# Patient Record
Sex: FEMALE | Race: WHITE | Employment: OTHER | URBAN - METROPOLITAN AREA
[De-identification: names, ages, dates, MRNs, and addresses within clinical notes are randomized per-mention and may not be internally consistent; named-entity substitution may affect disease eponyms.]

---

## 2017-01-26 DIAGNOSIS — Z12.31 ENCOUNTER FOR SCREENING MAMMOGRAM FOR BREAST CANCER: Primary | ICD-10-CM

## 2017-02-22 ENCOUNTER — OFFICE VISIT (OUTPATIENT)
Dept: FAMILY MEDICINE CLINIC | Age: 72
End: 2017-02-22

## 2017-02-22 VITALS
DIASTOLIC BLOOD PRESSURE: 69 MMHG | BODY MASS INDEX: 23.99 KG/M2 | HEART RATE: 60 BPM | SYSTOLIC BLOOD PRESSURE: 137 MMHG | OXYGEN SATURATION: 97 % | WEIGHT: 140.5 LBS | HEIGHT: 64 IN

## 2017-02-22 DIAGNOSIS — E78.00 PURE HYPERCHOLESTEROLEMIA: ICD-10-CM

## 2017-02-22 DIAGNOSIS — I25.10 CORONARY ARTERY DISEASE INVOLVING NATIVE CORONARY ARTERY OF NATIVE HEART WITHOUT ANGINA PECTORIS: ICD-10-CM

## 2017-02-22 DIAGNOSIS — E11.42 TYPE 2 DIABETES MELLITUS WITH DIABETIC POLYNEUROPATHY, WITHOUT LONG-TERM CURRENT USE OF INSULIN (HCC): Primary | ICD-10-CM

## 2017-02-22 DIAGNOSIS — I48.0 PAROXYSMAL ATRIAL FIBRILLATION (HCC): ICD-10-CM

## 2017-02-22 DIAGNOSIS — I73.9 PVD (PERIPHERAL VASCULAR DISEASE) (HCC): ICD-10-CM

## 2017-02-22 DIAGNOSIS — I10 ESSENTIAL HYPERTENSION: ICD-10-CM

## 2017-02-22 LAB
A/G RATIO: 1.3 (ref 1.1–2.2)
ALBUMIN SERPL-MCNC: 4.3 G/DL (ref 3.4–5)
ALP BLD-CCNC: 72 U/L (ref 40–129)
ALT SERPL-CCNC: 18 U/L (ref 10–40)
ANION GAP SERPL CALCULATED.3IONS-SCNC: 12 MMOL/L (ref 3–16)
AST SERPL-CCNC: 15 U/L (ref 15–37)
BILIRUB SERPL-MCNC: 0.5 MG/DL (ref 0–1)
BUN BLDV-MCNC: 26 MG/DL (ref 7–20)
CALCIUM SERPL-MCNC: 9.9 MG/DL (ref 8.3–10.6)
CHLORIDE BLD-SCNC: 96 MMOL/L (ref 99–110)
CHOLESTEROL, TOTAL: 139 MG/DL (ref 0–199)
CO2: 25 MMOL/L (ref 21–32)
CREAT SERPL-MCNC: 0.8 MG/DL (ref 0.6–1.2)
CREATININE URINE POCT: ABNORMAL
GFR AFRICAN AMERICAN: >60
GFR NON-AFRICAN AMERICAN: >60
GLOBULIN: 3.2 G/DL
GLUCOSE BLD-MCNC: 193 MG/DL (ref 70–99)
HDLC SERPL-MCNC: 54 MG/DL (ref 40–60)
LDL CHOLESTEROL CALCULATED: 71 MG/DL
MICROALBUMIN/CREAT 24H UR: 50 MG/G{CREAT}
MICROALBUMIN/CREAT UR-RTO: ABNORMAL
POTASSIUM SERPL-SCNC: 4.3 MMOL/L (ref 3.5–5.1)
SODIUM BLD-SCNC: 133 MMOL/L (ref 136–145)
TOTAL PROTEIN: 7.5 G/DL (ref 6.4–8.2)
TRIGL SERPL-MCNC: 69 MG/DL (ref 0–150)
VLDLC SERPL CALC-MCNC: 14 MG/DL

## 2017-02-22 PROCEDURE — 3017F COLORECTAL CA SCREEN DOC REV: CPT | Performed by: FAMILY MEDICINE

## 2017-02-22 PROCEDURE — 1090F PRES/ABSN URINE INCON ASSESS: CPT | Performed by: FAMILY MEDICINE

## 2017-02-22 PROCEDURE — 1123F ACP DISCUSS/DSCN MKR DOCD: CPT | Performed by: FAMILY MEDICINE

## 2017-02-22 PROCEDURE — G8400 PT W/DXA NO RESULTS DOC: HCPCS | Performed by: FAMILY MEDICINE

## 2017-02-22 PROCEDURE — 3014F SCREEN MAMMO DOC REV: CPT | Performed by: FAMILY MEDICINE

## 2017-02-22 PROCEDURE — 99214 OFFICE O/P EST MOD 30 MIN: CPT | Performed by: FAMILY MEDICINE

## 2017-02-22 PROCEDURE — 36415 COLL VENOUS BLD VENIPUNCTURE: CPT | Performed by: FAMILY MEDICINE

## 2017-02-22 PROCEDURE — G8484 FLU IMMUNIZE NO ADMIN: HCPCS | Performed by: FAMILY MEDICINE

## 2017-02-22 PROCEDURE — G8598 ASA/ANTIPLAT THER USED: HCPCS | Performed by: FAMILY MEDICINE

## 2017-02-22 PROCEDURE — 82044 UR ALBUMIN SEMIQUANTITATIVE: CPT | Performed by: FAMILY MEDICINE

## 2017-02-22 PROCEDURE — 3044F HG A1C LEVEL LT 7.0%: CPT | Performed by: FAMILY MEDICINE

## 2017-02-22 PROCEDURE — G8420 CALC BMI NORM PARAMETERS: HCPCS | Performed by: FAMILY MEDICINE

## 2017-02-22 PROCEDURE — 4040F PNEUMOC VAC/ADMIN/RCVD: CPT | Performed by: FAMILY MEDICINE

## 2017-02-22 PROCEDURE — 1036F TOBACCO NON-USER: CPT | Performed by: FAMILY MEDICINE

## 2017-02-22 PROCEDURE — G8427 DOCREV CUR MEDS BY ELIG CLIN: HCPCS | Performed by: FAMILY MEDICINE

## 2017-02-23 LAB
ESTIMATED AVERAGE GLUCOSE: 240.3 MG/DL
HBA1C MFR BLD: 10 %

## 2017-04-13 RX ORDER — LISINOPRIL 10 MG/1
TABLET ORAL
Qty: 90 TABLET | Refills: 1 | Status: SHIPPED | OUTPATIENT
Start: 2017-04-13 | End: 2017-04-14 | Stop reason: SDUPTHER

## 2017-04-13 RX ORDER — SIMVASTATIN 40 MG
TABLET ORAL
Qty: 90 TABLET | Refills: 1 | Status: SHIPPED | OUTPATIENT
Start: 2017-04-13 | End: 2017-04-14 | Stop reason: SDUPTHER

## 2017-04-14 RX ORDER — LISINOPRIL 10 MG/1
TABLET ORAL
Qty: 90 TABLET | Refills: 3 | Status: SHIPPED | OUTPATIENT
Start: 2017-04-14 | End: 2017-06-30 | Stop reason: DRUGHIGH

## 2017-04-14 RX ORDER — SIMVASTATIN 40 MG
TABLET ORAL
Qty: 90 TABLET | Refills: 3 | Status: SHIPPED | OUTPATIENT
Start: 2017-04-14 | End: 2017-06-30 | Stop reason: ALTCHOICE

## 2017-05-15 ENCOUNTER — HOSPITAL ENCOUNTER (OUTPATIENT)
Dept: OTHER | Age: 72
Discharge: HOME | End: 2017-05-15
Attending: PODIATRIST | Admitting: PODIATRIST

## 2017-05-15 DIAGNOSIS — L97.509 DIABETIC FOOT ULCER ASSOCIATED WITH DIABETES MELLITUS DUE TO UNDERLYING CONDITION, UNSPECIFIED LATERALITY: ICD-10-CM

## 2017-05-15 DIAGNOSIS — E08.621 DIABETIC FOOT ULCER ASSOCIATED WITH DIABETES MELLITUS DUE TO UNDERLYING CONDITION, UNSPECIFIED LATERALITY: ICD-10-CM

## 2017-05-15 LAB
A/G RATIO: 1.2 (ref 1.1–2.2)
ALBUMIN SERPL-MCNC: 4.1 G/DL (ref 3.4–5)
ALP BLD-CCNC: 77 U/L (ref 40–129)
ALT SERPL-CCNC: 19 U/L (ref 10–40)
ANION GAP SERPL CALCULATED.3IONS-SCNC: 14 MMOL/L (ref 3–16)
AST SERPL-CCNC: 18 U/L (ref 15–37)
BASOPHILS ABSOLUTE: 0.1 K/UL (ref 0–0.2)
BASOPHILS RELATIVE PERCENT: 1.1 %
BILIRUB SERPL-MCNC: 0.4 MG/DL (ref 0–1)
BUN BLDV-MCNC: 26 MG/DL (ref 7–20)
CALCIUM SERPL-MCNC: 9.8 MG/DL (ref 8.3–10.6)
CHLORIDE BLD-SCNC: 95 MMOL/L (ref 99–110)
CO2: 25 MMOL/L (ref 21–32)
CREAT SERPL-MCNC: 1.1 MG/DL (ref 0.6–1.2)
EOSINOPHILS ABSOLUTE: 0.3 K/UL (ref 0–0.6)
EOSINOPHILS RELATIVE PERCENT: 3.1 %
GFR AFRICAN AMERICAN: 59
GFR NON-AFRICAN AMERICAN: 49
GLOBULIN: 3.4 G/DL
GLUCOSE BLD-MCNC: 303 MG/DL (ref 70–99)
HCT VFR BLD CALC: 33.4 % (ref 36–48)
HEMOGLOBIN: 11.2 G/DL (ref 12–16)
LYMPHOCYTES ABSOLUTE: 2.6 K/UL (ref 1–5.1)
LYMPHOCYTES RELATIVE PERCENT: 25.1 %
MCH RBC QN AUTO: 27.1 PG (ref 26–34)
MCHC RBC AUTO-ENTMCNC: 33.6 G/DL (ref 31–36)
MCV RBC AUTO: 80.6 FL (ref 80–100)
MONOCYTES ABSOLUTE: 0.6 K/UL (ref 0–1.3)
MONOCYTES RELATIVE PERCENT: 5.8 %
NEUTROPHILS ABSOLUTE: 6.8 K/UL (ref 1.7–7.7)
NEUTROPHILS RELATIVE PERCENT: 64.9 %
PDW BLD-RTO: 14.4 % (ref 12.4–15.4)
PLATELET # BLD: 183 K/UL (ref 135–450)
PMV BLD AUTO: 7.5 FL (ref 5–10.5)
POTASSIUM SERPL-SCNC: 4.2 MMOL/L (ref 3.5–5.1)
RBC # BLD: 4.15 M/UL (ref 4–5.2)
SEDIMENTATION RATE, ERYTHROCYTE: 37 MM/HR (ref 0–30)
SODIUM BLD-SCNC: 134 MMOL/L (ref 136–145)
TOTAL PROTEIN: 7.5 G/DL (ref 6.4–8.2)
VITAMIN B-12: 603 PG/ML (ref 211–911)
WBC # BLD: 10.4 K/UL (ref 4–11)

## 2017-05-16 LAB
ESTIMATED AVERAGE GLUCOSE: 205.9 MG/DL
HBA1C MFR BLD: 8.8 %
VITAMIN D 25-HYDROXY: 14.1 NG/ML

## 2017-05-20 ENCOUNTER — OFFICE VISIT (OUTPATIENT)
Dept: FAMILY MEDICINE CLINIC | Age: 72
End: 2017-05-20

## 2017-05-20 VITALS
WEIGHT: 137.13 LBS | HEART RATE: 79 BPM | SYSTOLIC BLOOD PRESSURE: 181 MMHG | DIASTOLIC BLOOD PRESSURE: 89 MMHG | OXYGEN SATURATION: 99 % | BODY MASS INDEX: 23.91 KG/M2

## 2017-05-20 DIAGNOSIS — M86.172 OSTEOMYELITIS OF ANKLE OR FOOT, LEFT, ACUTE (HCC): ICD-10-CM

## 2017-05-20 DIAGNOSIS — I70.203 ATHEROSCLEROSIS OF NATIVE ARTERY OF BOTH LOWER EXTREMITIES, WITH UNSPECIFIED PRESENCE OF CLINICAL MANIFESTATION (HCC): ICD-10-CM

## 2017-05-20 DIAGNOSIS — L03.119 CELLULITIS OF FOOT: Primary | ICD-10-CM

## 2017-05-20 DIAGNOSIS — E55.9 VITAMIN D DEFICIENCY: ICD-10-CM

## 2017-05-20 PROCEDURE — 1036F TOBACCO NON-USER: CPT | Performed by: FAMILY MEDICINE

## 2017-05-20 PROCEDURE — G8400 PT W/DXA NO RESULTS DOC: HCPCS | Performed by: FAMILY MEDICINE

## 2017-05-20 PROCEDURE — G8420 CALC BMI NORM PARAMETERS: HCPCS | Performed by: FAMILY MEDICINE

## 2017-05-20 PROCEDURE — 1090F PRES/ABSN URINE INCON ASSESS: CPT | Performed by: FAMILY MEDICINE

## 2017-05-20 PROCEDURE — 3014F SCREEN MAMMO DOC REV: CPT | Performed by: FAMILY MEDICINE

## 2017-05-20 PROCEDURE — G8427 DOCREV CUR MEDS BY ELIG CLIN: HCPCS | Performed by: FAMILY MEDICINE

## 2017-05-20 PROCEDURE — G8598 ASA/ANTIPLAT THER USED: HCPCS | Performed by: FAMILY MEDICINE

## 2017-05-20 PROCEDURE — 3017F COLORECTAL CA SCREEN DOC REV: CPT | Performed by: FAMILY MEDICINE

## 2017-05-20 PROCEDURE — 4040F PNEUMOC VAC/ADMIN/RCVD: CPT | Performed by: FAMILY MEDICINE

## 2017-05-20 PROCEDURE — 1123F ACP DISCUSS/DSCN MKR DOCD: CPT | Performed by: FAMILY MEDICINE

## 2017-05-20 PROCEDURE — 99214 OFFICE O/P EST MOD 30 MIN: CPT | Performed by: FAMILY MEDICINE

## 2017-05-20 RX ORDER — GENTAMICIN SULFATE 1 MG/G
CREAM TOPICAL
Status: ON HOLD | COMMUNITY
Start: 2017-05-15 | End: 2017-06-07 | Stop reason: HOSPADM

## 2017-05-20 RX ORDER — DOXYCYCLINE HYCLATE 100 MG/1
100 CAPSULE ORAL 2 TIMES DAILY
Qty: 20 CAPSULE | Refills: 0 | Status: SHIPPED | OUTPATIENT
Start: 2017-05-20 | End: 2017-05-30

## 2017-05-20 RX ORDER — LEVOFLOXACIN 500 MG/1
500 TABLET, FILM COATED ORAL DAILY
Qty: 10 TABLET | Refills: 0 | Status: SHIPPED | OUTPATIENT
Start: 2017-05-20 | End: 2017-05-30

## 2017-05-20 RX ORDER — AMMONIUM LACTATE 12 G/100G
CREAM TOPICAL
Status: ON HOLD | COMMUNITY
Start: 2017-05-15 | End: 2017-06-07 | Stop reason: HOSPADM

## 2017-05-20 RX ORDER — CHOLECALCIFEROL (VITAMIN D3) 1250 MCG
50000 CAPSULE ORAL
Qty: 4 CAPSULE | Refills: 3 | Status: SHIPPED | OUTPATIENT
Start: 2017-05-20 | End: 2017-08-22

## 2017-05-20 RX ORDER — HYDROCODONE BITARTRATE AND ACETAMINOPHEN 5; 325 MG/1; MG/1
1 TABLET ORAL EVERY 6 HOURS PRN
Qty: 100 TABLET | Refills: 0 | Status: ON HOLD | OUTPATIENT
Start: 2017-05-20 | End: 2017-06-07 | Stop reason: HOSPADM

## 2017-05-26 ENCOUNTER — OFFICE VISIT (OUTPATIENT)
Dept: FAMILY MEDICINE CLINIC | Age: 72
End: 2017-05-26

## 2017-05-26 VITALS
WEIGHT: 131.25 LBS | DIASTOLIC BLOOD PRESSURE: 80 MMHG | OXYGEN SATURATION: 98 % | TEMPERATURE: 97.7 F | HEART RATE: 62 BPM | BODY MASS INDEX: 22.89 KG/M2 | SYSTOLIC BLOOD PRESSURE: 151 MMHG

## 2017-05-26 DIAGNOSIS — I73.9 PVD (PERIPHERAL VASCULAR DISEASE) (HCC): ICD-10-CM

## 2017-05-26 DIAGNOSIS — I70.223 ATHEROSCLEROSIS OF NATIVE ARTERY OF BOTH LOWER EXTREMITIES WITH REST PAIN (HCC): Primary | ICD-10-CM

## 2017-05-26 PROCEDURE — 4040F PNEUMOC VAC/ADMIN/RCVD: CPT | Performed by: FAMILY MEDICINE

## 2017-05-26 PROCEDURE — 1090F PRES/ABSN URINE INCON ASSESS: CPT | Performed by: FAMILY MEDICINE

## 2017-05-26 PROCEDURE — 1123F ACP DISCUSS/DSCN MKR DOCD: CPT | Performed by: FAMILY MEDICINE

## 2017-05-26 PROCEDURE — 3017F COLORECTAL CA SCREEN DOC REV: CPT | Performed by: FAMILY MEDICINE

## 2017-05-26 PROCEDURE — G8598 ASA/ANTIPLAT THER USED: HCPCS | Performed by: FAMILY MEDICINE

## 2017-05-26 PROCEDURE — G8419 CALC BMI OUT NRM PARAM NOF/U: HCPCS | Performed by: FAMILY MEDICINE

## 2017-05-26 PROCEDURE — 3014F SCREEN MAMMO DOC REV: CPT | Performed by: FAMILY MEDICINE

## 2017-05-26 PROCEDURE — G8428 CUR MEDS NOT DOCUMENT: HCPCS | Performed by: FAMILY MEDICINE

## 2017-05-26 PROCEDURE — 1036F TOBACCO NON-USER: CPT | Performed by: FAMILY MEDICINE

## 2017-05-26 PROCEDURE — G8400 PT W/DXA NO RESULTS DOC: HCPCS | Performed by: FAMILY MEDICINE

## 2017-05-26 PROCEDURE — 99213 OFFICE O/P EST LOW 20 MIN: CPT | Performed by: FAMILY MEDICINE

## 2017-05-31 PROBLEM — L03.90 CELLULITIS: Status: ACTIVE | Noted: 2017-05-31

## 2017-06-30 ENCOUNTER — OFFICE VISIT (OUTPATIENT)
Dept: VASCULAR SURGERY | Age: 72
End: 2017-06-30

## 2017-06-30 VITALS
HEART RATE: 61 BPM | OXYGEN SATURATION: 98 % | SYSTOLIC BLOOD PRESSURE: 164 MMHG | DIASTOLIC BLOOD PRESSURE: 74 MMHG | BODY MASS INDEX: 22.18 KG/M2 | TEMPERATURE: 97.2 F | WEIGHT: 138 LBS | HEIGHT: 66 IN | RESPIRATION RATE: 16 BRPM

## 2017-06-30 DIAGNOSIS — Z09 POSTOP CHECK: Primary | ICD-10-CM

## 2017-06-30 DIAGNOSIS — Z95.828 S/P VASCULAR BYPASS: ICD-10-CM

## 2017-06-30 PROCEDURE — 99024 POSTOP FOLLOW-UP VISIT: CPT | Performed by: SURGERY

## 2017-06-30 RX ORDER — BISACODYL 10 MG
10 SUPPOSITORY, RECTAL RECTAL DAILY PRN
COMMUNITY
End: 2017-08-22

## 2017-06-30 RX ORDER — CLINDAMYCIN HYDROCHLORIDE 300 MG/1
300 CAPSULE ORAL 3 TIMES DAILY
COMMUNITY
End: 2017-08-22

## 2017-06-30 RX ORDER — ACETAMINOPHEN 325 MG/1
650 TABLET ORAL EVERY 6 HOURS PRN
COMMUNITY
End: 2017-08-22

## 2017-06-30 RX ORDER — HYDROCODONE BITARTRATE AND ACETAMINOPHEN 5; 325 MG/1; MG/1
1 TABLET ORAL EVERY 6 HOURS PRN
COMMUNITY
End: 2017-08-22

## 2017-06-30 RX ORDER — ATORVASTATIN CALCIUM 20 MG/1
20 TABLET, FILM COATED ORAL DAILY
COMMUNITY
End: 2017-08-22 | Stop reason: CLARIF

## 2017-06-30 RX ORDER — LISINOPRIL 20 MG/1
10 TABLET ORAL DAILY
COMMUNITY
End: 2017-08-22 | Stop reason: DRUGHIGH

## 2017-07-06 ENCOUNTER — TELEPHONE (OUTPATIENT)
Dept: CARDIOTHORACIC SURGERY | Age: 72
End: 2017-07-06

## 2017-07-20 RX ORDER — DIGOXIN 250 MCG
TABLET ORAL
Qty: 90 TABLET | Refills: 1 | Status: SHIPPED | OUTPATIENT
Start: 2017-07-20 | End: 2017-08-22 | Stop reason: SDUPTHER

## 2017-07-20 RX ORDER — HYDROCHLOROTHIAZIDE 25 MG/1
25 TABLET ORAL DAILY
Qty: 90 TABLET | Refills: 1 | Status: SHIPPED | OUTPATIENT
Start: 2017-07-20 | End: 2018-01-10 | Stop reason: SDUPTHER

## 2017-08-21 ENCOUNTER — HOSPITAL ENCOUNTER (OUTPATIENT)
Dept: VASCULAR LAB | Age: 72
Discharge: HOME | End: 2017-08-21
Attending: SURGERY | Admitting: SURGERY

## 2017-08-21 DIAGNOSIS — I73.9 PERIPHERAL VASCULAR DISEASE (HCC): ICD-10-CM

## 2017-08-22 ENCOUNTER — TELEPHONE (OUTPATIENT)
Dept: CARDIOTHORACIC SURGERY | Age: 72
End: 2017-08-22

## 2017-08-22 ENCOUNTER — OFFICE VISIT (OUTPATIENT)
Dept: FAMILY MEDICINE CLINIC | Age: 72
End: 2017-08-22

## 2017-08-22 VITALS
HEIGHT: 64 IN | DIASTOLIC BLOOD PRESSURE: 63 MMHG | HEART RATE: 45 BPM | WEIGHT: 124.38 LBS | SYSTOLIC BLOOD PRESSURE: 131 MMHG | BODY MASS INDEX: 21.24 KG/M2 | OXYGEN SATURATION: 98 %

## 2017-08-22 DIAGNOSIS — I10 ESSENTIAL HYPERTENSION: ICD-10-CM

## 2017-08-22 DIAGNOSIS — E78.00 PURE HYPERCHOLESTEROLEMIA: ICD-10-CM

## 2017-08-22 DIAGNOSIS — I73.9 PVD (PERIPHERAL VASCULAR DISEASE) (HCC): ICD-10-CM

## 2017-08-22 DIAGNOSIS — E11.42 TYPE 2 DIABETES MELLITUS WITH DIABETIC POLYNEUROPATHY, WITHOUT LONG-TERM CURRENT USE OF INSULIN (HCC): Primary | ICD-10-CM

## 2017-08-22 DIAGNOSIS — I48.0 PAROXYSMAL ATRIAL FIBRILLATION (HCC): ICD-10-CM

## 2017-08-22 LAB
ANION GAP SERPL CALCULATED.3IONS-SCNC: 15 MMOL/L (ref 3–16)
BUN BLDV-MCNC: 22 MG/DL (ref 7–20)
CALCIUM SERPL-MCNC: 9.3 MG/DL (ref 8.3–10.6)
CHLORIDE BLD-SCNC: 91 MMOL/L (ref 99–110)
CO2: 23 MMOL/L (ref 21–32)
CREAT SERPL-MCNC: 1.1 MG/DL (ref 0.6–1.2)
DIGOXIN LEVEL: 4.4 NG/ML (ref 0.8–2)
GFR AFRICAN AMERICAN: 59
GFR NON-AFRICAN AMERICAN: 49
GLUCOSE BLD-MCNC: 128 MG/DL (ref 70–99)
POTASSIUM SERPL-SCNC: 4.8 MMOL/L (ref 3.5–5.1)
SODIUM BLD-SCNC: 129 MMOL/L (ref 136–145)

## 2017-08-22 PROCEDURE — G8598 ASA/ANTIPLAT THER USED: HCPCS | Performed by: FAMILY MEDICINE

## 2017-08-22 PROCEDURE — 1090F PRES/ABSN URINE INCON ASSESS: CPT | Performed by: FAMILY MEDICINE

## 2017-08-22 PROCEDURE — G8427 DOCREV CUR MEDS BY ELIG CLIN: HCPCS | Performed by: FAMILY MEDICINE

## 2017-08-22 PROCEDURE — 3046F HEMOGLOBIN A1C LEVEL >9.0%: CPT | Performed by: FAMILY MEDICINE

## 2017-08-22 PROCEDURE — 3014F SCREEN MAMMO DOC REV: CPT | Performed by: FAMILY MEDICINE

## 2017-08-22 PROCEDURE — 3017F COLORECTAL CA SCREEN DOC REV: CPT | Performed by: FAMILY MEDICINE

## 2017-08-22 PROCEDURE — 99214 OFFICE O/P EST MOD 30 MIN: CPT | Performed by: FAMILY MEDICINE

## 2017-08-22 PROCEDURE — G8420 CALC BMI NORM PARAMETERS: HCPCS | Performed by: FAMILY MEDICINE

## 2017-08-22 PROCEDURE — G8400 PT W/DXA NO RESULTS DOC: HCPCS | Performed by: FAMILY MEDICINE

## 2017-08-22 PROCEDURE — 1123F ACP DISCUSS/DSCN MKR DOCD: CPT | Performed by: FAMILY MEDICINE

## 2017-08-22 PROCEDURE — 1036F TOBACCO NON-USER: CPT | Performed by: FAMILY MEDICINE

## 2017-08-22 PROCEDURE — 36415 COLL VENOUS BLD VENIPUNCTURE: CPT | Performed by: FAMILY MEDICINE

## 2017-08-22 PROCEDURE — 4040F PNEUMOC VAC/ADMIN/RCVD: CPT | Performed by: FAMILY MEDICINE

## 2017-08-22 RX ORDER — LISINOPRIL 10 MG/1
TABLET ORAL
COMMUNITY
Start: 2017-07-22 | End: 2018-04-30 | Stop reason: SDUPTHER

## 2017-08-22 RX ORDER — DIGOXIN 125 MCG
TABLET ORAL
Qty: 90 TABLET | Refills: 1 | Status: SHIPPED | OUTPATIENT
Start: 2017-08-22 | End: 2018-01-10 | Stop reason: SDUPTHER

## 2017-08-22 RX ORDER — TRAMADOL HYDROCHLORIDE 50 MG/1
TABLET ORAL
Refills: 0 | COMMUNITY
Start: 2017-07-06 | End: 2017-12-04 | Stop reason: ALTCHOICE

## 2017-08-22 RX ORDER — SIMVASTATIN 40 MG
TABLET ORAL
COMMUNITY
Start: 2017-07-22 | End: 2018-04-30 | Stop reason: SDUPTHER

## 2017-08-22 ASSESSMENT — PATIENT HEALTH QUESTIONNAIRE - PHQ9
SUM OF ALL RESPONSES TO PHQ9 QUESTIONS 1 & 2: 0
1. LITTLE INTEREST OR PLEASURE IN DOING THINGS: 0
2. FEELING DOWN, DEPRESSED OR HOPELESS: 0
SUM OF ALL RESPONSES TO PHQ QUESTIONS 1-9: 0

## 2017-08-23 DIAGNOSIS — E87.1 HYPONATREMIA: Primary | ICD-10-CM

## 2017-08-23 LAB
ESTIMATED AVERAGE GLUCOSE: 131.2 MG/DL
HBA1C MFR BLD: 6.2 %

## 2017-09-01 ENCOUNTER — NURSE ONLY (OUTPATIENT)
Dept: FAMILY MEDICINE CLINIC | Age: 72
End: 2017-09-01

## 2017-09-01 DIAGNOSIS — E87.1 HYPONATREMIA: ICD-10-CM

## 2017-09-01 LAB
ANION GAP SERPL CALCULATED.3IONS-SCNC: 16 MMOL/L (ref 3–16)
BUN BLDV-MCNC: 12 MG/DL (ref 7–20)
CALCIUM SERPL-MCNC: 10.1 MG/DL (ref 8.3–10.6)
CHLORIDE BLD-SCNC: 95 MMOL/L (ref 99–110)
CO2: 25 MMOL/L (ref 21–32)
CREAT SERPL-MCNC: 0.9 MG/DL (ref 0.6–1.2)
GFR AFRICAN AMERICAN: >60
GFR NON-AFRICAN AMERICAN: >60
GLUCOSE BLD-MCNC: 110 MG/DL (ref 70–99)
POTASSIUM SERPL-SCNC: 4.4 MMOL/L (ref 3.5–5.1)
SODIUM BLD-SCNC: 136 MMOL/L (ref 136–145)

## 2017-09-01 PROCEDURE — 36415 COLL VENOUS BLD VENIPUNCTURE: CPT | Performed by: FAMILY MEDICINE

## 2017-10-27 RX ORDER — HYDROCODONE BITARTRATE AND ACETAMINOPHEN 5; 325 MG/1; MG/1
1 TABLET ORAL EVERY 6 HOURS PRN
Qty: 60 TABLET | Refills: 0 | Status: SHIPPED | OUTPATIENT
Start: 2017-10-27 | End: 2017-11-29 | Stop reason: SDUPTHER

## 2017-10-27 NOTE — TELEPHONE ENCOUNTER
An [place] Autom[employer] Reporting System report was run and reviewed on the patient today. The results are consistent with the patient's treatment plan.

## 2017-11-28 ENCOUNTER — OFFICE VISIT (OUTPATIENT)
Dept: FAMILY MEDICINE CLINIC | Age: 72
End: 2017-11-28

## 2017-11-28 VITALS
BODY MASS INDEX: 22.73 KG/M2 | HEART RATE: 69 BPM | DIASTOLIC BLOOD PRESSURE: 74 MMHG | OXYGEN SATURATION: 100 % | WEIGHT: 130.38 LBS | TEMPERATURE: 98.1 F | SYSTOLIC BLOOD PRESSURE: 144 MMHG

## 2017-11-28 DIAGNOSIS — I48.0 PAROXYSMAL ATRIAL FIBRILLATION (HCC): ICD-10-CM

## 2017-11-28 DIAGNOSIS — I10 ESSENTIAL HYPERTENSION: ICD-10-CM

## 2017-11-28 DIAGNOSIS — I73.9 PVD (PERIPHERAL VASCULAR DISEASE) (HCC): ICD-10-CM

## 2017-11-28 DIAGNOSIS — L97.512 SKIN ULCER OF RIGHT FOOT WITH FAT LAYER EXPOSED (HCC): ICD-10-CM

## 2017-11-28 DIAGNOSIS — E11.42 TYPE 2 DIABETES MELLITUS WITH DIABETIC POLYNEUROPATHY, WITHOUT LONG-TERM CURRENT USE OF INSULIN (HCC): Primary | ICD-10-CM

## 2017-11-28 DIAGNOSIS — L97.911 ULCER OF RIGHT LOWER EXTREMITY, LIMITED TO BREAKDOWN OF SKIN (HCC): ICD-10-CM

## 2017-11-28 LAB — DIGOXIN LEVEL: 1.1 NG/ML (ref 0.8–2)

## 2017-11-28 PROCEDURE — G8484 FLU IMMUNIZE NO ADMIN: HCPCS | Performed by: FAMILY MEDICINE

## 2017-11-28 PROCEDURE — 3017F COLORECTAL CA SCREEN DOC REV: CPT | Performed by: FAMILY MEDICINE

## 2017-11-28 PROCEDURE — 36415 COLL VENOUS BLD VENIPUNCTURE: CPT | Performed by: FAMILY MEDICINE

## 2017-11-28 PROCEDURE — G8598 ASA/ANTIPLAT THER USED: HCPCS | Performed by: FAMILY MEDICINE

## 2017-11-28 PROCEDURE — G8420 CALC BMI NORM PARAMETERS: HCPCS | Performed by: FAMILY MEDICINE

## 2017-11-28 PROCEDURE — 3014F SCREEN MAMMO DOC REV: CPT | Performed by: FAMILY MEDICINE

## 2017-11-28 PROCEDURE — 4040F PNEUMOC VAC/ADMIN/RCVD: CPT | Performed by: FAMILY MEDICINE

## 2017-11-28 PROCEDURE — G8400 PT W/DXA NO RESULTS DOC: HCPCS | Performed by: FAMILY MEDICINE

## 2017-11-28 PROCEDURE — 1090F PRES/ABSN URINE INCON ASSESS: CPT | Performed by: FAMILY MEDICINE

## 2017-11-28 PROCEDURE — 99214 OFFICE O/P EST MOD 30 MIN: CPT | Performed by: FAMILY MEDICINE

## 2017-11-28 PROCEDURE — 1123F ACP DISCUSS/DSCN MKR DOCD: CPT | Performed by: FAMILY MEDICINE

## 2017-11-28 PROCEDURE — G8427 DOCREV CUR MEDS BY ELIG CLIN: HCPCS | Performed by: FAMILY MEDICINE

## 2017-11-28 PROCEDURE — 1036F TOBACCO NON-USER: CPT | Performed by: FAMILY MEDICINE

## 2017-11-28 PROCEDURE — 3044F HG A1C LEVEL LT 7.0%: CPT | Performed by: FAMILY MEDICINE

## 2017-11-28 NOTE — PATIENT INSTRUCTIONS
Please read the healthy family handout that you were given and share it with your family. Please compare this printed medication list with your medications at home to be sure they are the same. If you have any medications that are different please contact us immediately at [phone]. Also review your allergies that we have listed, these may also include medications that you have not been able to tolerate, make sure everything listed is correct. If you have any allergies that are different please contact us immediately at [phone].

## 2017-11-28 NOTE — PROGRESS NOTES
Subjective:   She presents for follow up of her diabetes atrial fibrillation hypertension PVD and chronic ulcerations. She states she has been going to podiatry since May and her toe was not getting any better she would like to see somebody different. She also has a shallow ulcer on her right leg that has not healed. She's been using antibiotic ointment on it she's been on oral antibiotics per podiatry. She admits she is still noncompliant with her diet her digoxin level was elevated on last blood work so we reduced her digoxin dose and her stomach is feeling better now she states her appetite has increased. We are going to recheck the level today. Blood pressures have been decently controlled        She is allergic to pcn [penicillins]. She   reports that she has never smoked. She has never used smokeless tobacco. Review of systems negative for chest pain shortness of breath wheezing abdominal pain and rectal bleeding positive for chronic leg swelling left usually worse than right  Objective:   BP (!) 144/74 (Site: Left Arm, Position: Sitting, Cuff Size: Small Adult)   Pulse 69   Temp 98.1 °F (36.7 °C) (Oral)   Wt 130 lb 6 oz (59.1 kg)   SpO2 100%   BMI 22.73 kg/m²   BP Readings from Last 3 Encounters:   11/28/17 (!) 144/74   08/22/17 131/63   06/30/17 (!) 164/74     Physical Exam   Constitutional: She is oriented to person, place, and time. She appears well-developed and well-nourished. Non-toxic appearance. HENT:   Head: Normocephalic. Eyes: Conjunctivae are normal. Right eye exhibits no discharge. Left eye exhibits no discharge. No scleral icterus. Neck: Neck supple. Carotid bruit is not present. No thyroid mass and no thyromegaly present. Cardiovascular: Normal rate, regular rhythm and normal heart sounds. No murmur heard. Pulmonary/Chest: Breath sounds normal. No respiratory distress. She has no wheezes. She has no rales. Abdominal: Soft. She exhibits no distension and no mass.  There is no hepatosplenomegaly. There is no tenderness. There is no rebound and no guarding. Musculoskeletal: She exhibits edema. Lymphadenopathy:     She has no cervical adenopathy. Right: No supraclavicular adenopathy present. Neurological: She is alert and oriented to person, place, and time. Skin: Skin is warm. No cyanosis. Psychiatric: She has a normal mood and affect. Her behavior is normal. Judgment and thought content normal.   Vitals reviewed. very shallow skin breakdown on left shin small ulceration left medial leg. Ulceration left great toe top  Assessment and Plan:  1. Type 2 diabetes mellitus with diabetic polyneuropathy, without long-term current use of insulin (HCC)  Hemoglobin A1C   2. Paroxysmal atrial fibrillation ([facility])  DIGOXIN LEVEL   3. Essential hypertension     4. PVD (peripheral vascular disease) ([facility]     5. Ulcer of right lower extremity, limited to breakdown of skin [facility])  [facility]   6. Skin ulcer of right foot with fat layer exposed ([facility]  [facility]   After long discussion will change to [facility] wound care center instead of podiatry in [facility] at patient request.  Patient did not want to go down to [facility] or [address] [place] [address] to see vascular surgeon  The problems listed in the assessment are stable unless otherwise indicated. She  was instructed to continue their current medications and treatment for the above  problems unless otherwise indicated above. Age-specific preventative medicine recommendations were reviewed with patient today and the Healthy Family Handout was given to patient. Avoid tobacco products exposure. Follow up in 3 mo. Call or return to office for any problems that develop before the next scheduled follow-up appointment. Jeremiha Monsivais M.D. Parts of this note were completed using voice recognition transcription.   Every effort was made to ensure accuracy; however, inadvertent transcription errors may be present.

## 2017-11-29 LAB
ESTIMATED AVERAGE GLUCOSE: 111.2 MG/DL
HBA1C MFR BLD: 5.5 %

## 2017-11-29 RX ORDER — HYDROCODONE BITARTRATE AND ACETAMINOPHEN 5; 325 MG/1; MG/1
1 TABLET ORAL EVERY 6 HOURS PRN
Qty: 60 TABLET | Refills: 0 | Status: SHIPPED | OUTPATIENT
Start: 2017-11-29 | End: 2018-02-12 | Stop reason: ALTCHOICE

## 2017-12-04 ENCOUNTER — HOSPITAL ENCOUNTER (OUTPATIENT)
Dept: WOUND CARE | Age: 72
Discharge: HOME | End: 2017-12-04
Attending: PODIATRIST | Admitting: PODIATRIST

## 2017-12-04 VITALS
DIASTOLIC BLOOD PRESSURE: 72 MMHG | TEMPERATURE: 98.2 F | RESPIRATION RATE: 17 BRPM | WEIGHT: 136.7 LBS | HEIGHT: 65 IN | HEART RATE: 78 BPM | BODY MASS INDEX: 22.78 KG/M2 | SYSTOLIC BLOOD PRESSURE: 165 MMHG

## 2017-12-04 NOTE — PROGRESS NOTES
[address] Progress Note      Dolores Gallo     : 1945    DATE OF VISIT:  2017    Subjective:     Dolores Gallo is a 67 y.o. female who has a chief complaint of a diabetic ulcer located on the left foot and right foot and left leg. Patient has been seeing Dr. Maricruz Richards for her wounds. Did have arterial procedure with Dr. Ángela Gastelum during the summer. States did see him once after the surgery for follow up. States that she has a lot of burning pain in her legs and feet. States that she thought it was due to medicine she used on her feet until she stopped using the medication and the burning continued. Has also been seen at the [address] [facility] in the past.  She is unsure if bone was removed from her great toe during that time. Did not have the 4th toe wound at that time. Ms. Marcelo Gregory has a past medical history of Atrial fibrillation ([address] Blood transfusion; CAD (coronary artery disease); Diabetes mellitus type II; Diabetic neuropathy ([address] Gastric ulcer; H. pylori infection; Hyperlipidemia; Hypertension; Numbness and tingling of left leg; Osteoarthritis; Poor historian; PVD (peripheral vascular disease) ([address] Unspecified cerebral artery occlusion with cerebral infarction; and upper gi bleed. She has a past surgical history that includes Coronary angioplasty with stent (244,4285); Cardiac catheterization (12); Total shoulder arthroplasty (10/5/12); angioplasty (12/30/15); and other surgical history (Left, 2017). Her family history includes Diabetes in her brother, maternal grandmother, and sister; Heart Disease in her father and mother; Stroke in her mother. Ms. Marcelo Gregory reports that she has never smoked. She has never used smokeless tobacco. She reports that she does not drink alcohol or use drugs.     Her current medication list consists of HYDROcodone-acetaminophen, aspirin, collagenase, digoxin, hydrochlorothiazide, lisinopril, metFORMIN, and simvastatin. Allergies: Pcn [penicillins]    Pertinent items from the review of systems are discussed in the HPI; the remainder of the ROS was reviewed and is negative. Objective:     BP (!) 165/72   Pulse 78   Temp 98.2 °F (36.8 °C) (Oral)   Resp 17   Ht 5' 5\" (1.651 m)   Wt 136 lb 11.2 oz (62 kg)   BMI 22.75 kg/m²   General Appearance: alert and oriented to person, place and time, well developed and well- nourished, in no acute distress  Skin: warm and dry, no rash or erythema  Head: normocephalic and atraumatic  Eyes: pupils equal, round, and reactive to light, extraocular eye movements intact, conjunctivae normal  ENT: tympanic membrane, external ear and ear canal normal bilaterally, nose without deformity, nasal mucosa and turbinates normal without polyps  Neck: supple and non-tender without mass, no thyromegaly or thyroid nodules, no cervical lymphadenopathy  Pulmonary/Chest: clear to auscultation bilaterally- no wheezes, rales or rhonchi, normal air movement, no respiratory distress  Cardiovascular: normal rate, regular rhythm, normal S1 and S2, no murmurs, rubs, clicks, or gallops, distal pulses intact, no carotid bruits  Abdomen: soft, non-tender, non-distended, normal bowel sounds, no masses or organomegaly.      Dorsalis pedis pulse left dopplerable  Posterior tibial pulse left dopplerable  Dorsalis pedis pulse right dopplerable  Posterior tibial pulse right dopplerable  Protective sensation absent bilateral LE      Ulcer on the posterior aspect right heel with no fibrotic tissue, red granulation tissue, mild serous drainage, no hyperkeratotic rim, no undermining, no tunneling, no purulence, no malodor, no eschar, no periwound maceration, no periwound erythema, no edema, no crepitus, no increase in skin temperature, ulcer probes to soft tissue only    Ulcer on the left hallux overlying the HIPJ with moderate fibrotic tissue, no granulation tissue, mild serous drainage, no hyperkeratotic rim, (safegel dry dressing ). Home treatment: good handwashing before and after any dressing changes. Cleanse ulcer with saline or soap & water before dressing change. May use Vaseline (petrolatum), Aquaphor, Aveeno, CeraVe, Cetaphil, Eucerin, Lubriderm, etc for dry skin. Dressing type for home: Continue topical ointment to wounds daily. Written discharge instructions given to patient. Offload ulcer(s) as directed. Elevate leg(s) as directed. Follow up in 1 week. Ordered xray to evaluate for osteomyelitis. Advised patient that most of the burning pain is due to peripheral neuropathy. Will need to follow up with Dr. Lorenza Flores. Review of notes in EPIC reveals concern for viability of toes even after arterial procedure with Dr. Lorenza Flores. Advised patient that given exposed bone there is a concern for the viability of her toes.          Electronically signed by Maru Kang DPM on 12/4/2017 at 4:43 PM.

## 2017-12-11 ENCOUNTER — HOSPITAL ENCOUNTER (OUTPATIENT)
Dept: WOUND CARE | Age: 72
Discharge: HOME | End: 2017-12-11
Attending: PODIATRIST | Admitting: PODIATRIST

## 2017-12-11 VITALS
TEMPERATURE: 98.1 F | RESPIRATION RATE: 16 BRPM | SYSTOLIC BLOOD PRESSURE: 158 MMHG | BODY MASS INDEX: 22.82 KG/M2 | HEIGHT: 65 IN | WEIGHT: 137 LBS | HEART RATE: 70 BPM | DIASTOLIC BLOOD PRESSURE: 72 MMHG

## 2017-12-11 ASSESSMENT — PAIN DESCRIPTION - FREQUENCY: FREQUENCY: INTERMITTENT

## 2017-12-11 ASSESSMENT — PAIN DESCRIPTION - ORIENTATION: ORIENTATION: LEFT

## 2017-12-11 ASSESSMENT — PAIN DESCRIPTION - ONSET: ONSET: ON-GOING

## 2017-12-11 ASSESSMENT — PAIN DESCRIPTION - PAIN TYPE: TYPE: CHRONIC PAIN

## 2017-12-11 ASSESSMENT — PAIN DESCRIPTION - DESCRIPTORS: DESCRIPTORS: BURNING

## 2017-12-11 ASSESSMENT — PAIN DESCRIPTION - PROGRESSION: CLINICAL_PROGRESSION: NOT CHANGED

## 2017-12-11 ASSESSMENT — PAIN SCALES - GENERAL: PAINLEVEL_OUTOF10: 5

## 2017-12-11 ASSESSMENT — PAIN DESCRIPTION - LOCATION: LOCATION: FOOT

## 2017-12-11 NOTE — PROGRESS NOTES
round, and reactive to light, extraocular eye movements intact, conjunctivae normal  ENT: tympanic membrane, external ear and ear canal normal bilaterally, nose without deformity, nasal mucosa and turbinates normal without polyps  Neck: supple and non-tender without mass, no thyromegaly or thyroid nodules, no cervical lymphadenopathy  Pulmonary/Chest: clear to auscultation bilaterally- no wheezes, rales or rhonchi, normal air movement, no respiratory distress  Cardiovascular: normal rate, regular rhythm, normal S1 and S2, no murmurs, rubs, clicks, or gallops, distal pulses intact, no carotid bruits  Abdomen: soft, non-tender, non-distended, normal bowel sounds, no masses or organomegaly.      Dorsalis pedis pulse left dopplerable  Posterior tibial pulse left dopplerable  Dorsalis pedis pulse right dopplerable  Posterior tibial pulse right dopplerable  Protective sensation absent bilateral LE      Ulcer on the posterior aspect right heel with no fibrotic tissue, red granulation tissue, mild serous drainage, no hyperkeratotic rim, no undermining, no tunneling, no purulence, no malodor, no eschar, no periwound maceration, no periwound erythema, no edema, no crepitus, no increase in skin temperature, ulcer probes to soft tissue only    Ulcer on the left hallux overlying the HIPJ with moderate fibrotic tissue, no granulation tissue, mild serous drainage, no hyperkeratotic rim, no undermining, no tunneling, no purulence, no malodor, no eschar,  no periwound maceration, mild periwound erythema, no edema, no crepitus, no increase in skin temperature, ulcer probes to bone    Ulcer on the left 4th digit overlying the PIPJ with mild fibrotic tissue, no granulation tissue, mild serous drainage, no hyperkeratotic rim, no undermining, no tunneling, no purulence, no malodor, no eschar, no periwound maceration, mild periwound erythema, mild edema, no crepitus, no increase in skin temperature, ulcer probes to bone that is  Noncompliance of patient with dietary regimen Z91.11    PVD (peripheral vascular disease) ([facility]) I73.9    Pure hypercholesterolemia E78.00    Coronary artery disease involving native coronary artery of native heart without angina pectoris I25.10    Essential hypertension I10    Type 2 diabetes mellitus with diabetic polyneuropathy, without long-term current use of insulin ([facility]) E11.42    Vitamin D deficiency E55.9    Cellulitis L03.90    Acute osteomyelitis of left foot ([facility]) M86.172       Assessment of today's active condition(s): diabetic foot ulcers bilateral, chronic ulcer left lower leg, diabetes mellitus, PAD. Factors contributing to occurrence and/or persistence of the chronic ulcer include diabetes and arterial insufficiency. Sharp debridement is indicated today, based upon the exam findings in the ulcer(s) above. Procedure note:     Consent obtained. Time out taken. Anesthetic  Anesthetic:  (post debridement)     After application of topical 4% lidocaine plain to the ulcers, the ulcer on left 4th digit was debrided of all fibrotic, necrotic, hyperkeratotic, nonviable and viable tissue through subcutaneous tissue and muscle and bone. This excised skin, subcutaneous tissue and muscle and bone with a scalpel and ronguer. Total Surface Area Debrided:  1 sq cm. The ulcer(s) were then irrigated with normal saline solution. The procedure was completed with a small amount of bleeding, and hemostasis was by pressure and by silver nitrate stick. The patient tolerated the procedure well, with no significant complications. The patient's level of pain during and after the procedure was monitored, and is noted in the wound documentation flowsheet. Discharge plan:     Treatment in the wound care center today:  . Home treatment: good handwashing before and after any dressing changes. Cleanse ulcer with saline or soap & water before dressing change.  May use Vaseline (petrolatum), Aquaphor,

## 2017-12-11 NOTE — PLAN OF CARE
Problem: Wound:  Goal: Will show signs of wound healing; wound closure and no evidence of infection  Will show signs of wound healing; wound closure and no evidence of infection   Outcome: Ongoing  Pt to the [facility] for follow up appointment. Dr Hosea Malik discussed Jacqui Pac results with patient. Pt to continue with santyl to wounds at home. Pt to follow up in the [facility] in 1 week. Discharge instructions reviewed with patient, all questions answered, copy given to patient. Dressings were applied to all wounds per M.D. Instructions at this visit.

## 2017-12-18 ENCOUNTER — HOSPITAL ENCOUNTER (OUTPATIENT)
Dept: WOUND CARE | Age: 72
Discharge: HOME | End: 2017-12-18
Attending: PODIATRIST | Admitting: PODIATRIST

## 2017-12-18 VITALS
TEMPERATURE: 97.9 F | WEIGHT: 139 LBS | RESPIRATION RATE: 17 BRPM | SYSTOLIC BLOOD PRESSURE: 156 MMHG | BODY MASS INDEX: 23.16 KG/M2 | HEART RATE: 65 BPM | HEIGHT: 65 IN | DIASTOLIC BLOOD PRESSURE: 76 MMHG

## 2017-12-18 NOTE — PROGRESS NOTES
[facility] Progress Note      Mali Mancini     : 1945    DATE OF VISIT:  2017    Subjective:     Mali Mancini is a 67 y.o. female who has a chief complaint of a diabetic ulcer located on the left foot and right foot and left leg. Ms. Kylee Buchanan has a past medical history of Atrial fibrillation ([facility] Blood transfusion; CAD (coronary artery disease); Diabetes mellitus type II; Diabetic neuropathy ([address] Gastric ulcer; H. pylori infection; Hyperlipidemia; Hypertension; Numbness and tingling of left leg; Osteoarthritis; Poor historian; PVD (peripheral vascular disease) ([facility] Unspecified cerebral artery occlusion with cerebral infarction; and upper gi bleed. She has a past surgical history that includes Coronary angioplasty with stent (9694,4038); Cardiac catheterization (12); Total shoulder arthroplasty (10/5/12); angioplasty (12/30/15); and other surgical history (Left, 2017). Her family history includes Diabetes in her brother, maternal grandmother, and sister; Heart Disease in her father and mother; Stroke in her mother. Ms. Kylee Buchanan reports that she has never smoked. She has never used smokeless tobacco. She reports that she does not drink alcohol or use drugs. Her current medication list consists of HYDROcodone-acetaminophen, aspirin, collagenase, digoxin, hydrochlorothiazide, lisinopril, metFORMIN, and simvastatin. Allergies: Pcn [penicillins]    Pertinent items from the review of systems are discussed in the HPI; the remainder of the ROS was reviewed and is negative.        Objective:     BP (!) 156/76   Pulse 65   Temp 97.9 °F (36.6 °C) (Oral)   Resp 17   Ht 5' 5\" (1.651 m)   Wt 139 lb (63 kg)   BMI 23.13 kg/m²   General Appearance: alert and oriented to person, place and time, well developed and well- nourished, in no acute distress  Skin: warm and dry, no rash or erythema  Head: normocephalic and atraumatic  Eyes: pupils equal, bone    Ulcer on the left lower leg medial aspect within cicatrix with mild to moderate fibrotic tissue, red granulation tissue, mild serous drainage, no hyperkeratotic rim, no undermining, no tunneling, no purulence, no malodor, no eschar,  mild periwound maceration, mild periwound erythema, mild edema, no crepitus, no increase in skin temperature, ulcer probes to soft tissue only         Today's ulcer measurements are in the wound documentation flowsheet.      Wound 12/04/17 #1- L medial lower leg cluster, diabetic leg ulcer, holbrook 1, onset 11/17, surgical-Wound Length (cm): 4.3 cm  Wound 12/04/17 #2- L dorsal great toe, diabetic, Holbrook 1, onset 05/17, trauma-Wound Length (cm): 0.9 cm  Wound 12/04/17 #3- L dorsal 4th toe, diabetic, Holbrook 1, onset 05/17, trauma-Wound Length (cm): 0.4 cm  Wound 12/04/17 #4- R heel, diabetic, H[place]rook 1, onset 11/17, trauma-Wound Length (cm): 0.7 cm  Wound 12/04/17 #1- L medial lower leg cluster, diabetic leg ulcer, holbrook 1, onset 11/17, surgical-Wound Width (cm): 0.6 cm  Wound 12/04/17 #2- L dorsal great toe, diabetic, Holbrook 1, onset 05/17, trauma-Wound Width (cm): 0.6 cm  Wound 12/04/17 #3- L dorsal 4th toe, diabetic, Holbrook 1, onset 05/17, trauma-Wound Width (cm): 0.3 cm  Wound 12/04/17 #4- R heel, diabetic, Holbrook 1, onset 11/17, trauma-Wound Width (cm): 0.2 cm  Wound 12/04/17 #1- L medial lower leg cluster, diabetic leg ulcer, holbrook 1, onset 11/17, surgical-Wound Depth (cm) : 1.3  Wound 12/04/17 #2- L dorsal great toe, diabetic, Holbrook 1, onset 05/17, trauma-Wound Depth (cm) : 0.3  Wound 12/04/17 #3- L dorsal 4th toe, diabetic, Holbrook 1, onset 05/17, trauma-Wound Depth (cm) : 0.2  Wound 12/04/17 #4- R heel, diabetic, Holbrook 1, onset 11/17, trauma-Wound Depth (cm) : 0.2    LABS  Lab Results   Component Value Date    LABA1C 5.5 11/28/2017         Assessment:     Patient Active Problem List   Diagnosis Code    Osteoarthritis M19.90    Paroxysmal atrial fibrillation ([facility] [address] I48.0  Noncompliance of patient with dietary regimen Z91.11    PVD (peripheral vascular disease) ([facility]) I73.9    Pure hypercholesterolemia E78.00    Coronary artery disease involving native coronary artery of native heart without angina pectoris I25.10    Essential hypertension I10    Type 2 diabetes mellitus with diabetic polyneuropathy, without long-term current use of insulin ([facility]) E11.42    Vitamin D deficiency E55.9    Cellulitis L03.90    Acute osteomyelitis of left foot ([facility]) M86.172       Assessment of today's active condition(s): diabetic foot ulcers bilateral, chronic ulcer left lower leg, diabetes mellitus, PAD. Factors contributing to occurrence and/or persistence of the chronic ulcer include diabetes and arterial insufficiency. Sharp debridement is indicated today, based upon the exam findings in the ulcer(s) above. Procedure note:     Consent obtained. Time out taken. Anesthetic  Anesthetic:  (post debridement)     After application of topical 4% lidocaine plain to the ulcer, the ulcer was debrided of all fibrotic, necrotic, hyperkeratotic tissue, nonviable and viable tissue through the subcutaneous tissue. This excised skin and subcutaneous tissue with a scalpel. Total Surface Area Debrided:  1 sq cm. The ulcer(s) were then irrigated with normal saline solution. The procedure was completed with a small amount of bleeding, and hemostasis was by pressure and by silver nitrate stick. The patient tolerated the procedure well, with no significant complications. The patient's level of pain during and after the procedure was monitored, and is noted in the wound documentation flowsheet.          Discharge plan:     Treatment in the wound care center today: Wound 12/04/17 #1- L medial lower leg cluster, diabetic leg ulcer, holbrook 1, onset 11/17, surgical-Dressing/Treatment:  (Aquacel Ag,Mepilex border)  Wound 12/04/17 #2- L dorsal great toe, diabetic, Holbrook 1, onset 05/17, trauma-Dressing/Treatment:  (Aquacel Ag,Mepilex border)  Wound 12/04/17 #3- L dorsal 4th toe, diabetic, Holbrook 1, onset 05/17, trauma-Dressing/Treatment:  (Aquacel Ag,Mepilex border)  Wound 12/04/17 #4- R heel, diabetic, Holbrook 1, onset 11/17, trauma-Dressing/Treatment:  (Aquacel Ag,Mepilex border). Home treatment: good handwashing before and after any dressing changes. Cleanse ulcer with saline or soap & water before dressing change. May use Vaseline (petrolatum), Aquaphor, Aveeno, CeraVe, Cetaphil, Eucerin, Lubriderm, etc for dry skin. Dressing type for home: Continue topical ointment to wounds daily. Written discharge instructions given to patient. Offload ulcer(s) as directed. Elevate leg(s) as directed. Follow up in 1 week. Advised patient that most of the burning pain is due to peripheral neuropathy. Will need to follow up with Dr. Ila Sanchez.              Electronically signed by Anibal Graves DPM on 12/18/2017 at 5:53 PM.

## 2018-01-08 ENCOUNTER — HOSPITAL ENCOUNTER (OUTPATIENT)
Dept: WOUND CARE | Age: 73
Discharge: HOME | End: 2018-01-08
Attending: PODIATRIST | Admitting: PODIATRIST

## 2018-01-08 VITALS
HEIGHT: 65 IN | DIASTOLIC BLOOD PRESSURE: 71 MMHG | RESPIRATION RATE: 16 BRPM | TEMPERATURE: 98.7 F | BODY MASS INDEX: 23.32 KG/M2 | HEART RATE: 66 BPM | WEIGHT: 140 LBS | SYSTOLIC BLOOD PRESSURE: 162 MMHG

## 2018-01-08 NOTE — PROGRESS NOTES
diabetic, Holbrook 1, onset 05/17, trauma-Dressing/Treatment: Other (Comment) (aquacel ag,dry dressing ). Home treatment: good handwashing before and after any dressing changes. Cleanse ulcer with saline or soap & water before dressing change. May use Vaseline (petrolatum), Aquaphor, Aveeno, CeraVe, Cetaphil, Eucerin, Lubriderm, etc for dry skin. Dressing type for home: Aquacel AG and dry dressing to the wounds changed daily. Written discharge instructions given to patient. Offload ulcer(s) as directed. Elevate leg(s) as directed. Follow up in 1 week. Continue follow up with Dr. Sandy Tripp as recommended by him. Discussed surgical intervention with patient. Given communication between leg ulcers will need excision and debridement of the area. Will also need debridement of foot ulcers in order to stimulate wound healing as well. Explained risks, complications, alternatives and benefits with patient. Understands chance of nonhealing wound, infection, need for further surgery, loss of limb or life. Will PA and schedule for ulcer debridement with graft application.              Electronically signed by Saulo Hobson DPM on 1/8/2018 at 4:47 PM.

## 2018-01-10 DIAGNOSIS — I48.0 PAROXYSMAL ATRIAL FIBRILLATION (HCC): ICD-10-CM

## 2018-01-10 RX ORDER — HYDROCHLOROTHIAZIDE 25 MG/1
25 TABLET ORAL DAILY
Qty: 90 TABLET | Refills: 1 | Status: SHIPPED | OUTPATIENT
Start: 2018-01-10 | End: 2018-07-31 | Stop reason: SDUPTHER

## 2018-01-10 RX ORDER — DIGOXIN 125 MCG
TABLET ORAL
Qty: 90 TABLET | Refills: 1 | Status: SHIPPED | OUTPATIENT
Start: 2018-01-10 | End: 2018-07-31 | Stop reason: SDUPTHER

## 2018-01-10 NOTE — TELEPHONE ENCOUNTER
Future appt Visit date 3/2/18    Last appt 11/28/17    Refilled medication per verbal order from MD.

## 2018-01-11 ENCOUNTER — HOSPITAL ENCOUNTER (OUTPATIENT)
Dept: SURGERY | Age: 73
Discharge: HOME | End: 2018-01-11
Attending: PODIATRIST | Admitting: PODIATRIST

## 2018-01-11 VITALS
DIASTOLIC BLOOD PRESSURE: 84 MMHG | OXYGEN SATURATION: 96 % | BODY MASS INDEX: 23.32 KG/M2 | WEIGHT: 140 LBS | HEIGHT: 65 IN | RESPIRATION RATE: 16 BRPM | TEMPERATURE: 98.4 F | SYSTOLIC BLOOD PRESSURE: 159 MMHG | HEART RATE: 53 BPM

## 2018-01-11 DIAGNOSIS — L97.514 DIABETIC ULCER OF OTHER PART OF RIGHT FOOT ASSOCIATED WITH TYPE 2 DIABETES MELLITUS, WITH NECROSIS OF BONE (HCC): Primary | ICD-10-CM

## 2018-01-11 DIAGNOSIS — E11.621 DIABETIC ULCER OF OTHER PART OF RIGHT FOOT ASSOCIATED WITH TYPE 2 DIABETES MELLITUS, WITH NECROSIS OF BONE (HCC): Primary | ICD-10-CM

## 2018-01-11 LAB
GLUCOSE BLD-MCNC: 118 MG/DL (ref 70–99)
PERFORMED ON: ABNORMAL

## 2018-01-11 RX ORDER — PROMETHAZINE HYDROCHLORIDE 25 MG/ML
6.25 INJECTION, SOLUTION INTRAMUSCULAR; INTRAVENOUS
Status: ACTIVE | OUTPATIENT
Start: 2018-01-11 | End: 2018-01-11

## 2018-01-11 RX ORDER — HYDRALAZINE HYDROCHLORIDE 20 MG/ML
5 INJECTION INTRAMUSCULAR; INTRAVENOUS EVERY 10 MIN PRN
Status: DISCONTINUED | OUTPATIENT
Start: 2018-01-11 | End: 2018-01-12 | Stop reason: HOSPADM

## 2018-01-11 RX ORDER — HYDROCODONE BITARTRATE AND ACETAMINOPHEN 5; 325 MG/1; MG/1
1-2 TABLET ORAL EVERY 4 HOURS PRN
Qty: 30 TABLET | Refills: 0 | Status: SHIPPED | OUTPATIENT
Start: 2018-01-11 | End: 2018-01-18

## 2018-01-11 RX ORDER — ONDANSETRON 2 MG/ML
4 INJECTION INTRAMUSCULAR; INTRAVENOUS
Status: ACTIVE | OUTPATIENT
Start: 2018-01-11 | End: 2018-01-11

## 2018-01-11 RX ORDER — HYDROMORPHONE HCL 110MG/55ML
0.25 PATIENT CONTROLLED ANALGESIA SYRINGE INTRAVENOUS EVERY 5 MIN PRN
Status: DISCONTINUED | OUTPATIENT
Start: 2018-01-11 | End: 2018-01-12 | Stop reason: HOSPADM

## 2018-01-11 RX ORDER — DIPHENHYDRAMINE HYDROCHLORIDE 50 MG/ML
12.5 INJECTION INTRAMUSCULAR; INTRAVENOUS
Status: ACTIVE | OUTPATIENT
Start: 2018-01-11 | End: 2018-01-11

## 2018-01-11 RX ORDER — LIDOCAINE HYDROCHLORIDE 10 MG/ML
0.3 INJECTION, SOLUTION EPIDURAL; INFILTRATION; INTRACAUDAL; PERINEURAL
Status: COMPLETED | OUTPATIENT
Start: 2018-01-11 | End: 2018-01-11

## 2018-01-11 RX ORDER — OXYCODONE HYDROCHLORIDE AND ACETAMINOPHEN 5; 325 MG/1; MG/1
2 TABLET ORAL PRN
Status: COMPLETED | OUTPATIENT
Start: 2018-01-11 | End: 2018-01-11

## 2018-01-11 RX ORDER — SODIUM CHLORIDE 0.9 % (FLUSH) 0.9 %
10 SYRINGE (ML) INJECTION EVERY 12 HOURS SCHEDULED
Status: DISCONTINUED | OUTPATIENT
Start: 2018-01-11 | End: 2018-01-12 | Stop reason: HOSPADM

## 2018-01-11 RX ORDER — MEPERIDINE HYDROCHLORIDE 25 MG/ML
12.5 INJECTION INTRAMUSCULAR; INTRAVENOUS; SUBCUTANEOUS EVERY 5 MIN PRN
Status: DISCONTINUED | OUTPATIENT
Start: 2018-01-11 | End: 2018-01-12 | Stop reason: HOSPADM

## 2018-01-11 RX ORDER — MORPHINE SULFATE 10 MG/ML
2 INJECTION, SOLUTION INTRAMUSCULAR; INTRAVENOUS EVERY 5 MIN PRN
Status: DISCONTINUED | OUTPATIENT
Start: 2018-01-11 | End: 2018-01-12 | Stop reason: HOSPADM

## 2018-01-11 RX ORDER — SODIUM CHLORIDE 0.9 % (FLUSH) 0.9 %
10 SYRINGE (ML) INJECTION PRN
Status: DISCONTINUED | OUTPATIENT
Start: 2018-01-11 | End: 2018-01-12 | Stop reason: HOSPADM

## 2018-01-11 RX ORDER — OXYCODONE HYDROCHLORIDE AND ACETAMINOPHEN 5; 325 MG/1; MG/1
1 TABLET ORAL PRN
Status: COMPLETED | OUTPATIENT
Start: 2018-01-11 | End: 2018-01-11

## 2018-01-11 RX ORDER — SODIUM CHLORIDE, SODIUM LACTATE, POTASSIUM CHLORIDE, CALCIUM CHLORIDE 600; 310; 30; 20 MG/100ML; MG/100ML; MG/100ML; MG/100ML
INJECTION, SOLUTION INTRAVENOUS CONTINUOUS
Status: DISCONTINUED | OUTPATIENT
Start: 2018-01-11 | End: 2018-01-12 | Stop reason: HOSPADM

## 2018-01-11 RX ORDER — HYDROMORPHONE HCL 110MG/55ML
0.5 PATIENT CONTROLLED ANALGESIA SYRINGE INTRAVENOUS EVERY 5 MIN PRN
Status: DISCONTINUED | OUTPATIENT
Start: 2018-01-11 | End: 2018-01-12 | Stop reason: HOSPADM

## 2018-01-11 RX ORDER — LABETALOL HYDROCHLORIDE 5 MG/ML
5 INJECTION, SOLUTION INTRAVENOUS EVERY 10 MIN PRN
Status: DISCONTINUED | OUTPATIENT
Start: 2018-01-11 | End: 2018-01-12 | Stop reason: HOSPADM

## 2018-01-11 RX ORDER — MORPHINE SULFATE 4 MG/ML
1 INJECTION, SOLUTION INTRAMUSCULAR; INTRAVENOUS EVERY 5 MIN PRN
Status: DISCONTINUED | OUTPATIENT
Start: 2018-01-11 | End: 2018-01-12 | Stop reason: HOSPADM

## 2018-01-11 RX ADMIN — OXYCODONE HYDROCHLORIDE AND ACETAMINOPHEN 1 TABLET: 5; 325 TABLET ORAL at 09:53

## 2018-01-11 RX ADMIN — LIDOCAINE HYDROCHLORIDE 0.1 ML: 10 INJECTION, SOLUTION EPIDURAL; INFILTRATION; INTRACAUDAL; PERINEURAL at 08:09

## 2018-01-11 RX ADMIN — SODIUM CHLORIDE, SODIUM LACTATE, POTASSIUM CHLORIDE, CALCIUM CHLORIDE: 600; 310; 30; 20 INJECTION, SOLUTION INTRAVENOUS at 08:08

## 2018-01-11 ASSESSMENT — PAIN SCALES - GENERAL: PAINLEVEL_OUTOF10: 4

## 2018-01-11 ASSESSMENT — PAIN - FUNCTIONAL ASSESSMENT: PAIN_FUNCTIONAL_ASSESSMENT: 0-10

## 2018-01-11 NOTE — PLAN OF CARE
Phase II:  1. Patient is identified using name and the date of birth. 2.  The patient is free from signs and symptoms of chemical, electrical, laser, radiation, positioning, or transfer/transport injury. 3.  The patient receives appropriate medication(s), safely administered during the Perioperative period. 4.  The patient has wound/tissue perfusion consistent with or improved from baseline levels established preoperatively. 5.  The patient is at or returning to normothermia at the conclusion of the immediate postoperative period. 6.  The patient's fluid, electrolyte, and acid base balances are consistent with or improved from baseline levels established preoperatively. 7.  The patient's pulmonary function is consistent with or improved from baseline levels established preoperatively. 8.  The patient's cardiovascular status is consistent with or improved from baseline levels established preoperatively. 9.  The patient/caregiver demonstrates knowledge of nutritional management related to the operative or other invasive procedure. 10. The patient/caregiver demonstrates knowledge of medication, pain, and wound management. 11. The patient participates in the rehabilitation process as applicable. 12.  The patient/caregiver participates in decisions affection his or her Perioperative plan of care. 13.  The patient's care is consistent with the individualized Perioperative plan of care. 14.  The patient's right to privacy is maintained. 15. The patient is the recipient of competent and ethical care within legal standards of practice. 16.  The patient's value system, lifestyle, ethnicity, and culture are considered, respected, and incorporated in the Perioperative plan of care and understands special services available. 17.  The patient demonstrates and/or reports adequate pain control throughout the the Perioperative period. 18.   The patient's neurological status is consistent with or improved from
Pre-Operative:  1. Patient/Caregiver identifies - states name and date of birth. 2.  The patient is free from signs and symptoms of injury. 3.  The patient receives appropriate medication(s), safely administered during the Perioperative period. 4.  The patient is free from signs and symptoms of infection. 5.  The patient has wound / tissue perfusion. 6.  The patients's fluid, electrolyte, and acid-base balances are established preoperatively. 7.  The patient's pulmonary function is established preoperatively. 8.  The patient's cardiovascular status is established preoperatively. 9.  The patient / caregiver demonstrates knowledge of nutritional management related to the operative or other invasive procedure. 10. The patient/caregiver demonstrates knowledge of medication management. 11. The patient/caregiver demonstrates knowledge of pain management. 12.  The patient participates in the rehabilitation process as applicable. 13.  The patient/caregiver participates in decisions affection his or her Perioperative plan of care. 14.  The patient's care is consistent with the individualized Perioperative plan of care. 15.  The patient's right to privacy is maintained. 16.  The patient is the recipient of competent and ethical care within legal standards of practice. 17.  The patient's value system, lifestyle, ethnicity, and culture are considered, respected, and incorporated in the Perioperative plan of care and understands special services available. 18.  The patient demonstrates and/or reports adequate pain control throughout the the Perioperative period. 19. The patient's neurological status is established preoperatively. 20. The patient/caregiver demonstrates knowledge of the expected responses to the operative or invasive procedure. 21.  Patient/Caregiver has reduced anxiety. Interventions- Familiarize with environment and equipment.   22. Patient/Caregiver verbalizes understanding of Phase I
and Phase II process. 23.  Patient pain level is established preoperatively using age appropriate pain scale. 24.  The patient will move to fall risk upon sedation- during and through the recovery phase. Interventions- orient the patient to the environment, especially the location of the bathroom; provide treaded socks/non-skid footwear; demonstrate and teach back use of the nurse's call system; instruct the patient to call for help before getting out of bed; lock all movable equipment before transferring patient; keep bed in lowest position possible.  25.  Other:

## 2018-01-11 NOTE — ANESTHESIA PRE-OP
Department of Anesthesiology  Preprocedure Note       Name:  Love Cunningham   Age:  67 y.o.  :  1945                                          MRN:  [id number]         Date:  2018      Surgeon:    Procedure:    Medications prior to admission:   Prior to Admission medications    Medication Sig Start Date End Date Taking? Authorizing Provider   metFORMIN (GLUCOPHAGE) 500 MG tablet TAKE 1 TABLET TWICE A DAY  WITH MEALS 1/10/18  Yes Emily Combs MD   hydrochlorothiazide (HYDRODIURIL) 25 MG tablet Take 1 tablet by mouth daily 1/10/18  Yes Emily Combs MD   digoxin [facility]) 125 MCG tablet TAKE 1 TABLET DAILY (dose reduction) 1/10/18  Yes Emily Combs MD   HYDROcodone-acetaminophen (NORCO) 5-325 MG per tablet Take 1 tablet by mouth every 6 hours as needed for Pain . 17  Yes Emily Combs MD   simvastatin (ZOCOR) 40 MG tablet  17  Yes Historical Provider, MD   lisinopril (PRINIVIL;ZESTRIL) 10 MG tablet  17  Yes Historical Provider, MD   aspirin 81 MG EC tablet Take 81 mg by mouth daily. Indications: stopped 12   Yes Historical Provider, MD       Current medications:    Current Outpatient Prescriptions   Medication Sig Dispense Refill    metFORMIN (GLUCOPHAGE) 500 MG tablet TAKE 1 TABLET TWICE A DAY  WITH MEALS 180 tablet 1    hydrochlorothiazide (HYDRODIURIL) 25 MG tablet Take 1 tablet by mouth daily 90 tablet 1    digoxin (LANOXIN) 125 MCG tablet TAKE 1 TABLET DAILY (dose reduction) 90 tablet 1    HYDROcodone-acetaminophen (NORCO) 5-325 MG per tablet Take 1 tablet by mouth every 6 hours as needed for Pain . 60 tablet 0    simvastatin (ZOCOR) 40 MG tablet       lisinopril (PRINIVIL;ZESTRIL) 10 MG tablet       aspirin 81 MG EC tablet Take 81 mg by mouth daily.  Indications: stopped 12       Current Facility-Administered Medications   Medication Dose Route Frequency Provider Last Rate Last Dose    vancomycin 1000 mg IVPB in 250 mL D5W addavial  1,000 mg Intravenous Once Brown Antonia, DPLESLIE        lactated ringers infusion   Intravenous Continuous Tim Guo  mL/hr at 01/11/18 4717      sodium chloride flush 0.9 % injection 10 mL  10 mL Intravenous 2 times per day Tim Guo MD        sodium chloride flush 0.9 % injection 10 mL  10 mL Intravenous PRN Tim Guo MD        HYDROmorphone (DILAUDID) injection 0.25 mg  0.25 mg Intravenous Q5 Min PRN Mikhail Quijano MD        HYDROmorphone (DILAUDID) injection 0.5 mg  0.5 mg Intravenous Q5 Min PRN Mikhail Quijano MD        morphine (PF) injection 1 mg  1 mg Intravenous Q5 Min PRN Mikhail Quijano MD        morphine (PF) injection 2 mg  2 mg Intravenous Q5 Min PRN Mikhail Quijano MD        oxyCODONE-acetaminophen (PERCOCET) 5-325 MG per tablet 1 tablet  1 tablet Oral PRN Mikhail Quijano MD        Or    oxyCODONE-acetaminophen (PERCOCET) 5-325 MG per tablet 2 tablet  2 tablet Oral PRN Mikhail Quijano MD        diphenhydrAMINE (BENADRYL) injection 12.5 mg  12.5 mg Intravenous Once PRN Mikhail Quijano MD        ondansetron [facility]) injection 4 mg  4 mg Intravenous Once PRN Mikhail Quijano MD        promethazine [facility]) injection 6.25 mg  6.25 mg Intravenous Once PRN Mikhail Quijano MD        labetalol (NORMODYNE;TRANDATE) injection 5 mg  5 mg Intravenous Q10 Min PRN Mikhail Quijano MD        hydrALAZINE (APRESOLINE) injection 5 mg  5 mg Intravenous Q10 Min PRN Mikhail Quijano MD        meperidine (DEMEROL) injection 12.5 mg  12.5 mg Intravenous Q5 Min PRN Mikhail Quijano MD           Allergies:     Allergies   Allergen Reactions    Pcn [Penicillins] Other (See Comments)     Unsure of reaction       Problem List:    Patient Active Problem List   Diagnosis Code    Osteoarthritis M19.90    Paroxysmal atrial fibrillation ([facility] I48.0    Noncompliance of patient with dietary regimen Z91.11    PVD (peripheral vascular disease) ([facility] Height: 5' 5\" (1.651 m)                                               BP Readings from Last 3 Encounters:   01/11/18 (!) 162/78   01/08/18 (!) 162/71   12/18/17 (!) 156/76       NPO Status: Time of last liquid consumption: 2350                        Time of last solid consumption: 2350                        Date of last liquid consumption: 01/10/18                        Date of last solid food consumption: 01/10/18    BMI:   Wt Readings from Last 3 Encounters:   01/09/18 140 lb (63.5 kg)   01/08/18 140 lb (63.5 kg)   12/18/17 139 lb (63 kg)     Body mass index is 23.3 kg/m². Anesthesia Evaluation    Airway: Mallampati: II  TM distance: >3 FB   Neck ROM: full  Mouth opening: > = 3 FB Dental:          Pulmonary:normal exam                               Cardiovascular:    (+) hypertension:, CAD (Stents x2 10 yrs ago. On Aspirin.):, dysrhythmias (Past hx): atrial fibrillation,                   Neuro/Psych:   (+) CVA:,             GI/Hepatic/Renal:   (+) PUD,           Endo/Other:    (+) Type II DM, , .                 Abdominal:           Vascular:                                     Pre-Operative Diagnosis: ULCERS, DIABETES    67 y.o.   BMI:  Body mass index is 23.3 kg/m².      Vitals:    01/09/18 1322 01/11/18 0750   BP:  (!) 162/78   Pulse:  58   Resp:  20   Temp:  97.6 °F (36.4 °C)   TempSrc:  Temporal   SpO2:  99%   Weight: 140 lb (63.5 kg)    Height: 5' 5\" (1.651 m)        Allergies   Allergen Reactions    Pcn [Penicillins] Other (See Comments)     Unsure of reaction       Social History   Substance Use Topics    Smoking status: Never Smoker    Smokeless tobacco: Never Used      Comment: Not needed    Alcohol use No       LABS:    CBC  Lab Results   Component Value Date/Time    WBC 16.9 (H) 06/06/2017 04:57 AM    HGB 9.2 (L) 06/06/2017 04:57 AM    HCT 27.3 (L) 06/06/2017 04:57 AM     06/06/2017 04:57 AM     RENAL  Lab Results   Component Value Date/Time     09/01/2017 02:40 PM    K 4.4 09/01/2017 02:40 PM    CL 95 (L) 09/01/2017 02:40 PM    CO2 25 09/01/2017 02:40 PM    BUN 12 09/01/2017 02:40 PM    CREATININE 0.9 09/01/2017 02:40 PM    GLUCOSE 110 (H) 09/01/2017 02:40 PM     COAGS  Lab Results   Component Value Date/Time    PROTIME 13.6 (H) 05/31/2017 02:00 PM    PROTIME 19.8 (H) 01/02/2010 03:15 PM    INR 1.20 (H) 05/31/2017 02:00 PM    APTT 29.3 06/06/2017 04:57 AM        Anesthesia Plan      general     ASA 3     (I discussed with the patient the risks and benefits of PIV, general anesthesia, IV Narcotics, PACU. All questions were answered the patient agrees with the plan.)  Induction: intravenous. MIPS: Postoperative opioids intended and Prophylactic antiemetics administered. Anesthetic plan and risks discussed with patient. Plan discussed with CRNA.                   Wallace Rodrigues MD   1/11/2018

## 2018-01-11 NOTE — H&P
Progress Note    Admit Date:  1/11/2018    Subjective:  67 y.o. female who is seen for evaluation of ulcers on left LE. [facility]        Past Medical History:        Diagnosis Date    Atrial fibrillation (HCC)     off coumadin after bleed, declined restart    Blood transfusion     CAD (coronary artery disease)     Diabetes mellitus type II     Diabetic neuropathy ([facility] 2011    Gastric ulcer     H. pylori infection 2009    Hyperlipidemia     Hypertension     Numbness and tingling of left leg     Osteoarthritis     knees    Poor historian     PVD (peripheral vascular disease) ([facility]     PTA distal sfa/pop/peroneal, Dr. Myra Toledo 12/2015    Unspecified cerebral artery occlusion with cerebral infarction     TIA affected left eye    upper gi bleed 12/2009       Past Surgical History:        Procedure Laterality Date    ANGIOPLASTY  12/30/15    Dr. Myra Toledo, E, PTA of distal sfa/pop/peroneal    CARDIAC CATHETERIZATION  9/21/12    done for surgical clearance, cath was negative    CORONARY ANGIOPLASTY WITH STENT PLACEMENT  2006,2007    OTHER SURGICAL HISTORY Left 06/05/2017    Left Femoral Peroneal Bypass    SHOULDER ARTHROPLASTY  10/5/12    RIGHT SHOULDER TOTAL ARTHROPLASTY, BICEPS TENODESIS DEPUY, GLOBAL ADVANTAGE AP       Current Medications:    Current Facility-Administered Medications: vancomycin 1000 mg IVPB in 250 mL D5W addavial, 1,000 mg, Intravenous, Once  lactated ringers infusion, , Intravenous, Continuous  sodium chloride flush 0.9 % injection 10 mL, 10 mL, Intravenous, 2 times per day  sodium chloride flush 0.9 % injection 10 mL, 10 mL, Intravenous, PRN  HYDROmorphone (DILAUDID) injection 0.25 mg, 0.25 mg, Intravenous, Q5 Min PRN  HYDROmorphone (DILAUDID) injection 0.5 mg, 0.5 mg, Intravenous, Q5 Min PRN  morphine (PF) injection 1 mg, 1 mg, Intravenous, Q5 Min PRN  morphine (PF) injection 2 mg, 2 mg, Intravenous, Q5 Min PRN  oxyCODONE-acetaminophen (PERCOCET) 5-325 MG per

## 2018-01-11 NOTE — ANESTHESIA POST-OP
Postoperative Anesthesia Note    Name:    Sharif Marie  MRN:      [id number]    Patient Vitals for the past 12 hrs:   BP Temp Temp src Pulse Resp SpO2   01/11/18 0952 (!) 159/84 - - 53 16 96 %   01/11/18 0927 (!) 144/70 98.4 °F (36.9 °C) Temporal (!) 43 16 98 %   01/11/18 0750 (!) 162/78 97.6 °F (36.4 °C) Temporal 58 20 99 %        LABS:    CBC  Lab Results   Component Value Date/Time    WBC 16.9 (H) 06/06/2017 04:57 AM    HGB 9.2 (L) 06/06/2017 04:57 AM    HCT 27.3 (L) 06/06/2017 04:57 AM     06/06/2017 04:57 AM     RENAL  Lab Results   Component Value Date/Time     09/01/2017 02:40 PM    K 4.4 09/01/2017 02:40 PM    CL 95 (L) 09/01/2017 02:40 PM    CO2 25 09/01/2017 02:40 PM    BUN 12 09/01/2017 02:40 PM    CREATININE 0.9 09/01/2017 02:40 PM    GLUCOSE 110 (H) 09/01/2017 02:40 PM     COAGS  Lab Results   Component Value Date/Time    PROTIME 13.6 (H) 05/31/2017 02:00 PM    PROTIME 19.8 (H) 01/02/2010 03:15 PM    INR 1.20 (H) 05/31/2017 02:00 PM    APTT 29.3 06/06/2017 04:57 AM       Intake & Output: In: 36 [P.O.:120; I.V.:700]  Out: -     Nausea & Vomiting:  No    Level of Consciousness:  Awake    Pain Assessment:  Adequate analgesia    Anesthesia Complications:  No apparent anesthetic complications    SUMMARY      Vital signs stable  OK to discharge from Stage I post anesthesia care.   Care transferred from Anesthesiology department on discharge from perioperative area

## 2018-01-15 ENCOUNTER — HOSPITAL ENCOUNTER (OUTPATIENT)
Dept: WOUND CARE | Age: 73
Discharge: HOME | End: 2018-01-15
Attending: PODIATRIST | Admitting: PODIATRIST

## 2018-01-15 VITALS
HEART RATE: 69 BPM | HEIGHT: 65 IN | DIASTOLIC BLOOD PRESSURE: 71 MMHG | RESPIRATION RATE: 17 BRPM | WEIGHT: 140 LBS | TEMPERATURE: 96.8 F | BODY MASS INDEX: 23.32 KG/M2 | SYSTOLIC BLOOD PRESSURE: 161 MMHG

## 2018-01-15 ASSESSMENT — PAIN SCALES - GENERAL: PAINLEVEL_OUTOF10: 0

## 2018-01-15 NOTE — PLAN OF CARE
Problem: Wound:  Goal: Will show signs of wound healing; wound closure and no evidence of infection  Will show signs of wound healing; wound closure and no evidence of infection   Outcome: Ongoing  Pt to the [facility] for follow up appointment. Pt had wound debridement on 1/11/18. Pt to have weekly dressing placed and follow up in the [facility] in 1 week. Discharge instructions reviewed with patient, all questions answered, copy given to patient. Dressings were applied to all wounds per M.D. Instructions at this visit.

## 2018-01-22 ENCOUNTER — HOSPITAL ENCOUNTER (OUTPATIENT)
Dept: WOUND CARE | Age: 73
Discharge: HOME | End: 2018-01-22
Attending: PODIATRIST | Admitting: PODIATRIST

## 2018-01-22 VITALS
HEIGHT: 65 IN | SYSTOLIC BLOOD PRESSURE: 155 MMHG | BODY MASS INDEX: 23.46 KG/M2 | DIASTOLIC BLOOD PRESSURE: 65 MMHG | WEIGHT: 140.8 LBS | TEMPERATURE: 97.8 F | RESPIRATION RATE: 14 BRPM | HEART RATE: 55 BPM

## 2018-01-22 ASSESSMENT — PAIN SCALES - GENERAL: PAINLEVEL_OUTOF10: 0

## 2018-01-22 NOTE — PROGRESS NOTES
normocephalic and atraumatic  Eyes: pupils equal, round, and reactive to light, extraocular eye movements intact, conjunctivae normal  ENT: tympanic membrane, external ear and ear canal normal bilaterally, nose without deformity, nasal mucosa and turbinates normal without polyps  Neck: supple and non-tender without mass, no thyromegaly or thyroid nodules, no cervical lymphadenopathy  Pulmonary/Chest: clear to auscultation bilaterally- no wheezes, rales or rhonchi, normal air movement, no respiratory distress  Cardiovascular: normal rate, regular rhythm, normal S1 and S2, no murmurs, rubs, clicks, or gallops, distal pulses intact, no carotid bruits  Abdomen: soft, non-tender, non-distended, normal bowel sounds, no masses or organomegaly. Dorsalis pedis pulse left dopplerable  Posterior tibial pulse left dopplerable  Dorsalis pedis pulse right dopplerable  Posterior tibial pulse right dopplerable  Protective sensation absent bilateral LE      Ulcer on the posterior aspect right heel epithelialized. Ulcer on the left hallux overlying the HIPJ with mild fibrotic tissue, no granulation tissue, mild serous drainage, no hyperkeratotic rim, no undermining, no tunneling, no purulence, no malodor, no eschar,  no periwound maceration, mild periwound erythema, no edema, no crepitus, no increase in skin temperature, ulcer probes to soft tissue    Ulcer on the left 4th digit epithelized. Ulcers on the left lower leg medial aspect with sutures intact. Mild gapping at proximal and distal aspect. Mild serous drainage. No active signs of infection noted. Today's ulcer measurements are in the wound documentation flowsheet.      Incision 01/11/18 Leg Left-Wound Length (cm): 4.5 cm  Wound 12/04/17 #2- L dorsal great toe, diabetic, Holbrook 1, onset 05/17, trauma-Wound Length (cm): 1 cm  Wound 12/04/17 #3- L dorsal 4th toe, diabetic, Holbrook 1, onset 05/17, trauma-Wound Length (cm): 0 cm  Incision 01/11/18 Leg Left-Wound

## 2018-01-22 NOTE — PLAN OF CARE
Problem: Wound:  Goal: Will show signs of wound healing; wound closure and no evidence of infection  Will show signs of wound healing; wound closure and no evidence of infection   Outcome: Ongoing  Pt to the [address] [place] [address] for follow up treatment. Wounds and incision stable. Pt to continue with weekly dressing. Pt to follow up in the [address] [place] [address] in 1 week. Instructed pt to continue to keep blood sugars in normal range. Discharge instructions reviewed with patient, all questions answered, copy given to patient. Dressings were applied to all wounds per M.D. Instructions at this visit.

## 2018-01-24 NOTE — PROGRESS NOTES
[facility] Progress Note      Edvin San     : 1945    DATE OF VISIT:  1/15/2018    Subjective:     Edvin San is a 67 y.o. female who has a chief complaint of a diabetic ulcer located on the left foot and left leg. States no issues since surgery. Ms. Jeremiah Eisenberg has a past medical history of Atrial fibrillation ([facility] [address] Blood transfusion; CAD (coronary artery disease); Diabetes mellitus type II; Diabetic neuropathy ([facility] Gastric ulcer; H. pylori infection; Hyperlipidemia; Hypertension; Numbness and tingling of left leg; Osteoarthritis; Poor historian; PVD (peripheral vascular disease) ([facility] [address] Unspecified cerebral artery occlusion with cerebral infarction; and upper gi bleed. She has a past surgical history that includes Coronary angioplasty with stent (6639,[id number]); Cardiac catheterization (12); Total shoulder arthroplasty (10/5/12); angioplasty (12/30/15); other surgical history (Left, 2017); and Foot surgery (Left, 2018). Her family history includes Diabetes in her brother, maternal grandmother, and sister; Heart Disease in her father and mother; Stroke in her mother. Ms. Jeremiah Eisenberg reports that she has never smoked. She has never used smokeless tobacco. She reports that she does not drink alcohol or use drugs. Her current medication list consists of HYDROcodone-acetaminophen, aspirin, digoxin, hydrochlorothiazide, lisinopril, metFORMIN, and simvastatin. Allergies: Pcn [penicillins]    Pertinent items from the review of systems are discussed in the HPI; the remainder of the ROS was reviewed and is negative.        Objective:     BP (!) 161/71   Pulse 69   Temp 96.8 °F (36 °C) (Oral)   Resp 17   Ht 5' 5\" (1.651 m)   Wt 140 lb (63.5 kg)   BMI 23.30 kg/m²   General Appearance: alert and oriented to person, place and time, well developed and well- nourished, in no acute distress  Skin: warm and dry, no rash or erythema  Head: normocephalic and #2- L dorsal great toe, diabetic, Holbrook 1, onset 05/17, trauma-Dressing/Treatment:  (xeroform,dry dressing, ace)  [REMOVED] Wound 12/04/17 #3- L dorsal 4th toe, diabetic, Holbrook 1, onset 05/17, trauma-Dressing/Treatment:  (xeroform,dry dressing, ace)  Incision 01/11/18 Leg Left-Dressing/Treatment: Dry dressing, Xeroform. Home treatment: good handwashing before and after any dressing changes. Cleanse ulcer with saline or soap & water before dressing change. May use Vaseline (petrolatum), Aquaphor, Aveeno, CeraVe, Cetaphil, Eucerin, Lubriderm, etc for dry skin. Dressing type for home: xeroform and well padded dry dressing with ace wrap to remain intact for one week. Written discharge instructions given to patient. Offload ulcer(s) as directed. Elevate leg(s) as directed. Follow up in 1 week. Continue follow up with Dr. Myra Toledo as recommended by him.                Electronically signed by Mary Killian DPM on 1/24/2018 at 11:57 AM.

## 2018-01-29 ENCOUNTER — HOSPITAL ENCOUNTER (OUTPATIENT)
Dept: WOUND CARE | Age: 73
Discharge: HOME | End: 2018-01-29
Attending: PODIATRIST | Admitting: PODIATRIST

## 2018-01-29 VITALS
DIASTOLIC BLOOD PRESSURE: 70 MMHG | RESPIRATION RATE: 16 BRPM | WEIGHT: 144.4 LBS | SYSTOLIC BLOOD PRESSURE: 164 MMHG | HEART RATE: 63 BPM | BODY MASS INDEX: 24.06 KG/M2 | HEIGHT: 65 IN | TEMPERATURE: 96.7 F

## 2018-01-29 ASSESSMENT — PAIN SCALES - GENERAL: PAINLEVEL_OUTOF10: 0

## 2018-01-29 NOTE — PROGRESS NOTES
Left-Wound Width (cm): 0.5 cm  Wound 12/04/17 #2- L dorsal great toe, diabetic, Holbrook 1, onset 05/17, trauma-Wound Depth (cm) : 0.4  Incision 01/11/18 Leg Left-Wound Depth (cm) : 0.1      LABS  Lab Results   Component Value Date    LABA1C 5.5 11/28/2017         Assessment:     Patient Active Problem List   Diagnosis Code    Osteoarthritis M19.90    Paroxysmal atrial fibrillation ([facility] [address] I48.0    Noncompliance of patient with dietary regimen Z91.11    PVD (peripheral vascular disease) ([facility] [address] I73.9    Pure hypercholesterolemia E78.00    Coronary artery disease involving native coronary artery of native heart without angina pectoris I25.10    Essential hypertension I10    Type 2 diabetes mellitus with diabetic polyneuropathy, without long-term current use of insulin ([facility]) E11.42    Vitamin D deficiency E55.9    Cellulitis L03.90    [facility] M86.172       Assessment of today's active condition(s): diabetic foot ulcers left, chronic ulcer left lower leg, diabetes mellitus, PAD. Factors contributing to occurrence and/or persistence of the chronic ulcer include diabetes and arterial insufficiency. Sharp debridement is not indicated today, based upon the exam findings in the ulcer(s) above. Discharge plan:     Treatment in the wound care center today: Wound 12/04/17 #2- L dorsal great toe, diabetic, Holbrook 1, onset 05/17, trauma-Dressing/Treatment: Ace Wrap, Dry dressing, Xeroform, 4x4  Incision 01/11/18 Leg Left-Dressing/Treatment: Ace Wrap, Dry dressing, Xeroform, 4x4. Home treatment: good handwashing before and after any dressing changes. Cleanse ulcer with saline or soap & water before dressing change. May use Vaseline (petrolatum), Aquaphor, Aveeno, CeraVe, Cetaphil, Eucerin, Lubriderm, etc for dry skin. Dressing type for home: xeroform and well padded dry dressing with ace wrap to remain intact for one week. Written discharge instructions given to patient.    Offload

## 2018-02-05 ENCOUNTER — HOSPITAL ENCOUNTER (OUTPATIENT)
Dept: WOUND CARE | Age: 73
Discharge: HOME | End: 2018-02-05
Attending: PODIATRIST | Admitting: PODIATRIST

## 2018-02-05 VITALS
TEMPERATURE: 96.8 F | SYSTOLIC BLOOD PRESSURE: 151 MMHG | BODY MASS INDEX: 23.66 KG/M2 | WEIGHT: 142 LBS | DIASTOLIC BLOOD PRESSURE: 73 MMHG | HEART RATE: 69 BPM | HEIGHT: 65 IN | RESPIRATION RATE: 16 BRPM

## 2018-02-12 ENCOUNTER — HOSPITAL ENCOUNTER (OUTPATIENT)
Dept: WOUND CARE | Age: 73
Discharge: HOME | End: 2018-02-12
Attending: PODIATRIST | Admitting: PODIATRIST

## 2018-02-12 VITALS
BODY MASS INDEX: 24.75 KG/M2 | SYSTOLIC BLOOD PRESSURE: 124 MMHG | RESPIRATION RATE: 17 BRPM | TEMPERATURE: 99.2 F | DIASTOLIC BLOOD PRESSURE: 65 MMHG | HEART RATE: 73 BPM | WEIGHT: 145 LBS | HEIGHT: 64 IN

## 2018-02-19 ENCOUNTER — HOSPITAL ENCOUNTER (OUTPATIENT)
Dept: WOUND CARE | Age: 73
Discharge: HOME | End: 2018-02-19
Attending: PODIATRIST | Admitting: PODIATRIST

## 2018-02-19 VITALS
HEART RATE: 74 BPM | RESPIRATION RATE: 16 BRPM | BODY MASS INDEX: 23.9 KG/M2 | WEIGHT: 140 LBS | HEIGHT: 64 IN | SYSTOLIC BLOOD PRESSURE: 146 MMHG | TEMPERATURE: 96.8 F | DIASTOLIC BLOOD PRESSURE: 56 MMHG

## 2018-02-19 NOTE — PROGRESS NOTES
atraumatic  Eyes: pupils equal, round, and reactive to light, extraocular eye movements intact, conjunctivae normal  ENT: tympanic membrane, external ear and ear canal normal bilaterally, nose without deformity, nasal mucosa and turbinates normal without polyps  Neck: supple and non-tender without mass, no thyromegaly or thyroid nodules, no cervical lymphadenopathy  Pulmonary/Chest: clear to auscultation bilaterally- no wheezes, rales or rhonchi, normal air movement, no respiratory distress  Cardiovascular: normal rate, regular rhythm, normal S1 and S2, no murmurs, rubs, clicks, or gallops, distal pulses intact, no carotid bruits  Abdomen: soft, non-tender, non-distended, normal bowel sounds, no masses or organomegaly. Dorsalis pedis pulse left dopplerable  Posterior tibial pulse left dopplerable  Dorsalis pedis pulse right dopplerable  Posterior tibial pulse right dopplerable  Protective sensation absent bilateral LE      Ulcer on the posterior aspect right heel epithelialized. Ulcer on the left hallux overlying the HIPJ with mild fibrotic tissue, no granulation tissue, mild to moderate serous drainage, no hyperkeratotic rim, no undermining, no tunneling, no purulence, no malodor, no eschar,  no periwound maceration, mild periwound erythema, no edema, no crepitus, no increase in skin temperature, ulcer probes to bone    Ulcer on the left 4th digit epithelized. Ulcer on the left lower leg medial aspect with mild fibrotic tissue, red granulation tissue, mild serous drainage, no hyperkeratotic rim, no undermining, no tunneling, no purulence, no malodor, no eschar,  no periwound maceration, mild periwound erythema, mild edema, no crepitus, no increase in skin temperature, ulcer probes to soft tissue only         Today's ulcer measurements are in the wound documentation flowsheet.      Incision 01/11/18 Leg Left-Wound Length (cm): 4 cm  Wound 12/04/17 #2- L dorsal great toe, diabetic, Holbrook 1, onset 05/17, trauma-Wound Length (cm): 0.8 cm  Incision 01/11/18 Leg Left-Wound Width (cm): 0.2 cm  Wound 12/04/17 #2- L dorsal great toe, diabetic, [place] 1, onset 05/17, trauma-Wound Width (cm): 0.8 cm  Incision 01/11/18 Leg Left-Wound Depth (cm) : 0.9  Wound 12/04/17 #2- L dorsal great toe, diabetic, Hol[place] 1, onset 05/17, trauma-Wound Depth (cm) : 0.2      LABS  Lab Results   Component Value Date    LABA1C 5.5 11/28/2017         Assessment:     Patient Active Problem List   Diagnosis Code    Osteoarthritis M19.90    Paroxysmal atrial fibrillation ([facility] I48.0    Noncompliance of patient with dietary regimen Z91.11    PVD (peripheral vascular disease) ([facility] I73.9    Pure hypercholesterolemia E78.00    Coronary artery disease involving native coronary artery of native heart without angina pectoris I25.10    Essential hypertension I10    Type 2 diabetes mellitus with diabetic polyneuropathy, without long-term current use of insulin ([facility]) E11.42    Vitamin D deficiency E55.9    Cellulitis L03.90    Acute osteomyelitis of left foot ([facility] [address] M86.172       Assessment of today's active condition(s): diabetic foot ulcers left, chronic ulcer left lower leg, diabetes mellitus, PAD. Factors contributing to occurrence and/or persistence of the chronic ulcer include diabetes and arterial insufficiency. Sharp debridement is not indicated today, based upon the exam findings in the ulcer(s) above. Discharge plan:     Treatment in the wound care center today: Incision 01/11/18 Leg Left-Dressing/Treatment: Other (Comment) (JACI,kerlix,ace wrap toes to knee)  Wound 12/04/17 #2- L dorsal great toe, diabetic, Holbrook 1, onset 05/17, trauma-Dressing/Treatment: Other (Comment) (aquacel ag,4x4,kerlix,ace wrap). Home treatment: good handwashing before and after any dressing changes. Cleanse ulcer with saline or soap & water before dressing change.  May use Vaseline (petrolatum), Aquaphor, Aveeno, CeraVe, Cetaphil, Eucerin, Lubriderm, etc for dry skin. Dressing type for home: To try to improve granulation tissue, encourage the ulcer to contract, and manage the higher-than usual level of exudate, we elected to apply a disposable NPWT system today (JACI). Wound was cleansed with saline after debridement; skin prep applied to periwound skin; first dressing layer(s) were applied as above, then a  JACI dressing was applied, with the Port in the superior position, and secured with adhesive strips. Pump turned on, functioning well, no leaks. Patient educated to keep dressing dry, watch occasionally for alarm lights, and note if drainage becomes excessive, or accumulates around [place]. Well padded dry dressing with ace wrap to remain intact for one week applied over JACI from toes to knee. Written discharge instructions given to patient. Offload ulcer(s) as directed. Elevate leg(s) as directed. Follow up in 1 week. Continue follow up with Dr. Ruslan Ash as recommended by him.            Electronically signed by Karlyne Aase, DPM on 2/19/2018 at 5:28 PM.

## 2018-02-21 DIAGNOSIS — I73.9 PVD (PERIPHERAL VASCULAR DISEASE) (HCC): Primary | ICD-10-CM

## 2018-02-22 NOTE — PROGRESS NOTES
Dressing type for home: To try to improve granulation tissue, encourage the ulcer to contract, and manage the higher-than usual level of exudate, we elected to apply a disposable NPWT system today (JACI). Wound was cleansed with saline after debridement; skin prep applied to periwound skin; first dressing layer(s) were applied as above, then a  JACI dressing was applied, with the Port in the superior position, and secured with adhesive strips. Pump turned on, functioning well, no leaks. Patient educated to keep dressing dry, watch occasionally for alarm lights, and note if drainage becomes excessive, or accumulates around [place]. Well padded dry dressing with ace wrap to remain intact for one week applied over JACI from toes to knee. Written discharge instructions given to patient. Offload ulcer(s) as directed. Elevate leg(s) as directed. Follow up in 1 week. Continue follow up with Dr. Crow Castelan as recommended by him.            Electronically signed by Elzbieta Johnson DPM on 2/22/2018 at 3:03 PM.

## 2018-02-26 ENCOUNTER — HOSPITAL ENCOUNTER (OUTPATIENT)
Dept: WOUND CARE | Age: 73
Discharge: HOME | End: 2018-02-26
Attending: PODIATRIST | Admitting: PODIATRIST

## 2018-02-26 VITALS
BODY MASS INDEX: 24.59 KG/M2 | HEIGHT: 64 IN | RESPIRATION RATE: 16 BRPM | SYSTOLIC BLOOD PRESSURE: 158 MMHG | WEIGHT: 144 LBS | HEART RATE: 78 BPM | DIASTOLIC BLOOD PRESSURE: 72 MMHG | TEMPERATURE: 97.3 F

## 2018-02-26 NOTE — PLAN OF CARE
Problem: Wound:  Goal: Will show signs of wound healing; wound closure and no evidence of infection  Will show signs of wound healing; wound closure and no evidence of infection   Outcome: Ongoing  Pt to the [facility] for follow up appointment. Great toe wound debrided today. Pt to have JACI to left lower leg. Pt to follow up in the [facility] in 1 week. Discharge instructions reviewed with patient, all questions answered, copy given to patient. Dressings were applied to all wounds per M.D. Instructions at this visit.

## 2018-03-01 NOTE — PROGRESS NOTES
[facility] Progress Note      Lboo Solorzano     : 1945    DATE OF VISIT:  2018    Subjective:     Lobo Solorzano is a 67 y.o. female who has a chief complaint of a diabetic ulcer located on the left foot and left leg. States no issues with wounds. Ms. Sepideh Medel has a past medical history of Atrial fibrillation ([facility] [address] Blood transfusion; CAD (coronary artery disease); Diabetes mellitus type II; Diabetic neuropathy ([address] Gastric ulcer; H. pylori infection; Hyperlipidemia; Hypertension; Numbness and tingling of left leg; Osteoarthritis; Poor historian; PVD (peripheral vascular disease) ([facility] [address] Unspecified cerebral artery occlusion with cerebral infarction; and upper gi bleed. She has a past surgical history that includes Coronary angioplasty with stent ([id number]); Cardiac catheterization (12); Total shoulder arthroplasty (10/5/12); angioplasty (12/30/15); other surgical history (Left, 2017); and Foot surgery (Left, 2018). Her family history includes Diabetes in her brother, maternal grandmother, and sister; Heart Disease in her father and mother; Stroke in her mother. Ms. Sepideh Medel reports that she has never smoked. She has never used smokeless tobacco. She reports that she does not drink alcohol or use drugs. Her current medication list consists of aspirin, digoxin, hydrochlorothiazide, lisinopril, metFORMIN, and simvastatin. Allergies: Pcn [penicillins]    Pertinent items from the review of systems are discussed in the HPI; the remainder of the ROS was reviewed and is negative.        Objective:     BP (!) 158/72   Pulse 78   Temp 97.3 °F (36.3 °C) (Oral)   Resp 16   Ht 5' 4\" (1.626 m)   Wt 144 lb (65.3 kg)   BMI 24.72 kg/m²   General Appearance: alert and oriented to person, place and time, well developed and well- nourished, in no acute distress  Skin: warm and dry, no rash or erythema  Head: normocephalic and atraumatic  Eyes: pupils equal, round, and reactive to light, extraocular eye movements intact, conjunctivae normal  ENT: tympanic membrane, external ear and ear canal normal bilaterally, nose without deformity, nasal mucosa and turbinates normal without polyps  Neck: supple and non-tender without mass, no thyromegaly or thyroid nodules, no cervical lymphadenopathy  Pulmonary/Chest: clear to auscultation bilaterally- no wheezes, rales or rhonchi, normal air movement, no respiratory distress  Cardiovascular: normal rate, regular rhythm, normal S1 and S2, no murmurs, rubs, clicks, or gallops, distal pulses intact, no carotid bruits  Abdomen: soft, non-tender, non-distended, normal bowel sounds, no masses or organomegaly. Dorsalis pedis pulse left dopplerable  Posterior tibial pulse left dopplerable  Dorsalis pedis pulse right dopplerable  Posterior tibial pulse right dopplerable  Protective sensation absent bilateral LE      Ulcer on the posterior aspect right heel epithelialized. Ulcer on the left hallux overlying the HIPJ with mild fibrotic tissue, no granulation tissue, mild serous drainage, no hyperkeratotic rim, no undermining, no tunneling, no purulence, no malodor, no eschar,  no periwound maceration, mild periwound erythema, no edema, no crepitus, no increase in skin temperature, ulcer probes to bone    Ulcer on the left 4th digit epithelized. Ulcer on the left lower leg medial aspect with mild fibrotic tissue, red granulation tissue, mild serous drainage, no hyperkeratotic rim, no undermining, no tunneling, no purulence, no malodor, no eschar,  no periwound maceration, mild periwound erythema, mild edema, no crepitus, no increase in skin temperature, ulcer probes to soft tissue only         Today's ulcer measurements are in the wound documentation flowsheet.      Wound 12/04/17 #2- L dorsal great toe, diabetic, [place] 1, onset 05/17, trauma-Wound Length (cm): 1 cm  Incision 01/11/18 Leg Left-Wound Length (cm): 4.5 cm  Wound 12/04/17 #2- L dorsal great toe, diabetic, [place] 1, onset 05/17, trauma-Wound Width (cm): 0.5 cm  Incision 01/11/18 Leg Left-Wound Width (cm): 0.1 cm  Wound 12/04/17 #2- L dorsal great toe, diabetic, Holbrook 1, onset 05/17, trauma-Wound Depth (cm) : 0.2  Incision 01/11/18 Leg Left-Wound Depth (cm) : 0.5      LABS  Lab Results   Component Value Date    LABA1C 5.5 11/28/2017         Assessment:     Patient Active Problem List   Diagnosis Code    Osteoarthritis M19.90    [facility]ox[facility] ([facility] I48.0    Noncompliance of patient with dietary regimen Z91.11    PVD (peripheral vascular disease) ([facility] I73.9    Pure hypercholesterolemia E78.00    Coronary artery disease involving native coronary artery of native heart without angina pectoris I25.10    Essential hypertension I10    Type 2 diabetes mellitus with diabetic polyneuropathy, without long-term current use of insulin ([facility]) E11.42    Vitamin D deficiency E55.9    Cellulitis L03.90    Acute osteomyelitis of left foot ([facility] M86.172       Assessment of today's active condition(s): diabetic foot ulcer left, chronic ulcer left lower leg, diabetes mellitus, PAD. Factors contributing to occurrence and/or persistence of the chronic ulcer include diabetes and arterial insufficiency. Sharp debridement is indicated today, based upon the exam findings in the ulcer(s) above. Procedure note:     Consent obtained. Time out taken. Anesthetic  Anesthetic:  (post debridement)     After application of topical 4% lidocaine plain to the ulcer, the ulcer was debrided of all fibrotic, necrotic, hyperkeratotic tissue, nonviable and viable tissue through the subcutaneous tissue. This excised skin and subcutaneous tissue with a scalpel. Total Surface Area Debrided:  1 sq cm. The ulcer(s) were then irrigated with normal saline solution. The procedure was completed with a small amount of bleeding, and hemostasis was by pressure.  The patient tolerated the procedure well, with no significant complications. The patient's level of pain during and after the procedure was monitored, and is noted in the wound documentation flowsheet. Discharge plan:     Treatment in the wound care center today: Wound 12/04/17 #2- L dorsal great toe, diabetic, Holbrook 1, onset 05/17, trauma-Dressing/Treatment:  (aquacell ag,kerlix,ace )  Incision 01/11/18 Leg Left-Dressing/Treatment:  (altagracia,jaci). Home treatment: good handwashing before and after any dressing changes. Cleanse ulcer with saline or soap & water before dressing change. May use Vaseline (petrolatum), Aquaphor, Aveeno, CeraVe, Cetaphil, Eucerin, Lubriderm, etc for dry skin. Dressing type for home: To try to improve granulation tissue, encourage the ulcer to contract, and manage the higher-than usual level of exudate, we elected to apply a disposable NPWT system today (JACI). Wound was cleansed with saline after debridement; skin prep applied to periwound skin; first dressing layer(s) were applied as above, then a  JACI dressing was applied, with the Port in the superior position, and secured with adhesive strips. Pump turned on, functioning well, no leaks. Patient educated to keep dressing dry, watch occasionally for alarm lights, and note if drainage becomes excessive, or accumulates around [place]. Well padded dry dressing with ace wrap to remain intact for one week applied over JACI from toes to knee. Written discharge instructions given to patient. Offload ulcer(s) as directed. Elevate leg(s) as directed. Follow up in 1 week. Continue follow up with Dr. Rafael West as recommended by him.            Electronically signed by Cora Wright DPM on 3/1/2018 at 11:22 AM.

## 2018-03-02 ENCOUNTER — OFFICE VISIT (OUTPATIENT)
Dept: FAMILY MEDICINE CLINIC | Age: 73
End: 2018-03-02

## 2018-03-02 VITALS
SYSTOLIC BLOOD PRESSURE: 160 MMHG | WEIGHT: 143 LBS | BODY MASS INDEX: 24.55 KG/M2 | DIASTOLIC BLOOD PRESSURE: 78 MMHG | TEMPERATURE: 98 F | HEART RATE: 62 BPM | OXYGEN SATURATION: 100 %

## 2018-03-02 DIAGNOSIS — E11.42 TYPE 2 DIABETES MELLITUS WITH DIABETIC POLYNEUROPATHY, WITHOUT LONG-TERM CURRENT USE OF INSULIN (HCC): Primary | ICD-10-CM

## 2018-03-02 DIAGNOSIS — I73.9 PVD (PERIPHERAL VASCULAR DISEASE) (HCC): ICD-10-CM

## 2018-03-02 DIAGNOSIS — I25.10 CORONARY ARTERY DISEASE INVOLVING NATIVE CORONARY ARTERY OF NATIVE HEART WITHOUT ANGINA PECTORIS: ICD-10-CM

## 2018-03-02 DIAGNOSIS — I10 ESSENTIAL HYPERTENSION: ICD-10-CM

## 2018-03-02 DIAGNOSIS — E78.00 PURE HYPERCHOLESTEROLEMIA: ICD-10-CM

## 2018-03-02 DIAGNOSIS — I48.0 PAROXYSMAL ATRIAL FIBRILLATION (HCC): ICD-10-CM

## 2018-03-02 LAB
A/G RATIO: 1.5 (ref 1.1–2.2)
ALBUMIN SERPL-MCNC: 4.4 G/DL (ref 3.4–5)
ALP BLD-CCNC: 81 U/L (ref 40–129)
ALT SERPL-CCNC: 33 U/L (ref 10–40)
ANION GAP SERPL CALCULATED.3IONS-SCNC: 16 MMOL/L (ref 3–16)
AST SERPL-CCNC: 33 U/L (ref 15–37)
BASOPHILS ABSOLUTE: 0.1 K/UL (ref 0–0.2)
BASOPHILS RELATIVE PERCENT: 1.2 %
BILIRUB SERPL-MCNC: 0.5 MG/DL (ref 0–1)
BUN BLDV-MCNC: 28 MG/DL (ref 7–20)
CALCIUM SERPL-MCNC: 9.1 MG/DL (ref 8.3–10.6)
CHLORIDE BLD-SCNC: 98 MMOL/L (ref 99–110)
CHOLESTEROL, TOTAL: 141 MG/DL (ref 0–199)
CO2: 23 MMOL/L (ref 21–32)
CREAT SERPL-MCNC: 0.9 MG/DL (ref 0.6–1.2)
CREATININE URINE POCT: 50
EOSINOPHILS ABSOLUTE: 0.3 K/UL (ref 0–0.6)
EOSINOPHILS RELATIVE PERCENT: 3.7 %
GFR AFRICAN AMERICAN: >60
GFR NON-AFRICAN AMERICAN: >60
GLOBULIN: 3 G/DL
GLUCOSE BLD-MCNC: 105 MG/DL (ref 70–99)
HCT VFR BLD CALC: 31.3 % (ref 36–48)
HDLC SERPL-MCNC: 75 MG/DL (ref 40–60)
HEMOGLOBIN: 10.4 G/DL (ref 12–16)
LDL CHOLESTEROL CALCULATED: 56 MG/DL
LYMPHOCYTES ABSOLUTE: 2.4 K/UL (ref 1–5.1)
LYMPHOCYTES RELATIVE PERCENT: 30.4 %
MCH RBC QN AUTO: 27.6 PG (ref 26–34)
MCHC RBC AUTO-ENTMCNC: 33.2 G/DL (ref 31–36)
MCV RBC AUTO: 83 FL (ref 80–100)
MICROALBUMIN/CREAT 24H UR: 10 MG/G{CREAT}
MICROALBUMIN/CREAT UR-RTO: ABNORMAL
MONOCYTES ABSOLUTE: 0.5 K/UL (ref 0–1.3)
MONOCYTES RELATIVE PERCENT: 6.4 %
NEUTROPHILS ABSOLUTE: 4.6 K/UL (ref 1.7–7.7)
NEUTROPHILS RELATIVE PERCENT: 58.3 %
PDW BLD-RTO: 14.5 % (ref 12.4–15.4)
PLATELET # BLD: 188 K/UL (ref 135–450)
PMV BLD AUTO: 8.4 FL (ref 5–10.5)
POTASSIUM SERPL-SCNC: 4.5 MMOL/L (ref 3.5–5.1)
RBC # BLD: 3.77 M/UL (ref 4–5.2)
SODIUM BLD-SCNC: 137 MMOL/L (ref 136–145)
TOTAL PROTEIN: 7.4 G/DL (ref 6.4–8.2)
TRIGL SERPL-MCNC: 48 MG/DL (ref 0–150)
VLDLC SERPL CALC-MCNC: 10 MG/DL
WBC # BLD: 7.9 K/UL (ref 4–11)

## 2018-03-02 PROCEDURE — 3014F SCREEN MAMMO DOC REV: CPT | Performed by: FAMILY MEDICINE

## 2018-03-02 PROCEDURE — 3046F HEMOGLOBIN A1C LEVEL >9.0%: CPT | Performed by: FAMILY MEDICINE

## 2018-03-02 PROCEDURE — G8427 DOCREV CUR MEDS BY ELIG CLIN: HCPCS | Performed by: FAMILY MEDICINE

## 2018-03-02 PROCEDURE — 1123F ACP DISCUSS/DSCN MKR DOCD: CPT | Performed by: FAMILY MEDICINE

## 2018-03-02 PROCEDURE — G8484 FLU IMMUNIZE NO ADMIN: HCPCS | Performed by: FAMILY MEDICINE

## 2018-03-02 PROCEDURE — G8400 PT W/DXA NO RESULTS DOC: HCPCS | Performed by: FAMILY MEDICINE

## 2018-03-02 PROCEDURE — G8420 CALC BMI NORM PARAMETERS: HCPCS | Performed by: FAMILY MEDICINE

## 2018-03-02 PROCEDURE — 99214 OFFICE O/P EST MOD 30 MIN: CPT | Performed by: FAMILY MEDICINE

## 2018-03-02 PROCEDURE — 82044 UR ALBUMIN SEMIQUANTITATIVE: CPT | Performed by: FAMILY MEDICINE

## 2018-03-02 PROCEDURE — G8598 ASA/ANTIPLAT THER USED: HCPCS | Performed by: FAMILY MEDICINE

## 2018-03-02 PROCEDURE — 36415 COLL VENOUS BLD VENIPUNCTURE: CPT | Performed by: FAMILY MEDICINE

## 2018-03-02 PROCEDURE — 4040F PNEUMOC VAC/ADMIN/RCVD: CPT | Performed by: FAMILY MEDICINE

## 2018-03-02 PROCEDURE — 1090F PRES/ABSN URINE INCON ASSESS: CPT | Performed by: FAMILY MEDICINE

## 2018-03-02 PROCEDURE — 1036F TOBACCO NON-USER: CPT | Performed by: FAMILY MEDICINE

## 2018-03-02 PROCEDURE — 3017F COLORECTAL CA SCREEN DOC REV: CPT | Performed by: FAMILY MEDICINE

## 2018-03-02 NOTE — PROGRESS NOTES
Subjective:   She presents for follow up of her diabetes atrial fibrillation PVD hyperlipidemia CAD and hypertension she's also still going to one care doctor for her chronic left foot issues and she has a wound VAC on now and she states it seems to be doing better she is still eating 3 candy bars a day. She has been noncompliant with her diabetic diet for a while but her numbers have been decent. She is back in atrial fibrillation today on exam but she has refused anticoagulation. She is due to get her cholesterol checked today she denies any heart issues that she is aware of but she does have some chest pain when she lifts things sometimes but she thinks it may be more the muscles        She is allergic to pcn [penicillins]. She   reports that she has never smoked. She has never used smokeless tobacco. Review of systems negative for swelling shortness breath wheezing abdominal pain rectal bleeding  Objective:   BP (!) 160/78   Pulse 62   Temp 98 °F (36.7 °C) (Oral)   Wt 143 lb (64.9 kg)   SpO2 100%   BMI 24.55 kg/m²   BP Readings from Last 3 Encounters:   03/02/18 (!) 160/78   02/26/18 (!) 158/72   02/19/18 (!) 146/56     Physical Exam   Constitutional: She is oriented to person, place, and time. She appears well-developed and well-nourished. Non-toxic appearance. HENT:   Head: Normocephalic. Eyes: Conjunctivae are normal. Right eye exhibits no discharge. Left eye exhibits no discharge. No scleral icterus. Neck: Neck supple. Carotid bruit is not present. No thyroid mass and no thyromegaly present. Cardiovascular: Normal rate and normal heart sounds. An irregular rhythm present. No murmur heard. Pulmonary/Chest: Breath sounds normal. No respiratory distress. She has no wheezes. She has no rales. Abdominal: Soft. She exhibits no distension and no mass. There is no hepatosplenomegaly. There is no tenderness. There is no rebound and no guarding. Musculoskeletal: She exhibits no edema.

## 2018-03-03 LAB
ESTIMATED AVERAGE GLUCOSE: 114 MG/DL
HBA1C MFR BLD: 5.6 %

## 2018-03-05 ENCOUNTER — HOSPITAL ENCOUNTER (OUTPATIENT)
Dept: WOUND CARE | Age: 73
Discharge: HOME | End: 2018-03-05
Attending: PODIATRIST | Admitting: PODIATRIST

## 2018-03-05 VITALS
RESPIRATION RATE: 18 BRPM | DIASTOLIC BLOOD PRESSURE: 66 MMHG | BODY MASS INDEX: 24.62 KG/M2 | SYSTOLIC BLOOD PRESSURE: 148 MMHG | HEART RATE: 69 BPM | TEMPERATURE: 98.4 F | HEIGHT: 64 IN | WEIGHT: 144.2 LBS

## 2018-03-12 ENCOUNTER — HOSPITAL ENCOUNTER (OUTPATIENT)
Dept: WOUND CARE | Age: 73
Discharge: HOME | End: 2018-03-12
Attending: PODIATRIST | Admitting: PODIATRIST

## 2018-03-12 VITALS
DIASTOLIC BLOOD PRESSURE: 74 MMHG | SYSTOLIC BLOOD PRESSURE: 157 MMHG | BODY MASS INDEX: 24.92 KG/M2 | HEART RATE: 80 BPM | RESPIRATION RATE: 18 BRPM | WEIGHT: 146 LBS | TEMPERATURE: 96.8 F | HEIGHT: 64 IN

## 2018-03-12 ASSESSMENT — PAIN SCALES - GENERAL: PAINLEVEL_OUTOF10: 0

## 2018-03-19 ENCOUNTER — HOSPITAL ENCOUNTER (OUTPATIENT)
Dept: WOUND CARE | Age: 73
Discharge: HOME | End: 2018-03-19
Attending: PODIATRIST | Admitting: PODIATRIST

## 2018-03-19 VITALS
BODY MASS INDEX: 24.82 KG/M2 | HEIGHT: 64 IN | HEART RATE: 78 BPM | RESPIRATION RATE: 18 BRPM | SYSTOLIC BLOOD PRESSURE: 162 MMHG | DIASTOLIC BLOOD PRESSURE: 76 MMHG | TEMPERATURE: 97.8 F | WEIGHT: 145.4 LBS

## 2018-03-20 NOTE — PROGRESS NOTES
equal, round, and reactive to light, extraocular eye movements intact, conjunctivae normal  ENT: tympanic membrane, external ear and ear canal normal bilaterally, nose without deformity, nasal mucosa and turbinates normal without polyps  Neck: supple and non-tender without mass, no thyromegaly or thyroid nodules, no cervical lymphadenopathy  Pulmonary/Chest: clear to auscultation bilaterally- no wheezes, rales or rhonchi, normal air movement, no respiratory distress  Cardiovascular: normal rate, regular rhythm, normal S1 and S2, no murmurs, rubs, clicks, or gallops, distal pulses intact, no carotid bruits  Abdomen: soft, non-tender, non-distended, normal bowel sounds, no masses or organomegaly. Dorsalis pedis pulse left dopplerable  Posterior tibial pulse left dopplerable  Dorsalis pedis pulse right dopplerable  Posterior tibial pulse right dopplerable  Protective sensation absent bilateral LE      Ulcer on the posterior aspect right heel epithelialized. Ulcer on the left hallux overlying the HIPJ with mild fibrotic tissue, no granulation tissue, mild serous drainage, no hyperkeratotic rim, no undermining, no tunneling, no purulence, no malodor, no eschar,  no periwound maceration, mild periwound erythema, no edema, no crepitus, no increase in skin temperature, ulcer probes to bone    Ulcer on the left 4th digit epithelized. Ulcer on the left lower leg medial aspect with mild fibrotic tissue, red granulation tissue, mild serous drainage, no hyperkeratotic rim, no undermining, no tunneling, no purulence, no malodor, no eschar,  no periwound maceration, mild periwound erythema, mild edema, no crepitus, no increase in skin temperature, ulcer probes to soft tissue only         Today's ulcer measurements are in the wound documentation flowsheet.      Wound 12/04/17 #2- L dorsal great toe, diabetic, Hol[place] 1, onset 05/17, trauma-Wound Length (cm): 1 cm  Incision 01/11/18 Leg Left-Wound Length (cm): 3.3

## 2018-03-20 NOTE — PROGRESS NOTES
[facility] Progress Note      aMx Stark     : 1945    DATE OF VISIT:  3/5/2018    Subjective:     Max Stark is a 67 y.o. female who has a chief complaint of a diabetic ulcer located on the left foot and left leg. States no issues with wounds. Ms. Vanessa Gerber has a past medical history of Atrial fibrillation ([facility] [address] Blood transfusion; CAD (coronary artery disease); Diabetes mellitus type II; Diabetic neuropathy ([facility] [address] Gastric ulcer; H. pylori infection; Hyperlipidemia; Hypertension; Numbness and tingling of left leg; Osteoarthritis; Poor historian; PVD (peripheral vascular disease) ([facility] [address] Unspecified cerebral artery occlusion with cerebral infarction; and upper gi bleed. She has a past surgical history that includes Coronary angioplasty with stent (,); Cardiac catheterization (12); Total shoulder arthroplasty (10/5/12); angioplasty (12/30/15); other surgical history (Left, 2017); and Foot surgery (Left, 2018). Her family history includes Diabetes in her brother, maternal grandmother, and sister; Heart Disease in her father and mother; Stroke in her mother. Ms. Vanessa Gerber reports that she has never smoked. She has never used smokeless tobacco. She reports that she does not drink alcohol or use drugs. Her current medication list consists of aspirin, digoxin, hydrochlorothiazide, lisinopril, metFORMIN, and simvastatin. Allergies: Pcn [penicillins]    Pertinent items from the review of systems are discussed in the HPI; the remainder of the ROS was reviewed and is negative.        Objective:     BP (!) 148/66   Pulse 69   Temp 98.4 °F (36.9 °C) (Oral)   Resp 18   Ht 5' 4\" (1.626 m)   Wt 144 lb 3.2 oz (65.4 kg)   BMI 24.75 kg/m²   General Appearance: alert and oriented to person, place and time, well developed and well- nourished, in no acute distress  Skin: warm and dry, no rash or erythema  Head: normocephalic and atraumatic  Eyes:

## 2018-03-26 ENCOUNTER — HOSPITAL ENCOUNTER (OUTPATIENT)
Dept: WOUND CARE | Age: 73
Discharge: HOME | End: 2018-03-26
Attending: PODIATRIST | Admitting: PODIATRIST

## 2018-03-26 VITALS
DIASTOLIC BLOOD PRESSURE: 54 MMHG | BODY MASS INDEX: 24.41 KG/M2 | WEIGHT: 143 LBS | SYSTOLIC BLOOD PRESSURE: 138 MMHG | HEIGHT: 64 IN | HEART RATE: 70 BPM | RESPIRATION RATE: 18 BRPM | TEMPERATURE: 98.1 F

## 2018-03-26 ASSESSMENT — PAIN SCALES - GENERAL: PAINLEVEL_OUTOF10: 0

## 2018-04-02 ENCOUNTER — HOSPITAL ENCOUNTER (OUTPATIENT)
Dept: WOUND CARE | Age: 73
Discharge: HOME | End: 2018-04-02
Attending: PODIATRIST | Admitting: PODIATRIST

## 2018-04-02 VITALS
HEIGHT: 64 IN | DIASTOLIC BLOOD PRESSURE: 58 MMHG | TEMPERATURE: 97 F | WEIGHT: 144.2 LBS | RESPIRATION RATE: 18 BRPM | BODY MASS INDEX: 24.62 KG/M2 | SYSTOLIC BLOOD PRESSURE: 132 MMHG | HEART RATE: 69 BPM

## 2018-04-02 ASSESSMENT — PAIN DESCRIPTION - PROGRESSION: CLINICAL_PROGRESSION: NOT CHANGED

## 2018-04-02 ASSESSMENT — PAIN SCALES - GENERAL: PAINLEVEL_OUTOF10: 0

## 2018-04-09 ENCOUNTER — HOSPITAL ENCOUNTER (OUTPATIENT)
Dept: WOUND CARE | Age: 73
Discharge: HOME | End: 2018-04-09
Attending: PODIATRIST | Admitting: PODIATRIST

## 2018-04-09 VITALS
BODY MASS INDEX: 24.59 KG/M2 | RESPIRATION RATE: 18 BRPM | SYSTOLIC BLOOD PRESSURE: 155 MMHG | TEMPERATURE: 97.7 F | HEART RATE: 69 BPM | DIASTOLIC BLOOD PRESSURE: 64 MMHG | HEIGHT: 64 IN | WEIGHT: 144 LBS

## 2018-04-09 ASSESSMENT — PAIN SCALES - GENERAL: PAINLEVEL_OUTOF10: 0

## 2018-04-12 NOTE — PROGRESS NOTES
05/17, trauma-Wound Length (cm): 0.7 cm  [REMOVED] Incision 01/11/18 Leg Left-Wound Width (cm): 0.3 cm  Wound 12/04/17 #2- L dorsal great toe, diabetic, [place] 1, onset 05/17, trauma-Wound Width (cm): 0.7 cm  [REMOVED] Incision 01/11/18 Leg Left-Wound Depth (cm) : 0.7  Wound 12/04/17 #2- L dorsal great toe, diabetic, [place] 1, onset 05/17, trauma-Wound Depth (cm) : 0.2      LABS  Lab Results   Component Value Date    LABA1C 5.6 03/02/2018         Assessment:     Patient Active Problem List   Diagnosis Code    Osteoarthritis M19.90    Paroxysmal atrial fibrillation ([facility] [address] I48.0    Noncompliance of patient with dietary regimen Z91.11    PVD (peripheral vascular disease) ([facility] [address] I73.9    Pure hypercholesterolemia E78.00    Coronary artery disease involving native coronary artery of native heart without angina pectoris I25.10    Essential hypertension I10    Type 2 diabetes mellitus with diabetic polyneuropathy, without long-term current use of insulin ([facility]) E11.42    Vitamin D deficiency E55.9    Cellulitis L03.90    Acute osteomyelitis of left foot ([facility] [address] M86.172       Assessment of today's active condition(s): diabetic foot ulcer left, chronic ulcer left lower leg, diabetes mellitus, PAD. Factors contributing to occurrence and/or persistence of the chronic ulcer include diabetes and arterial insufficiency. Sharp debridement is not indicated today, based upon the exam findings in the ulcer(s) above. Discharge plan:     Treatment in the wound care center today: [REMOVED] Incision 01/11/18 Leg Left-Dressing/Treatment: Other (Comment) (iodoform,dry dressing tubi )  Wound 12/04/17 #2- L dorsal great toe, diabetic, [place] 1, onset 05/17, trauma-Dressing/Treatment:  (opticell ag,dry dressing tubi ). Home treatment: good handwashing before and after any dressing changes. Cleanse ulcer with saline or soap & water before dressing change.  May use Vaseline (petrolatum), Aquaphor, Aveeno, CeraVe, Cetaphil, Eucerin,

## 2018-04-12 NOTE — PROGRESS NOTES
Length (cm): 0.7 cm  Wound 12/04/17 #2- L dorsal great toe, diabetic, [place] 1, onset 05/17, trauma-Wound Length (cm): 0.7 cm  [REMOVED] Incision 01/11/18 Leg Left-Wound Width (cm): 0.1 cm  Wound 03/26/18 #5, left surgical dehiscence, full thickness, onset 3/35/18, surgical injury-Wound Width (cm): 0.2 cm  Wound 12/04/17 #2- L dorsal great toe, diabetic, [place] 1, onset 05/17, trauma-Wound Width (cm): 0.7 cm  [REMOVED] Incision 01/11/18 Leg Left-Wound Depth (cm) : 0.3 (tunnel @ 6 o'clock, 1.0 cm)  Wound 03/26/18 #5, left surgical dehiscence, full thickness, onset 3/35/18, surgical injury-Wound Depth (cm) : 1.1  Wound 12/04/17 #2- L dorsal great toe, diabetic, [place] 1, onset 05/17, trauma-Wound Depth (cm) : 0.3      LABS  Lab Results   Component Value Date    LABA1C 5.6 03/02/2018         Assessment:     Patient Active Problem List   Diagnosis Code    Osteoarthritis M19.90    Paroxysmal atrial fibrillation ([facility] [address] I48.0    Noncompliance of patient with dietary regimen Z91.11    PVD (peripheral vascular disease) ([address] I73.9    Pure hypercholesterolemia E78.00    Coronary artery disease involving native coronary artery of native heart without angina pectoris I25.10    Essential hypertension I10    Type 2 diabetes mellitus with diabetic polyneuropathy, without long-term current use of insulin ([facility]) E11.42    Vitamin D deficiency E55.9    Cellulitis L03.90    Acute osteomyelitis of left foot ([facility] [address] M86.172       Assessment of today's active condition(s): diabetic foot ulcer left, chronic ulcer left lower leg, diabetes mellitus, PAD. Factors contributing to occurrence and/or persistence of the chronic ulcer include diabetes and arterial insufficiency. Sharp debridement is not indicated today, based upon the exam findings in the ulcer(s) above.       Discharge plan:     Treatment in the wound care center today: [REMOVED] Incision 01/11/18 Leg Left-Dressing/Treatment: Other (Comment) (iodoform,JACI)  Wound 03/26/18 #5, left surgical dehiscence, full thickness, onset 3/35/18, surgical injury-Dressing/Treatment:  (iodoform,JACI)  Wound 12/04/17 #2- L dorsal great toe, diabetic, [place] 1, onset 05/17, trauma-Dressing/Treatment:  (zinc,opticell dry dressing ). Home treatment: good handwashing before and after any dressing changes. Cleanse ulcer with saline or soap & water before dressing change. May use Vaseline (petrolatum), Aquaphor, Aveeno, CeraVe, Cetaphil, Eucerin, Lubriderm, etc for dry skin. Dressing type for home: To try to improve granulation tissue, encourage the ulcer to contract, and manage the higher-than usual level of exudate, we elected to apply a disposable NPWT system today (JACI). Wound was cleansed with saline after debridement; skin prep applied to periwound skin; first dressing layer(s) were applied as above, then a  JACI dressing was applied, with the Port in the superior position, and secured with adhesive strips. Pump turned on, functioning well, no leaks. Patient educated to keep dressing dry, watch occasionally for alarm lights, and note if drainage becomes excessive, or accumulates around [place]. Well padded dry dressing with ace wrap to remain intact for one week applied over JACI from toes to knee. Written discharge instructions given to patient. Offload ulcer(s) as directed. Elevate leg(s) as directed. Follow up in 1 week. Continue follow up with Dr. Lennox Kub as recommended by him.            Electronically signed by Melissa Hull DPM on 4/12/2018 at 4:17 PM.

## 2018-04-16 ENCOUNTER — HOSPITAL ENCOUNTER (OUTPATIENT)
Dept: WOUND CARE | Age: 73
Discharge: HOME | End: 2018-04-16
Attending: PODIATRIST | Admitting: PODIATRIST

## 2018-04-16 VITALS
HEIGHT: 64 IN | SYSTOLIC BLOOD PRESSURE: 149 MMHG | DIASTOLIC BLOOD PRESSURE: 71 MMHG | WEIGHT: 146 LBS | RESPIRATION RATE: 18 BRPM | TEMPERATURE: 98.2 F | HEART RATE: 62 BPM | BODY MASS INDEX: 24.92 KG/M2

## 2018-04-23 ENCOUNTER — HOSPITAL ENCOUNTER (OUTPATIENT)
Dept: WOUND CARE | Age: 73
Discharge: HOME | End: 2018-04-23
Attending: PODIATRIST | Admitting: PODIATRIST

## 2018-04-23 VITALS
BODY MASS INDEX: 23.45 KG/M2 | HEART RATE: 70 BPM | WEIGHT: 137.38 LBS | TEMPERATURE: 97.5 F | RESPIRATION RATE: 16 BRPM | HEIGHT: 64 IN

## 2018-04-23 ASSESSMENT — PAIN SCALES - GENERAL: PAINLEVEL_OUTOF10: 0

## 2018-04-30 ENCOUNTER — HOSPITAL ENCOUNTER (OUTPATIENT)
Dept: WOUND CARE | Age: 73
Discharge: HOME | End: 2018-04-30
Attending: PODIATRIST | Admitting: PODIATRIST

## 2018-04-30 VITALS
HEART RATE: 59 BPM | BODY MASS INDEX: 23.39 KG/M2 | SYSTOLIC BLOOD PRESSURE: 152 MMHG | DIASTOLIC BLOOD PRESSURE: 69 MMHG | HEIGHT: 64 IN | TEMPERATURE: 97 F | WEIGHT: 137 LBS | RESPIRATION RATE: 16 BRPM

## 2018-04-30 RX ORDER — SIMVASTATIN 40 MG
40 TABLET ORAL NIGHTLY
Qty: 90 TABLET | Refills: 0 | Status: SHIPPED | OUTPATIENT
Start: 2018-04-30 | End: 2018-08-21 | Stop reason: SDUPTHER

## 2018-04-30 RX ORDER — LISINOPRIL 10 MG/1
10 TABLET ORAL DAILY
Qty: 90 TABLET | Refills: 0 | Status: SHIPPED | OUTPATIENT
Start: 2018-04-30 | End: 2018-08-21 | Stop reason: SDUPTHER

## 2018-04-30 RX ORDER — LIDOCAINE HYDROCHLORIDE 40 MG/ML
SOLUTION TOPICAL ONCE
Status: DISCONTINUED | OUTPATIENT
Start: 2018-04-30 | End: 2018-05-01 | Stop reason: HOSPADM

## 2018-05-07 ENCOUNTER — HOSPITAL ENCOUNTER (OUTPATIENT)
Dept: WOUND CARE | Age: 73
Discharge: HOME | End: 2018-05-07
Attending: PODIATRIST | Admitting: PODIATRIST

## 2018-05-07 VITALS
TEMPERATURE: 97.4 F | RESPIRATION RATE: 16 BRPM | HEIGHT: 64 IN | WEIGHT: 141 LBS | BODY MASS INDEX: 24.07 KG/M2 | DIASTOLIC BLOOD PRESSURE: 56 MMHG | HEART RATE: 69 BPM | SYSTOLIC BLOOD PRESSURE: 144 MMHG

## 2018-05-07 RX ORDER — LIDOCAINE 40 MG/G
CREAM TOPICAL ONCE
Status: DISCONTINUED | OUTPATIENT
Start: 2018-05-07 | End: 2018-05-08 | Stop reason: HOSPADM

## 2018-05-14 ENCOUNTER — HOSPITAL ENCOUNTER (OUTPATIENT)
Dept: WOUND CARE | Age: 73
Discharge: HOME | End: 2018-05-14
Attending: PODIATRIST | Admitting: PODIATRIST

## 2018-05-14 VITALS
WEIGHT: 138 LBS | TEMPERATURE: 98.1 F | SYSTOLIC BLOOD PRESSURE: 137 MMHG | HEIGHT: 64 IN | DIASTOLIC BLOOD PRESSURE: 62 MMHG | RESPIRATION RATE: 16 BRPM | HEART RATE: 81 BPM | BODY MASS INDEX: 23.56 KG/M2

## 2018-05-14 RX ORDER — LIDOCAINE 40 MG/G
CREAM TOPICAL ONCE
Status: DISCONTINUED | OUTPATIENT
Start: 2018-05-14 | End: 2018-05-15 | Stop reason: HOSPADM

## 2018-05-14 ASSESSMENT — PAIN SCALES - GENERAL: PAINLEVEL_OUTOF10: 0

## 2018-05-21 ENCOUNTER — HOSPITAL ENCOUNTER (OUTPATIENT)
Dept: WOUND CARE | Age: 73
Discharge: HOME | End: 2018-05-21
Attending: PODIATRIST | Admitting: PODIATRIST

## 2018-05-21 VITALS
RESPIRATION RATE: 17 BRPM | HEIGHT: 64 IN | BODY MASS INDEX: 23.87 KG/M2 | DIASTOLIC BLOOD PRESSURE: 72 MMHG | TEMPERATURE: 97.4 F | SYSTOLIC BLOOD PRESSURE: 147 MMHG | HEART RATE: 70 BPM | WEIGHT: 139.8 LBS

## 2018-05-21 RX ORDER — LIDOCAINE 40 MG/G
CREAM TOPICAL PRN
Status: DISCONTINUED | OUTPATIENT
Start: 2018-05-21 | End: 2018-05-22 | Stop reason: HOSPADM

## 2018-05-21 ASSESSMENT — PAIN SCALES - GENERAL: PAINLEVEL_OUTOF10: 0

## 2018-06-04 ENCOUNTER — HOSPITAL ENCOUNTER (OUTPATIENT)
Dept: WOUND CARE | Age: 73
Discharge: HOME | End: 2018-06-04
Attending: PODIATRIST | Admitting: PODIATRIST

## 2018-06-04 VITALS — TEMPERATURE: 97.8 F | WEIGHT: 137.6 LBS | HEIGHT: 64 IN | BODY MASS INDEX: 23.49 KG/M2 | RESPIRATION RATE: 18 BRPM

## 2018-06-04 RX ORDER — LIDOCAINE 40 MG/G
CREAM TOPICAL ONCE
Status: DISCONTINUED | OUTPATIENT
Start: 2018-06-04 | End: 2018-06-05 | Stop reason: HOSPADM

## 2018-07-10 ENCOUNTER — OFFICE VISIT (OUTPATIENT)
Dept: FAMILY MEDICINE CLINIC | Age: 73
End: 2018-07-10

## 2018-07-10 VITALS
OXYGEN SATURATION: 100 % | HEART RATE: 80 BPM | DIASTOLIC BLOOD PRESSURE: 80 MMHG | TEMPERATURE: 97.9 F | SYSTOLIC BLOOD PRESSURE: 154 MMHG | BODY MASS INDEX: 23.95 KG/M2 | WEIGHT: 139.5 LBS

## 2018-07-10 DIAGNOSIS — E11.42 TYPE 2 DIABETES MELLITUS WITH DIABETIC POLYNEUROPATHY, WITHOUT LONG-TERM CURRENT USE OF INSULIN (HCC): Primary | ICD-10-CM

## 2018-07-10 LAB
A/G RATIO: 1.4 (ref 1.1–2.2)
ALBUMIN SERPL-MCNC: 4.6 G/DL (ref 3.4–5)
ALP BLD-CCNC: 72 U/L (ref 40–129)
ALT SERPL-CCNC: 15 U/L (ref 10–40)
ANION GAP SERPL CALCULATED.3IONS-SCNC: 15 MMOL/L (ref 3–16)
AST SERPL-CCNC: 18 U/L (ref 15–37)
BASOPHILS ABSOLUTE: 0.1 K/UL (ref 0–0.2)
BASOPHILS RELATIVE PERCENT: 1.3 %
BILIRUB SERPL-MCNC: 0.5 MG/DL (ref 0–1)
BUN BLDV-MCNC: 31 MG/DL (ref 7–20)
CALCIUM SERPL-MCNC: 10 MG/DL (ref 8.3–10.6)
CHLORIDE BLD-SCNC: 98 MMOL/L (ref 99–110)
CO2: 24 MMOL/L (ref 21–32)
CREAT SERPL-MCNC: 1.1 MG/DL (ref 0.6–1.2)
EOSINOPHILS ABSOLUTE: 0.3 K/UL (ref 0–0.6)
EOSINOPHILS RELATIVE PERCENT: 3.7 %
GFR AFRICAN AMERICAN: 59
GFR NON-AFRICAN AMERICAN: 49
GLOBULIN: 3.3 G/DL
GLUCOSE BLD-MCNC: 139 MG/DL (ref 70–99)
HBA1C MFR BLD: 6.3 %
HCT VFR BLD CALC: 32.2 % (ref 36–48)
HEMOGLOBIN: 11.1 G/DL (ref 12–16)
LYMPHOCYTES ABSOLUTE: 2.4 K/UL (ref 1–5.1)
LYMPHOCYTES RELATIVE PERCENT: 25.4 %
MCH RBC QN AUTO: 28.3 PG (ref 26–34)
MCHC RBC AUTO-ENTMCNC: 34.3 G/DL (ref 31–36)
MCV RBC AUTO: 82.5 FL (ref 80–100)
MONOCYTES ABSOLUTE: 0.6 K/UL (ref 0–1.3)
MONOCYTES RELATIVE PERCENT: 6.2 %
NEUTROPHILS ABSOLUTE: 5.9 K/UL (ref 1.7–7.7)
NEUTROPHILS RELATIVE PERCENT: 63.4 %
PDW BLD-RTO: 15.9 % (ref 12.4–15.4)
PLATELET # BLD: 199 K/UL (ref 135–450)
PMV BLD AUTO: 8.2 FL (ref 5–10.5)
POTASSIUM SERPL-SCNC: 4.3 MMOL/L (ref 3.5–5.1)
RBC # BLD: 3.91 M/UL (ref 4–5.2)
SODIUM BLD-SCNC: 137 MMOL/L (ref 136–145)
TOTAL PROTEIN: 7.9 G/DL (ref 6.4–8.2)
WBC # BLD: 9.2 K/UL (ref 4–11)

## 2018-07-10 PROCEDURE — 99213 OFFICE O/P EST LOW 20 MIN: CPT | Performed by: NURSE PRACTITIONER

## 2018-07-10 PROCEDURE — G8598 ASA/ANTIPLAT THER USED: HCPCS | Performed by: NURSE PRACTITIONER

## 2018-07-10 PROCEDURE — 3017F COLORECTAL CA SCREEN DOC REV: CPT | Performed by: NURSE PRACTITIONER

## 2018-07-10 PROCEDURE — 2022F DILAT RTA XM EVC RTNOPTHY: CPT | Performed by: NURSE PRACTITIONER

## 2018-07-10 PROCEDURE — G8400 PT W/DXA NO RESULTS DOC: HCPCS | Performed by: NURSE PRACTITIONER

## 2018-07-10 PROCEDURE — 4040F PNEUMOC VAC/ADMIN/RCVD: CPT | Performed by: NURSE PRACTITIONER

## 2018-07-10 PROCEDURE — G8427 DOCREV CUR MEDS BY ELIG CLIN: HCPCS | Performed by: NURSE PRACTITIONER

## 2018-07-10 PROCEDURE — G8420 CALC BMI NORM PARAMETERS: HCPCS | Performed by: NURSE PRACTITIONER

## 2018-07-10 PROCEDURE — 83036 HEMOGLOBIN GLYCOSYLATED A1C: CPT | Performed by: NURSE PRACTITIONER

## 2018-07-10 PROCEDURE — 1090F PRES/ABSN URINE INCON ASSESS: CPT | Performed by: NURSE PRACTITIONER

## 2018-07-10 PROCEDURE — 36415 COLL VENOUS BLD VENIPUNCTURE: CPT | Performed by: NURSE PRACTITIONER

## 2018-07-10 PROCEDURE — 3044F HG A1C LEVEL LT 7.0%: CPT | Performed by: NURSE PRACTITIONER

## 2018-07-10 PROCEDURE — 1036F TOBACCO NON-USER: CPT | Performed by: NURSE PRACTITIONER

## 2018-07-10 PROCEDURE — 1123F ACP DISCUSS/DSCN MKR DOCD: CPT | Performed by: NURSE PRACTITIONER

## 2018-07-10 ASSESSMENT — ENCOUNTER SYMPTOMS
SHORTNESS OF BREATH: 0
VOMITING: 0
COUGH: 0

## 2018-07-10 NOTE — PROGRESS NOTES
swelling. Gastrointestinal: Negative for vomiting. Endocrine: Negative for polydipsia, polyphagia and polyuria. Musculoskeletal: Negative for gait problem. Skin: Negative for pallor. Neurological: Positive for numbness (feet). Negative for headaches. Physical Exam   Constitutional: She is oriented to person, place, and time. She appears well-developed. Non-toxic appearance. No distress. HENT:   Head: Normocephalic and atraumatic. Mouth/Throat: Oropharynx is clear and moist.   Eyes: Pupils are equal, round, and reactive to light. Neck: Normal range of motion. Neck supple. Cardiovascular: Normal rate and normal heart sounds. An irregular rhythm present. No murmur heard. Pulmonary/Chest: Effort normal and breath sounds normal. She has no wheezes. Musculoskeletal: She exhibits no edema. Full ROM of all 4 extremities. Foot exam:     Cyanosis or signs of ischemia - none      Ulcerations - none    Callouses - no significant    Deformities - none                         Monofilament sensation testing - decreased bilateral                         Pulses - decreased left      Neurological: She is alert and oriented to person, place, and time. Skin: Skin is warm and dry. Psychiatric: She has a normal mood and affect. Her behavior is normal. Thought content normal.   Vitals reviewed. Vitals:    07/10/18 1350 07/10/18 1355   BP: (!) 168/78 (!) 154/80   Pulse: 80    Temp: 97.9 °F (36.6 °C)    TempSrc: Oral    SpO2: 100%    Weight: 139 lb 8 oz (63.3 kg)        Assessment:  Encounter Diagnosis   Name Primary?  Type 2 diabetes mellitus with diabetic polyneuropathy, without long-term current use of insulin ([facility] [address] Yes       Plan:  1. Type 2 diabetes mellitus with diabetic polyneuropathy, without long-term current use of insulin ([facility])  Stable  Continue with current medication. Back on candy bars. Hb A1c was 5.63 months ago, today is 6.3  Follow-up with podiatry to trim toenails.   Follow-up

## 2018-07-11 DIAGNOSIS — Z12.39 BREAST CANCER SCREENING: Primary | ICD-10-CM

## 2018-07-31 DIAGNOSIS — I48.0 PAROXYSMAL ATRIAL FIBRILLATION (HCC): ICD-10-CM

## 2018-07-31 RX ORDER — HYDROCHLOROTHIAZIDE 25 MG/1
25 TABLET ORAL DAILY
Qty: 90 TABLET | Refills: 1 | Status: SHIPPED | OUTPATIENT
Start: 2018-07-31 | End: 2019-02-01 | Stop reason: SDUPTHER

## 2018-07-31 RX ORDER — DIGOXIN 125 MCG
TABLET ORAL
Qty: 90 TABLET | Refills: 1 | Status: SHIPPED | OUTPATIENT
Start: 2018-07-31 | End: 2019-02-01 | Stop reason: SDUPTHER

## 2018-08-21 RX ORDER — LISINOPRIL 10 MG/1
10 TABLET ORAL DAILY
Qty: 90 TABLET | Refills: 0 | Status: SHIPPED | OUTPATIENT
Start: 2018-08-21 | End: 2018-11-19 | Stop reason: SDUPTHER

## 2018-08-21 RX ORDER — SIMVASTATIN 40 MG
40 TABLET ORAL NIGHTLY
Qty: 90 TABLET | Refills: 0 | Status: SHIPPED | OUTPATIENT
Start: 2018-08-21 | End: 2018-11-19 | Stop reason: SDUPTHER

## 2018-10-10 ENCOUNTER — OFFICE VISIT (OUTPATIENT)
Dept: FAMILY MEDICINE CLINIC | Age: 73
End: 2018-10-10
Payer: MEDICARE

## 2018-10-10 VITALS
SYSTOLIC BLOOD PRESSURE: 148 MMHG | OXYGEN SATURATION: 98 % | DIASTOLIC BLOOD PRESSURE: 76 MMHG | BODY MASS INDEX: 24.25 KG/M2 | HEART RATE: 67 BPM | TEMPERATURE: 98 F | WEIGHT: 141.25 LBS

## 2018-10-10 DIAGNOSIS — M20.42 HAMMERTOE, BILATERAL: ICD-10-CM

## 2018-10-10 DIAGNOSIS — I25.10 CORONARY ARTERY DISEASE INVOLVING NATIVE CORONARY ARTERY OF NATIVE HEART WITHOUT ANGINA PECTORIS: ICD-10-CM

## 2018-10-10 DIAGNOSIS — M20.41 HAMMERTOE, BILATERAL: ICD-10-CM

## 2018-10-10 DIAGNOSIS — I10 ESSENTIAL HYPERTENSION: ICD-10-CM

## 2018-10-10 DIAGNOSIS — E78.00 PURE HYPERCHOLESTEROLEMIA: ICD-10-CM

## 2018-10-10 DIAGNOSIS — E11.42 TYPE 2 DIABETES MELLITUS WITH DIABETIC POLYNEUROPATHY, WITHOUT LONG-TERM CURRENT USE OF INSULIN (HCC): Primary | ICD-10-CM

## 2018-10-10 LAB
ANION GAP SERPL CALCULATED.3IONS-SCNC: 13 MMOL/L (ref 3–16)
BUN BLDV-MCNC: 30 MG/DL (ref 7–20)
CALCIUM SERPL-MCNC: 9.8 MG/DL (ref 8.3–10.6)
CHLORIDE BLD-SCNC: 98 MMOL/L (ref 99–110)
CO2: 25 MMOL/L (ref 21–32)
CREAT SERPL-MCNC: 1.1 MG/DL (ref 0.6–1.2)
GFR AFRICAN AMERICAN: 59
GFR NON-AFRICAN AMERICAN: 49
GLUCOSE BLD-MCNC: 169 MG/DL (ref 70–99)
POTASSIUM SERPL-SCNC: 4.1 MMOL/L (ref 3.5–5.1)
SODIUM BLD-SCNC: 136 MMOL/L (ref 136–145)

## 2018-10-10 PROCEDURE — 4040F PNEUMOC VAC/ADMIN/RCVD: CPT | Performed by: NURSE PRACTITIONER

## 2018-10-10 PROCEDURE — G8427 DOCREV CUR MEDS BY ELIG CLIN: HCPCS | Performed by: NURSE PRACTITIONER

## 2018-10-10 PROCEDURE — 1101F PT FALLS ASSESS-DOCD LE1/YR: CPT | Performed by: NURSE PRACTITIONER

## 2018-10-10 PROCEDURE — G8420 CALC BMI NORM PARAMETERS: HCPCS | Performed by: NURSE PRACTITIONER

## 2018-10-10 PROCEDURE — 1090F PRES/ABSN URINE INCON ASSESS: CPT | Performed by: NURSE PRACTITIONER

## 2018-10-10 PROCEDURE — 99214 OFFICE O/P EST MOD 30 MIN: CPT | Performed by: NURSE PRACTITIONER

## 2018-10-10 PROCEDURE — G8484 FLU IMMUNIZE NO ADMIN: HCPCS | Performed by: NURSE PRACTITIONER

## 2018-10-10 PROCEDURE — 36415 COLL VENOUS BLD VENIPUNCTURE: CPT | Performed by: NURSE PRACTITIONER

## 2018-10-10 PROCEDURE — G8598 ASA/ANTIPLAT THER USED: HCPCS | Performed by: NURSE PRACTITIONER

## 2018-10-10 PROCEDURE — 1123F ACP DISCUSS/DSCN MKR DOCD: CPT | Performed by: NURSE PRACTITIONER

## 2018-10-10 PROCEDURE — 1036F TOBACCO NON-USER: CPT | Performed by: NURSE PRACTITIONER

## 2018-10-10 PROCEDURE — 3044F HG A1C LEVEL LT 7.0%: CPT | Performed by: NURSE PRACTITIONER

## 2018-10-10 PROCEDURE — 2022F DILAT RTA XM EVC RTNOPTHY: CPT | Performed by: NURSE PRACTITIONER

## 2018-10-10 PROCEDURE — G8400 PT W/DXA NO RESULTS DOC: HCPCS | Performed by: NURSE PRACTITIONER

## 2018-10-10 PROCEDURE — 3017F COLORECTAL CA SCREEN DOC REV: CPT | Performed by: NURSE PRACTITIONER

## 2018-10-10 ASSESSMENT — PATIENT HEALTH QUESTIONNAIRE - PHQ9
SUM OF ALL RESPONSES TO PHQ QUESTIONS 1-9: 0
2. FEELING DOWN, DEPRESSED OR HOPELESS: 0
1. LITTLE INTEREST OR PLEASURE IN DOING THINGS: 0
SUM OF ALL RESPONSES TO PHQ9 QUESTIONS 1 & 2: 0
SUM OF ALL RESPONSES TO PHQ QUESTIONS 1-9: 0

## 2018-10-10 NOTE — PATIENT INSTRUCTIONS
Please read the healthy family handout that you were given and share it with your family. Please compare this printed medication list with your medications at home to be sure they are the same. If you have any medications that are different please contact us immediately at [phone]. Also review your allergies that we have listed, these may also include medications that you have not been able to tolerate, make sure everything listed is correct. If you have any allergies that are different please contact us immediately at [phone]. Patient Education        Coronary Artery Disease: Care Instructions  Your Care Instructions    The heart is a muscle, and like any muscle, it needs blood to work well. Coronary artery disease occurs when the arteries that bring oxygen-rich blood to your heart have a buildup of plaque-deposits of fats and other substances. Plaque can reduce blood flow to the heart muscle. This can cause angina symptoms such as chest pain or pressure. A heart attack can happen if blood flow is completely blocked. You can do a lot to improve your health and prevent a heart attack. Eating healthy food, not smoking, getting regular exercise, and taking your medicine are the main things you can do every day to stay healthy. Follow-up care is a key part of your treatment and safety. Be sure to make and go to all appointments, and call your doctor if you are having problems. It's also a good idea to know your test results and keep a list of the medicines you take. How can you care for yourself at home? Medicines    · Be safe with medicines. Take your medicines exactly as prescribed. Call your doctor if you think you are having a problem with your medicine. You will get more details on the specific medicines your doctor prescribes. You may need several medicines.   ¨ Angiotensin-converting enzyme (ACE) inhibitors, angiotensin II receptor blockers (ARBs), beta-blockers, and statins can help prevent

## 2018-10-11 LAB
ESTIMATED AVERAGE GLUCOSE: 168.6 MG/DL
HBA1C MFR BLD: 7.5 %

## 2018-11-19 RX ORDER — SIMVASTATIN 40 MG
40 TABLET ORAL NIGHTLY
Qty: 90 TABLET | Refills: 1 | Status: SHIPPED | OUTPATIENT
Start: 2018-11-19 | End: 2019-05-09 | Stop reason: SDUPTHER

## 2018-11-19 RX ORDER — LISINOPRIL 10 MG/1
10 TABLET ORAL DAILY
Qty: 90 TABLET | Refills: 1 | Status: SHIPPED | OUTPATIENT
Start: 2018-11-19 | End: 2019-05-09

## 2019-01-10 ENCOUNTER — OFFICE VISIT (OUTPATIENT)
Dept: FAMILY MEDICINE CLINIC | Age: 74
End: 2019-01-10
Payer: MEDICARE

## 2019-01-10 VITALS
TEMPERATURE: 97.6 F | WEIGHT: 131.5 LBS | OXYGEN SATURATION: 100 % | DIASTOLIC BLOOD PRESSURE: 76 MMHG | BODY MASS INDEX: 22.57 KG/M2 | SYSTOLIC BLOOD PRESSURE: 148 MMHG | HEART RATE: 61 BPM

## 2019-01-10 DIAGNOSIS — E11.42 TYPE 2 DIABETES MELLITUS WITH DIABETIC POLYNEUROPATHY, WITHOUT LONG-TERM CURRENT USE OF INSULIN (HCC): Primary | ICD-10-CM

## 2019-01-10 DIAGNOSIS — I48.0 PAROXYSMAL ATRIAL FIBRILLATION (HCC): ICD-10-CM

## 2019-01-10 DIAGNOSIS — I10 ESSENTIAL HYPERTENSION: ICD-10-CM

## 2019-01-10 DIAGNOSIS — E78.00 PURE HYPERCHOLESTEROLEMIA: ICD-10-CM

## 2019-01-10 PROBLEM — L03.90 CELLULITIS: Status: RESOLVED | Noted: 2017-05-31 | Resolved: 2019-01-10

## 2019-01-10 LAB
ANION GAP SERPL CALCULATED.3IONS-SCNC: 17 MMOL/L (ref 3–16)
BASOPHILS ABSOLUTE: 0.1 K/UL (ref 0–0.2)
BASOPHILS RELATIVE PERCENT: 1.1 %
BUN BLDV-MCNC: 33 MG/DL (ref 7–20)
CALCIUM SERPL-MCNC: 9.9 MG/DL (ref 8.3–10.6)
CHLORIDE BLD-SCNC: 98 MMOL/L (ref 99–110)
CHOLESTEROL, TOTAL: 149 MG/DL (ref 0–199)
CO2: 24 MMOL/L (ref 21–32)
CREAT SERPL-MCNC: 1 MG/DL (ref 0.6–1.2)
EOSINOPHILS ABSOLUTE: 0.3 K/UL (ref 0–0.6)
EOSINOPHILS RELATIVE PERCENT: 3.1 %
GFR AFRICAN AMERICAN: >60
GFR NON-AFRICAN AMERICAN: 54
GLUCOSE BLD-MCNC: 139 MG/DL (ref 70–99)
HBA1C MFR BLD: 7.3 %
HCT VFR BLD CALC: 35.2 % (ref 36–48)
HDLC SERPL-MCNC: 58 MG/DL (ref 40–60)
HEMOGLOBIN: 11.7 G/DL (ref 12–16)
LDL CHOLESTEROL CALCULATED: 78 MG/DL
LYMPHOCYTES ABSOLUTE: 2.4 K/UL (ref 1–5.1)
LYMPHOCYTES RELATIVE PERCENT: 26 %
MCH RBC QN AUTO: 26.9 PG (ref 26–34)
MCHC RBC AUTO-ENTMCNC: 33.2 G/DL (ref 31–36)
MCV RBC AUTO: 81 FL (ref 80–100)
MONOCYTES ABSOLUTE: 0.5 K/UL (ref 0–1.3)
MONOCYTES RELATIVE PERCENT: 5.9 %
NEUTROPHILS ABSOLUTE: 5.8 K/UL (ref 1.7–7.7)
NEUTROPHILS RELATIVE PERCENT: 63.9 %
PDW BLD-RTO: 14.9 % (ref 12.4–15.4)
PLATELET # BLD: 236 K/UL (ref 135–450)
PMV BLD AUTO: 8.2 FL (ref 5–10.5)
POTASSIUM SERPL-SCNC: 4.4 MMOL/L (ref 3.5–5.1)
RBC # BLD: 4.35 M/UL (ref 4–5.2)
SODIUM BLD-SCNC: 139 MMOL/L (ref 136–145)
TRIGL SERPL-MCNC: 67 MG/DL (ref 0–150)
VLDLC SERPL CALC-MCNC: 13 MG/DL
WBC # BLD: 9.2 K/UL (ref 4–11)

## 2019-01-10 PROCEDURE — 3017F COLORECTAL CA SCREEN DOC REV: CPT | Performed by: NURSE PRACTITIONER

## 2019-01-10 PROCEDURE — 36415 COLL VENOUS BLD VENIPUNCTURE: CPT | Performed by: NURSE PRACTITIONER

## 2019-01-10 PROCEDURE — 83036 HEMOGLOBIN GLYCOSYLATED A1C: CPT | Performed by: NURSE PRACTITIONER

## 2019-01-10 PROCEDURE — 3046F HEMOGLOBIN A1C LEVEL >9.0%: CPT | Performed by: NURSE PRACTITIONER

## 2019-01-10 PROCEDURE — 1101F PT FALLS ASSESS-DOCD LE1/YR: CPT | Performed by: NURSE PRACTITIONER

## 2019-01-10 PROCEDURE — G8420 CALC BMI NORM PARAMETERS: HCPCS | Performed by: NURSE PRACTITIONER

## 2019-01-10 PROCEDURE — G8484 FLU IMMUNIZE NO ADMIN: HCPCS | Performed by: NURSE PRACTITIONER

## 2019-01-10 PROCEDURE — G8400 PT W/DXA NO RESULTS DOC: HCPCS | Performed by: NURSE PRACTITIONER

## 2019-01-10 PROCEDURE — G8598 ASA/ANTIPLAT THER USED: HCPCS | Performed by: NURSE PRACTITIONER

## 2019-01-10 PROCEDURE — 2022F DILAT RTA XM EVC RTNOPTHY: CPT | Performed by: NURSE PRACTITIONER

## 2019-01-10 PROCEDURE — 1123F ACP DISCUSS/DSCN MKR DOCD: CPT | Performed by: NURSE PRACTITIONER

## 2019-01-10 PROCEDURE — 1090F PRES/ABSN URINE INCON ASSESS: CPT | Performed by: NURSE PRACTITIONER

## 2019-01-10 PROCEDURE — 99214 OFFICE O/P EST MOD 30 MIN: CPT | Performed by: NURSE PRACTITIONER

## 2019-01-10 PROCEDURE — G8427 DOCREV CUR MEDS BY ELIG CLIN: HCPCS | Performed by: NURSE PRACTITIONER

## 2019-01-10 PROCEDURE — 1036F TOBACCO NON-USER: CPT | Performed by: NURSE PRACTITIONER

## 2019-01-10 PROCEDURE — 4040F PNEUMOC VAC/ADMIN/RCVD: CPT | Performed by: NURSE PRACTITIONER

## 2019-02-01 DIAGNOSIS — I48.0 PAROXYSMAL ATRIAL FIBRILLATION (HCC): ICD-10-CM

## 2019-02-01 RX ORDER — DIGOXIN 125 MCG
TABLET ORAL
Qty: 90 TABLET | Refills: 1 | Status: SHIPPED | OUTPATIENT
Start: 2019-02-01 | End: 2019-07-27 | Stop reason: SDUPTHER

## 2019-02-01 RX ORDER — HYDROCHLOROTHIAZIDE 25 MG/1
25 TABLET ORAL DAILY
Qty: 90 TABLET | Refills: 1 | Status: SHIPPED | OUTPATIENT
Start: 2019-02-01 | End: 2019-05-09 | Stop reason: ALTCHOICE

## 2019-04-05 PROBLEM — Z53.20 COLONOSCOPY REFUSED: Status: ACTIVE | Noted: 2019-04-05

## 2019-04-26 ENCOUNTER — TELEPHONE (OUTPATIENT)
Dept: FAMILY MEDICINE CLINIC | Age: 74
End: 2019-04-26

## 2019-04-26 ENCOUNTER — OFFICE VISIT (OUTPATIENT)
Dept: FAMILY MEDICINE CLINIC | Age: 74
End: 2019-04-26
Payer: MEDICARE

## 2019-04-26 VITALS
BODY MASS INDEX: 22.31 KG/M2 | TEMPERATURE: 98 F | OXYGEN SATURATION: 100 % | SYSTOLIC BLOOD PRESSURE: 190 MMHG | WEIGHT: 130 LBS | HEART RATE: 80 BPM | DIASTOLIC BLOOD PRESSURE: 70 MMHG

## 2019-04-26 DIAGNOSIS — I10 ESSENTIAL HYPERTENSION: ICD-10-CM

## 2019-04-26 DIAGNOSIS — T15.01XA CORNEAL FOREIGN BODY WITH RESIDUAL MATERIAL, RIGHT, INITIAL ENCOUNTER: Primary | ICD-10-CM

## 2019-04-26 DIAGNOSIS — S39.012A BACK STRAIN, INITIAL ENCOUNTER: ICD-10-CM

## 2019-04-26 PROCEDURE — G8427 DOCREV CUR MEDS BY ELIG CLIN: HCPCS | Performed by: NURSE PRACTITIONER

## 2019-04-26 PROCEDURE — G8400 PT W/DXA NO RESULTS DOC: HCPCS | Performed by: NURSE PRACTITIONER

## 2019-04-26 PROCEDURE — G8420 CALC BMI NORM PARAMETERS: HCPCS | Performed by: NURSE PRACTITIONER

## 2019-04-26 PROCEDURE — 1090F PRES/ABSN URINE INCON ASSESS: CPT | Performed by: NURSE PRACTITIONER

## 2019-04-26 PROCEDURE — 1123F ACP DISCUSS/DSCN MKR DOCD: CPT | Performed by: NURSE PRACTITIONER

## 2019-04-26 PROCEDURE — 99214 OFFICE O/P EST MOD 30 MIN: CPT | Performed by: NURSE PRACTITIONER

## 2019-04-26 PROCEDURE — 1036F TOBACCO NON-USER: CPT | Performed by: NURSE PRACTITIONER

## 2019-04-26 PROCEDURE — G8598 ASA/ANTIPLAT THER USED: HCPCS | Performed by: NURSE PRACTITIONER

## 2019-04-26 PROCEDURE — 4040F PNEUMOC VAC/ADMIN/RCVD: CPT | Performed by: NURSE PRACTITIONER

## 2019-04-26 PROCEDURE — 3017F COLORECTAL CA SCREEN DOC REV: CPT | Performed by: NURSE PRACTITIONER

## 2019-04-26 RX ORDER — BACLOFEN 10 MG/1
5 TABLET ORAL NIGHTLY PRN
Qty: 15 TABLET | Refills: 0 | Status: SHIPPED | OUTPATIENT
Start: 2019-04-26 | End: 2019-05-09 | Stop reason: ALTCHOICE

## 2019-04-26 RX ORDER — POLYMYXIN B SULFATE AND TRIMETHOPRIM 1; 10000 MG/ML; [USP'U]/ML
1 SOLUTION OPHTHALMIC EVERY 4 HOURS
Qty: 1 BOTTLE | Refills: 0 | Status: SHIPPED | OUTPATIENT
Start: 2019-04-26 | End: 2019-05-06

## 2019-04-26 NOTE — PROGRESS NOTES
affect. Her behavior is normal. Thought content normal.   Nursing note and vitals reviewed. Vitals:    04/26/19 1527 04/26/19 1531   BP: (!) 185/114 (!) 190/70   Pulse: 80    Temp: 98 °F (36.7 °C)    TempSrc: Oral    SpO2: 100%    Weight: 130 lb (59 kg)            MARVIN Licea-CNP    The note was completedusing Dragon voice recognition transcription. Every effort was made to ensure accuracy; however, inadvertent  transcription errors may be present despite my best efforts to edit errors.

## 2019-04-26 NOTE — PATIENT INSTRUCTIONS
Please read the healthy family handout that you were given and share it with your family. Please compare this printed medication list with your medications at home to be sure they are the same. If you have any medications that are different please contact us immediately at [phone]. Also review your allergies that we have listed, these may also include medications that you have not been able to tolerate, make sure everything listed is correct. If you have any allergies that are different please contact us immediately at [phone]. Patient Education        Object in the Eye: Care Instructions  Your Care Instructions  It is common for a speck of dirt or a small object, such as an eyelash or an insect, to get in the eye. Usually your tears wash the object out. But the speck can scratch the surface of the eye (cornea). If the eye surface is scratched, it can feel as if something is still in the eye. Most surface scratches are minor and heal on their own in a day or two. If the object was still in your eye, your doctor probably removed it during your exam. Sometimes these objects become stuck deep in the eye and require more treatment. For instance, a metal object may leave a rust ring. Follow-up care is a key part of your treatment and safety. Be sure to make and go to all appointments, and call your doctor if you are having problems. It's also a good idea to know your test results and keep a list of the medicines you take. How can you care for yourself at home? · The doctor probably used medicine to numb your eye. When it wears off in 30 to 60 minutes, your eye pain may come back. Take over-the-counter pain medicine, such as acetaminophen (Tylenol), ibuprofen (Advil, Motrin), or naproxen (Aleve), as needed. Read and follow all instructions on the label. · Do not take two or more pain medicines at the same time unless the doctor told you to. Many pain medicines have acetaminophen, which is Tylenol. Too much acetaminophen (Tylenol) can be harmful. · If your doctor prescribed antibiotics, take them as directed. Do not stop taking them just because you feel better. You need to take the full course of antibiotics. · The doctor may have put a patch over your injured eye. If so, keep your eye closed under the patch. This will make your eye feel better. Do not remove the patch until your doctor has told you to. · If you do not have a patch, keep your hurt eye closed to reduce pain. · Wash your hands before touching your eye. · Do not rub your injured eye. Rubbing can make it worse. · Use the prescribed eyedrops or ointment as directed. Be sure the dropper or bottle tip is clean. · To put in eyedrops or ointment:  ? Tilt your head back, and pull your lower eyelid down with one finger. ? Drop or squirt the medicine inside the lower lid. ? Close your eye for 30 to 60 seconds to let the drops or ointment move around. ? Do not touch the ointment or dropper tip to your eyelashes or any other surface. · Do not use a contact lens in your hurt eye until your doctor says you can. Also, do not wear eye makeup until your eye heals. · Do not drive if you are wearing an eye patch. You cannot  distances well. · For the first 24 to 48 hours, limit reading and other tasks that require a lot of eye movement. · Bright light may hurt. It may help to wear dark glasses. · To prevent eye injuries in the future, wear safety glasses or goggles when you work with machines or tools, mow the lawn, or ride a bike or motorcycle. When should you call for help? Call 911 anytime you think you may need emergency care. For example, call if:    · You suddenly cannot see or can barely see.    Call your doctor now or seek immediate medical care if:    · You have signs of infection in the eye, such as:  ? Pus or thick discharge coming from the eye.  ? Redness or swelling around the eye.  ?  A fever.     · You have new or increasing eye pain.     · It feels like sand is in your eye when you blink.    Watch closely for changes in your health, and be sure to contact your doctor if:    · You are not getting better after 1 day (24 hours). Where can you learn more? Go to https://chstephane.[employer] org and sign in to your W[employer] account. Enter I210 in the [facility] box to learn more about \"Object in the Eye: Care Instructions. \"     If you do not have an account, please click on the \"Sign Up Now\" link. Current as of: September 23, 2018  Content Version: 11.9  © 2639-7715 [employer]. Care instructions adapted under license by [facility] ([facility]). If you have questions about a medical condition or this instruction, always ask your healthcare professional. [address] any warranty or liability for your use of this information. Patient Education        Strain or Sprain: Care Instructions  Your Care Instructions    A strain happens when you overstretch, or pull, a muscle. A sprain occurs when you stretch or tear a ligament, the tough tissue that connects one bone to another. These problems can happen when you exercise or lift something or when you are in an accident. Rest and other home care can help strains and sprains heal.  The doctor has checked you carefully, but problems can develop later. If you notice any problems or new symptoms,  get medical treatment right away. Follow-up care is a key part of your treatment and safety. Be sure to make and go to all appointments, and call your doctor if you are having problems. It's also a good idea to know your test results and keep a list of the medicines you take. How can you care for yourself at home? · If your doctor gave you a sling, splint, brace, or immobilizer, use it exactly as directed. · Rest the strained or sprained area, and follow your doctor's advice about when you can be active again.   · Put ice or a cold pack on the sore your healthcare professional. [place] [address] any warranty or liability for your use of this information.

## 2019-04-29 ENCOUNTER — TELEPHONE (OUTPATIENT)
Dept: FAMILY MEDICINE CLINIC | Age: 74
End: 2019-04-29

## 2019-04-29 NOTE — TELEPHONE ENCOUNTER
Spoke to [facility]. Patient is scheduled on 4-30-19 @ 4-pm Dr. Christian Golden @ [facility] office.

## 2019-04-30 RX ORDER — PREDNISONE 10 MG/1
10 TABLET ORAL DAILY
Qty: 5 TABLET | Refills: 0 | Status: SHIPPED | OUTPATIENT
Start: 2019-04-30 | End: 2019-05-05

## 2019-04-30 NOTE — TELEPHONE ENCOUNTER
I sent and a prescription for prednisone to her pharmacy. If symptoms are not improved with prednisone use she needs to make another appointment.

## 2019-05-02 ENCOUNTER — HOSPITAL ENCOUNTER (OUTPATIENT)
Dept: GENERAL RADIOLOGY | Age: 74
Discharge: HOME | End: 2019-05-02
Payer: MEDICARE

## 2019-05-02 ENCOUNTER — OFFICE VISIT (OUTPATIENT)
Dept: FAMILY MEDICINE CLINIC | Age: 74
End: 2019-05-02
Payer: MEDICARE

## 2019-05-02 ENCOUNTER — HOSPITAL ENCOUNTER (OUTPATIENT)
Age: 74
Discharge: HOME | End: 2019-05-02
Payer: MEDICARE

## 2019-05-02 VITALS
DIASTOLIC BLOOD PRESSURE: 82 MMHG | OXYGEN SATURATION: 100 % | HEART RATE: 85 BPM | TEMPERATURE: 98.2 F | SYSTOLIC BLOOD PRESSURE: 154 MMHG

## 2019-05-02 DIAGNOSIS — M54.41 ACUTE RIGHT-SIDED LOW BACK PAIN WITH RIGHT-SIDED SCIATICA: Primary | ICD-10-CM

## 2019-05-02 DIAGNOSIS — M54.41 ACUTE RIGHT-SIDED LOW BACK PAIN WITH RIGHT-SIDED SCIATICA: ICD-10-CM

## 2019-05-02 DIAGNOSIS — R82.90 ABNORMAL URINALYSIS: ICD-10-CM

## 2019-05-02 LAB
A/G RATIO: 1.4 (ref 1.1–2.2)
ALBUMIN SERPL-MCNC: 4.7 G/DL (ref 3.4–5)
ALP BLD-CCNC: 67 U/L (ref 40–129)
ALT SERPL-CCNC: 22 U/L (ref 10–40)
ANION GAP SERPL CALCULATED.3IONS-SCNC: 15 MMOL/L (ref 3–16)
AST SERPL-CCNC: 21 U/L (ref 15–37)
BASOPHILS ABSOLUTE: 0.1 K/UL (ref 0–0.2)
BASOPHILS RELATIVE PERCENT: 1.1 %
BILIRUB SERPL-MCNC: 0.5 MG/DL (ref 0–1)
BILIRUBIN, POC: ABNORMAL
BLOOD URINE, POC: ABNORMAL
BUN BLDV-MCNC: 40 MG/DL (ref 7–20)
CALCIUM SERPL-MCNC: 10.8 MG/DL (ref 8.3–10.6)
CHLORIDE BLD-SCNC: 94 MMOL/L (ref 99–110)
CLARITY, POC: CLEAR
CO2: 25 MMOL/L (ref 21–32)
COLOR, POC: YELLOW
CREAT SERPL-MCNC: 1.5 MG/DL (ref 0.6–1.2)
EOSINOPHILS ABSOLUTE: 0.2 K/UL (ref 0–0.6)
EOSINOPHILS RELATIVE PERCENT: 2.3 %
GFR AFRICAN AMERICAN: 41
GFR NON-AFRICAN AMERICAN: 34
GLOBULIN: 3.4 G/DL
GLUCOSE BLD-MCNC: 225 MG/DL (ref 70–99)
GLUCOSE URINE, POC: ABNORMAL
HCT VFR BLD CALC: 36.6 % (ref 36–48)
HEMOGLOBIN: 12.3 G/DL (ref 12–16)
KETONES, POC: ABNORMAL
LEUKOCYTE EST, POC: ABNORMAL
LYMPHOCYTES ABSOLUTE: 2.3 K/UL (ref 1–5.1)
LYMPHOCYTES RELATIVE PERCENT: 23.3 %
MCH RBC QN AUTO: 27.6 PG (ref 26–34)
MCHC RBC AUTO-ENTMCNC: 33.7 G/DL (ref 31–36)
MCV RBC AUTO: 82 FL (ref 80–100)
MONOCYTES ABSOLUTE: 0.5 K/UL (ref 0–1.3)
MONOCYTES RELATIVE PERCENT: 4.9 %
NEUTROPHILS ABSOLUTE: 6.6 K/UL (ref 1.7–7.7)
NEUTROPHILS RELATIVE PERCENT: 68.4 %
NITRITE, POC: ABNORMAL
PDW BLD-RTO: 15.2 % (ref 12.4–15.4)
PH, POC: 5
PLATELET # BLD: 240 K/UL (ref 135–450)
PMV BLD AUTO: 8.1 FL (ref 5–10.5)
POTASSIUM SERPL-SCNC: 4.9 MMOL/L (ref 3.5–5.1)
PROTEIN, POC: ABNORMAL
RBC # BLD: 4.47 M/UL (ref 4–5.2)
SODIUM BLD-SCNC: 134 MMOL/L (ref 136–145)
SPECIFIC GRAVITY, POC: 1.02
TOTAL PROTEIN: 8.1 G/DL (ref 6.4–8.2)
UROBILINOGEN, POC: 0.2
WBC # BLD: 9.7 K/UL (ref 4–11)

## 2019-05-02 PROCEDURE — 36415 COLL VENOUS BLD VENIPUNCTURE: CPT | Performed by: NURSE PRACTITIONER

## 2019-05-02 PROCEDURE — G8598 ASA/ANTIPLAT THER USED: HCPCS | Performed by: NURSE PRACTITIONER

## 2019-05-02 PROCEDURE — G8420 CALC BMI NORM PARAMETERS: HCPCS | Performed by: NURSE PRACTITIONER

## 2019-05-02 PROCEDURE — G8400 PT W/DXA NO RESULTS DOC: HCPCS | Performed by: NURSE PRACTITIONER

## 2019-05-02 PROCEDURE — 1036F TOBACCO NON-USER: CPT | Performed by: NURSE PRACTITIONER

## 2019-05-02 PROCEDURE — 99214 OFFICE O/P EST MOD 30 MIN: CPT | Performed by: NURSE PRACTITIONER

## 2019-05-02 PROCEDURE — 3017F COLORECTAL CA SCREEN DOC REV: CPT | Performed by: NURSE PRACTITIONER

## 2019-05-02 PROCEDURE — 4040F PNEUMOC VAC/ADMIN/RCVD: CPT | Performed by: NURSE PRACTITIONER

## 2019-05-02 PROCEDURE — 72100 X-RAY EXAM L-S SPINE 2/3 VWS: CPT

## 2019-05-02 PROCEDURE — G8427 DOCREV CUR MEDS BY ELIG CLIN: HCPCS | Performed by: NURSE PRACTITIONER

## 2019-05-02 PROCEDURE — 1123F ACP DISCUSS/DSCN MKR DOCD: CPT | Performed by: NURSE PRACTITIONER

## 2019-05-02 PROCEDURE — 1090F PRES/ABSN URINE INCON ASSESS: CPT | Performed by: NURSE PRACTITIONER

## 2019-05-02 PROCEDURE — 81003 URINALYSIS AUTO W/O SCOPE: CPT | Performed by: NURSE PRACTITIONER

## 2019-05-02 RX ORDER — HYDROCODONE BITARTRATE AND ACETAMINOPHEN 5; 325 MG/1; MG/1
1 TABLET ORAL EVERY 6 HOURS PRN
Qty: 12 TABLET | Refills: 0 | Status: SHIPPED | OUTPATIENT
Start: 2019-05-02 | End: 2019-05-05

## 2019-05-02 NOTE — PROGRESS NOTES
Patient: Kortney Delacruz is a 68 y.o. female who presents today with the following Chief Complaint(s):  Chief Complaint   Patient presents with    Leg Pain     Right leg         Assessment & Plan:  1. Acute right-sided low back pain with right-sided sciatica  Ongoing issue  Had no relief with conservative treatment. Obtain x-rays. Rule out urinary tract infection  Urine dip trace leukocytes, trace blood and 1+ protein. May be related to contamination. Sent for culture. O[facility]RS report was obtained and no inappropriate prescription use was found. Follow-up as needed  - XR LUMBAR SPINE (2-3 VIEWS); Future  - HYDROcodone-acetaminophen (NORCO) 5-325 MG per tablet; Take 1 tablet by mouth every 6 hours as needed for Pain for up to 3 days. Intended supply: 3 days. Take lowest dose possible to manage pain  Dispense: 12 tablet; Refill: 0  - POCT Urinalysis No Micro (Auto)  - Comprehensive Metabolic Panel  - CBC Auto Differential      HPI  Aziza Lara is in the office with continued complaints of low back pain more so on the right side. Had back pain for 3 weeks. has been using muscle relaxer and prednisone without relief of symptoms. Her pain is getting worse. Pain is ache to sharp and constant. Worse with movement and upright position. She developed pain after she picked up several cases of water. Denies bowel or bladder incontinence, abdominal pain, melena, saddle numbness, weakness, falling, abnormal fatigue or fever. She has been vomiting due to pain. Pain radiates into her right leg into the thigh. No rash.     Current Outpatient Medications   Medication Sig Dispense Refill    predniSONE (DELTASONE) 10 MG tablet Take 1 tablet by mouth daily for 5 days 5 tablet 0    trimethoprim-polymyxin b (POLYTRIM) 48395-3.1 UNIT/ML-% ophthalmic solution Place 1 drop into the right eye every 4 hours for 10 days 1 Bottle 0    baclofen (LIORESAL) 10 MG tablet Take 0.5 tablets by mouth nightly as needed (muscle spasms) 15 tablet 0    digoxin (LANOXIN) 125 MCG tablet TAKE 1 TABLET DAILY (dose reduction) 90 tablet 1    hydrochlorothiazide (HYDRODIURIL) 25 MG tablet Take 1 tablet by mouth daily 90 tablet 1    metFORMIN (GLUCOPHAGE) 500 MG tablet Take 2 tablets in the morning and 1 tablet in the evening - Dose increase on 10/11/18 270 tablet 1    lisinopril (PRINIVIL;ZESTRIL) 10 MG tablet Take 1 tablet by mouth daily 90 tablet 1    simvastatin (ZOCOR) 40 MG tablet Take 1 tablet by mouth nightly 90 tablet 1    Docusate Calcium (STOOL SOFTENER PO) Take by mouth      aspirin 81 MG EC tablet Take 81 mg by mouth daily        No current facility-administered medications for this visit. Patient's past medical history, surgical history, family history, medications,  and allergies  were all reviewed and updated as appropriate today. Review of Systems  See HPI    Physical Exam   Constitutional: She is oriented to person, place, and time. She appears well-developed and well-nourished. She appears distressed (painful). HENT:   Head: Normocephalic. Eyes: Pupils are equal, round, and reactive to light. EOM are normal.   Neck: Normal range of motion. Cardiovascular: Normal rate, regular rhythm, normal heart sounds and intact distal pulses. No murmur heard. Pulmonary/Chest: Effort normal. She has no wheezes. Abdominal: Soft. Bowel sounds are normal. She exhibits no distension. There is no tenderness. Musculoskeletal:        Right hip: Normal.        Left hip: Normal.        Thoracic back: Normal.        Lumbar back: She exhibits decreased range of motion. She exhibits no bony tenderness. Back:    Stooped position   Neurological: She is alert and oriented to person, place, and time. Skin: Skin is warm and dry. Capillary refill takes 2 to 3 seconds. Psychiatric: She has a normal mood and affect.        Vitals:    05/02/19 1448 05/02/19 1451   BP: (!) 150/84 (!) 154/82   Pulse: 85    Temp: 98.2 °F (36.8 °C)    TempSrc: Oral    SpO2: 100%            MARVIN Guzman-CNP    The note was completedusing Dragon voice recognition transcription. Every effort was made to ensure accuracy; however, inadvertent  transcription errors may be present despite my best efforts to edit errors.

## 2019-05-02 NOTE — PATIENT INSTRUCTIONS
Please read the healthy family handout that you were given and share it with your family. Please compare this printed medication list with your medications at home to be sure they are the same. If you have any medications that are different please contact us immediately at [phone]. Also review your allergies that we have listed, these may also include medications that you have not been able to tolerate, make sure everything listed is correct. If you have any allergies that are different please contact us immediately at [phone]. Patient Education        Acute Low Back Pain: Exercises  Your Care Instructions  Here are some examples of typical rehabilitation exercises for your condition. Start each exercise slowly. Ease off the exercise if you start to have pain. Your doctor or physical therapist will tell you when you can start these exercises and which ones will work best for you. When you are not being active, find a comfortable position for rest. Some people are comfortable on the floor or a medium-firm bed with a small pillow under their head and another under their knees. Some people prefer to lie on their side with a pillow between their knees. Don't stay in one position for too long. Take short walks (10 to 20 minutes) every 2 to 3 hours. Avoid slopes, hills, and stairs until you feel better. Walk only distances you can manage without pain, especially leg pain. How to do the exercises  Back stretches    1. Get down on your hands and knees on the floor. 2. Relax your head and allow it to droop. Round your back up toward the ceiling until you feel a nice stretch in your upper, middle, and lower back. Hold this stretch for as long as it feels comfortable, or about 15 to 30 seconds. 3. Return to the starting position with a flat back while you are on your hands and knees. 4. Let your back sway by pressing your stomach toward the floor. Lift your buttocks toward the ceiling.   5. Hold this position for 15 to 30 seconds. 6. Repeat 2 to 4 times. Follow-up care is a key part of your treatment and safety. Be sure to make and go to all appointments, and call your doctor if you are having problems. It's also a good idea to know your test results and keep a list of the medicines you take. Where can you learn more? Go to https://CampusTappepiceweb.Meograph. org and sign in to your [employer] account. Enter X850 in the [employer] box to learn more about \"Acute Low Back Pain: Exercises. \"     If you do not have an account, please click on the \"Sign Up Now\" link. Current as of: September 20, 2018  Content Version: 11.9  © 4105-5090 [employer]. Care instructions adapted under license by [facility] ([facility]). If you have questions about a medical condition or this instruction, always ask your healthcare professional. [address] any warranty or liability for your use of this information.

## 2019-05-04 ENCOUNTER — APPOINTMENT (OUTPATIENT)
Dept: GENERAL RADIOLOGY | Age: 74
End: 2019-05-04
Payer: MEDICARE

## 2019-05-04 ENCOUNTER — HOSPITAL ENCOUNTER (EMERGENCY)
Age: 74
Discharge: HOME | End: 2019-05-04
Attending: EMERGENCY MEDICINE
Payer: MEDICARE

## 2019-05-04 VITALS
SYSTOLIC BLOOD PRESSURE: 176 MMHG | TEMPERATURE: 97 F | OXYGEN SATURATION: 100 % | HEIGHT: 65 IN | BODY MASS INDEX: 21.66 KG/M2 | RESPIRATION RATE: 16 BRPM | HEART RATE: 45 BPM | WEIGHT: 130 LBS | DIASTOLIC BLOOD PRESSURE: 61 MMHG

## 2019-05-04 DIAGNOSIS — M54.50 CHRONIC BILATERAL LOW BACK PAIN WITHOUT SCIATICA: ICD-10-CM

## 2019-05-04 DIAGNOSIS — G89.29 CHRONIC BILATERAL LOW BACK PAIN WITHOUT SCIATICA: ICD-10-CM

## 2019-05-04 DIAGNOSIS — N28.9 MILD RENAL INSUFFICIENCY: Primary | ICD-10-CM

## 2019-05-04 LAB
A/G RATIO: 1.3 (ref 1.1–2.2)
ALBUMIN SERPL-MCNC: 4.6 G/DL (ref 3.4–5)
ALP BLD-CCNC: 66 U/L (ref 40–129)
ALT SERPL-CCNC: 18 U/L (ref 10–40)
ANION GAP SERPL CALCULATED.3IONS-SCNC: 11 MMOL/L (ref 3–16)
AST SERPL-CCNC: 21 U/L (ref 15–37)
BACTERIA: ABNORMAL /HPF
BASOPHILS ABSOLUTE: 0.1 K/UL (ref 0–0.2)
BASOPHILS RELATIVE PERCENT: 1.3 %
BILIRUB SERPL-MCNC: 0.4 MG/DL (ref 0–1)
BILIRUBIN URINE: NEGATIVE
BLOOD, URINE: NEGATIVE
BUN BLDV-MCNC: 36 MG/DL (ref 7–20)
CALCIUM SERPL-MCNC: 9.9 MG/DL (ref 8.3–10.6)
CHLORIDE BLD-SCNC: 98 MMOL/L (ref 99–110)
CLARITY: CLEAR
CO2: 27 MMOL/L (ref 21–32)
COLOR: ABNORMAL
CREAT SERPL-MCNC: 1.1 MG/DL (ref 0.6–1.2)
EOSINOPHILS ABSOLUTE: 0.3 K/UL (ref 0–0.6)
EOSINOPHILS RELATIVE PERCENT: 3 %
EPITHELIAL CELLS, UA: ABNORMAL /HPF
GFR AFRICAN AMERICAN: 59
GFR NON-AFRICAN AMERICAN: 49
GLOBULIN: 3.5 G/DL
GLUCOSE BLD-MCNC: 171 MG/DL (ref 70–99)
GLUCOSE URINE: NEGATIVE MG/DL
HCT VFR BLD CALC: 36.7 % (ref 36–48)
HEMOGLOBIN: 12.4 G/DL (ref 12–16)
KETONES, URINE: NEGATIVE MG/DL
LACTIC ACID: 1.3 MMOL/L (ref 0.4–2)
LEUKOCYTE ESTERASE, URINE: ABNORMAL
LYMPHOCYTES ABSOLUTE: 2.3 K/UL (ref 1–5.1)
LYMPHOCYTES RELATIVE PERCENT: 27.6 %
MAGNESIUM: 2.1 MG/DL (ref 1.8–2.4)
MCH RBC QN AUTO: 27.4 PG (ref 26–34)
MCHC RBC AUTO-ENTMCNC: 33.7 G/DL (ref 31–36)
MCV RBC AUTO: 81.3 FL (ref 80–100)
MICROSCOPIC EXAMINATION: YES
MONOCYTES ABSOLUTE: 0.5 K/UL (ref 0–1.3)
MONOCYTES RELATIVE PERCENT: 6 %
NEUTROPHILS ABSOLUTE: 5.2 K/UL (ref 1.7–7.7)
NEUTROPHILS RELATIVE PERCENT: 62.1 %
NITRITE, URINE: NEGATIVE
PDW BLD-RTO: 15.2 % (ref 12.4–15.4)
PH UA: 6 (ref 5–8)
PLATELET # BLD: 202 K/UL (ref 135–450)
PMV BLD AUTO: 7.5 FL (ref 5–10.5)
POTASSIUM SERPL-SCNC: 4.5 MMOL/L (ref 3.5–5.1)
PROTEIN UA: NEGATIVE MG/DL
RBC # BLD: 4.51 M/UL (ref 4–5.2)
RBC UA: ABNORMAL /HPF (ref 0–2)
SODIUM BLD-SCNC: 136 MMOL/L (ref 136–145)
SPECIFIC GRAVITY UA: <=1.005 (ref 1–1.03)
TOTAL PROTEIN: 8.1 G/DL (ref 6.4–8.2)
URINE CULTURE, ROUTINE: NORMAL
URINE REFLEX TO CULTURE: YES
URINE TYPE: ABNORMAL
UROBILINOGEN, URINE: 0.2 E.U./DL
WBC # BLD: 8.4 K/UL (ref 4–11)
WBC UA: ABNORMAL /HPF (ref 0–5)

## 2019-05-04 PROCEDURE — 83735 ASSAY OF MAGNESIUM: CPT

## 2019-05-04 PROCEDURE — 96360 HYDRATION IV INFUSION INIT: CPT

## 2019-05-04 PROCEDURE — 2580000003 HC RX 258: Performed by: PHYSICIAN ASSISTANT

## 2019-05-04 PROCEDURE — 6370000000 HC RX 637 (ALT 250 FOR IP): Performed by: PHYSICIAN ASSISTANT

## 2019-05-04 PROCEDURE — 71046 X-RAY EXAM CHEST 2 VIEWS: CPT

## 2019-05-04 PROCEDURE — 80053 COMPREHEN METABOLIC PANEL: CPT

## 2019-05-04 PROCEDURE — 85025 COMPLETE CBC W/AUTO DIFF WBC: CPT

## 2019-05-04 PROCEDURE — 81001 URINALYSIS AUTO W/SCOPE: CPT

## 2019-05-04 PROCEDURE — 87086 URINE CULTURE/COLONY COUNT: CPT

## 2019-05-04 PROCEDURE — 87186 SC STD MICRODIL/AGAR DIL: CPT

## 2019-05-04 PROCEDURE — 87077 CULTURE AEROBIC IDENTIFY: CPT

## 2019-05-04 PROCEDURE — 83605 ASSAY OF LACTIC ACID: CPT

## 2019-05-04 PROCEDURE — 99283 EMERGENCY DEPT VISIT LOW MDM: CPT

## 2019-05-04 RX ORDER — 0.9 % SODIUM CHLORIDE 0.9 %
1000 INTRAVENOUS SOLUTION INTRAVENOUS ONCE
Status: COMPLETED | OUTPATIENT
Start: 2019-05-04 | End: 2019-05-04

## 2019-05-04 RX ORDER — AMLODIPINE BESYLATE 5 MG/1
5 TABLET ORAL DAILY
Qty: 30 TABLET | Refills: 0 | Status: SHIPPED | OUTPATIENT
Start: 2019-05-04 | End: 2019-05-09 | Stop reason: SDUPTHER

## 2019-05-04 RX ORDER — AMLODIPINE BESYLATE 5 MG/1
5 TABLET ORAL ONCE
Status: COMPLETED | OUTPATIENT
Start: 2019-05-04 | End: 2019-05-04

## 2019-05-04 RX ADMIN — SODIUM CHLORIDE 1000 ML: 9 INJECTION, SOLUTION INTRAVENOUS at 15:09

## 2019-05-04 RX ADMIN — AMLODIPINE BESYLATE 5 MG: 5 TABLET ORAL at 16:58

## 2019-05-04 ASSESSMENT — PAIN DESCRIPTION - DESCRIPTORS: DESCRIPTORS: STABBING

## 2019-05-04 ASSESSMENT — PAIN DESCRIPTION - LOCATION: LOCATION: BACK;FLANK

## 2019-05-04 ASSESSMENT — PAIN DESCRIPTION - FREQUENCY: FREQUENCY: CONTINUOUS

## 2019-05-04 ASSESSMENT — PAIN SCALES - GENERAL: PAINLEVEL_OUTOF10: 10

## 2019-05-04 NOTE — ED PROVIDER NOTES
[address] ED  EMERGENCY DEPARTMENT ENCOUNTER        Pt Name: Jazmine Reagan  MRN: [id number]  [place] 1945  Date of evaluation: 5/4/2019  Provider: Kailee Downey PA-C  PCP: MARVIN Lerner CNP    This patient was seen and evaluated by the attending Man Moore. CHIEF COMPLAINT       Chief Complaint   Patient presents with    Back Pain     Pt. states she went to her family doctor this week for back pain. Pt. states she called her family doctor and was told she is in kidney failure. HISTORY OF PRESENT ILLNESS   (Location/Symptom, Timing/Onset, Context/Setting, Quality, Duration, Modifying Factors, Severity)  Note limiting factors. Jazmine Reagan is a 68 y.o. female patient presenting at the urging of her PCP for evaluation of renal insufficiency. The patient had renal studies January 10, 2019 showing BUN 33, creatinine 1.0 and GFR 54. The patient was seen yesterday at PCP office and laboratory studies were ordered. Results reveal sodium 134, potassium 4.9, glucose 225, BUN 40, creatinine 1.5 and GFR 34. The patient's urinalysis showed no clear evidence UTI. She had 1+ set on Macrobid. Culture result at this time showing greater than 50,000 mixed pathogens. Patient states she has low back pain. She did have x-ray lumbar spinal May 2 showing spondylolisthesis L5-S1 with a 14 mm anterior listhesis of L5 on S1. MRI suggested to further evaluate the possibility of central canal stenosis. Advanced arthritic changes are noted. At this time she does not indicate chest pain, shortness breath, fever, chills, urinary symptoms or paresthesias related to the back pain complaint. The patient referred by PCP for renal insufficiency. Nursing Notes were all reviewed and agreed with or any disagreements were addressed  in the HPI.     REVIEW OF SYSTEMS    (2-9 systems for level 4, 10 or more for level 5)     Review of Systems    Positives and Pertinent negatives as per HPI. Except as noted abovein the ROS, all other systems were reviewed and negative. PAST MEDICAL HISTORY     Past Medical History:   Diagnosis Date    Atrial fibrillation ([facility]     off coumadin after bleed, declined restart    Blood transfusion     CAD (coronary artery disease)     Diabetes mellitus type II     Diabetic neuropathy ([facility] 2011    Gastric ulcer     H. pylori infection 2009    Hyperlipidemia     Hypertension     Numbness and tingling of left leg     Osteoarthritis     knees    Poor historian     PVD (peripheral vascular disease) ([facility]     PTA distal sfa/pop/peroneal, Dr. Annabella Lacy 12/2015    Unspecified cerebral artery occlusion with cerebral infarction     TIA affected left eye    upper gi bleed 12/2009         SURGICAL HISTORY     Past Surgical History:   Procedure Laterality Date    ANGIOPLASTY  12/30/15    Dr. Annabella Lacy, E, PTA of distal sfa/pop/peroneal    CARDIAC CATHETERIZATION  9/21/12    done for surgical clearance, cath was negative    CORONARY ANGIOPLASTY WITH STENT PLACEMENT  8560,5734    FOOT SURGERY Left 01/11/2018    DEBRIDEMENT OF ULCERS LEFT FOOT AND LEG WITH GRAFT    OTHER SURGICAL HISTORY Left 06/05/2017    Left Femoral Peroneal Bypass    SHOULDER ARTHROPLASTY  10/5/12    RIGHT SHOULDER TOTAL ARTHROPLASTY, BICEPS TENODESIS DEPUY, GLOBAL ADVANTAGE AP         [address]       Discharge Medication List as of 5/4/2019  4:56 PM      CONTINUE these medications which have NOT CHANGED    Details   HYDROcodone-acetaminophen (NORCO) 5-325 MG per tablet Take 1 tablet by mouth every 6 hours as needed for Pain for up to 3 days. Intended supply: 3 days.  Take lowest dose possible to manage pain, Disp-12 tablet, R-0Print      predniSONE (DELTASONE) 10 MG tablet Take 1 tablet by mouth daily for 5 days, Disp-5 tablet, R-0Normal      trimethoprim-polymyxin b (POLYTRIM) 88540-6.1 UNIT/ML-% ophthalmic solution Place 1 drop into the right eye every 4 hours for CFU/ml mixed skin/urogenital robert.  No further workup    Organism Gram negative qi (*)     All other components within normal limits    Narrative:     ORDER#: [id number]                          ORDERED BY: Nancy Cortes  SOURCE: Urine Clean Catch                  COLLECTED:  05/04/19 15:05  ANTIBIOTICS AT ONEL.:                      RECEIVED :  05/04/19 16:33  Performed at:  [facility]  [address] [facility]   Phone [phone]   COMPREHENSIVE METABOLIC PANEL - Abnormal; Notable for the following components:    Chloride 98 (*)     Glucose 171 (*)     BUN 36 (*)     GFR Non- 49 (*)     GFR  59 (*)     All other components within normal limits    Narrative:     Performed at:  [facility]) [facility],  [place], [place] [employer]   Phone [phone]   URINE RT REFLEX TO CULTURE - Abnormal; Notable for the following components:    Leukocyte Esterase, Urine SMALL (*)     All other components within normal limits    Narrative:     Performed at:  [facility],  [place], [facility]   Phone [phone]   MICROSCOPIC URINALYSIS - Abnormal; Notable for the following components:    Bacteria, UA 3+ (*)     All other components within normal limits    Narrative:     Performed at:  [facility],  [place], [facility]   Phone [phone]   CBC WITH AUTO DIFFERENTIAL    Narrative:     Performed at:  [place] [address],  [place], [facility]   Phone [phone]   MAGNESIUM    Narrative:     Performed at:  [facility] [address],  [employer] [place], [employer]   Phone [phone]   LACTIC ACID, PLASMA    Narrative:     Performed at:  [place] [address],  [place], [place] [employer]   Phone [phone]       All other labs were within normal range or not returned as of this dictation. EKG: All EKG's are interpreted by the Emergency Department Physician who either signs orCo-signs this chart in the absence of a cardiologist.  Please see their note for interpretation of EKG. RADIOLOGY:   Non-plain film images such as CT, Ultrasound and MRI are read by the radiologist. Dub Distance radiographic images are visualized andpreliminarily interpreted by the  ED Provider with the below findings:    Chest x-ray shows no acute cardiopulmonary process. Interpretation [facility] Radiologist below, if available at the time of this note:    XR CHEST STANDARD (2 VW)   Preliminary Result   No acute cardiopulmonary disease. [unfilled]      PROCEDURES   Unless otherwise noted below, none     Procedures    CRITICAL CARE TIME   N/A    CONSULTS:  None      EMERGENCY DEPARTMENT COURSE and DIFFERENTIALDIAGNOSIS/MDM:   Vitals:    Vitals:    05/04/19 1437 05/04/19 1642   BP: (!) 161/66 (!) 176/61   Pulse: 66 (!) 45   Resp: 14 16   Temp: 97 °F (36.1 °C)    TempSrc: Oral    SpO2: 99% 100%   Weight: 130 lb (59 kg)    Height: 5' 5\" (1.651 m)        Patient was given thefollowing medications:  Medications   0.9 % sodium chloride bolus (0 mLs Intravenous Stopped 5/4/19 1623)   amLODIPine (NORVASC) tablet 5 mg (5 mg Oral Given 5/4/19 1658)       Patient presenting urging of her PCP due to abnormal renal studies. Patient asymptomatic. Patient BP elevated. No cardiovascular complaints. The patient's CBC is normal.  The patient's CMP reveals BUN 36, crit of 1.1 and GFR 49. This does reflect mild renal compromise. Patient hydrated with 1 L saline. The patient is on both lisinopril and hydrochlorothiazide. Hydrochlorothiazide 25 mg will be discontinued and the patient will be started on amlodipine 5 mg. Patient recommended to increase her fluid intake.   She will need repeat BMP in the following week as well as BP

## 2019-05-04 NOTE — DISCHARGE INSTR - COC
Continuity of Care Form    Patient Name: Kortney Delacruz   :  1945  MRN:  [id number]    Admit date:  2019  Discharge date:  ***    Code Status Order: Prior   Advance Directives:     Admitting Physician:  No admitting provider for patient encounter.   PCP: MARVIN Pate CNP    Discharging Nurse: [facility] Unit/Room#:   Discharging Unit Phone Number: ***    Emergency Contact:   Extended Emergency Contact Information  Primary Emergency Contact: Dillon Romero  Address: [place] [address], [address] [place] [address]  [address] Phone: [phone]  Relation: Spouse  Secondary Emergency Contact: [employer] [facility] Phone: [phone]  Relation: Other    Past Surgical History:  Past Surgical History:   Procedure Laterality Date    ANGIOPLASTY  12/30/15    JALEESA Smith, PTA of distal sfa/pop/peroneal    CARDIAC CATHETERIZATION  12    done for surgical clearance, cath was negative    CORONARY ANGIOPLASTY WITH STENT PLACEMENT      FOOT SURGERY Left 2018    DEBRIDEMENT OF ULCERS LEFT FOOT AND LEG WITH GRAFT    OTHER SURGICAL HISTORY Left 2017    Left Femoral Peroneal Bypass    SHOULDER ARTHROPLASTY  10/5/12    RIGHT SHOULDER TOTAL ARTHROPLASTY, BICEPS TENODESIS DEPUY, GLOBAL ADVANTAGE AP       Immunization History:   Immunization History   Administered Date(s) Administered    Pneumococcal 13-valent Conjugate (Dcjchqi50) 2016    Pneumococcal Polysaccharide (Wnxlrtknb06) 2011       Active Problems:  Patient Active Problem List   Diagnosis Code    Osteoarthritis M19.90    Paroxysmal atrial fibrillation ([facility] I48.0    Noncompliance of patient with dietary regimen Z91.11    PVD (peripheral vascular disease) ([facility] I73.9    Pure hypercholesterolemia E78.00    Coronary artery disease involving native coronary artery of native heart without angina pectoris I25.10    Essential hypertension I10    Type 2 diabetes mellitus with diabetic polyneuropathy, without long-term current use of insulin ([facility]) E11.42    Vitamin D deficiency E55.9    Acute osteomyelitis of left foot ([facility]) M86.172    Hammertoe, bilateral M20.41, M20.42    Colonoscopy refused Z53.20       Isolation/Infection:   Isolation          No Isolation            Nurse Assessment:  Last Vital Signs: BP (!) 161/66   Pulse 66   Temp 97 °F (36.1 °C) (Oral)   Resp 14   Ht 5' 5\" (1.651 m)   Wt 130 lb (59 kg)   SpO2 99%   BMI 21.63 kg/m²     Last documented pain score (0-10 scale): Pain Level: 10  Last Weight:   Wt Readings from Last 1 Encounters:   05/04/19 130 lb (59 kg)     Mental Status:  {IP PT MENTAL STATUS:20030}    IV Access:  { RAFA IV ACCESS:872035782}    Nursing Mobility/ADLs:  Walking   {P DME EAOP:534803569}  Transfer  {P DME QHJD:250154138}  Bathing  {[facility] DME BCBR:096205765}  Dressing  {P DME KEYN:764751839}  Toileting  {P DME TJUP:684774077}  Feeding  {[facility] DME EOUF:981623425}  Med Admin  {[facility] DME NTIJ:459432305}  Med Delivery   { RAFA MED Delivery:242850372}    Wound Care Documentation and Therapy:  Wound 12/28/15 Other (Comment) Toe (Comment  which one) Anterior; Left (Active)   Number of days: 1223       Wound 06/01/17 Venous ulcer Toe (Comment  which one) (Active)   Number of days: 701       Diabetic Ulcer 12/28/15 Toe (Comment which one) Left  (Active)   Number of days: 1223       Incision 06/05/17 Leg Left (Active)   Number of days: 698        Elimination:  Continence:   · Bowel: {YES / HP:02949}  · Bladder: {YES / GV:32862}  Urinary Catheter: {Urinary Catheter:769978157}   Colostomy/Ileostomy/Ileal Conduit: {YES / WM:45081}       Date of Last BM: ***    Intake/Output Summary (Last 24 hours) at 5/4/2019 1636  Last data filed at 5/4/2019 1623  Gross per 24 hour   Intake 1000 ml   Output --   Net 1000 ml     No intake/output data recorded.     Safety Concerns:     [address] Safety Concerns:442116930}    Impairments/Disabilities:      [address] SIGNATURE:  {[facility]:850433563}

## 2019-05-05 NOTE — ED PROVIDER NOTES
I independently performed a history and physical on Xuan Romero.   All diagnostic, treatment, and disposition decisions were made by myself in conjunction with the advanced practice provider. Briefly, this is a 68 y.o. female here for back pain. Patient had outpatient lab testing done and was told that her kidneys were having problems she came to the ER for further evaluation. .    On exam, patient does have a benign physical examination, we have noted improved kidney function on the AP blood test today. At this point we are going to adjust the patient's blood pressure medications, and we'll have the patient follow up with the primary care doctor on Monday    For further details of Demario Carrasco emergency department encounter, please see documentation by advanced practice provider  Yajaira Magana.         Danilo Barajas MD  05/05/19 2542

## 2019-05-06 LAB
ORGANISM: ABNORMAL
URINE CULTURE, ROUTINE: ABNORMAL
URINE CULTURE, ROUTINE: ABNORMAL

## 2019-05-07 NOTE — RESULT ENCOUNTER NOTE
Comfortable patient at her urine culture was positive and a need Macrobid 100 mg twice a day for 10 days

## 2019-05-09 ENCOUNTER — OFFICE VISIT (OUTPATIENT)
Dept: FAMILY MEDICINE CLINIC | Age: 74
End: 2019-05-09
Payer: MEDICARE

## 2019-05-09 VITALS
DIASTOLIC BLOOD PRESSURE: 72 MMHG | WEIGHT: 129.38 LBS | SYSTOLIC BLOOD PRESSURE: 168 MMHG | BODY MASS INDEX: 21.53 KG/M2 | HEART RATE: 85 BPM

## 2019-05-09 DIAGNOSIS — N28.9 FUNCTION KIDNEY DECREASED: ICD-10-CM

## 2019-05-09 DIAGNOSIS — M54.41 ACUTE MIDLINE LOW BACK PAIN WITH RIGHT-SIDED SCIATICA: ICD-10-CM

## 2019-05-09 DIAGNOSIS — I10 ESSENTIAL HYPERTENSION: Primary | ICD-10-CM

## 2019-05-09 DIAGNOSIS — E11.42 TYPE 2 DIABETES MELLITUS WITH DIABETIC POLYNEUROPATHY, WITHOUT LONG-TERM CURRENT USE OF INSULIN (HCC): ICD-10-CM

## 2019-05-09 PROCEDURE — 99214 OFFICE O/P EST MOD 30 MIN: CPT | Performed by: NURSE PRACTITIONER

## 2019-05-09 PROCEDURE — 3045F PR MOST RECENT HEMOGLOBIN A1C LEVEL 7.0-9.0%: CPT | Performed by: NURSE PRACTITIONER

## 2019-05-09 PROCEDURE — 4040F PNEUMOC VAC/ADMIN/RCVD: CPT | Performed by: NURSE PRACTITIONER

## 2019-05-09 PROCEDURE — 36415 COLL VENOUS BLD VENIPUNCTURE: CPT | Performed by: NURSE PRACTITIONER

## 2019-05-09 PROCEDURE — 1123F ACP DISCUSS/DSCN MKR DOCD: CPT | Performed by: NURSE PRACTITIONER

## 2019-05-09 PROCEDURE — G8420 CALC BMI NORM PARAMETERS: HCPCS | Performed by: NURSE PRACTITIONER

## 2019-05-09 PROCEDURE — G8400 PT W/DXA NO RESULTS DOC: HCPCS | Performed by: NURSE PRACTITIONER

## 2019-05-09 PROCEDURE — 1036F TOBACCO NON-USER: CPT | Performed by: NURSE PRACTITIONER

## 2019-05-09 PROCEDURE — 1090F PRES/ABSN URINE INCON ASSESS: CPT | Performed by: NURSE PRACTITIONER

## 2019-05-09 PROCEDURE — G8427 DOCREV CUR MEDS BY ELIG CLIN: HCPCS | Performed by: NURSE PRACTITIONER

## 2019-05-09 PROCEDURE — 2022F DILAT RTA XM EVC RTNOPTHY: CPT | Performed by: NURSE PRACTITIONER

## 2019-05-09 PROCEDURE — 3017F COLORECTAL CA SCREEN DOC REV: CPT | Performed by: NURSE PRACTITIONER

## 2019-05-09 PROCEDURE — G8598 ASA/ANTIPLAT THER USED: HCPCS | Performed by: NURSE PRACTITIONER

## 2019-05-09 RX ORDER — AMLODIPINE BESYLATE 5 MG/1
5 TABLET ORAL DAILY
Qty: 90 TABLET | Refills: 0 | Status: SHIPPED | OUTPATIENT
Start: 2019-05-09 | End: 2019-08-06 | Stop reason: SDUPTHER

## 2019-05-09 RX ORDER — SIMVASTATIN 20 MG
20 TABLET ORAL NIGHTLY
Qty: 90 TABLET | Refills: 0 | Status: SHIPPED | OUTPATIENT
Start: 2019-05-09 | End: 2019-08-06 | Stop reason: SDUPTHER

## 2019-05-09 RX ORDER — LISINOPRIL 20 MG/1
20 TABLET ORAL DAILY
Qty: 90 TABLET | Refills: 0 | Status: SHIPPED | OUTPATIENT
Start: 2019-05-09 | End: 2019-08-06 | Stop reason: SDUPTHER

## 2019-05-09 NOTE — PROGRESS NOTES
Scheduled patients MRI for Monday May 20th at 3:00. [facility] [place] [facility]"" [facility].    Left message on patients answering machine with all information. That is what patient wanted me to do. Instructed her to call the office if she does not understand.

## 2019-05-09 NOTE — PATIENT INSTRUCTIONS
Please read the healthy family handout that you were given and share it with your family. Please compare this printed medication list with your medications at home to be sure they are the same. If you have any medications that are different please contact us immediately at [phone]. Also review your allergies that we have listed, these may also include medications that you have not been able to tolerate, make sure everything listed is correct. If you have any allergies that are different please contact us immediately at [phone].

## 2019-05-09 NOTE — PROGRESS NOTES
Patient: Suzanne Pedroza is a 68 y.o. female who presents today with the following Chief Complaint(s):  Chief Complaint   Patient presents with    Other     ER follow up         Assessment & Plan:  1. Essential hypertension  Uncontrolled  Increase lisinopril. Discontinue hydrochlorothiazide and continue with Norvasc. Follow low-sodium diet  Follow-up in 4 weeks  - lisinopril (PRINIVIL;ZESTRIL) 20 MG tablet; Take 1 tablet by mouth daily  Dispense: 90 tablet; Refill: 0  - amLODIPine (NORVASC) 5 MG tablet; Take 1 tablet by mouth daily  Dispense: 90 tablet; Refill: 0    2. Type 2 diabetes mellitus with diabetic polyneuropathy, without long-term current use of insulin (HCC)  Increase metformin to 2 tablets twice daily  Decrease sugar from diet. F/u in 3 months  - metFORMIN (GLUCOPHAGE) 500 MG tablet; Take 2 tablets by mouth 2 times daily (with meals)  Dispense: 360 tablet; Refill: 0    3. Acute midline low back pain with right-sided sciatica  Continued pain  May take [place] as needed  MRI ordered  - MRI [address] [place]; Future    4. Function kidney decreased  Recheck kidney functions  Drink at least 64 ounces of water daily. - Comprehensive Metabolic Panel    HPI  Tara Goddard is in the office for ER follow-up.  5 days ago she went to the emergency department for decreased kidney functions. Hydrochlorothiazide was discontinued and Norvasc was started. She was given IV fluids and discharged home. She is not sure what medication she is supposed to be taking. Muscle cramps in her legs have gotten better. She is still drinking a lot of pop and juice and eating candy. She is still having lower back pain that radiates into her right leg. Denies bowel or bladder incontinence. Last week she was falling.     Current Outpatient Medications   Medication Sig Dispense Refill    amLODIPine (NORVASC) 5 MG tablet Take 1 tablet by mouth daily 30 tablet 0    digoxin (LANOXIN) 125 MCG tablet TAKE 1 TABLET DAILY (dose reduction) 90 tablet 1    metFORMIN (GLUCOPHAGE) 500 MG tablet Take 2 tablets in the morning and 1 tablet in the evening - Dose increase on 10/11/18 270 tablet 1    lisinopril (PRINIVIL;ZESTRIL) 10 MG tablet Take 1 tablet by mouth daily 90 tablet 1    simvastatin (ZOCOR) 40 MG tablet Take 1 tablet by mouth nightly 90 tablet 1    aspirin 81 MG EC tablet Take 81 mg by mouth daily        No current facility-administered medications for this visit. Patient's past medical history, surgical history, family history, medications,  and allergies  were all reviewed and updated as appropriate today. Review of Systems  See HPI    Physical Exam   Constitutional: She is oriented to person, place, and time. She appears well-developed and well-nourished. HENT:   Head: Normocephalic. Eyes: Pupils are equal, round, and reactive to light. EOM are normal.   Neck: Normal range of motion. Cardiovascular: Normal rate and intact distal pulses. Pulmonary/Chest: Effort normal.   Musculoskeletal:        Right hip: Normal.        Thoracic back: Normal.        Lumbar back: She exhibits decreased range of motion and tenderness. Neurological: She is alert and oriented to person, place, and time. She displays abnormal reflex. Skin: Skin is warm and dry. Capillary refill takes 2 to 3 seconds. Psychiatric: She has a normal mood and affect. Vitals:    05/09/19 1459   BP: (!) 168/72   Pulse: 85   Weight: 129 lb 6 oz (58.7 kg)           MARVIN Baer-CNP    The note was completedusing Dragon voice recognition transcription. Every effort was made to ensure accuracy; however, inadvertent  transcription errors may be present despite my best efforts to edit errors.

## 2019-05-10 LAB
A/G RATIO: 1.5 (ref 1.1–2.2)
ALBUMIN SERPL-MCNC: 4.6 G/DL (ref 3.4–5)
ALP BLD-CCNC: 65 U/L (ref 40–129)
ALT SERPL-CCNC: 21 U/L (ref 10–40)
ANION GAP SERPL CALCULATED.3IONS-SCNC: 14 MMOL/L (ref 3–16)
AST SERPL-CCNC: 19 U/L (ref 15–37)
BILIRUB SERPL-MCNC: 0.5 MG/DL (ref 0–1)
BUN BLDV-MCNC: 24 MG/DL (ref 7–20)
CALCIUM SERPL-MCNC: 10.3 MG/DL (ref 8.3–10.6)
CHLORIDE BLD-SCNC: 98 MMOL/L (ref 99–110)
CO2: 25 MMOL/L (ref 21–32)
CREAT SERPL-MCNC: 1.2 MG/DL (ref 0.6–1.2)
GFR AFRICAN AMERICAN: 53
GFR NON-AFRICAN AMERICAN: 44
GLOBULIN: 3 G/DL
GLUCOSE BLD-MCNC: 168 MG/DL (ref 70–99)
POTASSIUM SERPL-SCNC: 4.1 MMOL/L (ref 3.5–5.1)
SODIUM BLD-SCNC: 137 MMOL/L (ref 136–145)
TOTAL PROTEIN: 7.6 G/DL (ref 6.4–8.2)

## 2019-05-11 NOTE — RESULT ENCOUNTER NOTE
Patient's positive result has been appropriately evaluated by the provider pool. Patient was unable to be reached by phone. A voicemail was left with the patient. Will await a return phone call. Will try to contact patient again tomorrow per protocol.

## 2019-05-20 ENCOUNTER — HOSPITAL ENCOUNTER (OUTPATIENT)
Dept: MRI IMAGING | Age: 74
Discharge: HOME | End: 2019-05-20
Payer: MEDICARE

## 2019-05-20 DIAGNOSIS — M54.41 ACUTE MIDLINE LOW BACK PAIN WITH RIGHT-SIDED SCIATICA: ICD-10-CM

## 2019-05-20 PROCEDURE — 72148 MRI LUMBAR SPINE W/O DYE: CPT

## 2019-05-21 DIAGNOSIS — M43.16 SPONDYLOLISTHESIS OF LUMBAR REGION: ICD-10-CM

## 2019-05-21 DIAGNOSIS — M43.10 RETROLISTHESIS: Primary | ICD-10-CM

## 2019-05-23 DIAGNOSIS — S39.012A BACK STRAIN, INITIAL ENCOUNTER: ICD-10-CM

## 2019-05-23 RX ORDER — BACLOFEN 10 MG/1
TABLET ORAL
Qty: 15 TABLET | Refills: 0 | OUTPATIENT
Start: 2019-05-23

## 2019-07-27 DIAGNOSIS — I48.0 PAROXYSMAL ATRIAL FIBRILLATION (HCC): ICD-10-CM

## 2019-07-29 RX ORDER — DIGOXIN 125 MCG
TABLET ORAL
Qty: 90 TABLET | Refills: 1 | Status: SHIPPED | OUTPATIENT
Start: 2019-07-29 | End: 2020-01-29

## 2019-07-29 RX ORDER — HYDROCHLOROTHIAZIDE 25 MG/1
TABLET ORAL
Qty: 90 TABLET | Refills: 1 | OUTPATIENT
Start: 2019-07-29

## 2019-08-06 DIAGNOSIS — I10 ESSENTIAL HYPERTENSION: ICD-10-CM

## 2019-08-06 DIAGNOSIS — E11.42 TYPE 2 DIABETES MELLITUS WITH DIABETIC POLYNEUROPATHY, WITHOUT LONG-TERM CURRENT USE OF INSULIN (HCC): ICD-10-CM

## 2019-08-06 RX ORDER — AMLODIPINE BESYLATE 5 MG/1
TABLET ORAL
Qty: 90 TABLET | Refills: 0 | Status: SHIPPED | OUTPATIENT
Start: 2019-08-06 | End: 2019-10-01

## 2019-08-06 RX ORDER — LISINOPRIL 20 MG/1
TABLET ORAL
Qty: 90 TABLET | Refills: 0 | Status: SHIPPED | OUTPATIENT
Start: 2019-08-06 | End: 2019-10-30 | Stop reason: SDUPTHER

## 2019-08-06 RX ORDER — SIMVASTATIN 20 MG
TABLET ORAL
Qty: 90 TABLET | Refills: 0 | Status: SHIPPED | OUTPATIENT
Start: 2019-08-06 | End: 2019-11-03 | Stop reason: SDUPTHER

## 2019-08-21 ENCOUNTER — TELEPHONE (OUTPATIENT)
Dept: FAMILY MEDICINE CLINIC | Age: 74
End: 2019-08-21

## 2019-10-01 ENCOUNTER — OFFICE VISIT (OUTPATIENT)
Dept: FAMILY MEDICINE CLINIC | Age: 74
End: 2019-10-01
Payer: MEDICARE

## 2019-10-01 VITALS
SYSTOLIC BLOOD PRESSURE: 178 MMHG | OXYGEN SATURATION: 100 % | HEART RATE: 72 BPM | BODY MASS INDEX: 22.36 KG/M2 | DIASTOLIC BLOOD PRESSURE: 72 MMHG | TEMPERATURE: 98.5 F | WEIGHT: 134.38 LBS

## 2019-10-01 DIAGNOSIS — I73.9 PVD (PERIPHERAL VASCULAR DISEASE) (HCC): ICD-10-CM

## 2019-10-01 DIAGNOSIS — E11.42 TYPE 2 DIABETES MELLITUS WITH DIABETIC POLYNEUROPATHY, WITHOUT LONG-TERM CURRENT USE OF INSULIN (HCC): Primary | ICD-10-CM

## 2019-10-01 DIAGNOSIS — I48.0 PAROXYSMAL ATRIAL FIBRILLATION (HCC): ICD-10-CM

## 2019-10-01 DIAGNOSIS — E78.00 PURE HYPERCHOLESTEROLEMIA: ICD-10-CM

## 2019-10-01 DIAGNOSIS — I10 ESSENTIAL HYPERTENSION: ICD-10-CM

## 2019-10-01 LAB
A/G RATIO: 1.7 (ref 1.1–2.2)
ALBUMIN SERPL-MCNC: 4.7 G/DL (ref 3.4–5)
ALP BLD-CCNC: 64 U/L (ref 40–129)
ALT SERPL-CCNC: 13 U/L (ref 10–40)
ANION GAP SERPL CALCULATED.3IONS-SCNC: 12 MMOL/L (ref 3–16)
AST SERPL-CCNC: 17 U/L (ref 15–37)
BASOPHILS ABSOLUTE: 0.1 K/UL (ref 0–0.2)
BASOPHILS RELATIVE PERCENT: 1.1 %
BILIRUB SERPL-MCNC: 0.5 MG/DL (ref 0–1)
BUN BLDV-MCNC: 26 MG/DL (ref 7–20)
CALCIUM SERPL-MCNC: 10.2 MG/DL (ref 8.3–10.6)
CHLORIDE BLD-SCNC: 102 MMOL/L (ref 99–110)
CHOLESTEROL, TOTAL: 134 MG/DL (ref 0–199)
CO2: 25 MMOL/L (ref 21–32)
CREAT SERPL-MCNC: 1 MG/DL (ref 0.6–1.2)
CREATININE URINE POCT: 100
EOSINOPHILS ABSOLUTE: 0.4 K/UL (ref 0–0.6)
EOSINOPHILS RELATIVE PERCENT: 4.3 %
GFR AFRICAN AMERICAN: >60
GFR NON-AFRICAN AMERICAN: 54
GLOBULIN: 2.8 G/DL
GLUCOSE BLD-MCNC: 115 MG/DL (ref 70–99)
HCT VFR BLD CALC: 33.2 % (ref 36–48)
HDLC SERPL-MCNC: 58 MG/DL (ref 40–60)
HEMOGLOBIN: 10.8 G/DL (ref 12–16)
LDL CHOLESTEROL CALCULATED: 65 MG/DL
LYMPHOCYTES ABSOLUTE: 2.3 K/UL (ref 1–5.1)
LYMPHOCYTES RELATIVE PERCENT: 25.8 %
MCH RBC QN AUTO: 26.5 PG (ref 26–34)
MCHC RBC AUTO-ENTMCNC: 32.4 G/DL (ref 31–36)
MCV RBC AUTO: 81.7 FL (ref 80–100)
MICROALBUMIN/CREAT 24H UR: 80 MG/G{CREAT}
MICROALBUMIN/CREAT UR-RTO: ABNORMAL
MONOCYTES ABSOLUTE: 0.5 K/UL (ref 0–1.3)
MONOCYTES RELATIVE PERCENT: 5.4 %
NEUTROPHILS ABSOLUTE: 5.5 K/UL (ref 1.7–7.7)
NEUTROPHILS RELATIVE PERCENT: 63.4 %
PDW BLD-RTO: 15.3 % (ref 12.4–15.4)
PLATELET # BLD: 224 K/UL (ref 135–450)
PMV BLD AUTO: 8.2 FL (ref 5–10.5)
POTASSIUM SERPL-SCNC: 4.5 MMOL/L (ref 3.5–5.1)
RBC # BLD: 4.06 M/UL (ref 4–5.2)
SODIUM BLD-SCNC: 139 MMOL/L (ref 136–145)
TOTAL PROTEIN: 7.5 G/DL (ref 6.4–8.2)
TRIGL SERPL-MCNC: 55 MG/DL (ref 0–150)
VLDLC SERPL CALC-MCNC: 11 MG/DL
WBC # BLD: 8.7 K/UL (ref 4–11)

## 2019-10-01 PROCEDURE — 1036F TOBACCO NON-USER: CPT | Performed by: NURSE PRACTITIONER

## 2019-10-01 PROCEDURE — 99214 OFFICE O/P EST MOD 30 MIN: CPT | Performed by: NURSE PRACTITIONER

## 2019-10-01 PROCEDURE — G8427 DOCREV CUR MEDS BY ELIG CLIN: HCPCS | Performed by: NURSE PRACTITIONER

## 2019-10-01 PROCEDURE — G8598 ASA/ANTIPLAT THER USED: HCPCS | Performed by: NURSE PRACTITIONER

## 2019-10-01 PROCEDURE — G8484 FLU IMMUNIZE NO ADMIN: HCPCS | Performed by: NURSE PRACTITIONER

## 2019-10-01 PROCEDURE — 2022F DILAT RTA XM EVC RTNOPTHY: CPT | Performed by: NURSE PRACTITIONER

## 2019-10-01 PROCEDURE — 3017F COLORECTAL CA SCREEN DOC REV: CPT | Performed by: NURSE PRACTITIONER

## 2019-10-01 PROCEDURE — 1090F PRES/ABSN URINE INCON ASSESS: CPT | Performed by: NURSE PRACTITIONER

## 2019-10-01 PROCEDURE — 36415 COLL VENOUS BLD VENIPUNCTURE: CPT | Performed by: NURSE PRACTITIONER

## 2019-10-01 PROCEDURE — G8400 PT W/DXA NO RESULTS DOC: HCPCS | Performed by: NURSE PRACTITIONER

## 2019-10-01 PROCEDURE — G8420 CALC BMI NORM PARAMETERS: HCPCS | Performed by: NURSE PRACTITIONER

## 2019-10-01 PROCEDURE — 3045F PR MOST RECENT HEMOGLOBIN A1C LEVEL 7.0-9.0%: CPT | Performed by: NURSE PRACTITIONER

## 2019-10-01 PROCEDURE — 1123F ACP DISCUSS/DSCN MKR DOCD: CPT | Performed by: NURSE PRACTITIONER

## 2019-10-01 PROCEDURE — 82044 UR ALBUMIN SEMIQUANTITATIVE: CPT | Performed by: NURSE PRACTITIONER

## 2019-10-01 PROCEDURE — 4040F PNEUMOC VAC/ADMIN/RCVD: CPT | Performed by: NURSE PRACTITIONER

## 2019-10-01 RX ORDER — AMLODIPINE BESYLATE 10 MG/1
10 TABLET ORAL DAILY
Qty: 90 TABLET | Refills: 0 | Status: SHIPPED | OUTPATIENT
Start: 2019-10-01 | End: 2020-02-24 | Stop reason: SDUPTHER

## 2019-10-02 LAB
ESTIMATED AVERAGE GLUCOSE: 122.6 MG/DL
HBA1C MFR BLD: 5.9 %

## 2019-10-30 DIAGNOSIS — I10 ESSENTIAL HYPERTENSION: ICD-10-CM

## 2019-10-30 RX ORDER — LISINOPRIL 20 MG/1
TABLET ORAL
Qty: 90 TABLET | Refills: 0 | Status: SHIPPED | OUTPATIENT
Start: 2019-10-30 | End: 2020-02-11

## 2019-10-30 RX ORDER — AMLODIPINE BESYLATE 5 MG/1
TABLET ORAL
Qty: 90 TABLET | Refills: 0 | OUTPATIENT
Start: 2019-10-30

## 2019-11-04 RX ORDER — SIMVASTATIN 20 MG
TABLET ORAL
Qty: 90 TABLET | Refills: 0 | Status: SHIPPED | OUTPATIENT
Start: 2019-11-04 | End: 2020-02-11

## 2019-11-19 DIAGNOSIS — I10 ESSENTIAL HYPERTENSION: ICD-10-CM

## 2019-11-19 RX ORDER — AMLODIPINE BESYLATE 5 MG/1
TABLET ORAL
Qty: 90 TABLET | Refills: 0 | OUTPATIENT
Start: 2019-11-19

## 2020-01-02 ENCOUNTER — OFFICE VISIT (OUTPATIENT)
Dept: FAMILY MEDICINE CLINIC | Age: 75
End: 2020-01-02
Payer: MEDICARE

## 2020-01-02 VITALS
RESPIRATION RATE: 16 BRPM | TEMPERATURE: 98.1 F | OXYGEN SATURATION: 100 % | BODY MASS INDEX: 24.76 KG/M2 | WEIGHT: 148.6 LBS | HEIGHT: 65 IN | DIASTOLIC BLOOD PRESSURE: 78 MMHG | SYSTOLIC BLOOD PRESSURE: 173 MMHG | HEART RATE: 69 BPM

## 2020-01-02 LAB
A/G RATIO: 1.3 (ref 1.1–2.2)
ALBUMIN SERPL-MCNC: 4.3 G/DL (ref 3.4–5)
ALP BLD-CCNC: 74 U/L (ref 40–129)
ALT SERPL-CCNC: 20 U/L (ref 10–40)
ANION GAP SERPL CALCULATED.3IONS-SCNC: 16 MMOL/L (ref 3–16)
AST SERPL-CCNC: 19 U/L (ref 15–37)
BILIRUB SERPL-MCNC: 0.7 MG/DL (ref 0–1)
BUN BLDV-MCNC: 18 MG/DL (ref 7–20)
CALCIUM SERPL-MCNC: 9.7 MG/DL (ref 8.3–10.6)
CHLORIDE BLD-SCNC: 100 MMOL/L (ref 99–110)
CO2: 22 MMOL/L (ref 21–32)
CREAT SERPL-MCNC: 1.1 MG/DL (ref 0.6–1.2)
GFR AFRICAN AMERICAN: 59
GFR NON-AFRICAN AMERICAN: 48
GLOBULIN: 3.3 G/DL
GLUCOSE BLD-MCNC: 236 MG/DL (ref 70–99)
POTASSIUM SERPL-SCNC: 4.4 MMOL/L (ref 3.5–5.1)
SODIUM BLD-SCNC: 138 MMOL/L (ref 136–145)
TOTAL PROTEIN: 7.6 G/DL (ref 6.4–8.2)
VITAMIN D 25-HYDROXY: 14.4 NG/ML

## 2020-01-02 PROCEDURE — 2022F DILAT RTA XM EVC RTNOPTHY: CPT | Performed by: NURSE PRACTITIONER

## 2020-01-02 PROCEDURE — 1036F TOBACCO NON-USER: CPT | Performed by: NURSE PRACTITIONER

## 2020-01-02 PROCEDURE — G8484 FLU IMMUNIZE NO ADMIN: HCPCS | Performed by: NURSE PRACTITIONER

## 2020-01-02 PROCEDURE — 3046F HEMOGLOBIN A1C LEVEL >9.0%: CPT | Performed by: NURSE PRACTITIONER

## 2020-01-02 PROCEDURE — G8400 PT W/DXA NO RESULTS DOC: HCPCS | Performed by: NURSE PRACTITIONER

## 2020-01-02 PROCEDURE — 36415 COLL VENOUS BLD VENIPUNCTURE: CPT | Performed by: NURSE PRACTITIONER

## 2020-01-02 PROCEDURE — G8420 CALC BMI NORM PARAMETERS: HCPCS | Performed by: NURSE PRACTITIONER

## 2020-01-02 PROCEDURE — 1090F PRES/ABSN URINE INCON ASSESS: CPT | Performed by: NURSE PRACTITIONER

## 2020-01-02 PROCEDURE — G8510 SCR DEP NEG, NO PLAN REQD: HCPCS | Performed by: NURSE PRACTITIONER

## 2020-01-02 PROCEDURE — 99213 OFFICE O/P EST LOW 20 MIN: CPT | Performed by: NURSE PRACTITIONER

## 2020-01-02 PROCEDURE — 3017F COLORECTAL CA SCREEN DOC REV: CPT | Performed by: NURSE PRACTITIONER

## 2020-01-02 PROCEDURE — G8427 DOCREV CUR MEDS BY ELIG CLIN: HCPCS | Performed by: NURSE PRACTITIONER

## 2020-01-02 PROCEDURE — 4040F PNEUMOC VAC/ADMIN/RCVD: CPT | Performed by: NURSE PRACTITIONER

## 2020-01-02 PROCEDURE — 1123F ACP DISCUSS/DSCN MKR DOCD: CPT | Performed by: NURSE PRACTITIONER

## 2020-01-02 ASSESSMENT — PATIENT HEALTH QUESTIONNAIRE - PHQ9
SUM OF ALL RESPONSES TO PHQ9 QUESTIONS 1 & 2: 0
SUM OF ALL RESPONSES TO PHQ QUESTIONS 1-9: 0
2. FEELING DOWN, DEPRESSED OR HOPELESS: 0
SUM OF ALL RESPONSES TO PHQ QUESTIONS 1-9: 0
1. LITTLE INTEREST OR PLEASURE IN DOING THINGS: 0

## 2020-01-02 NOTE — PROGRESS NOTES
and cheeseburgers. She reports she cannot afford other foods. She does not smoke. Denies activity intolerance, orthopnea, ankle edema or jaw pain. Her only complaint is her shoulder pain from torn rotator cuff. Hyperlipidemia/CAD  Current treatment includes Zocor. Reports she takes her medication most of the time. She is noncompliant with diet and exercise. Denies adverse effects, myalgia. She does not see cardiology. She states there is nothing wrong with her heart rate now and does not need to see a heart doctor. She has history of vitamin D deficiency. She does not take vitamin D supplements. She denies abnormal fatigue, depression. Current Outpatient Medications   Medication Sig Dispense Refill    simvastatin (ZOCOR) 20 MG tablet TAKE 1 TABLET BY MOUTH EVERY DAY AT NIGHT 90 tablet 0    lisinopril (PRINIVIL;ZESTRIL) 20 MG tablet TAKE 1 TABLET BY MOUTH EVERY DAY 90 tablet 0    amLODIPine (NORVASC) 10 MG tablet Take 1 tablet by mouth daily 90 tablet 0    digoxin (LANOXIN) 125 MCG tablet TAKE 1 TABLET DAILY (DOSE REDUCTION) 90 tablet 1    aspirin 81 MG EC tablet Take 81 mg by mouth daily        No current facility-administered medications for this visit. Patient's past medical history, surgical history, family history, medications,  and allergies  were all reviewed and updated as appropriate today. Review of Systems  See HPI    Physical Exam  Vitals signs reviewed. Constitutional:       General: She is not in acute distress. Appearance: She is well-developed. She is not toxic-appearing. HENT:      Head: Normocephalic. Nose: Nose normal.      Mouth/Throat:      Mouth: Mucous membranes are moist.      Pharynx: No posterior oropharyngeal erythema. Eyes:      Extraocular Movements: Extraocular movements intact. Neck:      Musculoskeletal: Neck supple. Vascular: No carotid bruit. Cardiovascular:      Rate and Rhythm: Normal rate. Pulses: Normal pulses. Heart sounds: No murmur. Pulmonary:      Effort: Pulmonary effort is normal.      Breath sounds: Normal breath sounds. No wheezing or rhonchi. Abdominal:      General: Bowel sounds are normal.      Palpations: Abdomen is soft. Musculoskeletal: Normal range of motion. Right lower leg: No edema. Left lower leg: No edema. Lymphadenopathy:      Cervical: No cervical adenopathy. Skin:     General: Skin is warm and dry. Capillary Refill: Capillary refill takes 2 to 3 seconds. Neurological:      Mental Status: She is alert and oriented to person, place, and time. Psychiatric:         Behavior: Behavior normal.         Thought Content: Thought content normal.         Judgment: Judgment normal.         Vitals:    01/02/20 1324 01/02/20 1331   BP: (!) 174/85 (!) 173/78   Site: Left Upper Arm Left Upper Arm   Position: Sitting Sitting   Cuff Size: Medium Adult Medium Adult   Pulse: 69 69   Resp: 16    Temp: 98.1 °F (36.7 °C)    SpO2: 100%    Weight: 148 lb 9.6 oz (67.4 kg)    Height: 5' 5\" (1.651 m)            MARVIN Campbell-CNP    The note was completedusing Dragon voice recognition transcription. Every effort was made to ensure accuracy; however, inadvertent  transcription errors may be present despite my best efforts to edit errors.

## 2020-01-03 LAB
BASOPHILS ABSOLUTE: 0.1 K/UL (ref 0–0.2)
BASOPHILS RELATIVE PERCENT: 1.6 %
EOSINOPHILS ABSOLUTE: 0.2 K/UL (ref 0–0.6)
EOSINOPHILS RELATIVE PERCENT: 2.9 %
ESTIMATED AVERAGE GLUCOSE: 162.8 MG/DL
HBA1C MFR BLD: 7.3 %
HCT VFR BLD CALC: 33.5 % (ref 36–48)
HEMOGLOBIN: 10.9 G/DL (ref 12–16)
LYMPHOCYTES ABSOLUTE: 1.3 K/UL (ref 1–5.1)
LYMPHOCYTES RELATIVE PERCENT: 19.9 %
MCH RBC QN AUTO: 26.8 PG (ref 26–34)
MCHC RBC AUTO-ENTMCNC: 32.4 G/DL (ref 31–36)
MCV RBC AUTO: 82.5 FL (ref 80–100)
MONOCYTES ABSOLUTE: 0.4 K/UL (ref 0–1.3)
MONOCYTES RELATIVE PERCENT: 5.5 %
NEUTROPHILS ABSOLUTE: 4.7 K/UL (ref 1.7–7.7)
NEUTROPHILS RELATIVE PERCENT: 70.1 %
PDW BLD-RTO: 16.5 % (ref 12.4–15.4)
PLATELET # BLD: 180 K/UL (ref 135–450)
PMV BLD AUTO: 8.5 FL (ref 5–10.5)
RBC # BLD: 4.06 M/UL (ref 4–5.2)
WBC # BLD: 6.7 K/UL (ref 4–11)

## 2020-01-29 RX ORDER — DIGOXIN 125 MCG
TABLET ORAL
Qty: 90 TABLET | Refills: 0 | Status: SHIPPED | OUTPATIENT
Start: 2020-01-29 | End: 2020-11-02 | Stop reason: SDUPTHER

## 2020-02-11 RX ORDER — SIMVASTATIN 20 MG
TABLET ORAL
Qty: 90 TABLET | Refills: 0 | Status: SHIPPED | OUTPATIENT
Start: 2020-02-11 | End: 2020-05-11 | Stop reason: SDUPTHER

## 2020-02-11 RX ORDER — LISINOPRIL 20 MG/1
TABLET ORAL
Qty: 90 TABLET | Refills: 0 | Status: SHIPPED | OUTPATIENT
Start: 2020-02-11 | End: 2020-05-11 | Stop reason: SDUPTHER

## 2020-02-24 RX ORDER — AMLODIPINE BESYLATE 10 MG/1
10 TABLET ORAL DAILY
Qty: 90 TABLET | Refills: 1 | Status: SHIPPED | OUTPATIENT
Start: 2020-02-24 | End: 2020-07-22 | Stop reason: SDUPTHER

## 2020-05-11 RX ORDER — LISINOPRIL 20 MG/1
20 TABLET ORAL DAILY
Qty: 90 TABLET | Refills: 0 | Status: SHIPPED | OUTPATIENT
Start: 2020-05-11 | End: 2020-08-11

## 2020-05-11 RX ORDER — SIMVASTATIN 20 MG
20 TABLET ORAL NIGHTLY
Qty: 90 TABLET | Refills: 0 | Status: SHIPPED | OUTPATIENT
Start: 2020-05-11 | End: 2020-08-11

## 2020-07-21 ENCOUNTER — OFFICE VISIT (OUTPATIENT)
Dept: FAMILY MEDICINE CLINIC | Age: 75
End: 2020-07-21
Payer: MEDICARE

## 2020-07-21 VITALS
SYSTOLIC BLOOD PRESSURE: 145 MMHG | WEIGHT: 137.13 LBS | BODY MASS INDEX: 22.82 KG/M2 | HEART RATE: 50 BPM | TEMPERATURE: 97.1 F | DIASTOLIC BLOOD PRESSURE: 64 MMHG

## 2020-07-21 LAB
A/G RATIO: 1.3 (ref 1.1–2.2)
ALBUMIN SERPL-MCNC: 4.3 G/DL (ref 3.4–5)
ALP BLD-CCNC: 80 U/L (ref 40–129)
ALT SERPL-CCNC: 17 U/L (ref 10–40)
ANION GAP SERPL CALCULATED.3IONS-SCNC: 13 MMOL/L (ref 3–16)
AST SERPL-CCNC: 18 U/L (ref 15–37)
BASOPHILS ABSOLUTE: 0.1 K/UL (ref 0–0.2)
BASOPHILS RELATIVE PERCENT: 1.8 %
BILIRUB SERPL-MCNC: 0.4 MG/DL (ref 0–1)
BUN BLDV-MCNC: 29 MG/DL (ref 7–20)
CALCIUM SERPL-MCNC: 9.7 MG/DL (ref 8.3–10.6)
CHLORIDE BLD-SCNC: 104 MMOL/L (ref 99–110)
CO2: 23 MMOL/L (ref 21–32)
CREAT SERPL-MCNC: 1.1 MG/DL (ref 0.6–1.2)
DIGOXIN LEVEL: 1.7 NG/ML (ref 0.8–2)
EOSINOPHILS ABSOLUTE: 0.3 K/UL (ref 0–0.6)
EOSINOPHILS RELATIVE PERCENT: 4.5 %
GFR AFRICAN AMERICAN: 59
GFR NON-AFRICAN AMERICAN: 48
GLOBULIN: 3.2 G/DL
GLUCOSE BLD-MCNC: 102 MG/DL (ref 70–99)
HCT VFR BLD CALC: 31.6 % (ref 36–48)
HEMOGLOBIN: 10.3 G/DL (ref 12–16)
LYMPHOCYTES ABSOLUTE: 1.7 K/UL (ref 1–5.1)
LYMPHOCYTES RELATIVE PERCENT: 28.2 %
MCH RBC QN AUTO: 25.6 PG (ref 26–34)
MCHC RBC AUTO-ENTMCNC: 32.5 G/DL (ref 31–36)
MCV RBC AUTO: 78.6 FL (ref 80–100)
MONOCYTES ABSOLUTE: 0.4 K/UL (ref 0–1.3)
MONOCYTES RELATIVE PERCENT: 7.1 %
NEUTROPHILS ABSOLUTE: 3.6 K/UL (ref 1.7–7.7)
NEUTROPHILS RELATIVE PERCENT: 58.4 %
PDW BLD-RTO: 16.5 % (ref 12.4–15.4)
PLATELET # BLD: 200 K/UL (ref 135–450)
PMV BLD AUTO: 8.1 FL (ref 5–10.5)
POTASSIUM SERPL-SCNC: 4.6 MMOL/L (ref 3.5–5.1)
RBC # BLD: 4.02 M/UL (ref 4–5.2)
SODIUM BLD-SCNC: 140 MMOL/L (ref 136–145)
TOTAL PROTEIN: 7.5 G/DL (ref 6.4–8.2)
WBC # BLD: 6.2 K/UL (ref 4–11)

## 2020-07-21 PROCEDURE — 99214 OFFICE O/P EST MOD 30 MIN: CPT | Performed by: NURSE PRACTITIONER

## 2020-07-21 PROCEDURE — 36415 COLL VENOUS BLD VENIPUNCTURE: CPT | Performed by: NURSE PRACTITIONER

## 2020-07-21 PROCEDURE — 3051F HG A1C>EQUAL 7.0%<8.0%: CPT | Performed by: NURSE PRACTITIONER

## 2020-07-21 RX ORDER — DIGOXIN 125 MCG
TABLET ORAL
Qty: 90 TABLET | Refills: 0 | Status: CANCELLED | OUTPATIENT
Start: 2020-07-21

## 2020-07-21 ASSESSMENT — ENCOUNTER SYMPTOMS
SORE THROAT: 0
ABDOMINAL PAIN: 0
ABDOMINAL DISTENTION: 0
DIARRHEA: 0
COUGH: 0
SHORTNESS OF BREATH: 0
NAUSEA: 0
VOMITING: 0
RHINORRHEA: 0
WHEEZING: 0
CHEST TIGHTNESS: 0

## 2020-07-21 NOTE — PROGRESS NOTES
CHIEF COMPLAINT  Chief Complaint   Patient presents with   Pia NEWSOME   Kvng Parrish is a 76 y.o. female who presents to the office for checkup. Patient presents to the office alone. Patient reports that her  manages her medications and she is unsure of what she takes. Patient does report that she forgets to take them at least once to twice a week. Patient denies any episodes of dizziness or lightheadedness. No chest pain, tightness, palpitations or shortness of breath. Patient has a history of atrial fibrillation and previously was on digoxin however patient does not know if she can currently still takes it. No abdominal pain or discomfort. No nausea, vomiting, or diarrhea. No unusual fatigue or unexplained weight loss. Patient reports chronic bilateral feet pain patient reports that when she walks for long periods of time she does get swelling. Patient reports that left foot is worse than right foot. Patient has history of neuropathy. Patient denies any acute injury, trauma, or fall prior. No fever or chills. No cough or congestion. Patient denies any use of tobacco products, recreational drugs, or alcohol. Patient reports that her  does smoke. Patient does not check her blood sugar or blood pressure on a regular basis. No other complaints, modifying factors or associated symptoms. Nursing notes reviewed.    Past Medical History:   Diagnosis Date    Atrial fibrillation ([facility] [address]     off coumadin after bleed, declined restart    Blood transfusion     CAD (coronary artery disease)     Diabetes mellitus type II     Diabetic neuropathy ([address] 2011    Gastric ulcer     H. pylori infection 2009    Hyperlipidemia     Hypertension     Numbness and tingling of left leg     Osteoarthritis     knees    Poor historian     PVD (peripheral vascular disease) ([facility] [address]     PTA distal sfa/pop/peroneal, Dr. 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     Fear of current or ex partner: Not on file     Emotionally abused: Not on file     Physically abused: Not on file     Forced sexual activity: Not on file   Other Topics Concern    Not on file   Social History Narrative    Not on file     Current Outpatient Medications   Medication Sig Dispense Refill    simvastatin (ZOCOR) 20 MG tablet Take 1 tablet by mouth nightly 90 tablet 0    lisinopril (PRINIVIL;ZESTRIL) 20 MG tablet Take 1 tablet by mouth daily 90 tablet 0    digoxin (LANOXIN) 125 MCG tablet TAKE 1 TABLET DAILY (DOSE REDUCTION) 90 tablet 0    aspirin 81 MG EC tablet Take 81 mg by mouth daily       amLODIPine (NORVASC) 10 MG tablet Take 1 tablet by mouth daily 90 tablet 1     No current facility-administered medications for this visit. Allergies   Allergen Reactions    Pcn [Penicillins] Other (See Comments)     Unsure of reaction       REVIEW OF SYSTEMS  Review of Systems   Constitutional: Negative for activity change, appetite change, chills and fever. HENT: Negative for congestion, rhinorrhea and sore throat. Eyes: Negative for visual disturbance. Respiratory: Negative for cough, chest tightness, shortness of breath and wheezing. Cardiovascular: Negative for chest pain and leg swelling. Gastrointestinal: Negative for abdominal distention, abdominal pain, diarrhea, nausea and vomiting. Genitourinary: Negative for dysuria, frequency, hematuria and urgency. Musculoskeletal: Positive for arthralgias. Negative for gait problem and myalgias. Skin: Negative for rash. Neurological: Negative for dizziness, weakness, light-headedness, numbness and headaches. Psychiatric/Behavioral: Negative for self-injury and sleep disturbance. The patient is not nervous/anxious. PHYSICAL EXAM  BP (!) 145/64   Pulse 50   Temp 97.1 °F (36.2 °C) (Infrared)   Wt 137 lb 2 oz (62.2 kg)   BMI 22.82 kg/m²   Physical Exam  Vitals signs reviewed.    Constitutional:       General: She is not in acute distress. Appearance: Normal appearance. She is well-developed. She is not diaphoretic. HENT:      Head: Normocephalic and atraumatic. Right Ear: Tympanic membrane normal.      Left Ear: Tympanic membrane normal.      Nose: Nose normal.      Mouth/Throat:      Mouth: Mucous membranes are moist.      Pharynx: Posterior oropharyngeal erythema present. Eyes:      General:         Right eye: No discharge. Left eye: No discharge. Pupils: Pupils are equal, round, and reactive to light. Neck:      Musculoskeletal: Normal range of motion and neck supple. Vascular: No JVD. Cardiovascular:      Rate and Rhythm: Normal rate and regular rhythm. Pulses: Normal pulses. Pulmonary:      Effort: Pulmonary effort is normal. No respiratory distress. Breath sounds: No stridor. No wheezing, rhonchi or rales. Chest:      Chest wall: No tenderness. Abdominal:      General: Bowel sounds are normal. There is no distension. Palpations: Abdomen is soft. Tenderness: There is no abdominal tenderness. There is no guarding. Musculoskeletal: Normal range of motion. General: No deformity. Right lower leg: Edema present. Left lower leg: Edema present. Skin:     General: Skin is warm and dry. Capillary Refill: Capillary refill takes 2 to 3 seconds. Coloration: Skin is not pale. Findings: No bruising, lesion or rash. Neurological:      General: No focal deficit present. Mental Status: She is alert and oriented to person, place, and time. Psychiatric:         Mood and Affect: Mood normal.         Speech: Speech normal.         Behavior: Behavior normal.         Cognition and Memory: Memory is impaired. ASSESSMENT/PLAN:   1. Type 2 diabetes mellitus with diabetic polyneuropathy, without long-term current use of insulin (HCC)  Stable. Patient noncompliant with diet.   I did discuss risks associated with uncontrolled diet associated with diabetes mellitus, hypertension, and hyperlipidemia. Patient educated on the significant risk associated with cardiovascular events. She is not on any medications at this time. Patient reports that she snacks and eats candy as well as [employer] daily. Patient denies any fatigue, diaphoresis, frequent thirst or urination. Patient has seen podiatry in the past but denies any current delayed healing wounds. Prior A1c 5.9. Labs ordered and pending we will follow-up with results. Follow-up in 3 months, sooner for new or worsening symptoms.  - HEMOGLOBIN A1C    2. Paroxysmal atrial fibrillation (HCC)  Asymptomatic. Patient has been on digoxin 125 mcg tablets at previous visits. Patient unsure if she takes it on a daily basis or not. Patient is noncompliant with medications and does forget to take doses at least 1-2 times a week. Patient denies any episodes of dizziness, lightheadedness, chest pain, palpitations, shortness of breath. Digoxin level will be obtained and patient's  will be called to verify home medications. Once  verifies medications we will refill if patient needs refills. Continue current treatment follow-up in 3 months, sooner for new or worsening symptoms.  - DIGOXIN LEVEL    3. Coronary artery disease involving native coronary artery of native heart without angina pectoris/Pure hypercholesterolemia  Stable. Patient prescribed Zocor 20 mg and aspirin. Patient reports that she forgets to take her medications at least once or twice a week. Patient is not compliant with diet. Patient denies any overt side effects or myalgias. Patient does not see cardiology on a regular basis. Patient reports that she feels fine and denies any complaints of fatigue, chest pain, chest tightness or shortness of breath. Continue current therapy and follow-up in 3 months, sooner for new or worsening symptoms. 4. Essential hypertension  Stable.   Patient is prescribed

## 2020-07-22 LAB
ESTIMATED AVERAGE GLUCOSE: 119.8 MG/DL
HBA1C MFR BLD: 5.8 %

## 2020-07-22 RX ORDER — AMLODIPINE BESYLATE 10 MG/1
10 TABLET ORAL DAILY
COMMUNITY
End: 2020-08-17

## 2020-08-11 RX ORDER — LISINOPRIL 20 MG/1
TABLET ORAL
Qty: 90 TABLET | Refills: 0 | Status: SHIPPED | OUTPATIENT
Start: 2020-08-11 | End: 2020-09-10 | Stop reason: SDUPTHER

## 2020-08-11 RX ORDER — SIMVASTATIN 20 MG
TABLET ORAL
Qty: 90 TABLET | Refills: 0 | Status: SHIPPED | OUTPATIENT
Start: 2020-08-11 | End: 2020-09-10 | Stop reason: SDUPTHER

## 2020-08-11 NOTE — TELEPHONE ENCOUNTER
Future appt scheduled 10/23/2020         Last appt 07/21/2020      Last Written       simvastatin (ZOCOR) 20 MG tablet   05/11/2020  #90  0 RF     lisinopril (PRINIVIL;ZESTRIL) 20 MG tablet  05/11/2020  #90  0 RF

## 2020-08-17 RX ORDER — AMLODIPINE BESYLATE 10 MG/1
TABLET ORAL
Qty: 90 TABLET | Refills: 0 | Status: SHIPPED | OUTPATIENT
Start: 2020-08-17 | End: 2020-11-09

## 2020-09-10 RX ORDER — SIMVASTATIN 20 MG
TABLET ORAL
Qty: 90 TABLET | Refills: 1 | Status: SHIPPED | OUTPATIENT
Start: 2020-09-10 | End: 2021-02-12 | Stop reason: SDUPTHER

## 2020-09-10 RX ORDER — LISINOPRIL 20 MG/1
TABLET ORAL
Qty: 90 TABLET | Refills: 1 | Status: SHIPPED | OUTPATIENT
Start: 2020-09-10 | End: 2021-02-12 | Stop reason: SDUPTHER

## 2020-10-08 ENCOUNTER — OFFICE VISIT (OUTPATIENT)
Dept: FAMILY MEDICINE CLINIC | Age: 75
End: 2020-10-08
Payer: MEDICARE

## 2020-10-08 VITALS
DIASTOLIC BLOOD PRESSURE: 71 MMHG | BODY MASS INDEX: 23.3 KG/M2 | HEART RATE: 84 BPM | TEMPERATURE: 98.4 F | SYSTOLIC BLOOD PRESSURE: 136 MMHG | OXYGEN SATURATION: 98 % | WEIGHT: 140 LBS

## 2020-10-08 LAB — HBA1C MFR BLD: 6 %

## 2020-10-08 PROCEDURE — 99214 OFFICE O/P EST MOD 30 MIN: CPT | Performed by: NURSE PRACTITIONER

## 2020-10-08 PROCEDURE — 83036 HEMOGLOBIN GLYCOSYLATED A1C: CPT | Performed by: NURSE PRACTITIONER

## 2020-10-08 ASSESSMENT — ENCOUNTER SYMPTOMS
ABDOMINAL DISTENTION: 0
RHINORRHEA: 0
NAUSEA: 0
SORE THROAT: 0
VOMITING: 0
COUGH: 0
ABDOMINAL PAIN: 0
CHEST TIGHTNESS: 0
SHORTNESS OF BREATH: 0
DIARRHEA: 0
WHEEZING: 0

## 2020-10-08 NOTE — PROGRESS NOTES
CHIEF COMPLAINT  Chief Complaint   Patient presents with   Monty NEWSOME   Ricarda French is a 76 y.o. female who presents to the office checkup. Patient denies any recent changes in medical illness or medications. Patient denies any unusual fatigue or unintentional weight loss. No chest pain, tightness, palpitations or shortness of breath. No abdominal pain or discomfort. No nausea, vomiting, or diarrhea. Patient has ongoing left leg swelling and reports that she has been walking on it more lately. Patient continues to be noncompliant with diet and medications. Patient reports that she tries to take them when she remembers and that her  helps her with her medications. Patient continues to eat fast food on a daily basis. Patient requesting a order so that she does not have to wear seatbelt in the car because of her limited range of motion of her shoulders because of previous surgery. Patient reports that she got a ticket a few weeks ago and needs an order saying that she does not need to wear a seatbelt. No other complaints, modifying factors or associated symptoms. Nursing notes reviewed.    Past Medical History:   Diagnosis Date    Atrial fibrillation ([address]     off coumadin after bleed, declined restart    Blood transfusion     CAD (coronary artery disease)     Diabetes mellitus type II     Diabetic neuropathy ([address] 2011    Gastric ulcer     H. pylori infection 2009    Hyperlipidemia     Hypertension     Numbness and tingling of left leg     Osteoarthritis     knees    Poor historian     PVD (peripheral vascular disease) ([facility] [address]     PTA distal sfa/pop/peroneal, Dr. Patrice Ca 12/2015    Unspecified cerebral artery occlusion with cerebral infarction     TIA affected left eye    upper gi bleed 12/2009     Past Surgical History:   Procedure Laterality Date    ANGIOPLASTY  12/30/15    Dr. Patrice Ca, [facility], PTA of distal sfa/pop/peroneal    CARDIAC CATHETERIZATION  9/21/12    done for surgical clearance, cath was negative    CORONARY ANGIOPLASTY WITH STENT PLACEMENT  2067,2911    FOOT SURGERY Left 01/11/2018    DEBRIDEMENT OF ULCERS LEFT FOOT AND LEG WITH GRAFT    OTHER SURGICAL HISTORY Left 06/05/2017    Left Femoral Peroneal Bypass    SHOULDER ARTHROPLASTY  10/5/12    RIGHT SHOULDER TOTAL ARTHROPLASTY, BICEPS TENODESIS DEPUY, GLOBAL ADVANTAGE AP     Family History   Problem Relation Age of Onset    Heart Disease Mother     Stroke Mother     Heart Disease Father     Diabetes Sister     Diabetes Brother     Diabetes Maternal Grandmother      Social History     Socioeconomic History    Marital status:      Spouse name: Not on file    Number of children: Not on file    Years of education: Not on file    Highest education level: Not on file   Occupational History    Not on file   Social Needs    Financial resource strain: Not on file    Food insecurity     Worry: Not on file     Inability: Not on file    Transportation needs     Medical: Not on file     Non-medical: Not on file   Tobacco Use    Smoking status: Never Smoker    Smokeless tobacco: Never Used    Tobacco comment: Not needed   Substance and Sexual Activity    Alcohol use: No    Drug use: No    Sexual activity: Yes     Partners: Male   Lifestyle    Physical activity     Days per week: Not on file     Minutes per session: Not on file    Stress: Not on file   Relationships    Social connections     Talks on phone: Not on file     Gets together: Not on file     Attends [employer] service: Not on file     Active member of club or organization: Not on file     Attends meetings of clubs or organizations: Not on file     Relationship status: Not on file    Intimate partner violence     Fear of current or ex partner: Not on file     Emotionally abused: Not on file     Physically abused: Not on file     Forced sexual activity: Not on file   Other Topics Concern    Not on file   Social History Narrative    Not on file     Current Outpatient Medications   Medication Sig Dispense Refill    lisinopril (PRINIVIL;ZESTRIL) 20 MG tablet TAKE 1 TABLET BY MOUTH EVERY DAY 90 tablet 1    simvastatin (ZOCOR) 20 MG tablet TAKE 1 TABLET BY MOUTH EVERY DAY AT NIGHT 90 tablet 1    metFORMIN (GLUCOPHAGE) 500 MG tablet Take 2 tablets by mouth daily (with breakfast) 180 tablet 1    amLODIPine (NORVASC) 10 MG tablet TAKE 1 TABLET BY MOUTH EVERY DAY 90 tablet 0    digoxin (LANOXIN) 125 MCG tablet TAKE 1 TABLET DAILY (DOSE REDUCTION) 90 tablet 0    aspirin 81 MG EC tablet Take 81 mg by mouth daily        No current facility-administered medications for this visit. Allergies   Allergen Reactions    Pcn [Penicillins] Other (See Comments)     Unsure of reaction       REVIEW OF SYSTEMS  Review of Systems   Constitutional: Negative for activity change, appetite change, chills and fever. HENT: Negative for congestion, rhinorrhea and sore throat. Eyes: Negative for visual disturbance. Respiratory: Negative for cough, chest tightness, shortness of breath and wheezing. Cardiovascular: Negative for chest pain and leg swelling. Gastrointestinal: Negative for abdominal distention, abdominal pain, diarrhea, nausea and vomiting. Genitourinary: Negative for dysuria, frequency, hematuria and urgency. Musculoskeletal: Positive for arthralgias. Negative for gait problem and myalgias. Skin: Negative for rash. Neurological: Negative for dizziness, weakness, light-headedness, numbness and headaches. Psychiatric/Behavioral: Negative for self-injury and sleep disturbance. The patient is not nervous/anxious. PHYSICAL EXAM  /71   Pulse 84   Temp 98.4 °F (36.9 °C) (Oral)   Wt 140 lb (63.5 kg)   SpO2 98%   BMI 23.30 kg/m²   Physical Exam  Vitals signs reviewed. Constitutional:       General: She is not in acute distress. Appearance: Normal appearance. She is well-developed. She is not diaphoretic.    HENT: Head: Normocephalic and atraumatic. Right Ear: Tympanic membrane normal.      Left Ear: Tympanic membrane normal.      Nose: Nose normal.      Mouth/Throat:      Mouth: Mucous membranes are moist.      Pharynx: Oropharynx is clear. Eyes:      General:         Right eye: No discharge. Left eye: No discharge. Pupils: Pupils are equal, round, and reactive to light. Neck:      Musculoskeletal: Normal range of motion and neck supple. Vascular: No JVD. Cardiovascular:      Rate and Rhythm: Normal rate. Pulses: Normal pulses. Dorsalis pedis pulses are 2+ on the right side and 2+ on the left side. Pulmonary:      Effort: Pulmonary effort is normal. No respiratory distress. Breath sounds: No stridor. No wheezing, rhonchi or rales. Chest:      Chest wall: No tenderness. Abdominal:      General: Bowel sounds are normal. There is no distension. Palpations: Abdomen is soft. Tenderness: There is no abdominal tenderness. There is no guarding. Musculoskeletal:         General: No deformity. Left lower leg: Edema present. Comments: Chronic limited range of motion of right shoulder. Feet:      Right foot:      Protective Sensation: 5 sites tested. 1 site sensed. Left foot:      Protective Sensation: 5 sites tested. 2 sites sensed. Skin:     General: Skin is warm and dry. Capillary Refill: Capillary refill takes 2 to 3 seconds. Coloration: Skin is not pale. Findings: No bruising, lesion or rash. Neurological:      General: No focal deficit present. Mental Status: She is alert and oriented to person, place, and time. Psychiatric:         Mood and Affect: Mood normal.         Speech: Speech normal.         Behavior: Behavior normal.         Cognition and Memory: Memory is impaired. ASSESSMENT/PLAN:   1. Type 2 diabetes mellitus with diabetic polyneuropathy, without long-term current use of insulin (HCC)  Stable.   A1c today noncompliant with diet. No adverse side effects or myalgias of statins. Patient does not see cardiology on a regular basis. Patient asymptomatic at this time. Continue current treatment management follow-up in 4 months, sooner for new or worsening symptoms.  - Lipid, Fasting; Future    5. Essential hypertension  Stable. Patient is prescribed lisinopril 10 mg and Norvasc 10 mg daily. Patient reports that she does forget to take her medications at least 1-2 times a week. Patient is noncompliant with diet or exercise. Patient reports intermittent swelling in her ankles. Patient reports that she eats [employer] every night. Patient reports that she does eat cheeseburgers sometimes twice a day for [employer]. Patient reports that she also eats sweets. Patient reports eating 3 cookies before her appointment today. Patient also reports eating she is coming twice a week due to her now working at [employer]. No episodes of chest pain, tightness, palpitations or shortness of breath. Continue with current treatment and therapy, follow-up in 3 months, sooner for new or worsening symptoms. 6. Vitamin D deficiency  Stable. Previous levels deficient. Patient was recommended to take vitamin D on a daily basis. Patient unsure if she is taking a supplement. Medication is not listed on her home med rec. Labs ordered and will reevaluate. 7. Colonoscopy refused  Patient aware of risks associated with refusing colon cancer screening. Patient continues to refuse colonoscopy or FIT testing. Patient requesting a note stating that she does not have to wear a seatbelt while driving her vehicle. Patient reports that because of her previous right shoulder replacement she has limited range of motion and difficulty buckling her seatbelt. Patient has previously been prescribed to wear only a lap belt in the car however that has .   Patient does not want an order for the lap belt and reports that she still has difficulty buckling her seatbelt. I did inform patient that I cannot write an order for her stating that she does not need to wear a seatbelt while driving her car. I educated her on the reasons why I cannot prescribe that and that it is up to the discretion of the office or if she is pulled over. The note was completed using Dragon voice recognition transcription. Every effort was made to ensure accuracy; however, inadvertent  transcription errors may be present despite my best efforts to edit errors.     Chantel Ha, APRN - CNP

## 2020-10-08 NOTE — PATIENT INSTRUCTIONS
Please read the healthy family handout that you were given and share it with your family. Please compare this printed medication list with your medications at home to be sure they are the same. If you have any medications that are different please contact us immediately at [phone]. Also review your allergies that we have listed, these may also include medications that you have not been able to tolerate, make sure everything listed is correct. If you have any allergies that are different please contact us immediately at [phone]. Patient Education        Atrial Fibrillation: Care Instructions  Your Care Instructions     Atrial fibrillation is an irregular and often fast heartbeat. Treating this condition is important for several reasons. It can cause blood clots, which can travel from your heart to your brain and cause a stroke. If you have a fast heartbeat, you may feel lightheaded, dizzy, and weak. An irregular heartbeat can also increase your risk for heart failure. Atrial fibrillation is often the result of another heart condition, such as high blood pressure or coronary artery disease. Making changes to improve your heart condition will help you stay healthy and active. Follow-up care is a key part of your treatment and safety. Be sure to make and go to all appointments, and call your doctor if you are having problems. It's also a good idea to know your test results and keep a list of the medicines you take. How can you care for yourself at home? Medicines    · Take your medicines exactly as prescribed. Call your doctor if you think you are having a problem with your medicine. You will get more details on the specific medicines your doctor prescribes.     · If your doctor has given you a blood thinner to prevent a stroke, be sure you get instructions about how to take your medicine safely.  Blood thinners can cause serious bleeding problems.     · Do not take any vitamins, over-the-counter drugs, or herbal products without talking to your doctor first.   Lifestyle changes    · Do not smoke. Smoking can increase your chance of a stroke and heart attack. If you need help quitting, talk to your doctor about stop-smoking programs and medicines. These can increase your chances of quitting for good.     · Eat a heart-healthy diet.     · Stay at a healthy weight. Lose weight if you need to.     · Limit alcohol to 2 drinks a day for men and 1 drink a day for women. Too much alcohol can cause health problems.     · Avoid colds and flu. Get a pneumococcal vaccine shot. If you have had one before, ask your doctor whether you need another dose. Get a flu shot every year. If you must be around people with colds or flu, wash your hands often. Activity    · If your doctor recommends it, get more exercise. Walking is a good choice. Bit by bit, increase the amount you walk every day. Try for at least 30 minutes on most days of the week. You also may want to swim, bike, or do other activities. Your doctor may suggest that you join a cardiac rehabilitation program so that you can have help increasing your physical activity safely.     · Start light exercise if your doctor says it is okay. Even a small amount will help you get stronger, have more energy, and manage stress. Walking is an easy way to get exercise. Start out by walking a little more than you did in the hospital. Gradually increase the amount you walk.     · When you exercise, watch for signs that your heart is working too hard. You are pushing too hard if you cannot talk while you are exercising. If you become short of breath or dizzy or have chest pain, sit down and rest immediately.     · Check your pulse regularly. Place two fingers on the artery at the palm side of your wrist, in line with your thumb. If your heartbeat seems uneven or fast, talk to your doctor. When should you call for help? Call 911 anytime you think you may need emergency care. For example, call if:    · You have symptoms of a heart attack. These may include:  ? Chest pain or pressure, or a strange feeling in the chest.  ? Sweating. ? Shortness of breath. ? Nausea or vomiting. ? Pain, pressure, or a strange feeling in the back, neck, jaw, or upper belly or in one or both shoulders or arms. ? Lightheadedness or sudden weakness. ? A fast or irregular heartbeat. After you call 911, the  may tell you to chew 1 adult-strength or 2 to 4 low-dose aspirin. Wait for an ambulance. Do not try to drive yourself.     · You have symptoms of a stroke. These may include:  ? Sudden numbness, tingling, weakness, or loss of movement in your face, arm, or leg, especially on only one side of your body. ? Sudden vision changes. ? Sudden trouble speaking. ? Sudden confusion or trouble understanding simple statements. ? Sudden problems with walking or balance. ? A sudden, severe headache that is different from past headaches.     · You passed out (lost consciousness). Call your doctor now or seek immediate medical care if:    · You have new or increased shortness of breath.     · You feel dizzy or lightheaded, or you feel like you may faint.     · Your heart rate becomes irregular.     · You can feel your heart flutter in your chest or skip heartbeats. Tell your doctor if these symptoms are new or worse. Watch closely for changes in your health, and be sure to contact your doctor if you have any problems. Where can you learn more? Go to https://Nunook Interactivejulio cOxsensis.PharmaGen. org and sign in to your [employer] account. Enter U020 in the [employer] box to learn more about \"Atrial Fibrillation: Care Instructions. \"     If you do not have an account, please click on the \"Sign Up Now\" link. Current as of: December 16, 2019               Content Version: 12.6  © 1006-8236 [employer]. Care instructions adapted under license by [address]  If you have questions about a medical condition or this instruction, always ask your healthcare professional. [address] any warranty or liability for your use of this information. Patient Education        Learning About Coronary Artery Disease (CAD)  What is coronary artery disease? Coronary artery disease (CAD) occurs when plaque builds up in the arteries that bring oxygen-rich blood to your heart. Plaque is a fatty substance made of cholesterol, calcium, and other substances in the blood. This process is called hardening of the arteries, or atherosclerosis. What happens when you have coronary artery disease? · Plaque may narrow the coronary arteries. Narrowed arteries cause poor blood flow. This can lead to angina symptoms such as chest pain or discomfort. If blood flow is completely blocked, you could have a heart attack. · You can slow CAD and reduce the risk of future problems by making changes in your lifestyle. These include quitting smoking and eating heart-healthy foods. · Treatments for CAD, along with changes in your lifestyle, can help you live a longer and healthier life. How can you prevent coronary artery disease? · Do not smoke. It may be the best thing you can do to prevent heart disease. If you need help quitting, talk to your doctor about stop-smoking programs and medicines. These can increase your chances of quitting for good. · Be active. Get at least 30 minutes of exercise on most days of the week. Walking is a good choice. You also may want to do other activities, such as running, swimming, cycling, or playing tennis or team sports. · Eat heart-healthy foods. Eat more fruits and vegetables and less foods that contain saturated and trans fats. Limit alcohol, sodium, and sweets. · Stay at a healthy weight. Lose weight if you need to. · Manage other health problems such as diabetes, high blood pressure, and high cholesterol. How is coronary artery disease treated?   · Your doctor will suggest that you make lifestyle changes. For example, your doctor may ask you to eat healthy foods, quit smoking, lose extra weight, and be more active. · You will have to take medicines. · Your doctor may suggest a procedure to open narrowed or blocked arteries. This is called angioplasty. Or your doctor may suggest using healthy blood vessels to create detours around narrowed or blocked arteries. This is called bypass surgery. Follow-up care is a key part of your treatment and safety. Be sure to make and go to all appointments, and call your doctor if you are having problems. It's also a good idea to know your test results and keep a list of the medicines you take. Where can you learn more? Go to https://Cube BiotechpeWisheweb.Resident Research. org and sign in to your [employer] account. Enter [id number] in the Imm[employer]oft box to learn more about \"Learning About Coronary Artery Disease (CAD). \"     If you do not have an account, please click on the \"Sign Up Now\" link. Current as of: December 16, 2019               Content Version: 12.6  © 7536-1696 [employer]. Care instructions adapted under license by [facility] ([facility]). If you have questions about a medical condition or this instruction, always ask your healthcare professional. [address] any warranty or liability for your use of this information. Patient Education        Learning About Meal Planning for Diabetes  Why plan your meals? Meal planning can be a key part of managing diabetes. Planning meals and snacks with the right balance of carbohydrate, protein, and fat can help you keep your blood sugar at the target level you set with your doctor. You don't have to eat special foods. You can eat what your family eats, including sweets once in a while. But you do have to pay attention to how often you eat and how much you eat of certain foods.   You may want to work with a dietitian or a certified diabetes educator. He or she can give you tips and meal ideas and can answer your questions about meal planning. This health professional can also help you reach a healthy weight if that is one of your goals. What plan is right for you? Your dietitian or diabetes educator may suggest that you start with the plate format or carbohydrate counting. The plate format  The plate format is a simple way to help you manage how you eat. You plan meals by learning how much space each food should take on a plate. Using the plate format helps you spread carbohydrate throughout the day. It can make it easier to keep your blood sugar level within your target range. It also helps you see if you're eating healthy portion sizes. To use the plate format, you put non-starchy vegetables on half your plate. Add meat or meat substitutes on one-quarter of the plate. Put a grain or starchy vegetable (such as brown rice or a potato) on the final quarter of the plate. You can add a small piece of fruit and some low-fat or fat-free milk or yogurt, depending on your carbohydrate goal for each meal.  Here are some tips for using the plate format:  · Make sure that you are not using an oversized plate. A 9-inch plate is best. Many restaurants use larger plates. · Get used to using the plate format at home. Then you can use it when you eat out. · Write down your questions about using the plate format. Talk to your doctor, a dietitian, or a diabetes educator about your concerns. Carbohydrate counting  With carbohydrate counting, you plan meals based on the amount of carbohydrate in each food. Carbohydrate raises blood sugar higher and more quickly than any other nutrient. It is found in desserts, breads and cereals, and fruit. It's also found in starchy vegetables such as potatoes and corn, grains such as rice and pasta, and milk and yogurt. Spreading carbohydrate throughout the day helps keep your blood sugar levels within your target range.   Your educator. How do you get started with meal planning? Here are some tips to get started:  · Plan your meals a week at a time. Don't forget to include snacks too. · Use cookbooks or online recipes to plan several main meals. Plan some quick meals for busy nights. You also can double some recipes that freeze well. Then you can save half for other busy nights when you don't have time to cook. · Make sure you have the ingredients you need for your recipes. If you're running low on basic items, put these items on your shopping list too. · List foods that you use to make breakfasts, lunches, and snacks. List plenty of fruits and vegetables. · Post this list on the refrigerator. Add to it as you think of more things you need. · Take the list to the store to do your weekly shopping. Follow-up care is a key part of your treatment and safety. Be sure to make and go to all appointments, and call your doctor if you are having problems. It's also a good idea to know your test results and keep a list of the medicines you take. Where can you learn more? Go to https://Vital EnergipepicewChicago Hustles Magazine.Camiloo. org and sign in to your [employer] account. Enter X613 in the "astamuse company, ltd." box to learn more about \"Learning About Meal Planning for Diabetes. \"     If you do not have an account, please click on the \"Sign Up Now\" link. Current as of: December 20, 2019               Content Version: 12.6  © 1924-4794 [employer] Incorporated. Care instructions adapted under license by [facility] ([facility]). If you have questions about a medical condition or this instruction, always ask your healthcare professional. [place] [address] any warranty or liability for your use of this information. Patient Education        DASH Diet: Care Instructions  Your Care Instructions     The DASH diet is an eating plan that can help lower your blood pressure. DASH stands for Dietary Approaches to Stop Hypertension.  Hypertension is high blood pressure. The DASH diet focuses on eating foods that are high in calcium, potassium, and magnesium. These nutrients can lower blood pressure. The foods that are highest in these nutrients are fruits, vegetables, low-fat dairy products, nuts, seeds, and legumes. But taking calcium, potassium, and magnesium supplements instead of eating foods that are high in those nutrients does not have the same effect. The DASH diet also includes whole grains, fish, and poultry. The DASH diet is one of several lifestyle changes your doctor may recommend to lower your high blood pressure. Your doctor may also want you to decrease the amount of sodium in your diet. Lowering sodium while following the DASH diet can lower blood pressure even further than just the DASH diet alone. Follow-up care is a key part of your treatment and safety. Be sure to make and go to all appointments, and call your doctor if you are having problems. It's also a good idea to know your test results and keep a list of the medicines you take. How can you care for yourself at home? Following the DASH diet  · Eat 4 to 5 servings of fruit each day. A serving is 1 medium-sized piece of fruit, ½ cup chopped or canned fruit, 1/4 cup dried fruit, or 4 ounces (½ cup) of fruit juice. Choose fruit more often than fruit juice. · Eat 4 to 5 servings of vegetables each day. A serving is 1 cup of lettuce or raw leafy vegetables, ½ cup of chopped or cooked vegetables, or 4 ounces (½ cup) of vegetable juice. Choose vegetables more often than vegetable juice. · Get 2 to 3 servings of low-fat and fat-free dairy each day. A serving is 8 ounces of milk, 1 cup of yogurt, or 1 ½ ounces of cheese. · Eat 6 to 8 servings of grains each day. A serving is 1 slice of bread, 1 ounce of dry cereal, or ½ cup of cooked rice, pasta, or cooked cereal. Try to choose whole-grain products as much as possible. · Limit lean meat, poultry, and fish to 2 servings each day.  A serving is account. Enter T789 in the [facility] box to learn more about \"DASH Diet: Care Instructions. \"     If you do not have an account, please click on the \"Sign Up Now\" link. Current as of: December 16, 2019               Content Version: 12.6  © 2094-6539 [employer] Incorporated. Care instructions adapted under license by [facility] ([facility]). If you have questions about a medical condition or this instruction, always ask your healthcare professional. [address] any warranty or liability for your use of this information. Patient Education        High Cholesterol: Care Instructions  Your Care Instructions     Cholesterol is a type of fat in your blood. It is needed for many body functions, such as making new cells. Cholesterol is made by your body. It also comes from food you eat. High cholesterol means that you have too much of the fat in your blood. This raises your risk of a heart attack and stroke. LDL and HDL are part of your total cholesterol. LDL is the \"bad\" cholesterol. High LDL can raise your risk for heart disease, heart attack, and stroke. HDL is the \"good\" cholesterol. It helps clear bad cholesterol from the body. High HDL is linked with a lower risk of heart disease, heart attack, and stroke. Your cholesterol levels help your doctor find out your risk for having a heart attack or stroke. You and your doctor can talk about whether you need to lower your risk and what treatment is best for you. A heart-healthy lifestyle along with medicines can help lower your cholesterol and your risk. The way you choose to lower your risk will depend on how high your risk is for heart attack and stroke. It will also depend on how you feel about taking medicines. Follow-up care is a key part of your treatment and safety. Be sure to make and go to all appointments, and call your doctor if you are having problems.  It's also a good idea to know your test results and keep a list of the medicines you take. How can you care for yourself at home? · Eat a variety of foods every day. Good choices include fruits, vegetables, whole grains (like oatmeal), dried beans and peas, nuts and seeds, soy products (like tofu), and fat-free or low-fat dairy products. · Replace butter, margarine, and hydrogenated or partially hydrogenated oils with olive and canola oils. (Canola oil margarine without trans fat is fine.)  · Replace red meat with fish, poultry, and soy protein (like tofu). · Limit processed and packaged foods like chips, crackers, and cookies. · Bake, broil, or steam foods. Don't colón them. · Be physically active. Get at least 30 minutes of exercise on most days of the week. Walking is a good choice. You also may want to do other activities, such as running, swimming, cycling, or playing tennis or team sports. · Stay at a healthy weight or lose weight by making the changes in eating and physical activity listed above. Losing just a small amount of weight, even 5 to 10 pounds, can reduce your risk for having a heart attack or stroke. · Do not smoke. When should you call for help? Watch closely for changes in your health, and be sure to contact your doctor if:    · You need help making lifestyle changes.     · You have questions about your medicine. Where can you learn more? Go to https://Plures Technologiespeelgineb.Jalbum. org and sign in to your [facility] account. Enter E853 in the [facility] box to learn more about \"High Cholesterol: Care Instructions. \"     If you do not have an account, please click on the \"Sign Up Now\" link. Current as of: December 16, 2019               Content Version: 12.6  © 0005-5186 [employer] Incorporated. Care instructions adapted under license by [facility] ([facility]).  If you have questions about a medical condition or this instruction, always ask your healthcare professional. [address] any warranty or liability for your use of this information. Patient Education        High Blood Pressure: Care Instructions  Overview     It's normal for blood pressure to go up and down throughout the day. But if it stays up, you have high blood pressure. Another name for high blood pressure is hypertension. Despite what a lot of people think, high blood pressure usually doesn't cause headaches or make you feel dizzy or lightheaded. It usually has no symptoms. But it does increase your risk of stroke, heart attack, and other problems. You and your doctor will talk about your risks of these problems based on your blood pressure. Your doctor will give you a goal for your blood pressure. Your goal will be based on your health and your age. Lifestyle changes, such as eating healthy and being active, are always important to help lower blood pressure. You might also take medicine to reach your blood pressure goal.  Follow-up care is a key part of your treatment and safety. Be sure to make and go to all appointments, and call your doctor if you are having problems. It's also a good idea to know your test results and keep a list of the medicines you take. How can you care for yourself at home? Medical treatment  · If you stop taking your medicine, your blood pressure will go back up. You may take one or more types of medicine to lower your blood pressure. Be safe with medicines. Take your medicine exactly as prescribed. Call your doctor if you think you are having a problem with your medicine. · Talk to your doctor before you start taking aspirin every day. Aspirin can help certain people lower their risk of a heart attack or stroke. But taking aspirin isn't right for everyone, because it can cause serious bleeding. · See your doctor regularly. You may need to see the doctor more often at first or until your blood pressure comes down.   · If you are taking blood pressure medicine, talk to your doctor before you take decongestants or anti-inflammatory weakness. ? A fast or irregular heartbeat.     · You have symptoms of a stroke. These may include:  ? Sudden numbness, tingling, weakness, or loss of movement in your face, arm, or leg, especially on only one side of your body. ? Sudden vision changes. ? Sudden trouble speaking. ? Sudden confusion or trouble understanding simple statements. ? Sudden problems with walking or balance. ? A sudden, severe headache that is different from past headaches.     · You have severe back or belly pain. Do not wait until your blood pressure comes down on its own. Get help right away. Call your doctor now or seek immediate care if:    · Your blood pressure is much higher than normal (such as 180/120 or higher), but you don't have symptoms.     · You think high blood pressure is causing symptoms, such as:  ? Severe headache.  ? Blurry vision. Watch closely for changes in your health, and be sure to contact your doctor if:    · Your blood pressure measures higher than your doctor recommends at least 2 times. That means the top number is higher or the bottom number is higher, or both.     · You think you may be having side effects from your blood pressure medicine. Where can you learn more? Go to https://i3 membrane.Zeus. org and sign in to your [employer] account. Enter H063 in the [employer] box to learn more about \"High Blood Pressure: Care Instructions. \"     If you do not have an account, please click on the \"Sign Up Now\" link. Current as of: December 16, 2019               Content Version: 12.6  © 6266-0252 [employer], Incorporated. Care instructions adapted under license by [facility] ([facility]). If you have questions about a medical condition or this instruction, always ask your healthcare professional. [place] [address] any warranty or liability for your use of this information.

## 2020-10-17 ENCOUNTER — HOSPITAL ENCOUNTER (EMERGENCY)
Age: 75
Discharge: HOME | End: 2020-10-17
Payer: MEDICARE

## 2020-10-17 ENCOUNTER — APPOINTMENT (OUTPATIENT)
Dept: GENERAL RADIOLOGY | Age: 75
End: 2020-10-17
Payer: MEDICARE

## 2020-10-17 VITALS
HEART RATE: 72 BPM | DIASTOLIC BLOOD PRESSURE: 63 MMHG | SYSTOLIC BLOOD PRESSURE: 167 MMHG | OXYGEN SATURATION: 99 % | RESPIRATION RATE: 16 BRPM | WEIGHT: 140 LBS | TEMPERATURE: 98.4 F | BODY MASS INDEX: 23.3 KG/M2

## 2020-10-17 PROCEDURE — 99283 EMERGENCY DEPT VISIT LOW MDM: CPT

## 2020-10-17 PROCEDURE — 73562 X-RAY EXAM OF KNEE 3: CPT

## 2020-10-17 ASSESSMENT — ENCOUNTER SYMPTOMS
ABDOMINAL PAIN: 0
COUGH: 0
NAUSEA: 0
VOMITING: 0
SHORTNESS OF BREATH: 0

## 2020-10-17 ASSESSMENT — PAIN SCALES - GENERAL: PAINLEVEL_OUTOF10: 0
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
## 2020-10-18 NOTE — ED NOTES
Pt placed in 6in strapping ace wrap to left knee for comfort.      Kristie Laureano LPN  07/11/05 1383

## 2020-10-23 ENCOUNTER — OFFICE VISIT (OUTPATIENT)
Dept: ORTHOPEDIC SURGERY | Age: 75
End: 2020-10-23
Payer: MEDICARE

## 2020-10-23 VITALS — BODY MASS INDEX: 23.32 KG/M2 | HEIGHT: 65 IN | WEIGHT: 140 LBS

## 2020-10-23 PROCEDURE — 1090F PRES/ABSN URINE INCON ASSESS: CPT | Performed by: PHYSICIAN ASSISTANT

## 2020-10-23 PROCEDURE — 1123F ACP DISCUSS/DSCN MKR DOCD: CPT | Performed by: PHYSICIAN ASSISTANT

## 2020-10-23 PROCEDURE — 3017F COLORECTAL CA SCREEN DOC REV: CPT | Performed by: PHYSICIAN ASSISTANT

## 2020-10-23 PROCEDURE — 1036F TOBACCO NON-USER: CPT | Performed by: PHYSICIAN ASSISTANT

## 2020-10-23 PROCEDURE — G8484 FLU IMMUNIZE NO ADMIN: HCPCS | Performed by: PHYSICIAN ASSISTANT

## 2020-10-23 PROCEDURE — G8420 CALC BMI NORM PARAMETERS: HCPCS | Performed by: PHYSICIAN ASSISTANT

## 2020-10-23 PROCEDURE — 4040F PNEUMOC VAC/ADMIN/RCVD: CPT | Performed by: PHYSICIAN ASSISTANT

## 2020-10-23 PROCEDURE — 99203 OFFICE O/P NEW LOW 30 MIN: CPT | Performed by: PHYSICIAN ASSISTANT

## 2020-10-23 PROCEDURE — G8427 DOCREV CUR MEDS BY ELIG CLIN: HCPCS | Performed by: PHYSICIAN ASSISTANT

## 2020-10-23 PROCEDURE — G8400 PT W/DXA NO RESULTS DOC: HCPCS | Performed by: PHYSICIAN ASSISTANT
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
## 2020-11-02 RX ORDER — DIGOXIN 125 MCG
TABLET ORAL
Qty: 90 TABLET | Refills: 0 | Status: SHIPPED | OUTPATIENT
Start: 2020-11-02 | End: 2021-02-12 | Stop reason: SDUPTHER

## 2020-11-09 RX ORDER — AMLODIPINE BESYLATE 10 MG/1
TABLET ORAL
Qty: 90 TABLET | Refills: 0 | Status: SHIPPED | OUTPATIENT
Start: 2020-11-09 | End: 2020-11-30 | Stop reason: SDUPTHER

## 2020-11-30 RX ORDER — AMLODIPINE BESYLATE 10 MG/1
TABLET ORAL
Qty: 90 TABLET | Refills: 0 | Status: SHIPPED | OUTPATIENT
Start: 2020-11-30 | End: 2021-02-12 | Stop reason: SDUPTHER

## 2020-11-30 NOTE — TELEPHONE ENCOUNTER
Patient needed RX sent to [facility] in [facility]. RX was sent to the wrong pharmacy    Refilled medication per verbal order from provider.

## 2020-12-17 ENCOUNTER — APPOINTMENT (OUTPATIENT)
Dept: VASCULAR LAB | Age: 75
End: 2020-12-17
Payer: MEDICARE

## 2020-12-17 ENCOUNTER — HOSPITAL ENCOUNTER (EMERGENCY)
Age: 75
Discharge: HOME | End: 2020-12-17
Attending: EMERGENCY MEDICINE
Payer: MEDICARE

## 2020-12-17 VITALS
SYSTOLIC BLOOD PRESSURE: 169 MMHG | BODY MASS INDEX: 23.66 KG/M2 | DIASTOLIC BLOOD PRESSURE: 52 MMHG | HEART RATE: 60 BPM | RESPIRATION RATE: 16 BRPM | HEIGHT: 65 IN | WEIGHT: 142 LBS | TEMPERATURE: 97.8 F | OXYGEN SATURATION: 94 %

## 2020-12-17 LAB
ANION GAP SERPL CALCULATED.3IONS-SCNC: 10 MMOL/L (ref 3–16)
APTT: 33.9 SEC (ref 24.2–36.2)
BASOPHILS ABSOLUTE: 0.1 K/UL (ref 0–0.2)
BASOPHILS RELATIVE PERCENT: 1.4 %
BUN BLDV-MCNC: 22 MG/DL (ref 7–20)
CALCIUM SERPL-MCNC: 9.8 MG/DL (ref 8.3–10.6)
CHLORIDE BLD-SCNC: 107 MMOL/L (ref 99–110)
CO2: 23 MMOL/L (ref 21–32)
CREAT SERPL-MCNC: 1.1 MG/DL (ref 0.6–1.2)
EOSINOPHILS ABSOLUTE: 0.2 K/UL (ref 0–0.6)
EOSINOPHILS RELATIVE PERCENT: 3.2 %
GFR AFRICAN AMERICAN: 59
GFR NON-AFRICAN AMERICAN: 48
GLUCOSE BLD-MCNC: 159 MG/DL (ref 70–99)
HCT VFR BLD CALC: 31.6 % (ref 36–48)
HEMOGLOBIN: 10.2 G/DL (ref 12–16)
INR BLD: 1.16 (ref 0.86–1.14)
LYMPHOCYTES ABSOLUTE: 1.4 K/UL (ref 1–5.1)
LYMPHOCYTES RELATIVE PERCENT: 19.4 %
MCH RBC QN AUTO: 26 PG (ref 26–34)
MCHC RBC AUTO-ENTMCNC: 32.4 G/DL (ref 31–36)
MCV RBC AUTO: 80.2 FL (ref 80–100)
MONOCYTES ABSOLUTE: 0.5 K/UL (ref 0–1.3)
MONOCYTES RELATIVE PERCENT: 6.8 %
NEUTROPHILS ABSOLUTE: 5.2 K/UL (ref 1.7–7.7)
NEUTROPHILS RELATIVE PERCENT: 69.2 %
PDW BLD-RTO: 16.7 % (ref 12.4–15.4)
PLATELET # BLD: 199 K/UL (ref 135–450)
PMV BLD AUTO: 7.9 FL (ref 5–10.5)
POTASSIUM REFLEX MAGNESIUM: 4.3 MMOL/L (ref 3.5–5.1)
PROTHROMBIN TIME: 13.5 SEC (ref 10–13.2)
RBC # BLD: 3.94 M/UL (ref 4–5.2)
SODIUM BLD-SCNC: 140 MMOL/L (ref 136–145)
WBC # BLD: 7.4 K/UL (ref 4–11)

## 2020-12-17 PROCEDURE — 85610 PROTHROMBIN TIME: CPT

## 2020-12-17 PROCEDURE — 6370000000 HC RX 637 (ALT 250 FOR IP): Performed by: EMERGENCY MEDICINE

## 2020-12-17 PROCEDURE — 80048 BASIC METABOLIC PNL TOTAL CA: CPT

## 2020-12-17 PROCEDURE — 99284 EMERGENCY DEPT VISIT MOD MDM: CPT

## 2020-12-17 PROCEDURE — 93971 EXTREMITY STUDY: CPT

## 2020-12-17 PROCEDURE — 85025 COMPLETE CBC W/AUTO DIFF WBC: CPT

## 2020-12-17 PROCEDURE — 85730 THROMBOPLASTIN TIME PARTIAL: CPT

## 2020-12-17 RX ADMIN — APIXABAN 10 MG: 5 TABLET, FILM COATED ORAL at 18:11

## 2020-12-17 ASSESSMENT — PAIN DESCRIPTION - LOCATION: LOCATION: LEG

## 2020-12-17 ASSESSMENT — ENCOUNTER SYMPTOMS
SHORTNESS OF BREATH: 0
COUGH: 0
VOMITING: 0
NAUSEA: 0

## 2020-12-17 ASSESSMENT — PAIN SCALES - GENERAL: PAINLEVEL_OUTOF10: 9

## 2020-12-17 ASSESSMENT — PAIN DESCRIPTION - PAIN TYPE: TYPE: ACUTE PAIN

## 2020-12-17 ASSESSMENT — PAIN DESCRIPTION - ORIENTATION: ORIENTATION: LEFT
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
## 2021-02-10 RX ORDER — APIXABAN 5 MG/1
TABLET, FILM COATED ORAL
Qty: 60 TABLET | Refills: 0 | Status: SHIPPED | OUTPATIENT
Start: 2021-02-10 | End: 2021-02-12 | Stop reason: SDUPTHER

## 2021-02-10 NOTE — TELEPHONE ENCOUNTER
Future appt scheduled 02/12/2021           Last appt 10/08/2020      Last Written 12/25/2020    apixaban (ELIQUIS) 5 MG TABS tablet  #60  0 RF           Looks like this medication may have been written during ED visit

## 2021-02-11 NOTE — PROGRESS NOTES
CHIEF COMPLAINT  Chief Complaint   Patient presents with   Marta NEWSOME   Cheikh Roque is a 76 y.o. female who presents to the office for month checkup. Patient denies any new unusual fatigue or unexplained weight loss. No episodes of chest pain, tightness, palpitations or shortness of breath. No abdominal pain or discomfort. No nausea, vomiting, or diarrhea. Patient has ongoing chronic left lower leg swelling and pain. Patient also has reports of chronic shoulder pain which makes it difficult for her to buckle her seatbelt while driving. Patient reports  helping her with her medications. Patient was diagnosed with a DVT in December and placed on anticoagulation therapy. Patient reports that she has been off of it for approximately a month or more. Patient reports that the pharmacy never refilled it. Patient does not check blood sugar on a regular basis. Patient reports that she works 2 days a week and  helps her with her medications. No other complaints, modifying factors or associated symptoms. Nursing notes reviewed.    Past Medical History:   Diagnosis Date    Atrial fibrillation ([facility]     off coumadin after bleed, declined restart    Blood transfusion     CAD (coronary artery disease)     Diabetes mellitus type II     Diabetic neuropathy ([address] 2011    Gastric ulcer     H. pylori infection 2009    Hyperlipidemia     Hypertension     Numbness and tingling of left leg     Osteoarthritis     knees    Poor historian     PVD (peripheral vascular disease) ([facility]     PTA distal sfa/pop/peroneal, Dr. Becki Estrada 12/2015    Unspecified cerebral artery occlusion with cerebral infarction     TIA affected left eye    upper gi bleed 12/2009     Past Surgical History:   Procedure Laterality Date    ANGIOPLASTY  12/30/15    Dr. Becki Estrada, E, PTA of distal sfa/pop/peroneal    CARDIAC CATHETERIZATION  9/21/12    done for surgical clearance, cath was negative    CORONARY ANGIOPLASTY WITH STENT PLACEMENT  [id number]    FOOT SURGERY Left 01/11/2018    DEBRIDEMENT OF ULCERS LEFT FOOT AND LEG WITH GRAFT    OTHER SURGICAL HISTORY Left 06/05/2017    Left Femoral Peroneal Bypass    SHOULDER ARTHROPLASTY  10/5/12    RIGHT SHOULDER TOTAL ARTHROPLASTY, BICEPS TENODESIS DEPUY, GLOBAL ADVANTAGE AP     Family History   Problem Relation Age of Onset    Heart Disease Mother     Stroke Mother     Heart Disease Father     Diabetes Sister     Diabetes Brother     Diabetes Maternal Grandmother      Social History     Socioeconomic History    Marital status:      Spouse name: Not on file    Number of children: Not on file    Years of education: Not on file    Highest education level: Not on file   Occupational History    Not on file   Social Needs    Financial resource strain: Not on file    Food insecurity     Worry: Not on file     Inability: Not on file    Transportation needs     Medical: Not on file     Non-medical: Not on file   Tobacco Use    Smoking status: Never Smoker    Smokeless tobacco: Never Used    Tobacco comment: Not needed   Substance and Sexual Activity    Alcohol use: No    Drug use: No    Sexual activity: Yes     Partners: Male   Lifestyle    Physical activity     Days per week: Not on file     Minutes per session: Not on file    Stress: Not on file   Relationships    Social connections     Talks on phone: Not on file     Gets together: Not on file     Attends Yazidi service: Not on file     Active member of club or organization: Not on file     Attends meetings of clubs or organizations: Not on file     Relationship status: Not on file    Intimate partner violence     Fear of current or ex partner: Not on file     Emotionally abused: Not on file     Physically abused: Not on file     Forced sexual activity: Not on file   Other Topics Concern    Not on file   Social History Narrative    Not on file     Current Outpatient Medications Medication Sig Dispense Refill    metFORMIN (GLUCOPHAGE) 500 MG tablet Take 2 tablets by mouth daily (with breakfast) 180 tablet 1    aspirin 81 MG EC tablet Take 81 mg by mouth daily       apixaban (ELIQUIS) 5 MG TABS tablet TAKE 1 TABLET BY MOUTH TWO TIMES A  tablet 5    digoxin (LANOXIN) 125 MCG tablet Take 1 tablet daily. 90 tablet 5    amLODIPine (NORVASC) 10 MG tablet TAKE 1 TABLET BY MOUTH EVERY DAY 90 tablet 5    lisinopril (PRINIVIL;ZESTRIL) 20 MG tablet TAKE 1 TABLET BY MOUTH EVERY DAY 90 tablet 5    simvastatin (ZOCOR) 20 MG tablet TAKE 1 TABLET BY MOUTH EVERY DAY AT NIGHT 90 tablet 5     No current facility-administered medications for this visit. Allergies   Allergen Reactions    Pcn [Penicillins] Other (See Comments)     Unsure of reaction       REVIEW OF SYSTEMS  Review of Systems   Constitutional: Negative for activity change, appetite change, chills and fever. HENT: Negative for congestion, rhinorrhea and sore throat. Eyes: Negative for visual disturbance. Respiratory: Negative for cough, chest tightness, shortness of breath and wheezing. Cardiovascular: Positive for leg swelling. Negative for chest pain. Gastrointestinal: Negative for abdominal distention, abdominal pain, diarrhea, nausea and vomiting. Genitourinary: Negative for dysuria, frequency, hematuria and urgency. Musculoskeletal: Positive for arthralgias and joint swelling. Negative for myalgias. Skin: Positive for wound. Negative for rash. Neurological: Negative for dizziness, weakness, light-headedness, numbness and headaches. Psychiatric/Behavioral: Negative for self-injury and sleep disturbance. The patient is not nervous/anxious. PHYSICAL EXAM  BP (!) 166/65   Pulse 69   Temp 98.3 °F (36.8 °C) (Oral)   Wt 147 lb (66.7 kg)   SpO2 99%   BMI 24.46 kg/m²   Physical Exam  Vitals signs reviewed. Constitutional:       General: She is not in acute distress.      Appearance: Normal appearance. She is well-developed. She is not diaphoretic. HENT:      Head: Normocephalic and atraumatic. Right Ear: External ear normal.      Left Ear: External ear normal.      Nose: Nose normal.      Mouth/Throat:      Mouth: Mucous membranes are moist.      Pharynx: Oropharynx is clear. Eyes:      General:         Right eye: No discharge. Left eye: No discharge. Pupils: Pupils are equal, round, and reactive to light. Neck:      Musculoskeletal: Normal range of motion and neck supple. Vascular: No JVD. Cardiovascular:      Rate and Rhythm: Normal rate. Pulses: Normal pulses. Pulmonary:      Effort: Pulmonary effort is normal. No respiratory distress. Breath sounds: No stridor. No wheezing, rhonchi or rales. Chest:      Chest wall: No tenderness. Abdominal:      General: Bowel sounds are normal. There is no distension. Palpations: Abdomen is soft. Tenderness: There is no abdominal tenderness. There is no guarding. Musculoskeletal:         General: No swelling or deformity. Left knee: She exhibits decreased range of motion and swelling. Skin:     General: Skin is warm and dry. Capillary Refill: Capillary refill takes 2 to 3 seconds. Coloration: Skin is not pale. Findings: Bruising and lesion present. No rash. Neurological:      Mental Status: She is alert and oriented to person, place, and time. Psychiatric:         Mood and Affect: Mood normal.         Speech: Speech normal.         Behavior: Behavior normal.         Cognition and Memory: Memory is impaired. ASSESSMENT/PLAN:   1. Type 2 diabetes mellitus with diabetic polyneuropathy, without long-term current use of insulin (HCC)  Stable. Last A1c 6.0. Patient noncompliant with diet or checking blood sugars. Patient denies any intentional weight loss, fatigue, increase in thirst or urination. Patient has seen podiatry on a regular basis.   Patient encouraged to do eye exam.  Patient currently on Metformin 1000 mg daily. Continue with current treatment management. Labs ordered and pending we will follow-up with results. Follow-up with patient in 4 months, sooner for new or worsening symptoms.  - HEMOGLOBIN A1C  - COMPREHENSIVE METABOLIC PANEL  - CBC Auto Differential    2. Coronary artery disease involving native coronary artery of native heart without angina pectoris  Stable. Patient prescribed Zocor 20 mg and aspirin. Patient reports occasionally forgetting to take her medications. Patient noncompliant with diet. No adverse side effects or myalgias of statins. Patient does not see cardiology on a regular basis. Patient asymptomatic at this time. Continue current treatment management follow-up in 4 months, sooner for new or worsening symptoms. 3. Pure hypercholesterolemia  Stable  versus uncontrolled. Previous labs ordered for fasting lipid however patient is not fasting today. I did recommend her calling and scheduling appointment to have fasting lipids performed. Patient is noncompliant with diet monitoring, exercise. Patient encouraged to follow a healthy lifestyle. We will follow-up with lipid once we are able to get those labs. Patient verbalized and acknowledges. 4. Paroxysmal atrial fibrillation (HCC)  Stable. Patient prescribe digoxin 125 mcg daily. Patient denies any episodes of chest pain, tightness, palpitations or shortness of breath. Previous dig level stable. Continue with current treatment management follow-up in 4 months, sooner for new or worsening symptoms. 5. Noncompliance of patient with dietary regimen      6. Essential hypertension  Stable. Patient is prescribed lisinopril 10 mg and Norvasc 10 mg daily. She does occasionally forget to take her medications. Patient noncompliant with diet and exercise. Patient denies any episodes of chest pain, tightness, palpitations or shortness of breath.   Continue with current treatment management follow-up in 4 months, sooner for new or worsening symptoms. 7. PVD (peripheral vascular disease) (HCC)  Stable. Patient reports swelling to left lower extremity. Patient was diagnosed with DVT in December and placed on Eliquis. Patient reports that after that ran out she stopped taking it. Refills were sent to patient's pharmacy however she reports that they did not have it. We did call and verify that prescriptions were sent and received. Patient encouraged to restart anticoagulation therapy. I did order repeat ultrasound for further evaluation and comparison to previous ultrasound performed in December. Patient denies any calf pain or tenderness upon exam.  No erythematous areas noted. Patient aware of risks associated with DVTs, A. fib and not taking anticoagulation therapy and missing doses of medications. Follow-up in 4 months, sooner for new or worsening symptoms. 8. Deep venous thrombosis (DVT) of left peroneal vein, unspecified chronicity ([facility]  See above #7.  - US DUP LOWER EXTREMITY LEFT JONATHON; Future         The note was completed using Dragon voice recognition transcription. Every effort was made to ensure accuracy; however, inadvertent  transcription errors may be present despite my best efforts to edit errors.     Reji Mehta, MARVIN - CNP

## 2021-02-12 ENCOUNTER — OFFICE VISIT (OUTPATIENT)
Dept: FAMILY MEDICINE CLINIC | Age: 76
End: 2021-02-12
Payer: MEDICARE

## 2021-02-12 VITALS
TEMPERATURE: 98.3 F | SYSTOLIC BLOOD PRESSURE: 166 MMHG | WEIGHT: 147 LBS | BODY MASS INDEX: 24.46 KG/M2 | OXYGEN SATURATION: 99 % | DIASTOLIC BLOOD PRESSURE: 65 MMHG | HEART RATE: 69 BPM

## 2021-02-12 DIAGNOSIS — I48.0 PAROXYSMAL ATRIAL FIBRILLATION (HCC): ICD-10-CM

## 2021-02-12 DIAGNOSIS — I10 ESSENTIAL (PRIMARY) HYPERTENSION: ICD-10-CM

## 2021-02-12 DIAGNOSIS — E78.00 PURE HYPERCHOLESTEROLEMIA: ICD-10-CM

## 2021-02-12 DIAGNOSIS — I25.10 CORONARY ARTERY DISEASE INVOLVING NATIVE CORONARY ARTERY OF NATIVE HEART WITHOUT ANGINA PECTORIS: ICD-10-CM

## 2021-02-12 DIAGNOSIS — I73.9 PVD (PERIPHERAL VASCULAR DISEASE) (HCC): ICD-10-CM

## 2021-02-12 DIAGNOSIS — Z91.119 NONCOMPLIANCE OF PATIENT WITH DIETARY REGIMEN: ICD-10-CM

## 2021-02-12 DIAGNOSIS — I10 ESSENTIAL HYPERTENSION: ICD-10-CM

## 2021-02-12 DIAGNOSIS — I82.452 DEEP VENOUS THROMBOSIS (DVT) OF LEFT PERONEAL VEIN, UNSPECIFIED CHRONICITY (HCC): ICD-10-CM

## 2021-02-12 DIAGNOSIS — E11.42 TYPE 2 DIABETES MELLITUS WITH DIABETIC POLYNEUROPATHY, WITHOUT LONG-TERM CURRENT USE OF INSULIN (HCC): Primary | ICD-10-CM

## 2021-02-12 LAB
A/G RATIO: 1.3 (ref 1.1–2.2)
ALBUMIN SERPL-MCNC: 4.6 G/DL (ref 3.4–5)
ALP BLD-CCNC: 102 U/L (ref 40–129)
ALT SERPL-CCNC: 27 U/L (ref 10–40)
ANION GAP SERPL CALCULATED.3IONS-SCNC: 13 MMOL/L (ref 3–16)
AST SERPL-CCNC: 24 U/L (ref 15–37)
BASOPHILS ABSOLUTE: 0.1 K/UL (ref 0–0.2)
BASOPHILS RELATIVE PERCENT: 1 %
BILIRUB SERPL-MCNC: 0.7 MG/DL (ref 0–1)
BUN BLDV-MCNC: 32 MG/DL (ref 7–20)
CALCIUM SERPL-MCNC: 10 MG/DL (ref 8.3–10.6)
CHLORIDE BLD-SCNC: 105 MMOL/L (ref 99–110)
CO2: 22 MMOL/L (ref 21–32)
CREAT SERPL-MCNC: 1.2 MG/DL (ref 0.6–1.2)
EOSINOPHILS ABSOLUTE: 0.2 K/UL (ref 0–0.6)
EOSINOPHILS RELATIVE PERCENT: 3 %
GFR AFRICAN AMERICAN: 53
GFR NON-AFRICAN AMERICAN: 44
GLOBULIN: 3.6 G/DL
GLUCOSE BLD-MCNC: 136 MG/DL (ref 70–99)
HCT VFR BLD CALC: 31.1 % (ref 36–48)
HEMOGLOBIN: 10.2 G/DL (ref 12–16)
LYMPHOCYTES ABSOLUTE: 1.3 K/UL (ref 1–5.1)
LYMPHOCYTES RELATIVE PERCENT: 16.4 %
MCH RBC QN AUTO: 26 PG (ref 26–34)
MCHC RBC AUTO-ENTMCNC: 32.7 G/DL (ref 31–36)
MCV RBC AUTO: 79.6 FL (ref 80–100)
MONOCYTES ABSOLUTE: 0.5 K/UL (ref 0–1.3)
MONOCYTES RELATIVE PERCENT: 5.8 %
NEUTROPHILS ABSOLUTE: 5.8 K/UL (ref 1.7–7.7)
NEUTROPHILS RELATIVE PERCENT: 73.8 %
PDW BLD-RTO: 16.2 % (ref 12.4–15.4)
PLATELET # BLD: 190 K/UL (ref 135–450)
PMV BLD AUTO: 8.7 FL (ref 5–10.5)
POTASSIUM SERPL-SCNC: 4.7 MMOL/L (ref 3.5–5.1)
RBC # BLD: 3.91 M/UL (ref 4–5.2)
SODIUM BLD-SCNC: 140 MMOL/L (ref 136–145)
TOTAL PROTEIN: 8.2 G/DL (ref 6.4–8.2)
WBC # BLD: 7.8 K/UL (ref 4–11)

## 2021-02-12 PROCEDURE — 99214 OFFICE O/P EST MOD 30 MIN: CPT | Performed by: NURSE PRACTITIONER

## 2021-02-12 PROCEDURE — 36415 COLL VENOUS BLD VENIPUNCTURE: CPT | Performed by: NURSE PRACTITIONER

## 2021-02-12 RX ORDER — LISINOPRIL 20 MG/1
TABLET ORAL
Qty: 90 TABLET | Refills: 5 | Status: SHIPPED | OUTPATIENT
Start: 2021-02-12 | End: 2022-03-03 | Stop reason: SDUPTHER

## 2021-02-12 RX ORDER — AMLODIPINE BESYLATE 10 MG/1
TABLET ORAL
Qty: 90 TABLET | Refills: 5 | Status: SHIPPED | OUTPATIENT
Start: 2021-02-12 | End: 2022-03-03 | Stop reason: SDUPTHER

## 2021-02-12 RX ORDER — SIMVASTATIN 20 MG
TABLET ORAL
Qty: 90 TABLET | Refills: 5 | Status: SHIPPED | OUTPATIENT
Start: 2021-02-12 | End: 2022-03-03 | Stop reason: SDUPTHER

## 2021-02-12 RX ORDER — DIGOXIN 125 MCG
TABLET ORAL
Qty: 90 TABLET | Refills: 5 | Status: SHIPPED | OUTPATIENT
Start: 2021-02-12 | End: 2022-03-03 | Stop reason: SDUPTHER

## 2021-02-12 ASSESSMENT — ENCOUNTER SYMPTOMS
COUGH: 0
SORE THROAT: 0
DIARRHEA: 0
CHEST TIGHTNESS: 0
VOMITING: 0
RHINORRHEA: 0
WHEEZING: 0
SHORTNESS OF BREATH: 0
NAUSEA: 0
ABDOMINAL DISTENTION: 0
ABDOMINAL PAIN: 0

## 2021-02-12 NOTE — PATIENT INSTRUCTIONS
Please read the healthy family handout that you were given and share it with your family. Please compare this printed medication list with your medications at home to be sure they are the same. If you have any medications that are different please contact us immediately at [phone]. Also review your allergies that we have listed, these may also include medications that you have not been able to tolerate, make sure everything listed is correct. If you have any allergies that are different please contact us immediately at [phone]. Patient Education        Atrial Fibrillation: Care Instructions  Your Care Instructions     Atrial fibrillation is an irregular and often fast heartbeat. Treating this condition is important for several reasons. It can cause blood clots, which can travel from your heart to your brain and cause a stroke. If you have a fast heartbeat, you may feel lightheaded, dizzy, and weak. An irregular heartbeat can also increase your risk for heart failure. Atrial fibrillation is often the result of another heart condition, such as high blood pressure or coronary artery disease. Making changes to improve your heart condition will help you stay healthy and active. Follow-up care is a key part of your treatment and safety. Be sure to make and go to all appointments, and call your doctor if you are having problems. It's also a good idea to know your test results and keep a list of the medicines you take. How can you care for yourself at home? Medicines    · Take your medicines exactly as prescribed. Call your doctor if you think you are having a problem with your medicine. You will get more details on the specific medicines your doctor prescribes.     · If your doctor has given you a blood thinner to prevent a stroke, be sure you get instructions about how to take your medicine safely.  Blood thinners can cause serious bleeding problems.     · Do not take any vitamins, over-the-counter For example, call if:    · You have symptoms of a heart attack. These may include:  ? Chest pain or pressure, or a strange feeling in the chest.  ? Sweating. ? Shortness of breath. ? Nausea or vomiting. ? Pain, pressure, or a strange feeling in the back, neck, jaw, or upper belly or in one or both shoulders or arms. ? Lightheadedness or sudden weakness. ? A fast or irregular heartbeat. After you call 911, the  may tell you to chew 1 adult-strength or 2 to 4 low-dose aspirin. Wait for an ambulance. Do not try to drive yourself.     · You have symptoms of a stroke. These may include:  ? Sudden numbness, tingling, weakness, or loss of movement in your face, arm, or leg, especially on only one side of your body. ? Sudden vision changes. ? Sudden trouble speaking. ? Sudden confusion or trouble understanding simple statements. ? Sudden problems with walking or balance. ? A sudden, severe headache that is different from past headaches.     · You passed out (lost consciousness). Call your doctor now or seek immediate medical care if:    · You have new or increased shortness of breath.     · You feel dizzy or lightheaded, or you feel like you may faint.     · Your heart rate becomes irregular.     · You can feel your heart flutter in your chest or skip heartbeats. Tell your doctor if these symptoms are new or worse. Watch closely for changes in your health, and be sure to contact your doctor if you have any problems. Where can you learn more? Go to https://ScardspeRevaluate.Duxter. org and sign in to your Think Good Thoughts account. Enter U020 in the [employer] box to learn more about \"Atrial Fibrillation: Care Instructions. \"     If you do not have an account, please click on the \"Sign Up Now\" link. Current as of: December 16, 2019               Content Version: 12.6  © 1916-7600 Mbaobao, Incorporated. Care instructions adapted under license by [facility] ([facility]).  If you have questions about a medical condition or this instruction, always ask your healthcare professional. [address] any warranty or liability for your use of this information. Patient Education        Learning About Coronary Artery Disease (CAD)  What is coronary artery disease? Coronary artery disease (CAD) occurs when plaque builds up in the arteries that bring oxygen-rich blood to your heart. Plaque is a fatty substance made of cholesterol, calcium, and other substances in the blood. This process is called hardening of the arteries, or atherosclerosis. What happens when you have coronary artery disease? · Plaque may narrow the coronary arteries. Narrowed arteries cause poor blood flow. This can lead to angina symptoms such as chest pain or discomfort. If blood flow is completely blocked, you could have a heart attack. · You can slow CAD and reduce the risk of future problems by making changes in your lifestyle. These include quitting smoking and eating heart-healthy foods. · Treatments for CAD, along with changes in your lifestyle, can help you live a longer and healthier life. How can you prevent coronary artery disease? · Do not smoke. It may be the best thing you can do to prevent heart disease. If you need help quitting, talk to your doctor about stop-smoking programs and medicines. These can increase your chances of quitting for good. · Be active. Get at least 30 minutes of exercise on most days of the week. Walking is a good choice. You also may want to do other activities, such as running, swimming, cycling, or playing tennis or team sports. · Eat heart-healthy foods. Eat more fruits and vegetables and less foods that contain saturated and trans fats. Limit alcohol, sodium, and sweets. · Stay at a healthy weight. Lose weight if you need to. · Manage other health problems such as diabetes, high blood pressure, and high cholesterol. How is coronary artery disease treated?   · Your doctor will suggest that you make lifestyle changes. For example, your doctor may ask you to eat healthy foods, quit smoking, lose extra weight, and be more active. · You will have to take medicines. · Your doctor may suggest a procedure to open narrowed or blocked arteries. This is called angioplasty. Or your doctor may suggest using healthy blood vessels to create detours around narrowed or blocked arteries. This is called bypass surgery. Follow-up care is a key part of your treatment and safety. Be sure to make and go to all appointments, and call your doctor if you are having problems. It's also a good idea to know your test results and keep a list of the medicines you take. Where can you learn more? Go to https://MicropharmapeAct-On Softwareeweb."CollabRx, Inc.". org and sign in to your cocone account. Enter [phone] in the [employer]ital [employer] box to learn more about \"Learning About Coronary Artery Disease (CAD). \"     If you do not have an account, please click on the \"Sign Up Now\" link. Current as of: December 16, 2019               Content Version: 12.6  © 6873-7002 [employer]Plant. Care instructions adapted under license by [facility] ([facility]). If you have questions about a medical condition or this instruction, always ask your healthcare professional. [address] any warranty or liability for your use of this information. Patient Education        Learning About Meal Planning for Diabetes  Why plan your meals? Meal planning can be a key part of managing diabetes. Planning meals and snacks with the right balance of carbohydrate, protein, and fat can help you keep your blood sugar at the target level you set with your doctor. You don't have to eat special foods. You can eat what your family eats, including sweets once in a while. But you do have to pay attention to how often you eat and how much you eat of certain foods.   You may want to work with a dietitian or a certified diabetes educator. He or she can give you tips and meal ideas and can answer your questions about meal planning. This health professional can also help you reach a healthy weight if that is one of your goals. What plan is right for you? Your dietitian or diabetes educator may suggest that you start with the plate format or carbohydrate counting. The plate format  The plate format is a simple way to help you manage how you eat. You plan meals by learning how much space each food should take on a plate. Using the plate format helps you spread carbohydrate throughout the day. It can make it easier to keep your blood sugar level within your target range. It also helps you see if you're eating healthy portion sizes. To use the plate format, you put non-starchy vegetables on half your plate. Add meat or meat substitutes on one-quarter of the plate. Put a grain or starchy vegetable (such as brown rice or a potato) on the final quarter of the plate. You can add a small piece of fruit and some low-fat or fat-free milk or yogurt, depending on your carbohydrate goal for each meal.  Here are some tips for using the plate format:  · Make sure that you are not using an oversized plate. A 9-inch plate is best. Many restaurants use larger plates. · Get used to using the plate format at home. Then you can use it when you eat out. · Write down your questions about using the plate format. Talk to your doctor, a dietitian, or a diabetes educator about your concerns. Carbohydrate counting  With carbohydrate counting, you plan meals based on the amount of carbohydrate in each food. Carbohydrate raises blood sugar higher and more quickly than any other nutrient. It is found in desserts, breads and cereals, and fruit. It's also found in starchy vegetables such as potatoes and corn, grains such as rice and pasta, and milk and yogurt. Spreading carbohydrate throughout the day helps keep your blood sugar levels within your target range.   Your daily amount depends on several things, including your weight, how active you are, which diabetes medicines you take, and what your goals are for your blood sugar levels. A registered dietitian or diabetes educator can help you plan how much carbohydrate to include in each meal and snack. A guideline for your daily amount of carbohydrate is:  · 45 to 60 grams at each meal. That's about the same as 3 to 4 carbohydrate servings. · 15 to 20 grams at each snack. That's about the same as 1 carbohydrate serving. The Nutrition Facts label on packaged foods tells you how much carbohydrate is in a serving of the food. First, look at the serving size on the food label. Is that the amount you eat in a serving? All of the nutrition information on a food label is based on that serving size. So if you eat more or less than that, you'll need to adjust the other numbers. Total carbohydrate is the next thing you need to look for on the label. If you count carbohydrate servings, one serving of carbohydrate is 15 grams. For foods that don't come with labels, such as fresh fruits and vegetables, you'll need a guide that lists carbohydrate in these foods. Ask your doctor, dietitian, or diabetes educator about books or other nutrition guides you can use. If you take insulin, you need to know how many grams of carbohydrate are in a meal. This lets you know how much rapid-acting insulin to take before you eat. If you use an insulin pump, you get a constant rate of insulin during the day. So the pump must be programmed at meals to give you extra insulin to cover the rise in blood sugar after meals. When you know how much carbohydrate you will eat, you can take the right amount of insulin. Or, if you always use the same amount of insulin, you need to make sure that you eat the same amount of carbohydrate at meals.   If you need more help to understand carbohydrate counting and food labels, ask your doctor, dietitian, or diabetes educator. How do you get started with meal planning? Here are some tips to get started:  · Plan your meals a week at a time. Don't forget to include snacks too. · Use cookbooks or online recipes to plan several main meals. Plan some quick meals for busy nights. You also can double some recipes that freeze well. Then you can save half for other busy nights when you don't have time to cook. · Make sure you have the ingredients you need for your recipes. If you're running low on basic items, put these items on your shopping list too. · List foods that you use to make breakfasts, lunches, and snacks. List plenty of fruits and vegetables. · Post this list on the refrigerator. Add to it as you think of more things you need. · Take the list to the store to do your weekly shopping. Follow-up care is a key part of your treatment and safety. Be sure to make and go to all appointments, and call your doctor if you are having problems. It's also a good idea to know your test results and keep a list of the medicines you take. Where can you learn more? Go to https://Vital Insightpepiceweb.LogoGrab. org and sign in to your [employer] account. Enter F630 in the [facility] box to learn more about \"Learning About Meal Planning for Diabetes. \"     If you do not have an account, please click on the \"Sign Up Now\" link. Current as of: December 20, 2019               Content Version: 12.6  © 0332-7351 [employer]DACO, Incorporated. Care instructions adapted under license by [facility] ([facility]). If you have questions about a medical condition or this instruction, always ask your healthcare professional. [place] [address] any warranty or liability for your use of this information. Patient Education        DASH Diet: Care Instructions  Your Care Instructions     The DASH diet is an eating plan that can help lower your blood pressure. DASH stands for Dietary Approaches to Stop Hypertension.  Hypertension is high blood pressure. The DASH diet focuses on eating foods that are high in calcium, potassium, and magnesium. These nutrients can lower blood pressure. The foods that are highest in these nutrients are fruits, vegetables, low-fat dairy products, nuts, seeds, and legumes. But taking calcium, potassium, and magnesium supplements instead of eating foods that are high in those nutrients does not have the same effect. The DASH diet also includes whole grains, fish, and poultry. The DASH diet is one of several lifestyle changes your doctor may recommend to lower your high blood pressure. Your doctor may also want you to decrease the amount of sodium in your diet. Lowering sodium while following the DASH diet can lower blood pressure even further than just the DASH diet alone. Follow-up care is a key part of your treatment and safety. Be sure to make and go to all appointments, and call your doctor if you are having problems. It's also a good idea to know your test results and keep a list of the medicines you take. How can you care for yourself at home? Following the DASH diet  · Eat 4 to 5 servings of fruit each day. A serving is 1 medium-sized piece of fruit, ½ cup chopped or canned fruit, 1/4 cup dried fruit, or 4 ounces (½ cup) of fruit juice. Choose fruit more often than fruit juice. · Eat 4 to 5 servings of vegetables each day. A serving is 1 cup of lettuce or raw leafy vegetables, ½ cup of chopped or cooked vegetables, or 4 ounces (½ cup) of vegetable juice. Choose vegetables more often than vegetable juice. · Get 2 to 3 servings of low-fat and fat-free dairy each day. A serving is 8 ounces of milk, 1 cup of yogurt, or 1 ½ ounces of cheese. · Eat 6 to 8 servings of grains each day. A serving is 1 slice of bread, 1 ounce of dry cereal, or ½ cup of cooked rice, pasta, or cooked cereal. Try to choose whole-grain products as much as possible. · Limit lean meat, poultry, and fish to 2 servings each day.  A serving is 3 ounces, about the size of a deck of cards. · Eat 4 to 5 servings of nuts, seeds, and legumes (cooked dried beans, lentils, and split peas) each week. A serving is 1/3 cup of nuts, 2 tablespoons of seeds, or ½ cup of cooked beans or peas. · Limit fats and oils to 2 to 3 servings each day. A serving is 1 teaspoon of vegetable oil or 2 tablespoons of salad dressing. · Limit sweets and added sugars to 5 servings or less a week. A serving is 1 tablespoon jelly or jam, ½ cup sorbet, or 1 cup of lemonade. · Eat less than 2,300 milligrams (mg) of sodium a day. If you limit your sodium to 1,500 mg a day, you can lower your blood pressure even more. Tips for success  · Start small. Do not try to make dramatic changes to your diet all at once. You might feel that you are missing out on your favorite foods and then be more likely to not follow the plan. Make small changes, and stick with them. Once those changes become habit, add a few more changes. · Try some of the following:  ? Make it a goal to eat a fruit or vegetable at every meal and at snacks. This will make it easy to get the recommended amount of fruits and vegetables each day. ? Try yogurt topped with fruit and nuts for a snack or healthy dessert. ? Add lettuce, tomato, cucumber, and onion to sandwiches. ? Combine a ready-made pizza crust with low-fat mozzarella cheese and lots of vegetable toppings. Try using tomatoes, squash, spinach, broccoli, carrots, cauliflower, and onions. ? Have a variety of cut-up vegetables with a low-fat dip as an appetizer instead of chips and dip. ? Sprinkle sunflower seeds or chopped almonds over salads. Or try adding chopped walnuts or almonds to cooked vegetables. ? Try some vegetarian meals using beans and peas. Add garbanzo or kidney beans to salads. Make burritos and tacos with mashed enamorado beans or black beans. Where can you learn more? Go to https://kristina.[facility]. org and sign in to your MyChart account. Enter T105 in the [facility] box to learn more about \"DASH Diet: Care Instructions. \"     If you do not have an account, please click on the \"Sign Up Now\" link. Current as of: December 16, 2019               Content Version: 12.6  © 0602-4381 imeem, Incorporated. Care instructions adapted under license by [facility] ([facility]). If you have questions about a medical condition or this instruction, always ask your healthcare professional. [address] any warranty or liability for your use of this information. Patient Education        High Cholesterol: Care Instructions  Your Care Instructions     Cholesterol is a type of fat in your blood. It is needed for many body functions, such as making new cells. Cholesterol is made by your body. It also comes from food you eat. High cholesterol means that you have too much of the fat in your blood. This raises your risk of a heart attack and stroke. LDL and HDL are part of your total cholesterol. LDL is the \"bad\" cholesterol. High LDL can raise your risk for heart disease, heart attack, and stroke. HDL is the \"good\" cholesterol. It helps clear bad cholesterol from the body. High HDL is linked with a lower risk of heart disease, heart attack, and stroke. Your cholesterol levels help your doctor find out your risk for having a heart attack or stroke. You and your doctor can talk about whether you need to lower your risk and what treatment is best for you. A heart-healthy lifestyle along with medicines can help lower your cholesterol and your risk. The way you choose to lower your risk will depend on how high your risk is for heart attack and stroke. It will also depend on how you feel about taking medicines. Follow-up care is a key part of your treatment and safety. Be sure to make and go to all appointments, and call your doctor if you are having problems.  It's also a good idea to know your test results and keep a list of the medicines you take. How can you care for yourself at home? · Eat a variety of foods every day. Good choices include fruits, vegetables, whole grains (like oatmeal), dried beans and peas, nuts and seeds, soy products (like tofu), and fat-free or low-fat dairy products. · Replace butter, margarine, and hydrogenated or partially hydrogenated oils with olive and canola oils. (Canola oil margarine without trans fat is fine.)  · Replace red meat with fish, poultry, and soy protein (like tofu). · Limit processed and packaged foods like chips, crackers, and cookies. · Bake, broil, or steam foods. Don't colón them. · Be physically active. Get at least 30 minutes of exercise on most days of the week. Walking is a good choice. You also may want to do other activities, such as running, swimming, cycling, or playing tennis or team sports. · Stay at a healthy weight or lose weight by making the changes in eating and physical activity listed above. Losing just a small amount of weight, even 5 to 10 pounds, can reduce your risk for having a heart attack or stroke. · Do not smoke. When should you call for help? Watch closely for changes in your health, and be sure to contact your doctor if:    · You need help making lifestyle changes.     · You have questions about your medicine. Where can you learn more? Go to https://[place] Commandpeelgineb.CloudDock. org and sign in to your [employer] account. Enter E601 in the [facility] box to learn more about \"High Cholesterol: Care Instructions. \"     If you do not have an account, please click on the \"Sign Up Now\" link. Current as of: December 16, 2019               Content Version: 12.6  © 3269-9483 [employer]ellr[employer]yr [employer], Incorporated. Care instructions adapted under license by [facility] ([facility]).  If you have questions about a medical condition or this instruction, always ask your healthcare professional. [address] any warranty or liability for your use of this information.

## 2021-02-13 LAB
ESTIMATED AVERAGE GLUCOSE: 128.4 MG/DL
HBA1C MFR BLD: 6.1 %

## 2021-02-19 ENCOUNTER — HOSPITAL ENCOUNTER (OUTPATIENT)
Dept: VASCULAR LAB | Age: 76
Discharge: HOME | End: 2021-02-19
Payer: MEDICARE

## 2021-02-19 DIAGNOSIS — I82.452 DEEP VENOUS THROMBOSIS (DVT) OF LEFT PERONEAL VEIN, UNSPECIFIED CHRONICITY (HCC): ICD-10-CM

## 2021-02-19 PROCEDURE — 93971 EXTREMITY STUDY: CPT

## 2021-03-23 NOTE — TELEPHONE ENCOUNTER
Refilled medication per verbal order from provider.   Future appt scheduled 05/14/2021  Last appt 02/12/2021

## 2021-05-13 NOTE — PROGRESS NOTES
CHIEF COMPLAINT  Chief Complaint   Patient presents with    Check-Up     DM2        HPI   Jennifer Mcintosh is a 76 y.o. female who presents to the office checkup. Patient denies any recent changes medications or medical illness. No new unusual fatigue or unexplained weight loss. No episodes of dizziness, lightheadedness, chest pain or shortness of breath. No abdominal pain or discomfort. No nausea, vomiting, diarrhea. Patient continues to have left lower leg swelling. Patient reports that she is unable to elevate it because it makes her sciatica worse. Patient does not check blood sugar on a regular basis. Patient reports taking her medications as prescribed other than occasionally forgetting her aspirin. No other complaints, modifying factors or associated symptoms. Nursing notes reviewed.    Past Medical History:   Diagnosis Date    Atrial fibrillation ([facility] [address]     off coumadin after bleed, declined restart    Blood transfusion     CAD (coronary artery disease)     Diabetes mellitus type II     Diabetic neuropathy ([facility] [address] 2011    Gastric ulcer     H. pylori infection 2009    Hyperlipidemia     Hypertension     Numbness and tingling of left leg     Osteoarthritis     knees    Poor historian     PVD (peripheral vascular disease) ([facility] [address]     PTA distal sfa/pop/peroneal, Dr. Indigo Romero 12/2015    Unspecified cerebral artery occlusion with cerebral infarction     TIA affected left eye    upper gi bleed 12/2009     Past Surgical History:   Procedure Laterality Date    ANGIOPLASTY  12/30/15    Dr. Indigo Romero, E, PTA of distal sfa/pop/peroneal    CARDIAC CATHETERIZATION  9/21/12    done for surgical clearance, cath was negative    CORONARY ANGIOPLASTY WITH STENT PLACEMENT  9406,[id number]    FOOT SURGERY Left 01/11/2018    DEBRIDEMENT OF ULCERS LEFT FOOT AND LEG WITH GRAFT    OTHER SURGICAL HISTORY Left 06/05/2017    Left Femoral Peroneal Bypass    SHOULDER ARTHROPLASTY  10/5/12    RIGHT SHOULDER TOTAL ARTHROPLASTY, BICEPS TENODESIS OSIEL, GLOBAL ADVANTAGE AP     Family History   Problem Relation Age of Onset    Heart Disease Mother     Stroke Mother     Heart Disease Father     Diabetes Sister     Diabetes Brother     Diabetes Maternal Grandmother      Social History     Socioeconomic History    Marital status:      Spouse name: Not on file    Number of children: Not on file    Years of education: Not on file    Highest education level: Not on file   Occupational History    Not on file   Social Needs    Financial resource strain: Not on file    Food insecurity     Worry: Not on file     Inability: Not on file    Transportation needs     Medical: Not on file     Non-medical: Not on file   Tobacco Use    Smoking status: Never Smoker    Smokeless tobacco: Never Used    Tobacco comment: Not needed   Substance and Sexual Activity    Alcohol use: No    Drug use: No    Sexual activity: Yes     Partners: Male   Lifestyle    Physical activity     Days per week: Not on file     Minutes per session: Not on file    Stress: Not on file   Relationships    Social connections     Talks on phone: Not on file     Gets together: Not on file     Attends Shinto service: Not on file     Active member of club or organization: Not on file     Attends meetings of clubs or organizations: Not on file     Relationship status: Not on file    Intimate partner violence     Fear of current or ex partner: Not on file     Emotionally abused: Not on file     Physically abused: Not on file     Forced sexual activity: Not on file   Other Topics Concern    Not on file   Social History Narrative    Not on file     Current Outpatient Medications   Medication Sig Dispense Refill    metFORMIN (GLUCOPHAGE) 500 MG tablet TAKE 2 TABLETS BY MOUTH EVERY DAY WITH BREAKFAST 180 tablet 1    apixaban (ELIQUIS) 5 MG TABS tablet TAKE 1 TABLET BY MOUTH TWO TIMES A  tablet 5    digoxin (LANOXIN) 125 MCG tablet Take 1 Pharynx: Oropharynx is clear. No oropharyngeal exudate or posterior oropharyngeal erythema. Eyes:      General:         Right eye: No discharge. Left eye: No discharge. Pupils: Pupils are equal, round, and reactive to light. Neck:      Musculoskeletal: Normal range of motion and neck supple. Vascular: No JVD. Cardiovascular:      Rate and Rhythm: Normal rate and regular rhythm. Pulses: Normal pulses. Pulmonary:      Effort: Pulmonary effort is normal. No respiratory distress. Breath sounds: No stridor. No wheezing, rhonchi or rales. Chest:      Chest wall: No tenderness. Abdominal:      General: Bowel sounds are normal. There is no distension. Palpations: Abdomen is soft. Tenderness: There is no abdominal tenderness. There is no guarding. Musculoskeletal:      Right shoulder: She exhibits decreased range of motion and swelling. Left ankle: She exhibits swelling. Left lower leg: Edema present. Skin:     General: Skin is warm and dry. Capillary Refill: Capillary refill takes less than 2 seconds. Findings: Bruising present. No rash. Neurological:      Mental Status: She is alert and oriented to person, place, and time. Psychiatric:         Mood and Affect: Mood normal.         Behavior: Behavior normal.            ASSESSMENT/PLAN:   1. Type 2 diabetes mellitus with diabetic polyneuropathy, without long-term current use of insulin ([address]  Able. Previous A1c 6.1. Patient noncompliant with diet and checking blood sugars. Patient denies any unusual fatigue, unexplained weight loss, polyuria, polydipsia. Patient given referral for ophthalmology to have diabetic eye exam.  Patient currently on Metformin 1000 mg daily. Continue with current treatment management labs ordered and pending follow-up in 4 months, sooner for new or worsening symptoms.  - HEMOGLOBIN A1C  - AFL - Kathryn Triana MD, Ophthalmology, [facility]    2.  Pure hypercholesterolemia  Stable? Previous lipid performed 2019. Patient reports that she ate around noon therefore lipid panel was performed today. Patient has difficulty getting to earlier appointments. Patient encouraged to monitor dietary intake tinea with Zocor 20 mg daily. Follow-up in 6 months, sooner for new or worsening symptoms.  - LIPID PANEL    3. Colonoscopy refused  Colon Cancer screening discussed patient declines. 4. PVD (peripheral vascular disease) (HCC)  Stable versus uncontrolled. Patient continues to have left lower leg swelling. Patient diagnosed with DVT in February. Patient is on Eliquis and reports taking 5 mg twice a day. Patient denies any missed doses. Patient reports that she does take aspirin when she remembers. Patient wearing compression socks today. I did recommend her monitoring salt in her diet and being more compliant as well as elevating leg when able. Patient reports that she has difficulty elevating extremity because it exacerbates sciatica. Patient denies any pain with left lower extremity but reports feeling worsening numbness. I did recommend patient following up with vascular surgeon that she has seen in the past.  Patient verbalized and acknowledges referral placed and patient encouraged to call and schedule. - Leobardo Porter MD, Vascular Surgery, [facility]    5. Deep venous thrombosis (DVT) of left peroneal vein, unspecified chronicity ([facility] [address]  See above #4. - Leobardo Porter MD, Vascular Surgery, [facility]         The note was completed using Dragon voice recognition transcription. Every effort was made to ensure accuracy; however, inadvertent  transcription errors may be present despite my best efforts to edit errors.     Anna Umaña, MARVIN - CNP

## 2021-05-14 ENCOUNTER — OFFICE VISIT (OUTPATIENT)
Dept: FAMILY MEDICINE CLINIC | Age: 76
End: 2021-05-14
Payer: MEDICARE

## 2021-05-14 VITALS
TEMPERATURE: 98.3 F | HEART RATE: 53 BPM | DIASTOLIC BLOOD PRESSURE: 54 MMHG | OXYGEN SATURATION: 98 % | WEIGHT: 142 LBS | SYSTOLIC BLOOD PRESSURE: 151 MMHG | BODY MASS INDEX: 23.63 KG/M2

## 2021-05-14 DIAGNOSIS — E78.00 PURE HYPERCHOLESTEROLEMIA: ICD-10-CM

## 2021-05-14 DIAGNOSIS — E11.42 TYPE 2 DIABETES MELLITUS WITH DIABETIC POLYNEUROPATHY, WITHOUT LONG-TERM CURRENT USE OF INSULIN (HCC): Primary | ICD-10-CM

## 2021-05-14 DIAGNOSIS — I73.9 PVD (PERIPHERAL VASCULAR DISEASE) (HCC): ICD-10-CM

## 2021-05-14 DIAGNOSIS — I82.452 DEEP VENOUS THROMBOSIS (DVT) OF LEFT PERONEAL VEIN, UNSPECIFIED CHRONICITY (HCC): ICD-10-CM

## 2021-05-14 DIAGNOSIS — Z53.20 COLONOSCOPY REFUSED: ICD-10-CM

## 2021-05-14 PROCEDURE — 99214 OFFICE O/P EST MOD 30 MIN: CPT | Performed by: NURSE PRACTITIONER

## 2021-05-14 PROCEDURE — 1123F ACP DISCUSS/DSCN MKR DOCD: CPT | Performed by: NURSE PRACTITIONER

## 2021-05-14 PROCEDURE — 1090F PRES/ABSN URINE INCON ASSESS: CPT | Performed by: NURSE PRACTITIONER

## 2021-05-14 PROCEDURE — 3017F COLORECTAL CA SCREEN DOC REV: CPT | Performed by: NURSE PRACTITIONER

## 2021-05-14 PROCEDURE — 2022F DILAT RTA XM EVC RTNOPTHY: CPT | Performed by: NURSE PRACTITIONER

## 2021-05-14 PROCEDURE — 4040F PNEUMOC VAC/ADMIN/RCVD: CPT | Performed by: NURSE PRACTITIONER

## 2021-05-14 PROCEDURE — G8400 PT W/DXA NO RESULTS DOC: HCPCS | Performed by: NURSE PRACTITIONER

## 2021-05-14 PROCEDURE — 1036F TOBACCO NON-USER: CPT | Performed by: NURSE PRACTITIONER

## 2021-05-14 PROCEDURE — 36415 COLL VENOUS BLD VENIPUNCTURE: CPT | Performed by: NURSE PRACTITIONER

## 2021-05-14 PROCEDURE — G8420 CALC BMI NORM PARAMETERS: HCPCS | Performed by: NURSE PRACTITIONER

## 2021-05-14 PROCEDURE — 3044F HG A1C LEVEL LT 7.0%: CPT | Performed by: NURSE PRACTITIONER

## 2021-05-14 PROCEDURE — G8427 DOCREV CUR MEDS BY ELIG CLIN: HCPCS | Performed by: NURSE PRACTITIONER

## 2021-05-14 ASSESSMENT — ENCOUNTER SYMPTOMS
RHINORRHEA: 0
ABDOMINAL PAIN: 0
COUGH: 0
SORE THROAT: 0
VOMITING: 0
SHORTNESS OF BREATH: 0
WHEEZING: 0
BACK PAIN: 1
DIARRHEA: 0
CHEST TIGHTNESS: 0
ABDOMINAL DISTENTION: 0
NAUSEA: 0

## 2021-05-14 ASSESSMENT — PATIENT HEALTH QUESTIONNAIRE - PHQ9
SUM OF ALL RESPONSES TO PHQ QUESTIONS 1-9: 0
SUM OF ALL RESPONSES TO PHQ9 QUESTIONS 1 & 2: 0
SUM OF ALL RESPONSES TO PHQ QUESTIONS 1-9: 0

## 2021-05-15 LAB
CHOLESTEROL, TOTAL: 132 MG/DL (ref 0–199)
ESTIMATED AVERAGE GLUCOSE: 125.5 MG/DL
HBA1C MFR BLD: 6 %
HDLC SERPL-MCNC: 54 MG/DL (ref 40–60)
LDL CHOLESTEROL CALCULATED: 64 MG/DL
TRIGL SERPL-MCNC: 68 MG/DL (ref 0–150)
VLDLC SERPL CALC-MCNC: 14 MG/DL

## 2021-05-28 ENCOUNTER — OFFICE VISIT (OUTPATIENT)
Dept: VASCULAR SURGERY | Age: 76
End: 2021-05-28
Payer: MEDICARE

## 2021-05-28 VITALS
SYSTOLIC BLOOD PRESSURE: 142 MMHG | HEIGHT: 63 IN | BODY MASS INDEX: 25.16 KG/M2 | DIASTOLIC BLOOD PRESSURE: 68 MMHG | WEIGHT: 142 LBS

## 2021-05-28 DIAGNOSIS — I82.552 CHRONIC DEEP VEIN THROMBOSIS (DVT) OF LEFT PERONEAL VEIN (HCC): ICD-10-CM

## 2021-05-28 DIAGNOSIS — I73.9 PERIPHERAL VASCULAR DISEASE, UNSPECIFIED (HCC): Primary | ICD-10-CM

## 2021-05-28 DIAGNOSIS — I73.9 PVD (PERIPHERAL VASCULAR DISEASE) (HCC): Primary | ICD-10-CM

## 2021-05-28 PROCEDURE — G8427 DOCREV CUR MEDS BY ELIG CLIN: HCPCS | Performed by: SURGERY

## 2021-05-28 PROCEDURE — G8417 CALC BMI ABV UP PARAM F/U: HCPCS | Performed by: SURGERY

## 2021-05-28 PROCEDURE — 1090F PRES/ABSN URINE INCON ASSESS: CPT | Performed by: SURGERY

## 2021-05-28 PROCEDURE — 99203 OFFICE O/P NEW LOW 30 MIN: CPT | Performed by: SURGERY

## 2021-05-28 NOTE — PROGRESS NOTES
FOOT AND LEG WITH GRAFT    OTHER SURGICAL HISTORY Left 06/05/2017    Left Femoral Peroneal Bypass    SHOULDER ARTHROPLASTY  10/5/12    RIGHT SHOULDER TOTAL ARTHROPLASTY, BICEPS TENODESIS DEPUY, GLOBAL ADVANTAGE AP       Home Medications:   Prior to Admission medications    Medication Sig Start Date End Date Taking? Authorizing Provider   metFORMIN (GLUCOPHAGE) 500 MG tablet TAKE 2 TABLETS BY MOUTH EVERY DAY WITH BREAKFAST 3/23/21  Yes MARVIN Mcgregor CNP   apixaban (ELIQUIS) 5 MG TABS tablet TAKE 1 TABLET BY MOUTH TWO TIMES A DAY 2/12/21  Yes MARVIN Mcgregor CNP   digoxin (LANOXIN) 125 MCG tablet Take 1 tablet daily.  2/12/21  Yes MARVIN Mcgregor CNP   amLODIPine (NORVASC) 10 MG tablet TAKE 1 TABLET BY MOUTH EVERY DAY 2/12/21  Yes MARVIN Mcgregor CNP   lisinopril (PRINIVIL;ZESTRIL) 20 MG tablet TAKE 1 TABLET BY MOUTH EVERY DAY 2/12/21  Yes MARVIN Mcgregor CNP   simvastatin (ZOCOR) 20 MG tablet TAKE 1 TABLET BY MOUTH EVERY DAY AT NIGHT 2/12/21  Yes MARVIN Mcgregor CNP   aspirin 81 MG EC tablet Take 81 mg by mouth daily    Yes Historical Provider, MD        Allergies:  Pcn [penicillins]      Social History:      Social History     Socioeconomic History    Marital status:      Spouse name: Not on file    Number of children: Not on file    Years of education: Not on file    Highest education level: Not on file   Occupational History    Not on file   Tobacco Use    Smoking status: Never Smoker    Smokeless tobacco: Never Used    Tobacco comment: Not needed   Vaping Use    Vaping Use: Never used   Substance and Sexual Activity    Alcohol use: No    Drug use: No    Sexual activity: Yes     Partners: Male   Other Topics Concern    Not on file   Social History Narrative    Not on file     Social Determinants of Health     Financial Resource Strain:     Difficulty of Paying Living Expenses:    Food Insecurity:     Worried About Running Out of Food in the Last Year:     Ran Out of Food in the Last Year:    Transportation Needs:     Lack of Transportation (Medical):  Lack of Transportation (Non-Medical):    Physical Activity:     Days of Exercise per Week:     Minutes of Exercise per Session:    Stress:     Feeling of Stress :    Social Connections:     Frequency of Communication with Friends and Family:     Frequency of Social Gatherings with Friends and Family:     Attends Anabaptism Services:     Active Member of Clubs or Organizations:     Attends Club or Organization Meetings:     Marital Status:    Intimate Partner Violence:     Fear of Current or Ex-Partner:     Emotionally Abused:     Physically Abused:     Sexually Abused:        Family History:        Problem Relation Age of Onset    Heart Disease Mother     Stroke Mother     Heart Disease Father     Diabetes Sister     Diabetes Brother     Diabetes Maternal Grandmother        Review of Systems:  A 14 point review of systems was completed. Pertinent positives identified in the HPI, all other review of systems negative. Physical Examination:    BP (!) 142/68 (Site: Right Upper Arm)   Ht 5' 3\" (1.6 m)   Wt 142 lb (64.4 kg)   BMI 25.15 kg/m²     Weight: 142 lb (64.4 kg)       General appearance: NAD  Eyes: PERRLA  Neck: no JVD, no lymphadenopathy. Respiratory: effort is unlabored, no crackles, wheezes or rubs. Cardiovascular: regular, no murmur. No carotid bruits. Pulses: Palp graft pulse left medial knee. GI: abdomen soft, nondistended, no organomegaly. Musculoskeletal: strength and tone normal.  Extremities: warm and pink. Mild/moderate LLE edema. Skin: no dermatitis or ulceration. Neuro/psychiatric: grossly intact. MEDICAL DECISION MAKING/TESTING        Venous Duplex:    Impressions   Right Impression   There is no evidence of deep venous thrombosis involving the common femoral   vein.    Left Impression   There is acute totally occluding deep venous thrombosis involving the one   peroneal vein. There is no other evidence of deep or superficial venous thrombosis involving   the left lower extremity. Previous exam on 12/17/2020, acute deep venous thrombosis involving left   peroneal veins.       Conclusions        Summary        There is acute totally occluding deep venous thrombosis involving the one    left peroneal vein.    There is no evidence of deep venous thrombosis involving the right common    femoral vein. Assessment:      Diagnosis Orders   1. PVD (peripheral vascular disease) ([facility] [address]     2. Chronic deep vein thrombosis (DVT) of left peroneal vein (HCC)       Leg swelling more a consequence of prior bypass surgery than from isolated tibial vein DVT. DVT also more likely chronic and secondary to operation on peroneal artery which is immediately adjacent to peroneal veins. Patient needs to maintain anticoagulation for Afib in either case. Recommendations/Plan:  Continue anticoagulation. Support stockings daily for edema control. F/u graft duplex exam to evaluate bypass.        Tae Oliver MD, MD, FACS

## 2021-06-03 ENCOUNTER — HOSPITAL ENCOUNTER (EMERGENCY)
Age: 76
Discharge: HOME | End: 2021-06-03
Payer: MEDICARE

## 2021-06-03 ENCOUNTER — HOSPITAL ENCOUNTER (OUTPATIENT)
Dept: VASCULAR LAB | Age: 76
Discharge: HOME | End: 2021-06-03
Payer: MEDICARE

## 2021-06-03 VITALS
SYSTOLIC BLOOD PRESSURE: 150 MMHG | OXYGEN SATURATION: 97 % | DIASTOLIC BLOOD PRESSURE: 65 MMHG | TEMPERATURE: 98.2 F | HEART RATE: 55 BPM | RESPIRATION RATE: 16 BRPM

## 2021-06-03 DIAGNOSIS — S51.811A SKIN TEAR OF FOREARM WITHOUT COMPLICATION, RIGHT, INITIAL ENCOUNTER: Primary | ICD-10-CM

## 2021-06-03 DIAGNOSIS — I73.9 PERIPHERAL VASCULAR DISEASE, UNSPECIFIED (HCC): ICD-10-CM

## 2021-06-03 PROCEDURE — 99284 EMERGENCY DEPT VISIT MOD MDM: CPT

## 2021-06-03 PROCEDURE — 6370000000 HC RX 637 (ALT 250 FOR IP): Performed by: NURSE PRACTITIONER

## 2021-06-03 PROCEDURE — 12001 RPR S/N/AX/GEN/TRNK 2.5CM/<: CPT

## 2021-06-03 PROCEDURE — 90715 TDAP VACCINE 7 YRS/> IM: CPT | Performed by: NURSE PRACTITIONER

## 2021-06-03 PROCEDURE — 93926 LOWER EXTREMITY STUDY: CPT

## 2021-06-03 PROCEDURE — 6360000002 HC RX W HCPCS: Performed by: NURSE PRACTITIONER

## 2021-06-03 PROCEDURE — 90471 IMMUNIZATION ADMIN: CPT | Performed by: NURSE PRACTITIONER

## 2021-06-03 RX ADMIN — TETANUS TOXOID, REDUCED DIPHTHERIA TOXOID AND ACELLULAR PERTUSSIS VACCINE, ADSORBED 0.5 ML: 5; 2.5; 8; 8; 2.5 SUSPENSION INTRAMUSCULAR at 17:29

## 2021-06-03 RX ADMIN — MUPIROCIN: 20 OINTMENT TOPICAL at 17:29

## 2021-06-03 ASSESSMENT — ENCOUNTER SYMPTOMS
ABDOMINAL PAIN: 0
COLOR CHANGE: 0
RHINORRHEA: 0
SORE THROAT: 0
SHORTNESS OF BREATH: 0

## 2021-06-03 NOTE — ED PROVIDER NOTES
Evaluated by [id number] [facility] Provider          [facility] ED  EMERGENCY DEPARTMENT ENCOUNTER        Pt Name: Jennifer Rodriguez  MRN: [id number]  [place] 1945  Dateof evaluation: 6/3/2021  Provider: MARVIN Larios CNP  PCP: MARVIN Bishop CNP  ED Attending: No att. providers found    [address] [place] [address]       Chief Complaint   Patient presents with    Wound Check     pt sustained a skin tear last friday, seeking re-eval of wound to right forearm. HISTORY OF PRESENTILLNESS   (Location/Symptom, Timing/Onset, Context/Setting, Quality, Duration, Modifying Factors, Severity)  Note limiting factors. Jennifer Rodriguez is a 76 y.o. female for right skin tear. Onset was 1 week ago. Context includes patient reports that she had a skin tear to her right forearm 1 week ago. Patient reports that she was at a visit at the hospital today and decided to have her wound evaluated in the ER. Patient states that it does not appear to be healing. She denies any fever and is unsure of her tetanus status. Alleviating factors include nothing. Aggravating factors include nothing. Pain is 0/10. Nothing has been used for pain today. Nursing Notes were all reviewed and agreed with or any disagreements were addressed  in the HPI. REVIEW OF SYSTEMS    (2-9 systems for level 4, 10 or more for level 5)     Review of Systems   Constitutional: Negative for fever. HENT: Negative for congestion, rhinorrhea and sore throat. Respiratory: Negative for shortness of breath. Cardiovascular: Negative for chest pain. Gastrointestinal: Negative for abdominal pain. Genitourinary: Negative for decreased urine volume and difficulty urinating. Musculoskeletal: Negative for arthralgias and myalgias. Skin: Positive for wound. Negative for color change and rash. Neurological: Negative for dizziness and light-headedness. Psychiatric/Behavioral: Negative for agitation.    All other systems reviewed and are negative. Positives and Pertinent negatives as per HPI. Except as noted above in the ROS, all other systems were reviewed and negative. PAST MEDICAL HISTORY     Past Medical History:   Diagnosis Date    Atrial fibrillation ([facility] [address]     off coumadin after bleed, declined restart    Blood transfusion     CAD (coronary artery disease)     Diabetes mellitus type II     Diabetic neuropathy ([facility] [address] 2011    Gastric ulcer     H. pylori infection 2009    Hyperlipidemia     Hypertension     Numbness and tingling of left leg     Osteoarthritis     knees    Poor historian     PVD (peripheral vascular disease) ([facility] [address]     PTA distal sfa/pop/peroneal, Dr. Chris Hernandez 12/2015    Unspecified cerebral artery occlusion with cerebral infarction     TIA affected left eye    upper gi bleed 12/2009         SURGICAL HISTORY       Past Surgical History:   Procedure Laterality Date    ANGIOPLASTY  12/30/15    Dr. Chris Hernandez, LLE, PTA of distal sfa/pop/peroneal    CARDIAC CATHETERIZATION  9/21/12    done for surgical clearance, cath was negative    CORONARY ANGIOPLASTY WITH STENT PLACEMENT  [id number],[id number]    FOOT SURGERY Left 01/11/2018    DEBRIDEMENT OF ULCERS LEFT FOOT AND LEG WITH GRAFT    OTHER SURGICAL HISTORY Left 06/05/2017    Left Femoral Peroneal Bypass    SHOULDER ARTHROPLASTY  10/5/12    RIGHT SHOULDER TOTAL ARTHROPLASTY, BICEPS TENODESIS DEPUY, GLOBAL ADVANTAGE AP         CURRENT MEDICATIONS       Discharge Medication List as of 6/3/2021  5:30 PM      CONTINUE these medications which have NOT CHANGED    Details   metFORMIN (GLUCOPHAGE) 500 MG tablet TAKE 2 TABLETS BY MOUTH EVERY DAY WITH BREAKFAST, Disp-180 tablet, R-1Normal      apixaban (ELIQUIS) 5 MG TABS tablet TAKE 1 TABLET BY MOUTH TWO TIMES A DAY, Disp-180 tablet, R-5pcpNormal      digoxin (LANOXIN) 125 MCG tablet Take 1 tablet daily. , Disp-90 tablet, R-5Normal      amLODIPine (NORVASC) 10 MG tablet TAKE 1 TABLET BY MOUTH EVERY DAY, Disp-90 tablet, R-5Normal      lisinopril (PRINIVIL;ZESTRIL) 20 MG tablet TAKE 1 TABLET BY MOUTH EVERY DAY, Disp-90 tablet, R-5Normal      simvastatin (ZOCOR) 20 MG tablet TAKE 1 TABLET BY MOUTH EVERY DAY AT NIGHT, Disp-90 tablet, R-5Normal      aspirin 81 MG EC tablet Take 81 mg by mouth daily Historical Med               ALLERGIES     Pcn [penicillins]    FAMILY HISTORY       Family History   Problem Relation Age of Onset    Heart Disease Mother     Stroke Mother     Heart Disease Father     Diabetes Sister     Diabetes Brother     Diabetes Maternal Grandmother           SOCIAL HISTORY       Social History     Socioeconomic History    Marital status:      Spouse name: None    Number of children: None    Years of education: None    Highest education level: None   Occupational History    None   Tobacco Use    Smoking status: Never Smoker    Smokeless tobacco: Never Used    Tobacco comment: Not needed   Vaping Use    Vaping Use: Never used   Substance and Sexual Activity    Alcohol use: No    Drug use: No    Sexual activity: Yes     Partners: Male   Other Topics Concern    None   Social History Narrative    None     Social Determinants of Health     Financial Resource Strain:     Difficulty of Paying Living Expenses:    Food Insecurity:     Worried About Running Out of Food in the Last Year:     Ran Out of Food in the Last Year:    Transportation Needs:     Lack of Transportation (Medical):      Lack of Transportation (Non-Medical):    Physical Activity:     Days of Exercise per Week:     Minutes of Exercise per Session:    Stress:     Feeling of Stress :    Social Connections:     Frequency of Communication with Friends and Family:     Frequency of Social Gatherings with Friends and Family:     Attends Hoahaoism Services:     Active Member of Clubs or Organizations:     Attends Club or Organization Meetings:     Marital Status:    Intimate Partner Violence:     Fear of Current or Ex-Partner: Consent obtained:  Verbal    Consent given by:  Patient    Risks discussed:  Infection, pain, poor cosmetic result and poor wound healing    Alternatives discussed:  No treatment  Laceration details:     Location:  Shoulder/arm    Shoulder/arm location:  R lower arm    Wound length (cm): 2 cm triangular skin flap tear. Laceration depth: Superficial.  Repair type:     Repair type:  Complex  Pre-procedure details:     Preparation:  Patient was prepped and draped in usual sterile fashion  Exploration:     Wound exploration: wound explored through full range of motion    Treatment:     Debridement:  Minimal (1 cm discolored skin tag cut with scissors and removed.)    Scar revision: no           CRITICAL CARE TIME   N/A    CONSULTS:  None      EMERGENCYDEPARTMENT COURSE and DIFFERENTIAL DIAGNOSIS/MDM:   Vitals:    Vitals:    06/03/21 1556 06/03/21 1715   BP: (!) 165/63 (!) 150/65   Pulse: 52 55   Resp: 16 16   Temp: 98.2 °F (36.8 °C)    TempSrc: Oral    SpO2: 97% 97%       Patient was given the following medications:  Medications   mupirocin (BACTROBAN) 2 % ointment ( Topical Given 6/3/21 1729)   Tetanus-Diphth-Acell Pertussis (BOOSTRIX) injection 0.5 mL (0.5 mLs Intramuscular Given 6/3/21 1729)       Patient here today for a wound check. Patient reports that she caught her right arm on a door jam a week ago and has had a skin tear since. Patient was in the hospital today having other procedures done and thought that she would have her wound evaluated. She denies any fever and is unsure of her tetanus status. Patient is neurovascular intact her right hand. She reports that her dressing does not stay intact. On exam of her wound she does have a triangular skin flap that was avulsed. There was some necrotic tissue that was removed after the wound was cleaned. At this point it is too late for sutures and the patient was informed that it cannot be sewn clot at this time however it will heal by secondary intention.

## 2021-06-07 ENCOUNTER — TELEPHONE (OUTPATIENT)
Dept: VASCULAR SURGERY | Age: 76
End: 2021-06-07

## 2021-06-07 DIAGNOSIS — I73.9 PERIPHERAL VASCULAR DISEASE, UNSPECIFIED (HCC): Primary | ICD-10-CM

## 2021-09-16 NOTE — PROGRESS NOTES
CHIEF COMPLAINT  Chief Complaint   Patient presents with    Check-Up     DM2        HPI   Mariam Marin is a 68 y.o. female who presents to the office check up. She denies any new unusual fatigue or unexplained weight loss. No chest pain or shortness of breath. No abdominal pain, nausea, vomiting or diarrhea. Patient denies any recent cough or congestion. No numbness or tingling in extremities. No unilateral weakness. Patient reports taking her medications as prescribed. Patient reports that her  does help her with her medications. Patient reports that she did have a fall 2 months ago bending over to  a can off the road. Patient reports that they did call EMS however she declined care at that time. No head injury or loss of consciousness. No other complaints, modifying factors or associated symptoms. Nursing notes reviewed.    Past Medical History:   Diagnosis Date    Atrial fibrillation ([facility]     off coumadin after bleed, declined restart    Blood transfusion     CAD (coronary artery disease)     Diabetes mellitus type II     Diabetic neuropathy ([facility] [address] [facility] 2011    Gastric ulcer     H. pylori infection 2009    Hyperlipidemia     Hypertension     Numbness and tingling of left leg     Osteoarthritis     knees    Poor historian     PVD (peripheral vascular disease) ([facility] [address] [facility]     PTA distal sfa/pop/peroneal, Dr. Erika Vasques 12/2015    Unspecified cerebral artery occlusion with cerebral infarction     TIA affected left eye    upper gi bleed 12/2009     Past Surgical History:   Procedure Laterality Date    ANGIOPLASTY  12/30/15    Dr. Erika Vasques, [facility], PTA of distal sfa/pop/peroneal    CARDIAC CATHETERIZATION  9/21/12    done for surgical clearance, cath was negative    CORONARY ANGIOPLASTY WITH STENT PLACEMENT  0177,9247    FOOT SURGERY Left 01/11/2018    DEBRIDEMENT OF ULCERS LEFT FOOT AND LEG WITH GRAFT    OTHER SURGICAL HISTORY Left 06/05/2017    Left Femoral Peroneal Bypass    SHOULDER ARTHROPLASTY  10/5/12    RIGHT SHOULDER TOTAL ARTHROPLASTY, BICEPS TENODESIS DEPUY, GLOBAL ADVANTAGE AP     Family History   Problem Relation Age of Onset    Heart Disease Mother     Stroke Mother     Heart Disease Father     Diabetes Sister     Diabetes Brother     Diabetes Maternal Grandmother      Social History     Socioeconomic History    Marital status:      Spouse name: Not on file    Number of children: Not on file    Years of education: Not on file    Highest education level: Not on file   Occupational History    Not on file   Tobacco Use    Smoking status: Never Smoker    Smokeless tobacco: Never Used    Tobacco comment: Not needed   Vaping Use    Vaping Use: Never used   Substance and Sexual Activity    Alcohol use: No    Drug use: No    Sexual activity: Yes     Partners: Male   Other Topics Concern    Not on file   Social History Narrative    Not on file     Social Determinants of Health     Financial Resource Strain:     Difficulty of Paying Living Expenses:    Food Insecurity:     Worried About Running Out of Food in the Last Year:     Ran Out of Food in the Last Year:    Transportation Needs:     Lack of Transportation (Medical):      Lack of Transportation (Non-Medical):    Physical Activity:     Days of Exercise per Week:     Minutes of Exercise per Session:    Stress:     Feeling of Stress :    Social Connections:     Frequency of Communication with Friends and Family:     Frequency of Social Gatherings with Friends and Family:     Attends Hinduism Services:     Active Member of Clubs or Organizations:     Attends Club or Organization Meetings:     Marital Status:    Intimate Partner Violence:     Fear of Current or Ex-Partner:     Emotionally Abused:     Physically Abused:     Sexually Abused:      Current Outpatient Medications   Medication Sig Dispense Refill    metFORMIN (GLUCOPHAGE) 500 MG tablet TAKE 2 TABLETS BY MOUTH EVERY DAY WITH BREAKFAST 180 tablet 1    apixaban (ELIQUIS) 5 MG TABS tablet TAKE 1 TABLET BY MOUTH TWO TIMES A  tablet 5    digoxin (LANOXIN) 125 MCG tablet Take 1 tablet daily. 90 tablet 5    amLODIPine (NORVASC) 10 MG tablet TAKE 1 TABLET BY MOUTH EVERY DAY 90 tablet 5    lisinopril (PRINIVIL;ZESTRIL) 20 MG tablet TAKE 1 TABLET BY MOUTH EVERY DAY 90 tablet 5    simvastatin (ZOCOR) 20 MG tablet TAKE 1 TABLET BY MOUTH EVERY DAY AT NIGHT 90 tablet 5    aspirin 81 MG EC tablet Take 81 mg by mouth daily        No current facility-administered medications for this visit. Allergies   Allergen Reactions    Pcn [Penicillins] Other (See Comments)     Unsure of reaction       REVIEW OF SYSTEMS  Review of Systems   Constitutional: Negative for activity change, appetite change, chills and fever. HENT: Negative for congestion, rhinorrhea and sore throat. Eyes: Negative for visual disturbance. Respiratory: Negative for cough, chest tightness, shortness of breath and wheezing. Cardiovascular: Positive for leg swelling. Negative for chest pain. Gastrointestinal: Negative for abdominal distention, abdominal pain, diarrhea, nausea and vomiting. Genitourinary: Negative for dysuria, frequency, hematuria and urgency. Musculoskeletal: Positive for arthralgias, back pain and gait problem. Negative for myalgias. Skin: Negative for rash and wound. Neurological: Negative for dizziness, weakness, light-headedness, numbness and headaches. Hematological: Bruises/bleeds easily. Psychiatric/Behavioral: Negative for self-injury and sleep disturbance. The patient is not nervous/anxious. PHYSICAL EXAM  /60   Pulse (!) 45   Temp 98.7 °F (37.1 °C) (Oral)   Wt 131 lb 2 oz (59.5 kg)   SpO2 99%   BMI 23.23 kg/m²   Physical Exam  Vitals reviewed. Constitutional:       General: She is not in acute distress. Appearance: Normal appearance. She is well-developed. She is not diaphoretic.    HENT:      Head: worsening symptoms.  - HEMOGLOBIN A1C    2. Paroxysmal atrial fibrillation (HCC)  Stable. Patient compliant with digoxin 125 mcg daily. No episodes of dizziness, lightheadedness, chest pain or shortness of breath. Labs ordered and pending. Continue with current treatment management follow-up in 4 months, sooner for new or worsening symptoms.  - DIGOXIN LEVEL    3. PVD (peripheral vascular disease) (HCC)  Stable. Patient has followed with Dr. Gricelda Meas recently. Patient compliant with Eliquis daily. Further change in treatment plan. Patient denies any new or worsening swelling, pain in lower extremities. Patient continues to not wear compression stockings. We did discuss the importance of wearing the compression stockings and continue with current dosing of medication management. Follow-up in 4 to 6 months, sooner for new or worsening symptoms. 4. Pure hypercholesterolemia  Stable. Compliant with simvastatin  milligrams daily. Patient educated on the importance of diet. Continue with current treatment management follow-up in 6 months, sooner for new or worsening symptoms. 5. Coronary artery disease involving native coronary artery of native heart without angina pectoris  Stable.  Patient prescribed Zocor 20 mg, lisinopril 20 mg, amlodipine 10 mg, and aspirin 81 mg daily.  Patient reports occasionally forgetting to take her medications.  Patient noncompliant with diet.  No adverse side effects or myalgias of statins.  Patient does not see cardiology on a regular basis.  Patient asymptomatic at this time.  Continue current treatment management follow-up in 4 months, sooner for new or worsening symptoms. 6. Essential hypertension  Stable. Compliant with Norvasc 10 mg and lisinopril 20 mg daily. No episodes of dizziness, lightheadedness, chest pain or shortness breath.   Continue with current treatment management follow-up in 4 to 6 months, sooner for new or worsening symptoms.  - COMPREHENSIVE METABOLIC PANEL  - CBC Auto Differential    7. Vitamin D deficiency  Stable. Labs ordered and pending. We will follow-up with results. No current use of supplements at this time. - VITAMIN D 25 HYDROXY    8. Colonoscopy refused  Colon Cancer screening discussed patient declines. 9. At high risk for falls  Patient reports that approximately 2 months ago she fell bending over to  a can off the side of the road. Patient reports that she did cut herself and was bleeding. Patient reports that standards call 911 however declined care at the scene. Patient denies any loss of consciousness. We did discuss the importance of being evaluated properly due to anticoagulation therapy. Patient's tetanus is up-to-date. On the basis of positive falls risk screening, assessment and plan is as follows: home safety tips provided. The note was completed using Dragon voice recognition transcription. Every effort was made to ensure accuracy; however, inadvertent  transcription errors may be present despite my best efforts to edit errors.     MARVIN Arias - CNP

## 2021-09-17 ENCOUNTER — OFFICE VISIT (OUTPATIENT)
Dept: FAMILY MEDICINE CLINIC | Age: 76
End: 2021-09-17
Payer: MEDICARE

## 2021-09-17 VITALS
TEMPERATURE: 98.7 F | HEART RATE: 45 BPM | WEIGHT: 131.13 LBS | OXYGEN SATURATION: 99 % | SYSTOLIC BLOOD PRESSURE: 137 MMHG | DIASTOLIC BLOOD PRESSURE: 60 MMHG | BODY MASS INDEX: 23.23 KG/M2

## 2021-09-17 DIAGNOSIS — I10 ESSENTIAL HYPERTENSION: ICD-10-CM

## 2021-09-17 DIAGNOSIS — E78.00 PURE HYPERCHOLESTEROLEMIA: ICD-10-CM

## 2021-09-17 DIAGNOSIS — I48.0 PAROXYSMAL ATRIAL FIBRILLATION (HCC): ICD-10-CM

## 2021-09-17 DIAGNOSIS — Z91.81 AT HIGH RISK FOR FALLS: ICD-10-CM

## 2021-09-17 DIAGNOSIS — E55.9 VITAMIN D DEFICIENCY: ICD-10-CM

## 2021-09-17 DIAGNOSIS — I25.10 CORONARY ARTERY DISEASE INVOLVING NATIVE CORONARY ARTERY OF NATIVE HEART WITHOUT ANGINA PECTORIS: ICD-10-CM

## 2021-09-17 DIAGNOSIS — Z91.119 NONCOMPLIANCE OF PATIENT WITH DIETARY REGIMEN: ICD-10-CM

## 2021-09-17 DIAGNOSIS — Z53.20 COLONOSCOPY REFUSED: ICD-10-CM

## 2021-09-17 DIAGNOSIS — E11.42 TYPE 2 DIABETES MELLITUS WITH DIABETIC POLYNEUROPATHY, WITHOUT LONG-TERM CURRENT USE OF INSULIN (HCC): Primary | ICD-10-CM

## 2021-09-17 DIAGNOSIS — I73.9 PVD (PERIPHERAL VASCULAR DISEASE) (HCC): ICD-10-CM

## 2021-09-17 PROCEDURE — G8420 CALC BMI NORM PARAMETERS: HCPCS | Performed by: NURSE PRACTITIONER

## 2021-09-17 PROCEDURE — G8427 DOCREV CUR MEDS BY ELIG CLIN: HCPCS | Performed by: NURSE PRACTITIONER

## 2021-09-17 PROCEDURE — 36415 COLL VENOUS BLD VENIPUNCTURE: CPT | Performed by: NURSE PRACTITIONER

## 2021-09-17 PROCEDURE — 1123F ACP DISCUSS/DSCN MKR DOCD: CPT | Performed by: NURSE PRACTITIONER

## 2021-09-17 PROCEDURE — 99214 OFFICE O/P EST MOD 30 MIN: CPT | Performed by: NURSE PRACTITIONER

## 2021-09-17 PROCEDURE — 1036F TOBACCO NON-USER: CPT | Performed by: NURSE PRACTITIONER

## 2021-09-17 PROCEDURE — 4040F PNEUMOC VAC/ADMIN/RCVD: CPT | Performed by: NURSE PRACTITIONER

## 2021-09-17 PROCEDURE — 1090F PRES/ABSN URINE INCON ASSESS: CPT | Performed by: NURSE PRACTITIONER

## 2021-09-17 PROCEDURE — G8400 PT W/DXA NO RESULTS DOC: HCPCS | Performed by: NURSE PRACTITIONER

## 2021-09-17 ASSESSMENT — ENCOUNTER SYMPTOMS
VOMITING: 0
SORE THROAT: 0
ABDOMINAL PAIN: 0
DIARRHEA: 0
ABDOMINAL DISTENTION: 0
CHEST TIGHTNESS: 0
COUGH: 0
RHINORRHEA: 0
NAUSEA: 0
SHORTNESS OF BREATH: 0
WHEEZING: 0
BACK PAIN: 1

## 2021-09-17 NOTE — PATIENT INSTRUCTIONS
Please read the healthy family handout that you were given and share it with your family. Please compare this printed medication list with your medications at home to be sure they are the same. If you have any medications that are different please contact us immediately at [phone]. Also review your allergies that we have listed, these may also include medications that you have not been able to tolerate, make sure everything listed is correct. If you have any allergies that are different please contact us immediately at [phone].

## 2021-09-18 LAB
A/G RATIO: 1.4 (ref 1.1–2.2)
ALBUMIN SERPL-MCNC: 4.5 G/DL (ref 3.4–5)
ALP BLD-CCNC: 89 U/L (ref 40–129)
ALT SERPL-CCNC: 16 U/L (ref 10–40)
ANION GAP SERPL CALCULATED.3IONS-SCNC: 21 MMOL/L (ref 3–16)
AST SERPL-CCNC: <5 U/L (ref 15–37)
BASOPHILS ABSOLUTE: 0.1 K/UL (ref 0–0.2)
BASOPHILS RELATIVE PERCENT: 1.5 %
BILIRUB SERPL-MCNC: 0.5 MG/DL (ref 0–1)
BUN BLDV-MCNC: 29 MG/DL (ref 7–20)
CALCIUM SERPL-MCNC: 9.8 MG/DL (ref 8.3–10.6)
CHLORIDE BLD-SCNC: 104 MMOL/L (ref 99–110)
CO2: 15 MMOL/L (ref 21–32)
CREAT SERPL-MCNC: 1.2 MG/DL (ref 0.6–1.2)
DIGOXIN LEVEL: 0.9 NG/ML (ref 0.8–2)
EOSINOPHILS ABSOLUTE: 0.3 K/UL (ref 0–0.6)
EOSINOPHILS RELATIVE PERCENT: 3.8 %
ESTIMATED AVERAGE GLUCOSE: 116.9 MG/DL
GFR AFRICAN AMERICAN: 53
GFR NON-AFRICAN AMERICAN: 44
GLOBULIN: 3.2 G/DL
GLUCOSE BLD-MCNC: 153 MG/DL (ref 70–99)
HBA1C MFR BLD: 5.7 %
HCT VFR BLD CALC: 31.2 % (ref 36–48)
HEMOGLOBIN: 9.9 G/DL (ref 12–16)
LYMPHOCYTES ABSOLUTE: 1.7 K/UL (ref 1–5.1)
LYMPHOCYTES RELATIVE PERCENT: 26.1 %
MCH RBC QN AUTO: 25.9 PG (ref 26–34)
MCHC RBC AUTO-ENTMCNC: 31.9 G/DL (ref 31–36)
MCV RBC AUTO: 81.3 FL (ref 80–100)
MONOCYTES ABSOLUTE: 0.3 K/UL (ref 0–1.3)
MONOCYTES RELATIVE PERCENT: 5.2 %
NEUTROPHILS ABSOLUTE: 4.2 K/UL (ref 1.7–7.7)
NEUTROPHILS RELATIVE PERCENT: 63.4 %
PDW BLD-RTO: 16.1 % (ref 12.4–15.4)
PLATELET # BLD: 192 K/UL (ref 135–450)
PMV BLD AUTO: 8.1 FL (ref 5–10.5)
POTASSIUM SERPL-SCNC: 4.7 MMOL/L (ref 3.5–5.1)
RBC # BLD: 3.84 M/UL (ref 4–5.2)
SODIUM BLD-SCNC: 140 MMOL/L (ref 136–145)
TOTAL PROTEIN: 7.7 G/DL (ref 6.4–8.2)
VITAMIN D 25-HYDROXY: 13.3 NG/ML
WBC # BLD: 6.7 K/UL (ref 4–11)
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
## 2022-01-28 ENCOUNTER — OFFICE VISIT (OUTPATIENT)
Dept: FAMILY MEDICINE CLINIC | Age: 77
End: 2022-01-28
Payer: MEDICARE

## 2022-01-28 VITALS
SYSTOLIC BLOOD PRESSURE: 151 MMHG | OXYGEN SATURATION: 99 % | TEMPERATURE: 97.8 F | DIASTOLIC BLOOD PRESSURE: 69 MMHG | HEART RATE: 70 BPM | WEIGHT: 135 LBS | BODY MASS INDEX: 23.91 KG/M2

## 2022-01-28 DIAGNOSIS — E78.00 PURE HYPERCHOLESTEROLEMIA: ICD-10-CM

## 2022-01-28 DIAGNOSIS — I48.0 PAROXYSMAL ATRIAL FIBRILLATION (HCC): ICD-10-CM

## 2022-01-28 DIAGNOSIS — M19.90 OSTEOARTHRITIS, UNSPECIFIED OSTEOARTHRITIS TYPE, UNSPECIFIED SITE: ICD-10-CM

## 2022-01-28 DIAGNOSIS — Z79.01 CHRONIC ANTICOAGULATION: ICD-10-CM

## 2022-01-28 DIAGNOSIS — E11.42 TYPE 2 DIABETES MELLITUS WITH DIABETIC POLYNEUROPATHY, WITHOUT LONG-TERM CURRENT USE OF INSULIN (HCC): Primary | ICD-10-CM

## 2022-01-28 DIAGNOSIS — I10 ESSENTIAL HYPERTENSION: ICD-10-CM

## 2022-01-28 DIAGNOSIS — Z53.20 COLONOSCOPY REFUSED: ICD-10-CM

## 2022-01-28 DIAGNOSIS — T14.8XXA WOUND OF SKIN: ICD-10-CM

## 2022-01-28 DIAGNOSIS — I82.552 CHRONIC DEEP VEIN THROMBOSIS (DVT) OF LEFT PERONEAL VEIN (HCC): ICD-10-CM

## 2022-01-28 LAB — HBA1C MFR BLD: 6.1 %

## 2022-01-28 PROCEDURE — 83036 HEMOGLOBIN GLYCOSYLATED A1C: CPT | Performed by: NURSE PRACTITIONER

## 2022-01-28 PROCEDURE — 4040F PNEUMOC VAC/ADMIN/RCVD: CPT | Performed by: NURSE PRACTITIONER

## 2022-01-28 PROCEDURE — 1090F PRES/ABSN URINE INCON ASSESS: CPT | Performed by: NURSE PRACTITIONER

## 2022-01-28 PROCEDURE — G8484 FLU IMMUNIZE NO ADMIN: HCPCS | Performed by: NURSE PRACTITIONER

## 2022-01-28 PROCEDURE — G8427 DOCREV CUR MEDS BY ELIG CLIN: HCPCS | Performed by: NURSE PRACTITIONER

## 2022-01-28 PROCEDURE — 99214 OFFICE O/P EST MOD 30 MIN: CPT | Performed by: NURSE PRACTITIONER

## 2022-01-28 PROCEDURE — G8420 CALC BMI NORM PARAMETERS: HCPCS | Performed by: NURSE PRACTITIONER

## 2022-01-28 PROCEDURE — 1036F TOBACCO NON-USER: CPT | Performed by: NURSE PRACTITIONER

## 2022-01-28 PROCEDURE — G8400 PT W/DXA NO RESULTS DOC: HCPCS | Performed by: NURSE PRACTITIONER

## 2022-01-28 PROCEDURE — 1123F ACP DISCUSS/DSCN MKR DOCD: CPT | Performed by: NURSE PRACTITIONER

## 2022-01-28 ASSESSMENT — ENCOUNTER SYMPTOMS
SHORTNESS OF BREATH: 0
NAUSEA: 0
SORE THROAT: 0
WHEEZING: 0
CHEST TIGHTNESS: 0
DIARRHEA: 0
RHINORRHEA: 0
ABDOMINAL DISTENTION: 0
BACK PAIN: 1
ABDOMINAL PAIN: 0
COUGH: 0
VOMITING: 0

## 2022-01-28 NOTE — PATIENT INSTRUCTIONS
Please read the healthy family handout that you were given and share it with your family. Please compare this printed medication list with your medications at home to be sure they are the same. If you have any medications that are different please contact us immediately at [phone]. Also review your allergies that we have listed, these may also include medications that you have not been able to tolerate, make sure everything listed is correct. If you have any allergies that are different please contact us immediately at [phone].

## 2022-02-23 ENCOUNTER — TELEPHONE (OUTPATIENT)
Dept: FAMILY MEDICINE CLINIC | Age: 77
End: 2022-02-23

## 2022-02-23 NOTE — LETTER
[address]  [address] [place] [address]  Phone: [phone]  Fax: [phone]    MARVIN Dang CNP         February 23, 2022     Patient: Lacie Davis   YOB: 1945   Date of Visit: 2/23/2022       To Whom It May Concern: It is my medical opinion that Philippe Montesinos requires a disability parking placard for the following reasons:  She cannot walk 200 feet without stopping to rest.  Duration of need: 5 years. If you have any questions or concerns, please don't hesitate to call.     Sincerely,        MARVIN Dang CNP

## 2022-03-03 ENCOUNTER — OFFICE VISIT (OUTPATIENT)
Dept: FAMILY MEDICINE CLINIC | Age: 77
End: 2022-03-03
Payer: MEDICARE

## 2022-03-03 VITALS
HEART RATE: 64 BPM | TEMPERATURE: 97.7 F | DIASTOLIC BLOOD PRESSURE: 67 MMHG | SYSTOLIC BLOOD PRESSURE: 178 MMHG | WEIGHT: 128.25 LBS | BODY MASS INDEX: 22.72 KG/M2 | OXYGEN SATURATION: 98 %

## 2022-03-03 DIAGNOSIS — I48.0 PAROXYSMAL ATRIAL FIBRILLATION (HCC): ICD-10-CM

## 2022-03-03 DIAGNOSIS — I10 ESSENTIAL HYPERTENSION: ICD-10-CM

## 2022-03-03 DIAGNOSIS — G62.9 NEUROPATHY: ICD-10-CM

## 2022-03-03 DIAGNOSIS — I10 ESSENTIAL (PRIMARY) HYPERTENSION: ICD-10-CM

## 2022-03-03 DIAGNOSIS — L03.115 CELLULITIS OF RIGHT LOWER EXTREMITY: ICD-10-CM

## 2022-03-03 DIAGNOSIS — M79.672 PAIN IN BOTH FEET: Primary | ICD-10-CM

## 2022-03-03 DIAGNOSIS — M79.671 PAIN IN BOTH FEET: Primary | ICD-10-CM

## 2022-03-03 DIAGNOSIS — Z76.0 MEDICATION REFILL: ICD-10-CM

## 2022-03-03 PROCEDURE — 1090F PRES/ABSN URINE INCON ASSESS: CPT | Performed by: NURSE PRACTITIONER

## 2022-03-03 PROCEDURE — G8420 CALC BMI NORM PARAMETERS: HCPCS | Performed by: NURSE PRACTITIONER

## 2022-03-03 PROCEDURE — G8427 DOCREV CUR MEDS BY ELIG CLIN: HCPCS | Performed by: NURSE PRACTITIONER

## 2022-03-03 PROCEDURE — 4040F PNEUMOC VAC/ADMIN/RCVD: CPT | Performed by: NURSE PRACTITIONER

## 2022-03-03 PROCEDURE — G8484 FLU IMMUNIZE NO ADMIN: HCPCS | Performed by: NURSE PRACTITIONER

## 2022-03-03 PROCEDURE — 1123F ACP DISCUSS/DSCN MKR DOCD: CPT | Performed by: NURSE PRACTITIONER

## 2022-03-03 PROCEDURE — 99213 OFFICE O/P EST LOW 20 MIN: CPT | Performed by: NURSE PRACTITIONER

## 2022-03-03 PROCEDURE — G8400 PT W/DXA NO RESULTS DOC: HCPCS | Performed by: NURSE PRACTITIONER

## 2022-03-03 PROCEDURE — 1036F TOBACCO NON-USER: CPT | Performed by: NURSE PRACTITIONER

## 2022-03-03 RX ORDER — GABAPENTIN 100 MG/1
100 CAPSULE ORAL DAILY
Qty: 30 CAPSULE | Refills: 2 | Status: SHIPPED | OUTPATIENT
Start: 2022-03-03 | End: 2022-03-28 | Stop reason: ALTCHOICE

## 2022-03-03 RX ORDER — AMLODIPINE BESYLATE 10 MG/1
TABLET ORAL
Qty: 90 TABLET | Refills: 3 | Status: CANCELLED | OUTPATIENT
Start: 2022-03-03

## 2022-03-03 RX ORDER — SIMVASTATIN 20 MG
TABLET ORAL
Qty: 90 TABLET | Refills: 3 | Status: CANCELLED | OUTPATIENT
Start: 2022-03-03

## 2022-03-03 RX ORDER — LISINOPRIL 20 MG/1
TABLET ORAL
Qty: 90 TABLET | Refills: 3 | Status: CANCELLED | OUTPATIENT
Start: 2022-03-03

## 2022-03-03 RX ORDER — CEPHALEXIN 500 MG/1
500 CAPSULE ORAL 4 TIMES DAILY
Qty: 40 CAPSULE | Refills: 0 | Status: SHIPPED | OUTPATIENT
Start: 2022-03-03 | End: 2022-03-28 | Stop reason: ALTCHOICE

## 2022-03-03 RX ORDER — LISINOPRIL 20 MG/1
TABLET ORAL
Qty: 90 TABLET | Refills: 5 | Status: SHIPPED | OUTPATIENT
Start: 2022-03-03 | End: 2022-06-27 | Stop reason: SDUPTHER

## 2022-03-03 RX ORDER — DIGOXIN 125 MCG
TABLET ORAL
Qty: 90 TABLET | Refills: 5 | Status: ON HOLD | OUTPATIENT
Start: 2022-03-03 | End: 2022-06-09 | Stop reason: SDUPTHER

## 2022-03-03 RX ORDER — DIGOXIN 125 MCG
TABLET ORAL
Qty: 90 TABLET | Refills: 3 | Status: CANCELLED | OUTPATIENT
Start: 2022-03-03

## 2022-03-03 RX ORDER — AMLODIPINE BESYLATE 10 MG/1
TABLET ORAL
Qty: 90 TABLET | Refills: 5 | Status: SHIPPED | OUTPATIENT
Start: 2022-03-03 | End: 2022-06-27 | Stop reason: SDUPTHER

## 2022-03-03 RX ORDER — SIMVASTATIN 20 MG
TABLET ORAL
Qty: 90 TABLET | Refills: 5 | Status: SHIPPED | OUTPATIENT
Start: 2022-03-03 | End: 2022-06-27 | Stop reason: ALTCHOICE

## 2022-03-03 ASSESSMENT — ENCOUNTER SYMPTOMS
BACK PAIN: 1
SHORTNESS OF BREATH: 0
ABDOMINAL PAIN: 0
COUGH: 0
CHEST TIGHTNESS: 0
NAUSEA: 0
COLOR CHANGE: 1
SORE THROAT: 0
DIARRHEA: 0
ABDOMINAL DISTENTION: 0
RHINORRHEA: 0
WHEEZING: 0
VOMITING: 0

## 2022-03-03 NOTE — PATIENT INSTRUCTIONS
Please read the healthy family handout that you were given and share it with your family. Please compare this printed medication list with your medications at home to be sure they are the same. If you have any medications that are different please contact us immediately at [phone]. Also review your allergies that we have listed, these may also include medications that you have not been able to tolerate, make sure everything listed is correct. If you have any allergies that are different please contact us immediately at [phone].

## 2022-03-03 NOTE — PROGRESS NOTES
CHIEF COMPLAINT  Chief Complaint   Patient presents with    Foot Pain        HPI   Jonathan Rosen is a 68 y.o. female who presents to the office complaining of worsening pain/numbness and tingling in her bilateral feet. Patient reports that she was unable to get into the podiatrist and cut her toenails herself. Patient reports that she has an area where she cut the skin around her toe nail. Patient reports that pain is worse in her right foot. Patient also requesting refills on all her medications. Patient reports that she has not taken any of her daily meds today. No other complaints, modifying factors or associated symptoms. Nursing notes reviewed.    Past Medical History:   Diagnosis Date    Atrial fibrillation ([address]     off coumadin after bleed, declined restart    Blood transfusion     CAD (coronary artery disease)     Diabetes mellitus type II     Diabetic neuropathy ([facility] 2011    Gastric ulcer     H. pylori infection 2009    Hyperlipidemia     Hypertension     Numbness and tingling of left leg     Osteoarthritis     knees    Poor historian     PVD (peripheral vascular disease) ([facility] [address] [facility]     PTA distal sfa/pop/peroneal, Dr. Agusto Mukherjee 12/2015    Unspecified cerebral artery occlusion with cerebral infarction     TIA affected left eye    upper gi bleed 12/2009     Past Surgical History:   Procedure Laterality Date    ANGIOPLASTY  12/30/15    Dr. Agusto Mukherjee, LLE, PTA of distal sfa/pop/peroneal    CARDIAC CATHETERIZATION  9/21/12    done for surgical clearance, cath was negative    CORONARY ANGIOPLASTY WITH STENT PLACEMENT  3044,6160    FOOT SURGERY Left 01/11/2018    DEBRIDEMENT OF ULCERS LEFT FOOT AND LEG WITH GRAFT    OTHER SURGICAL HISTORY Left 06/05/2017    Left Femoral Peroneal Bypass    SHOULDER ARTHROPLASTY  10/5/12    RIGHT SHOULDER TOTAL ARTHROPLASTY, BICEPS TENODESIS DEPUY, GLOBAL ADVANTAGE AP     Family History   Problem Relation Age of Onset    Heart Disease Mother     Stroke Mother     Heart Disease Father     Diabetes Sister     Diabetes Brother     Diabetes Maternal Grandmother      Social History     Socioeconomic History    Marital status:      Spouse name: Not on file    Number of children: Not on file    Years of education: Not on file    Highest education level: Not on file   Occupational History    Not on file   Tobacco Use    Smoking status: Never Smoker    Smokeless tobacco: Never Used    Tobacco comment: Not needed   Vaping Use    Vaping Use: Never used   Substance and Sexual Activity    Alcohol use: No    Drug use: No    Sexual activity: Yes     Partners: Male   Other Topics Concern    Not on file   Social History Narrative    Not on file     Social Determinants of Health     Financial Resource Strain:     Difficulty of Paying Living Expenses: Not on file   Food Insecurity:     Worried About Running Out of Food in the Last Year: Not on file    Donal of Food in the Last Year: Not on file   Transportation Needs:     Lack of Transportation (Medical): Not on file    Lack of Transportation (Non-Medical):  Not on file   Physical Activity:     Days of Exercise per Week: Not on file    Minutes of Exercise per Session: Not on file   Stress:     Feeling of Stress : Not on file   Social Connections:     Frequency of Communication with Friends and Family: Not on file    Frequency of Social Gatherings with Friends and Family: Not on file    Attends Mandaeism Services: Not on file    Active Member of Clubs or Organizations: Not on file    Attends Club or Organization Meetings: Not on file    Marital Status: Not on file   Intimate Partner Violence:     Fear of Current or Ex-Partner: Not on file    Emotionally Abused: Not on file    Physically Abused: Not on file    Sexually Abused: Not on file   Housing Stability:     Unable to Pay for Housing in the Last Year: Not on file    Number of Places Lived in the Last Year: Not on file    Unstable Housing in the Last Year: Not on file     Current Outpatient Medications   Medication Sig Dispense Refill    cephALEXin (KEFLEX) 500 MG capsule Take 1 capsule by mouth 4 times daily 40 capsule 0    gabapentin (NEURONTIN) 100 MG capsule Take 1 capsule by mouth daily for 90 days. Intended supply: 90 days 30 capsule 2    metFORMIN (GLUCOPHAGE) 500 MG tablet TAKE 2 TABLETS BY MOUTH EVERY DAY WITH BREAKFAST 180 tablet 0    apixaban (ELIQUIS) 5 MG TABS tablet TAKE 1 TABLET BY MOUTH TWO TIMES A  tablet 5    digoxin (LANOXIN) 125 MCG tablet Take 1 tablet daily. 90 tablet 5    amLODIPine (NORVASC) 10 MG tablet TAKE 1 TABLET BY MOUTH EVERY DAY 90 tablet 5    lisinopril (PRINIVIL;ZESTRIL) 20 MG tablet TAKE 1 TABLET BY MOUTH EVERY DAY 90 tablet 5    simvastatin (ZOCOR) 20 MG tablet TAKE 1 TABLET BY MOUTH EVERY DAY AT NIGHT 90 tablet 5    aspirin 81 MG EC tablet Take 81 mg by mouth daily        No current facility-administered medications for this visit. Allergies   Allergen Reactions    Pcn [Penicillins] Other (See Comments)     Unsure of reaction       REVIEW OF SYSTEMS  Review of Systems   Constitutional: Negative for activity change, appetite change, chills and fever. HENT: Negative for congestion, rhinorrhea and sore throat. Eyes: Negative for visual disturbance. Respiratory: Negative for cough, chest tightness, shortness of breath and wheezing. Cardiovascular: Positive for leg swelling. Negative for chest pain. Gastrointestinal: Negative for abdominal distention, abdominal pain, diarrhea, nausea and vomiting. Genitourinary: Negative for dysuria, frequency, hematuria and urgency. Musculoskeletal: Positive for arthralgias, back pain and gait problem. Negative for myalgias. Skin: Positive for color change and wound. Negative for rash. Neurological: Negative for dizziness, weakness, light-headedness, numbness and headaches. Hematological: Bruises/bleeds easily. Psychiatric/Behavioral: Negative for self-injury and sleep disturbance. The patient is not nervous/anxious. PHYSICAL EXAM  BP (!) 178/67   Pulse 64   Temp 97.7 °F (36.5 °C) (Oral)   Wt 128 lb 4 oz (58.2 kg)   SpO2 98%   BMI 22.72 kg/m²   Physical Exam  Vitals reviewed. Constitutional:       General: She is not in acute distress. Appearance: Normal appearance. She is well-developed. She is not diaphoretic. HENT:      Head: Normocephalic and atraumatic. Right Ear: External ear normal.      Left Ear: External ear normal.      Nose: Nose normal.      Mouth/Throat:      Mouth: Mucous membranes are moist.      Pharynx: Oropharynx is clear. No oropharyngeal exudate or posterior oropharyngeal erythema. Eyes:      General:         Right eye: No discharge. Left eye: No discharge. Pupils: Pupils are equal, round, and reactive to light. Neck:      Vascular: No JVD. Cardiovascular:      Rate and Rhythm: Normal rate and regular rhythm. Pulses: Normal pulses. Pulmonary:      Effort: Pulmonary effort is normal. No respiratory distress. Breath sounds: No stridor. No wheezing, rhonchi or rales. Chest:      Chest wall: No tenderness. Abdominal:      General: Bowel sounds are normal. There is no distension. Palpations: Abdomen is soft. Tenderness: There is no abdominal tenderness. There is no guarding. Musculoskeletal:      Cervical back: Normal range of motion and neck supple. Right lower leg: Edema present. Left lower leg: Edema present. Feet:       Comments: Trace edema   Feet:      Comments: Redness noted to right great toe around nailbed that extends to right great toe. No abscess or streaking. Skin:     General: Skin is warm and dry. Capillary Refill: Capillary refill takes less than 2 seconds. Findings: Bruising and erythema present. No rash.    Neurological:      Mental Status: She is alert and oriented to person, place, and time.   Psychiatric:         Mood and Affect: Mood normal.         Behavior: Behavior normal.        ASSESSMENT/PLAN:   1. Pain in both feet  Reports ongoing pain in bilateral feet. Patient reports the pain is worse at night. Patient reports it keeps her up and has difficulty sleeping. Patient has known history of peripheral vascular disease. Patient reports taken Tylenol but it is not helping. Patient reports that she went to multiple podiatrist and was unable to get into them so she went ahead and trimmed her own toenails. Patient did cut the skin around her right great toe. Toe does appear erythematous. No areas of streaking, sloughing or abscess noted. I did recommend that patient be placed on a course of Keflex at this time with strict return precautions. Patient encouraged to soak feet in Epson salt and warm water. We did discuss healthy diet, regular food intake and taking her prescribed medications as directed. Patiently placed on gabapentin 100 mg daily and encouraged to follow-up if symptoms do not improve or worsen. Patient verbalized and acknowledges with plan of care at this time. - gabapentin (NEURONTIN) 100 MG capsule; Take 1 capsule by mouth daily for 90 days. Intended supply: 90 days  Dispense: 30 capsule; Refill: 2    2. Neuropathy  See above #1    3. Cellulitis of right lower extremity  See above #1  - cephALEXin (KEFLEX) 500 MG capsule; Take 1 capsule by mouth 4 times daily  Dispense: 40 capsule; Refill: 0    4. Medication refill  Requesting refills on all current chronic medications at today's visit. Refill sent to pharmacy patient notified. The note was completed using Dragon voice recognition transcription. Every effort was made to ensure accuracy; however, inadvertent  transcription errors may be present despite my best efforts to edit errors.     Alejandro rDake, MARVIN - CNP

## 2022-03-23 DIAGNOSIS — L03.115 CELLULITIS OF RIGHT LOWER EXTREMITY: ICD-10-CM

## 2022-03-23 RX ORDER — CEPHALEXIN 500 MG/1
CAPSULE ORAL
Qty: 40 CAPSULE | Refills: 0 | OUTPATIENT
Start: 2022-03-23

## 2022-03-25 RX ORDER — MELATONIN
1000 DAILY
Qty: 1 TABLET | Refills: 0
Start: 2022-03-25 | End: 2022-06-27 | Stop reason: SDUPTHER

## 2022-03-28 ENCOUNTER — OFFICE VISIT (OUTPATIENT)
Dept: FAMILY MEDICINE CLINIC | Age: 77
End: 2022-03-28
Payer: MEDICARE

## 2022-03-28 VITALS
HEART RATE: 73 BPM | OXYGEN SATURATION: 100 % | TEMPERATURE: 98.1 F | BODY MASS INDEX: 24.11 KG/M2 | WEIGHT: 136.13 LBS | DIASTOLIC BLOOD PRESSURE: 72 MMHG | SYSTOLIC BLOOD PRESSURE: 182 MMHG

## 2022-03-28 DIAGNOSIS — L03.031 CELLULITIS OF GREAT TOE, RIGHT: ICD-10-CM

## 2022-03-28 DIAGNOSIS — Z91.199 NON COMPLIANCE WITH MEDICAL TREATMENT: Primary | ICD-10-CM

## 2022-03-28 DIAGNOSIS — E11.42 TYPE 2 DIABETES MELLITUS WITH DIABETIC POLYNEUROPATHY, WITHOUT LONG-TERM CURRENT USE OF INSULIN (HCC): ICD-10-CM

## 2022-03-28 PROCEDURE — 4040F PNEUMOC VAC/ADMIN/RCVD: CPT | Performed by: NURSE PRACTITIONER

## 2022-03-28 PROCEDURE — G8427 DOCREV CUR MEDS BY ELIG CLIN: HCPCS | Performed by: NURSE PRACTITIONER

## 2022-03-28 PROCEDURE — 1036F TOBACCO NON-USER: CPT | Performed by: NURSE PRACTITIONER

## 2022-03-28 PROCEDURE — 3044F HG A1C LEVEL LT 7.0%: CPT | Performed by: NURSE PRACTITIONER

## 2022-03-28 PROCEDURE — G8484 FLU IMMUNIZE NO ADMIN: HCPCS | Performed by: NURSE PRACTITIONER

## 2022-03-28 PROCEDURE — 99213 OFFICE O/P EST LOW 20 MIN: CPT | Performed by: NURSE PRACTITIONER

## 2022-03-28 PROCEDURE — G8420 CALC BMI NORM PARAMETERS: HCPCS | Performed by: NURSE PRACTITIONER

## 2022-03-28 PROCEDURE — 1090F PRES/ABSN URINE INCON ASSESS: CPT | Performed by: NURSE PRACTITIONER

## 2022-03-28 PROCEDURE — G8400 PT W/DXA NO RESULTS DOC: HCPCS | Performed by: NURSE PRACTITIONER

## 2022-03-28 PROCEDURE — 1123F ACP DISCUSS/DSCN MKR DOCD: CPT | Performed by: NURSE PRACTITIONER

## 2022-03-28 RX ORDER — CLINDAMYCIN HYDROCHLORIDE 150 MG/1
450 CAPSULE ORAL 3 TIMES DAILY
Qty: 90 CAPSULE | Refills: 0 | Status: SHIPPED | OUTPATIENT
Start: 2022-03-28 | End: 2022-04-07

## 2022-03-28 RX ORDER — CLINDAMYCIN HYDROCHLORIDE 300 MG/1
300 CAPSULE ORAL 2 TIMES DAILY
Qty: 14 CAPSULE | Refills: 0 | Status: CANCELLED | OUTPATIENT
Start: 2022-03-28 | End: 2022-04-04

## 2022-03-28 ASSESSMENT — ENCOUNTER SYMPTOMS
GASTROINTESTINAL NEGATIVE: 1
COLOR CHANGE: 1
RESPIRATORY NEGATIVE: 1

## 2022-03-28 NOTE — PATIENT INSTRUCTIONS
Please read the healthy family handout that you were given and share it with your family. Please compare this printed medication list with your medications at home to be sure they are the same. If you have any medications that are different please contact us immediately at [phone]. Also review your allergies that we have listed, these may also include medications that you have not been able to tolerate, make sure everything listed is correct. If you have any allergies that are different please contact us immediately at [phone].

## 2022-03-28 NOTE — PROGRESS NOTES
CHIEF COMPLAINT  Chief Complaint   Patient presents with    Other     Discuss medications and foot pain        HPI   Efra Whitney is a 68 y.o. female who presents to the office complaining of ongoing pain and burning in her feet. Patient was recently treated for cellulitis. Patient reports that she did finish the 10-day treatment of Keflex. Patient reports that there was a medication discrepancy at the pharmacy. Patient was also given a prescription for gabapentin at that time however she reports that she never received it. Patient reports that she did receive 2 bottles of antibiotics with her other medications but no medications for pain/gabapentin. Patient reports that she has been soaking her foot in vinegar as advised by one of her friends. Patient denies any fever, chills. Patient reports that she has been cutting her nails herself as well. Patient had been instructed previously to follow-up with podiatry but reports that she was unable to get in. Patient reports that sugars have been elevated as well. Patient reports that she did get pulled over recently because her blood glucose was 400. Patient reports that she had been eating nothing but peanut butter fudge prior to that episode. No other complaints, modifying factors or associated symptoms. Nursing notes reviewed.    Past Medical History:   Diagnosis Date    Atrial fibrillation ([address]     off coumadin after bleed, declined restart    Blood transfusion     CAD (coronary artery disease)     Diabetes mellitus type II     Diabetic neuropathy ([address] 2011    Gastric ulcer     H. pylori infection 2009    Hyperlipidemia     Hypertension     Numbness and tingling of left leg     Osteoarthritis     knees    Poor historian     PVD (peripheral vascular disease) ([address]     PTA distal sfa/pop/peroneal, Dr. Greene Retedward 12/2015    Unspecified cerebral artery occlusion with cerebral infarction     TIA affected left eye    upper gi bleed 12/2009 Past Surgical History:   Procedure Laterality Date    ANGIOPLASTY  12/30/15    Dr. Carol Cheng, LLE, PTA of distal sfa/pop/peroneal    CARDIAC CATHETERIZATION  9/21/12    done for surgical clearance, cath was negative    CORONARY ANGIOPLASTY WITH STENT PLACEMENT  [id number]    FOOT SURGERY Left 01/11/2018    DEBRIDEMENT OF ULCERS LEFT FOOT AND LEG WITH GRAFT    OTHER SURGICAL HISTORY Left 06/05/2017    Left Femoral Peroneal Bypass    SHOULDER ARTHROPLASTY  10/5/12    RIGHT SHOULDER TOTAL ARTHROPLASTY, BICEPS TENODESIS DEPUY, GLOBAL ADVANTAGE AP     Family History   Problem Relation Age of Onset    Heart Disease Mother     Stroke Mother     Heart Disease Father     Diabetes Sister     Diabetes Brother     Diabetes Maternal Grandmother      Social History     Socioeconomic History    Marital status:      Spouse name: Not on file    Number of children: Not on file    Years of education: Not on file    Highest education level: Not on file   Occupational History    Not on file   Tobacco Use    Smoking status: Never Smoker    Smokeless tobacco: Never Used    Tobacco comment: Not needed   Vaping Use    Vaping Use: Never used   Substance and Sexual Activity    Alcohol use: No    Drug use: No    Sexual activity: Yes     Partners: Male   Other Topics Concern    Not on file   Social History Narrative    Not on file     Social Determinants of Health     Financial Resource Strain:     Difficulty of Paying Living Expenses: Not on file   Food Insecurity:     Worried About Running Out of Food in the Last Year: Not on file    Donal of Food in the Last Year: Not on file   Transportation Needs:     Lack of Transportation (Medical): Not on file    Lack of Transportation (Non-Medical):  Not on file   Physical Activity:     Days of Exercise per Week: Not on file    Minutes of Exercise per Session: Not on file   Stress:     Feeling of Stress : Not on file   Social Connections:     Frequency of Communication with Friends and Family: Not on file    Frequency of Social Gatherings with Friends and Family: Not on file    Attends Scientology Services: Not on file    Active Member of Clubs or Organizations: Not on file    Attends Club or Organization Meetings: Not on file    Marital Status: Not on file   Intimate Partner Violence:     Fear of Current or Ex-Partner: Not on file    Emotionally Abused: Not on file    Physically Abused: Not on file    Sexually Abused: Not on file   Housing Stability:     Unable to Pay for Housing in the Last Year: Not on file    Number of [place] in the Last Year: Not on file    Unstable Housing in the Last Year: Not on file     Current Outpatient Medications   Medication Sig Dispense Refill    Ibuprofen (ADVIL PO) Take by mouth      clindamycin (CLEOCIN) 150 MG capsule Take 3 capsules by mouth 3 times daily for 10 days 90 capsule 0    vitamin D3 (CHOLECALCIFEROL) 25 MCG (1000 UT) TABS tablet Take 1 tablet by mouth daily 1 tablet 0    metFORMIN (GLUCOPHAGE) 500 MG tablet TAKE 2 TABLETS BY MOUTH EVERY DAY WITH BREAKFAST 180 tablet 0    digoxin (LANOXIN) 125 MCG tablet Take 1 tablet daily. 90 tablet 5    amLODIPine (NORVASC) 10 MG tablet TAKE 1 TABLET BY MOUTH EVERY DAY 90 tablet 5    lisinopril (PRINIVIL;ZESTRIL) 20 MG tablet TAKE 1 TABLET BY MOUTH EVERY DAY 90 tablet 5    simvastatin (ZOCOR) 20 MG tablet TAKE 1 TABLET BY MOUTH EVERY DAY AT NIGHT 90 tablet 5     No current facility-administered medications for this visit. Allergies   Allergen Reactions    Pcn [Penicillins] Other (See Comments)     Unsure of reaction       REVIEW OF SYSTEMS  Review of Systems   Constitutional: Negative. HENT: Negative. Respiratory: Negative. Cardiovascular: Negative. Gastrointestinal: Negative. Genitourinary: Negative. Musculoskeletal: Positive for arthralgias. Skin: Positive for color change. Neurological: Negative.     Psychiatric/Behavioral: more compliant with her medication regimen, diet and treatment that patient could lose her 's license and be under my direct care. Patient verbalized and acknowledges. We did discuss the importance of taking medications as prescribed and that her  assist her. Patient aware that if she has any concerns or issues to contact our office immediately in regards to further medications. Patient verbalized and acknowledges with plan of care at this time. 2. Type 2 diabetes mellitus with diabetic polyneuropathy, without long-term current use of insulin ([facility]  See above #1    3. Cellulitis of great toe, right  Patient presents today for reevaluation of foot pain and swelling. Patient was previously placed on Keflex and reports that she finished antibiotic for approximately 10 days. Patient reports that the pharmacy messed up and gave her 2 bottles of antibiotics and not 1 for gabapentin. Patient reports that she was also soaking her toe in vinegar. Patient reports that she continues to cut her own toenails. Patient reports that she has tried to get into the podiatrist since last visit. I did recommend that patient discontinue use of vinegar, use only warm Epson salt soaks and start clindamycin at this time. Patient educated on the importance of not cutting her own toenails and seeing podiatrist for further treatment. Patient encouraged to call our office for any new or worsening symptoms. Patient verbalized acknowledges the plan of care at this time. - clindamycin (CLEOCIN) 150 MG capsule; Take 3 capsules by mouth 3 times daily for 10 days  Dispense: 90 capsule; Refill: 0         The note was completed using Dragon voice recognition transcription. Every effort was made to ensure accuracy; however, inadvertent  transcription errors may be present despite my best efforts to edit errors.     Alex Shaw, APRN - CNP

## 2022-05-03 ENCOUNTER — HOSPITAL ENCOUNTER (EMERGENCY)
Age: 77
Discharge: HOME | End: 2022-05-03
Payer: MEDICARE

## 2022-05-03 VITALS
BODY MASS INDEX: 24.8 KG/M2 | SYSTOLIC BLOOD PRESSURE: 140 MMHG | WEIGHT: 140 LBS | TEMPERATURE: 97.4 F | RESPIRATION RATE: 18 BRPM | DIASTOLIC BLOOD PRESSURE: 68 MMHG | HEART RATE: 70 BPM | OXYGEN SATURATION: 99 %

## 2022-05-03 DIAGNOSIS — S91.109A NON-HEALING OPEN WOUND OF TOE, INITIAL ENCOUNTER: Primary | ICD-10-CM

## 2022-05-03 PROCEDURE — 6370000000 HC RX 637 (ALT 250 FOR IP): Performed by: NURSE PRACTITIONER

## 2022-05-03 PROCEDURE — 99283 EMERGENCY DEPT VISIT LOW MDM: CPT

## 2022-05-03 RX ORDER — DOXYCYCLINE HYCLATE 100 MG
100 TABLET ORAL 2 TIMES DAILY
Qty: 14 TABLET | Refills: 0 | Status: SHIPPED | OUTPATIENT
Start: 2022-05-03 | End: 2022-05-10

## 2022-05-03 RX ORDER — DOXYCYCLINE HYCLATE 100 MG
100 TABLET ORAL ONCE
Status: COMPLETED | OUTPATIENT
Start: 2022-05-03 | End: 2022-05-03

## 2022-05-03 RX ADMIN — DOXYCYCLINE HYCLATE 100 MG: 100 TABLET, COATED ORAL at 13:41

## 2022-05-03 ASSESSMENT — ENCOUNTER SYMPTOMS
RHINORRHEA: 0
ABDOMINAL PAIN: 0
COLOR CHANGE: 0
SORE THROAT: 0
SHORTNESS OF BREATH: 0
FACIAL SWELLING: 0

## 2022-05-03 ASSESSMENT — PAIN DESCRIPTION - ORIENTATION: ORIENTATION: LEFT;RIGHT

## 2022-05-03 ASSESSMENT — PAIN - FUNCTIONAL ASSESSMENT: PAIN_FUNCTIONAL_ASSESSMENT: 0-10

## 2022-05-03 ASSESSMENT — PAIN DESCRIPTION - LOCATION: LOCATION: FOOT

## 2022-05-03 ASSESSMENT — PAIN SCALES - GENERAL: PAINLEVEL_OUTOF10: 10

## 2022-05-03 NOTE — ED PROVIDER NOTES
Evaluated by [id number] [facility] Provider          [facility] ED  EMERGENCY DEPARTMENT ENCOUNTER        Pt Name: Marion Jones  MRN: [id number]  [place] 1945  Dateof evaluation: 5/3/2022  Provider: MARVIN Doherty CNP  PCP: MARVIN Resendez CNP  ED Attending: No att. providers found    [facility]       Chief Complaint   Patient presents with    Foot Pain     Pt states that per Dr. Raven Wood RN that she needed to be seen at [address] [place] [address]. Pt attempted to call the phone number to make an appoitment and wasn't able to get through to make the appoitment. Pt states that she was then told at the front to come through the ER       HISTORY OF PRESENTILLNESS   (Location/Symptom, Timing/Onset, Context/Setting, Quality, Duration, Modifying Factors, Severity)  Note limiting factors. Marion Jones is a 68 y.o. female for right great toe and fourth toe wounds. Onset was over the last few days. Context includes patient states that she was sent over to see Dr. Hung Vaughan at the wound clinic however the wound clinic sent her to the ED because Dr. Hung Vaughan is not working today. Patient is wanting to be evaluated for the wounds on her toes and have her toenails addressed. Patient is a diabetic. Patient is unsure about her tetanus status. Patient denies any fevers. Alleviating factors include nothing. Aggravating factors include nothing. Pain is 10/10. Nothing has been used for pain today. Reports that she is currently on antibiotics for her foot. She also has an empty bottle of Neurontin that she states she has been taking. Nursing Notes were all reviewed and agreed with or any disagreements were addressed  in the HPI. REVIEW OF SYSTEMS    (2-9 systems for level 4, 10 or more for level 5)     Review of Systems   Constitutional: Negative for activity change, appetite change and fever. HENT: Negative for congestion, facial swelling, rhinorrhea and sore throat. Eyes: Negative for visual disturbance. Respiratory: Negative for shortness of breath. Cardiovascular: Negative for chest pain. Gastrointestinal: Negative for abdominal pain. Genitourinary: Negative for difficulty urinating. Musculoskeletal: Negative for arthralgias and myalgias. Skin: Negative for color change and rash. Foot wounds   Neurological: Negative for dizziness and light-headedness. Psychiatric/Behavioral: Negative for agitation. All other systems reviewed and are negative. Positives and Pertinent negatives as per HPI. Except as noted above in the ROS, all other systems were reviewed and negative.        PAST MEDICAL HISTORY     Past Medical History:   Diagnosis Date    Atrial fibrillation ([facility]     off coumadin after bleed, declined restart    Blood transfusion     CAD (coronary artery disease)     Diabetes mellitus type II     Diabetic neuropathy ([facility] 2011    Gastric ulcer     H. pylori infection 2009    Hyperlipidemia     Hypertension     Numbness and tingling of left leg     Osteoarthritis     knees    Poor historian     PVD (peripheral vascular disease) ([facility]     PTA distal sfa/pop/peroneal, Dr. Lizzette Pete 12/2015    Unspecified cerebral artery occlusion with cerebral infarction     TIA affected left eye    upper gi bleed 12/2009         SURGICAL HISTORY       Past Surgical History:   Procedure Laterality Date    ANGIOPLASTY  12/30/15    Dr. Lizzette Pete, [facility], PTA of distal sfa/pop/peroneal    CARDIAC CATHETERIZATION  9/21/12    done for surgical clearance, cath was negative    CORONARY ANGIOPLASTY WITH STENT PLACEMENT  [id number]    FOOT SURGERY Left 01/11/2018    DEBRIDEMENT OF ULCERS LEFT FOOT AND LEG WITH GRAFT    OTHER SURGICAL HISTORY Left 06/05/2017    Left Femoral Peroneal Bypass    SHOULDER ARTHROPLASTY  10/5/12    RIGHT SHOULDER TOTAL ARTHROPLASTY, BICEPS TENODESIS DEPUY, GLOBAL ADVANTAGE AP         CURRENT MEDICATIONS       Discharge Medication List as of 5/3/2022  1:32 PM      CONTINUE these medications which have NOT CHANGED    Details   Ibuprofen (ADVIL PO) Take by mouthHistorical Med      vitamin D3 (CHOLECALCIFEROL) 25 MCG (1000 UT) TABS tablet Take 1 tablet by mouth daily, Disp-1 tablet, R-0NO PRINT      metFORMIN (GLUCOPHAGE) 500 MG tablet TAKE 2 TABLETS BY MOUTH EVERY DAY WITH BREAKFAST, Disp-180 tablet, R-0Normal      digoxin (LANOXIN) 125 MCG tablet Take 1 tablet daily. , Disp-90 tablet, R-5Normal      amLODIPine (NORVASC) 10 MG tablet TAKE 1 TABLET BY MOUTH EVERY DAY, Disp-90 tablet, R-5Normal      lisinopril (PRINIVIL;ZESTRIL) 20 MG tablet TAKE 1 TABLET BY MOUTH EVERY DAY, Disp-90 tablet, R-5Normal      simvastatin (ZOCOR) 20 MG tablet TAKE 1 TABLET BY MOUTH EVERY DAY AT NIGHT, Disp-90 tablet, R-5Normal               ALLERGIES     Pcn [penicillins]    FAMILY HISTORY       Family History   Problem Relation Age of Onset    Heart Disease Mother     Stroke Mother     Heart Disease Father     Diabetes Sister     Diabetes Brother     Diabetes Maternal Grandmother           SOCIAL HISTORY       Social History     Socioeconomic History    Marital status:      Spouse name: None    Number of children: None    Years of education: None    Highest education level: None   Occupational History    None   Tobacco Use    Smoking status: Never Smoker    Smokeless tobacco: Never Used    Tobacco comment: Not needed   Vaping Use    Vaping Use: Never used   Substance and Sexual Activity    Alcohol use: No    Drug use: No    Sexual activity: Yes     Partners: Male   Other Topics Concern    None   Social History Narrative    None     Social Determinants of Health     Financial Resource Strain:     Difficulty of Paying Living Expenses: Not on file   Food Insecurity:     Worried About Running Out of Food in the Last Year: Not on file    Donal of Food in the Last Year: Not on file   Transportation Needs:     Lack of Transportation (Medical):  Not on file    Lack of Transportation (Non-Medical): Not on file   Physical Activity:     Days of Exercise per Week: Not on file    Minutes of Exercise per Session: Not on file   Stress:     Feeling of Stress : Not on file   Social Connections:     Frequency of Communication with Friends and Family: Not on file    Frequency of Social Gatherings with Friends and Family: Not on file    Attends Oriental orthodox Services: Not on file    Active Member of [address] [place] [address] or Organizations: Not on file    Attends Club or Organization Meetings: Not on file    Marital Status: Not on file   Intimate Partner Violence:     Fear of Current or Ex-Partner: Not on file    Emotionally Abused: Not on file    Physically Abused: Not on file    Sexually Abused: Not on file   Housing Stability:     Unable to Pay for Housing in the Last Year: Not on file    Number of [place] in the Last Year: Not on file    Unstable Housing in the Last Year: Not on file       SCREENINGS    [place] Coma Scale  Eye Opening: Spontaneous  Best Verbal Response: Oriented  Best Motor Response: Obeys commands  [place] Coma Scale Score: 15        PHYSICAL EXAM  (up to 7 for level 4, 8 or more for level 5)     ED Triage Vitals [05/03/22 1147]   BP Temp Temp Source Pulse Resp SpO2 Height Weight   (!) 144/63 97.4 °F (36.3 °C) Temporal 63 18 100 % -- 140 lb (63.5 kg)       Physical Exam  Constitutional:       Appearance: She is well-developed. HENT:      Head: Normocephalic and atraumatic. Cardiovascular:      Rate and Rhythm: Normal rate. Pulmonary:      Effort: Pulmonary effort is normal. No respiratory distress. Abdominal:      General: There is no distension. Palpations: Abdomen is soft. Tenderness: There is no abdominal tenderness. Musculoskeletal:         General: Normal range of motion. Cervical back: Normal range of motion. Skin:     General: Skin is warm and dry. Findings: Erythema present.       Comments: Distal portion of the right foot and the toes are slightly erythematous. There is a scabbed over area noted to the dorsal portion of the right great toe and the lateral aspect of the right fourth toe. There is no discharge noted. Sensation and pulse intact to right foot. Neurological:      Mental Status: She is alert and oriented to person, place, and time. DIAGNOSTIC RESULTS   LABS:    Labs Reviewed - No data to display    All other labs werewithin normal range or not returned as of this dictation. EKG: All EKG's are interpreted by the Emergency Department Physician who either signs or Co-signs this chart in the absence of a cardiologist.  Please see their note for interpretation of EKG. RADIOLOGY:           Interpretation per the Radiologist below, if available at the time of this note:    No orders to display     No results found. PROCEDURES   Unless otherwise noted below, none     Procedures     CRITICAL CARE TIME   N/A    CONSULTS:  None      EMERGENCYDEPARTMENT COURSE and DIFFERENTIAL DIAGNOSIS/MDM:   Vitals:    Vitals:    05/03/22 1147 05/03/22 1351   BP: (!) 144/63 (!) 140/68   Pulse: 63 70   Resp: 18 18   Temp: 97.4 °F (36.3 °C)    TempSrc: Temporal    SpO2: 100% 99%   Weight: 140 lb (63.5 kg)        Patient was given the following medications:  Medications   doxycycline hyclate (VIBRA-TABS) tablet 100 mg (100 mg Oral Given 5/3/22 1341)       Patient was seen evaluated by myself. Patient here for concerns for trying to see wound clinic. Patient states that she was sent in to see Dr. Karishma Woods at the wound clinic however upon arriving to the wound clinic Dr. Karishma Woods was not working and she was sent to the ED. Patient reports that she is on antibiotics for her foot from her primary care doctor. She is a diabetic and denies any fevers. On exam she is awake and alert hemodynamically stable nontoxic in appearance. She does have healing wounds noted to her right great toe and her right fourth toe. Call was placed to the patient's pharmacy and she reportedly according to the pharmacy notes was on Keflex earlier in the year however is currently not on any antibiotics. Patient be started on doxycycline and given a referral for podiatry. She was encouraged to follow-up. She was given a dose in the ED and given a postop shoe. She was encouraged to return to the ED for worsening symptoms. She was also encouraged to follow-up with her primary care doctor in the next few days. Patient was ultimately discharged with all questions answered. The patient tolerated their visit well. I have evaluated this patient. My supervising physician was available for consultation. The patient and / or the family were informed of the results of any tests, a time was given to answer questions, a plan was proposed and they agreed with plan. FINAL IMPRESSION      1.  Non-healing open wound of toe, initial encounter          DISPOSITION/PLAN   DISPOSITION Decision To Discharge 05/03/2022 01:51:04 PM      PATIENT REFERRED TO:  Nevin Conde, APRN - CNP  [address] [place]  [phone]    Schedule an appointment as soon as possible for a visit in 2 days  for re-evaluation    [facility] ED  [address] [facility]  [phone]    If symptoms worsen    [facility] [address] [phone]  Call today  for re-evaluation      DISCHARGE MEDICATIONS:  Discharge Medication List as of 5/3/2022  1:32 PM      START taking these medications    Details   doxycycline hyclate (VIBRA-TABS) 100 MG tablet Take 1 tablet by mouth 2 times daily for 7 days, Disp-14 tablet, R-0Print             DISCONTINUED MEDICATIONS:  Discharge Medication List as of 5/3/2022  1:32 PM                 (Please note that portions of this note were completed with a voice recognition program.  Efforts were made to edit the dictations but occasionally words are mis-transcribed.)    MARVIN Tapia CNP (electronically signed)         MARVIN Tapia CNP  05/03/22 [id number]

## 2022-05-03 NOTE — ED NOTES
[address] to check Abx; 2 different Rx's for Keflex in March.      Stephanie Bustos RN  05/03/22 9663

## 2022-05-12 ENCOUNTER — OFFICE VISIT (OUTPATIENT)
Dept: FAMILY MEDICINE CLINIC | Age: 77
End: 2022-05-12
Payer: MEDICARE

## 2022-05-12 ENCOUNTER — HOSPITAL ENCOUNTER (OUTPATIENT)
Dept: GENERAL RADIOLOGY | Age: 77
Discharge: HOME | DRG: 300 | End: 2022-05-12
Payer: MEDICARE

## 2022-05-12 ENCOUNTER — HOSPITAL ENCOUNTER (OUTPATIENT)
Age: 77
Discharge: HOME | DRG: 300 | End: 2022-05-12
Payer: MEDICARE

## 2022-05-12 VITALS
OXYGEN SATURATION: 100 % | SYSTOLIC BLOOD PRESSURE: 155 MMHG | HEART RATE: 92 BPM | TEMPERATURE: 98.6 F | WEIGHT: 133.5 LBS | DIASTOLIC BLOOD PRESSURE: 66 MMHG | BODY MASS INDEX: 23.65 KG/M2

## 2022-05-12 DIAGNOSIS — S91.109S NON-HEALING OPEN WOUND OF TOE, SEQUELA: Primary | ICD-10-CM

## 2022-05-12 DIAGNOSIS — S91.109S NON-HEALING OPEN WOUND OF TOE, SEQUELA: ICD-10-CM

## 2022-05-12 PROCEDURE — 1090F PRES/ABSN URINE INCON ASSESS: CPT | Performed by: NURSE PRACTITIONER

## 2022-05-12 PROCEDURE — 1036F TOBACCO NON-USER: CPT | Performed by: NURSE PRACTITIONER

## 2022-05-12 PROCEDURE — 73630 X-RAY EXAM OF FOOT: CPT

## 2022-05-12 PROCEDURE — 4040F PNEUMOC VAC/ADMIN/RCVD: CPT | Performed by: NURSE PRACTITIONER

## 2022-05-12 PROCEDURE — G8427 DOCREV CUR MEDS BY ELIG CLIN: HCPCS | Performed by: NURSE PRACTITIONER

## 2022-05-12 PROCEDURE — G8420 CALC BMI NORM PARAMETERS: HCPCS | Performed by: NURSE PRACTITIONER

## 2022-05-12 PROCEDURE — 99213 OFFICE O/P EST LOW 20 MIN: CPT | Performed by: NURSE PRACTITIONER

## 2022-05-12 PROCEDURE — 1123F ACP DISCUSS/DSCN MKR DOCD: CPT | Performed by: NURSE PRACTITIONER

## 2022-05-12 PROCEDURE — G8400 PT W/DXA NO RESULTS DOC: HCPCS | Performed by: NURSE PRACTITIONER

## 2022-05-12 RX ORDER — CLINDAMYCIN HYDROCHLORIDE 150 MG/1
450 CAPSULE ORAL 3 TIMES DAILY
Qty: 90 CAPSULE | Refills: 0 | Status: ON HOLD | OUTPATIENT
Start: 2022-05-12 | End: 2022-05-25 | Stop reason: HOSPADM

## 2022-05-12 ASSESSMENT — ENCOUNTER SYMPTOMS
RESPIRATORY NEGATIVE: 1
EYES NEGATIVE: 1
GASTROINTESTINAL NEGATIVE: 1
COLOR CHANGE: 1

## 2022-05-12 NOTE — PROGRESS NOTES
CHIEF COMPLAINT  Chief Complaint   Patient presents with    Other     ER follow up        HPI   Maeve Davidson is a 68 y.o. female who presents to the office for ER follow-up. Patient was seen in the ER 5/3/2022 for nonhealing wound on her right great toe. Patient has been referred to podiatry in the past but has had difficulty getting into see them. Patient also reports that she went to the wound care clinic but did not have an appointment and was sent over to the ER for evaluation. Patient was placed on doxycycline at that time and reports that Shala Maher has a few days left. \"Patient denies any fever, chills, nausea or vomiting. She has history of type 2 diabetes mellitus. No other complaints, modifying factors or associated symptoms. Nursing notes reviewed.    Past Medical History:   Diagnosis Date    Atrial fibrillation ([address]     off coumadin after bleed, declined restart    Blood transfusion     CAD (coronary artery disease)     Diabetes mellitus type II     Diabetic neuropathy ([address] 2011    Gastric ulcer     H. pylori infection 2009    Hyperlipidemia     Hypertension     Numbness and tingling of left leg     Osteoarthritis     knees    Poor historian     PVD (peripheral vascular disease) ([address]     PTA distal sfa/pop/peroneal, Dr. Elisha Segura 12/2015    Unspecified cerebral artery occlusion with cerebral infarction     TIA affected left eye    upper gi bleed 12/2009     Past Surgical History:   Procedure Laterality Date    ANGIOPLASTY  12/30/15    Dr. Elisha Segura, [facility], PTA of distal sfa/pop/peroneal    CARDIAC CATHETERIZATION  9/21/12    done for surgical clearance, cath was negative    CORONARY ANGIOPLASTY WITH STENT PLACEMENT  [id number]    FOOT SURGERY Left 01/11/2018    DEBRIDEMENT OF ULCERS LEFT FOOT AND LEG WITH GRAFT    OTHER SURGICAL HISTORY Left 06/05/2017    Left Femoral Peroneal Bypass    SHOULDER ARTHROPLASTY  10/5/12    RIGHT SHOULDER TOTAL ARTHROPLASTY, BICEPS TENODESIS [facility], GLOBAL ADVANTAGE AP     Family History   Problem Relation Age of Onset    Heart Disease Mother     Stroke Mother     Heart Disease Father     Diabetes Sister     Diabetes Brother     Diabetes Maternal Grandmother      Social History     Socioeconomic History    Marital status:      Spouse name: Not on file    Number of children: Not on file    Years of education: Not on file    Highest education level: Not on file   Occupational History    Not on file   Tobacco Use    Smoking status: Never Smoker    Smokeless tobacco: Never Used    Tobacco comment: Not needed   Vaping Use    Vaping Use: Never used   Substance and Sexual Activity    Alcohol use: No    Drug use: No    Sexual activity: Yes     Partners: Male   Other Topics Concern    Not on file   Social History Narrative    Not on file     Social Determinants of Health     Financial Resource Strain:     Difficulty of Paying Living Expenses: Not on file   Food Insecurity:     Worried About Running Out of Food in the Last Year: Not on file    Donal of Food in the Last Year: Not on file   Transportation Needs:     Lack of Transportation (Medical): Not on file    Lack of Transportation (Non-Medical):  Not on file   Physical Activity:     Days of Exercise per Week: Not on file    Minutes of Exercise per Session: Not on file   Stress:     Feeling of Stress : Not on file   Social Connections:     Frequency of Communication with Friends and Family: Not on file    Frequency of Social Gatherings with Friends and Family: Not on file    Attends Gnosticism Services: Not on file    Active Member of Clubs or Organizations: Not on file    Attends Club or Organization Meetings: Not on file    Marital Status: Not on file   Intimate Partner Violence:     Fear of Current or Ex-Partner: Not on file    Emotionally Abused: Not on file    Physically Abused: Not on file    Sexually Abused: Not on file   Housing Stability:     Unable to Pay for Housing in the Last Year: Not on file    Number of Places Lived in the Last Year: Not on file    Unstable Housing in the Last Year: Not on file     Current Outpatient Medications   Medication Sig Dispense Refill    clindamycin (CLEOCIN) 150 MG capsule Take 3 capsules by mouth 3 times daily for 10 days 90 capsule 0    Ibuprofen (ADVIL PO) Take by mouth      vitamin D3 (CHOLECALCIFEROL) 25 MCG (1000 UT) TABS tablet Take 1 tablet by mouth daily 1 tablet 0    metFORMIN (GLUCOPHAGE) 500 MG tablet TAKE 2 TABLETS BY MOUTH EVERY DAY WITH BREAKFAST 180 tablet 0    digoxin (LANOXIN) 125 MCG tablet Take 1 tablet daily. 90 tablet 5    amLODIPine (NORVASC) 10 MG tablet TAKE 1 TABLET BY MOUTH EVERY DAY 90 tablet 5    lisinopril (PRINIVIL;ZESTRIL) 20 MG tablet TAKE 1 TABLET BY MOUTH EVERY DAY 90 tablet 5    simvastatin (ZOCOR) 20 MG tablet TAKE 1 TABLET BY MOUTH EVERY DAY AT NIGHT 90 tablet 5     No current facility-administered medications for this visit. Allergies   Allergen Reactions    Pcn [Penicillins] Other (See Comments)     Unsure of reaction       REVIEW OF SYSTEMS  Review of Systems   Constitutional: Negative. HENT: Negative. Eyes: Negative. Respiratory: Negative. Gastrointestinal: Negative. Genitourinary: Negative. Musculoskeletal: Positive for arthralgias. Skin: Positive for color change and wound. Neurological: Negative. Psychiatric/Behavioral: Negative. PHYSICAL EXAM  BP (!) 155/66   Pulse 92   Temp 98.6 °F (37 °C) (Oral)   Wt 133 lb 8 oz (60.6 kg)   SpO2 100%   BMI 23.65 kg/m²   Physical Exam  Constitutional:       Appearance: Normal appearance. HENT:      Head: Normocephalic. Right Ear: External ear normal.      Left Ear: External ear normal.      Nose: Nose normal.      Mouth/Throat:      Mouth: Mucous membranes are moist.      Pharynx: Oropharynx is clear. Eyes:      Extraocular Movements: Extraocular movements intact.       Conjunctiva/sclera: Conjunctivae normal.   Cardiovascular:      Rate and Rhythm: Normal rate. Pulses: Normal pulses. Pulmonary:      Effort: Pulmonary effort is normal.      Breath sounds: Normal breath sounds. Abdominal:      Palpations: Abdomen is soft. Tenderness: There is no guarding. Musculoskeletal:      Cervical back: Normal range of motion and neck supple. Skin:     General: Skin is warm and dry. Capillary Refill: Capillary refill takes 2 to 3 seconds. Findings: Erythema present. Comments: See image   Neurological:      Mental Status: She is alert and oriented to person, place, and time. ASSESSMENT/PLAN:   1. Non-healing open wound of toe, sequela  Presents today with ongoing concerns of wounds on right foot/toes. Patient reports that she has had difficulty getting in with a podiatrist and was seen in the ER most recently. Patient was placed on doxycycline and reports that she has a few days left. Based on date of prescription patient should have been finished with current antibiotics. Patient denies any fever, chills, vomiting or diarrhea. Patient reports that she did go to the wound care clinic but did not have an appointment. I did contact the wound care clinic myself and made an appointment for the patient on 5/25 at 230. Radiological imaging of the right foot was ordered. Patient instructed to go to [facility] for imaging due to concerns of worsening wounds on her foot. Patient is diabetic and noncompliant with diet. Patient was placed on clindamycin at this time. Patient reports that yesterday she was unable to put a shoe on her foot and went barefoot at [facility]. Patient counseled on the importance of wearing shoes and not going barefoot due to significant risk of worsening wound condition and risks of possible amputation of toes due to foot infection.   Patient verbalized and acknowledges with plan of care prior to leaving the office.  - [facility]  - XR FOOT RIGHT (2 VIEWS); Future  - clindamycin (CLEOCIN) 150 MG capsule; Take 3 capsules by mouth 3 times daily for 10 days  Dispense: 90 capsule; Refill: 0         The note was completed using Dragon voice recognition transcription. Every effort was made to ensure accuracy; however, inadvertent  transcription errors may be present despite my best efforts to edit errors.     Pamela Griffiths, MARVIN - CNP

## 2022-05-12 NOTE — PATIENT INSTRUCTIONS
Please read the healthy family handout that you were given and share it with your family. Please compare this printed medication list with your medications at home to be sure they are the same. If you have any medications that are different please contact us immediately at [phone]. Also review your allergies that we have listed, these may also include medications that you have not been able to tolerate, make sure everything listed is correct. If you have any allergies that are different please contact us immediately at [phone].

## 2022-05-13 ENCOUNTER — HOSPITAL ENCOUNTER (INPATIENT)
Age: 77
LOS: 5 days | Discharge: ANOTHER HOSPITAL | DRG: 300 | End: 2022-05-18
Attending: STUDENT IN AN ORGANIZED HEALTH CARE EDUCATION/TRAINING PROGRAM | Admitting: INTERNAL MEDICINE
Payer: MEDICARE

## 2022-05-13 DIAGNOSIS — M86.9 OSTEOMYELITIS OF RIGHT FOOT, UNSPECIFIED TYPE (HCC): Primary | ICD-10-CM

## 2022-05-13 DIAGNOSIS — L03.115 CELLULITIS OF RIGHT LOWER EXTREMITY: ICD-10-CM

## 2022-05-13 PROBLEM — M86.171 ACUTE OSTEOMYELITIS OF RIGHT FOOT (HCC): Status: ACTIVE | Noted: 2022-05-13

## 2022-05-13 LAB
A/G RATIO: 1.4 (ref 1.1–2.2)
ALBUMIN SERPL-MCNC: 4.6 G/DL (ref 3.4–5)
ALP BLD-CCNC: 80 U/L (ref 40–129)
ALT SERPL-CCNC: 15 U/L (ref 10–40)
ANION GAP SERPL CALCULATED.3IONS-SCNC: 13 MMOL/L (ref 3–16)
AST SERPL-CCNC: 17 U/L (ref 15–37)
BASOPHILS ABSOLUTE: 0.1 K/UL (ref 0–0.2)
BASOPHILS RELATIVE PERCENT: 1.1 %
BILIRUB SERPL-MCNC: 0.3 MG/DL (ref 0–1)
BUN BLDV-MCNC: 40 MG/DL (ref 7–20)
CALCIUM SERPL-MCNC: 9.8 MG/DL (ref 8.3–10.6)
CHLORIDE BLD-SCNC: 102 MMOL/L (ref 99–110)
CO2: 21 MMOL/L (ref 21–32)
CREAT SERPL-MCNC: 1.3 MG/DL (ref 0.6–1.2)
EOSINOPHILS ABSOLUTE: 0.3 K/UL (ref 0–0.6)
EOSINOPHILS RELATIVE PERCENT: 3.6 %
GFR AFRICAN AMERICAN: 48
GFR NON-AFRICAN AMERICAN: 40
GLUCOSE BLD-MCNC: 103 MG/DL (ref 70–99)
GLUCOSE BLD-MCNC: 117 MG/DL (ref 70–99)
HCT VFR BLD CALC: 33.4 % (ref 36–48)
HEMOGLOBIN: 10.7 G/DL (ref 12–16)
INFLUENZA A: NOT DETECTED
INFLUENZA B: NOT DETECTED
LACTIC ACID: 0.7 MMOL/L (ref 0.4–2)
LYMPHOCYTES ABSOLUTE: 1.9 K/UL (ref 1–5.1)
LYMPHOCYTES RELATIVE PERCENT: 20 %
MCH RBC QN AUTO: 26.4 PG (ref 26–34)
MCHC RBC AUTO-ENTMCNC: 32 G/DL (ref 31–36)
MCV RBC AUTO: 82.4 FL (ref 80–100)
MONOCYTES ABSOLUTE: 0.6 K/UL (ref 0–1.3)
MONOCYTES RELATIVE PERCENT: 6.6 %
NEUTROPHILS ABSOLUTE: 6.4 K/UL (ref 1.7–7.7)
NEUTROPHILS RELATIVE PERCENT: 68.7 %
PDW BLD-RTO: 15.3 % (ref 12.4–15.4)
PERFORMED ON: ABNORMAL
PLATELET # BLD: 255 K/UL (ref 135–450)
PMV BLD AUTO: 7.9 FL (ref 5–10.5)
POTASSIUM REFLEX MAGNESIUM: 4.3 MMOL/L (ref 3.5–5.1)
RBC # BLD: 4.05 M/UL (ref 4–5.2)
SARS-COV-2 RNA, RT PCR: NOT DETECTED
SODIUM BLD-SCNC: 136 MMOL/L (ref 136–145)
TOTAL PROTEIN: 8 G/DL (ref 6.4–8.2)
WBC # BLD: 9.3 K/UL (ref 4–11)

## 2022-05-13 PROCEDURE — 85025 COMPLETE CBC W/AUTO DIFF WBC: CPT

## 2022-05-13 PROCEDURE — 2580000003 HC RX 258: Performed by: NURSE PRACTITIONER

## 2022-05-13 PROCEDURE — 80053 COMPREHEN METABOLIC PANEL: CPT

## 2022-05-13 PROCEDURE — 83605 ASSAY OF LACTIC ACID: CPT

## 2022-05-13 PROCEDURE — 6360000002 HC RX W HCPCS: Performed by: NURSE PRACTITIONER

## 2022-05-13 PROCEDURE — 87636 SARSCOV2 & INF A&B AMP PRB: CPT

## 2022-05-13 PROCEDURE — 83036 HEMOGLOBIN GLYCOSYLATED A1C: CPT

## 2022-05-13 PROCEDURE — 6370000000 HC RX 637 (ALT 250 FOR IP): Performed by: INTERNAL MEDICINE

## 2022-05-13 PROCEDURE — 87040 BLOOD CULTURE FOR BACTERIA: CPT

## 2022-05-13 PROCEDURE — 36415 COLL VENOUS BLD VENIPUNCTURE: CPT

## 2022-05-13 PROCEDURE — 96365 THER/PROPH/DIAG IV INF INIT: CPT

## 2022-05-13 PROCEDURE — 99285 EMERGENCY DEPT VISIT HI MDM: CPT

## 2022-05-13 PROCEDURE — 1200000000 HC SEMI PRIVATE

## 2022-05-13 PROCEDURE — 2580000003 HC RX 258: Performed by: INTERNAL MEDICINE

## 2022-05-13 RX ORDER — ENOXAPARIN SODIUM 100 MG/ML
30 INJECTION SUBCUTANEOUS DAILY
Status: DISCONTINUED | OUTPATIENT
Start: 2022-05-14 | End: 2022-05-16

## 2022-05-13 RX ORDER — INSULIN LISPRO 100 [IU]/ML
0-6 INJECTION, SOLUTION INTRAVENOUS; SUBCUTANEOUS
Status: DISCONTINUED | OUTPATIENT
Start: 2022-05-13 | End: 2022-05-18 | Stop reason: HOSPADM

## 2022-05-13 RX ORDER — ASPIRIN 81 MG/1
81 TABLET ORAL DAILY
COMMUNITY
End: 2022-06-27 | Stop reason: SDUPTHER

## 2022-05-13 RX ORDER — ATORVASTATIN CALCIUM 10 MG/1
10 TABLET, FILM COATED ORAL DAILY
Status: DISCONTINUED | OUTPATIENT
Start: 2022-05-13 | End: 2022-05-18 | Stop reason: HOSPADM

## 2022-05-13 RX ORDER — MAGNESIUM SULFATE 1 G/100ML
1000 INJECTION INTRAVENOUS PRN
Status: DISCONTINUED | OUTPATIENT
Start: 2022-05-13 | End: 2022-05-18 | Stop reason: HOSPADM

## 2022-05-13 RX ORDER — SODIUM CHLORIDE 0.9 % (FLUSH) 0.9 %
5-40 SYRINGE (ML) INJECTION PRN
Status: DISCONTINUED | OUTPATIENT
Start: 2022-05-13 | End: 2022-05-18 | Stop reason: HOSPADM

## 2022-05-13 RX ORDER — ONDANSETRON 4 MG/1
4 TABLET, ORALLY DISINTEGRATING ORAL EVERY 8 HOURS PRN
Status: DISCONTINUED | OUTPATIENT
Start: 2022-05-13 | End: 2022-05-18 | Stop reason: HOSPADM

## 2022-05-13 RX ORDER — POTASSIUM CHLORIDE 7.45 MG/ML
10 INJECTION INTRAVENOUS PRN
Status: DISCONTINUED | OUTPATIENT
Start: 2022-05-13 | End: 2022-05-18 | Stop reason: HOSPADM

## 2022-05-13 RX ORDER — POLYETHYLENE GLYCOL 3350 17 G/17G
17 POWDER, FOR SOLUTION ORAL DAILY PRN
Status: DISCONTINUED | OUTPATIENT
Start: 2022-05-13 | End: 2022-05-18 | Stop reason: HOSPADM

## 2022-05-13 RX ORDER — POTASSIUM CHLORIDE 20 MEQ/1
40 TABLET, EXTENDED RELEASE ORAL PRN
Status: DISCONTINUED | OUTPATIENT
Start: 2022-05-13 | End: 2022-05-18 | Stop reason: HOSPADM

## 2022-05-13 RX ORDER — ACETAMINOPHEN 325 MG/1
650 TABLET ORAL EVERY 6 HOURS PRN
Status: DISCONTINUED | OUTPATIENT
Start: 2022-05-13 | End: 2022-05-18 | Stop reason: HOSPADM

## 2022-05-13 RX ORDER — DIGOXIN 125 MCG
125 TABLET ORAL DAILY
Status: DISCONTINUED | OUTPATIENT
Start: 2022-05-14 | End: 2022-05-18 | Stop reason: HOSPADM

## 2022-05-13 RX ORDER — DEXTROSE MONOHYDRATE 50 MG/ML
100 INJECTION, SOLUTION INTRAVENOUS PRN
Status: DISCONTINUED | OUTPATIENT
Start: 2022-05-13 | End: 2022-05-18 | Stop reason: HOSPADM

## 2022-05-13 RX ORDER — SODIUM CHLORIDE 9 MG/ML
INJECTION, SOLUTION INTRAVENOUS PRN
Status: DISCONTINUED | OUTPATIENT
Start: 2022-05-13 | End: 2022-05-18 | Stop reason: HOSPADM

## 2022-05-13 RX ORDER — LISINOPRIL 20 MG/1
20 TABLET ORAL DAILY
Status: DISCONTINUED | OUTPATIENT
Start: 2022-05-13 | End: 2022-05-18 | Stop reason: HOSPADM

## 2022-05-13 RX ORDER — INSULIN LISPRO 100 [IU]/ML
0-3 INJECTION, SOLUTION INTRAVENOUS; SUBCUTANEOUS NIGHTLY
Status: DISCONTINUED | OUTPATIENT
Start: 2022-05-13 | End: 2022-05-18 | Stop reason: HOSPADM

## 2022-05-13 RX ORDER — VITAMIN B COMPLEX
1000 TABLET ORAL DAILY
Status: DISCONTINUED | OUTPATIENT
Start: 2022-05-13 | End: 2022-05-18 | Stop reason: HOSPADM

## 2022-05-13 RX ORDER — AMLODIPINE BESYLATE 5 MG/1
10 TABLET ORAL DAILY
Status: DISCONTINUED | OUTPATIENT
Start: 2022-05-13 | End: 2022-05-18 | Stop reason: HOSPADM

## 2022-05-13 RX ORDER — SODIUM CHLORIDE 9 MG/ML
INJECTION, SOLUTION INTRAVENOUS CONTINUOUS
Status: DISCONTINUED | OUTPATIENT
Start: 2022-05-13 | End: 2022-05-18

## 2022-05-13 RX ORDER — ACETAMINOPHEN 650 MG/1
650 SUPPOSITORY RECTAL EVERY 6 HOURS PRN
Status: DISCONTINUED | OUTPATIENT
Start: 2022-05-13 | End: 2022-05-18 | Stop reason: HOSPADM

## 2022-05-13 RX ORDER — ONDANSETRON 2 MG/ML
4 INJECTION INTRAMUSCULAR; INTRAVENOUS EVERY 6 HOURS PRN
Status: DISCONTINUED | OUTPATIENT
Start: 2022-05-13 | End: 2022-05-18 | Stop reason: HOSPADM

## 2022-05-13 RX ORDER — SODIUM CHLORIDE 0.9 % (FLUSH) 0.9 %
5-40 SYRINGE (ML) INJECTION EVERY 12 HOURS SCHEDULED
Status: DISCONTINUED | OUTPATIENT
Start: 2022-05-13 | End: 2022-05-18 | Stop reason: HOSPADM

## 2022-05-13 RX ADMIN — SODIUM CHLORIDE, PRESERVATIVE FREE 10 ML: 5 INJECTION INTRAVENOUS at 23:10

## 2022-05-13 RX ADMIN — Medication 1000 UNITS: at 23:09

## 2022-05-13 RX ADMIN — LISINOPRIL 20 MG: 20 TABLET ORAL at 23:09

## 2022-05-13 RX ADMIN — VANCOMYCIN HYDROCHLORIDE 1500 MG: 10 INJECTION, POWDER, LYOPHILIZED, FOR SOLUTION INTRAVENOUS at 18:00

## 2022-05-13 RX ADMIN — CEFEPIME 2000 MG: 2 INJECTION, POWDER, FOR SOLUTION INTRAVENOUS at 16:43

## 2022-05-13 RX ADMIN — SODIUM CHLORIDE: 9 INJECTION, SOLUTION INTRAVENOUS at 23:16

## 2022-05-13 RX ADMIN — AMLODIPINE BESYLATE 10 MG: 5 TABLET ORAL at 23:09

## 2022-05-13 RX ADMIN — ATORVASTATIN CALCIUM 10 MG: 10 TABLET, FILM COATED ORAL at 23:10

## 2022-05-13 ASSESSMENT — PAIN SCALES - GENERAL
PAINLEVEL_OUTOF10: 5
PAINLEVEL_OUTOF10: 2

## 2022-05-13 ASSESSMENT — PAIN - FUNCTIONAL ASSESSMENT: PAIN_FUNCTIONAL_ASSESSMENT: 0-10

## 2022-05-13 ASSESSMENT — LIFESTYLE VARIABLES: HOW OFTEN DO YOU HAVE A DRINK CONTAINING ALCOHOL: NEVER

## 2022-05-13 ASSESSMENT — PAIN DESCRIPTION - ORIENTATION: ORIENTATION: RIGHT

## 2022-05-13 ASSESSMENT — PAIN DESCRIPTION - LOCATION: LOCATION: TOE (COMMENT WHICH ONE)

## 2022-05-13 NOTE — ED PROVIDER NOTES
Kaylee 50        Pt Name: Lin Max  MRN: [id number]  [place] 1945  Date of evaluation: 5/13/2022  Provider: MARVIN Dennis CNP  PCP: MARVIN Tinoco CNP  Note Started: 4:19 PM EDT       I have seen and evaluated this patient with my supervising physician Cameron Patino MD.      Kimani ALVES 49.       Chief Complaint   Patient presents with    Toe Injury     Pt states that she was sent to ED per PCP for possible toe infection. HISTORY OF PRESENT ILLNESS   (Location/Symptom, Timing/Onset, Context/Setting, Quality, Duration, Modifying Factors, Severity)  Note limiting factors. Chief Complaint: Infection involving the right great toe and fourth digit. Lin Max is a 68 y.o. female who presents to the emergency department with symptoms of ulceration involving the right great toe and fourth digit of foot. Patient states that symptoms began approximately a month ago. States that symptoms have continued to worsen. States that now she has swelling involving the foot, ankle, and mid shin. No fevers no chills. States that she was evaluated by her family doctor yesterday and was provided outpatient x-rays. X-rays were read today and they were concerning for infection of the bone called osteomyelitis. Patient states she reported zoster involving the great toe and fourth digit. She has no other acute complaints at this time and states otherwise feeling well. She states that she was sent here from her family doctor's office to be evaluated and placed on IV antibiotics and admitted to the hospital.    Nursing Notes were all reviewed and agreed with or any disagreements were addressed in the HPI. REVIEW OF SYSTEMS    (2-9 systems for level 4, 10 or more for level 5)     Review of Systems   Constitutional: Negative for chills, diaphoresis and fever.    HENT: Negative for congestion, ear pain, rhinorrhea and sore throat. Eyes: Negative for pain and visual disturbance. Respiratory: Negative for cough and shortness of breath. Cardiovascular: Negative for chest pain and leg swelling. Gastrointestinal: Negative for abdominal pain, blood in stool, diarrhea, nausea and vomiting. Genitourinary: Negative for difficulty urinating, dysuria, flank pain and frequency. Musculoskeletal: Negative for back pain and neck pain. Erythema involving the foot, ankle, and mid shin. Erythema is concerning for cellulitis. She also has some dry ulcerations with dried necrotic tissue involving the distal tip of the right great toe and right fourth digit of foot   Skin: Negative for rash and wound. Neurological: Negative for dizziness and light-headedness.        PAST MEDICAL HISTORY     Past Medical History:   Diagnosis Date    Atrial fibrillation ([facility]     off coumadin after bleed, declined restart    Blood transfusion     CAD (coronary artery disease)     Diabetes mellitus type II     Diabetic neuropathy ([facility] 2011    Gastric ulcer     H. pylori infection 2009    Hyperlipidemia     Hypertension     Numbness and tingling of left leg     Osteoarthritis     knees    Poor historian     PVD (peripheral vascular disease) ([facility] [facility]     PTA distal sfa/pop/peroneal, Dr. Elzbieta Lopez 12/2015    Unspecified cerebral artery occlusion with cerebral infarction     TIA affected left eye    upper gi bleed 12/2009       SURGICAL HISTORY     Past Surgical History:   Procedure Laterality Date    ANGIOPLASTY  12/30/15    Dr. Elzbieta Lopez, E, PTA of distal sfa/pop/peroneal    CARDIAC CATHETERIZATION  9/21/12    done for surgical clearance, cath was negative    CORONARY ANGIOPLASTY WITH STENT PLACEMENT  [id number]    FOOT SURGERY Left 01/11/2018    DEBRIDEMENT OF ULCERS LEFT FOOT AND LEG WITH GRAFT    OTHER SURGICAL HISTORY Left 06/05/2017    Left Femoral Peroneal Bypass    SHOULDER ARTHROPLASTY  10/5/12    RIGHT SHOULDER TOTAL ARTHROPLASTY, BICEPS TENODESIS OSIEL, GLOBAL ADVANTAGE AP       CURRENTMEDICATIONS       Current Discharge Medication List      CONTINUE these medications which have NOT CHANGED    Details   aspirin 81 MG EC tablet Take 81 mg by mouth daily      clindamycin (CLEOCIN) 150 MG capsule Take 3 capsules by mouth 3 times daily for 10 days  Qty: 90 capsule, Refills: 0    Associated Diagnoses: Non-healing open wound of toe, sequela      Ibuprofen (ADVIL PO) Take by mouth      vitamin D3 (CHOLECALCIFEROL) 25 MCG (1000 UT) TABS tablet Take 1 tablet by mouth daily  Qty: 1 tablet, Refills: 0      metFORMIN (GLUCOPHAGE) 500 MG tablet TAKE 2 TABLETS BY MOUTH EVERY DAY WITH BREAKFAST  Qty: 180 tablet, Refills: 0      digoxin (LANOXIN) 125 MCG tablet Take 1 tablet daily.   Qty: 90 tablet, Refills: 5    Associated Diagnoses: Paroxysmal atrial fibrillation (HCC)      amLODIPine (NORVASC) 10 MG tablet TAKE 1 TABLET BY MOUTH EVERY DAY  Qty: 90 tablet, Refills: 5    Associated Diagnoses: Essential (primary) hypertension      lisinopril (PRINIVIL;ZESTRIL) 20 MG tablet TAKE 1 TABLET BY MOUTH EVERY DAY  Qty: 90 tablet, Refills: 5    Associated Diagnoses: Essential hypertension      simvastatin (ZOCOR) 20 MG tablet TAKE 1 TABLET BY MOUTH EVERY DAY AT NIGHT  Qty: 90 tablet, Refills: 5             ALLERGIES     Pcn [penicillins]    FAMILYHISTORY       Family History   Problem Relation Age of Onset    Heart Disease Mother     Stroke Mother     Heart Disease Father     Diabetes Sister     Diabetes Brother     Diabetes Maternal Grandmother         SOCIAL HISTORY       Social History     Socioeconomic History    Marital status:      Spouse name: None    Number of children: None    Years of education: None    Highest education level: None   Occupational History    None   Tobacco Use    Smoking status: Never Smoker    Smokeless tobacco: Never Used    Tobacco comment: Not needed   Vaping Use    Vaping Use: Never used   Substance and Sexual Activity    Alcohol use: No    Drug use: No    Sexual activity: Yes     Partners: Male   Other Topics Concern    None   Social History Narrative    None     Social Determinants of Health     Financial Resource Strain:     Difficulty of Paying Living Expenses: Not on file   Food Insecurity:     Worried About Running Out of Food in the Last Year: Not on file    Donal of Food in the Last Year: Not on file   Transportation Needs:     Lack of Transportation (Medical): Not on file    Lack of Transportation (Non-Medical): Not on file   Physical Activity:     Days of Exercise per Week: Not on file    Minutes of Exercise per Session: Not on file   Stress:     Feeling of Stress : Not on file   Social Connections:     Frequency of Communication with Friends and Family: Not on file    Frequency of Social Gatherings with Friends and Family: Not on file    Attends Congregational Services: Not on file    Active Member of [address] [place] [address] [employer] or Organizations: Not on file    Attends Club or Organization Meetings: Not on file    Marital Status: Not on file   Intimate Partner Violence:     Fear of Current or Ex-Partner: Not on file    Emotionally Abused: Not on file    Physically Abused: Not on file    Sexually Abused: Not on file   Housing Stability:     Unable to Pay for Housing in the Last Year: Not on file    Number of [place] in the Last Year: Not on file    Unstable Housing in the Last Year: Not on file       SCREENINGS    [place] Coma Scale  Eye Opening: Spontaneous  Best Verbal Response: Oriented  Best Motor Response: Obeys commands  [place] Coma Scale Score: 15        PHYSICAL EXAM    (up to 7 for level 4, 8 or more for level 5)     ED Triage Vitals [05/13/22 1455]   BP Temp Temp Source Pulse Resp SpO2 Height Weight   (!) 165/65 96.3 °F (35.7 °C) Temporal 54 16 98 % -- 133 lb (60.3 kg)       Physical Exam  Vitals and nursing note reviewed. Constitutional:       Appearance: Normal appearance.  She is not toxic-appearing or diaphoretic. HENT:      Head: Normocephalic and atraumatic. Nose: Nose normal.   Eyes:      General:         Right eye: No discharge. Left eye: No discharge. Cardiovascular:      Rate and Rhythm: Normal rate and regular rhythm. Pulses: Normal pulses. Heart sounds: No murmur heard. Pulmonary:      Effort: Pulmonary effort is normal. No respiratory distress. Breath sounds: No wheezing or rhonchi. Abdominal:      Palpations: Abdomen is soft. Tenderness: There is no abdominal tenderness. There is no guarding or rebound. Musculoskeletal:      Cervical back: Normal range of motion and neck supple. Right foot: Swelling and tenderness present. Normal pulse. Left foot: Normal. Normal pulse. Comments: 2+ dorsalis pedis and posterior tibial pulse. Doppler confirmed in Bilateral lower extremities. Cellulitis noted from the foot up to mid shin of the right lower extremity. Denies any knee pain or hip pain. Skin:     General: Skin is warm and dry. Capillary Refill: Capillary refill takes less than 2 seconds. Neurological:      General: No focal deficit present. Mental Status: She is alert and oriented to person, place, and time.    Psychiatric:         Mood and Affect: Mood normal.         Behavior: Behavior normal.         DIAGNOSTIC RESULTS   LABS:    Labs Reviewed   CBC WITH AUTO DIFFERENTIAL - Abnormal; Notable for the following components:       Result Value    Hemoglobin 10.7 (*)     Hematocrit 33.4 (*)     All other components within normal limits   COMPREHENSIVE METABOLIC PANEL W/ REFLEX TO MG FOR LOW K - Abnormal; Notable for the following components:    Glucose 103 (*)     BUN 40 (*)     CREATININE 1.3 (*)     GFR Non- 40 (*)     GFR  48 (*)     All other components within normal limits   CBC WITH AUTO DIFFERENTIAL - Abnormal; Notable for the following components:    RBC 3.55 (*)     Hemoglobin 9.6 (*)     Hematocrit 29.0 (*)     All other components within normal limits   BASIC METABOLIC PANEL W/ REFLEX TO MG FOR LOW K - Abnormal; Notable for the following components:    Glucose 115 (*)     BUN 37 (*)     GFR Non- 48 (*)     GFR  58 (*)     All other components within normal limits   POCT GLUCOSE - Abnormal; Notable for the following components:    POC Glucose 117 (*)     All other components within normal limits   POCT GLUCOSE - Abnormal; Notable for the following components:    POC Glucose 113 (*)     All other components within normal limits   POCT GLUCOSE - Abnormal; Notable for the following components:    POC Glucose 162 (*)     All other components within normal limits   POCT GLUCOSE - Abnormal; Notable for the following components:    POC Glucose 123 (*)     All other components within normal limits   CULTURE, BLOOD 1    Narrative:     ORDER#: [id number]                          ORDERED BY: CORY FIERRO  SOURCE: Blood Antecubital-Lef              COLLECTED:  05/13/22 16:15  ANTIBIOTICS AT ONEL.:                      RECEIVED :  05/14/22 03:12  If child <=2 yrs old please draw pediatric bottle. ~Blood Culture 1   CULTURE, BLOOD 2    Narrative:     ORDER#: [id number]                          ORDERED BY: Mariela Francisco  SOURCE: Blood Antecubital-Rig              COLLECTED:  05/13/22 16:15  ANTIBIOTICS AT ONEL.:                      RECEIVED :  05/14/22 03:12  If child <=2 yrs old please draw pediatric bottle. ~Blood Culture #2   COVID-19 & INFLUENZA COMBO   LACTIC ACID   VANCOMYCIN LEVEL, RANDOM   HEMOGLOBIN B2T   BASIC METABOLIC PANEL W/ REFLEX TO MG FOR LOW K   CBC WITH AUTO DIFFERENTIAL   SEDIMENTATION RATE   C-REACTIVE PROTEIN   POCT GLUCOSE   POCT GLUCOSE   POCT GLUCOSE   POCT GLUCOSE   POCT GLUCOSE   POCT GLUCOSE   POCT GLUCOSE   POCT GLUCOSE       When ordered, only abnormal lab results are displayed.  All other labs were within normal range or not returned as of this dictation. EKG: When ordered, EKG's are interpreted by the Emergency Department Physician in the absence of a cardiologist.  Please see their note for interpretation of EKG. RADIOLOGY:   Non-plain film images such as CT, Ultrasound and MRI are read by the radiologist. Plain radiographic images are visualized andpreliminarily interpreted by the  ED Provider with the below findings:        Interpretation perthe Radiologist below, if available at the time of this note:    MRI FOOT RIGHT WO CONTRAST    (Results Pending)     No results found.       PROCEDURES   Unless otherwise noted below, none     Procedures    CRITICAL CARE TIME   N/A    CONSULTS:  IP CONSULT TO PODIATRY  IP CONSULT TO PHARMACY      EMERGENCY DEPARTMENT COURSE and DIFFERENTIAL DIAGNOSIS/MDM:   Vitals:    Vitals:    05/14/22 0530 05/14/22 0845 05/14/22 1401 05/14/22 2238   BP: (!) 154/59 (!) 172/70 (!) 148/54 (!) 179/66   Pulse: 54 78 92 61   Resp:   18 16   Temp:  97.1 °F (36.2 °C) 97 °F (36.1 °C) 98.4 °F (36.9 °C)   TempSrc:  Oral Oral Oral   SpO2:  96% 98% 96%   Weight:       Height:           Patient was given thefollowing medications:  Medications   metFORMIN (GLUCOPHAGE) tablet 500 mg (500 mg Oral Given 5/14/22 1658)   digoxin (LANOXIN) tablet 125 mcg (125 mcg Oral Given 5/14/22 0852)   amLODIPine (NORVASC) tablet 10 mg (10 mg Oral Given 5/14/22 0852)   lisinopril (PRINIVIL;ZESTRIL) tablet 20 mg (20 mg Oral Given 5/14/22 0851)   atorvastatin (LIPITOR) tablet 10 mg (10 mg Oral Given 5/14/22 0852)   Vitamin D (CHOLECALCIFEROL) tablet 1,000 Units (1,000 Units Oral Given 5/14/22 0852)   insulin lispro (HUMALOG) injection vial 0-6 Units (0 Units SubCUTAneous Not Given 5/14/22 1820)   insulin lispro (HUMALOG) injection vial 0-3 Units (0 Units SubCUTAneous Not Given 5/14/22 2030)   glucose chewable tablet 16 g (has no administration in time range)   dextrose bolus 10% 125 mL (has no administration in time range)     Or   dextrose bolus 10% 250 mL (has no administration in time range)   glucagon (rDNA) injection 1 mg (has no administration in time range)   dextrose 5 % solution (has no administration in time range)   0.9 % sodium chloride infusion ( IntraVENous Rate/Dose Verify 5/14/22 0658)   sodium chloride flush 0.9 % injection 5-40 mL (5 mLs IntraVENous Not Given 5/14/22 2238)   sodium chloride flush 0.9 % injection 5-40 mL (has no administration in time range)   0.9 % sodium chloride infusion (has no administration in time range)   potassium chloride (KLOR-CON M) extended release tablet 40 mEq (has no administration in time range)     Or   potassium bicarb-citric acid (EFFER-K) effervescent tablet 40 mEq (has no administration in time range)     Or   potassium chloride 10 mEq/100 mL IVPB (Peripheral Line) (has no administration in time range)   magnesium sulfate 1000 mg in dextrose 5% 100 mL IVPB (has no administration in time range)   enoxaparin Sodium (LOVENOX) injection 30 mg (30 mg SubCUTAneous Given 5/14/22 0852)   ondansetron (ZOFRAN-ODT) disintegrating tablet 4 mg (has no administration in time range)     Or   ondansetron (ZOFRAN) injection 4 mg (has no administration in time range)   polyethylene glycol (GLYCOLAX) packet 17 g (has no administration in time range)   acetaminophen (TYLENOL) tablet 650 mg (has no administration in time range)     Or   acetaminophen (TYLENOL) suppository 650 mg (has no administration in time range)   cefepime (MAXIPIME) 1000 mg IVPB minibag (0 mg IntraVENous Stopped 5/14/22 1730)   vancomycin (VANCOCIN) intermittent dosing (placeholder) (has no administration in time range)   hydrALAZINE (APRESOLINE) tablet 25 mg (25 mg Oral Given 5/14/22 2243)   cefepime (MAXIPIME) 2000 mg IVPB minibag (0 mg IntraVENous Stopped 5/13/22 1801)   vancomycin 1500 mg in dextrose 5% 300 mL IVPB (0 mg IntraVENous Stopped 5/13/22 1955)   vancomycin (VANCOCIN) 750 mg in dextrose 5 % 250 mL IVPB (0 mg IntraVENous Stopped 5/14/22 0958)     ED Course as of 05/15/22 0023   Fri May 13, 2022   1552 Osteomyelitis of right great toe and 4th digit  Cellulitis to mid shin  Admission for IV Abx [ER]   1648 Anemia to 10.7. No leukocytosis or thrombocytopenia [ER]   1719 COVID and flu swab negative [ER]   6965 Mild kidney dysfunction with creatinine of 1.3. No electrolyte abnormalities [ER]   1720 Liver function testing unremarkable [ER]      ED Course User Index  [ER] Luis Berg MD      Is this patient to be included in the SEP-1 Core Measure due to severe sepsis or septic shock? No   Exclusion criteria - the patient is NOT to be included for SEP-1 Core Measure due to:  2+ SIRS criteria are not met    Plan is patient is to be admitted to the hospital service. Patient was noted to have osteomyelitis involving the right great toe and fourth digit of the foot. Patient states that he has been having pain in the site for last few weeks/months. She is also noted to have cellulitis up to the point of mid shin. Patient be admitted to the hospital service for IV antibiotics and evaluation by podiatry. Patient has no other change in condition and states that she otherwise feels well. I spoke with hospitalist and they are evaluating her for admission. FINAL IMPRESSION      1. Osteomyelitis of right foot, unspecified type ([address]    2. Cellulitis of right lower extremity          DISPOSITION/PLAN   DISPOSITION Admitted 05/13/2022 05:50:06 PM      PATIENT REFERREDTO:  No follow-up provider specified.     DISCHARGE MEDICATIONS:  Current Discharge Medication List          DISCONTINUED MEDICATIONS:  Current Discharge Medication List                 (Please note that portions ofthis note were completed with a voice recognition program.  Efforts were made to edit the dictations but occasionally words are mis-transcribed.)    Raj Halsted, APRN - CNP (electronically signed)            Raj Halsted, APRN - CNP  05/15/22 0023

## 2022-05-13 NOTE — PLAN OF CARE
Impression   1. Soft tissue swelling in the right 1st and 4th toes potentially due to   cellulitis given the clinical history with possible ulcerations.  No   underlying findings of necrotizing fasciitis.  However, there is   osteomyelitis involving the lateral cortex of the 4th middle phalanx, and   there may be osteomyelitis involving the distal tuft of the 1st distal   phalanx associated with a nondisplaced pathologic fracture.  Consider MRI. 2. Mild osteoarthritic changes of the right 1st metatarsophalangeal joint. 3. Bony demineralization.         outpt  X ray . 5/12/22         sent in for admission for osteo       Patient with history of type 2 diabetes mellitus, hypertension, hyperlipidemia, diabetic foot ulcers,  Seen as outpatient for diabetic foot foot ulcers with right leg cellulitis. Imaging shows osteomyelitis. Patient sent in for admission. -IV antibiotics Vanco and cefepime.   Podiatry consult  -Resume home meds  -Sliding scale insulin

## 2022-05-14 LAB
ANION GAP SERPL CALCULATED.3IONS-SCNC: 9 MMOL/L (ref 3–16)
BASOPHILS ABSOLUTE: 0.1 K/UL (ref 0–0.2)
BASOPHILS RELATIVE PERCENT: 1.2 %
BUN BLDV-MCNC: 37 MG/DL (ref 7–20)
CALCIUM SERPL-MCNC: 9.3 MG/DL (ref 8.3–10.6)
CHLORIDE BLD-SCNC: 106 MMOL/L (ref 99–110)
CO2: 22 MMOL/L (ref 21–32)
CREAT SERPL-MCNC: 1.1 MG/DL (ref 0.6–1.2)
EOSINOPHILS ABSOLUTE: 0.3 K/UL (ref 0–0.6)
EOSINOPHILS RELATIVE PERCENT: 4.8 %
GFR AFRICAN AMERICAN: 58
GFR NON-AFRICAN AMERICAN: 48
GLUCOSE BLD-MCNC: 113 MG/DL (ref 70–99)
GLUCOSE BLD-MCNC: 115 MG/DL (ref 70–99)
GLUCOSE BLD-MCNC: 123 MG/DL (ref 70–99)
GLUCOSE BLD-MCNC: 162 MG/DL (ref 70–99)
GLUCOSE BLD-MCNC: 84 MG/DL (ref 70–99)
HCT VFR BLD CALC: 29 % (ref 36–48)
HEMOGLOBIN: 9.6 G/DL (ref 12–16)
LYMPHOCYTES ABSOLUTE: 1.3 K/UL (ref 1–5.1)
LYMPHOCYTES RELATIVE PERCENT: 19.7 %
MCH RBC QN AUTO: 27.1 PG (ref 26–34)
MCHC RBC AUTO-ENTMCNC: 33.2 G/DL (ref 31–36)
MCV RBC AUTO: 81.8 FL (ref 80–100)
MONOCYTES ABSOLUTE: 0.5 K/UL (ref 0–1.3)
MONOCYTES RELATIVE PERCENT: 7.4 %
NEUTROPHILS ABSOLUTE: 4.3 K/UL (ref 1.7–7.7)
NEUTROPHILS RELATIVE PERCENT: 66.9 %
PDW BLD-RTO: 15.2 % (ref 12.4–15.4)
PERFORMED ON: ABNORMAL
PERFORMED ON: NORMAL
PLATELET # BLD: 187 K/UL (ref 135–450)
PMV BLD AUTO: 7.3 FL (ref 5–10.5)
POTASSIUM REFLEX MAGNESIUM: 4.4 MMOL/L (ref 3.5–5.1)
RBC # BLD: 3.55 M/UL (ref 4–5.2)
SODIUM BLD-SCNC: 137 MMOL/L (ref 136–145)
VANCOMYCIN RANDOM: 13.5 UG/ML
WBC # BLD: 6.4 K/UL (ref 4–11)

## 2022-05-14 PROCEDURE — 6370000000 HC RX 637 (ALT 250 FOR IP): Performed by: INTERNAL MEDICINE

## 2022-05-14 PROCEDURE — 6360000002 HC RX W HCPCS: Performed by: INTERNAL MEDICINE

## 2022-05-14 PROCEDURE — 85025 COMPLETE CBC W/AUTO DIFF WBC: CPT

## 2022-05-14 PROCEDURE — 1200000000 HC SEMI PRIVATE

## 2022-05-14 PROCEDURE — 80202 ASSAY OF VANCOMYCIN: CPT

## 2022-05-14 PROCEDURE — 36415 COLL VENOUS BLD VENIPUNCTURE: CPT

## 2022-05-14 PROCEDURE — 80048 BASIC METABOLIC PNL TOTAL CA: CPT

## 2022-05-14 PROCEDURE — 99223 1ST HOSP IP/OBS HIGH 75: CPT | Performed by: INTERNAL MEDICINE

## 2022-05-14 PROCEDURE — 2580000003 HC RX 258: Performed by: INTERNAL MEDICINE

## 2022-05-14 RX ORDER — HYDRALAZINE HYDROCHLORIDE 25 MG/1
25 TABLET, FILM COATED ORAL EVERY 6 HOURS PRN
Status: DISCONTINUED | OUTPATIENT
Start: 2022-05-14 | End: 2022-05-16

## 2022-05-14 RX ADMIN — METFORMIN HYDROCHLORIDE 500 MG: 500 TABLET ORAL at 08:52

## 2022-05-14 RX ADMIN — LISINOPRIL 20 MG: 20 TABLET ORAL at 08:51

## 2022-05-14 RX ADMIN — INSULIN LISPRO 1 UNITS: 100 INJECTION, SOLUTION INTRAVENOUS; SUBCUTANEOUS at 12:20

## 2022-05-14 RX ADMIN — DIGOXIN 125 MCG: 125 TABLET ORAL at 08:52

## 2022-05-14 RX ADMIN — ENOXAPARIN SODIUM 30 MG: 100 INJECTION SUBCUTANEOUS at 08:52

## 2022-05-14 RX ADMIN — CEFEPIME HYDROCHLORIDE 1000 MG: 1 INJECTION, POWDER, FOR SOLUTION INTRAMUSCULAR; INTRAVENOUS at 17:00

## 2022-05-14 RX ADMIN — ATORVASTATIN CALCIUM 10 MG: 10 TABLET, FILM COATED ORAL at 08:52

## 2022-05-14 RX ADMIN — VANCOMYCIN HYDROCHLORIDE 750 MG: 750 INJECTION, POWDER, LYOPHILIZED, FOR SOLUTION INTRAVENOUS at 08:58

## 2022-05-14 RX ADMIN — METFORMIN HYDROCHLORIDE 500 MG: 500 TABLET ORAL at 16:58

## 2022-05-14 RX ADMIN — Medication 1000 UNITS: at 08:52

## 2022-05-14 RX ADMIN — CEFEPIME HYDROCHLORIDE 1000 MG: 1 INJECTION, POWDER, FOR SOLUTION INTRAMUSCULAR; INTRAVENOUS at 05:34

## 2022-05-14 RX ADMIN — HYDRALAZINE HYDROCHLORIDE 25 MG: 25 TABLET, FILM COATED ORAL at 22:43

## 2022-05-14 RX ADMIN — AMLODIPINE BESYLATE 10 MG: 5 TABLET ORAL at 08:52

## 2022-05-14 ASSESSMENT — PAIN DESCRIPTION - LOCATION: LOCATION: FOOT

## 2022-05-14 ASSESSMENT — PAIN SCALES - GENERAL
PAINLEVEL_OUTOF10: 5
PAINLEVEL_OUTOF10: 5

## 2022-05-14 ASSESSMENT — PAIN DESCRIPTION - ORIENTATION: ORIENTATION: RIGHT

## 2022-05-14 NOTE — FLOWSHEET NOTE
05/14/22 0419   Treatment Team Notification   Reason for Communication Abnormal vitals  (Elevated /57)   Team Member Name [facility]   Treatment Team Role Attending Provider   Method of Communication Call   Response Waiting for response   Notification Time [phone]     Notified MD of HTN, 174/57. MD aware. No new orders at this time. 7643: New orders for PRN hydralaizine 25 mg tablet every 6 hrs as needed. See orders.

## 2022-05-14 NOTE — FLOWSHEET NOTE
05/14/22 [place] Given To [employer] Received From [facility] Communication Face to Face; At bedside   Time Handoff Given 6460   End of Shift Check Performed Yes

## 2022-05-14 NOTE — FLOWSHEET NOTE
05/14/22 0530   Vital Signs   Pulse 54   Heart Rate Source Monitor   BP (!) 154/59   MAP (Calculated) 90.67   Level of Consciousness Alert (0)     Unable to given PRN hydralazine, BP did not meet order parameters.

## 2022-05-14 NOTE — CONSULTS
Pharmacy to dose IV Vanco:  1,500mg IV Vanco given in ED to provide Gram+ organism coverage to include MRSA. Will check a Random Vanco with AM labs. Will pulse dose until renal function stabilizes. Bone/ joint infection.   CARLOTA [facility] PharmD 5/13/2022 8:20 PM

## 2022-05-14 NOTE — PROGRESS NOTES
4 Eyes Skin Assessment     The patient is being assess for   Admission    I agree that 2 RN's have performed a thorough Head to Toe Skin Assessment on the patient. ALL assessment sites listed below have been assessed. Areas assessed for pressure by both nurses:   [x]   Head, Face, and Ears   [x]   Shoulders, Back, and Chest, Abdomen  [x]   Arms, Elbows, and Hands   [x]   Coccyx, Sacrum, and Ischium  [x]   Legs, Feet, and Heels        Healing skin teat to left forearm. Scattered bruising to BUE & BLE. Non-healing wound, ulcer to Right foot; digits 1 & 4, dry eschar. Skin Assessed Under all Medical Devices by both nurses:  N/A                      **SHARE this note so that the co-signing nurse is able to place an eSignature**    Co-signer eSignature: Electronically signed by Juan Bruner RN on 5/14/22 at 4:59 AM EDT    Does the Patient have Skin Breakdown related to pressure?   No     Jc Prevention initiated:  No   Wound Care Orders initiated:  No      [facility] nurse consulted for Pressure Injury (Stage 3,4, Unstageable, DTI, NWPT, Complex wounds)and New or Established Ostomies:  No      Primary Nurse eSignature: Electronically signed by Roseann Jones RN on 5/14/22 at 4:33 AM EDT

## 2022-05-14 NOTE — PLAN OF CARE
Problem: Discharge Planning  Goal: Discharge to home or other facility with appropriate resources  5/14/2022 1225 by Taina Carrera RN  Outcome: Progressing  5/14/2022 0508 by Yamile Mckinney RN  Outcome: Progressing  Flowsheets  Taken 5/14/2022 0358  Discharge to home or other facility with appropriate resources:   Identify barriers to discharge with patient and caregiver   Arrange for needed discharge resources and transportation as appropriate   Identify discharge learning needs (meds, wound care, etc)  Taken 5/13/2022 2325  Discharge to home or other facility with appropriate resources:   Identify barriers to discharge with patient and caregiver   Arrange for needed discharge resources and transportation as appropriate   Identify discharge learning needs (meds, wound care, etc)     Problem: Pain  Goal: Verbalizes/displays adequate comfort level or baseline comfort level  5/14/2022 1225 by Taina Carrera RN  Outcome: Progressing  5/14/2022 0508 by Yamile Mckinney RN  Outcome: Progressing  Flowsheets (Taken 5/13/2022 2307)  Verbalizes/displays adequate comfort level or baseline comfort level: Encourage patient to monitor pain and request assistance     Problem: Safety - Adult  Goal: Free from fall injury  5/14/2022 1225 by Taina Carrera RN  Outcome: Progressing  5/14/2022 0508 by Yamile Mckinney RN  Outcome: Progressing     Problem: Genitourinary - Adult  Goal: Absence of urinary retention  5/14/2022 1225 by Taina Carrera RN  Outcome: Progressing  5/14/2022 0508 by Yamile Mckinney RN  Outcome: Progressing     Problem: Metabolic/Fluid and Electrolytes - Adult  Goal: Electrolytes maintained within normal limits  5/14/2022 1225 by Taina Carrera RN  Outcome: Progressing  5/14/2022 0508 by Yamile Mckinney RN  Outcome: Progressing  Goal: Hemodynamic stability and optimal renal function maintained  5/14/2022 1225 by Taina Carrera RN  Outcome: Progressing  5/14/2022 0508 by Yamile Mckinney RN  Outcome: Progressing  Goal: Glucose maintained within prescribed range  5/14/2022 1225 by Sally Reis RN  Outcome: Progressing  5/14/2022 0508 by Philipp Villarreal RN  Outcome: Progressing     Problem: Hematologic - Adult  Goal: Maintains hematologic stability  5/14/2022 1225 by Sally Reis RN  Outcome: Progressing  5/14/2022 0508 by Philipp Villarreal RN  Outcome: Progressing     Problem: Chronic Conditions and Co-morbidities  Goal: Patient's chronic conditions and co-morbidity symptoms are monitored and maintained or improved  Outcome: Progressing

## 2022-05-14 NOTE — PLAN OF CARE
Problem: Discharge Planning  Goal: Discharge to home or other facility with appropriate resources  Outcome: Progressing  Flowsheets  Taken 5/14/2022 0358  Discharge to home or other facility with appropriate resources:   Identify barriers to discharge with patient and caregiver   Arrange for needed discharge resources and transportation as appropriate   Identify discharge learning needs (meds, wound care, etc)  Taken 5/13/2022 2323  Discharge to home or other facility with appropriate resources:   Identify barriers to discharge with patient and caregiver   Arrange for needed discharge resources and transportation as appropriate   Identify discharge learning needs (meds, wound care, etc)     Problem: Pain  Goal: Verbalizes/displays adequate comfort level or baseline comfort level  Outcome: Progressing  Flowsheets (Taken 5/13/2022 2305)  Verbalizes/displays adequate comfort level or baseline comfort level: Encourage patient to monitor pain and request assistance     Problem: Safety - Adult  Goal: Free from fall injury  Outcome: Progressing     Problem: Genitourinary - Adult  Goal: Absence of urinary retention  Outcome: Progressing     Problem: Metabolic/Fluid and Electrolytes - Adult  Goal: Electrolytes maintained within normal limits  Outcome: Progressing  Goal: Hemodynamic stability and optimal renal function maintained  Outcome: Progressing  Goal: Glucose maintained within prescribed range  Outcome: Progressing     Problem: Hematologic - Adult  Goal: Maintains hematologic stability  Outcome: Progressing

## 2022-05-14 NOTE — FLOWSHEET NOTE
05/14/22 0845   Vital Signs   Temp 97.1 °F (36.2 °C)   Temp Source Oral   Pulse 78   Heart Rate Source Monitor   BP (!) 172/70   BP Location Right upper arm   MAP (Calculated) 104   Patient Position Semi fowlers   Level of Consciousness Alert (0)   Oxygen Therapy   SpO2 96 %   O2 Device None (Room air)     Pt assessment completed, see flow sheet. Pty alert and oriented x 4. Pt denies any needs at this time.

## 2022-05-14 NOTE — H&P
Hospital Medicine History & Physical      PCP: MARVIN Bass CNP    Date of Admission: 5/13/2022    Date of Service: Pt seen/examined on 5/14/2022     Chief Complaint:    Chief Complaint   Patient presents with    Toe Injury     Pt states that she was sent to ED per PCP for possible toe infection. History Of Present Illness: The patient is a 68 y.o. female with pmhx of atrial fibrillation, CAD, DM type 2, HTN, HLD, PVD, CVA who presented to [facility] ED with complaint of possible toe infection. Patient sent in for evaluation of diabetic foot ulcers, with necrotic toes; outpatient imaging suggestive of osteomyelitis. Right right foot first toe and fourth toe with ulcerations, necrotic tips, and possible osteomyelitis. right foot is diffusely erythematous and tender. No fevers or chills. Pt states that she has been noticing the darkening in her toes for months , more redness and pain now -> saw PCP, imaging done and sent to the hospital for IV antibiotics and podiatry evaluation.       Past Medical History:        Diagnosis Date    Atrial fibrillation ([address]     off coumadin after bleed, declined restart    Blood transfusion     CAD (coronary artery disease)     Diabetes mellitus type II     Diabetic neuropathy ([address] 2011    Gastric ulcer     H. pylori infection 2009    Hyperlipidemia     Hypertension     Numbness and tingling of left leg     Osteoarthritis     knees    Poor historian     PVD (peripheral vascular disease) ([address]     PTA distal sfa/pop/peroneal, Dr. Tim Martínez 12/2015    Unspecified cerebral artery occlusion with cerebral infarction     TIA affected left eye    upper gi bleed 12/2009       Past Surgical History:        Procedure Laterality Date    ANGIOPLASTY  12/30/15    Dr. Tim Martínez, E, PTA of distal sfa/pop/peroneal    CARDIAC CATHETERIZATION  9/21/12    done for surgical clearance, cath was negative    CORONARY ANGIOPLASTY WITH STENT PLACEMENT  3231,1003  FOOT SURGERY Left 01/11/2018    DEBRIDEMENT OF ULCERS LEFT FOOT AND LEG WITH GRAFT    OTHER SURGICAL HISTORY Left 06/05/2017    Left Femoral Peroneal Bypass    SHOULDER ARTHROPLASTY  10/5/12    RIGHT SHOULDER TOTAL ARTHROPLASTY, BICEPS TENODESIS DEPUY, GLOBAL ADVANTAGE AP       Medications Prior to Admission:    Prior to Admission medications    Medication Sig Start Date End Date Taking? Authorizing Provider   aspirin 81 MG EC tablet Take 81 mg by mouth daily   Yes Historical Provider, MD   clindamycin (CLEOCIN) 150 MG capsule Take 3 capsules by mouth 3 times daily for 10 days 5/12/22 5/22/22  MARVIN Guerrero CNP   Ibuprofen (ADVIL PO) Take by mouth    Historical Provider, MD   vitamin D3 (CHOLECALCIFEROL) 25 MCG (1000 UT) TABS tablet Take 1 tablet by mouth daily 3/25/22   MARVIN Guerrero CNP   metFORMIN (GLUCOPHAGE) 500 MG tablet TAKE 2 TABLETS BY MOUTH EVERY DAY WITH BREAKFAST 3/3/22   MARVIN Guerrero CNP   digoxin (LANOXIN) 125 MCG tablet Take 1 tablet daily. 3/3/22   MARVIN Guerrero CNP   amLODIPine (NORVASC) 10 MG tablet TAKE 1 TABLET BY MOUTH EVERY DAY 3/3/22   MARVIN Guerrero CNP   lisinopril (PRINIVIL;ZESTRIL) 20 MG tablet TAKE 1 TABLET BY MOUTH EVERY DAY 3/3/22   MARVIN Guerrero CNP   simvastatin (ZOCOR) 20 MG tablet TAKE 1 TABLET BY MOUTH EVERY DAY AT NIGHT 3/3/22   MARVIN Guerrero CNP       Allergies:  Pcn [penicillins]    Social History:  The patient currently lives at home    TOBACCO:   reports that she has never smoked. She has never used smokeless tobacco.  ETOH:   reports no history of alcohol use.       Family History:   Positive as follows:        Problem Relation Age of Onset    Heart Disease Mother     Stroke Mother     Heart Disease Father     Diabetes Sister     Diabetes Brother     Diabetes Maternal Grandmother        REVIEW OF SYSTEMS:     Constitutional: Negative for fever   HENT: Negative for sore throat   Eyes: Negative for redness Respiratory: Negative  for dyspnea, cough   Cardiovascular: Negative for chest pain   Gastrointestinal: Negative for vomiting, diarrhea   Genitourinary: Negative for hematuria   Musculoskeletal: Negative for arthralgias   Right foot ulcers as above  Skin: Negative for rash   Neurological: Negative for syncope   Hematological: Negative for adenopathy   Psychiatric/Behavorial: Negative for anxiety    PHYSICAL EXAM:    BP (!) 154/59   Pulse 54   Temp 97.1 °F (36.2 °C) (Oral)   Resp 16   Ht 5' 3\" (1.6 m)   Wt 131 lb (59.4 kg)   SpO2 96%   BMI 23.21 kg/m²     Gen: No distress. Alert. Awake and well-oriented  Thin built  Eyes: PERRL. No sclera icterus. No conjunctival injection. ENT: No discharge. Pharynx clear. Neck: No JVD. No Carotid Bruit. Trachea midline. Resp: No accessory muscle use. No crackles. No wheezes. No rhonchi. CV: Regular rate. Regular rhythm. No murmur. No rub. No edema. Capillary Refill: Brisk,< 3 seconds   Peripheral Pulses: +2 palpable, equal bilaterally   GI: Non-tender. Non-distended. No masses. No organomegaly. Normal bowel sounds. No hernia. Skin:  M/S:    Right Lower extremity : right foot right first great toe and fourth toe is necrotic. Right foot is erythematous and tender. .  No edema  Neuro: Awake. Grossly nonfocal    Psych: Oriented x 3. No anxiety or agitation.      Lab Results   Component Value Date    WBC 6.4 05/14/2022    HGB 9.6 (L) 05/14/2022    HCT 29.0 (L) 05/14/2022    MCV 81.8 05/14/2022     05/14/2022     Lab Results   Component Value Date     05/14/2022    K 4.4 05/14/2022     05/14/2022    CO2 22 05/14/2022    BUN 37 (H) 05/14/2022    CREATININE 1.1 05/14/2022    GLUCOSE 115 (H) 05/14/2022    CALCIUM 9.3 05/14/2022    PROT 8.0 05/13/2022    LABALBU 4.6 05/13/2022    BILITOT 0.3 05/13/2022    ALKPHOS 80 05/13/2022    AST 17 05/13/2022    ALT 15 05/13/2022    LABGLOM 48 (A) 05/14/2022    GFRAA 58 (A) 05/14/2022    AGRATIO 1.4 05/13/2022 GLOB 3.2 09/17/2021       U/A:    Lab Results   Component Value Date    NITRITE neg 05/02/2019    COLORU Straw 05/04/2019    WBCUA 3-5 05/04/2019    RBCUA 0-2 05/04/2019    BACTERIA 3+ 05/04/2019    CLARITYU Clear 05/04/2019    SPECGRAV <=1.005 05/04/2019    LEUKOCYTESUR SMALL 05/04/2019    BLOODU Negative 05/04/2019    GLUCOSEU Negative 05/04/2019    AMORPHOUS 2+ 09/19/2012       CULTURES  Blood cultures pending   COVID and Flu not detected     RADIOLOGY  No orders to display     xray done as outpatient on 5/12/22  Impression   1. Soft tissue swelling in the right 1st and 4th toes potentially due to   cellulitis given the clinical history with possible ulcerations.  No   underlying findings of necrotizing fasciitis.  However, there is   osteomyelitis involving the lateral cortex of the 4th middle phalanx, and   there may be osteomyelitis involving the distal tuft of the 1st distal   phalanx associated with a nondisplaced pathologic fracture.  Consider MRI. 2. Mild osteoarthritic changes of the right 1st metatarsophalangeal joint. 3. Bony demineralization.            ASSESSMENT/PLAN:    #Osteomyelitis of right foot  #Chronic diabetic foot ulcers. Right first and fourth toe  -Afebrile   -No leukocytosis   -Continue IV antibiotics , IV vanc and cefepime   -IVF  -blood cultures pending   -podiatry consulted  -MRI of the foot has been ordered    #Mild dehydration  -Improved with IV fluids  Creatinine1.3-> 1.1    #Atrial fibrillation   -on digoxin   -rate controlled     #HTN   -continue norvasc   -continue lisinopril     #DM type 2   -continue metformin   -SSI   -continue to monitor BS     #HX of CVA   #CAD  #PVD   -continue ASA and statin       DVT Prophylaxis: lovenox   Diet: ADULT DIET;  Regular; 4 carb choices (60 gm/meal)  Code Status: Full Code        Yue Mendez MD

## 2022-05-14 NOTE — PROGRESS NOTES
patient to march in place at bedside. 2. Then ask patient to advance step and return each foot. Some medical conditions may render a patient from stepping backwards, use your best clinical judgement. Fail- Patient not able to complete tasks OR requires use of assistive device. Patient is MOBILITY LEVEL 3.        Mobility Level- 3

## 2022-05-14 NOTE — PROGRESS NOTES
Pharmacy Vancomycin Consult     Vancomycin Day: 2  Current Dosing: pulse  Current indication: bone and joint infection    Recent Labs     05/13/22  1615 05/14/22  0601   BUN 40* 37*   CREATININE 1.3* 1.1   WBC 9.3 6.4       Intake/Output Summary (Last 24 hours) at 5/14/2022 0715  Last data filed at 5/14/2022 9523  Gross per 24 hour   Intake 579.87 ml   Output --   Net 579.87 ml     Culture Date      Source                       Results      Ht Readings from Last 1 Encounters:   05/13/22 5' 3\" (1.6 m)        Wt Readings from Last 1 Encounters:   05/13/22 131 lb (59.4 kg)       Body mass index is 23.21 kg/m². Estimated Creatinine Clearance: 36 mL/min (based on SCr of 1.1 mg/dL). Random: 13.5    Assessment/Plan:  Give vancomycin 750mg x1  Random level again tomorrow morning.

## 2022-05-15 LAB
ANION GAP SERPL CALCULATED.3IONS-SCNC: 9 MMOL/L (ref 3–16)
BASOPHILS ABSOLUTE: 0.1 K/UL (ref 0–0.2)
BASOPHILS RELATIVE PERCENT: 1 %
BUN BLDV-MCNC: 29 MG/DL (ref 7–20)
C-REACTIVE PROTEIN: <3 MG/L (ref 0–5.1)
CALCIUM SERPL-MCNC: 9.3 MG/DL (ref 8.3–10.6)
CHLORIDE BLD-SCNC: 107 MMOL/L (ref 99–110)
CO2: 21 MMOL/L (ref 21–32)
CREAT SERPL-MCNC: 0.9 MG/DL (ref 0.6–1.2)
EOSINOPHILS ABSOLUTE: 0.3 K/UL (ref 0–0.6)
EOSINOPHILS RELATIVE PERCENT: 4.1 %
GFR AFRICAN AMERICAN: >60
GFR NON-AFRICAN AMERICAN: >60
GLUCOSE BLD-MCNC: 103 MG/DL (ref 70–99)
GLUCOSE BLD-MCNC: 111 MG/DL (ref 70–99)
GLUCOSE BLD-MCNC: 114 MG/DL (ref 70–99)
GLUCOSE BLD-MCNC: 121 MG/DL (ref 70–99)
GLUCOSE BLD-MCNC: 84 MG/DL (ref 70–99)
HCT VFR BLD CALC: 28.4 % (ref 36–48)
HEMOGLOBIN: 9.4 G/DL (ref 12–16)
LYMPHOCYTES ABSOLUTE: 1.2 K/UL (ref 1–5.1)
LYMPHOCYTES RELATIVE PERCENT: 16.9 %
MCH RBC QN AUTO: 26.8 PG (ref 26–34)
MCHC RBC AUTO-ENTMCNC: 33.2 G/DL (ref 31–36)
MCV RBC AUTO: 80.9 FL (ref 80–100)
MONOCYTES ABSOLUTE: 0.5 K/UL (ref 0–1.3)
MONOCYTES RELATIVE PERCENT: 6.5 %
NEUTROPHILS ABSOLUTE: 5.2 K/UL (ref 1.7–7.7)
NEUTROPHILS RELATIVE PERCENT: 71.5 %
PDW BLD-RTO: 15.1 % (ref 12.4–15.4)
PERFORMED ON: ABNORMAL
PERFORMED ON: NORMAL
PLATELET # BLD: 176 K/UL (ref 135–450)
PMV BLD AUTO: 7.5 FL (ref 5–10.5)
POTASSIUM REFLEX MAGNESIUM: 4.5 MMOL/L (ref 3.5–5.1)
RBC # BLD: 3.5 M/UL (ref 4–5.2)
SEDIMENTATION RATE, ERYTHROCYTE: 33 MM/HR (ref 0–30)
SODIUM BLD-SCNC: 137 MMOL/L (ref 136–145)
VANCOMYCIN RANDOM: 11.6 UG/ML
WBC # BLD: 7.3 K/UL (ref 4–11)

## 2022-05-15 PROCEDURE — 6370000000 HC RX 637 (ALT 250 FOR IP): Performed by: INTERNAL MEDICINE

## 2022-05-15 PROCEDURE — 99232 SBSQ HOSP IP/OBS MODERATE 35: CPT | Performed by: INTERNAL MEDICINE

## 2022-05-15 PROCEDURE — 80048 BASIC METABOLIC PNL TOTAL CA: CPT

## 2022-05-15 PROCEDURE — 85025 COMPLETE CBC W/AUTO DIFF WBC: CPT

## 2022-05-15 PROCEDURE — 1200000000 HC SEMI PRIVATE

## 2022-05-15 PROCEDURE — 80202 ASSAY OF VANCOMYCIN: CPT

## 2022-05-15 PROCEDURE — 86140 C-REACTIVE PROTEIN: CPT

## 2022-05-15 PROCEDURE — 36415 COLL VENOUS BLD VENIPUNCTURE: CPT

## 2022-05-15 PROCEDURE — 85652 RBC SED RATE AUTOMATED: CPT

## 2022-05-15 PROCEDURE — 2580000003 HC RX 258: Performed by: INTERNAL MEDICINE

## 2022-05-15 PROCEDURE — 6360000002 HC RX W HCPCS: Performed by: INTERNAL MEDICINE

## 2022-05-15 RX ADMIN — LISINOPRIL 20 MG: 20 TABLET ORAL at 08:21

## 2022-05-15 RX ADMIN — ATORVASTATIN CALCIUM 10 MG: 10 TABLET, FILM COATED ORAL at 08:21

## 2022-05-15 RX ADMIN — CEFEPIME HYDROCHLORIDE 1000 MG: 1 INJECTION, POWDER, FOR SOLUTION INTRAMUSCULAR; INTRAVENOUS at 04:56

## 2022-05-15 RX ADMIN — SODIUM CHLORIDE: 9 INJECTION, SOLUTION INTRAVENOUS at 04:55

## 2022-05-15 RX ADMIN — SODIUM CHLORIDE: 9 INJECTION, SOLUTION INTRAVENOUS at 19:17

## 2022-05-15 RX ADMIN — VANCOMYCIN HYDROCHLORIDE 1000 MG: 1 INJECTION, POWDER, LYOPHILIZED, FOR SOLUTION INTRAVENOUS at 08:25

## 2022-05-15 RX ADMIN — HYDRALAZINE HYDROCHLORIDE 25 MG: 25 TABLET, FILM COATED ORAL at 22:30

## 2022-05-15 RX ADMIN — DIGOXIN 125 MCG: 125 TABLET ORAL at 08:21

## 2022-05-15 RX ADMIN — ENOXAPARIN SODIUM 30 MG: 100 INJECTION SUBCUTANEOUS at 08:23

## 2022-05-15 RX ADMIN — METFORMIN HYDROCHLORIDE 500 MG: 500 TABLET ORAL at 17:26

## 2022-05-15 RX ADMIN — Medication 1000 UNITS: at 08:21

## 2022-05-15 RX ADMIN — AMLODIPINE BESYLATE 10 MG: 5 TABLET ORAL at 08:21

## 2022-05-15 RX ADMIN — CEFEPIME HYDROCHLORIDE 1000 MG: 1 INJECTION, POWDER, FOR SOLUTION INTRAMUSCULAR; INTRAVENOUS at 17:28

## 2022-05-15 RX ADMIN — METFORMIN HYDROCHLORIDE 500 MG: 500 TABLET ORAL at 08:20

## 2022-05-15 ASSESSMENT — ENCOUNTER SYMPTOMS
ABDOMINAL PAIN: 0
BACK PAIN: 0
SORE THROAT: 0
SHORTNESS OF BREATH: 0
NAUSEA: 0
RHINORRHEA: 0
COUGH: 0
EYE PAIN: 0
VOMITING: 0
DIARRHEA: 0
BLOOD IN STOOL: 0

## 2022-05-15 ASSESSMENT — PAIN SCALES - GENERAL: PAINLEVEL_OUTOF10: 4

## 2022-05-15 ASSESSMENT — PAIN DESCRIPTION - FREQUENCY: FREQUENCY: CONTINUOUS

## 2022-05-15 ASSESSMENT — PAIN DESCRIPTION - PAIN TYPE: TYPE: NEUROPATHIC PAIN;ACUTE PAIN

## 2022-05-15 ASSESSMENT — PAIN DESCRIPTION - LOCATION: LOCATION: FOOT

## 2022-05-15 ASSESSMENT — PAIN DESCRIPTION - DESCRIPTORS: DESCRIPTORS: BURNING

## 2022-05-15 ASSESSMENT — PAIN DESCRIPTION - ORIENTATION: ORIENTATION: RIGHT

## 2022-05-15 ASSESSMENT — PAIN DESCRIPTION - ONSET: ONSET: ON-GOING

## 2022-05-15 NOTE — PLAN OF CARE
Problem: Discharge Planning  Goal: Discharge to home or other facility with appropriate resources  Outcome: Progressing     Problem: Pain  Goal: Verbalizes/displays adequate comfort level or baseline comfort level  Outcome: Progressing     Problem: Safety - Adult  Goal: Free from fall injury  Outcome: Progressing     Problem: Genitourinary - Adult  Goal: Absence of urinary retention  Outcome: Progressing  Flowsheets (Taken 5/14/2022 1610 by Marcial Patino RN)  Absence of urinary retention: Assess patients ability to void and empty bladder     Problem: Metabolic/Fluid and Electrolytes - Adult  Goal: Electrolytes maintained within normal limits  Outcome: Progressing  Goal: Hemodynamic stability and optimal renal function maintained  Outcome: Progressing  Goal: Glucose maintained within prescribed range  Outcome: Progressing     Problem: Hematologic - Adult  Goal: Maintains hematologic stability  Outcome: Progressing     Problem: Chronic Conditions and Co-morbidities  Goal: Patient's chronic conditions and co-morbidity symptoms are monitored and maintained or improved  Outcome: Progressing     Problem: ABCDS Injury Assessment  Goal: Absence of physical injury  Outcome: Progressing

## 2022-05-15 NOTE — FLOWSHEET NOTE
Pt A/Ox4. BP elevated. Pt unlabored; respirations even, regular, effortless. No distress noted. Shift assessment complete. See flowsheet. PRN hydralazine given for BP. See MAR. Denies needs at this time. Side rails 2/4. Bed in low position. Bed alarm on. Call light within reach.         05/14/22 2178   Vital Signs   Temp 98.4 °F (36.9 °C)   Temp Source Oral   Pulse 61   Heart Rate Source Monitor   Resp 16   BP (!) 179/66   BP Location Right upper arm   MAP (Calculated) 103.67   Patient Position Semi fowlers   Level of Consciousness Alert (0)   MEWS Score 1   Pain Assessment   Pain Assessment 0-10   Pain Level 5   Oxygen Therapy   SpO2 96 %   O2 Device None (Room air)

## 2022-05-15 NOTE — FLOWSHEET NOTE
05/15/22 0815   Vital Signs   Temp 97.5 °F (36.4 °C)   Temp Source Oral   Pulse 76   Heart Rate Source Monitor   Resp 16   BP (!) 169/61   BP Location Right upper arm   MAP (Calculated) 97   Patient Position Sitting   Level of Consciousness Alert (0)   MEWS Score 1   Oxygen Therapy   SpO2 95 %   O2 Device None (Room air)     Pt assessment completed, vss, see flow sheet. Pt alert and oriented x 4. Pt denies any pain at this time.  Chase Mcintosh RN

## 2022-05-15 NOTE — PROGRESS NOTES
Pharmacy Vancomycin Consult     Vancomycin Day: 3  Current Dosing: pulse  Current indication: bone and joint infection      Recent Labs     05/14/22  0601 05/15/22  0610   BUN 37* 29*   CREATININE 1.1 0.9   WBC 6.4 7.3       Intake/Output Summary (Last 24 hours) at 5/15/2022 0724  Last data filed at 5/15/2022 1321  Gross per 24 hour   Intake 1831.93 ml   Output --   Net 1831.93 ml     Culture Date      Source                       Results      Ht Readings from Last 1 Encounters:   05/13/22 5' 3\" (1.6 m)        Wt Readings from Last 1 Encounters:   05/15/22 135 lb 4.8 oz (61.4 kg)       Body mass index is 23.97 kg/m². Estimated Creatinine Clearance: 44 mL/min (based on SCr of 0.9 mg/dL). Trough: 11.6    Assessment/Plan:  Give vancomycin 1000mg dose x1 today.   Repeat random level again tomorrow @ 0600

## 2022-05-15 NOTE — PROGRESS NOTES
chloride 75 mL/hr at 05/15/22 0702    sodium chloride         Data:  CBC:   Recent Labs     05/13/22  1615 05/14/22  0601 05/15/22  0610   WBC 9.3 6.4 7.3   RBC 4.05 3.55* 3.50*   HGB 10.7* 9.6* 9.4*   HCT 33.4* 29.0* 28.4*   MCV 82.4 81.8 80.9   RDW 15.3 15.2 15.1    187 176     BMP:   Recent Labs     05/13/22  1615 05/14/22  0601 05/15/22  0610    137 137   K 4.3 4.4 4.5    106 107   CO2 21 22 21   BUN 40* 37* 29*   CREATININE 1.3* 1.1 0.9     BNP: No results for input(s): BNP in the last 72 hours. PT/INR: No results for input(s): PROTIME, INR in the last 72 hours. APTT: No results for input(s): APTT in the last 72 hours. CARDIAC ENZYMES: No results for input(s): CKMB, CKMBINDEX, TROPONINI in the last 72 hours. Invalid input(s): CKTOTAL;3  FASTING LIPID PANEL:  Lab Results   Component Value Date    CHOL 132 05/14/2021    HDL 54 05/14/2021    TRIG 68 05/14/2021     LIVER PROFILE:   Recent Labs     05/13/22  1615   AST 17   ALT 15   BILITOT 0.3   ALKPHOS 80          Cultures  Blood cultures pending   COVID and Flu not detected        Radiology  MRI FOOT RIGHT WO CONTRAST    (Results Pending)            Xray  done as outpatient on 5/12/22  Impression   1. Soft tissue swelling in the right 1st and 4th toes potentially due to   cellulitis given the clinical history with possible ulcerations.  No   underlying findings of necrotizing fasciitis.  However, there is   osteomyelitis involving the lateral cortex of the 4th middle phalanx, and   there may be osteomyelitis involving the distal tuft of the 1st distal   phalanx associated with a nondisplaced pathologic fracture.  Consider MRI. 2. Mild osteoarthritic changes of the right 1st metatarsophalangeal joint. 3. Bony demineralization.          Assessment:  Principal Problem:    Acute osteomyelitis of right foot ([address]  Active Problems:    Osteomyelitis of right foot (HCC)    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, with bone involvement without evidence of necrosis ([address]    Primary hypertension  Resolved Problems:    * No resolved hospital problems. *      Plan:    #Osteomyelitis of right foot  #Chronic diabetic foot ulcers. Right first and fourth toe  -Afebrile   -No leukocytosis   -Continue IV antibiotics , IV vanc and cefepime   -IVF  -blood cultures pending   -podiatry consulted  -MRI of the foot has been ordered     #Mild dehydration  -Improved with IV fluids  Creatinine1.3-> 1.1     #Atrial fibrillation   -on digoxin   -rate controlled      #HTN   -continue norvasc   -continue lisinopril      #DM type 2   -continue metformin   -SSI   -continue to monitor BS      #HX of CVA   #CAD  #PVD   -continue ASA and statin         DVT Prophylaxis: lovenox   Diet: ADULT DIET;  Regular; 4 carb choices (60 gm/meal)  Code Status: Full Code          Lulú Valenzueal MD   5/15/2022 2:43 PM

## 2022-05-16 ENCOUNTER — APPOINTMENT (OUTPATIENT)
Dept: MRI IMAGING | Age: 77
DRG: 300 | End: 2022-05-16
Payer: MEDICARE

## 2022-05-16 ENCOUNTER — APPOINTMENT (OUTPATIENT)
Dept: CT IMAGING | Age: 77
DRG: 300 | End: 2022-05-16
Payer: MEDICARE

## 2022-05-16 LAB
ANION GAP SERPL CALCULATED.3IONS-SCNC: 10 MMOL/L (ref 3–16)
BASOPHILS ABSOLUTE: 0.1 K/UL (ref 0–0.2)
BASOPHILS RELATIVE PERCENT: 0.9 %
BUN BLDV-MCNC: 26 MG/DL (ref 7–20)
CALCIUM SERPL-MCNC: 9.1 MG/DL (ref 8.3–10.6)
CHLORIDE BLD-SCNC: 103 MMOL/L (ref 99–110)
CO2: 20 MMOL/L (ref 21–32)
CREAT SERPL-MCNC: 0.8 MG/DL (ref 0.6–1.2)
EOSINOPHILS ABSOLUTE: 0.3 K/UL (ref 0–0.6)
EOSINOPHILS RELATIVE PERCENT: 3.2 %
GFR AFRICAN AMERICAN: >60
GFR NON-AFRICAN AMERICAN: >60
GLUCOSE BLD-MCNC: 113 MG/DL (ref 70–99)
GLUCOSE BLD-MCNC: 113 MG/DL (ref 70–99)
GLUCOSE BLD-MCNC: 114 MG/DL (ref 70–99)
GLUCOSE BLD-MCNC: 119 MG/DL (ref 70–99)
GLUCOSE BLD-MCNC: 123 MG/DL (ref 70–99)
GLUCOSE BLD-MCNC: 149 MG/DL (ref 70–99)
HCT VFR BLD CALC: 27.3 % (ref 36–48)
HEMOGLOBIN: 9 G/DL (ref 12–16)
LYMPHOCYTES ABSOLUTE: 1.6 K/UL (ref 1–5.1)
LYMPHOCYTES RELATIVE PERCENT: 19.7 %
MCH RBC QN AUTO: 26.6 PG (ref 26–34)
MCHC RBC AUTO-ENTMCNC: 33.1 G/DL (ref 31–36)
MCV RBC AUTO: 80.4 FL (ref 80–100)
MONOCYTES ABSOLUTE: 0.5 K/UL (ref 0–1.3)
MONOCYTES RELATIVE PERCENT: 6.5 %
NEUTROPHILS ABSOLUTE: 5.8 K/UL (ref 1.7–7.7)
NEUTROPHILS RELATIVE PERCENT: 69.7 %
PDW BLD-RTO: 15.1 % (ref 12.4–15.4)
PERFORMED ON: ABNORMAL
PLATELET # BLD: 170 K/UL (ref 135–450)
PMV BLD AUTO: 7.7 FL (ref 5–10.5)
POTASSIUM REFLEX MAGNESIUM: 4.3 MMOL/L (ref 3.5–5.1)
RBC # BLD: 3.39 M/UL (ref 4–5.2)
SODIUM BLD-SCNC: 133 MMOL/L (ref 136–145)
VANCOMYCIN RANDOM: 12.3 UG/ML
WBC # BLD: 8.4 K/UL (ref 4–11)

## 2022-05-16 PROCEDURE — 6370000000 HC RX 637 (ALT 250 FOR IP): Performed by: INTERNAL MEDICINE

## 2022-05-16 PROCEDURE — 85025 COMPLETE CBC W/AUTO DIFF WBC: CPT

## 2022-05-16 PROCEDURE — 80202 ASSAY OF VANCOMYCIN: CPT

## 2022-05-16 PROCEDURE — 73718 MRI LOWER EXTREMITY W/O DYE: CPT

## 2022-05-16 PROCEDURE — 6360000004 HC RX CONTRAST MEDICATION: Performed by: PODIATRIST

## 2022-05-16 PROCEDURE — 36415 COLL VENOUS BLD VENIPUNCTURE: CPT

## 2022-05-16 PROCEDURE — 2580000003 HC RX 258: Performed by: INTERNAL MEDICINE

## 2022-05-16 PROCEDURE — 1200000000 HC SEMI PRIVATE

## 2022-05-16 PROCEDURE — 99232 SBSQ HOSP IP/OBS MODERATE 35: CPT | Performed by: INTERNAL MEDICINE

## 2022-05-16 PROCEDURE — 80048 BASIC METABOLIC PNL TOTAL CA: CPT

## 2022-05-16 PROCEDURE — 6360000002 HC RX W HCPCS: Performed by: INTERNAL MEDICINE

## 2022-05-16 PROCEDURE — 75635 CT ANGIO ABDOMINAL ARTERIES: CPT

## 2022-05-16 RX ORDER — ENOXAPARIN SODIUM 100 MG/ML
40 INJECTION SUBCUTANEOUS DAILY
Status: DISCONTINUED | OUTPATIENT
Start: 2022-05-17 | End: 2022-05-18 | Stop reason: HOSPADM

## 2022-05-16 RX ORDER — HYDRALAZINE HYDROCHLORIDE 20 MG/ML
10 INJECTION INTRAMUSCULAR; INTRAVENOUS EVERY 6 HOURS PRN
Status: DISCONTINUED | OUTPATIENT
Start: 2022-05-16 | End: 2022-05-17

## 2022-05-16 RX ORDER — CLONIDINE HYDROCHLORIDE 0.1 MG/1
0.1 TABLET ORAL EVERY 4 HOURS PRN
Status: DISCONTINUED | OUTPATIENT
Start: 2022-05-16 | End: 2022-05-18 | Stop reason: HOSPADM

## 2022-05-16 RX ORDER — OXYCODONE HYDROCHLORIDE AND ACETAMINOPHEN 5; 325 MG/1; MG/1
1 TABLET ORAL EVERY 4 HOURS PRN
Status: DISCONTINUED | OUTPATIENT
Start: 2022-05-16 | End: 2022-05-18 | Stop reason: HOSPADM

## 2022-05-16 RX ADMIN — METFORMIN HYDROCHLORIDE 500 MG: 500 TABLET ORAL at 08:24

## 2022-05-16 RX ADMIN — METFORMIN HYDROCHLORIDE 500 MG: 500 TABLET ORAL at 17:13

## 2022-05-16 RX ADMIN — AMLODIPINE BESYLATE 10 MG: 5 TABLET ORAL at 08:24

## 2022-05-16 RX ADMIN — LISINOPRIL 20 MG: 20 TABLET ORAL at 08:24

## 2022-05-16 RX ADMIN — CLONIDINE HYDROCHLORIDE 0.1 MG: 0.1 TABLET ORAL at 03:24

## 2022-05-16 RX ADMIN — ENOXAPARIN SODIUM 30 MG: 100 INJECTION SUBCUTANEOUS at 08:24

## 2022-05-16 RX ADMIN — SODIUM CHLORIDE, PRESERVATIVE FREE 10 ML: 5 INJECTION INTRAVENOUS at 21:34

## 2022-05-16 RX ADMIN — VANCOMYCIN HYDROCHLORIDE 1500 MG: 10 INJECTION, POWDER, LYOPHILIZED, FOR SOLUTION INTRAVENOUS at 11:01

## 2022-05-16 RX ADMIN — CEFEPIME 2000 MG: 2 INJECTION, POWDER, FOR SOLUTION INTRAVENOUS at 18:32

## 2022-05-16 RX ADMIN — IOPAMIDOL 100 ML: 755 INJECTION, SOLUTION INTRAVENOUS at 11:51

## 2022-05-16 RX ADMIN — INSULIN LISPRO 1 UNITS: 100 INJECTION, SOLUTION INTRAVENOUS; SUBCUTANEOUS at 11:26

## 2022-05-16 RX ADMIN — CEFEPIME HYDROCHLORIDE 1000 MG: 1 INJECTION, POWDER, FOR SOLUTION INTRAMUSCULAR; INTRAVENOUS at 05:57

## 2022-05-16 RX ADMIN — ACETAMINOPHEN 650 MG: 325 TABLET ORAL at 00:05

## 2022-05-16 RX ADMIN — OXYCODONE HYDROCHLORIDE AND ACETAMINOPHEN 1 TABLET: 5; 325 TABLET ORAL at 03:24

## 2022-05-16 RX ADMIN — Medication 1000 UNITS: at 08:24

## 2022-05-16 RX ADMIN — ATORVASTATIN CALCIUM 10 MG: 10 TABLET, FILM COATED ORAL at 08:24

## 2022-05-16 ASSESSMENT — PAIN SCALES - GENERAL
PAINLEVEL_OUTOF10: 3
PAINLEVEL_OUTOF10: 5
PAINLEVEL_OUTOF10: 6
PAINLEVEL_OUTOF10: 7

## 2022-05-16 ASSESSMENT — PAIN DESCRIPTION - ORIENTATION
ORIENTATION_2: LEFT
ORIENTATION: RIGHT

## 2022-05-16 ASSESSMENT — PAIN - FUNCTIONAL ASSESSMENT
PAIN_FUNCTIONAL_ASSESSMENT_SITE2: PREVENTS OR INTERFERES SOME ACTIVE ACTIVITIES AND ADLS
PAIN_FUNCTIONAL_ASSESSMENT: PREVENTS OR INTERFERES SOME ACTIVE ACTIVITIES AND ADLS

## 2022-05-16 ASSESSMENT — PAIN DESCRIPTION - PAIN TYPE: TYPE: NEUROPATHIC PAIN;ACUTE PAIN

## 2022-05-16 ASSESSMENT — PAIN DESCRIPTION - LOCATION
LOCATION_2: SHOULDER
LOCATION: FOOT

## 2022-05-16 ASSESSMENT — PAIN DESCRIPTION - ONSET: ONSET: ON-GOING

## 2022-05-16 ASSESSMENT — PAIN DESCRIPTION - DESCRIPTORS
DESCRIPTORS: BURNING
DESCRIPTORS_2: DULL;ACHING

## 2022-05-16 ASSESSMENT — PAIN DESCRIPTION - FREQUENCY: FREQUENCY: CONTINUOUS

## 2022-05-16 ASSESSMENT — PAIN DESCRIPTION - INTENSITY: RATING_2: 6

## 2022-05-16 NOTE — ACP (ADVANCE CARE PLANNING)
Advance Care Planning     General Advance Care Planning (ACP) Conversation    Date of Conversation: 5/13/2022  Conducted with: Patient with Decision Making Capacity    Healthcare Decision Maker:  No healthcare decision makers have been documented. Click here to complete [address] including selection of the Healthcare Decision Maker Relationship (ie \"Primary\")   Today we documented Decision Maker(s) consistent with Legal Next of Kin hierarchy.     Reviewed DNR/DNI and patient elects Full Code (Attempt Resuscitation)      Length of Voluntary ACP Conversation in minutes:  <16 minutes (Non-Billable)    Cyndie Zamarripa RN

## 2022-05-16 NOTE — FLOWSHEET NOTE
05/16/22 0752   Handoff   Communication Given Shift Handoff   Handoff Given To SOLEDAD   Handoff Received From [facility] Communication Face to Face; At bedside   Time Handoff Given 5707   End of Shift Check Performed Yes

## 2022-05-16 NOTE — PLAN OF CARE
Problem: Discharge Planning  Goal: Discharge to home or other facility with appropriate resources  5/16/2022 0106 by Joanna Wilcox RN  Outcome: Progressing  Flowsheets (Taken 5/15/2022 2304)  Discharge to home or other facility with appropriate resources: Identify barriers to discharge with patient and caregiver  5/15/2022 1114 by Leslie Moreno RN  Outcome: Progressing     Problem: Pain  Goal: Verbalizes/displays adequate comfort level or baseline comfort level  5/16/2022 0106 by Joanna Wilcox RN  Outcome: Progressing  5/15/2022 1114 by Leslie Moreno RN  Outcome: Progressing     Problem: Safety - Adult  Goal: Free from fall injury  5/16/2022 0106 by Joanna Wilcox RN  Outcome: Progressing  5/15/2022 1114 by Leslie Moreno RN  Outcome: Progressing     Problem: Genitourinary - Adult  Goal: Absence of urinary retention  5/16/2022 0106 by Joanna Wilcox RN  Outcome: Progressing  Flowsheets (Taken 5/15/2022 2304)  Absence of urinary retention: Assess patients ability to void and empty bladder  5/15/2022 1114 by Leslie Moreno RN  Outcome: Progressing  Flowsheets (Taken 5/14/2022 2240 by Joanna Wilcox RN)  Absence of urinary retention: Assess patients ability to void and empty bladder     Problem: Metabolic/Fluid and Electrolytes - Adult  Goal: Electrolytes maintained within normal limits  5/16/2022 0106 by Joanna Wilcox RN  Outcome: Progressing  Flowsheets (Taken 5/15/2022 2304)  Electrolytes maintained within normal limits: Monitor labs and assess patient for signs and symptoms of electrolyte imbalances  5/15/2022 1114 by Leslie Moreno RN  Outcome: Progressing  Goal: Hemodynamic stability and optimal renal function maintained  5/16/2022 0106 by Joanna Wilcox RN  Outcome: Progressing  Flowsheets (Taken 5/15/2022 2304)  Hemodynamic stability and optimal renal function maintained:   Monitor labs and assess for signs and symptoms of volume excess or deficit   Monitor intake, output and patient weight  5/15/2022 1114 by Jose Luis Hernandez RN  Outcome: Progressing  Goal: Glucose maintained within prescribed range  5/16/2022 0106 by Megan Estrada RN  Outcome: Progressing  Flowsheets (Taken 5/15/2022 2304)  Glucose maintained within prescribed range:   Assess for signs and symptoms of hyperglycemia and hypoglycemia   Monitor blood glucose as ordered  5/15/2022 1114 by Jose Luis Hernandez RN  Outcome: Progressing     Problem: Hematologic - Adult  Goal: Maintains hematologic stability  5/16/2022 0106 by Megan Estrada RN  Outcome: Progressing  Flowsheets (Taken 5/15/2022 2304)  Maintains hematologic stability: Assess for signs and symptoms of bleeding or hemorrhage  5/15/2022 1114 by Jose Luis Hernandez RN  Outcome: Progressing     Problem: Chronic Conditions and Co-morbidities  Goal: Patient's chronic conditions and co-morbidity symptoms are monitored and maintained or improved  5/16/2022 0106 by Megan Estrada RN  Outcome: Progressing  Flowsheets (Taken 5/15/2022 2304)  Care Plan - Patient's Chronic Conditions and Co-Morbidity Symptoms are Monitored and Maintained or Improved: Monitor and assess patient's chronic conditions and comorbid symptoms for stability, deterioration, or improvement  5/15/2022 1114 by Jose Luis Hernandez RN  Outcome: Progressing     Problem: ABCDS Injury Assessment  Goal: Absence of physical injury  5/16/2022 0106 by Megan Estrada RN  Outcome: Progressing  5/15/2022 1114 by Jose Luis Hernandez RN  Outcome: Progressing

## 2022-05-16 NOTE — FLOWSHEET NOTE
Pt A/Ox4. BP elevated. Pt unlabored; respirations even, regular, effortless. No distress noted. Shift assessment complete. See flowsheet. PRN hydralazine given for BP. Will reassess. See MAR. Denies needs at this time. Side rails 2/4. Bed in low position. Bed alarm on. Call light within reach.       05/15/22 2225   Vital Signs   Temp 97.7 °F (36.5 °C)   Temp Source Oral   Pulse 77   Heart Rate Source Monitor   Resp 16   BP (!) 190/73   BP Location Right upper arm   MAP (Calculated) 112   Patient Position Supine   Level of Consciousness Alert (0)   MEWS Score 1   Pain Assessment   Pain Assessment 0-10   Pain Level 4   Patient's Stated Pain Goal 0 - No pain   Pain Location Foot   Pain Orientation Right   Pain Descriptors Burning   Pain Type Neuropathic pain;Acute pain   Pain Frequency Continuous   Pain Onset On-going   Oxygen Therapy   SpO2 96 %   O2 Device None (Room air)

## 2022-05-16 NOTE — FLOWSHEET NOTE
Notified MD for persistent HTN & moderate pain. PRN hydralazine 25 mg tablet given at 2230, not effective. Pt c/o moderate pain in Rt foot, only has Tylenol ordered pain management. Tylenol given at 0005, not effective. Requested intervention to tx HTN & prn pain medication for moderate-severe pain. See new orders.        05/15/22 2225 05/15/22 2304 05/16/22 0005   Vital Signs   Temp 97.7 °F (36.5 °C)  --   --    Temp Source Oral  --   --    Pulse 77  --  65   Heart Rate Source Monitor  --  Monitor   Resp 16  --   --    BP (!) 190/73  --  (!) 185/65   BP Location Right upper arm  --   --    MAP (Calculated) 112  --  105   Patient Position Supine  --   --    Level of Consciousness Alert (0) Alert (0) Alert (0)   MEWS Score 1  --   --    Pain Assessment   Pain Assessment 0-10  --  0-10   Pain Level 4  --  7   Patient's Stated Pain Goal 0 - No pain  --   --    Pain Location Foot  --   --    Pain Orientation Right  --   --    Pain Descriptors Burning  --   --    Functional Pain Assessment  --   --   --    Pain Type Neuropathic pain;Acute pain  --   --    Pain Frequency Continuous  --   --    Pain Onset On-going  --   --    Response to Pain Intervention  --   --   --    Multiple Pain Sites  --   --   --    Pain 2   Pain Rating 2  --   --   --    Pain Location 2  --   --   --    Pain Orientation 2  --   --   --    Pain Descriptors 2  --   --   --    Functional Pain Assessment 2  --   --   --    Opioid-Induced Sedation   POSS Score  --   --   --    RASS   Vega Agitation Sedation Scale (RASS)  --   --   --    Oxygen Therapy   SpO2 96 %  --   --    O2 Device None (Room air)  --   --       05/16/22 0100 05/16/22 0206   Vital Signs   Temp  --  98.1 °F (36.7 °C)   Temp Source  --  Oral   Pulse  --  71   Heart Rate Source  --  Monitor   Resp  --  18   BP  --  (!) 185/73   BP Location  --   --    MAP (Calculated)  --  110.33   Patient Position  --  Supine   Level of Consciousness Alert (0) Alert (0)   MEWS Score  --  1   Pain Assessment   Pain Assessment 0-10 0-10   Pain Level 7 6   Patient's Stated Pain Goal  --   --    Pain Location  --  Foot   Pain Orientation  --  Right   Pain Descriptors  --  Burning   Functional Pain Assessment  --  Prevents or interferes some active activities and ADLs   Pain Type  --  Neuropathic pain;Acute pain   Pain Frequency  --  Continuous   Pain Onset  --  On-going   Response to Pain Intervention None (pain unchanged or no improvement)  --    Multiple Pain Sites  --  Yes   Pain 2   Pain Rating 2  --  6   Pain Location 2  --  Shoulder   Pain Orientation 2  --  Left   Pain Descriptors 2  --  Dull;Aching   Functional Pain Assessment 2  --  Prevents or interferes some active activities and ADLs   Opioid-Induced Sedation   POSS Score  --  1   RASS   Vega Agitation Sedation Scale (RASS)  --  0   Oxygen Therapy   SpO2  --  98 %   O2 Device  --  None (Room air)

## 2022-05-16 NOTE — FLOWSHEET NOTE
05/16/22 0559   Vital Signs   Pulse 52   Heart Rate Source Monitor   BP (!) 154/63   MAP (Calculated) 93.33   Level of Consciousness Alert (0)   Pain Assessment   Pain Assessment 0-10   Pain Level 3   Response to Pain Intervention Patient satisfied     Clonidine effective. PRN percocet helped pain. Pt satisfied.

## 2022-05-16 NOTE — FLOWSHEET NOTE
05/16/22 0815   Vital Signs   Temp 98 °F (36.7 °C)   Temp Source Oral   Pulse 50   Heart Rate Source Monitor   Resp 16   BP (!) 147/58   BP Location Right upper arm   MAP (Calculated) 87.67   Patient Position Sitting   Level of Consciousness Alert (0)   MEWS Score 2   Pain Assessment   Pain Assessment None - Denies Pain   Oxygen Therapy   SpO2 97 %   O2 Device None (Room air)     Pt assessment completed, vss, see flow sheet. Pt alert and oriented x 4. Pt denies any pain at this time.  Nichol Vasques RN

## 2022-05-16 NOTE — CONSULTS
CONSULT    Admit Date:  5/13/2022    Subjective:  68 y.o. female who is seen for evaluation of wounds on right foot with osteomyelitis on xray. Patient was scheduled to see me in the office last Tuesday but was a no show. States she knew she had an appointment in Tuesday and Thursday last week but did not know who for. Went to her PCP on Thursday. Has shown up at my office and [address] in the past without an appointment wanting her toenails cut. States that she is not walking like she used to. Uses a cart when grocery shopping. Is not able to walk very far before her feet and legs start to hurt. Feet hurt most of the time. Was putting hydrogen peroxide on the wounds prior to admission. Has seen Dr. Marques Harris in the past for arterial issues in the left leg.      Past Medical History:        Diagnosis Date    Atrial fibrillation ([address]     off coumadin after bleed, declined restart    Blood transfusion     CAD (coronary artery disease)     Diabetes mellitus type II     Diabetic neuropathy ([address] 2011    Gastric ulcer     H. pylori infection 2009    Hyperlipidemia     Hypertension     Numbness and tingling of left leg     Osteoarthritis     knees    Poor historian     PVD (peripheral vascular disease) ([address]     PTA distal sfa/pop/peroneal, Dr. Marques Harris 12/2015    Unspecified cerebral artery occlusion with cerebral infarction     TIA affected left eye    upper gi bleed 12/2009       Past Surgical History:        Procedure Laterality Date    ANGIOPLASTY  12/30/15    Dr. Marques Harris, LLE, PTA of distal sfa/pop/peroneal    CARDIAC CATHETERIZATION  9/21/12    done for surgical clearance, cath was negative    CORONARY ANGIOPLASTY WITH STENT PLACEMENT  [id number]    FOOT SURGERY Left 01/11/2018    DEBRIDEMENT OF ULCERS LEFT FOOT AND LEG WITH GRAFT    OTHER SURGICAL HISTORY Left 06/05/2017    Left Femoral Peroneal Bypass    SHOULDER ARTHROPLASTY  10/5/12    RIGHT SHOULDER TOTAL ARTHROPLASTY, BICEPS MIGUEL [employer]VANTAGE AP       Current Medications:     metFORMIN  500 mg Oral BID WC    digoxin  125 mcg Oral Daily    amLODIPine  10 mg Oral Daily    lisinopril  20 mg Oral Daily    atorvastatin  10 mg Oral Daily    Vitamin D  1,000 Units Oral Daily    insulin lispro  0-6 Units SubCUTAneous TID WC    insulin lispro  0-3 Units SubCUTAneous Nightly    sodium chloride flush  5-40 mL IntraVENous 2 times per day    enoxaparin  30 mg SubCUTAneous Daily    cefepime  1,000 mg IntraVENous Q12H    vancomycin (VANCOCIN) intermittent dosing (placeholder)   Other RX Placeholder       Allergies:  Pcn [penicillins]    Social History:    Social History     Tobacco Use    Smoking status: Never Smoker    Smokeless tobacco: Never Used    Tobacco comment: Not needed   Vaping Use    Vaping Use: Never used   Substance Use Topics    Alcohol use: No    Drug use: No       Family History:       Problem Relation Age of Onset    Heart Disease Mother     Stroke Mother     Heart Disease Father     Diabetes Sister     Diabetes Brother     Diabetes Maternal Grandmother        Review of Systems    CONSTITUTIONAL:  negative  EYES:  negative  HEENT:  negative  RESPIRATORY:  negative  CARDIOVASCULAR:  negative  GASTROINTESTINAL:  negative  GENITOURINARY:  negative  INTEGUMENT/BREAST:  positive for ulcers  MUSCULOSKELETAL:  positive for  pain  NEUROLOGICAL:  positive for numbness      Objective:   BP (!) 154/63   Pulse 52   Temp 97.8 °F (36.6 °C) (Oral)   Resp 16   Ht 5' 3\" (1.6 m)   Wt 135 lb 4.8 oz (61.4 kg)   SpO2 96%   BMI 23.97 kg/m²     Data:  CBC:   Recent Labs     05/14/22  0601 05/15/22  0610 05/16/22  0559   WBC 6.4 7.3 8.4   HGB 9.6* 9.4* 9.0*   HCT 29.0* 28.4* 27.3*   MCV 81.8 80.9 80.4    176 170     BMP:   Recent Labs     05/14/22  0601 05/15/22  0610 05/16/22  0559    137 133*   K 4.4 4.5 4.3    107 103   CO2 22 21 20*   BUN 37* 29* 26*   CREATININE 1.1 0.9 0.8     LIVER PROFILE:   Recent Labs     05/13/22  1615   AST 17   ALT 15   BILITOT 0.3   ALKPHOS 80     PT/INR:   Recent Labs     05/13/22  1615   PROT 8.0     HgBA1c:  Lab Results   Component Value Date    LABA1C 6.1 01/28/2022       CRP - <3 (5/15/22)  ESR - 33 (3/15/22)    Cultures: Blood x2 - NGSF    Imaging: xray right foot -   Diffuse bony demineralization. Age indeterminate transverse fracture   involving the distal tuft of the 1st distal phalanx with suggestion of   osteolysis along the fracture margins and no significant displacement. Osteolysis in the lateral cortex of the 4th middle phalanx. Joints maintain   anatomic alignment. Calcaneal enthesophytes at the insertion of the Achilles   tendon and origin of the plantar aponeurosis. Mild arthropathic changes of   the 1st metatarsophalangeal joint. Soft tissue swelling in the 1st and 4th   toes with possible skin defects. No soft tissue gas nor radiopaque foreign   bodies. Extensive arterial calcifications. Impression   1. Soft tissue swelling in the right 1st and 4th toes potentially due to   cellulitis given the clinical history with possible ulcerations. No   underlying findings of necrotizing fasciitis. However, there is   osteomyelitis involving the lateral cortex of the 4th middle phalanx, and   there may be osteomyelitis involving the distal tuft of the 1st distal   phalanx associated with a nondisplaced pathologic fracture. Consider MRI. 2. Mild osteoarthritic changes of the right 1st metatarsophalangeal joint. 3. Bony demineralization. Physical Exam:    General Appearance: alert and oriented to person, place and time, well developed and well- nourished, in no acute distress  Head: normocephalic and atraumatic  Eyes: pupils equal, round  Neck: trachea midline  Pulmonary/Chest: no wheezes  Abdomen: soft, non-tender, non-distended    Distal aspect right hallux with dry stable eschar noted. No active drainage noted.  Mild erythema noted. No increase in skin temperature noted. No purulence or malodor noted. Lateral aspect right 4th digit at DIPJ with dry stable eschar with no drainage noted. Absent hair growth bilateral LE. Thin shiny skin bilateral LE. Assessment:  Patient Active Problem List   Diagnosis Code    Osteoarthritis M19.90    Paroxysmal atrial fibrillation ([facility] I48.0    Noncompliance of patient with dietary regimen Z91.11    PVD (peripheral vascular disease) ([facility] I73.9    Pure hypercholesterolemia E78.00    Coronary artery disease involving native coronary artery of native heart without angina pectoris I25.10    Essential hypertension I10    Type 2 diabetes mellitus with diabetic polyneuropathy, without long-term current use of insulin ([facility]) E11.42    Vitamin D deficiency E55.9    Acute osteomyelitis of left foot ([facility] M86.172    Hammertoe, bilateral M20.41, M20.42    Colonoscopy refused Z53.20    Chronic deep vein thrombosis (DVT) of left peroneal vein ([facility]) I82.552    Acute osteomyelitis of right foot ([facility] M86.171    Osteomyelitis of right foot ([facility] M86.9    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, with bone involvement without evidence of necrosis ([facility] E10.621, L97.516    Primary hypertension I10     diabetic foot ulcers right foot secondary to peripheral neuropathy   PAD  diabetes mellitus with peripheral neuropathy   Osteomyelitis right foot secondary to diabetes mellitus       Plan  Patient examined. Reviewed labs and imaging. Continue IV antibiotics. OK to leave wounds open to air. Xrays show question of osteomyelitis right hallux and 4th digit. However CRP is WNL and ESR is just above normal. I would expect these to be elevated with osteomyelitis. Ordered MRI of the foot to further evaluate for osteomyelitis or soft tissue infection. Ordered MRA right LE to further evaluate for severity of PAD.  Has had noninvasive arterial exams in the past

## 2022-05-16 NOTE — CARE COORDINATION
Case Management Assessment  Initial Evaluation      Patient Name: Rubio Yang  YOB: 1945  Diagnosis: Acute osteomyelitis of right foot ([facility] [M86.171]  Cellulitis of right lower extremity [L03.115]  Osteomyelitis of right foot, unspecified type [facility]) [M86.9]  Date / Time: 5/13/2022  3:41 PM    Admission status/Date:INPT 5/13/22  Chart Reviewed: Yes      Patient Interviewed: Yes   Family Interviewed:  No      Hospitalization in the last 30 days:  No      Health Care Decision Maker : Alexia Solomon, spouse: [phone]    (CM - must 1st enter selection under Navigator - emergency contact- [place] Relationship and pick relationship)   Who do you trust or have selected to make healthcare decisions for you      Met with: pt at bedside  Interview conducted  (bedside/phone):    Current PCP: [address] required for SNF : Y, N          3 night stay required - Y, [employer]  Support Systems/Care Needs: Spouse/Significant Other  Transportation: self    Meal Preparation: self    Housing  Living Arrangements: home with spouse  Steps: 1  Intent for return to present living arrangements: Yes  Identified Issues: [id number] [address] [facility] with Home Health Care : No Agency:(Services)  Type of Home Care Services: None  Passport/Waiver : No  :                      Phone Number:    Passport/Waiver Services: -          Durable Medical Equiptment   DME Provider: n/a  Equipment: n/a  Walker___Cane___RTS___ BSC___Shower Chair___Hospital Bed___W/C____Other________  02 at ____Liter(s)---wears(frequency)_______ HHN ___ CPAP___ BiPap___   N/A____      Home O2 Use :  No    If No for home O2---if presently on O2 during hospitalization:  No  if yes CM to follow for potential DC O2 need  Informed of need for care provider to bring portable home O2 tank on day of discharge for nursing to connect prior to leaving:   Not Indicated  Verbalized agreement/Understanding:   Not Indicated    Community Service Affiliation  Dialysis:  No    · Agency:  · Location:  · Dialysis Schedule:  · Phone:   · Fax: Other Community Services: (ex:PT/OT,Mental Health,Wound Clinic, Cardio/Pul [address])    DISCHARGE PLAN: Explained Case Management role/services. Cm reviewed chart and met with pt. Pt reports to live at home with spouse and plans to return home at discharge. Pt denies needs. CM will follow for poss c needs.

## 2022-05-16 NOTE — PROGRESS NOTES
IM Progress Note    Admit Date:  5/13/2022      Admitted with diabetic foot ulcer and osteomyelitis. Subjective:  Ms. Sean Sena is stable. And denies any major pain issues  No fevers      Objective:   BP (!) 147/58   Pulse 50   Temp 98 °F (36.7 °C) (Oral)   Resp 16   Ht 5' 3\" (1.6 m)   Wt 135 lb 4.8 oz (61.4 kg)   SpO2 97%   BMI 23.97 kg/m²       Intake/Output Summary (Last 24 hours) at 5/16/2022 1413  Last data filed at 5/16/2022 0754  Gross per 24 hour   Intake 2267.66 ml   Output 2 ml   Net 2265.66 ml         Physical Exam:  Gen: elderly thin frail female,  No distress. Alert. Awake and well-oriented  Thin built  Eyes: PERRL. No sclera icterus. No conjunctival injection. ENT: No discharge. Pharynx clear. Neck: No JVD. No Carotid Bruit. Trachea midline. Resp: No accessory muscle use. No crackles. No wheezes. No rhonchi. CV: Regular rate. Regular rhythm. No murmur. No rub. No edema. Capillary Refill: Brisk,< 3 seconds   Peripheral Pulses: +2 palpable, equal bilaterally   GI: Non-tender. Non-distended. No masses. No organomegaly. Normal bowel sounds. No hernia. Skin:  M/S:    Right Lower extremity : right foot right first great toe and fourth toe tip mild necrotic ulcers. improving  Right foot  Erythema and tenderness . No edema  Poor pulses in both feet   Evidence of old left popliteal- femoral bypass  Neuro: Awake. Grossly nonfocal    Psych: Oriented x 3.  No anxiety or agitation.            Medications:   Scheduled Meds:   cefepime  2,000 mg IntraVENous Q12H    [START ON 5/17/2022] enoxaparin  40 mg SubCUTAneous Daily    metFORMIN  500 mg Oral BID     digoxin  125 mcg Oral Daily    amLODIPine  10 mg Oral Daily    lisinopril  20 mg Oral Daily    atorvastatin  10 mg Oral Daily    Vitamin D  1,000 Units Oral Daily    insulin lispro  0-6 Units SubCUTAneous TID     insulin lispro  0-3 Units SubCUTAneous Nightly    sodium chloride flush  5-40 mL IntraVENous 2 times per day    vancomycin (VANCOCIN) intermittent dosing (placeholder)   Other RX Placeholder       Continuous Infusions:   dextrose      sodium chloride 75 mL/hr at 05/16/22 0754    sodium chloride         Data:  CBC:   Recent Labs     05/14/22  0601 05/15/22  0610 05/16/22  0559   WBC 6.4 7.3 8.4   RBC 3.55* 3.50* 3.39*   HGB 9.6* 9.4* 9.0*   HCT 29.0* 28.4* 27.3*   MCV 81.8 80.9 80.4   RDW 15.2 15.1 15.1    176 170     BMP:   Recent Labs     05/14/22  0601 05/15/22  0610 05/16/22  0559    137 133*   K 4.4 4.5 4.3    107 103   CO2 22 21 20*   BUN 37* 29* 26*   CREATININE 1.1 0.9 0.8        LIVER PROFILE:   Recent Labs     05/13/22  1615   AST 17   ALT 15   BILITOT 0.3   ALKPHOS 80          Cultures  Blood cultures pending   COVID and Flu not detected        Radiology  CTA ABDOMINAL AORTA W BILAT RUNOFF W CONTRAST   Final Result   Extensive vascular calcifications seen throughout the abdominal aorta,   mesenteric vessels, pelvic vessels and lower extremity vessels. There is   patency identified of a graft from the mid superficial femoral artery on the   left to the peroneal artery. There is a 2-vessel runoff identified to the   left ankle with extensive disease seen throughout the right superficial   femoral artery distally with multiple areas of severe stenoses. There is   severe stenoses as well seen throughout the right popliteal artery with a   2-vessel runoff to the right ankle with the peroneal artery and anterior   tibial artery with extensive disease seen throughout the trifurcation vessels   as well. Severe stenoses seen in the right renal artery at multiple levels with severe   stenoses at the origin of the SMA and left renal artery. Atrophy and   decreased enhancement of the right renal parenchyma. MRI FOOT RIGHT WO CONTRAST    (Results Pending)            Xray  done as outpatient on 5/12/22  Impression   1.  Soft tissue swelling in the right 1st and 4th toes potentially due to cellulitis given the clinical history with possible ulcerations.  No   underlying findings of necrotizing fasciitis.  However, there is   osteomyelitis involving the lateral cortex of the 4th middle phalanx, and   there may be osteomyelitis involving the distal tuft of the 1st distal   phalanx associated with a nondisplaced pathologic fracture.  Consider MRI. 2. Mild osteoarthritic changes of the right 1st metatarsophalangeal joint. 3. Bony demineralization. CTA LE     Extensive vascular calcifications seen throughout the abdominal aorta,   mesenteric vessels, pelvic vessels and lower extremity vessels.  There is   patency identified of a graft from the mid superficial femoral artery on the   left to the peroneal artery. Miguel Lugo is a 2-vessel runoff identified to the   left ankle with extensive disease seen throughout the right superficial   femoral artery distally with multiple areas of severe stenoses.  There is   severe stenoses as well seen throughout the right popliteal artery with a   2-vessel runoff to the right ankle with the peroneal artery and anterior   tibial artery with extensive disease seen throughout the trifurcation vessels   as well. Severe stenoses seen in the right renal artery at multiple levels with severe   stenoses at the origin of the SMA and left renal artery.  Atrophy and   decreased enhancement of the right renal parenchyma. Assessment and Plan      #Osteomyelitis of right fourth toe middle phalynx and possible osteomyelitis of distal tuft of great toe   #Chronic diabetic foot ulcers.   likely ischemic ulcers given CTA findings , see above     -Afebrile   -No leukocytosis   -Continue IV antibiotics , IV vanc and cefepime   -IVF  -blood cultures pending   -podiatry consulted     #Mild dehydration  -Improved with IV fluids  Creatinine1.3-> 1.1     #Atrial fibrillation   -on digoxin   -rate controlled      #HTN   -continue norvasc   -continue lisinopril      #DM type 2 -continue metformin   -SSI   -continue to monitor BS      #HX of CVA   #CAD  #PVD   -continue ASA and statin     PAD - s.p left LE intervention previously  See CTA legs above  Might need right side as well   Refer to DR. LEGER at CT        DVT Prophylaxis: lovenox   Diet: ADULT DIET;  Regular; 4 carb choices (60 gm/meal)  Code Status: Full Code          Katey Jones MD   5/16/2022 2:13 PM

## 2022-05-16 NOTE — PLAN OF CARE
Problem: Discharge Planning  Goal: Discharge to home or other facility with appropriate resources  5/16/2022 1235 by Bryan Harper RN  Outcome: Progressing  5/16/2022 0106 by Opal Gregory RN  Outcome: Progressing  [address] (Taken 5/15/2022 2304)  Discharge to home or other facility with appropriate resources: Identify barriers to discharge with patient and caregiver     Problem: Pain  Goal: Verbalizes/displays adequate comfort level or baseline comfort level  5/16/2022 1235 by Bryan Harper RN  Outcome: Progressing  5/16/2022 0106 by Opal Gregory RN  Outcome: Progressing     Problem: Safety - Adult  Goal: Free from fall injury  5/16/2022 1235 by Bryan Harper RN  Outcome: Progressing  5/16/2022 0106 by Opal Gregory RN  Outcome: Progressing     Problem: Genitourinary - Adult  Goal: Absence of urinary retention  5/16/2022 1235 by Bryan Harper RN  Outcome: Progressing  5/16/2022 0106 by Opal Gregory RN  Outcome: Progressing  Flowsheets (Taken 5/15/2022 2304)  Absence of urinary retention: Assess patients ability to void and empty bladder     Problem: Metabolic/Fluid and Electrolytes - Adult  Goal: Electrolytes maintained within normal limits  5/16/2022 1235 by Bryan Harper RN  Outcome: Progressing  5/16/2022 0106 by Opal Gregory RN  Outcome: Progressing  Flowsheets (Taken 5/15/2022 2304)  Electrolytes maintained within normal limits: Monitor labs and assess patient for signs and symptoms of electrolyte imbalances  Goal: Hemodynamic stability and optimal renal function maintained  5/16/2022 1235 by Bryan Harper RN  Outcome: Progressing  5/16/2022 0106 by Opal Gregory RN  Outcome: Progressing  Flowsheets (Taken 5/15/2022 2304)  Hemodynamic stability and optimal renal function maintained:   Monitor labs and assess for signs and symptoms of volume excess or deficit   Monitor intake, output and patient weight  Goal: Glucose maintained within prescribed range  5/16/2022 1235 by Jose Young TOPHER Mckeon  Outcome: Progressing  5/16/2022 0106 by Marcial Patino RN  Outcome: Progressing  Flowsheets (Taken 5/15/2022 2304)  Glucose maintained within prescribed range:   Assess for signs and symptoms of hyperglycemia and hypoglycemia   Monitor blood glucose as ordered     Problem: Hematologic - Adult  Goal: Maintains hematologic stability  5/16/2022 1235 by Glenn Crooks RN  Outcome: Progressing  5/16/2022 0106 by Marcial Patino RN  Outcome: Progressing  Flowsheets (Taken 5/15/2022 2304)  Maintains hematologic stability: Assess for signs and symptoms of bleeding or hemorrhage     Problem: Chronic Conditions and Co-morbidities  Goal: Patient's chronic conditions and co-morbidity symptoms are monitored and maintained or improved  5/16/2022 1235 by Glenn Crooks RN  Outcome: Progressing  5/16/2022 0106 by Marcial Patino RN  Outcome: Progressing  Flowsheets (Taken 5/15/2022 2304)  Care Plan - Patient's Chronic Conditions and Co-Morbidity Symptoms are Monitored and Maintained or Improved: Monitor and assess patient's chronic conditions and comorbid symptoms for stability, deterioration, or improvement     Problem: ABCDS Injury Assessment  Goal: Absence of physical injury  5/16/2022 1235 by Glenn Crooks RN  Outcome: Progressing  5/16/2022 0106 by Marcial Patino RN  Outcome: Progressing  Flowsheets (Taken 5/16/2022 0106)  Absence of Physical Injury: Implement safety measures based on patient assessment

## 2022-05-16 NOTE — PROGRESS NOTES
Pharmacy Vancomycin Consult     Vancomycin Day: 4  Current Dosing: Pulse  Current indication: Bone and Joint Infection    Recent Labs     05/15/22  0610 05/16/22  0559   BUN 29* 26*   CREATININE 0.9 0.8   WBC 7.3 8.4       Estimated Creatinine Clearance: 49 mL/min (based on SCr of 0.8 mg/dL). Trough: 12.3    Assessment/Plan:  Will dose with 1500 mg x1 and recheck a level with AM labs.      Aron Negrete, PharmD, [facility], 5/16/2022 8:39 AM

## 2022-05-16 NOTE — ED PROVIDER NOTES
I independently examined and evaluated Ricarda French. I personally saw the patient and performed a substantive portion of the visit including all aspects of the medical decision making    In brief, Ricarda French is a 68 y.o. female with a past medical history of paroxysmal atrial fibrillation, peripheral vascular disease, coronary artery disease, diabetes, DVT, and osteomyelitis, who presents to the ED sent in by outpatient providers for admission for treatment of osteomyelitis. Patient has ulceration of the right great toe and fourth digit, symptoms started approximately 1 month ago. Symptoms have worsened progressively since that time. She now has swelling of the foot to the mid shin. She was seen by her family doctor yesterday who completed outpatient x-rays that were concerning for osteomyelitis. She denies fevers, chills, vomiting, or other signs of systemic infection. PCP contacted podiatry who said patient should present to the emergency department for admission for IV antibiotics. Lower extremity edema, coolness, and weakness are Absent. Numbness and tingling are Absent. XR R foot 5/12/22  Impression   1. Soft tissue swelling in the right 1st and 4th toes potentially due to   cellulitis given the clinical history with possible ulcerations.  No   underlying findings of necrotizing fasciitis.  However, there is   osteomyelitis involving the lateral cortex of the 4th middle phalanx, and   there may be osteomyelitis involving the distal tuft of the 1st distal   phalanx associated with a nondisplaced pathologic fracture.  Consider MRI. 2. Mild osteoarthritic changes of the right 1st metatarsophalangeal joint. 3. Bony demineralization. REVIEW OF SYSTEMS  All systems reviewed, pertinent positives per HPI otherwise noted to be negative.     Focused exam revealed   PHYSICAL EXAM  BP (!) 165/65   Pulse 54   Temp 97 °F (36.1 °C) (Oral)   Resp 16   Ht 5' 3\" (1.6 m)   Wt 135 lb 4.8 oz (61.4 kg)   SpO2 98%   BMI 23.97 kg/m²    GENERAL APPEARANCE: Awake and alert. Cooperative. no distress. HENT: Normocephalic. Atraumatic. Mucous membranes are moist  NECK: Supple. Full range of motion of the neck without stiffness or pain. EYES: PERRL. EOM's grossly intact. HEART/CHEST: RRR. No murmurs. Chest wall is not tender to palpation. Bilateral pulses present. LUNGS: Respirations unlabored. CTAB. Good air exchange. Speaking comfortably in full sentences. ABDOMEN: No tenderness. Soft. Non-distended. No masses. No organomegaly. No guarding or rebound. MUSCULOSKELETAL: No extremity edema. Compartments soft. No deformity. No tenderness in the extremities. All extremities neurovascularly intact. SKIN: Warm and dry. No acute rashes. Swelling and tenderness of the right foot, mild edema and erythema to mid shin. No crepitus. NEUROLOGICAL: Alert and oriented. No gross facial drooping. Strength 5/5, sensation intact. PSYCHIATRIC: Normal mood and affect. ED course / MDM:   Overall well appearing patient, in no acute distress, presenting for osteomyelitis of the right foot diagnosed by outpatient provider. Physical exam remarkable for evidence of cellulitis and ulceration on the right foot. I personally saw the patient and performed a substantive portion of the visit including all aspects of the medical decision making. Differential diagnosis includes but is not limited to: Cellulitis, osteomyelitis, low suspicion for septic arthritis, necrotizing fasciitis, sepsis      Workup showed:    ED Course as of 05/15/22 2141   Fri May 13, 2022   1648 Anemia to 10.7. No leukocytosis or thrombocytopenia [ER]   1719 COVID and flu swab negative [ER]   6177 Mild kidney dysfunction with creatinine of 1.3. No electrolyte abnormalities [ER]   1720 Liver function testing unremarkable [ER]      ED Course User Index  [ER] Junie Kc MD     Compartments are soft, low suspicion for compartment syndrome. History and exam not consistent with DVT. No evidence of neurovascular injury on exam. No warmth and erythema over the joint. No swelling. No pain with micro-movements. Low suspicion for septic arthritis or gout. No crepitus on exam, x-ray did not show evidence of air. Low suspicion for necrotizing fasciitis. Based on results of work-up, I am concerned for cellulitis and osteomyelitis. At this time, do feel the patient requires admission for further work-up and management. Discussed the patient with hospital team.    CLINICAL IMPRESSION  1. Osteomyelitis of right foot, unspecified type ([address]    2. Cellulitis of right lower extremity        Blood pressure (!) 184/81, pulse 65, temperature 97 °F (36.1 °C), temperature source Oral, resp. rate 16, height 5' 3\" (1.6 m), weight 135 lb 4.8 oz (61.4 kg), SpO2 98 %, not currently breastfeeding. Puneet Cruz was admitted in stable condition. All diagnostic, treatment, and disposition decisions were made by myself in conjunction with the advanced practice provider. For all further details of the patient's emergency department visit, please see the advanced practice provider's documentation. Comment: Please note this report has been produced using speech recognition software and may contain errors related to that system including errors in grammar, punctuation, and spelling, as well as words and phrases that may be inappropriate. If there are any questions or concerns please feel free to contact the dictating provider for clarification.         Ced Pedro MD  05/15/22 0163

## 2022-05-17 LAB
ANION GAP SERPL CALCULATED.3IONS-SCNC: 9 MMOL/L (ref 3–16)
BASOPHILS ABSOLUTE: 0.1 K/UL (ref 0–0.2)
BASOPHILS RELATIVE PERCENT: 0.9 %
BLOOD CULTURE, ROUTINE: NORMAL
BUN BLDV-MCNC: 28 MG/DL (ref 7–20)
CALCIUM SERPL-MCNC: 9.5 MG/DL (ref 8.3–10.6)
CHLORIDE BLD-SCNC: 104 MMOL/L (ref 99–110)
CO2: 22 MMOL/L (ref 21–32)
CREAT SERPL-MCNC: 1 MG/DL (ref 0.6–1.2)
CULTURE, BLOOD 2: NORMAL
EOSINOPHILS ABSOLUTE: 0.3 K/UL (ref 0–0.6)
EOSINOPHILS RELATIVE PERCENT: 4.1 %
ESTIMATED AVERAGE GLUCOSE: 114 MG/DL
GFR AFRICAN AMERICAN: >60
GFR NON-AFRICAN AMERICAN: 54
GLUCOSE BLD-MCNC: 107 MG/DL (ref 70–99)
GLUCOSE BLD-MCNC: 110 MG/DL (ref 70–99)
GLUCOSE BLD-MCNC: 116 MG/DL (ref 70–99)
GLUCOSE BLD-MCNC: 159 MG/DL (ref 70–99)
GLUCOSE BLD-MCNC: 169 MG/DL (ref 70–99)
HBA1C MFR BLD: 5.6 %
HCT VFR BLD CALC: 26.7 % (ref 36–48)
HEMOGLOBIN: 9 G/DL (ref 12–16)
LYMPHOCYTES ABSOLUTE: 1.6 K/UL (ref 1–5.1)
LYMPHOCYTES RELATIVE PERCENT: 22 %
MCH RBC QN AUTO: 27.2 PG (ref 26–34)
MCHC RBC AUTO-ENTMCNC: 33.9 G/DL (ref 31–36)
MCV RBC AUTO: 80.4 FL (ref 80–100)
MONOCYTES ABSOLUTE: 0.5 K/UL (ref 0–1.3)
MONOCYTES RELATIVE PERCENT: 7 %
NEUTROPHILS ABSOLUTE: 4.9 K/UL (ref 1.7–7.7)
NEUTROPHILS RELATIVE PERCENT: 66 %
PDW BLD-RTO: 14.8 % (ref 12.4–15.4)
PERFORMED ON: ABNORMAL
PLATELET # BLD: 185 K/UL (ref 135–450)
PMV BLD AUTO: 7.3 FL (ref 5–10.5)
POTASSIUM REFLEX MAGNESIUM: 4.4 MMOL/L (ref 3.5–5.1)
RBC # BLD: 3.32 M/UL (ref 4–5.2)
SODIUM BLD-SCNC: 135 MMOL/L (ref 136–145)
VANCOMYCIN RANDOM: 20.4 UG/ML
WBC # BLD: 7.4 K/UL (ref 4–11)

## 2022-05-17 PROCEDURE — 80048 BASIC METABOLIC PNL TOTAL CA: CPT

## 2022-05-17 PROCEDURE — 6360000002 HC RX W HCPCS: Performed by: INTERNAL MEDICINE

## 2022-05-17 PROCEDURE — 6370000000 HC RX 637 (ALT 250 FOR IP): Performed by: INTERNAL MEDICINE

## 2022-05-17 PROCEDURE — 80202 ASSAY OF VANCOMYCIN: CPT

## 2022-05-17 PROCEDURE — 2580000003 HC RX 258: Performed by: INTERNAL MEDICINE

## 2022-05-17 PROCEDURE — 85025 COMPLETE CBC W/AUTO DIFF WBC: CPT

## 2022-05-17 PROCEDURE — 99232 SBSQ HOSP IP/OBS MODERATE 35: CPT | Performed by: INTERNAL MEDICINE

## 2022-05-17 PROCEDURE — 1200000000 HC SEMI PRIVATE

## 2022-05-17 RX ORDER — HYDRALAZINE HYDROCHLORIDE 50 MG/1
50 TABLET, FILM COATED ORAL EVERY 12 HOURS SCHEDULED
Status: DISCONTINUED | OUTPATIENT
Start: 2022-05-17 | End: 2022-05-18 | Stop reason: HOSPADM

## 2022-05-17 RX ADMIN — INSULIN LISPRO 1 UNITS: 100 INJECTION, SOLUTION INTRAVENOUS; SUBCUTANEOUS at 11:44

## 2022-05-17 RX ADMIN — LISINOPRIL 20 MG: 20 TABLET ORAL at 09:03

## 2022-05-17 RX ADMIN — CEFEPIME 2000 MG: 2 INJECTION, POWDER, FOR SOLUTION INTRAVENOUS at 18:12

## 2022-05-17 RX ADMIN — CEFEPIME 2000 MG: 2 INJECTION, POWDER, FOR SOLUTION INTRAVENOUS at 05:47

## 2022-05-17 RX ADMIN — ATORVASTATIN CALCIUM 10 MG: 10 TABLET, FILM COATED ORAL at 09:04

## 2022-05-17 RX ADMIN — OXYCODONE HYDROCHLORIDE AND ACETAMINOPHEN 1 TABLET: 5; 325 TABLET ORAL at 13:49

## 2022-05-17 RX ADMIN — ENOXAPARIN SODIUM 40 MG: 100 INJECTION SUBCUTANEOUS at 09:07

## 2022-05-17 RX ADMIN — INSULIN LISPRO 1 UNITS: 100 INJECTION, SOLUTION INTRAVENOUS; SUBCUTANEOUS at 21:21

## 2022-05-17 RX ADMIN — METFORMIN HYDROCHLORIDE 500 MG: 500 TABLET ORAL at 09:04

## 2022-05-17 RX ADMIN — METFORMIN HYDROCHLORIDE 500 MG: 500 TABLET ORAL at 18:04

## 2022-05-17 RX ADMIN — DIGOXIN 125 MCG: 125 TABLET ORAL at 09:04

## 2022-05-17 RX ADMIN — HYDRALAZINE HYDROCHLORIDE 50 MG: 50 TABLET, FILM COATED ORAL at 11:47

## 2022-05-17 RX ADMIN — HYDRALAZINE HYDROCHLORIDE 50 MG: 50 TABLET, FILM COATED ORAL at 20:10

## 2022-05-17 RX ADMIN — Medication 1000 UNITS: at 09:03

## 2022-05-17 RX ADMIN — AMLODIPINE BESYLATE 10 MG: 5 TABLET ORAL at 09:04

## 2022-05-17 ASSESSMENT — PAIN SCALES - GENERAL: PAINLEVEL_OUTOF10: 7

## 2022-05-17 ASSESSMENT — PAIN DESCRIPTION - ORIENTATION: ORIENTATION: LEFT;RIGHT

## 2022-05-17 NOTE — PROGRESS NOTES
IM Progress Note    Admit Date:  5/13/2022      Admitted with diabetic foot ulcer and osteomyelitis. Subjective:  Ms. Marko Ramsey is stable. And denies any major pain issues  No fevers    MRI with osteo       Objective:   BP (!) 163/67   Pulse 64   Temp 97.7 °F (36.5 °C) (Oral)   Resp 16   Ht 5' 3\" (1.6 m)   Wt 135 lb 4.8 oz (61.4 kg)   SpO2 96%   BMI 23.97 kg/m²     No intake or output data in the 24 hours ending 05/17/22 1031      Physical Exam:  Gen: elderly thin frail female,  No distress. Alert. Awake and well-oriented  Thin built  Eyes: PERRL. No sclera icterus. No conjunctival injection. ENT: No discharge. Pharynx clear. Neck: No JVD. No Carotid Bruit. Trachea midline. Resp: No accessory muscle use. No crackles. No wheezes. No rhonchi. CV: Regular rate. Regular rhythm. No murmur. No rub. No edema. Capillary Refill: Brisk,< 3 seconds   Peripheral Pulses: +2 palpable, equal bilaterally   GI: Non-tender. Non-distended. No masses. No organomegaly. Normal bowel sounds. No hernia. Skin:  M/S:    Right Lower extremity : right foot right first great toe and fourth toe tip mild necrotic ulcers. improving  Right foot  Erythema and tenderness . No edema  Poor pulses in both feet   Evidence of old left popliteal- femoral bypass  Neuro: Awake. Grossly nonfocal    Psych: Oriented x 3.  No anxiety or agitation.            Medications:   Scheduled Meds:   hydrALAZINE  50 mg Oral 2 times per day    cefepime  2,000 mg IntraVENous Q12H    enoxaparin  40 mg SubCUTAneous Daily    metFORMIN  500 mg Oral BID     digoxin  125 mcg Oral Daily    amLODIPine  10 mg Oral Daily    lisinopril  20 mg Oral Daily    atorvastatin  10 mg Oral Daily    Vitamin D  1,000 Units Oral Daily    insulin lispro  0-6 Units SubCUTAneous TID     insulin lispro  0-3 Units SubCUTAneous Nightly    sodium chloride flush  5-40 mL IntraVENous 2 times per day    vancomycin (VANCOCIN) intermittent dosing (placeholder)   Other RX Placeholder       Continuous Infusions:   dextrose      sodium chloride 75 mL/hr at 05/16/22 0754    sodium chloride         Data:  CBC:   Recent Labs     05/15/22  0610 05/16/22  0559 05/17/22  0545   WBC 7.3 8.4 7.4   RBC 3.50* 3.39* 3.32*   HGB 9.4* 9.0* 9.0*   HCT 28.4* 27.3* 26.7*   MCV 80.9 80.4 80.4   RDW 15.1 15.1 14.8    170 185     BMP:   Recent Labs     05/15/22  0610 05/16/22  0559 05/17/22  0545    133* 135*   K 4.5 4.3 4.4    103 104   CO2 21 20* 22   BUN 29* 26* 28*   CREATININE 0.9 0.8 1.0        LIVER PROFILE:   No results for input(s): AST, ALT, ALB, BILIDIR, BILITOT, ALKPHOS in the last 72 hours. Cultures  Blood cultures pending   COVID and Flu not detected        Radiology  MRI FOOT RIGHT WO CONTRAST   Final Result   1. Osteomyelitis throughout the 1st distal phalanx. Adjacent deep soft   tissue ulceration and cellulitis. No associated abscess. 2. Osteomyelitis of the 4th middle and distal phalanges. Adjacent deep soft   tissue ulceration along the dorsal lateral aspect of the toe with adjacent   cellulitis. No abscess. CTA ABDOMINAL AORTA W BILAT RUNOFF W CONTRAST   Final Result   Extensive vascular calcifications seen throughout the abdominal aorta,   mesenteric vessels, pelvic vessels and lower extremity vessels. There is   patency identified of a graft from the mid superficial femoral artery on the   left to the peroneal artery. There is a 2-vessel runoff identified to the   left ankle with extensive disease seen throughout the right superficial   femoral artery distally with multiple areas of severe stenoses. There is   severe stenoses as well seen throughout the right popliteal artery with a   2-vessel runoff to the right ankle with the peroneal artery and anterior   tibial artery with extensive disease seen throughout the trifurcation vessels   as well.       Severe stenoses seen in the right renal artery at multiple levels with severe   stenoses at the origin of the SMA and left renal artery. Atrophy and   decreased enhancement of the right renal parenchyma.                  Xray  done as outpatient on 5/12/22  Impression   1. Soft tissue swelling in the right 1st and 4th toes potentially due to   cellulitis given the clinical history with possible ulcerations.  No   underlying findings of necrotizing fasciitis.  However, there is   osteomyelitis involving the lateral cortex of the 4th middle phalanx, and   there may be osteomyelitis involving the distal tuft of the 1st distal   phalanx associated with a nondisplaced pathologic fracture.  Consider MRI. 2. Mild osteoarthritic changes of the right 1st metatarsophalangeal joint. 3. Bony demineralization. CTA LE     Extensive vascular calcifications seen throughout the abdominal aorta,   mesenteric vessels, pelvic vessels and lower extremity vessels.  There is   patency identified of a graft from the mid superficial femoral artery on the   left to the peroneal artery. [place] Lug[place] is a 2-vessel runoff identified to the   left ankle with extensive disease seen throughout the right superficial   femoral artery distally with multiple areas of severe stenoses.  There is   severe stenoses as well seen throughout the right popliteal artery with a   2-vessel runoff to the right ankle with the peroneal artery and anterior   tibial artery with extensive disease seen throughout the trifurcation vessels   as well. Severe stenoses seen in the right renal artery at multiple levels with severe   stenoses at the origin of the SMA and left renal artery.  Atrophy and   decreased enhancement of the right renal parenchyma. Assessment and Plan      #Osteomyelitis of right fourth toe distal and middle  phalynx and osteomyelitis of distal phalynx of great toe   #Chronic diabetic foot ulcers.   likely ischemic ulcers given CTA findings , see above     -Afebrile   -No leukocytosis   -Continue IV antibiotics , IV vanc and cefepime   -IVF  -blood cultures pending   -podiatry consulted     #Mild dehydration  -Improved with IV fluids  Creatinine1.3-> 1.1     #Atrial fibrillation   -on digoxin   -rate controlled   Unsure why not on AC     #HTN - uncontrolled  -continue norvasc and acei  -add hydralazine      #DM type 2   -continue metformin   -SSI   -continue to monitor BS      #HX of CVA   #CAD  #PVD   -continue ASA and statin     PAD - s.p left LE intervention previously  See CTA legs above  Might need right side as well   Refer to DR. LEGER at [facility]        DVT Prophylaxis: lovenox   Diet: ADULT DIET;  Regular; 4 carb choices (60 gm/meal)  Code Status: Full Code          Maria Del Carmen Castrejon MD   5/17/2022 10:31 AM

## 2022-05-17 NOTE — PROGRESS NOTES
Pharmacy Vancomycin Consult     Vancomycin Day: 5  Current Dosing: pulse  Current indication: Bone and joint infection      Recent Labs     05/16/22  0559 05/17/22  0545   BUN 26* 28*   CREATININE 0.8 1.0   WBC 8.4 7.4     No intake or output data in the 24 hours ending 05/17/22 0831  Culture Date      Source                       Results      Ht Readings from Last 1 Encounters:   05/13/22 5' 3\" (1.6 m)        Wt Readings from Last 1 Encounters:   05/15/22 135 lb 4.8 oz (61.4 kg)       Body mass index is 23.97 kg/m². Estimated Creatinine Clearance: 40 mL/min (based on SCr of 1 mg/dL). Random: 20.4    Assessment/Plan:  Hold vancomycin dose today.  Repeat random level tomorrow morning

## 2022-05-17 NOTE — PROGRESS NOTES
Pt still thinking that she was going to [facility] for [facility]. Pt was told that there is not an order for that.

## 2022-05-17 NOTE — ADT AUTH CERT
Utilization Reviews         Osteomyelitis - Care Day 4 (5/16/2022) by Mary Wadsworth RN       Review Status Review Entered   Completed 5/17/2022 12:12      Criteria Review      Care Day: 4 Care Date: 5/16/2022 Level of Care: Inpatient Floor    Guideline Day 2    Clinical Status    (X) * Afebrile or fever improved    5/17/2022 12:12 PM EDT by Mesitis      05/16/22 0434 97.8 (36.6) 16 64 172/71 -- Semi fowlers -- -- 96 None (Room air) --    (X) * Oral intake tolerated    Routes    ( ) * Oral hydration    5/17/2022 12:12 PM EDT by Mesitis      ivf at 75    Interventions    (X) Possible WBC, ESR, C-reactive protein    5/17/2022 12:12 PM EDT by Mesitis      Hemoglobin9.0g/dL   Cwvvbaymzw61.3%  Sodium 133   BUN 26    Medications    (X) Parenteral antibiotics    5/17/2022 12:12 PM EDT by Mesitis      maxipime iv  vanco iv    * Milestone   Additional Notes   DATE: 5/16      Pertinent Updates:   Cont iv abx, ivf.       Vitals:    05/16/22 0434 97.8 (36.6) 16 64 172/71 - Semi fowlers - - 96 None (Room air) -       Abnl/Pertinent Labs/Radiology/Diagnostic Studies:     Hemoglobin9.0g/dL    Epxontysmd76.3%   Sodium 133    BUN 26       Physical Exam:   Right Lower extremity : right foot right first great toe and fourth toe tip mild necrotic ulcers. improving  Right foot  Erythema and tenderness .  No edema   Poor pulses in both feet       MD Consults/Assessments & Plans:   MEDICINE:   #Osteomyelitis of right fourth toe middle phalynx and possible osteomyelitis of distal tuft of great toe    #Chronic diabetic foot ulcers.    likely ischemic ulcers given CTA findings , see above        -Afebrile    -No leukocytosis    -Continue IV antibiotics , IV vanc and cefepime    -IVF   -blood cultures pending    -podiatry consulted       #Mild dehydration   -Improved with IV fluids   Creatinine1.3-> 1.1       #Atrial fibrillation    -on digoxin    -rate controlled        #HTN    -continue norvasc    -continue lisinopril      #DM type 2    -continue metformin    -SSI    -continue to monitor BS        #HX of CVA    #CAD   #PVD    -continue ASA and statin        PAD - s.p left LE intervention previously   See CTA legs above   Might need right side as well    [facility] to [facility]. LEGER at [facility]           DVT Prophylaxis: lovenox    Diet: ADULT DIET; Regular; 4 carb choices (60 gm/meal)   Code Status: Full Code      POD:   diabetic foot ulcers right foot secondary to peripheral neuropathy    PAD   diabetes mellitus with peripheral neuropathy    Osteomyelitis right foot secondary to diabetes mellitus            Plan   Patient examined. Reviewed labs and imaging. Continue IV antibiotics. OK to leave wounds open to air. Xrays show question of osteomyelitis right hallux and 4th digit.  However CRP is WNL and ESR is just above normal. I would expect these to be elevated with osteomyelitis. Ordered MRI of the foot to further evaluate for osteomyelitis or soft tissue infection. Ordered MRA right LE to further evaluate for severity of PAD. Has had noninvasive arterial exams in the past that showed PAD but unable to calculate KE due to calcified vessels. If severe PAD is found will need to be evaluated by Dr. Elzbieta Lopez again at [facility].     Discussed with patient concern for underlying bone infection and need for additional imaging to determine if the toe is infected or not. If osteomyelitis is found patient knows that she is at risk for losing her toe. Control glucose levels to prevent future complications.        Medications:      cefepime (MAXIPIME) 1000 mg IVPB minibag   Dose: 1,000 mg   Freq: EVERY 12 HOURS Route: IV   Start: 05/14/22 0500 End: 05/16/22 1242      cefepime (MAXIPIME) 2000 mg IVPB minibag   Dose: 2,000 mg   Freq: EVERY 12 HOURS Route: IV   Start: 05/16/22 1800      vancomycin 1500 mg in dextrose 5% 300 mL IVPB   Dose: 1,500 mg   Freq: ONCE Route: IV   Start: 05/16/22 0900 End: 05/16/22 1301      0.9 % sodium chloride infusion Rate: 75 mL/hr Freq: CONTINUOUS Route: IV   Start: 05/13/22 2000        Osteomyelitis - Care Day 3 (5/15/2022) by Antony Pedro RN       Review Status Review Entered   Completed 5/17/2022 12:05      Criteria Review      Care Day: 3 Care Date: 5/15/2022 Level of Care: Inpatient Floor    Guideline Day 2    Clinical Status    (X) * Afebrile or fever improved    5/17/2022 12:05 PM EDT by Lawanda Moe      05/15/22 1400 97 (36.1) 16 65 184/81 -- Semi fowlers -- -- 98 None (Room air) --    (X) * Oral intake tolerated    Routes    ( ) * Oral hydration    5/17/2022 12:05 PM EDT by Lawanda Moe      ivf at 75    Interventions    (X) Possible WBC, ESR, C-reactive protein    5/17/2022 12:05 PM EDT by Lawadna Moe      Hemoglobin9.4g/dL   Eclnzhogof35.4%  BUN 29   Sed Rate 33    Medications    (X) Parenteral antibiotics    5/17/2022 12:05 PM EDT by Lawanda Moe      vanco iv  maxipime iv    * Milestone   Additional Notes   DATE: 5/15      Pertinent Updates:   Ivf at 75   Continued IV abx   Elevated BP- requiring hydralazine prn         Vitals:    05/15/22 1400 97 (36.1) 16 65 184/81 - Semi fowlers - - 98 None (Room air) -       Abnl/Pertinent Labs/Radiology/Diagnostic Studies:   Hemoglobin9.4g/dL    Hohihnknmp97.4%   BUN 29    Sed Rate 33       Physical Exam:   right foot right first great toe and fourth toe is necrotic.  Right foot is erythematous and tender      MD Consults/Assessments & Plans:   MEDICINE:   #Osteomyelitis of right foot   #Chronic diabetic foot ulcers.    Right first and fourth toe   -Afebrile    -No leukocytosis    -Continue IV antibiotics , IV vanc and cefepime    -IVF   -blood cultures pending    -podiatry consulted   -MRI of the foot has been ordered       #Mild dehydration   -Improved with IV fluids   Creatinine1.3-> 1.1       #Atrial fibrillation    -on digoxin    -rate controlled        #HTN    -continue norvasc    -continue lisinopril        #DM type 2    -continue metformin    -SSI -continue to monitor BS        #HX of CVA    #CAD   #PVD    -continue ASA and statin            DVT Prophylaxis: lovenox    Diet: ADULT DIET; Regular; 4 carb choices (60 gm/meal)   Code Status: Full Code      Medications:      0.9 % sodium chloride infusion   Rate: 75 mL/hr Freq: CONTINUOUS Route: IV   Start: 05/13/22 2000      cefepime (MAXIPIME) 1000 mg IVPB minibag   Dose: 1,000 mg   Freq: EVERY 12 HOURS Route: IV   Start: 05/14/22 0500 End: 05/16/22 1242      vancomycin 1000 mg IVPB in 250 mL D5W addavial   Dose: 1,000 mg   Freq: ONCE Route: IV   Start: 05/15/22 0745 End: 05/15/22 0925      hydrALAZINE (APRESOLINE) tablet 25 mg   Dose: 25 mg   Freq: EVERY 6 HOURS PRN Route: PO   PRN Comment: SBP > 160, Hold for HR > 85.    Start: 05/14/22 0516 End: 05/16/22 0241   X 1

## 2022-05-17 NOTE — PLAN OF CARE
Problem: Discharge Planning  Goal: Discharge to home or other facility with appropriate resources  5/17/2022 0220 by Miko Desir RN  Outcome: Progressing  5/16/2022 1235 by Sally Reis RN  Outcome: Progressing     Problem: Safety - Adult  Goal: Free from fall injury  5/17/2022 0220 by Miko Desir RN  Outcome: Progressing  5/16/2022 1235 by Sally Reis RN  Outcome: Progressing     Problem: Genitourinary - Adult  Goal: Absence of urinary retention  5/17/2022 0220 by Miko Desir RN  Outcome: Progressing  5/16/2022 1235 by Sally Reis RN  Outcome: Progressing     Problem: Metabolic/Fluid and Electrolytes - Adult  Goal: Electrolytes maintained within normal limits  5/17/2022 0220 by Miko Desir RN  Outcome: Progressing  5/16/2022 1235 by Sally Reis RN  Outcome: Progressing  Goal: Hemodynamic stability and optimal renal function maintained  5/17/2022 0220 by Miko Desir RN  Outcome: Progressing  5/16/2022 1235 by Sally Reis RN  Outcome: Progressing  Goal: Glucose maintained within prescribed range  5/17/2022 0220 by Miko Desir RN  Outcome: Progressing  5/16/2022 1235 by Sally Reis RN  Outcome: Progressing     Problem: Hematologic - Adult  Goal: Maintains hematologic stability  5/17/2022 0220 by Miko Desir RN  Outcome: Progressing  5/16/2022 1235 by Sally Reis RN  Outcome: Progressing     Problem: Chronic Conditions and Co-morbidities  Goal: Patient's chronic conditions and co-morbidity symptoms are monitored and maintained or improved  5/17/2022 0220 by Miko Desir RN  Outcome: Progressing  5/16/2022 1235 by Sally Reis RN  Outcome: Progressing     Problem: ABCDS Injury Assessment  Goal: Absence of physical injury  5/17/2022 0220 by Miko Desir RN  Outcome: Progressing  5/16/2022 1235 by Sally Reis RN  Outcome: Progressing

## 2022-05-17 NOTE — PROGRESS NOTES
PROGRESS NOTE    Admit Date:  5/13/2022    Subjective:  68 y.o. female who is seen for evaluation of wounds on right foot with osteomyelitis. Patient was scheduled to see me in the office last Tuesday but was a no show. States she knew she had an appointment in Tuesday and Thursday last week but did not know who for. Went to her PCP on Thursday. Has shown up at my office and [address] in the past without an appointment wanting her toenails cut. States that she is not walking like she used to. Uses a cart when grocery shopping. Is not able to walk very far before her feet and legs start to hurt. Feet hurt most of the time. Was putting hydrogen peroxide on the wounds prior to admission. Has seen Dr. Jenny Light in the past for arterial issues in the left leg. States feeling OK today. Pain in the foot is improved a little. Does still have burning in the foot.        Past Medical History:        Diagnosis Date    Atrial fibrillation ([facility]     off coumadin after bleed, declined restart    Blood transfusion     CAD (coronary artery disease)     Diabetes mellitus type II     Diabetic neuropathy ([address] 2011    Gastric ulcer     H. pylori infection 2009    Hyperlipidemia     Hypertension     Numbness and tingling of left leg     Osteoarthritis     knees    Poor historian     PVD (peripheral vascular disease) ([facility]     PTA distal sfa/pop/peroneal, Dr. Jenny Light 12/2015    Unspecified cerebral artery occlusion with cerebral infarction     TIA affected left eye    upper gi bleed 12/2009       Past Surgical History:        Procedure Laterality Date    ANGIOPLASTY  12/30/15    Dr. Jenny Light, LLE, PTA of distal sfa/pop/peroneal    CARDIAC CATHETERIZATION  9/21/12    done for surgical clearance, cath was negative    CORONARY ANGIOPLASTY WITH STENT PLACEMENT  [id number]    FOOT SURGERY Left 01/11/2018    DEBRIDEMENT OF ULCERS LEFT FOOT AND LEG WITH GRAFT    OTHER SURGICAL HISTORY Left 06/05/2017    Left Femoral Peroneal Bypass    SHOULDER ARTHROPLASTY  10/5/12    RIGHT SHOULDER TOTAL ARTHROPLASTY, BICEPS TENODESIS DEPUY, GLOBAL ADVANTAGE AP       Current Medications:     hydrALAZINE  50 mg Oral 2 times per day    cefepime  2,000 mg IntraVENous Q12H    enoxaparin  40 mg SubCUTAneous Daily    metFORMIN  500 mg Oral BID WC    digoxin  125 mcg Oral Daily    amLODIPine  10 mg Oral Daily    lisinopril  20 mg Oral Daily    atorvastatin  10 mg Oral Daily    Vitamin D  1,000 Units Oral Daily    insulin lispro  0-6 Units SubCUTAneous TID WC    insulin lispro  0-3 Units SubCUTAneous Nightly    sodium chloride flush  5-40 mL IntraVENous 2 times per day    vancomycin (VANCOCIN) intermittent dosing (placeholder)   Other RX Placeholder       Allergies:  Pcn [penicillins]    Social History:    Social History     Tobacco Use    Smoking status: Never Smoker    Smokeless tobacco: Never Used    Tobacco comment: Not needed   Vaping Use    Vaping Use: Never used   Substance Use Topics    Alcohol use: No    Drug use: No       Family History:       Problem Relation Age of Onset    Heart Disease Mother     Stroke Mother     Heart Disease Father     Diabetes Sister     Diabetes Brother     Diabetes Maternal Grandmother        Review of Systems    CONSTITUTIONAL:  negative  EYES:  negative  HEENT:  negative  RESPIRATORY:  negative  CARDIOVASCULAR:  negative  GASTROINTESTINAL:  negative  GENITOURINARY:  negative  INTEGUMENT/BREAST:  positive for ulcers  MUSCULOSKELETAL:  positive for pain  NEUROLOGICAL:  positive for numbness      Objective:   BP (!) 174/69   Pulse 64   Temp 97.7 °F (36.5 °C) (Oral)   Resp 16   Ht 5' 3\" (1.6 m)   Wt 135 lb 4.8 oz (61.4 kg)   SpO2 96%   BMI 23.97 kg/m²     Data:  CBC:   Recent Labs     05/15/22  0610 05/16/22  0559 05/17/22  0545   WBC 7.3 8.4 7.4   HGB 9.4* 9.0* 9.0*   HCT 28.4* 27.3* 26.7*   MCV 80.9 80.4 80.4    170 185     BMP:   Recent Labs     05/15/22  0610 05/16/22  0559 05/17/22  0545    133* 135*   K 4.5 4.3 4.4    103 104   CO2 21 20* 22   BUN 29* 26* 28*   CREATININE 0.9 0.8 1.0     LIVER PROFILE:   No results for input(s): AST, ALT, LIPASE, BILIDIR, BILITOT, ALKPHOS in the last 72 hours. Invalid input(s): AMYLASE,  ALB  PT/INR:   No results for input(s): PROT, INR in the last 72 hours. HgBA1c:  Lab Results   Component Value Date    LABA1C 5.6 05/13/2022       CRP - <3 (5/15/22)  ESR - 33 (3/15/22)    Cultures: Blood x2 - NGSF    Imaging: xray right foot -   Diffuse bony demineralization. Age indeterminate transverse fracture   involving the distal tuft of the 1st distal phalanx with suggestion of   osteolysis along the fracture margins and no significant displacement. Osteolysis in the lateral cortex of the 4th middle phalanx. Joints maintain   anatomic alignment. Calcaneal enthesophytes at the insertion of the Achilles   tendon and origin of the plantar aponeurosis. Mild arthropathic changes of   the 1st metatarsophalangeal joint. Soft tissue swelling in the 1st and 4th   toes with possible skin defects. No soft tissue gas nor radiopaque foreign   bodies. Extensive arterial calcifications. Impression   1. Soft tissue swelling in the right 1st and 4th toes potentially due to   cellulitis given the clinical history with possible ulcerations. No   underlying findings of necrotizing fasciitis. However, there is   osteomyelitis involving the lateral cortex of the 4th middle phalanx, and   there may be osteomyelitis involving the distal tuft of the 1st distal   phalanx associated with a nondisplaced pathologic fracture. Consider MRI. 2. Mild osteoarthritic changes of the right 1st metatarsophalangeal joint. 3. Bony demineralization. MRI right foot -   FINDINGS:   LISFRANC JOINT:  Lisfranc ligament is visualized and is normal.  The   alignment of the tarsal-metatarsal joint is anatomic.      BONE MARROW: Significant bone marrow edema with decreased T1 signal   throughout the 1st distal phalanx. Suspected erosion of the 1st distal   phalangeal tuft. Bone marrow edema with decreased T1 signal also seen   throughout the 4th middle and distal phalanges. No fracture or dislocation. No suspicious marrow space-occupying lesion. GREATER AND LESSER MTP JOINTS: Bipartite medial and lateral hallux sesamoids. No significant degenerative changes. No joint effusion. SOFT TISSUES: Deep soft tissue ulceration at the distal aspect of the 1st   toe. Circumferential subcutaneous edema throughout the 1st toe. Deep soft   tissue ulceration along the dorsal lateral aspect of the 4th toe. Circumferential subcutaneous edema also seen in the 4th toe. Mild dorsal   forefoot subcutaneous edema. No drainable fluid collection identified. TENDONS: The visualized tendons are intact without tenosynovitis. Impression:  1. Osteomyelitis throughout the 1st distal phalanx. Adjacent deep soft   tissue ulceration and cellulitis. No associated abscess. 2. Osteomyelitis of the 4th middle and distal phalanges. Adjacent deep soft   tissue ulceration along the dorsal lateral aspect of the toe with adjacent   cellulitis. No abscess. CTA with runoff    The abdominal aorta reveals extensive atherosclerotic disease throughout the   abdominal aorta. There is severe atherosclerotic disease identified at the   origin of the SMA with findings concerning for severe stenosis of the origin   of the SMA. There is severe stenosis seen at the origin of the right renal   artery as well as multiple areas throughout the right renal artery. There is   severe stenosis identified at the origin of the left renal artery. The KAHLIL   is patent. No significant stenosis of the abdominal aorta. Both common   iliac arteries are normal in caliber.   There is atherosclerotic disease seen   at the origin of iliac vessels bilaterally with no significant stenosis seen   in the external iliac arteries or internal iliac arteries. The right lower extremity reveals no significant stenosis of the common   femoral artery. There is extensive atherosclerotic disease seen throughout   the superficial femoral artery with patency identified of both the   superficial femoral artery proximally and the profunda. The mid superficial   femoral artery reveals moderate stenosis. There is extensive atherosclerotic   disease and severe stenosis seen at the distal superficial femoral artery on   the right. There are multiple areas of severe stenosis of the distal   superficial femoral artery. There is contrast seen within the proximal   popliteal artery with severe stenosis at the origin of the popliteal artery. There is severe stenosis of the distal popliteal artery. Extensive vascular   calcification identified with multiple levels of stenoses seen throughout the   popliteal artery. There is extensive atherosclerotic disease seen throughout   the trifurcation vessels with multiple collateral vessels identified within   the right lower extremity. There is a 2-vessel runoff identified within the   right ankle with multiple areas of stenosis seen both throughout the anterior   tibial artery and peroneal artery. The posterior tibial artery is not well   seen. The left lower extremity reveals atherosclerotic disease in the common   femoral artery. There is severe atherosclerotic disease seen throughout the   proximal superficial femoral artery. Patency of the profunda and its   branches. There is a graft identified from the mid superficial femoral   artery to the peroneal artery proximally. There is a 2-vessel runoff   identified to the left ankle. Occlusion identified of the distal superficial   femoral artery on the left and popliteal artery. Severe atherosclerotic   disease seen diffusely throughout the trifurcation vessels on the left.    Impression:  Extensive vascular calcifications seen throughout the abdominal aorta,   mesenteric vessels, pelvic vessels and lower extremity vessels. There is   patency identified of a graft from the mid superficial femoral artery on the   left to the peroneal artery. There is a 2-vessel runoff identified to the   left ankle with extensive disease seen throughout the right superficial   femoral artery distally with multiple areas of severe stenoses. There is   severe stenoses as well seen throughout the right popliteal artery with a   2-vessel runoff to the right ankle with the peroneal artery and anterior   tibial artery with extensive disease seen throughout the trifurcation vessels   as well. Severe stenoses seen in the right renal artery at multiple levels with severe   stenoses at the origin of the SMA and left renal artery. Atrophy and   decreased enhancement of the right renal parenchyma. Physical Exam:    General Appearance: alert and oriented to person, place and time, well developed and well- nourished, in no acute distress  Head: normocephalic and atraumatic  Eyes: pupils equal, round  Neck: trachea midline  Pulmonary/Chest: no wheezes  Abdomen: soft, non-tender, non-distended    Distal aspect right hallux with dry stable eschar noted. No active drainage noted. Mild erythema noted. No increase in skin temperature noted. No purulence or malodor noted. Lateral aspect right 4th digit at DIPJ with dry stable eschar with no drainage noted. Absent hair growth bilateral LE. Thin shiny skin bilateral LE.          Assessment:  Patient Active Problem List   Diagnosis Code    Osteoarthritis M19.90    Paroxysmal atrial fibrillation ([facility] I48.0    Noncompliance of patient with dietary regimen Z91.11    PAD (peripheral artery disease) ([facility] I73.9    Pure hypercholesterolemia E78.00    Coronary artery disease involving native coronary artery of native heart without angina pectoris I25.10    Essential hypertension I10    Type 2 diabetes mellitus with diabetic polyneuropathy, without long-term current use of insulin ([facility]) E11.42    Vitamin D deficiency E55.9    Acute osteomyelitis of left foot ([facility]) M86.172    Hammertoe, bilateral M20.41, M20.42    Colonoscopy refused Z53.20    Chronic deep vein thrombosis (DVT) of left peroneal vein ([facility]) I82.552    Acute osteomyelitis of right foot ([facility]) M86.171    Osteomyelitis of right foot ([facility] M86.9    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, with bone involvement without evidence of necrosis ([facility] E10.621, L97.516    Primary hypertension I10    Cellulitis of right lower extremity L03.115     diabetic foot ulcers right foot secondary to peripheral neuropathy   PAD  diabetes mellitus with peripheral neuropathy   Osteomyelitis right foot secondary to diabetes mellitus       Plan  Patient examined. Reviewed labs and imaging. Continue IV antibiotics. OK to leave wounds open to air. Control glucose levels to prevent future complications. MRI shows osteomyelitis of the distal phalanx right hallux and right 4th digit distal and middle phalanx. CTA reveals severe atherosclerotic disease and stenosis of multiple arteries. CRP is WNL and ESR is just above normal limit which would be expected to be elevated with osteomyelitis. Recommend follow up with Dr. Marianne Lopez for arterial evaluation/treatment.    Prior to amputation of the digits will need to have arterial supply improved first.           Electronically signed by Zayra Peng DPM on 5/17/2022 at 12:46 PM.

## 2022-05-18 ENCOUNTER — HOSPITAL ENCOUNTER (INPATIENT)
Age: 77
LOS: 7 days | Discharge: NURSING, REHAB OR LONG-TERM CARE | DRG: 253 | End: 2022-05-25
Attending: HOSPITALIST | Admitting: INTERNAL MEDICINE
Payer: MEDICARE

## 2022-05-18 VITALS
HEART RATE: 66 BPM | WEIGHT: 135.3 LBS | BODY MASS INDEX: 23.97 KG/M2 | SYSTOLIC BLOOD PRESSURE: 153 MMHG | OXYGEN SATURATION: 97 % | RESPIRATION RATE: 16 BRPM | TEMPERATURE: 97.4 F | HEIGHT: 63 IN | DIASTOLIC BLOOD PRESSURE: 63 MMHG

## 2022-05-18 DIAGNOSIS — M86.68 OTHER CHRONIC OSTEOMYELITIS, OTHER SITE (HCC): ICD-10-CM

## 2022-05-18 DIAGNOSIS — Z89.421 STATUS POST AMPUTATION OF TOE OF RIGHT FOOT (HCC): Primary | ICD-10-CM

## 2022-05-18 PROBLEM — I73.9 PVD (PERIPHERAL VASCULAR DISEASE) (HCC): Status: ACTIVE | Noted: 2022-05-18

## 2022-05-18 LAB
ANION GAP SERPL CALCULATED.3IONS-SCNC: 10 MMOL/L (ref 3–16)
BASOPHILS ABSOLUTE: 0.1 K/UL (ref 0–0.2)
BASOPHILS RELATIVE PERCENT: 0.8 %
BUN BLDV-MCNC: 34 MG/DL (ref 7–20)
CALCIUM SERPL-MCNC: 9.3 MG/DL (ref 8.3–10.6)
CHLORIDE BLD-SCNC: 105 MMOL/L (ref 99–110)
CO2: 20 MMOL/L (ref 21–32)
CREAT SERPL-MCNC: 1 MG/DL (ref 0.6–1.2)
EOSINOPHILS ABSOLUTE: 0.3 K/UL (ref 0–0.6)
EOSINOPHILS RELATIVE PERCENT: 3.6 %
GFR AFRICAN AMERICAN: >60
GFR NON-AFRICAN AMERICAN: 54
GLUCOSE BLD-MCNC: 104 MG/DL (ref 70–99)
GLUCOSE BLD-MCNC: 110 MG/DL (ref 70–99)
GLUCOSE BLD-MCNC: 112 MG/DL (ref 70–99)
GLUCOSE BLD-MCNC: 116 MG/DL (ref 70–99)
GLUCOSE BLD-MCNC: 124 MG/DL (ref 70–99)
HCT VFR BLD CALC: 25.9 % (ref 36–48)
HEMOGLOBIN: 8.9 G/DL (ref 12–16)
LYMPHOCYTES ABSOLUTE: 1.3 K/UL (ref 1–5.1)
LYMPHOCYTES RELATIVE PERCENT: 18.1 %
MCH RBC QN AUTO: 27.6 PG (ref 26–34)
MCHC RBC AUTO-ENTMCNC: 34.3 G/DL (ref 31–36)
MCV RBC AUTO: 80.3 FL (ref 80–100)
MONOCYTES ABSOLUTE: 0.6 K/UL (ref 0–1.3)
MONOCYTES RELATIVE PERCENT: 8 %
NEUTROPHILS ABSOLUTE: 5 K/UL (ref 1.7–7.7)
NEUTROPHILS RELATIVE PERCENT: 69.5 %
PDW BLD-RTO: 14.9 % (ref 12.4–15.4)
PERFORMED ON: ABNORMAL
PLATELET # BLD: 175 K/UL (ref 135–450)
PMV BLD AUTO: 7.5 FL (ref 5–10.5)
POTASSIUM REFLEX MAGNESIUM: 4.9 MMOL/L (ref 3.5–5.1)
RBC # BLD: 3.22 M/UL (ref 4–5.2)
SODIUM BLD-SCNC: 135 MMOL/L (ref 136–145)
VANCOMYCIN RANDOM: 11.8 UG/ML
WBC # BLD: 7.1 K/UL (ref 4–11)

## 2022-05-18 PROCEDURE — 6360000002 HC RX W HCPCS: Performed by: INTERNAL MEDICINE

## 2022-05-18 PROCEDURE — 36415 COLL VENOUS BLD VENIPUNCTURE: CPT

## 2022-05-18 PROCEDURE — 80202 ASSAY OF VANCOMYCIN: CPT

## 2022-05-18 PROCEDURE — 80048 BASIC METABOLIC PNL TOTAL CA: CPT

## 2022-05-18 PROCEDURE — 2580000003 HC RX 258: Performed by: INTERNAL MEDICINE

## 2022-05-18 PROCEDURE — 1200000000 HC SEMI PRIVATE

## 2022-05-18 PROCEDURE — 6370000000 HC RX 637 (ALT 250 FOR IP): Performed by: INTERNAL MEDICINE

## 2022-05-18 PROCEDURE — 85025 COMPLETE CBC W/AUTO DIFF WBC: CPT

## 2022-05-18 PROCEDURE — 2580000003 HC RX 258: Performed by: NURSE PRACTITIONER

## 2022-05-18 PROCEDURE — 99239 HOSP IP/OBS DSCHRG MGMT >30: CPT | Performed by: INTERNAL MEDICINE

## 2022-05-18 RX ORDER — DIGOXIN 125 MCG
125 TABLET ORAL DAILY
Status: DISCONTINUED | OUTPATIENT
Start: 2022-05-18 | End: 2022-05-25 | Stop reason: HOSPADM

## 2022-05-18 RX ORDER — SODIUM CHLORIDE 9 MG/ML
INJECTION, SOLUTION INTRAVENOUS PRN
Status: DISCONTINUED | OUTPATIENT
Start: 2022-05-18 | End: 2022-05-25 | Stop reason: HOSPADM

## 2022-05-18 RX ORDER — ONDANSETRON 2 MG/ML
4 INJECTION INTRAMUSCULAR; INTRAVENOUS EVERY 6 HOURS PRN
Status: DISCONTINUED | OUTPATIENT
Start: 2022-05-18 | End: 2022-05-25 | Stop reason: HOSPADM

## 2022-05-18 RX ORDER — ACETAMINOPHEN 325 MG/1
650 TABLET ORAL EVERY 6 HOURS PRN
Status: DISCONTINUED | OUTPATIENT
Start: 2022-05-18 | End: 2022-05-23

## 2022-05-18 RX ORDER — ONDANSETRON 4 MG/1
4 TABLET, ORALLY DISINTEGRATING ORAL EVERY 8 HOURS PRN
Status: DISCONTINUED | OUTPATIENT
Start: 2022-05-18 | End: 2022-05-25 | Stop reason: HOSPADM

## 2022-05-18 RX ORDER — SODIUM CHLORIDE 0.9 % (FLUSH) 0.9 %
5-40 SYRINGE (ML) INJECTION PRN
Status: DISCONTINUED | OUTPATIENT
Start: 2022-05-18 | End: 2022-05-25 | Stop reason: HOSPADM

## 2022-05-18 RX ORDER — ASPIRIN 81 MG/1
81 TABLET ORAL DAILY
Status: DISCONTINUED | OUTPATIENT
Start: 2022-05-18 | End: 2022-05-25 | Stop reason: HOSPADM

## 2022-05-18 RX ORDER — SODIUM CHLORIDE 9 MG/ML
INJECTION, SOLUTION INTRAVENOUS CONTINUOUS
Status: ACTIVE | OUTPATIENT
Start: 2022-05-18 | End: 2022-05-19

## 2022-05-18 RX ORDER — INSULIN LISPRO 100 [IU]/ML
0-6 INJECTION, SOLUTION INTRAVENOUS; SUBCUTANEOUS
Status: DISCONTINUED | OUTPATIENT
Start: 2022-05-19 | End: 2022-05-25 | Stop reason: HOSPADM

## 2022-05-18 RX ORDER — INSULIN LISPRO 100 [IU]/ML
0-3 INJECTION, SOLUTION INTRAVENOUS; SUBCUTANEOUS NIGHTLY
Status: DISCONTINUED | OUTPATIENT
Start: 2022-05-18 | End: 2022-05-25 | Stop reason: HOSPADM

## 2022-05-18 RX ORDER — ATORVASTATIN CALCIUM 10 MG/1
20 TABLET, FILM COATED ORAL DAILY
Refills: 5 | Status: DISCONTINUED | OUTPATIENT
Start: 2022-05-18 | End: 2022-05-25 | Stop reason: HOSPADM

## 2022-05-18 RX ORDER — SODIUM CHLORIDE 0.9 % (FLUSH) 0.9 %
5-40 SYRINGE (ML) INJECTION EVERY 12 HOURS SCHEDULED
Status: DISCONTINUED | OUTPATIENT
Start: 2022-05-18 | End: 2022-05-25 | Stop reason: HOSPADM

## 2022-05-18 RX ORDER — ENOXAPARIN SODIUM 100 MG/ML
40 INJECTION SUBCUTANEOUS DAILY
Status: DISCONTINUED | OUTPATIENT
Start: 2022-05-19 | End: 2022-05-24

## 2022-05-18 RX ORDER — ACETAMINOPHEN 650 MG/1
650 SUPPOSITORY RECTAL EVERY 6 HOURS PRN
Status: DISCONTINUED | OUTPATIENT
Start: 2022-05-18 | End: 2022-05-23

## 2022-05-18 RX ORDER — HYDRALAZINE HYDROCHLORIDE 50 MG/1
50 TABLET, FILM COATED ORAL EVERY 12 HOURS SCHEDULED
Status: DISCONTINUED | OUTPATIENT
Start: 2022-05-18 | End: 2022-05-21

## 2022-05-18 RX ORDER — POLYETHYLENE GLYCOL 3350 17 G/17G
17 POWDER, FOR SOLUTION ORAL DAILY PRN
Status: DISCONTINUED | OUTPATIENT
Start: 2022-05-18 | End: 2022-05-19

## 2022-05-18 RX ORDER — AMLODIPINE BESYLATE 5 MG/1
10 TABLET ORAL DAILY
Status: DISCONTINUED | OUTPATIENT
Start: 2022-05-18 | End: 2022-05-25 | Stop reason: HOSPADM

## 2022-05-18 RX ORDER — LISINOPRIL 20 MG/1
20 TABLET ORAL DAILY
Status: DISCONTINUED | OUTPATIENT
Start: 2022-05-18 | End: 2022-05-25 | Stop reason: HOSPADM

## 2022-05-18 RX ADMIN — HYDRALAZINE HYDROCHLORIDE 50 MG: 50 TABLET, FILM COATED ORAL at 20:04

## 2022-05-18 RX ADMIN — DIGOXIN 125 MCG: 125 TABLET ORAL at 08:39

## 2022-05-18 RX ADMIN — SODIUM CHLORIDE: 9 INJECTION, SOLUTION INTRAVENOUS at 01:22

## 2022-05-18 RX ADMIN — ASPIRIN 81 MG: 81 TABLET, COATED ORAL at 19:58

## 2022-05-18 RX ADMIN — CEFEPIME 2000 MG: 2 INJECTION, POWDER, FOR SOLUTION INTRAVENOUS at 06:18

## 2022-05-18 RX ADMIN — HYDRALAZINE HYDROCHLORIDE 50 MG: 50 TABLET, FILM COATED ORAL at 08:40

## 2022-05-18 RX ADMIN — Medication 1000 UNITS: at 08:40

## 2022-05-18 RX ADMIN — SODIUM CHLORIDE: 9 INJECTION, SOLUTION INTRAVENOUS at 21:54

## 2022-05-18 RX ADMIN — METFORMIN HYDROCHLORIDE 500 MG: 500 TABLET ORAL at 08:40

## 2022-05-18 RX ADMIN — CEFEPIME HYDROCHLORIDE 2000 MG: 2 INJECTION, POWDER, FOR SOLUTION INTRAVENOUS at 20:11

## 2022-05-18 RX ADMIN — AMLODIPINE BESYLATE 10 MG: 5 TABLET ORAL at 08:40

## 2022-05-18 RX ADMIN — ENOXAPARIN SODIUM 40 MG: 100 INJECTION SUBCUTANEOUS at 08:39

## 2022-05-18 RX ADMIN — LISINOPRIL 20 MG: 20 TABLET ORAL at 08:40

## 2022-05-18 RX ADMIN — SODIUM CHLORIDE: 9 INJECTION, SOLUTION INTRAVENOUS at 20:09

## 2022-05-18 RX ADMIN — ATORVASTATIN CALCIUM 10 MG: 10 TABLET, FILM COATED ORAL at 08:39

## 2022-05-18 ASSESSMENT — PAIN DESCRIPTION - ORIENTATION
ORIENTATION: LEFT;RIGHT
ORIENTATION_2: LEFT

## 2022-05-18 ASSESSMENT — PAIN DESCRIPTION - INTENSITY: RATING_2: 5

## 2022-05-18 ASSESSMENT — PAIN DESCRIPTION - ONSET: ONSET: ON-GOING

## 2022-05-18 ASSESSMENT — PAIN - FUNCTIONAL ASSESSMENT
PAIN_FUNCTIONAL_ASSESSMENT: PREVENTS OR INTERFERES SOME ACTIVE ACTIVITIES AND ADLS
PAIN_FUNCTIONAL_ASSESSMENT_SITE2: PREVENTS OR INTERFERES SOME ACTIVE ACTIVITIES AND ADLS

## 2022-05-18 ASSESSMENT — PAIN DESCRIPTION - FREQUENCY: FREQUENCY: CONTINUOUS

## 2022-05-18 ASSESSMENT — PAIN DESCRIPTION - DESCRIPTORS
DESCRIPTORS_2: ACHING
DESCRIPTORS: BURNING

## 2022-05-18 ASSESSMENT — PAIN DESCRIPTION - LOCATION
LOCATION: FOOT
LOCATION_2: SHOULDER

## 2022-05-18 ASSESSMENT — PAIN DESCRIPTION - PAIN TYPE: TYPE: NEUROPATHIC PAIN

## 2022-05-18 ASSESSMENT — PAIN SCALES - GENERAL: PAINLEVEL_OUTOF10: 3

## 2022-05-18 NOTE — PROGRESS NOTES
Pharmacy Vancomycin Consult     Vancomycin Day: 6  Current Dosing: pulse  Current indication: Bone and Joint Infection    Recent Labs     05/17/22  0545 05/18/22  0612   BUN 28* 34*   CREATININE 1.0 1.0   WBC 7.4 7.1       Estimated Creatinine Clearance: 40 mL/min (based on SCr of 1 mg/dL). Trough: 11.8    Assessment/Plan:  Will dose with 1250 mg x1 and recheck level with AM labs tomorrow.      Ellinwood Lu Turner, [facility], 5/18/2022 9:00 AM

## 2022-05-18 NOTE — PROGRESS NOTES
Patient transferred to [facility] for vascular surgery. Patient left in stable condition. Patients home medications sent with the patient in security bag.

## 2022-05-18 NOTE — PROGRESS NOTES
PROGRESS NOTE    Admit Date:  5/13/2022    Subjective:  68 y.o. female who is seen for evaluation of wounds on right foot with osteomyelitis. Patient was scheduled to see me in the office last Tuesday but was a no show. States she knew she had an appointment in Tuesday and Thursday last week but did not know who for. Went to her PCP on Thursday. Has shown up at my office and [address] in the past without an appointment wanting her toenails cut. States that she is not walking like she used to. Uses a cart when grocery shopping. Is not able to walk very far before her feet and legs start to hurt. Feet hurt most of the time. Was putting hydrogen peroxide on the wounds prior to admission. Has seen Dr. Patrice Ca in the past for arterial issues in the left leg. States feeling a little better today. Less pain in her foot.        Past Medical History:        Diagnosis Date    Atrial fibrillation ([address]     off coumadin after bleed, declined restart    Blood transfusion     CAD (coronary artery disease)     Diabetes mellitus type II     Diabetic neuropathy ([address] 2011    Gastric ulcer     H. pylori infection 2009    Hyperlipidemia     Hypertension     Numbness and tingling of left leg     Osteoarthritis     knees    Poor historian     PVD (peripheral vascular disease) ([address]     PTA distal sfa/pop/peroneal, Dr. Patrice Ca 12/2015    Unspecified cerebral artery occlusion with cerebral infarction     TIA affected left eye    upper gi bleed 12/2009       Past Surgical History:        Procedure Laterality Date    ANGIOPLASTY  12/30/15    Dr. Patrice Ca, LLE, PTA of distal sfa/pop/peroneal    CARDIAC CATHETERIZATION  9/21/12    done for surgical clearance, cath was negative    CORONARY ANGIOPLASTY WITH STENT PLACEMENT  [id number]    FOOT SURGERY Left 01/11/2018    DEBRIDEMENT OF ULCERS LEFT FOOT AND LEG WITH GRAFT    OTHER SURGICAL HISTORY Left 06/05/2017    Left Femoral Peroneal Bypass    SHOULDER ARTHROPLASTY  10/5/12    RIGHT SHOULDER TOTAL ARTHROPLASTY, BICEPS TENODESIS DEPUY, GLOBAL ADVANTAGE AP       Current Medications:     hydrALAZINE  50 mg Oral 2 times per day    cefepime  2,000 mg IntraVENous Q12H    enoxaparin  40 mg SubCUTAneous Daily    metFORMIN  500 mg Oral BID WC    digoxin  125 mcg Oral Daily    amLODIPine  10 mg Oral Daily    lisinopril  20 mg Oral Daily    atorvastatin  10 mg Oral Daily    Vitamin D  1,000 Units Oral Daily    insulin lispro  0-6 Units SubCUTAneous TID WC    insulin lispro  0-3 Units SubCUTAneous Nightly    sodium chloride flush  5-40 mL IntraVENous 2 times per day    vancomycin (VANCOCIN) intermittent dosing (placeholder)   Other RX Placeholder       Allergies:  Pcn [penicillins]    Social History:    Social History     Tobacco Use    Smoking status: Never Smoker    Smokeless tobacco: Never Used    Tobacco comment: Not needed   Vaping Use    Vaping Use: Never used   Substance Use Topics    Alcohol use: No    Drug use: No       Family History:       Problem Relation Age of Onset    Heart Disease Mother     Stroke Mother     Heart Disease Father     Diabetes Sister     Diabetes Brother     Diabetes Maternal Grandmother        Review of Systems    CONSTITUTIONAL:  negative  EYES:  negative  HEENT:  negative  RESPIRATORY:  negative  CARDIOVASCULAR:  negative  GASTROINTESTINAL:  negative  GENITOURINARY:  negative  INTEGUMENT/BREAST:  positive for ulcers  MUSCULOSKELETAL:  positive for pain  NEUROLOGICAL:  positive for numbness      Objective:   BP (!) 151/62   Pulse 68   Temp 98 °F (36.7 °C) (Oral)   Resp 16   Ht 5' 3\" (1.6 m)   Wt 135 lb 4.8 oz (61.4 kg)   SpO2 96%   BMI 23.97 kg/m²     Data:  CBC:   Recent Labs     05/16/22  0559 05/17/22  0545 05/18/22  0612   WBC 8.4 7.4 7.1   HGB 9.0* 9.0* 8.9*   HCT 27.3* 26.7* 25.9*   MCV 80.4 80.4 80.3    185 175     BMP:   Recent Labs     05/16/22  0559 05/17/22  0545 05/18/22  0612   * 135* 135*   K 4.3 4.4 4.9    104 105   CO2 20* 22 20*   BUN 26* 28* 34*   CREATININE 0.8 1.0 1.0     LIVER PROFILE:   No results for input(s): AST, ALT, LIPASE, BILIDIR, BILITOT, ALKPHOS in the last 72 hours. Invalid input(s): AMYLASE,  ALB  PT/INR:   No results for input(s): PROT, INR in the last 72 hours. HgBA1c:  Lab Results   Component Value Date    LABA1C 5.6 05/13/2022       CRP - <3 (5/15/22)  ESR - 33 (3/15/22)    Cultures: Blood x2 - NGSF    Imaging: xray right foot -   Diffuse bony demineralization. Age indeterminate transverse fracture   involving the distal tuft of the 1st distal phalanx with suggestion of   osteolysis along the fracture margins and no significant displacement. Osteolysis in the lateral cortex of the 4th middle phalanx. Joints maintain   anatomic alignment. Calcaneal enthesophytes at the insertion of the Achilles   tendon and origin of the plantar aponeurosis. Mild arthropathic changes of   the 1st metatarsophalangeal joint. Soft tissue swelling in the 1st and 4th   toes with possible skin defects. No soft tissue gas nor radiopaque foreign   bodies. Extensive arterial calcifications. Impression   1. Soft tissue swelling in the right 1st and 4th toes potentially due to   cellulitis given the clinical history with possible ulcerations. No   underlying findings of necrotizing fasciitis. However, there is   osteomyelitis involving the lateral cortex of the 4th middle phalanx, and   there may be osteomyelitis involving the distal tuft of the 1st distal   phalanx associated with a nondisplaced pathologic fracture. Consider MRI. 2. Mild osteoarthritic changes of the right 1st metatarsophalangeal joint. 3. Bony demineralization. MRI right foot -   FINDINGS:   LISFRANC JOINT:  Lisfranc ligament is visualized and is normal.  The   alignment of the tarsal-metatarsal joint is anatomic.      BONE MARROW: Significant bone marrow edema with decreased T1 signal throughout the 1st distal phalanx. Suspected erosion of the 1st distal   phalangeal tuft. Bone marrow edema with decreased T1 signal also seen   throughout the 4th middle and distal phalanges. No fracture or dislocation. No suspicious marrow space-occupying lesion. GREATER AND LESSER MTP JOINTS: Bipartite medial and lateral hallux sesamoids. No significant degenerative changes. No joint effusion. SOFT TISSUES: Deep soft tissue ulceration at the distal aspect of the 1st   toe. Circumferential subcutaneous edema throughout the 1st toe. Deep soft   tissue ulceration along the dorsal lateral aspect of the 4th toe. Circumferential subcutaneous edema also seen in the 4th toe. Mild dorsal   forefoot subcutaneous edema. No drainable fluid collection identified. TENDONS: The visualized tendons are intact without tenosynovitis. Impression:  1. Osteomyelitis throughout the 1st distal phalanx. Adjacent deep soft   tissue ulceration and cellulitis. No associated abscess. 2. Osteomyelitis of the 4th middle and distal phalanges. Adjacent deep soft   tissue ulceration along the dorsal lateral aspect of the toe with adjacent   cellulitis. No abscess. CTA with runoff    The abdominal aorta reveals extensive atherosclerotic disease throughout the   abdominal aorta. There is severe atherosclerotic disease identified at the   origin of the SMA with findings concerning for severe stenosis of the origin   of the SMA. There is severe stenosis seen at the origin of the right renal   artery as well as multiple areas throughout the right renal artery. There is   severe stenosis identified at the origin of the left renal artery. The KAHLIL   is patent. No significant stenosis of the abdominal aorta. Both common   iliac arteries are normal in caliber.   There is atherosclerotic disease seen   at the origin of iliac vessels bilaterally with no significant stenosis seen   in the external iliac arteries or internal iliac arteries. The right lower extremity reveals no significant stenosis of the common   femoral artery. There is extensive atherosclerotic disease seen throughout   the superficial femoral artery with patency identified of both the   superficial femoral artery proximally and the profunda. The mid superficial   femoral artery reveals moderate stenosis. There is extensive atherosclerotic   disease and severe stenosis seen at the distal superficial femoral artery on   the right. There are multiple areas of severe stenosis of the distal   superficial femoral artery. There is contrast seen within the proximal   popliteal artery with severe stenosis at the origin of the popliteal artery. There is severe stenosis of the distal popliteal artery. Extensive vascular   calcification identified with multiple levels of stenoses seen throughout the   popliteal artery. There is extensive atherosclerotic disease seen throughout   the trifurcation vessels with multiple collateral vessels identified within   the right lower extremity. There is a 2-vessel runoff identified within the   right ankle with multiple areas of stenosis seen both throughout the anterior   tibial artery and peroneal artery. The posterior tibial artery is not well   seen. The left lower extremity reveals atherosclerotic disease in the common   femoral artery. There is severe atherosclerotic disease seen throughout the   proximal superficial femoral artery. Patency of the profunda and its   branches. There is a graft identified from the mid superficial femoral   artery to the peroneal artery proximally. There is a 2-vessel runoff   identified to the left ankle. Occlusion identified of the distal superficial   femoral artery on the left and popliteal artery. Severe atherosclerotic   disease seen diffusely throughout the trifurcation vessels on the left.    Impression:  Extensive vascular calcifications seen throughout the abdominal aorta,   mesenteric vessels, pelvic vessels and lower extremity vessels. There is   patency identified of a graft from the mid superficial femoral artery on the   left to the peroneal artery. There is a 2-vessel runoff identified to the   left ankle with extensive disease seen throughout the right superficial   femoral artery distally with multiple areas of severe stenoses. There is   severe stenoses as well seen throughout the right popliteal artery with a   2-vessel runoff to the right ankle with the peroneal artery and anterior   tibial artery with extensive disease seen throughout the trifurcation vessels   as well. Severe stenoses seen in the right renal artery at multiple levels with severe   stenoses at the origin of the SMA and left renal artery. Atrophy and   decreased enhancement of the right renal parenchyma. Physical Exam:    General Appearance: alert and oriented to person, place and time, well developed and well- nourished, in no acute distress  Head: normocephalic and atraumatic  Eyes: pupils equal, round  Neck: trachea midline  Pulmonary/Chest: no wheezes  Abdomen: soft, non-tender, non-distended    Distal aspect right hallux with dry stable eschar noted. No active drainage noted. Mild erythema noted. No increase in skin temperature noted. No purulence or malodor noted. Lateral aspect right 4th digit at DIPJ with dry stable eschar with no drainage noted. Absent hair growth bilateral LE. Thin shiny skin bilateral LE.          Assessment:  Patient Active Problem List   Diagnosis Code    Osteoarthritis M19.90    Paroxysmal atrial fibrillation ([address] I48.0    Noncompliance of patient with dietary regimen Z91.11    PAD (peripheral artery disease) ([address] I73.9    Pure hypercholesterolemia E78.00    Coronary artery disease involving native coronary artery of native heart without angina pectoris I25.10    Essential hypertension I10    Type 2 diabetes mellitus with diabetic polyneuropathy, without long-term current use of insulin ([facility]) E11.42    Vitamin D deficiency E55.9    Acute osteomyelitis of left foot ([facility]) M86.172    Hammertoe, bilateral M20.41, M20.42    Colonoscopy refused Z53.20    Chronic deep vein thrombosis (DVT) of left peroneal vein ([facility]) I82.552    Acute osteomyelitis of right foot ([facility]) M86.171    Osteomyelitis of right foot ([facility] M86.9    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, with bone involvement without evidence of necrosis ([facility] E10.621, L97.516    Primary hypertension I10    Cellulitis of right lower extremity L03.115     diabetic foot ulcers right foot secondary to peripheral neuropathy   PAD  diabetes mellitus with peripheral neuropathy   Osteomyelitis right foot secondary to diabetes mellitus (hallux and 4th digit)      Plan  Patient examined. Reviewed labs and imaging. Continue IV antibiotics. OK to leave wounds open to air. Control glucose levels to prevent future complications. MRI shows osteomyelitis of the distal phalanx right hallux and right 4th digit distal and middle phalanx. CTA reveals severe atherosclerotic disease and stenosis of multiple arteries. CRP is WNL and ESR is just above normal limit which would be expected to be elevated with osteomyelitis. Discussed with Dr. Marcus Mary. Patient would benefit from transfer to [facility] for vascular evaluation and treatment. She has history of multiple no shows to appointments so if able to get arterial supply improved now it would greatly benefit the patient. If patient will require any digital amputations she needs to have an improvement in the arterial supply for any chance to heal the amputations. Patient understands.              Electronically signed by Raciel Duran DPM on 5/18/2022 at 8:23 AM.

## 2022-05-18 NOTE — PLAN OF CARE
Spoke to Dr. Lady Renteria, plans for angiogram to evaluate severe PAD in right LE  Transfer to [facility] under hospitalist service

## 2022-05-18 NOTE — PLAN OF CARE
Problem: Discharge Planning  Goal: Discharge to home or other facility with appropriate resources  Outcome: Progressing     Problem: Pain  Goal: Verbalizes/displays adequate comfort level or baseline comfort level  Outcome: Progressing     Problem: Safety - Adult  Goal: Free from fall injury  Outcome: Progressing     Problem: Genitourinary - Adult  Goal: Absence of urinary retention  Outcome: Progressing     Problem: Metabolic/Fluid and Electrolytes - Adult  Goal: Electrolytes maintained within normal limits  Outcome: Progressing  Goal: Hemodynamic stability and optimal renal function maintained  Outcome: Progressing  Goal: Glucose maintained within prescribed range  Outcome: Progressing     Problem: Hematologic - Adult  Goal: Maintains hematologic stability  Outcome: Progressing     Problem: Chronic Conditions and Co-morbidities  Goal: Patient's chronic conditions and co-morbidity symptoms are monitored and maintained or improved  Outcome: Progressing     Problem: ABCDS Injury Assessment  Goal: Absence of physical injury  Outcome: Progressing

## 2022-05-18 NOTE — DISCHARGE SUMMARY
Name:  Vidhya Perez  Room:  0208/0208-01  MRN:    [id number]    Discharge Summary      This discharge summary is in conjunction with a complete physical exam done on the day of discharge. Attending Physician: Dr. Douglas Crawley  Discharging Physician: Dr. Benton Adler: 5/13/2022  Discharge:   5/18/2022    HPI:    The patient is a 68 y.o. female with pmhx of atrial fibrillation, CAD, DM type 2, HTN, HLD, PVD, CVA who presented to [facility] ED with complaint of possible toe infection. Patient sent in for evaluation of diabetic foot ulcers, with necrotic toes; outpatient imaging suggestive of osteomyelitis.      Right right foot first toe and fourth toe with ulcerations, necrotic tips, and possible osteomyelitis. right foot is diffusely erythematous and tender. No fevers or chills.      Pt states that she has been noticing the darkening in her toes for months , more redness and pain now -> saw PCP, imaging done and sent to the hospital for IV antibiotics and podiatry evaluation.       Diagnoses this Admission and Hospital Course   #Osteomyelitis of right fourth toe distal and middle  phalynx and osteomyelitis of distal phalynx of great toe   #Chronic diabetic foot ulcers. likely ischemic ulcers given CTA findings , see above      -Afebrile   -No leukocytosis   -Continue IV antibiotics , IV vanc and cefepime   -IVF  -blood cultures remain neg  -podiatry consulted and recommends vascular intervention before foot surgery      #Mild dehydration  -Improved with IV fluids  Creatinine1.3-> 1.1     #Atrial fibrillation   -on digoxin   -rate controlled   Unsure why not on AC- remains in NSR      #HTN - uncontrolled  -continued norvasc and acei  -added hydralazine and prn clonidine      #DM type 2   -continued metformin   -SSI   -continued to monitor BS      #HX of CVA   #CAD  #PVD   -continued ASA and statin      PAD - s.p left LE intervention previously  See CTA legs below  Might need right side as well   Refer to  [facility]      DVT Prophylaxis: lovenox     Transferring [facility] for severe PAD    Procedures (Please Review Full Report for Details)  N/A    Consults    Podiatry       Physical Exam at Discharge:    BP (!) 176/64   Pulse 76   Temp 96.9 °F (36.1 °C) (Oral)   Resp 16   Ht 5' 3\" (1.6 m)   Wt 135 lb 4.8 oz (61.4 kg)   SpO2 99%   BMI 23.97 kg/m²     Gen: elderly thin frail female,  No distress. Alert. Prisca Zoya and well-oriented  Thin built  Eyes: PERRL. No sclera icterus. No conjunctival injection. ENT: No discharge. Pharynx clear. Neck: No JVD.  No Carotid Bruit. Trachea midline. Resp: No accessory muscle use. No crackles. No wheezes. No rhonchi. CV: Regular rate. Regular rhythm. No murmur.  No rub. No edema. Capillary Refill: Brisk,< 3 seconds   Peripheral Pulses: +2 palpable, equal bilaterally   GI: Non-tender. Non-distended. No masses. No organomegaly. Normal bowel sounds. No hernia. Skin:  M/S:    Right Lower extremity : right foot right first great toe and fourth toe tip mild necrotic ulcers. improving  Right foot  Erythema and tenderness .  No edema  Poor pulses in both feet   Evidence of old left popliteal- femoral bypass  Neuro: Awake. Grossly nonfocal    Psych: Oriented x 3.  No anxiety or agitation.        CBC: Recent Labs     05/16/22  0559 05/17/22  0545 05/18/22  0612   WBC 8.4 7.4 7.1   HGB 9.0* 9.0* 8.9*   HCT 27.3* 26.7* 25.9*   MCV 80.4 80.4 80.3    185 175     BMP:   Recent Labs     05/16/22  0559 05/17/22  0545 05/18/22  0612   * 135* 135*   K 4.3 4.4 4.9    104 105   CO2 20* 22 20*   BUN 26* 28* 34*   CREATININE 0.8 1.0 1.0       U/A:    Lab Results   Component Value Date    NITRITE neg 05/02/2019    COLORU Straw 05/04/2019    WBCUA 3-5 05/04/2019    RBCUA 0-2 05/04/2019    BACTERIA 3+ 05/04/2019    CLARITYU Clear 05/04/2019    SPECGRAV <=1.005 05/04/2019    LEUKOCYTESUR SMALL 05/04/2019    BLOODU Negative 05/04/2019    GLUCOSEU Negative 05/04/2019    AMORPHOUS 2+ 09/19/2012         CULTURES    Blood cultures NGTD  COVID and Flu not detected     RADIOLOGY  MRI FOOT RIGHT WO CONTRAST   Final Result   1. Osteomyelitis throughout the 1st distal phalanx. Adjacent deep soft   tissue ulceration and cellulitis. No associated abscess. 2. Osteomyelitis of the 4th middle and distal phalanges. Adjacent deep soft   tissue ulceration along the dorsal lateral aspect of the toe with adjacent   cellulitis. No abscess. CTA ABDOMINAL AORTA W BILAT RUNOFF W CONTRAST   Final Result   Extensive vascular calcifications seen throughout the abdominal aorta,   mesenteric vessels, pelvic vessels and lower extremity vessels. There is   patency identified of a graft from the mid superficial femoral artery on the   left to the peroneal artery. There is a 2-vessel runoff identified to the   left ankle with extensive disease seen throughout the right superficial   femoral artery distally with multiple areas of severe stenoses. There is   severe stenoses as well seen throughout the right popliteal artery with a   2-vessel runoff to the right ankle with the peroneal artery and anterior   tibial artery with extensive disease seen throughout the trifurcation vessels   as well. Severe stenoses seen in the right renal artery at multiple levels with severe   stenoses at the origin of the SMA and left renal artery. Atrophy and   decreased enhancement of the right renal parenchyma. Discharge Medications     Medication List      ASK your doctor about these medications    ADVIL PO     amLODIPine 10 MG tablet  Commonly known as: NORVASC  TAKE 1 TABLET BY MOUTH EVERY DAY     aspirin 81 MG EC tablet     clindamycin 150 MG capsule  Commonly known as: Cleocin  Take 3 capsules by mouth 3 times daily for 10 days     digoxin 125 MCG tablet  Commonly known as: LANOXIN  Take 1 tablet daily.      lisinopril 20 MG tablet  Commonly known as: PRINIVIL;ZESTRIL  TAKE 1 TABLET BY MOUTH EVERY DAY metFORMIN 500 MG tablet  Commonly known as: GLUCOPHAGE  TAKE 2 TABLETS BY MOUTH EVERY DAY WITH BREAKFAST     simvastatin 20 MG tablet  Commonly known as: ZOCOR  TAKE 1 TABLET BY MOUTH EVERY DAY AT NIGHT     vitamin D3 25 MCG (1000 UT) Tabs tablet  Commonly known as: CHOLECALCIFEROL  Take 1 tablet by mouth daily              Discharged in stable condition to [facility].

## 2022-05-18 NOTE — PROGRESS NOTES
Pt arrived from [place]. Alert and oriented, BP elevated, all other VSS. Page to MD to notify for orders.

## 2022-05-18 NOTE — PROGRESS NOTES
IM Progress Note    Admit Date:  5/13/2022      Admitted with diabetic foot ulcer and osteomyelitis. Subjective:  Ms. Alcon Alcala is stable. And denies any major pain issues but reports her foot hurts everytime she eats food   No fevers    MRI with osteomyelitis and podiatry recommending vascular surgery       Objective:   BP (!) 151/62   Pulse 68   Temp 98 °F (36.7 °C) (Oral)   Resp 16   Ht 5' 3\" (1.6 m)   Wt 135 lb 4.8 oz (61.4 kg)   SpO2 96%   BMI 23.97 kg/m²       Intake/Output Summary (Last 24 hours) at 5/18/2022 0727  Last data filed at 5/18/2022 1496  Gross per 24 hour   Intake 670.63 ml   Output 1 ml   Net 669.63 ml         Physical Exam:  Gen: elderly thin frail female,  No distress. Alert. Awake and well-oriented  Thin built  Eyes: PERRL. No sclera icterus. No conjunctival injection. ENT: No discharge. Pharynx clear. Neck: No JVD. No Carotid Bruit. Trachea midline. Resp: No accessory muscle use. No crackles. No wheezes. No rhonchi. CV: Regular rate. Regular rhythm. No murmur. No rub. No edema. Capillary Refill: Brisk,< 3 seconds   Peripheral Pulses: +2 palpable, equal bilaterally   GI: Non-tender. Non-distended. No masses. No organomegaly. Normal bowel sounds. No hernia. Skin:  M/S:    Right Lower extremity : right foot right first great toe and fourth toe tip mild necrotic ulcers. improving  Right foot  Erythema and tenderness . No edema  Poor pulses in both feet   Evidence of old left popliteal- femoral bypass  Neuro: Awake. Grossly nonfocal    Psych: Oriented x 3.  No anxiety or agitation.            Medications:   Scheduled Meds:   hydrALAZINE  50 mg Oral 2 times per day    cefepime  2,000 mg IntraVENous Q12H    enoxaparin  40 mg SubCUTAneous Daily    metFORMIN  500 mg Oral BID WC    digoxin  125 mcg Oral Daily    amLODIPine  10 mg Oral Daily    lisinopril  20 mg Oral Daily    atorvastatin  10 mg Oral Daily    Vitamin D  1,000 Units Oral Daily    insulin lispro  0-6 Units SubCUTAneous TID WC    insulin lispro  0-3 Units SubCUTAneous Nightly    sodium chloride flush  5-40 mL IntraVENous 2 times per day    vancomycin (VANCOCIN) intermittent dosing (placeholder)   Other RX Placeholder       Continuous Infusions:   dextrose      sodium chloride 75 mL/hr at 05/18/22 0122    sodium chloride         Data:  CBC:   Recent Labs     05/16/22  0559 05/17/22  0545 05/18/22  0612   WBC 8.4 7.4 7.1   RBC 3.39* 3.32* 3.22*   HGB 9.0* 9.0* 8.9*   HCT 27.3* 26.7* 25.9*   MCV 80.4 80.4 80.3   RDW 15.1 14.8 14.9    185 175     BMP:   Recent Labs     05/16/22  0559 05/17/22  0545   * 135*   K 4.3 4.4    104   CO2 20* 22   BUN 26* 28*   CREATININE 0.8 1.0        LIVER PROFILE:   No results for input(s): AST, ALT, ALB, BILIDIR, BILITOT, ALKPHOS in the last 72 hours. Cultures  Blood cultures pending   COVID and Flu not detected        Radiology  MRI FOOT RIGHT WO CONTRAST   Final Result   1. Osteomyelitis throughout the 1st distal phalanx. Adjacent deep soft   tissue ulceration and cellulitis. No associated abscess. 2. Osteomyelitis of the 4th middle and distal phalanges. Adjacent deep soft   tissue ulceration along the dorsal lateral aspect of the toe with adjacent   cellulitis. No abscess. CTA ABDOMINAL AORTA W BILAT RUNOFF W CONTRAST   Final Result   Extensive vascular calcifications seen throughout the abdominal aorta,   mesenteric vessels, pelvic vessels and lower extremity vessels. There is   patency identified of a graft from the mid superficial femoral artery on the   left to the peroneal artery. There is a 2-vessel runoff identified to the   left ankle with extensive disease seen throughout the right superficial   femoral artery distally with multiple areas of severe stenoses.   There is   severe stenoses as well seen throughout the right popliteal artery with a   2-vessel runoff to the right ankle with the peroneal artery and anterior   tibial artery with extensive disease seen throughout the trifurcation vessels   as well. Severe stenoses seen in the right renal artery at multiple levels with severe   stenoses at the origin of the SMA and left renal artery. Atrophy and   decreased enhancement of the right renal parenchyma.                  Xray  done as outpatient on 5/12/22  Impression   1. Soft tissue swelling in the right 1st and 4th toes potentially due to   cellulitis given the clinical history with possible ulcerations.  No   underlying findings of necrotizing fasciitis.  However, there is   osteomyelitis involving the lateral cortex of the 4th middle phalanx, and   there may be osteomyelitis involving the distal tuft of the 1st distal   phalanx associated with a nondisplaced pathologic fracture.  Consider MRI. 2. Mild osteoarthritic changes of the right 1st metatarsophalangeal joint. 3. Bony demineralization. CTA LE     Extensive vascular calcifications seen throughout the abdominal aorta,   mesenteric vessels, pelvic vessels and lower extremity vessels.  There is   patency identified of a graft from the mid superficial femoral artery on the   left to the peroneal artery. Min Jenna is a 2-vessel runoff identified to the   left ankle with extensive disease seen throughout the right superficial   femoral artery distally with multiple areas of severe stenoses.  There is   severe stenoses as well seen throughout the right popliteal artery with a   2-vessel runoff to the right ankle with the peroneal artery and anterior   tibial artery with extensive disease seen throughout the trifurcation vessels   as well. Severe stenoses seen in the right renal artery at multiple levels with severe   stenoses at the origin of the SMA and left renal artery.  Atrophy and   decreased enhancement of the right renal parenchyma.      Assessment and Plan      #Osteomyelitis of right fourth toe distal and middle  phalynx and osteomyelitis of distal phalynx of great toe   #Chronic diabetic foot ulcers. likely ischemic ulcers given CTA findings , see above     -Afebrile   -No leukocytosis   -Continue IV antibiotics , IV vanc and cefepime   -IVF  -blood cultures remain neg  -podiatry consulted and recommends vascular intervention before foot surgery      #Mild dehydration  -Improved with IV fluids  Creatinine1.3-> 1.1     #Atrial fibrillation   -on digoxin   -rate controlled   Unsure why not on AC- remains in NSR      #HTN - uncontrolled  -continue norvasc and acei  -added hydralazine and prn clonidine      #DM type 2   -continue metformin   -SSI   -continue to monitor BS      #HX of CVA   #CAD  #PVD   -continue ASA and statin     PAD - s.p left LE intervention previously  See CTA legs above  Might need right side as well   Refer to DR. LEGER         DVT Prophylaxis: lovenox   Diet: ADULT DIET;  Regular; 4 carb choices (60 gm/meal)  Code Status: Full Code          Collin Amador MD   5/18/2022 7:27 AM

## 2022-05-18 NOTE — CARE COORDINATION
INTERDISCIPLINARY PLAN OF CARE CONFERENCE    Date/Time: 5/18/2022 4:08 PM  Completed by: Teddy Esteban RN, Case Management      Patient Name:  Lorenza Calderon  YOB: 1945  Admitting Diagnosis: Acute osteomyelitis of right foot ([facility] [address] [M86.171]  Cellulitis of right lower extremity [L03.115]  Osteomyelitis of right foot, unspecified type [facility]) [M86.9]     Admit Date/Time:  5/13/2022  3:41 PM    Chart reviewed. Interdisciplinary team contacted or reviewed plan related to patient progress and discharge plans. Disciplines included Case Management, Nursing, and Dietitian. Current Status: Stable    Discharge Plan Comments: Noted pt is to transfer to [facility]. Will follow until transfer.

## 2022-05-18 NOTE — CONSULTS
Podiatry Consult Note    History of Present Illness: The patient is a 68year old woman with DM2, neuropathy and PVD who we were asked to see for OM at the right hallux and fourth toe. She has been worked up by Dr. Kuldeep Palma and the Team at [facility]. The patient has had wounds at her right hallux and fourth toe for months. They have become very pain. She presented to [facility]. She was found to have OM at the right hallux and right fourth toe. She also has PVD. She was transferred to [facility] for potential intervention with Dr. Gasper Denise. At the time of encounter, the patient had pain at all her toes on the right foot. She denied n/v/f/c and SOB.     Past Medical History:        Diagnosis Date    Atrial fibrillation ([facility] [address]     off coumadin after bleed, declined restart    Blood transfusion     CAD (coronary artery disease)     Diabetes mellitus type II     Diabetic neuropathy ([facility] [address] 2011    Gastric ulcer     H. pylori infection 2009    Hyperlipidemia     Hypertension     Numbness and tingling of left leg     Osteoarthritis     knees    Poor historian     PVD (peripheral vascular disease) ([facility] [address]     PTA distal sfa/pop/peroneal, Dr. Gasper Denise 12/2015    Unspecified cerebral artery occlusion with cerebral infarction     TIA affected left eye    upper gi bleed 12/2009       Past Surgical History:        Procedure Laterality Date    ANGIOPLASTY  12/30/15    Dr. Gasper Denise, LLE, PTA of distal sfa/pop/peroneal    CARDIAC CATHETERIZATION  9/21/12    done for surgical clearance, cath was negative    CORONARY ANGIOPLASTY WITH STENT PLACEMENT  [id number]    FOOT SURGERY Left 01/11/2018    DEBRIDEMENT OF ULCERS LEFT FOOT AND LEG WITH GRAFT    OTHER SURGICAL HISTORY Left 06/05/2017    Left Femoral Peroneal Bypass    SHOULDER ARTHROPLASTY  10/5/12    RIGHT SHOULDER TOTAL ARTHROPLASTY, BICEPS TENODESIS DEPUY, GLOBAL ADVANTAGE AP       Current Medications:    Current Facility-Administered Medications: amLODIPine (NORVASC) tablet 10 mg, 10 mg, Oral, Daily  aspirin EC tablet 81 mg, 81 mg, Oral, Daily  digoxin (LANOXIN) tablet 125 mcg, 125 mcg, Oral, Daily  lisinopril (PRINIVIL;ZESTRIL) tablet 20 mg, 20 mg, Oral, Daily  atorvastatin (LIPITOR) tablet 20 mg, 20 mg, Oral, Daily  cefepime (MAXIPIME) 2000 mg IVPB minibag, 2,000 mg, IntraVENous, Q12H  hydrALAZINE (APRESOLINE) tablet 50 mg, 50 mg, Oral, 2 times per day  insulin lispro (HUMALOG) injection vial 0-3 Units, 0-3 Units, SubCUTAneous, Nightly  [START ON 5/19/2022] insulin lispro (HUMALOG) injection vial 0-6 Units, 0-6 Units, SubCUTAneous, TID WC  [START ON 5/14/2023] vancomycin (VANCOCIN) intermittent dosing (placeholder), , Other, RX Placeholder  sodium chloride flush 0.9 % injection 5-40 mL, 5-40 mL, IntraVENous, 2 times per day  sodium chloride flush 0.9 % injection 5-40 mL, 5-40 mL, IntraVENous, PRN  0.9 % sodium chloride infusion, , IntraVENous, PRN  [START ON 5/19/2022] enoxaparin (LOVENOX) injection 40 mg, 40 mg, SubCUTAneous, Daily  ondansetron (ZOFRAN-ODT) disintegrating tablet 4 mg, 4 mg, Oral, Q8H PRN **OR** ondansetron (ZOFRAN) injection 4 mg, 4 mg, IntraVENous, Q6H PRN  polyethylene glycol (GLYCOLAX) packet 17 g, 17 g, Oral, Daily PRN  acetaminophen (TYLENOL) tablet 650 mg, 650 mg, Oral, Q6H PRN **OR** acetaminophen (TYLENOL) suppository 650 mg, 650 mg, Rectal, Q6H PRN    Allergies:  Pcn [penicillins]    Social History:    Social History     Socioeconomic History    Marital status:      Spouse name: Not on file    Number of children: Not on file    Years of education: Not on file    Highest education level: Not on file   Occupational History    Not on file   Tobacco Use    Smoking status: Never Smoker    Smokeless tobacco: Never Used    Tobacco comment: Not needed   Vaping Use    Vaping Use: Never used   Substance and Sexual Activity    Alcohol use: No    Drug use: No    Sexual activity: Yes     Partners: Male   Other Topics Concern    Not on file   Social History Narrative    Not on file     Social Determinants of Health     Financial Resource Strain:     Difficulty of Paying Living Expenses: Not on file   Food Insecurity:     Worried About Running Out of Food in the Last Year: Not on file    Donal of Food in the Last Year: Not on file   Transportation Needs:     Lack of Transportation (Medical): Not on file    Lack of Transportation (Non-Medical):  Not on file   Physical Activity:     Days of Exercise per Week: Not on file    Minutes of Exercise per Session: Not on file   Stress:     Feeling of Stress : Not on file   Social Connections:     Frequency of Communication with Friends and Family: Not on file    Frequency of Social Gatherings with Friends and Family: Not on file    Attends Tenri[employer] Services: Not on file    Active Member of [address] [place] [address] [employer] or Organizations: Not on file    Attends Club or Organization Meetings: Not on file    Marital Status: Not on file   Intimate Partner Violence:     Fear of Current or Ex-Partner: Not on file    Emotionally Abused: Not on file    Physically Abused: Not on file    Sexually Abused: Not on file   Housing Stability:     Unable to Pay for Housing in the Last Year: Not on file    Number of [place] in the Last Year: Not on file    Unstable Housing in the Last Year: Not on file     Family History:   Family History   Problem Relation Age of Onset    Heart Disease Mother     Stroke Mother     Heart Disease Father     Diabetes Sister     Diabetes Brother     Diabetes Maternal Grandmother      Review of Systems:    CONSTITUTIONAL:  negative  HEENT:  negative  RESPIRATORY:  negative  CARDIOVASCULAR:  negative  GASTROINTESTINAL:  negative  GENITOURINARY:  negative  ENDOCRINE:  negative  MUSCULOSKELETAL:  negative    Physical Exam:    Vitals:    BP (!) 178/80   Pulse 65   Temp 98.2 °F (36.8 °C) (Oral)   Resp 16   SpO2 97%   Integument:  Dry, black, necrotic changes at the distal right hallux and lateral right fourth toe. No surrounding erythema. No acute signs of infection. Otherwise, skin is thin, dry and atrophic, bilateral.  Neurologic:  Protective sensation is grossly intact to light touch at the level of the toes, bilateral.  Vascular:  DP and PT pulses are palpable, bilateral.  CFT is less than five seconds at all toes. No significant edema appreciated. Musculoskeletal:  No gross musculoskeletal deformities noted. Pain on palpation at the right hallux and right fourth toe. Labs:  CBC with Differential:    Lab Results   Component Value Date    WBC 7.1 05/18/2022    RBC 3.22 05/18/2022    HGB 8.9 05/18/2022    HCT 25.9 05/18/2022     05/18/2022    MCV 80.3 05/18/2022    MCH 27.6 05/18/2022    MCHC 34.3 05/18/2022    RDW 14.9 05/18/2022    SEGSPCT 71.4 05/23/2013    LYMPHOPCT 18.1 05/18/2022    MONOPCT 8.0 05/18/2022    EOSPCT 2.0 04/26/2010    BASOPCT 0.8 05/18/2022    MONOSABS 0.6 05/18/2022    LYMPHSABS 1.3 05/18/2022    EOSABS 0.3 05/18/2022    BASOSABS 0.1 05/18/2022    DIFFTYPE Auto-K 05/23/2013     CMP:    Lab Results   Component Value Date     05/18/2022    K 4.9 05/18/2022     05/18/2022    CO2 20 05/18/2022    BUN 34 05/18/2022    CREATININE 1.0 05/18/2022    GFRAA >60 05/18/2022    GFRAA >60 05/23/2013    AGRATIO 1.4 05/13/2022    LABGLOM 54 05/18/2022    GLUCOSE 104 05/18/2022    PROT 8.0 05/13/2022    PROT 7.3 09/10/2012    LABALBU 4.6 05/13/2022    CALCIUM 9.3 05/18/2022    BILITOT 0.3 05/13/2022    ALKPHOS 80 05/13/2022    AST 17 05/13/2022    ALT 15 05/13/2022     PT/INR:    Lab Results   Component Value Date    PROTIME 13.5 12/17/2020    PROTIME 19.8 01/02/2010    INR 1.16 12/17/2020     Last 3 Troponin:  No results found for: TROPONINI  HgBA1c:    Lab Results   Component Value Date    LABA1C 5.6 05/13/2022       Imaging:  MRI, Right Foot  Impression/Recommendations:  5/16/2022:  1. Osteomyelitis throughout the 1st distal phalanx.  Adjacent deep soft   tissue ulceration and cellulitis.  No associated abscess. 2. Osteomyelitis of the 4th middle and distal phalanges.  Adjacent deep soft   tissue ulceration along the dorsal lateral aspect of the toe with adjacent   cellulitis.  No abscess. The patient is a pleasant 68year old woman with:  1) Osteomyelitis, right hallux and fourth toe  - Antibiotics per Primary Team (Cefepime & Vanc)  - Patient will benefit from amputation of the right hallux and fourth toe pending Vascular work-up  - Weight-bear as tolerated for now  2) PVD  - Vascular consult pending  3) DM    Thank you for the opportunity to take part in this patient's care. Please feel free to contact me with any questions.     Gill Onofre, [facility] [id number]  Office: [phone]  Cell: [phone]

## 2022-05-19 LAB
ANION GAP SERPL CALCULATED.3IONS-SCNC: 10 MMOL/L (ref 3–16)
BASOPHILS ABSOLUTE: 0.1 K/UL (ref 0–0.2)
BASOPHILS RELATIVE PERCENT: 1.3 %
BUN BLDV-MCNC: 30 MG/DL (ref 7–20)
CALCIUM SERPL-MCNC: 9.2 MG/DL (ref 8.3–10.6)
CHLORIDE BLD-SCNC: 107 MMOL/L (ref 99–110)
CO2: 19 MMOL/L (ref 21–32)
CREAT SERPL-MCNC: 0.9 MG/DL (ref 0.6–1.2)
DIGOXIN LEVEL: 0.7 NG/ML (ref 0.8–2)
EOSINOPHILS ABSOLUTE: 0.4 K/UL (ref 0–0.6)
EOSINOPHILS RELATIVE PERCENT: 5.1 %
GFR AFRICAN AMERICAN: >60
GFR NON-AFRICAN AMERICAN: >60
GLUCOSE BLD-MCNC: 111 MG/DL (ref 70–99)
GLUCOSE BLD-MCNC: 114 MG/DL (ref 70–99)
GLUCOSE BLD-MCNC: 120 MG/DL (ref 70–99)
GLUCOSE BLD-MCNC: 126 MG/DL (ref 70–99)
GLUCOSE BLD-MCNC: 249 MG/DL (ref 70–99)
HCT VFR BLD CALC: 26 % (ref 36–48)
HEMOGLOBIN: 8.7 G/DL (ref 12–16)
LYMPHOCYTES ABSOLUTE: 1.3 K/UL (ref 1–5.1)
LYMPHOCYTES RELATIVE PERCENT: 17.6 %
MCH RBC QN AUTO: 27.1 PG (ref 26–34)
MCHC RBC AUTO-ENTMCNC: 33.5 G/DL (ref 31–36)
MCV RBC AUTO: 80.9 FL (ref 80–100)
MONOCYTES ABSOLUTE: 0.5 K/UL (ref 0–1.3)
MONOCYTES RELATIVE PERCENT: 7.4 %
NEUTROPHILS ABSOLUTE: 4.9 K/UL (ref 1.7–7.7)
NEUTROPHILS RELATIVE PERCENT: 68.6 %
PDW BLD-RTO: 15.1 % (ref 12.4–15.4)
PERFORMED ON: ABNORMAL
PLATELET # BLD: 175 K/UL (ref 135–450)
PMV BLD AUTO: 7.3 FL (ref 5–10.5)
POTASSIUM REFLEX MAGNESIUM: 4.4 MMOL/L (ref 3.5–5.1)
RBC # BLD: 3.21 M/UL (ref 4–5.2)
SODIUM BLD-SCNC: 136 MMOL/L (ref 136–145)
VANCOMYCIN RANDOM: 8.7 UG/ML
WBC # BLD: 7.2 K/UL (ref 4–11)

## 2022-05-19 PROCEDURE — 6370000000 HC RX 637 (ALT 250 FOR IP): Performed by: INTERNAL MEDICINE

## 2022-05-19 PROCEDURE — 85025 COMPLETE CBC W/AUTO DIFF WBC: CPT

## 2022-05-19 PROCEDURE — 80202 ASSAY OF VANCOMYCIN: CPT

## 2022-05-19 PROCEDURE — 1200000000 HC SEMI PRIVATE

## 2022-05-19 PROCEDURE — 2580000003 HC RX 258: Performed by: INTERNAL MEDICINE

## 2022-05-19 PROCEDURE — 6360000002 HC RX W HCPCS: Performed by: INTERNAL MEDICINE

## 2022-05-19 PROCEDURE — 80162 ASSAY OF DIGOXIN TOTAL: CPT

## 2022-05-19 PROCEDURE — 80048 BASIC METABOLIC PNL TOTAL CA: CPT

## 2022-05-19 PROCEDURE — 99222 1ST HOSP IP/OBS MODERATE 55: CPT | Performed by: SURGERY

## 2022-05-19 PROCEDURE — 36415 COLL VENOUS BLD VENIPUNCTURE: CPT

## 2022-05-19 RX ORDER — POLYETHYLENE GLYCOL 3350 17 G/17G
17 POWDER, FOR SOLUTION ORAL DAILY
Status: DISCONTINUED | OUTPATIENT
Start: 2022-05-20 | End: 2022-05-22

## 2022-05-19 RX ADMIN — CEFEPIME HYDROCHLORIDE 2000 MG: 2 INJECTION, POWDER, FOR SOLUTION INTRAVENOUS at 21:45

## 2022-05-19 RX ADMIN — CEFEPIME HYDROCHLORIDE 2000 MG: 2 INJECTION, POWDER, FOR SOLUTION INTRAVENOUS at 08:43

## 2022-05-19 RX ADMIN — ATORVASTATIN CALCIUM 20 MG: 10 TABLET, FILM COATED ORAL at 21:32

## 2022-05-19 RX ADMIN — LISINOPRIL 20 MG: 20 TABLET ORAL at 08:34

## 2022-05-19 RX ADMIN — ASPIRIN 81 MG: 81 TABLET, COATED ORAL at 08:34

## 2022-05-19 RX ADMIN — HYDRALAZINE HYDROCHLORIDE 50 MG: 50 TABLET, FILM COATED ORAL at 21:32

## 2022-05-19 RX ADMIN — VANCOMYCIN HYDROCHLORIDE 1250 MG: 10 INJECTION, POWDER, LYOPHILIZED, FOR SOLUTION INTRAVENOUS at 12:45

## 2022-05-19 RX ADMIN — HYDRALAZINE HYDROCHLORIDE 50 MG: 50 TABLET, FILM COATED ORAL at 08:35

## 2022-05-19 RX ADMIN — DIGOXIN 125 MCG: 125 TABLET ORAL at 08:35

## 2022-05-19 RX ADMIN — INSULIN LISPRO 1 UNITS: 100 INJECTION, SOLUTION INTRAVENOUS; SUBCUTANEOUS at 21:32

## 2022-05-19 RX ADMIN — POLYETHYLENE GLYCOL 3350 17 G: 17 POWDER, FOR SOLUTION ORAL at 10:01

## 2022-05-19 RX ADMIN — AMLODIPINE BESYLATE 10 MG: 5 TABLET ORAL at 08:34

## 2022-05-19 RX ADMIN — SODIUM CHLORIDE, PRESERVATIVE FREE 10 ML: 5 INJECTION INTRAVENOUS at 21:41

## 2022-05-19 ASSESSMENT — PAIN DESCRIPTION - LOCATION: LOCATION: TOE (COMMENT WHICH ONE)

## 2022-05-19 ASSESSMENT — PAIN DESCRIPTION - DESCRIPTORS: DESCRIPTORS: BURNING

## 2022-05-19 ASSESSMENT — PAIN DESCRIPTION - ORIENTATION: ORIENTATION: RIGHT

## 2022-05-19 ASSESSMENT — PAIN SCALES - GENERAL: PAINLEVEL_OUTOF10: 5

## 2022-05-19 NOTE — PROGRESS NOTES
Hospitalist Progress Note      PCP: Owen Acevedo, APRN - CNP    Date of Admission: 5/18/2022    Chief Complaint: osteomyelitis of right foot     Hospital Course: Gina Lara is a 68 y.o. female with a PMH of PVD with fem pop hx, Afib, CAD, DM2, HTN, and HLD who was a direct admit from [facility] to [facility] for osteomyelitis of the right foot. She presented on 5/13 to the [place] ED after her PCP evaluated her for diabetic foot ulcers and imaging suggested osteomyelitis in her right foot. In [place], she was started on IV vancomycin and cefepime. Podiatry was consulted and they recommended a vascular consult for a right hallus and 4th toe amputation. Therefore, she was transferred to our facility for a vascular consultation/workup. Vascular is following the patient and is recommended an angiogram to be completed tomorrow. Subjective: Mrs. Ariela Kent was in good spirits this morning. She states she feels tightness in her right foot and calf. She denied any pain, swelling, erythema, tenderness, fevers, chills, shakes, SOB,or  chest pain.       Medications:  Reviewed    Infusion Medications    sodium chloride Stopped (05/19/22 0843)     Scheduled Medications    amLODIPine  10 mg Oral Daily    aspirin  81 mg Oral Daily    digoxin  125 mcg Oral Daily    lisinopril  20 mg Oral Daily    atorvastatin  20 mg Oral Daily    cefepime  2,000 mg IntraVENous Q12H    hydrALAZINE  50 mg Oral 2 times per day    insulin lispro  0-3 Units SubCUTAneous Nightly    insulin lispro  0-6 Units SubCUTAneous TID WC    [START ON 5/14/2023] vancomycin (VANCOCIN) intermittent dosing (placeholder)   Other RX Placeholder    sodium chloride flush  5-40 mL IntraVENous 2 times per day    enoxaparin  40 mg SubCUTAneous Daily     PRN Meds: sodium chloride flush, sodium chloride, ondansetron **OR** ondansetron, polyethylene glycol, acetaminophen **OR** acetaminophen      Intake/Output Summary (Last 24 hours) at 5/19/2022 [id number] [facility] filed at 5/19/2022 1342  Gross per 24 hour   Intake 758.03 ml   Output 1400 ml   Net -641.97 ml       Physical Exam Performed:    BP (!) 149/63   Pulse 80   Temp 98 °F (36.7 °C) (Oral)   Resp 16   Ht 5' 3\" (1.6 m)   Wt 135 lb (61.2 kg)   SpO2 95%   BMI 23.91 kg/m²     General appearance: No apparent distress, appears stated age and cooperative. HEENT: Pupils equal, round, and reactive to light. Conjunctivae/corneas clear. Neck: Supple, with full range of motion. No jugular venous distention. Trachea midline. Respiratory:  Normal respiratory effort. Clear to auscultation, bilaterally without Rales/Wheezes/Rhonchi. Cardiovascular: Regular rate and rhythm with normal S1/S2 without murmurs, rubs or gallops. Abdomen: Soft, non-tender, non-distended with normal bowel sounds. Musculoskeletal: No clubbing, cyanosis or edema bilaterally. Full range of motion without deformity. Skin: Skin color, texture, turgor normal.  No rashes or lesions. Neurologic:  Neurovascularly intact without any focal sensory/motor deficits.  Cranial nerves: II-XII intact, grossly non-focal.  Psychiatric: Alert and oriented, thought content appropriate, normal insight  Capillary Refill: Brisk,3 seconds, normal   Peripheral Pulses: +2 palpable, equal bilaterally       Labs:   Recent Labs     05/17/22  0545 05/18/22  0612 05/19/22  0526   WBC 7.4 7.1 7.2   HGB 9.0* 8.9* 8.7*   HCT 26.7* 25.9* 26.0*    175 175     Recent Labs     05/17/22  0545 05/18/22  0612 05/19/22  0526   * 135* 136   K 4.4 4.9 4.4    105 107   CO2 22 20* 19*   BUN 28* 34* 30*   CREATININE 1.0 1.0 0.9   CALCIUM 9.5 9.3 9.2       Active Hospital Problems    Diagnosis     PVD (peripheral vascular disease) ([facility] [I73.9]      Priority: Medium    Acute osteomyelitis of right foot ([facility] [M86.171]      Priority: Medium    Essential hypertension [I10]     Coronary artery disease involving native coronary artery of native heart without angina pectoris [I25.10]     Type 2 diabetes mellitus with diabetic polyneuropathy, without long-term current use of insulin (HCC) [E11.42]     Paroxysmal atrial fibrillation (HCC) [I48.0]      Assessment/Plan:  Acute osteomyelitis of right foot in patient with known PVD  -MRI right foot on 5/16/2022 revealed: osteomyelitis t/o the 1st distal phalanx. Adjacent deep soft tissue ulceration and cellulitis. Osteomyelitis of 4th middle and distal phalanges. Adjacent deep soft tissue ulceration along the dorsal lateral aspect of the toe with adjacent cellulitis  -Continue abx regimen of cefepime and vancomycin    -Checking vanc levels   -neurovascular checks  -Vascular surgery consulted and recommendations below:   -NPO at midnight    -Angiogram tomorrow in the AM     CAD  -Continue ASA, lisinopril, amlodipine, atorvastatin    HTN   -Continue amlodipine, lisinopril and hydralazine     HLD  -Continue home atorvastatin     DM2, controlled  -HA1C on 5/13/2022: 5.6%   -Holding home medicationis  -Low dose SSI     PAF  -Continue digoxin   -checking digoxin levels   -Holding home eliquis      Chronic normocytic anemia  -No s/s of bleeding at this time  -cbc in am     DVT Prophylaxis: Lovenox  Diet: Diet NPO  ADULT DIET; Regular; 5 carb choices (75 gm/meal)  Code Status: Full Code    PT/OT Eval Status: N/a     Dispo - Pending vascular workup     Stephanie Fontaine MD     Addendum to Resident  Progress note:  Pt seen,examined and evaluated with resident and discussed regarding POC . I have reviewed the current history, physical findings, labs and assessment and plan and agree with note as documented by resident DO ( Dr. Verónica Lorenzo )    Patient currently sleeping during my rounds. Discussed care plan with resident team.  Noted vascular surgery consultation -plan for angiogram tomorrow and final decision regarding amputation to be determined based on testing. Continue rest of the current management.     Hipolito Linn Griselda Morris MD  Hospitalist Physician

## 2022-05-19 NOTE — PROGRESS NOTES
Attempted to call daughter in law to update on POC, left voicemail. Linda Hutton, dtr in law, updated on POC. States she will be up here later today.

## 2022-05-19 NOTE — PROGRESS NOTES
Assumed care from previous RN. No changes from her assessment. Pt up in chair, verbalizes understanding to be NPO at midnight. Denies any needs at this time. Will continue to monitor.

## 2022-05-19 NOTE — H&P
[facility] Medicine History & Physical      PCP: Lukas Cook, APRN - CNP    Date of Admission: 5/18/2022    Date of Service: Pt seen/examined on 5/18/2022 and Admitted to Inpatient with expected LOS greater than two midnights due to medical therapy. Chief Complaint:  Osteomyelitis of right foot    History Of Present Illness:      68 y.o. female, with PMH of PVD, HTN, CAD, HLD, DM and PAF, who was a direct admit from [facility] to [facility] with osteomyelitis of right foot. History obtained from the patient and review of EMR. The patient was admitted to [facility] on 5/13/2022 after presenting to the ED for possible toe infection. She was sent to the ED for evaluation of diabetic foot ulcers, with necrotic toes after outpatient imaging suggested the patient has osteomyelitis of the right great toe and fourth toe. The patient has a history of PVD and follows with podiatry, but has a difficult time getting in with them and occasionally no shows. she does have a history of having a left Fem Pop in the past. While admitted to [facility], the patient was started on IV vancomycin and cefepime and podiatry was consulted. Podiatry recommended a vascular consult d/t believing the patient would benefit from amputation of the right hallus and fourth toe. The patient was ultimately transferred to [facility] for further evaluation and treatment. Vascular surgery has been consulted. The patient denied any other associated symptoms as well as any aggravating and/or alleviating factors. At the time of this assessment, the patient was resting comfortably in bed. She currently denies any chest pain, back pain, abdominal pain, shortness of breath, numbness, tingling, N/V/C/D, fever and/or chills.      Past Medical History:          Diagnosis Date    Atrial fibrillation ([address]     off coumadin after bleed, declined restart    Blood transfusion     CAD (coronary artery disease)     Diabetes mellitus type II     Diabetic neuropathy ([facility] 2011    Gastric ulcer     H. pylori infection 2009    Hyperlipidemia     Hypertension     Numbness and tingling of left leg     Osteoarthritis     knees    Poor historian     PVD (peripheral vascular disease) ([facility]     PTA distal sfa/pop/peroneal, Dr. Janey Plasencia 12/2015    Unspecified cerebral artery occlusion with cerebral infarction     TIA affected left eye    upper gi bleed 12/2009     Past Surgical History:          Procedure Laterality Date    ANGIOPLASTY  12/30/15    Dr. Janey Plasencia, LLE, PTA of distal sfa/pop/peroneal    CARDIAC CATHETERIZATION  9/21/12    done for surgical clearance, cath was negative    CORONARY ANGIOPLASTY WITH STENT PLACEMENT  1229,1876    FOOT SURGERY Left 01/11/2018    DEBRIDEMENT OF ULCERS LEFT FOOT AND LEG WITH GRAFT    OTHER SURGICAL HISTORY Left 06/05/2017    Left Femoral Peroneal Bypass    SHOULDER ARTHROPLASTY  10/5/12    RIGHT SHOULDER TOTAL ARTHROPLASTY, BICEPS TENODESIS DEPUY, GLOBAL ADVANTAGE AP     Medications Prior to Admission:      Prior to Admission medications    Medication Sig Start Date End Date Taking? Authorizing Provider   aspirin 81 MG EC tablet Take 81 mg by mouth daily    Historical Provider, MD   clindamycin (CLEOCIN) 150 MG capsule Take 3 capsules by mouth 3 times daily for 10 days 5/12/22 5/22/22  Cheryle Rear, APRN - CNP   Ibuprofen (ADVIL PO) Take by mouth    Historical Provider, MD   vitamin D3 (CHOLECALCIFEROL) 25 MCG (1000 UT) TABS tablet Take 1 tablet by mouth daily 3/25/22   Cheryle Rear, APRN - CNP   metFORMIN (GLUCOPHAGE) 500 MG tablet TAKE 2 TABLETS BY MOUTH EVERY DAY WITH BREAKFAST 3/3/22   Cheryle Rear, APRN - CNP   digoxin (LANOXIN) 125 MCG tablet Take 1 tablet daily.  3/3/22   Cheryle Rear, APRN - CNP   amLODIPine (NORVASC) 10 MG tablet TAKE 1 TABLET BY MOUTH EVERY DAY 3/3/22   Cheryle Rear, APRN - CNP   lisinopril (PRINIVIL;ZESTRIL) 20 MG tablet TAKE 1 TABLET BY MOUTH EVERY DAY 3/3/22   Cheryle Rear, APRN - CNP simvastatin (ZOCOR) 20 MG tablet TAKE 1 TABLET BY MOUTH EVERY DAY AT NIGHT 3/3/22   Chantel Lab, APRN - CNP     Allergies:  Pcn [penicillins]    Social History:      The patient currently lives at home    TOBACCO:   reports that she has never smoked. She has never used smokeless tobacco.  ETOH:   reports no history of alcohol use. E-Cigarettes/Vaping Use     Questions Responses    E-Cigarette/Vaping Use Never User    Start Date     Passive Exposure     Quit Date     Counseling Given     Comments         Family History:      Reviewed in detail and negative for DM, CAD, Cancer, CVA. Positive as follows:        Problem Relation Age of Onset    Heart Disease Mother     Stroke Mother     Heart Disease Father     Diabetes Sister     Diabetes Brother     Diabetes Maternal Grandmother      REVIEW OF SYSTEMS COMPLETED:   Pertinent positives as noted in the HPI. All other systems reviewed and negative. PHYSICAL EXAM PERFORMED:    BP (!) 171/65   Pulse 77   Temp 98.3 °F (36.8 °C) (Oral)   Resp 16   SpO2 97%     General appearance:  Pleasant female in no apparent distress, appears stated age and cooperative. HEENT: Pupils equal, round, and reactive to light. Extra ocular muscles intact. Conjunctivae/corneas clear. Neck: Supple, with full range of motion. No jugular venous distention. Trachea midline. Respiratory:  Normal respiratory effort. Clear to auscultation, bilaterally without Rales/Wheezes/Rhonchi. Cardiovascular:  Regular rate and rhythm with normal S1/S2 without murmurs, rubs or gallops. Abdomen: Soft, non-tender, non-distended with normal bowel sounds. Musculoskeletal:  No clubbing, cyanosis or edema bilaterally. Full range of motion without deformity. Skin: Skin color, texture, turgor normal. right foot with great toe and fourth toe tip mildly necrotic ulcer. Erythema t/o. tenderness  Neurologic:  Neurovascularly intact.  Cranial nerves: II-XII intact, grossly non-focal.  Psychiatric:  Alert and oriented, thought content appropriate, normal insight  Capillary Refill: Brisk,3 seconds, normal  Peripheral Pulses: +2 palpable, equal bilaterally     Labs:     Recent Labs     05/16/22  0559 05/17/22  0545 05/18/22  0612   WBC 8.4 7.4 7.1   HGB 9.0* 9.0* 8.9*   HCT 27.3* 26.7* 25.9*    185 175     Recent Labs     05/16/22  0559 05/17/22  0545 05/18/22  0612   * 135* 135*   K 4.3 4.4 4.9    104 105   CO2 20* 22 20*   BUN 26* 28* 34*   CREATININE 0.8 1.0 1.0   CALCIUM 9.1 9.5 9.3     Urinalysis:      Lab Results   Component Value Date    NITRU Negative 05/04/2019    WBCUA 3-5 05/04/2019    BACTERIA 3+ 05/04/2019    RBCUA 0-2 05/04/2019    BLOODU Negative 05/04/2019    SPECGRAV <=1.005 05/04/2019    GLUCOSEU Negative 05/04/2019     Radiology:     CXR: I have reviewed the CXR with the following interpretation: N/A    EKG:  I have reviewed the EKG with the following interpretation: N/A    No orders to display     ASSESSMENT:    Active Hospital Problems    Diagnosis Date Noted    PVD (peripheral vascular disease) ([facility] [I73.9] 05/18/2022     Priority: Medium    Acute osteomyelitis of right foot ([address] Yann Singh 05/13/2022     Priority: Medium    Essential hypertension [I10] 07/19/2016    Coronary artery disease involving native coronary artery of native heart without angina pectoris [I25.10] 07/19/2016    Type 2 diabetes mellitus with diabetic polyneuropathy, without long-term current use of insulin ([address] [E11.42] 07/19/2016    Paroxysmal atrial fibrillation ([address] [I48.0] 04/25/2011     PLAN:    Acute osteomyelitis of right foot in patient with known PVD  -MRI right foot on 5/16/2022 revealed: osteomyelitis t/o the 1st distal phalanx. Adjacent deep soft tissue ulceration and cellulitis. Osteomyelitis of 4th middle and distal phalanges.  Adjacent deep soft tissue ulceration along the dorsal lateral aspect of the toe with adjacent cellulitis  -continue ASA  -continue cefepime and vancomycin  -NPO   -neurovascular checks  -vascular surgery consult - appreciate recommendations in advance    Essential HTN in setting of known CAD  -continue ASA  -continue amlodipine, lisinopril and hydralazine    HLD  -continue atorvastatin    DM2, controlled  -  -hemglobin a1c 5.6 on 5/13/2022  -hold home medicationis  -ldssi  -poct ac/hs  -hypoglycemia protocol  -carb control diet    PAF  -continue digoxin    Chronic normocytic anemia  -no s/s of bleeding at this time  -cbc in am    DVT Prophylaxis: Lovenox    Diet: Diet NPO  ADULT DIET; Regular; 3 carb choices (45 gm/meal)    Code Status: Full Code    PT/OT Eval Status: no indication for need at this time    Dispo - 2-3 days pending clinical improvement     MARVIN Brito CNP    Thank you MARVIN Hall CNP for the opportunity to be involved in this patient's care.  If you have any questions or concerns please feel free to contact me at [phone].  --------------------------Anticipated Dr. Scottie dorman-------------------------

## 2022-05-19 NOTE — DISCHARGE INSTR - COC
Continuity of Care Form    Patient Name: Marion Jones   :  1945  MRN:  [id number]    Admit date:  2022  Discharge date:  22      Code Status Order: Full Code   Advance Directives:      Admitting Physician:  No admitting provider for patient encounter.   PCP: MARVIN Resendez CNP    Discharging Nurse: [facility] Unit/Room#: 6334/3832-96  Discharging Unit Phone Number: [phone]    Emergency Contact:   Extended Emergency Contact Information  Primary Emergency Contact: Dillon Romero  Address: [address] [facility]  [address] Phone: [phone]  Work Phone: [phone]  Mobile Phone: [phone]  Relation: Spouse  Secondary Emergency Contact: [address] Phone: [phone]  Mobile Phone: [phone]  Relation: Daughter-in-Law    Past Surgical History:  Past Surgical History:   Procedure Laterality Date    ANGIOPLASTY  12/30/15    AIMEE KimbroughE, PTA of distal sfa/pop/peroneal    CARDIAC CATHETERIZATION  12    done for surgical clearance, cath was negative    CORONARY ANGIOPLASTY WITH STENT PLACEMENT  4154,3352    FOOT SURGERY Left 2018    DEBRIDEMENT OF ULCERS LEFT FOOT AND LEG WITH GRAFT    OTHER SURGICAL HISTORY Left 2017    Left Femoral Peroneal Bypass    SHOULDER ARTHROPLASTY  10/5/12    RIGHT SHOULDER TOTAL ARTHROPLASTY, BICEPS TENODESIS DEPUY, GLOBAL ADVANTAGE AP       Immunization History:   Immunization History   Administered Date(s) Administered    Pneumococcal Conjugate 13-valent (Idella Garland) 2016    Pneumococcal Polysaccharide ([id number]) 2011    Tdap (Boostrix, Adacel) 2021       Active Problems:  Patient Active Problem List   Diagnosis Code    Osteoarthritis M19.90    Paroxysmal atrial fibrillation ([address] I48.0    Noncompliance of patient with dietary regimen Z91.11    PAD (peripheral artery disease) ([facility] I73.9    Pure hypercholesterolemia E78.00    Coronary artery disease Wound Etiology Deep tissue/Injury 05/19/22 0831   Dressing/Treatment Open to air;Betadine swabs/povidone iodine 05/19/22 0831   Wound Length (cm) 1.5 cm 05/18/22 1800   Wound Width (cm) 1.5 cm 05/18/22 1800   Wound Depth (cm) 0 cm 05/18/22 1800   Wound Surface Area (cm^2) 2.25 cm^2 05/18/22 1800   Change in Wound Size % (l*w) -36.36 05/18/22 1800   Wound Volume (cm^3) 0 cm^3 05/18/22 1800   Wound Assessment Eschar dry 05/19/22 0831   Drainage Amount None 05/19/22 0831   Odor None 05/19/22 0831   Yazmin-wound Assessment Intact 05/19/22 0831   Number of days: 5       Wound 05/13/22 Toe (Comment  which one) Right digit 4 (Active)   Wound Etiology Diabetic 05/19/22 0831   Dressing/Treatment Open to air 05/19/22 0831   Wound Length (cm) 0.8 cm 05/18/22 1800   Wound Width (cm) 0.8 cm 05/18/22 1800   Wound Depth (cm) 0 cm 05/18/22 1800   Wound Surface Area (cm^2) 0.64 cm^2 05/18/22 1800   Change in Wound Size % (l*w) 0 05/18/22 1800   Wound Volume (cm^3) 0 cm^3 05/18/22 1800   Wound Assessment Eschar dry 05/19/22 0831   Drainage Amount None 05/19/22 0831   Odor None 05/19/22 0831   Yazmin-wound Assessment Intact 05/19/22 0831   Number of days: 5        Elimination:  Continence: Bowel: Yes  Bladder: Yes  Urinary Catheter: None   Colostomy/Ileostomy/Ileal Conduit: No       Date of Last BM: ***    Intake/Output Summary (Last 24 hours) at 5/19/2022 1031  Last data filed at 5/19/2022 0845  Gross per 24 hour   Intake 458.03 ml   Output 1400 ml   Net -941.97 ml     I/O last 3 completed shifts: In: 12 [P.O.:260; I.V.:106.1; IV Piggyback:39.9]  Out: 1400 [Urine:1400]    Safety Concerns:     Anxiety panic, especially with needles    Impairments/Disabilities:      Amputation - first and fourth toe on right foot. Nutrition Therapy:  Current Nutrition Therapy:   - Oral Diet:  Carb Control 5 carbs/meal (2000kcals/day)    Routes of Feeding: Oral  Liquids:  Thin Liquids  Daily Fluid Restriction: no  Last Modified Barium Swallow with Video (Video Swallowing Test): not done    Treatments at the Time of Hospital Discharge:   Respiratory Treatments: none  Oxygen Therapy:  is not on home oxygen therapy. Ventilator:    - No ventilator support    Rehab Therapies: Physical Therapy, Occupational Therapy, and Orthotics/Prosthetics  Weight Bearing Status/Restrictions: No weight bearing restrictions  Other Medical Equipment (for information only, NOT a DME order):  walker and SURGICAL SHOE  Other Treatments: ***    Patient's personal belongings (please select all that are sent with patient):  Dentures upper and lower    RN SIGNATURE:  Electronically signed by Malcolm Arauz RN on 5/25/22 at 3:27 PM EDT    CASE MANAGEMENT/SOCIAL WORK SECTION    Inpatient Status Date: 5/18/2022    Readmission Risk Assessment Score:  Readmission Risk              Risk of Unplanned Readmission:  9           Discharging to Facility/ Agency   Name: [facility]  DTFST:[phone]  Fax:[phone]      / signature: Electronically signed by Dickson Barrios RN on 5/25/22 at 2:40 PM EDT    PHYSICIAN SECTION    Prognosis: Good    Condition at Discharge: Stable    Rehab Potential (if transferring to Rehab): Good    Recommended Labs or Other Treatments After Discharge:    Recommended Follow-up, Labs or Other Treatments After Discharge:    Needs to follow up with PCP regarding hypertension,CKD, Dm2, and blood pressure   Needs to follow up with podiatrist one week after discharge            Physician Certification: I certify the above information and transfer of Ricarda French  is necessary for the continuing treatment of the diagnosis listed and that she requires [facility] for greater 30 days.      Update Admission H&P: Changes in H&P as follows - voltaren gel for osteoarthritis, Norco for acute pain from amputation, gabapentin 200 mg BID for nerve pain due to amputation & Dm2, colace and miralax for constipation, hydralazine 50 mg TID for blood pressures     PHYSICIAN SIGNATURE:  Electronically signed by Serenity Reyes MD on 5/25/22 at 11:33 AM EDT

## 2022-05-19 NOTE — PROGRESS NOTES
Assessment completed and documented. VSS. A/ox4. Ambulates stand by assist with a walker. Consent signed and in chart. Denies pain. Bed locked and in lowest position. Bedside table and call light within reach. Denies further needs at this time.

## 2022-05-19 NOTE — PROGRESS NOTES
Podiatry Progress Note  Subjective:   Patient seen at bedside this evening. No new pedal complaints. She denied n/v/f/c and SOB. Objective:   Vitals:  BP (!) 149/63   Pulse 80   Temp 98 °F (36.7 °C) (Oral)   Resp 16   Ht 5' 3\" (1.6 m)   Wt 135 lb (61.2 kg)   SpO2 95%   BMI 23.91 kg/m²   Integument:  Dry, black, necrotic changes at the distal right hallux and lateral right fourth toe. No surrounding erythema. No acute signs of infection. Otherwise, skin is thin, dry and atrophic, bilateral.  Neurologic:  Protective sensation is grossly intact to light touch at the level of the toes, bilateral.  Vascular:  DP and PT pulses are palpable, bilateral.  CFT is less than five seconds at all toes. No significant edema appreciated. Musculoskeletal:  No gross musculoskeletal deformities noted.  Pain on palpation at the right hallux and right fourth toe.      Labs:   CBC with Differential:    Lab Results   Component Value Date    WBC 7.2 05/19/2022    RBC 3.21 05/19/2022    HGB 8.7 05/19/2022    HCT 26.0 05/19/2022     05/19/2022    MCV 80.9 05/19/2022    MCH 27.1 05/19/2022    MCHC 33.5 05/19/2022    RDW 15.1 05/19/2022    SEGSPCT 71.4 05/23/2013    LYMPHOPCT 17.6 05/19/2022    MONOPCT 7.4 05/19/2022    EOSPCT 2.0 04/26/2010    BASOPCT 1.3 05/19/2022    MONOSABS 0.5 05/19/2022    LYMPHSABS 1.3 05/19/2022    EOSABS 0.4 05/19/2022    BASOSABS 0.1 05/19/2022    DIFFTYPE Auto-K 05/23/2013     CMP:    Lab Results   Component Value Date     05/19/2022    K 4.4 05/19/2022     05/19/2022    CO2 19 05/19/2022    BUN 30 05/19/2022    CREATININE 0.9 05/19/2022    GFRAA >60 05/19/2022    GFRAA >60 05/23/2013    AGRATIO 1.4 05/13/2022    LABGLOM >60 05/19/2022    GLUCOSE 114 05/19/2022    PROT 8.0 05/13/2022    PROT 7.3 09/10/2012    LABALBU 4.6 05/13/2022    CALCIUM 9.2 05/19/2022    BILITOT 0.3 05/13/2022    ALKPHOS 80 05/13/2022    AST 17 05/13/2022    ALT 15 05/13/2022     PT/INR:    Lab Results Component Value Date    PROTIME 13.5 12/17/2020    PROTIME 19.8 01/02/2010    INR 1.16 12/17/2020     Last 3 Troponin:  No results found for: TROPONINI  HgBA1c:    Lab Results   Component Value Date    LABA1C 5.6 05/13/2022     Imaging:   MRI, Right Foot  Impression/Recommendations:  5/16/2022:  1. Osteomyelitis throughout the 1st distal phalanx.  Adjacent deep soft   tissue ulceration and cellulitis.  No associated abscess. 2. Osteomyelitis of the 4th middle and distal phalanges.  Adjacent deep soft   tissue ulceration along the dorsal lateral aspect of the toe with adjacent   cellulitis.  No abscess.      Assessment/Plan:   1) Osteomyelitis, right hallux and fourth toe  - Antibiotics per Primary Team (Cefepime & Vanc)  - Patient will benefit from amputation of the right hallux and fourth toe pending Vascular work-up  - Weight-bear as tolerated for now  2) PVD  - Angio scheduled for tomorrow  3) DM    [place], [place], [facility] [address]  Office: [phone]  Cell: [phone]

## 2022-05-19 NOTE — CARE COORDINATION
CASE MANAGEMENT INITIAL ASSESSMENT      Reviewed chart and completed assessment with patient: at bedside  Family present:  no  Explained Case Management role/services. Primary contact information:     Health Care Decision Maker :   Primary Decision Maker: Mo Schmid - [phone]    Secondary Decision Maker: Otoniel Branch - Daughter-in-Law - [phone]    Secondary Decision Maker: Placido Hill - Child - [phone]          Can this person be reached and be able to respond quickly, such as within a few minutes or hours? Yes  Who would be your back-up decision maker? above    Admit date/status: 5/18/22 Inpatient  Diagnosis: PVD   Is this a Readmission?:  Yes      Insurance: [employer]   Precert required for SNF: Yes       3 night stay required: No    Living arrangements, Adls, care needs, prior to admission: home with spouse. Independent prior to admission    Durable Medical Equipment at home:  none    Services in the home and/or outpatient, prior to admission: none, per pt  will not let home care into house. Current PCP: MARVIN Hill CNp                                Medications:  Prescription coverage? Yes Will pt require financial assistance with medications  No     Transportation needs: daughter in law to transport         PT/OT recs: not ordered    [address] Notification (HEN): not initiated    Barriers to discharge: angiogram tomorrow    Plan/comments: Pt from home with spouse. Per Pt  falls a lot an will not go to doctor or let anyone into their home.  Pt has been to [facility] in the past.      ECOC on chart for MD signature

## 2022-05-19 NOTE — CONSULTS
Vascular Surgery Consultation    Date of Admission:  5/18/2022  5:57 PM  Date of Consultation:  5/19/2022    PCP:  MARVIN Amador CNP       Chief Complaint: R foot pain and Osteomyelitis. History of Present Illness: We are asked to see this patient in consultation by Dr. Kane Rojas regarding PVD and osteo r foot. Michelle Cooley is a 68 y.o. female who was transferred to [facility] from [facility] due to above issues. Patient well known to me- s/p LLE bypass. Currently reports continuous pain in Right foot ongoing for many months. She has dry ulcerations on several toes of foot. She underwent plain films and MRI consistent with Osteo of the Hallux. A CTA of the lower extremity was obtained showing multilevel occlusive disease. She will need local amputation but there is concern for wound healing with her PVD. She denies F/C. No CP or SOB.        Past Medical History:  Past Medical History:   Diagnosis Date    Atrial fibrillation ([address]     off coumadin after bleed, declined restart    Blood transfusion     CAD (coronary artery disease)     Diabetes mellitus type II     Diabetic neuropathy ([address] 2011    Gastric ulcer     H. pylori infection 2009    Hyperlipidemia     Hypertension     Numbness and tingling of left leg     Osteoarthritis     knees    Poor historian     PVD (peripheral vascular disease) ([address]     PTA distal sfa/pop/peroneal, Dr. Champ Man 12/2015    Unspecified cerebral artery occlusion with cerebral infarction     TIA affected left eye    upper gi bleed 12/2009       Past Surgical History:  Past Surgical History:   Procedure Laterality Date    ANGIOPLASTY  12/30/15    JALEESA Lemus, PTA of distal sfa/pop/peroneal    CARDIAC CATHETERIZATION  9/21/12    done for surgical clearance, cath was negative    CORONARY ANGIOPLASTY WITH STENT PLACEMENT  [id number]    FOOT SURGERY Left 01/11/2018    DEBRIDEMENT OF ULCERS LEFT FOOT AND LEG WITH GRAFT    OTHER SURGICAL HISTORY Left 06/05/2017    Left Femoral Peroneal Bypass    SHOULDER ARTHROPLASTY  10/5/12    RIGHT SHOULDER TOTAL ARTHROPLASTY, BICEPS TENODESIS DEPUY, GLOBAL ADVANTAGE AP       Home Medications:   Prior to Admission medications    Medication Sig Start Date End Date Taking? Authorizing Provider   aspirin 81 MG EC tablet Take 81 mg by mouth daily    Historical Provider, MD   clindamycin (CLEOCIN) 150 MG capsule Take 3 capsules by mouth 3 times daily for 10 days 5/12/22 5/22/22  MARVIN Villarreal CNP   Ibuprofen (ADVIL PO) Take by mouth    Historical Provider, MD   vitamin D3 (CHOLECALCIFEROL) 25 MCG (1000 UT) TABS tablet Take 1 tablet by mouth daily 3/25/22   MARVIN Villarreal CNP   metFORMIN (GLUCOPHAGE) 500 MG tablet TAKE 2 TABLETS BY MOUTH EVERY DAY WITH BREAKFAST 3/3/22   MARVIN Villarreal CNP   digoxin (LANOXIN) 125 MCG tablet Take 1 tablet daily.  3/3/22   MARVIN Villarreal CNP   amLODIPine (NORVASC) 10 MG tablet TAKE 1 TABLET BY MOUTH EVERY DAY 3/3/22   MARVIN Villarreal CNP   lisinopril (PRINIVIL;ZESTRIL) 20 MG tablet TAKE 1 TABLET BY MOUTH EVERY DAY 3/3/22   MARVIN Villarreal CNP   simvastatin (ZOCOR) 20 MG tablet TAKE 1 TABLET BY MOUTH EVERY DAY AT NIGHT 3/3/22   MARVIN Villarreal CNP        Facility Administered Medications:    amLODIPine  10 mg Oral Daily    aspirin  81 mg Oral Daily    digoxin  125 mcg Oral Daily    lisinopril  20 mg Oral Daily    atorvastatin  20 mg Oral Daily    cefepime  2,000 mg IntraVENous Q12H    hydrALAZINE  50 mg Oral 2 times per day    insulin lispro  0-3 Units SubCUTAneous Nightly    insulin lispro  0-6 Units SubCUTAneous TID WC    [START ON 5/14/2023] vancomycin (VANCOCIN) intermittent dosing (placeholder)   Other RX Placeholder    sodium chloride flush  5-40 mL IntraVENous 2 times per day    enoxaparin  40 mg SubCUTAneous Daily       Allergies:  Pcn [penicillins]     Social History:      Social History     Socioeconomic History    Marital status:      Spouse name: Not on file    Number of children: Not on file    Years of education: Not on file    Highest education level: Not on file   Occupational History    Not on file   Tobacco Use    Smoking status: Never Smoker    Smokeless tobacco: Never Used    Tobacco comment: Not needed   Vaping Use    Vaping Use: Never used   Substance and Sexual Activity    Alcohol use: No    Drug use: No    Sexual activity: Yes     Partners: Male   Other Topics Concern    Not on file   Social History Narrative    Not on file     Social Determinants of Health     Financial Resource Strain:     Difficulty of Paying Living Expenses: Not on file   Food Insecurity:     Worried About Running Out of Food in the Last Year: Not on file    Donal of Food in the Last Year: Not on file   Transportation Needs:     Lack of Transportation (Medical): Not on file    Lack of Transportation (Non-Medical):  Not on file   Physical Activity:     Days of Exercise per Week: Not on file    Minutes of Exercise per Session: Not on file   Stress:     Feeling of Stress : Not on file   Social Connections:     Frequency of Communication with Friends and Family: Not on file    Frequency of Social Gatherings with Friends and Family: Not on file    Attends Church Services: Not on file    Active Member of [address] [place] [address] [employer] or Organizations: Not on file    Attends Club or Organization Meetings: Not on file    Marital Status: Not on file   Intimate Partner Violence:     Fear of Current or Ex-Partner: Not on file    Emotionally Abused: Not on file    Physically Abused: Not on file    Sexually Abused: Not on file   Housing Stability:     Unable to Pay for Housing in the Last Year: Not on file    Number of [place] in the Last Year: Not on file    Unstable Housing in the Last Year: Not on file       Family History:        Problem Relation Age of Onset    Heart Disease Mother     Stroke Mother     Heart Disease Father     Diabetes Sister     Diabetes Brother     Diabetes Maternal Grandmother        Review of Systems:  A 14 point review of systems was completed. Pertinent positives identified in the HPI, all other review of systems negative. Physical Examination:    BP (!) 171/65   Pulse 77   Temp 98.3 °F (36.8 °C) (Oral)   Resp 16   Ht 5' 3\" (1.6 m)   Wt 135 lb (61.2 kg)   SpO2 97%   BMI 23.91 kg/m²        Admission Weight: 135 lb (61.2 kg)       General appearance: NAD  Eyes: PERRLA  Neck: no JVD, no lymphadenopathy. Respiratory: effort is unlabored, no crackles, wheezes or rubs. Cardiovascular: regular, no murmur. No carotid bruits. No edema or varicosities. Pulses: Palpable graft pulse behind left knee   femoral DP PT   RIGHT 1 doppler doppler   LEFT 1 doppler doppler   GI: abdomen soft, nondistended, no organomegaly. Musculoskeletal: strength and tone normal.  Extremities: warm and pink. Skin: Dry ulcerations distal R Hallux and 4th toe. Neuro/psychiatric: grossly intact. ASA 2 - Patient with mild systemic disease with no functional limitations    Mallampati Airway Assessment:  Mallampati Class II - (soft palate, fauces & uvula are visible)    MEDICAL DECISION MAKING/TESTING        CTA:  Images personally reviewed and interpreted. Multiple stenoses of the tight superficial femoral and popliteal artery. Tibial vessel patency difficult to determine but extensive calcifications noted. Left distal SFA to peroneal graft patent.       Labs:   CBC:   Recent Labs     05/17/22  0545 05/18/22  0612 05/19/22  0526   WBC 7.4 7.1 7.2   HGB 9.0* 8.9* 8.7*   HCT 26.7* 25.9* 26.0*   MCV 80.4 80.3 80.9    175 175     BMP:   Recent Labs     05/17/22  0545 05/18/22  0612 05/19/22  0526   * 135* 136   K 4.4 4.9 4.4    105 107   CO2 22 20* 19*   BUN 28* 34* 30*   CREATININE 1.0 1.0 0.9   CALCIUM 9.5 9.3 9.2     Cardiac Enzymes: No results for input(s): CKTOTAL, CKMB, CKMBINDEX, TROPONINI in the last 72 hours. PT/INR: No results for input(s): PROTIME, INR in the last 72 hours. APTT: No results for input(s): APTT in the last 72 hours. Liver Profile:  Lab Results   Component Value Date    AST 17 05/13/2022    ALT 15 05/13/2022    BILITOT 0.3 05/13/2022    ALKPHOS 80 05/13/2022     Lab Results   Component Value Date    CHOL 132 05/14/2021    HDL 54 05/14/2021    HDL 52 03/05/2012    TRIG 68 05/14/2021     TSH:  No results found for: TSH  UA:   Lab Results   Component Value Date    NITRITE neg 05/02/2019    COLORU Straw 05/04/2019    PHUR 6.0 05/04/2019    WBCUA 3-5 05/04/2019    RBCUA 0-2 05/04/2019    BACTERIA 3+ 05/04/2019    CLARITYU Clear 05/04/2019    SPECGRAV <=1.005 05/04/2019    LEUKOCYTESUR SMALL 05/04/2019    UROBILINOGEN 0.2 05/04/2019    BILIRUBINUR Negative 05/04/2019    BILIRUBINUR neg 05/02/2019    BLOODU Negative 05/04/2019    GLUCOSEU Negative 05/04/2019    AMORPHOUS 2+ 09/19/2012           Assessment:  Osteomyelitis of the Right Hallux with ischemic pain in the foot. Severe multilevel arterial occlusive disease in the right lower extremity. Plan: Angiogram of the right lower extremity with intervention in amenable. Patient may need bypass similar to LLE Previously. I discussed above with patient who understands and wishes to proceed. I have discussed all risks (including but not limited to bleeding, thrombosis, contrast allergy, renal failure, and early and late failure of intervention), benefits and alternatives of catheter-based angiography. Patient freely consents and is eager to proceed. All questions and expectations addressed.

## 2022-05-19 NOTE — PROGRESS NOTES
Vascular    PVD  Osteo R Foot. Needs flow improved prior to any toe amputation.    Angiogram tomorrow am.   [id number] Magdalene Bishop to eat today  Full consult to follow

## 2022-05-19 NOTE — PLAN OF CARE
Problem: Pain  Goal: Verbalizes/displays adequate comfort level or baseline comfort level  5/18/2022 2130 by Shayne Clemons RN  Outcome: Progressing

## 2022-05-20 ENCOUNTER — APPOINTMENT (OUTPATIENT)
Dept: CARDIAC CATH/INVASIVE PROCEDURES | Age: 77
DRG: 253 | End: 2022-05-20
Attending: HOSPITALIST
Payer: MEDICARE

## 2022-05-20 LAB
ANION GAP SERPL CALCULATED.3IONS-SCNC: 11 MMOL/L (ref 3–16)
BASOPHILS ABSOLUTE: 0.1 K/UL (ref 0–0.2)
BASOPHILS RELATIVE PERCENT: 1.3 %
BUN BLDV-MCNC: 34 MG/DL (ref 7–20)
CALCIUM SERPL-MCNC: 9.4 MG/DL (ref 8.3–10.6)
CHLORIDE BLD-SCNC: 105 MMOL/L (ref 99–110)
CO2: 20 MMOL/L (ref 21–32)
CREAT SERPL-MCNC: 1 MG/DL (ref 0.6–1.2)
EOSINOPHILS ABSOLUTE: 0.4 K/UL (ref 0–0.6)
EOSINOPHILS RELATIVE PERCENT: 4.5 %
GFR AFRICAN AMERICAN: >60
GFR NON-AFRICAN AMERICAN: 54
GLUCOSE BLD-MCNC: 120 MG/DL (ref 70–99)
GLUCOSE BLD-MCNC: 128 MG/DL (ref 70–99)
GLUCOSE BLD-MCNC: 130 MG/DL (ref 70–99)
GLUCOSE BLD-MCNC: 134 MG/DL (ref 70–99)
HCT VFR BLD CALC: 25.9 % (ref 36–48)
HEMOGLOBIN: 8.8 G/DL (ref 12–16)
LYMPHOCYTES ABSOLUTE: 1.3 K/UL (ref 1–5.1)
LYMPHOCYTES RELATIVE PERCENT: 15.7 %
MCH RBC QN AUTO: 27.2 PG (ref 26–34)
MCHC RBC AUTO-ENTMCNC: 33.9 G/DL (ref 31–36)
MCV RBC AUTO: 80.1 FL (ref 80–100)
MONOCYTES ABSOLUTE: 0.6 K/UL (ref 0–1.3)
MONOCYTES RELATIVE PERCENT: 7.8 %
NEUTROPHILS ABSOLUTE: 5.8 K/UL (ref 1.7–7.7)
NEUTROPHILS RELATIVE PERCENT: 70.7 %
PDW BLD-RTO: 15 % (ref 12.4–15.4)
PERFORMED ON: ABNORMAL
PLATELET # BLD: 191 K/UL (ref 135–450)
PMV BLD AUTO: 7 FL (ref 5–10.5)
POTASSIUM REFLEX MAGNESIUM: 4.6 MMOL/L (ref 3.5–5.1)
RBC # BLD: 3.23 M/UL (ref 4–5.2)
SODIUM BLD-SCNC: 136 MMOL/L (ref 136–145)
VANCOMYCIN RANDOM: 15.8 UG/ML
WBC # BLD: 8.2 K/UL (ref 4–11)

## 2022-05-20 PROCEDURE — 6360000002 HC RX W HCPCS

## 2022-05-20 PROCEDURE — C1894 INTRO/SHEATH, NON-LASER: HCPCS

## 2022-05-20 PROCEDURE — 37228 HC TIB PER TERRITORY PLASTY: CPT

## 2022-05-20 PROCEDURE — 6360000002 HC RX W HCPCS: Performed by: INTERNAL MEDICINE

## 2022-05-20 PROCEDURE — 80048 BASIC METABOLIC PNL TOTAL CA: CPT

## 2022-05-20 PROCEDURE — C2623 CATH, TRANSLUMIN, DRUG-COAT: HCPCS

## 2022-05-20 PROCEDURE — 80202 ASSAY OF VANCOMYCIN: CPT

## 2022-05-20 PROCEDURE — 36415 COLL VENOUS BLD VENIPUNCTURE: CPT

## 2022-05-20 PROCEDURE — 6370000000 HC RX 637 (ALT 250 FOR IP)

## 2022-05-20 PROCEDURE — 75774 ARTERY X-RAY EACH VESSEL: CPT

## 2022-05-20 PROCEDURE — B41G1ZZ FLUOROSCOPY OF LEFT LOWER EXTREMITY ARTERIES USING LOW OSMOLAR CONTRAST: ICD-10-PCS | Performed by: SURGERY

## 2022-05-20 PROCEDURE — 37224 PR REVSC OPN/PRG FEM/POP W/ANGIOPLASTY UNI: CPT | Performed by: SURGERY

## 2022-05-20 PROCEDURE — 6370000000 HC RX 637 (ALT 250 FOR IP): Performed by: INTERNAL MEDICINE

## 2022-05-20 PROCEDURE — 6360000004 HC RX CONTRAST MEDICATION

## 2022-05-20 PROCEDURE — 2500000003 HC RX 250 WO HCPCS

## 2022-05-20 PROCEDURE — 047K3ZZ DILATION OF RIGHT FEMORAL ARTERY, PERCUTANEOUS APPROACH: ICD-10-PCS | Performed by: SURGERY

## 2022-05-20 PROCEDURE — C1760 CLOSURE DEV, VASC: HCPCS

## 2022-05-20 PROCEDURE — 75716 ARTERY X-RAYS ARMS/LEGS: CPT

## 2022-05-20 PROCEDURE — C1887 CATHETER, GUIDING: HCPCS

## 2022-05-20 PROCEDURE — B41F1ZZ FLUOROSCOPY OF RIGHT LOWER EXTREMITY ARTERIES USING LOW OSMOLAR CONTRAST: ICD-10-PCS | Performed by: SURGERY

## 2022-05-20 PROCEDURE — C1725 CATH, TRANSLUMIN NON-LASER: HCPCS

## 2022-05-20 PROCEDURE — C1769 GUIDE WIRE: HCPCS

## 2022-05-20 PROCEDURE — 2709999900 HC NON-CHARGEABLE SUPPLY

## 2022-05-20 PROCEDURE — 99152 MOD SED SAME PHYS/QHP 5/>YRS: CPT | Performed by: SURGERY

## 2022-05-20 PROCEDURE — 2580000003 HC RX 258: Performed by: INTERNAL MEDICINE

## 2022-05-20 PROCEDURE — 76937 US GUIDE VASCULAR ACCESS: CPT | Performed by: SURGERY

## 2022-05-20 PROCEDURE — 37228 PR REVSC OPN/PRQ TIB/PERO W/ANGIOPLASTY UNI: CPT | Performed by: SURGERY

## 2022-05-20 PROCEDURE — 85025 COMPLETE CBC W/AUTO DIFF WBC: CPT

## 2022-05-20 PROCEDURE — 04FL3ZZ FRAGMENTATION OF LEFT FEMORAL ARTERY, PERCUTANEOUS APPROACH: ICD-10-PCS | Performed by: SURGERY

## 2022-05-20 PROCEDURE — C9764 REVASC INTRAVASC LITHOTRIPSY: HCPCS

## 2022-05-20 PROCEDURE — 1200000000 HC SEMI PRIVATE

## 2022-05-20 PROCEDURE — 75716 ARTERY X-RAYS ARMS/LEGS: CPT | Performed by: SURGERY

## 2022-05-20 RX ORDER — NITROGLYCERIN 20 MG/100ML
INJECTION INTRAVENOUS
Status: DISPENSED
Start: 2022-05-20 | End: 2022-05-20

## 2022-05-20 RX ORDER — FENTANYL CITRATE 50 UG/ML
INJECTION, SOLUTION INTRAMUSCULAR; INTRAVENOUS
Status: COMPLETED | OUTPATIENT
Start: 2022-05-20 | End: 2022-05-20

## 2022-05-20 RX ORDER — CLOPIDOGREL BISULFATE 75 MG/1
75 TABLET ORAL DAILY
Status: DISCONTINUED | OUTPATIENT
Start: 2022-05-21 | End: 2022-05-25 | Stop reason: HOSPADM

## 2022-05-20 RX ORDER — HEPARIN SODIUM 1000 [USP'U]/ML
INJECTION, SOLUTION INTRAVENOUS; SUBCUTANEOUS
Status: COMPLETED | OUTPATIENT
Start: 2022-05-20 | End: 2022-05-20

## 2022-05-20 RX ORDER — MIDAZOLAM HYDROCHLORIDE 1 MG/ML
INJECTION INTRAMUSCULAR; INTRAVENOUS
Status: COMPLETED | OUTPATIENT
Start: 2022-05-20 | End: 2022-05-20

## 2022-05-20 RX ORDER — CLOPIDOGREL BISULFATE 75 MG/1
150 TABLET ORAL ONCE
Status: COMPLETED | OUTPATIENT
Start: 2022-05-20 | End: 2022-05-20

## 2022-05-20 RX ADMIN — FENTANYL CITRATE 25 MCG: 50 INJECTION, SOLUTION INTRAMUSCULAR; INTRAVENOUS at 11:00

## 2022-05-20 RX ADMIN — ATORVASTATIN CALCIUM 20 MG: 10 TABLET, FILM COATED ORAL at 20:51

## 2022-05-20 RX ADMIN — HEPARIN SODIUM 1000 UNITS: 1000 INJECTION, SOLUTION INTRAVENOUS; SUBCUTANEOUS at 10:21

## 2022-05-20 RX ADMIN — HYDRALAZINE HYDROCHLORIDE 50 MG: 50 TABLET, FILM COATED ORAL at 20:51

## 2022-05-20 RX ADMIN — FENTANYL CITRATE 25 MCG: 50 INJECTION, SOLUTION INTRAMUSCULAR; INTRAVENOUS at 10:19

## 2022-05-20 RX ADMIN — VANCOMYCIN HYDROCHLORIDE 500 MG: 500 INJECTION, POWDER, LYOPHILIZED, FOR SOLUTION INTRAVENOUS at 16:48

## 2022-05-20 RX ADMIN — CLOPIDOGREL BISULFATE 150 MG: 75 TABLET ORAL at 11:43

## 2022-05-20 RX ADMIN — CEFEPIME HYDROCHLORIDE 2000 MG: 2 INJECTION, POWDER, FOR SOLUTION INTRAVENOUS at 20:58

## 2022-05-20 RX ADMIN — HEPARIN SODIUM 5000 UNITS: 1000 INJECTION, SOLUTION INTRAVENOUS; SUBCUTANEOUS at 10:05

## 2022-05-20 RX ADMIN — SODIUM CHLORIDE, PRESERVATIVE FREE 10 ML: 5 INJECTION INTRAVENOUS at 20:51

## 2022-05-20 RX ADMIN — FENTANYL CITRATE 25 MCG: 50 INJECTION, SOLUTION INTRAMUSCULAR; INTRAVENOUS at 10:21

## 2022-05-20 RX ADMIN — MIDAZOLAM HYDROCHLORIDE 1 MG: 1 INJECTION INTRAMUSCULAR; INTRAVENOUS at 09:52

## 2022-05-20 RX ADMIN — FENTANYL CITRATE 25 MCG: 50 INJECTION, SOLUTION INTRAMUSCULAR; INTRAVENOUS at 10:34

## 2022-05-20 RX ADMIN — MIDAZOLAM HYDROCHLORIDE 1 MG: 1 INJECTION INTRAMUSCULAR; INTRAVENOUS at 10:48

## 2022-05-20 RX ADMIN — FENTANYL CITRATE 25 MCG: 50 INJECTION, SOLUTION INTRAMUSCULAR; INTRAVENOUS at 10:49

## 2022-05-20 RX ADMIN — FENTANYL CITRATE 25 MCG: 50 INJECTION, SOLUTION INTRAMUSCULAR; INTRAVENOUS at 11:10

## 2022-05-20 RX ADMIN — ASPIRIN 81 MG: 81 TABLET, COATED ORAL at 09:34

## 2022-05-20 RX ADMIN — FENTANYL CITRATE 25 MCG: 50 INJECTION, SOLUTION INTRAMUSCULAR; INTRAVENOUS at 10:53

## 2022-05-20 RX ADMIN — FENTANYL CITRATE 25 MCG: 50 INJECTION, SOLUTION INTRAMUSCULAR; INTRAVENOUS at 09:52

## 2022-05-20 RX ADMIN — SODIUM CHLORIDE: 9 INJECTION, SOLUTION INTRAVENOUS at 20:57

## 2022-05-20 NOTE — PROGRESS NOTES
Hospitalist Progress Note      PCP: Pamela Griffiths, MARVIN - CNP    Date of Admission: 5/18/2022    Chief Complaint: osteomyelitis of right foot     Hospital Course: Lali Bradford is a 68 y.o. female with a PMH of PVD with fem pop hx, Afib, CAD, DM2, HTN, and HLD who was a direct admit from [facility] to [facility] for osteomyelitis of the right foot. She presented on 5/13 to the [place] ED after her PCP evaluated her for diabetic foot ulcers and imaging suggested osteomyelitis in her right foot. In [place], she was started on IV vancomycin and cefepime. Podiatry was consulted and they recommended a vascular consult for a right hallus and 4th toe amputation. Therefore, she was transferred to our facility for a vascular consultation/workup. Vascular is following the patient and is recommended an angiogram to be completed today. Subjective: Patient in angiogram today. Have attempted to see her multiple times but is still in OR.        Medications:  Reviewed    Infusion Medications    sodium chloride 10 mL/hr at 05/20/22 0607     Scheduled Medications    polyethylene glycol  17 g Oral Daily    amLODIPine  10 mg Oral Daily    aspirin  81 mg Oral Daily    digoxin  125 mcg Oral Daily    lisinopril  20 mg Oral Daily    atorvastatin  20 mg Oral Daily    cefepime  2,000 mg IntraVENous Q12H    hydrALAZINE  50 mg Oral 2 times per day    insulin lispro  0-3 Units SubCUTAneous Nightly    insulin lispro  0-6 Units SubCUTAneous TID WC    [START ON 5/14/2023] vancomycin (VANCOCIN) intermittent dosing (placeholder)   Other RX Placeholder    sodium chloride flush  5-40 mL IntraVENous 2 times per day    enoxaparin  40 mg SubCUTAneous Daily     PRN Meds: sodium chloride flush, sodium chloride, ondansetron **OR** ondansetron, acetaminophen **OR** acetaminophen      Intake/Output Summary (Last 24 hours) at 5/20/2022 0955  Last data filed at 5/20/2022 0757  Gross per 24 hour   Intake 1287.3 ml   Output --   Net 1287.3 ml       Physical Exam Performed:    BP (!) 122/50   Pulse 70   Temp 98.2 °F (36.8 °C) (Oral)   Resp 16   Ht 5' 3\" (1.6 m)   Wt 135 lb (61.2 kg)   SpO2 97%   BMI 23.91 kg/m²     Deferred for today. Labs:   Recent Labs     05/18/22  0612 05/19/22  0526 05/20/22  0520   WBC 7.1 7.2 8.2   HGB 8.9* 8.7* 8.8*   HCT 25.9* 26.0* 25.9*    175 191     Recent Labs     05/18/22  0612 05/19/22  0526 05/20/22  0520   * 136 136   K 4.9 4.4 4.6    107 105   CO2 20* 19* 20*   BUN 34* 30* 34*   CREATININE 1.0 0.9 1.0   CALCIUM 9.3 9.2 9.4       Active Hospital Problems    Diagnosis     PVD (peripheral vascular disease) ([facility] [I73.9]      Priority: Medium    Acute osteomyelitis of right foot ([facility] [M86.171]      Priority: Medium    Essential hypertension [I10]     Coronary artery disease involving native coronary artery of native heart without angina pectoris [I25.10]     Type 2 diabetes mellitus with diabetic polyneuropathy, without long-term current use of insulin (HCC) [E11.42]     Paroxysmal atrial fibrillation (HCC) [I48.0]      Assessment/Plan:  Acute osteomyelitis of right foot in patient with known PVD  -MRI right foot on 5/16/2022 revealed: osteomyelitis t/o the 1st distal phalanx. Adjacent deep soft tissue ulceration and cellulitis. Osteomyelitis of 4th middle and distal phalanges.  Adjacent deep soft tissue ulceration along the dorsal lateral aspect of the toe with adjacent cellulitis  -Continue abx regimen of cefepime and vancomycin    -Checking vanc levels   -neurovascular checks  -Vascular surgery consulted and recommendations below:   -Angiogram today     CAD  -Continue ASA, lisinopril, amlodipine, atorvastatin    HTN   -Continue amlodipine, lisinopril and hydralazine     HLD  -Continue home atorvastatin     DM2, controlled  -HA1C on 5/13/2022: 5.6%   -Holding home medicationis  -Low dose SSI     PAF  -Continue digoxin   -checking digoxin levels   -Holding home eliquis      Chronic normocytic anemia  -No s/s of bleeding at this time  -Monitoring     DVT Prophylaxis: Lovenox  Diet: Diet NPO  Code Status: Full Code    PT/OT Eval Status: N/a     Dispo - Pending vascular workup     Champ Valdes MD     Addendum to Resident  Progress note: Attempted to evaluate patient this morning. She is off the floor for her angiogram.  Discussed with RN-no acute events reported overnight; continue current management.   Will reevaluate tomorrow    Severa Filippo, MD  Hospitalist Physician

## 2022-05-20 NOTE — CARE COORDINATION
Patient from home with spouse with HX of frequent falls. She won't let anyone into home. She has been to [facility] in past. Normally independent with ADL's. Vascular following and patient had BLE Angiogram. PTA L SFA /Pop/AT. Podiatry following also. Awaiting final decision regarding amputation to be determined based on testing. Continue current  Management per MD. Following for needs.

## 2022-05-20 NOTE — PROGRESS NOTES
Report given to [facility] RN. Denied questions. Patients belongings up to room. Update given to Real Wilburn, daughter in law. Denied questions.

## 2022-05-20 NOTE — PLAN OF CARE
Bed locked and in low position, nonskid socks on, bedside table and call light within reach, calls appropriately for assistance.   Problem: Safety - Adult  Goal: Free from fall injury  Outcome: Progressing

## 2022-05-21 ENCOUNTER — APPOINTMENT (OUTPATIENT)
Dept: GENERAL RADIOLOGY | Age: 77
DRG: 253 | End: 2022-05-21
Attending: HOSPITALIST
Payer: MEDICARE

## 2022-05-21 LAB
A/G RATIO: 1.1 (ref 1.1–2.2)
ALBUMIN SERPL-MCNC: 3.5 G/DL (ref 3.4–5)
ALP BLD-CCNC: 66 U/L (ref 40–129)
ALT SERPL-CCNC: 14 U/L (ref 10–40)
ANION GAP SERPL CALCULATED.3IONS-SCNC: 13 MMOL/L (ref 3–16)
AST SERPL-CCNC: 15 U/L (ref 15–37)
BASOPHILS ABSOLUTE: 0.1 K/UL (ref 0–0.2)
BASOPHILS RELATIVE PERCENT: 0.9 %
BILIRUB SERPL-MCNC: 0.3 MG/DL (ref 0–1)
BUN BLDV-MCNC: 23 MG/DL (ref 7–20)
CALCIUM SERPL-MCNC: 8.9 MG/DL (ref 8.3–10.6)
CHLORIDE BLD-SCNC: 100 MMOL/L (ref 99–110)
CO2: 19 MMOL/L (ref 21–32)
CREAT SERPL-MCNC: 1.1 MG/DL (ref 0.6–1.2)
EOSINOPHILS ABSOLUTE: 0.3 K/UL (ref 0–0.6)
EOSINOPHILS RELATIVE PERCENT: 3 %
GFR AFRICAN AMERICAN: 58
GFR NON-AFRICAN AMERICAN: 48
GLUCOSE BLD-MCNC: 124 MG/DL (ref 70–99)
GLUCOSE BLD-MCNC: 156 MG/DL (ref 70–99)
GLUCOSE BLD-MCNC: 182 MG/DL (ref 70–99)
GLUCOSE BLD-MCNC: 203 MG/DL (ref 70–99)
GLUCOSE BLD-MCNC: 205 MG/DL (ref 70–99)
HCT VFR BLD CALC: 27 % (ref 36–48)
HEMOGLOBIN: 9.1 G/DL (ref 12–16)
LYMPHOCYTES ABSOLUTE: 1.1 K/UL (ref 1–5.1)
LYMPHOCYTES RELATIVE PERCENT: 12 %
MCH RBC QN AUTO: 27.1 PG (ref 26–34)
MCHC RBC AUTO-ENTMCNC: 33.7 G/DL (ref 31–36)
MCV RBC AUTO: 80.3 FL (ref 80–100)
MONOCYTES ABSOLUTE: 0.8 K/UL (ref 0–1.3)
MONOCYTES RELATIVE PERCENT: 7.9 %
NEUTROPHILS ABSOLUTE: 7.3 K/UL (ref 1.7–7.7)
NEUTROPHILS RELATIVE PERCENT: 76.2 %
PDW BLD-RTO: 15.3 % (ref 12.4–15.4)
PERFORMED ON: ABNORMAL
PLATELET # BLD: 191 K/UL (ref 135–450)
PMV BLD AUTO: 7.1 FL (ref 5–10.5)
POTASSIUM REFLEX MAGNESIUM: 4.7 MMOL/L (ref 3.5–5.1)
RBC # BLD: 3.36 M/UL (ref 4–5.2)
SODIUM BLD-SCNC: 132 MMOL/L (ref 136–145)
TOTAL PROTEIN: 6.6 G/DL (ref 6.4–8.2)
VANCOMYCIN RANDOM: 13.5 UG/ML
WBC # BLD: 9.5 K/UL (ref 4–11)

## 2022-05-21 PROCEDURE — 6360000002 HC RX W HCPCS: Performed by: INTERNAL MEDICINE

## 2022-05-21 PROCEDURE — 1200000000 HC SEMI PRIVATE

## 2022-05-21 PROCEDURE — 99231 SBSQ HOSP IP/OBS SF/LOW 25: CPT | Performed by: SURGERY

## 2022-05-21 PROCEDURE — 2580000003 HC RX 258: Performed by: SURGERY

## 2022-05-21 PROCEDURE — 73090 X-RAY EXAM OF FOREARM: CPT

## 2022-05-21 PROCEDURE — 6370000000 HC RX 637 (ALT 250 FOR IP): Performed by: INTERNAL MEDICINE

## 2022-05-21 PROCEDURE — 80202 ASSAY OF VANCOMYCIN: CPT

## 2022-05-21 PROCEDURE — 6360000002 HC RX W HCPCS: Performed by: SURGERY

## 2022-05-21 PROCEDURE — 6370000000 HC RX 637 (ALT 250 FOR IP): Performed by: SURGERY

## 2022-05-21 PROCEDURE — 36415 COLL VENOUS BLD VENIPUNCTURE: CPT

## 2022-05-21 PROCEDURE — 80053 COMPREHEN METABOLIC PANEL: CPT

## 2022-05-21 PROCEDURE — 85025 COMPLETE CBC W/AUTO DIFF WBC: CPT

## 2022-05-21 PROCEDURE — 6370000000 HC RX 637 (ALT 250 FOR IP): Performed by: STUDENT IN AN ORGANIZED HEALTH CARE EDUCATION/TRAINING PROGRAM

## 2022-05-21 PROCEDURE — 2580000003 HC RX 258: Performed by: INTERNAL MEDICINE

## 2022-05-21 RX ORDER — HYDRALAZINE HYDROCHLORIDE 50 MG/1
50 TABLET, FILM COATED ORAL EVERY 8 HOURS SCHEDULED
Status: DISCONTINUED | OUTPATIENT
Start: 2022-05-21 | End: 2022-05-25 | Stop reason: HOSPADM

## 2022-05-21 RX ADMIN — HYDRALAZINE HYDROCHLORIDE 50 MG: 50 TABLET, FILM COATED ORAL at 09:42

## 2022-05-21 RX ADMIN — VANCOMYCIN HYDROCHLORIDE 750 MG: 750 INJECTION, POWDER, LYOPHILIZED, FOR SOLUTION INTRAVENOUS at 15:45

## 2022-05-21 RX ADMIN — SODIUM CHLORIDE, PRESERVATIVE FREE 10 ML: 5 INJECTION INTRAVENOUS at 09:41

## 2022-05-21 RX ADMIN — SODIUM CHLORIDE: 9 INJECTION, SOLUTION INTRAVENOUS at 20:22

## 2022-05-21 RX ADMIN — CEFEPIME HYDROCHLORIDE 2000 MG: 2 INJECTION, POWDER, FOR SOLUTION INTRAVENOUS at 20:23

## 2022-05-21 RX ADMIN — INSULIN LISPRO 2 UNITS: 100 INJECTION, SOLUTION INTRAVENOUS; SUBCUTANEOUS at 13:15

## 2022-05-21 RX ADMIN — CEFEPIME HYDROCHLORIDE 2000 MG: 2 INJECTION, POWDER, FOR SOLUTION INTRAVENOUS at 09:49

## 2022-05-21 RX ADMIN — INSULIN LISPRO 1 UNITS: 100 INJECTION, SOLUTION INTRAVENOUS; SUBCUTANEOUS at 20:31

## 2022-05-21 RX ADMIN — ENOXAPARIN SODIUM 40 MG: 100 INJECTION SUBCUTANEOUS at 09:42

## 2022-05-21 RX ADMIN — AMLODIPINE BESYLATE 10 MG: 5 TABLET ORAL at 09:42

## 2022-05-21 RX ADMIN — SODIUM CHLORIDE, PRESERVATIVE FREE 10 ML: 5 INJECTION INTRAVENOUS at 20:20

## 2022-05-21 RX ADMIN — DIGOXIN 125 MCG: 125 TABLET ORAL at 09:41

## 2022-05-21 RX ADMIN — ASPIRIN 81 MG: 81 TABLET, COATED ORAL at 09:41

## 2022-05-21 RX ADMIN — HYDRALAZINE HYDROCHLORIDE 50 MG: 50 TABLET, FILM COATED ORAL at 22:01

## 2022-05-21 RX ADMIN — ATORVASTATIN CALCIUM 20 MG: 10 TABLET, FILM COATED ORAL at 20:30

## 2022-05-21 RX ADMIN — LISINOPRIL 20 MG: 20 TABLET ORAL at 09:42

## 2022-05-21 RX ADMIN — INSULIN LISPRO 1 UNITS: 100 INJECTION, SOLUTION INTRAVENOUS; SUBCUTANEOUS at 16:46

## 2022-05-21 RX ADMIN — POLYETHYLENE GLYCOL 3350 17 G: 17 POWDER, FOR SOLUTION ORAL at 09:41

## 2022-05-21 RX ADMIN — CLOPIDOGREL BISULFATE 75 MG: 75 TABLET ORAL at 09:42

## 2022-05-21 NOTE — PROGRESS NOTES
Pt awake, no c/o pain/discomfort. Denies any needs at this time. Personal belongings and call light within reach.  CECILIARN

## 2022-05-21 NOTE — PROGRESS NOTES
Podiatry Progress Note  Subjective:   Patient seen at bedside this morning. No new pedal complaints. She denied n/v/f/c and SOB. Objective:   Vitals:  BP (!) 172/70   Pulse 78   Temp 98.1 °F (36.7 °C)   Resp 20   Ht 5' 3\" (1.6 m)   Wt 134 lb 1.6 oz (60.8 kg)   SpO2 96%   BMI 23.75 kg/m²   Integument:  Dry, black, necrotic changes at the distal right hallux and lateral right fourth toe. No surrounding erythema. No acute signs of infection. Otherwise, skin is thin, dry and atrophic, bilateral.  Neurologic:  Protective sensation is grossly intact to light touch at the level of the toes, bilateral.  Vascular:  DP and PT pulses are palpable, bilateral.  CFT is less than five seconds at all toes. No significant edema appreciated. Musculoskeletal:  No gross musculoskeletal deformities noted.  Pain on palpation at the right hallux and right fourth toe.      Labs:   CBC with Differential:    Lab Results   Component Value Date    WBC 9.5 05/21/2022    RBC 3.36 05/21/2022    HGB 9.1 05/21/2022    HCT 27.0 05/21/2022     05/21/2022    MCV 80.3 05/21/2022    MCH 27.1 05/21/2022    MCHC 33.7 05/21/2022    RDW 15.3 05/21/2022    SEGSPCT 71.4 05/23/2013    LYMPHOPCT 12.0 05/21/2022    MONOPCT 7.9 05/21/2022    EOSPCT 2.0 04/26/2010    BASOPCT 0.9 05/21/2022    MONOSABS 0.8 05/21/2022    LYMPHSABS 1.1 05/21/2022    EOSABS 0.3 05/21/2022    BASOSABS 0.1 05/21/2022    DIFFTYPE Auto-K 05/23/2013     CMP:    Lab Results   Component Value Date     05/21/2022    K 4.7 05/21/2022     05/21/2022    CO2 19 05/21/2022    BUN 23 05/21/2022    CREATININE 1.1 05/21/2022    GFRAA 58 05/21/2022    GFRAA >60 05/23/2013    AGRATIO 1.1 05/21/2022    LABGLOM 48 05/21/2022    GLUCOSE 205 05/21/2022    PROT 6.6 05/21/2022    PROT 7.3 09/10/2012    LABALBU 3.5 05/21/2022    CALCIUM 8.9 05/21/2022    BILITOT 0.3 05/21/2022    ALKPHOS 66 05/21/2022    AST 15 05/21/2022    ALT 14 05/21/2022     PT/INR:    Lab Results Component Value Date    PROTIME 13.5 12/17/2020    PROTIME 19.8 01/02/2010    INR 1.16 12/17/2020     Last 3 Troponin:  No results found for: TROPONINI  HgBA1c:    Lab Results   Component Value Date    LABA1C 5.6 05/13/2022     Imaging:   MRI, Right Foot  Impression/Recommendations:  5/16/2022:  1. Osteomyelitis throughout the 1st distal phalanx.  Adjacent deep soft   tissue ulceration and cellulitis.  No associated abscess. 2. Osteomyelitis of the 4th middle and distal phalanges.  Adjacent deep soft   tissue ulceration along the dorsal lateral aspect of the toe with adjacent   cellulitis.  No abscess. Assessment/Plan:   1) Osteomyelitis, right hallux and fourth toe  - Antibiotics per Primary Team (Cefepime & Vanc)  - Patient will benefit from amputation of the right hallux and fourth toe.   Tentatively Monday  - Weight-bear as tolerated for now  2) PVD  - Angio yesterday  3) DM    Reggie Hobson DPM, [id number], [address]  Office: [phone]  Cell: [phone]

## 2022-05-21 NOTE — OP NOTE
[address] [address]                                OPERATIVE REPORT    PATIENT NAME: Deanna Valencia                      :        1945  MED REC NO:   [id number]                          ROOM:       0425  ACCOUNT NO:   [de-identified]                           ADMIT DATE: 2022  PROVIDER:     Vee Reeder MD    DATE OF PROCEDURE:  2022    PREOPERATIVE DIAGNOSES:  Peripheral vascular disease with ischemic  gangrene of several toes of the right foot and evidence of  osteomyelitis. POSTOPERATIVE DIAGNOSES:  Peripheral vascular disease with ischemic  gangrene of several toes of the right foot and evidence of  osteomyelitis. PROCEDURES PERFORMED:  1. Ultrasound-guided left femoral artery access. 2.  Insertion of catheter into the distal aorta. 3.  Bilateral lower extremity angiograms. 4.  Angioplasty and intravascular lithotripsy of the right superficial  femoral artery, popliteal artery, and anterior tibial artery. SURGEON:  Vee Reeder MD    ANESTHESIA:  Local with moderate monitored sedation. INDICATIONS:  The patient is a 26-year-old female with history of  peripheral vascular disease. She is status post left lower extremity  bypass. She presents at this time with ischemic ulcerations of the  right foot with the rest pain. MRI had demonstrated osteomyelitis in  the right foot. A CT angiogram was obtained; however, patency of  vessels was very difficult to establish due to dense calcifications  throughout the vessels. She is thus brought to the angio suite at this  time to undergo angiography with possible intervention on the right  lower extremity. OPERATIVE PROCEDURE:  The patient is brought to the angio suite and  placed in the supine position. Under my direct supervision, Versed and  fentanyl were administered for moderate sedation.   The patient was  monitored by an independent trained nurse observer using continuous  blood pressure, EKG, and pulse oximetry. I spent approximately 120  minutes face-to-face with the patient providing and directing sedation. The left femoral region was prepped and draped in sterile fashion. Ultrasound was used to identify the common femoral artery. This was  patent and pulsatile. Under direct visualization this was accessed and  a 6-Ghanaian introducer sheath was placed. A digital copy ultrasound  image was saved and placed in the patient's record. Bentson wire was  advanced into the distal aorta. Modified hook catheter was advanced  over the wire and placed just above the aortic bifurcation using a bolus  layne technique. Bilateral lower extremity angiography was then  performed. The catheter was then used to cannulate the right common  iliac artery. The wire was advanced onto the right common femoral  level. The short 6-Ghanaian sheath was removed and replaced with a  6-Ghanaian 45-cm long destination sheath which was advanced to the right  common femoral artery. The patient was given 5000 units of intravenous  heparin. Using a V14 control wire and a 0.014-inch Quick-Cross catheter  for support, the distal superficial femoral artery high-grade stenosis  and focal occlusions were crossed. The wire was then advanced into the  mid-anterior tibial artery. Angioplasty was performed of the proximal  anterior tibial artery using a 3-mm angioplasty balloon. Then using a  6-mm intravascular lithotripsy balloon, lithotripsy was performed. The  below-knee popliteal artery, behind knee popliteal artery and the  superficial femoral artery to just proximal to the adductor canal.  Post  angioplasty, there was markedly improved flow. I then treated the  popliteal and superficial femoral artery using a 5 mm x 250 mm IN. PACT  Admiral drug-coated balloon. Repeat angiograms were performed showing  some spasm in the distal anterior tibial artery from the wire. An  angled glide catheter was advanced over the wire and the wire was  removed and 400 mcg of nitroglycerin were slowly instilled and flushed  into the anterior tibial artery. Catheters and wires were then removed. The long sheath was removed over a Bentson wire and replaced with a  short 6-Mauritian introducer sheath. Retrograde femoral angiogram was  performed and a 6-Mauritian Mynx closure device was then placed and  deployed in standard fashion achieving excellent hemostasis. The  patient was then transferred to the recovery area in a stable condition. ANGIOGRAPHIC FINDINGS:  Initial angiograms demonstrate patent iliac  vessels, patent common femoral and deep femoral arteries. On the left  side, the proximal superficial femoral artery is patent. There is a  widely patent anastomosis to a bypass graft in the mid-superficial  femoral artery which is sewn to the peroneal vessel, which is a single  vessel runoff down the left leg. On the right side, the deep femoral  and common femoral arteries are patent. There are multiple sequential  stenoses and occlusions in the distal superficial femoral artery and  popliteal vessel behind the knee and just below the knee. There is a  moderate stenosis in the proximal anterior tibial artery. The anterior  tibial artery has distal runoff into the foot which proceeds down to the  ankle and then provides collaterals into the foot. Postangioplasty, the  stenoses have been resolved. There is brisk flow through the  superficial femoral artery, popliteal artery, into the anterior tibial  artery. Estimated blood loss throughout the procedure was less than 50  mL.         Allie Nix MD    D: 05/20/2022 20:49:25       T: 05/20/2022 20:51:45     [id number]  Job#: [id number]     Doc#: [id number]    CC:

## 2022-05-21 NOTE — PROGRESS NOTES
Podiatry Progress Note  Subjective:   Patient seen at bedside this afternoon. No new pedal complaints. She denied n/v/f/c and SOB. She had her angio earlier today with Dr. Champ Man. Objective:   Vitals:  BP (!) 183/72   Pulse 76   Temp 98 °F (36.7 °C) (Oral)   Resp 19   Ht 5' 3\" (1.6 m)   Wt 135 lb (61.2 kg)   SpO2 97%   BMI 23.91 kg/m²   Integument:  Dry, black, necrotic changes at the distal right hallux and lateral right fourth toe. No surrounding erythema. No acute signs of infection. Otherwise, skin is thin, dry and atrophic, bilateral.  Neurologic:  Protective sensation is grossly intact to light touch at the level of the toes, bilateral.  Vascular:  DP and PT pulses are palpable, bilateral.  CFT is less than five seconds at all toes. No significant edema appreciated. Musculoskeletal:  No gross musculoskeletal deformities noted.  Pain on palpation at the right hallux and right fourth toe.      Labs:   CBC with Differential:    Lab Results   Component Value Date    WBC 8.2 05/20/2022    RBC 3.23 05/20/2022    HGB 8.8 05/20/2022    HCT 25.9 05/20/2022     05/20/2022    MCV 80.1 05/20/2022    MCH 27.2 05/20/2022    MCHC 33.9 05/20/2022    RDW 15.0 05/20/2022    SEGSPCT 71.4 05/23/2013    LYMPHOPCT 15.7 05/20/2022    MONOPCT 7.8 05/20/2022    EOSPCT 2.0 04/26/2010    BASOPCT 1.3 05/20/2022    MONOSABS 0.6 05/20/2022    LYMPHSABS 1.3 05/20/2022    EOSABS 0.4 05/20/2022    BASOSABS 0.1 05/20/2022    DIFFTYPE Auto-K 05/23/2013     CMP:    Lab Results   Component Value Date     05/20/2022    K 4.6 05/20/2022     05/20/2022    CO2 20 05/20/2022    BUN 34 05/20/2022    CREATININE 1.0 05/20/2022    GFRAA >60 05/20/2022    GFRAA >60 05/23/2013    AGRATIO 1.4 05/13/2022    LABGLOM 54 05/20/2022    GLUCOSE 128 05/20/2022    PROT 8.0 05/13/2022    PROT 7.3 09/10/2012    LABALBU 4.6 05/13/2022    CALCIUM 9.4 05/20/2022    BILITOT 0.3 05/13/2022    ALKPHOS 80 05/13/2022    AST 17 05/13/2022 ALT 15 05/13/2022     PT/INR:    Lab Results   Component Value Date    PROTIME 13.5 12/17/2020    PROTIME 19.8 01/02/2010    INR 1.16 12/17/2020     Last 3 Troponin:  No results found for: TROPONINI  HgBA1c:    Lab Results   Component Value Date    LABA1C 5.6 05/13/2022     Imaging:   MRI, Right Foot  Impression/Recommendations:  5/16/2022:  1. Osteomyelitis throughout the 1st distal phalanx.  Adjacent deep soft   tissue ulceration and cellulitis.  No associated abscess. 2. Osteomyelitis of the 4th middle and distal phalanges.  Adjacent deep soft   tissue ulceration along the dorsal lateral aspect of the toe with adjacent   cellulitis.  No abscess. Assessment/Plan:   1) Osteomyelitis, right hallux and fourth toe  - Antibiotics per Primary Team (Cefepime & Vanc)  - Patient will benefit from amputation of the right hallux and fourth toe.   Tentatively Monday  - Weight-bear as tolerated for now  2) PVD  - Angio earlier today  3) KAYLA Lugo, [place], [address], [address]  Office: [phone]  Cell: [phone]

## 2022-05-21 NOTE — PROGRESS NOTES
Vascular    No acute complaints today  AF  Left groin soft, NT  Right foot warm with distal signals intact    Lab Results   Component Value Date    WBC 8.2 05/20/2022    HGB 8.8 (L) 05/20/2022    HCT 25.9 (L) 05/20/2022    MCV 80.1 05/20/2022     05/20/2022     Lab Results   Component Value Date    CREATININE 1.0 05/20/2022    BUN 34 (H) 05/20/2022     05/20/2022    K 4.6 05/20/2022     05/20/2022    CO2 20 (L) 05/20/2022     I/P:  S/p RLE angiogram with intervention  Good distal flow  Ok to proceed with podiatric intervention  Will peripherally follow      Dominick Aquino M.D., FACS.  5/21/2022  8:03 AM

## 2022-05-21 NOTE — PROGRESS NOTES
Pt resting quietly in bed. No c/o pain/discomfort. Denies any needs at this time. Safety/fall prevention maintained. Personal belongings and call light within reach.  CECILIARN

## 2022-05-21 NOTE — PROGRESS NOTES
Hospitalist Progress Note      PCP: Jack Field, APRN - CNP    Date of Admission: 5/18/2022    Chief Complaint: osteomyelitis of right foot     Hospital Course: Massimo Calzada is a 68 y.o. female with a PMH of PVD with fem pop hx, Afib, CAD, DM2, HTN, and HLD who was a direct admit from [facility] to [facility] for osteomyelitis of the right foot. She presented on 5/13 to the [place] ED after her PCP evaluated her for diabetic foot ulcers and imaging suggested osteomyelitis in her right foot. In [place], she was started on IV vancomycin and cefepime. Podiatry was consulted and they recommended a vascular consult for a right hallus and 4th toe amputation. Therefore, she was transferred to our facility for a vascular consultation/workup. Vascular consulted, angiogram showed good distal flow in right lower extremity. Podiatric will proceed with amputation intervention on Monday. Subjective:   Patient was seen and examined at bedside. Patient stated that her left forearm is hurting her, worst with movement, rest makes it better. She also complained of swelling of left upper extremity. Patient denies any SOB, cough, chest pain, abdominal pain, fevers, or N/V/D.       Medications:  Reviewed    Infusion Medications    sodium chloride Stopped (05/21/22 0328)     Scheduled Medications    vancomycin  750 mg IntraVENous Once    clopidogrel  75 mg Oral Daily    polyethylene glycol  17 g Oral Daily    amLODIPine  10 mg Oral Daily    aspirin  81 mg Oral Daily    digoxin  125 mcg Oral Daily    lisinopril  20 mg Oral Daily    atorvastatin  20 mg Oral Daily    cefepime  2,000 mg IntraVENous Q12H    hydrALAZINE  50 mg Oral 2 times per day    insulin lispro  0-3 Units SubCUTAneous Nightly    insulin lispro  0-6 Units SubCUTAneous TID WC    [START ON 5/14/2023] vancomycin (VANCOCIN) intermittent dosing (placeholder)   Other RX Placeholder    sodium chloride flush  5-40 mL IntraVENous 2 times per day    the right hallux and fourth toe, most likely on Monday.    - Recommended weight bear as tolerated for now. - Will continue monitoring     CAD  -Continue ASA, lisinopril, amlodipine, atorvastatin, plavix    HTN   -Continue amlodipine, lisinopril and hydralazine     HLD  -Continue home atorvastatin     DM2, controlled  -HA1C on 5/13/2022: 5.6%   -Holding home medicationis  -Low dose SSI     PAF  -Continue digoxin   -checking digoxin levels   -Holding home eliquis      Chronic normocytic anemia  -No s/s of bleeding at this time  - hemoglobin level 9.1    Left upper extremity pain  - Ordered xray of radial and ulna  - US of left upper extremity ordered to rule out DVTs. - Will continue monitoring      DVT Prophylaxis: Lovenox  Diet: ADULT DIET;  Regular; 5 carb choices (75 gm/meal)  Code Status: Full Code    PT/OT Eval Status: N/a     Dispo - Pending podiatry intervention     Arabella Buchanan, [address] [facility]   5/21/2022

## 2022-05-21 NOTE — FLOWSHEET NOTE
05/20/22 2011   Assessment   Charting Type Shift assessment   Psychosocial   Psychosocial (WDL) WDL   Neurological   Neuro (WDL) WDL   Level of Consciousness Alert (0)   Swallow Screening   Is the patient unable to remain alert for testing? No   Is the patient on a modified diet (thickened liquids) due to pre-existing dysphagia? No   Is there presence of existing enteral tube feeding via the stomach or nose? No   Is there presence of head-of-bed restrictions (less than 30 degrees)? No   Is there presence of tracheotomy tube? No   Is the patient ordered nothing-by-mouth status? No   3 oz Water Swallow Screen Pass   [place] Coma Scale   Eye Opening 4   Best Verbal Response 5   Best Motor Response 6   [place] Coma Scale Score 15   HEENT (Head, Ears, Eyes, Nose, & Throat)   Right Eye Glasses; Impaired vision   Left Eye Glasses; Impaired vision   Teeth Edentulous   Respiratory   Respiratory (WDL) WDL   Respiratory Interventions Cough & deep breathe;H. O.B. elevated   Respiratory Pattern Regular   Respiratory Depth Normal   Respiratory Quality/Effort Unlabored   Chest Assessment Chest expansion symmetrical   Breath Sounds   Right Upper Lobe Clear   Right Middle Lobe Clear   Right Lower Lobe Diminished   Left Upper Lobe Clear   Left Lower Lobe Diminished   Cardiac   Cardiac Regularity Irregular   Heart Sounds S1, S2   Cardiac Rhythm A-fib  (on tele monitor)   Rhythm Interpretation   Pulse 76   Cardiac Monitor   Cardiac/Telemetry Monitor On Portable telemetry pack applied   Alarm Audible Yes   Gastrointestinal   Abdomen Inspection Rounded; Soft   Last BM (including prior to admit) 05/19/22   RUQ Bowel Sounds Active   LUQ Bowel Sounds Active   RLQ Bowel Sounds Active   LLQ Bowel Sounds Active   Genitourinary   Genitourinary (WDL) WDL   Suprapubic Tenderness No   Dysuria (Pain/Burning w/Urination) No   Difficulty Urinating/Starting Stream No   Care Plan - Genitourinary Goals    Absence of urinary retention Assess patients ability to void and empty bladder   Peripheral Vascular   Peripheral Vascular (WDL) WDL   RLE Neurovascular Assessment   Capillary Refill Less than/Equal to 3 seconds   Color Appropriate for Ethnicity   Temperature Warm   RLE Sensation  Full sensation   R Post Tibial Pulse +2 (Moderate); Doppler   R Pedal Pulse Doppler   R Calf Tenderness  Negative   LLE Neurovascular Assessment   Capillary Refill Less than/Equal to 3 seconds   Color Appropriate for Ethnicity   Temperature Warm   L Post Tibial Pulse Doppler   L Pedal Pulse Doppler   L Calf Tenderness Negative   Skin Integumentary    Skin Color Ecchymosis (comment)  (scattered bruising, abrasion)   Skin Integrity Other (comment)  (scabbed areas, BUE, BLE.( Rt big toe, 4rt toe, dry ulcer))   Location scattered   Wound Offloading (Prevention Methods) Elevate heels   Wound Prevention/Protection Method Yes   Wound 05/13/22 Toe (Comment  which one) Right great toe   Date First Assessed/Time First Assessed: 05/13/22 2200   Present on Hospital Admission: Yes  Primary Wound Type: Diabetic Ulcer  Location: (c) Toe (Comment  which one)  Wound Location Orientation: Right  Wound Description (Comments): great toe   Wound Etiology Diabetic   Dressing/Treatment Open to air   Wound Assessment Dry   Drainage Amount None   Yazmin-wound Assessment Dry/flaky; Intact   Wound 05/13/22 Toe (Comment  which one) Right digit 4   Date First Assessed/Time First Assessed: 05/13/22 2200   Present on Hospital Admission: Yes  Primary Wound Type: Diabetic Ulcer  Location: (c) Toe (Comment  which one)  Wound Location Orientation: Right  Wound Description (Comments): digit 4   Wound Etiology Diabetic   Dressing/Treatment Open to air   Wound Assessment Dry   Drainage Amount None   Care Plan - Metabolic/Fluid and Electrolytes Goals   Electrolytes maintained within normal limits Monitor labs and assess patient for signs and symptoms of electrolyte imbalances; Administer electrolyte replacement as ordered Hemodynamic stability and optimal renal function maintained Monitor labs and assess for signs and symptoms of volume excess or deficit   Glucose maintained within prescribed range Monitor blood glucose as ordered   Care Plan - Hematologic Goals   Maintains hematologic stability Assess for signs and symptoms of bleeding or hemorrhage   Care Plan - Chronic Conditions and Co-Morbidity   Care Plan - Patient's Chronic Conditions and Co-Morbidity Symptoms are Monitored and Maintained or Improved Monitor and assess patient's chronic conditions and comorbid symptoms for stability, deterioration, or improvement;Collaborate with multidisciplinary team to address chronic and comorbid conditions and prevent exacerbation or deterioration   Care Plan - Discharge Planning Goals   Discharge to home or other facility with appropriate resources Identify barriers to discharge with patient and caregiver

## 2022-05-22 LAB
A/G RATIO: 1.1 (ref 1.1–2.2)
ALBUMIN SERPL-MCNC: 3.4 G/DL (ref 3.4–5)
ALP BLD-CCNC: 64 U/L (ref 40–129)
ALT SERPL-CCNC: 16 U/L (ref 10–40)
ANION GAP SERPL CALCULATED.3IONS-SCNC: 12 MMOL/L (ref 3–16)
AST SERPL-CCNC: 17 U/L (ref 15–37)
BASOPHILS ABSOLUTE: 0.1 K/UL (ref 0–0.2)
BASOPHILS RELATIVE PERCENT: 1.1 %
BILIRUB SERPL-MCNC: 0.3 MG/DL (ref 0–1)
BUN BLDV-MCNC: 26 MG/DL (ref 7–20)
CALCIUM SERPL-MCNC: 9 MG/DL (ref 8.3–10.6)
CHLORIDE BLD-SCNC: 100 MMOL/L (ref 99–110)
CO2: 20 MMOL/L (ref 21–32)
CREAT SERPL-MCNC: 1.1 MG/DL (ref 0.6–1.2)
EOSINOPHILS ABSOLUTE: 0.3 K/UL (ref 0–0.6)
EOSINOPHILS RELATIVE PERCENT: 2.8 %
GFR AFRICAN AMERICAN: 58
GFR NON-AFRICAN AMERICAN: 48
GLUCOSE BLD-MCNC: 125 MG/DL (ref 70–99)
GLUCOSE BLD-MCNC: 144 MG/DL (ref 70–99)
GLUCOSE BLD-MCNC: 147 MG/DL (ref 70–99)
GLUCOSE BLD-MCNC: 172 MG/DL (ref 70–99)
GLUCOSE BLD-MCNC: 180 MG/DL (ref 70–99)
HCT VFR BLD CALC: 26.2 % (ref 36–48)
HEMOGLOBIN: 8.7 G/DL (ref 12–16)
LYMPHOCYTES ABSOLUTE: 1.7 K/UL (ref 1–5.1)
LYMPHOCYTES RELATIVE PERCENT: 17.8 %
MCH RBC QN AUTO: 26.8 PG (ref 26–34)
MCHC RBC AUTO-ENTMCNC: 33.3 G/DL (ref 31–36)
MCV RBC AUTO: 80.5 FL (ref 80–100)
MONOCYTES ABSOLUTE: 0.9 K/UL (ref 0–1.3)
MONOCYTES RELATIVE PERCENT: 9.3 %
NEUTROPHILS ABSOLUTE: 6.6 K/UL (ref 1.7–7.7)
NEUTROPHILS RELATIVE PERCENT: 69 %
PDW BLD-RTO: 15.1 % (ref 12.4–15.4)
PERFORMED ON: ABNORMAL
PLATELET # BLD: 177 K/UL (ref 135–450)
PMV BLD AUTO: 8.1 FL (ref 5–10.5)
POTASSIUM REFLEX MAGNESIUM: 4.5 MMOL/L (ref 3.5–5.1)
RBC # BLD: 3.26 M/UL (ref 4–5.2)
SODIUM BLD-SCNC: 132 MMOL/L (ref 136–145)
TOTAL PROTEIN: 6.5 G/DL (ref 6.4–8.2)
VANCOMYCIN RANDOM: 16.2 UG/ML
WBC # BLD: 9.6 K/UL (ref 4–11)

## 2022-05-22 PROCEDURE — 6370000000 HC RX 637 (ALT 250 FOR IP): Performed by: INTERNAL MEDICINE

## 2022-05-22 PROCEDURE — 6370000000 HC RX 637 (ALT 250 FOR IP): Performed by: STUDENT IN AN ORGANIZED HEALTH CARE EDUCATION/TRAINING PROGRAM

## 2022-05-22 PROCEDURE — 36415 COLL VENOUS BLD VENIPUNCTURE: CPT

## 2022-05-22 PROCEDURE — 80053 COMPREHEN METABOLIC PANEL: CPT

## 2022-05-22 PROCEDURE — 6360000002 HC RX W HCPCS: Performed by: SURGERY

## 2022-05-22 PROCEDURE — 85025 COMPLETE CBC W/AUTO DIFF WBC: CPT

## 2022-05-22 PROCEDURE — 2580000003 HC RX 258: Performed by: INTERNAL MEDICINE

## 2022-05-22 PROCEDURE — 6370000000 HC RX 637 (ALT 250 FOR IP): Performed by: SURGERY

## 2022-05-22 PROCEDURE — 6360000002 HC RX W HCPCS: Performed by: INTERNAL MEDICINE

## 2022-05-22 PROCEDURE — 80202 ASSAY OF VANCOMYCIN: CPT

## 2022-05-22 PROCEDURE — 2580000003 HC RX 258: Performed by: SURGERY

## 2022-05-22 PROCEDURE — 1200000000 HC SEMI PRIVATE

## 2022-05-22 RX ORDER — POLYETHYLENE GLYCOL 3350 17 G/17G
17 POWDER, FOR SOLUTION ORAL 2 TIMES DAILY
Status: DISCONTINUED | OUTPATIENT
Start: 2022-05-22 | End: 2022-05-25 | Stop reason: HOSPADM

## 2022-05-22 RX ORDER — DOCUSATE SODIUM 100 MG/1
100 CAPSULE, LIQUID FILLED ORAL DAILY
Status: DISCONTINUED | OUTPATIENT
Start: 2022-05-22 | End: 2022-05-25 | Stop reason: HOSPADM

## 2022-05-22 RX ADMIN — LISINOPRIL 20 MG: 20 TABLET ORAL at 09:53

## 2022-05-22 RX ADMIN — AMLODIPINE BESYLATE 10 MG: 5 TABLET ORAL at 09:52

## 2022-05-22 RX ADMIN — VANCOMYCIN HYDROCHLORIDE 750 MG: 750 INJECTION, POWDER, LYOPHILIZED, FOR SOLUTION INTRAVENOUS at 14:15

## 2022-05-22 RX ADMIN — SODIUM CHLORIDE, PRESERVATIVE FREE 10 ML: 5 INJECTION INTRAVENOUS at 10:29

## 2022-05-22 RX ADMIN — DIGOXIN 125 MCG: 125 TABLET ORAL at 09:52

## 2022-05-22 RX ADMIN — HYDRALAZINE HYDROCHLORIDE 50 MG: 50 TABLET, FILM COATED ORAL at 05:32

## 2022-05-22 RX ADMIN — CEFEPIME HYDROCHLORIDE 2000 MG: 2 INJECTION, POWDER, FOR SOLUTION INTRAVENOUS at 20:02

## 2022-05-22 RX ADMIN — SODIUM CHLORIDE: 9 INJECTION, SOLUTION INTRAVENOUS at 20:00

## 2022-05-22 RX ADMIN — INSULIN LISPRO 1 UNITS: 100 INJECTION, SOLUTION INTRAVENOUS; SUBCUTANEOUS at 09:56

## 2022-05-22 RX ADMIN — INSULIN LISPRO 1 UNITS: 100 INJECTION, SOLUTION INTRAVENOUS; SUBCUTANEOUS at 18:13

## 2022-05-22 RX ADMIN — DOCUSATE SODIUM 100 MG: 100 CAPSULE, LIQUID FILLED ORAL at 18:31

## 2022-05-22 RX ADMIN — ATORVASTATIN CALCIUM 20 MG: 10 TABLET, FILM COATED ORAL at 20:02

## 2022-05-22 RX ADMIN — SODIUM CHLORIDE, PRESERVATIVE FREE 10 ML: 5 INJECTION INTRAVENOUS at 19:59

## 2022-05-22 RX ADMIN — ASPIRIN 81 MG: 81 TABLET, COATED ORAL at 09:53

## 2022-05-22 RX ADMIN — POLYETHYLENE GLYCOL 3350 17 G: 17 POWDER, FOR SOLUTION ORAL at 20:02

## 2022-05-22 RX ADMIN — HYDRALAZINE HYDROCHLORIDE 50 MG: 50 TABLET, FILM COATED ORAL at 22:04

## 2022-05-22 RX ADMIN — HYDRALAZINE HYDROCHLORIDE 50 MG: 50 TABLET, FILM COATED ORAL at 13:10

## 2022-05-22 RX ADMIN — INSULIN LISPRO 1 UNITS: 100 INJECTION, SOLUTION INTRAVENOUS; SUBCUTANEOUS at 13:11

## 2022-05-22 RX ADMIN — CLOPIDOGREL BISULFATE 75 MG: 75 TABLET ORAL at 09:52

## 2022-05-22 RX ADMIN — POLYETHYLENE GLYCOL 3350 17 G: 17 POWDER, FOR SOLUTION ORAL at 09:52

## 2022-05-22 RX ADMIN — INSULIN LISPRO 1 UNITS: 100 INJECTION, SOLUTION INTRAVENOUS; SUBCUTANEOUS at 21:14

## 2022-05-22 RX ADMIN — CEFEPIME HYDROCHLORIDE 2000 MG: 2 INJECTION, POWDER, FOR SOLUTION INTRAVENOUS at 10:28

## 2022-05-22 RX ADMIN — ACETAMINOPHEN 650 MG: 325 TABLET ORAL at 22:05

## 2022-05-22 RX ADMIN — ENOXAPARIN SODIUM 40 MG: 100 INJECTION SUBCUTANEOUS at 09:52

## 2022-05-22 ASSESSMENT — PAIN SCALES - GENERAL: PAINLEVEL_OUTOF10: 5

## 2022-05-22 ASSESSMENT — PAIN DESCRIPTION - DESCRIPTORS: DESCRIPTORS: ACHING

## 2022-05-22 ASSESSMENT — PAIN - FUNCTIONAL ASSESSMENT: PAIN_FUNCTIONAL_ASSESSMENT: PREVENTS OR INTERFERES WITH MANY ACTIVE NOT PASSIVE ACTIVITIES

## 2022-05-22 ASSESSMENT — PAIN DESCRIPTION - ORIENTATION: ORIENTATION: LEFT;RIGHT

## 2022-05-22 ASSESSMENT — PAIN DESCRIPTION - LOCATION: LOCATION: KNEE

## 2022-05-22 NOTE — PROGRESS NOTES
[place], [place]   Patient:   Amada Mckeon    YOB: 1945  MRN:   [id number]  Location: [facility] 0158-24       5/22/22 1:15 PM  [address] [facility] or Facility: [facility] From: [facility], [facility] RE: Virgen Grijalva 1945 RM: [id number] stool hard, requesting stool softener.  Need Callback: NO CALLBACK REQ C4 PROGRESSIVE CARE  Unread

## 2022-05-22 NOTE — PROGRESS NOTES
Hospitalist Progress Note      PCP: Zaid Balderas, APRN - CNP    Date of Admission: 5/18/2022    Chief Complaint: osteomyelitis of right foot     Hospital Course: Олег Faulkner is a 68 y.o. female with a PMH of PVD with fem pop hx, Afib, CAD, DM2, HTN, and HLD who was a direct admit from [facility] to [facility] for osteomyelitis of the right foot. She presented on 5/13 to the [facility] ED after her PCP evaluated her for diabetic foot ulcers and imaging suggested osteomyelitis in her right foot. In [facility], she was started on IV vancomycin and cefepime. Podiatry was consulted and they recommended a vascular consult for a right hallus and 4th toe amputation. Therefore, she was transferred to our facility for a vascular consultation/workup. Vascular consulted, angiogram showed good distal flow in right lower extremity. Podiatric will proceed with amputation intervention on Monday. Subjective:   Patient was seen and examined at bedside this morning. Patient stated that her left forearm pain is not hurting anymore. She complained of left shoulder pain worse with movement. She noticed it when she started being more active. Rest makes the pain better. Patient denies any radiating symptoms. Patient denies any SOB, cough, chest pain, abdominal pain, fevers, or N/V/D.       Medications:  Reviewed    Infusion Medications    sodium chloride Stopped (05/22/22 0026)     Scheduled Medications    vancomycin  750 mg IntraVENous Once    hydrALAZINE  50 mg Oral 3 times per day    clopidogrel  75 mg Oral Daily    polyethylene glycol  17 g Oral Daily    amLODIPine  10 mg Oral Daily    aspirin  81 mg Oral Daily    digoxin  125 mcg Oral Daily    lisinopril  20 mg Oral Daily    atorvastatin  20 mg Oral Daily    cefepime  2,000 mg IntraVENous Q12H    insulin lispro  0-3 Units SubCUTAneous Nightly    insulin lispro  0-6 Units SubCUTAneous TID WC    [START ON 5/14/2023] vancomycin (VANCOCIN) intermittent dosing (placeholder)   Other RX Placeholder    sodium chloride flush  5-40 mL IntraVENous 2 times per day    enoxaparin  40 mg SubCUTAneous Daily     PRN Meds: sodium chloride flush, sodium chloride, ondansetron **OR** ondansetron, acetaminophen **OR** acetaminophen      Intake/Output Summary (Last 24 hours) at 5/22/2022 1426  Last data filed at 5/22/2022 1317  Gross per 24 hour   Intake 1828.68 ml   Output 2250 ml   Net -421.32 ml       Physical Exam Performed:    BP (!) 150/54   Pulse 70   Temp 98.3 °F (36.8 °C)   Resp 20   Ht 5' 3\" (1.6 m)   Wt 134 lb 14.4 oz (61.2 kg)   SpO2 98%   BMI 23.90 kg/m²     General appearance: No apparent distress, appears stated age and cooperative. HEENT: Pupils equal, round, and reactive to light. Conjunctivae/corneas clear. Neck: Supple, with full range of motion. No jugular venous distention. Trachea midline. Respiratory:  Normal respiratory effort. Clear to auscultation, bilaterally without Rales/Wheezes/Rhonchi. Cardiovascular: Regular rate and rhythm with normal S1/S2 without murmurs, rubs or gallops. Abdomen: Soft, non-tender, non-distended with normal bowel sounds. Musculoskeletal:   Left upper extremity examination:  Inspection: no Abraisons, no swelling,  no acute bleeding. Palpation: mild tenderness when palpating AC joint, nontender to touch on left upper extremity. Range of motion: passive and active range of motion intact. Crepitus present during active and passive exam.   Strengths: Biceps flexion 4 out of 5, triceps flexion 4 out of 5, wrist extension 4 out of 5, finger flexion 5 out of 5, Finger abduction 4 out of 5  Sensation intact. Neurovascular intact in left upper extremity. No clubbing, cyanosis or edema bilaterally. Full range of motion without deformity. Skin: right foot with great toe and fourth toe tip mildly necrotic ulcer. Erythema present. Tenderness. Neurologic:  Neurovascularly intact without any focal sensory/motor deficits.  Cranial nerves: II-XII intact, grossly non-focal.  Psychiatric: Alert and oriented, thought content appropriate, normal insight  Capillary Refill: Brisk,3 seconds, normal   Peripheral Pulses: +2 palpable, equal bilaterally       Labs:   Recent Labs     05/20/22  0520 05/21/22  1045 05/22/22  0427   WBC 8.2 9.5 9.6   HGB 8.8* 9.1* 8.7*   HCT 25.9* 27.0* 26.2*    191 177     Recent Labs     05/20/22  0520 05/21/22  1045 05/22/22  0427    132* 132*   K 4.6 4.7 4.5    100 100   CO2 20* 19* 20*   BUN 34* 23* 26*   CREATININE 1.0 1.1 1.1   CALCIUM 9.4 8.9 9.0       Active Hospital Problems    Diagnosis     PVD (peripheral vascular disease) ([facility] [I73.9]      Priority: Medium    Acute osteomyelitis of right foot ([facility] [M86.171]      Priority: Medium    Essential hypertension [I10]     Coronary artery disease involving native coronary artery of native heart without angina pectoris [I25.10]     Type 2 diabetes mellitus with diabetic polyneuropathy, without long-term current use of insulin (HCC) [E11.42]     Paroxysmal atrial fibrillation (HCC) [I48.0]      Assessment/Plan:    Michelle Cooley is a 68 y.o. female with a PMH of PVD with fem pop hx, Afib, CAD, DM2, HTN, and HLD who was a direct admit from [facility] to [facility] for osteomyelitis of the right foot. Acute osteomyelitis of right foot in patient with known PVD  -MRI right foot on 5/16/2022 revealed: osteomyelitis t/o the 1st distal phalanx. Adjacent deep soft tissue ulceration and cellulitis. Osteomyelitis of 4th middle and distal phalanges.  Adjacent deep soft tissue ulceration along the dorsal lateral aspect of the toe with adjacent cellulitis  -Continue abx regimen of cefepime and vancomycin    -Checking vanc levels   -neurovascular checks  -Vascular surgery consulted and recommendations below:   - Angiogram showed good distal flow of right lower extremity.    - Recommended to proceed with podiatry intervention  - Podiatry recommended amputation of the right hallux and fourth toe, most likely on Monday.    - NPO after midnight.    - Recommended weight bear as tolerated for now. - Will continue monitoring     CAD  -Continue ASA, lisinopril, amlodipine, atorvastatin, plavix    HTN   - not well controlled  - Patient had blood pressure running in 180s/70 yesterday  - Adjusted hydralazine to three times daily  - Improving today with blood pressure 150/54  - Consider adjusting blood pressure medications prior to discharge for optimal control.   -Continue amlodipine, lisinopril and hydralazine     HLD  -Continue home atorvastatin     DM2, controlled  -HA1C on 5/13/2022: 5.6%   -Holding home medicationis  -Low dose SSI     PAF  -Continue digoxin   -checking digoxin levels   -Holding home eliquis      Chronic normocytic anemia  -No s/s of bleeding at this time  - hemoglobin level 8.7. Left upper extremity pain  - most likely due to osteoarthritis  - Yesterday patient complained of left forearm pain, today she complained of left shoulder pain. - xray of radial and ulna showed no acute fractures. - US of left upper extremity ordered to rule out DVTs. - Will continue monitoring      DVT Prophylaxis: Lovenox  Diet: ADULT DIET;  Regular; 5 carb choices (75 gm/meal)  Diet NPO  Code Status: Full Code    PT/OT Eval Status: N/a     Dispo - Pending podiatry intervention     Dakot[facility] HarveyTaylor [facility]   5/22/2022

## 2022-05-22 NOTE — PROGRESS NOTES
Podiatry Progress Note  Subjective:   Patient seen at bedside this morning. No new pedal complaints. She denied n/v/f/c and SOB. Objective:   Vitals:  BP (!) 158/66   Pulse 66   Temp 98.1 °F (36.7 °C) (Oral)   Resp 19   Ht 5' 3\" (1.6 m)   Wt 134 lb 14.4 oz (61.2 kg)   SpO2 97%   BMI 23.90 kg/m²   Integument:  Dry, black, necrotic changes at the distal right hallux and lateral right fourth toe. No surrounding erythema. No acute signs of infection. Otherwise, skin is thin, dry and atrophic, bilateral.  Neurologic:  Protective sensation is grossly intact to light touch at the level of the toes, bilateral.  Vascular:  DP and PT pulses are palpable, bilateral.  CFT is less than five seconds at all toes. No significant edema appreciated. Musculoskeletal:  No gross musculoskeletal deformities noted.  Pain on palpation at the right hallux and right fourth toe.      Labs:   CBC with Differential:    Lab Results   Component Value Date    WBC 9.5 05/21/2022    RBC 3.36 05/21/2022    HGB 9.1 05/21/2022    HCT 27.0 05/21/2022     05/21/2022    MCV 80.3 05/21/2022    MCH 27.1 05/21/2022    MCHC 33.7 05/21/2022    RDW 15.3 05/21/2022    SEGSPCT 71.4 05/23/2013    LYMPHOPCT 12.0 05/21/2022    MONOPCT 7.9 05/21/2022    EOSPCT 2.0 04/26/2010    BASOPCT 0.9 05/21/2022    MONOSABS 0.8 05/21/2022    LYMPHSABS 1.1 05/21/2022    EOSABS 0.3 05/21/2022    BASOSABS 0.1 05/21/2022    DIFFTYPE Auto-K 05/23/2013     CMP:    Lab Results   Component Value Date     05/21/2022    K 4.7 05/21/2022     05/21/2022    CO2 19 05/21/2022    BUN 23 05/21/2022    CREATININE 1.1 05/21/2022    GFRAA 58 05/21/2022    GFRAA >60 05/23/2013    AGRATIO 1.1 05/21/2022    LABGLOM 48 05/21/2022    GLUCOSE 205 05/21/2022    PROT 6.6 05/21/2022    PROT 7.3 09/10/2012    LABALBU 3.5 05/21/2022    CALCIUM 8.9 05/21/2022    BILITOT 0.3 05/21/2022    ALKPHOS 66 05/21/2022    AST 15 05/21/2022    ALT 14 05/21/2022     PT/INR:    Lab Results   Component Value Date    PROTIME 13.5 12/17/2020    PROTIME 19.8 01/02/2010    INR 1.16 12/17/2020     Last 3 Troponin:  No results found for: TROPONINI  HgBA1c:    Lab Results   Component Value Date    LABA1C 5.6 05/13/2022     Imaging:   MRI, Right Foot  Impression/Recommendations:  5/16/2022:  1. Osteomyelitis throughout the 1st distal phalanx.  Adjacent deep soft   tissue ulceration and cellulitis.  No associated abscess. 2. Osteomyelitis of the 4th middle and distal phalanges.  Adjacent deep soft   tissue ulceration along the dorsal lateral aspect of the toe with adjacent   cellulitis.  No abscess. Assessment/Plan:   1) Osteomyelitis, right hallux and fourth toe  - Antibiotics per Primary Team (Cefepime & Vanc)  - Patient will benefit from amputation of the right hallux and fourth toe.   Will schedule for Monday  - Weight-bear as tolerated for now  - NPO at midnight  2) PVD  - Angio 5/20/22  3) KAYLA Ahmadi Hem, [address], [address], [address]  Office: [phone]  Cell: [phone]

## 2022-05-22 NOTE — PROGRESS NOTES
Pt resting quietly in bed, No c/o pain/discomfort. Denies any needs. Safety/fall prevention matained. Personal belongings and call light within reach.  MKRN

## 2022-05-22 NOTE — FLOWSHEET NOTE
05/21/22 2023   Assessment   Charting Type Shift assessment   Psychosocial   Psychosocial (WDL) WDL   Neurological   Neuro (WDL) WDL   Level of Consciousness Alert (0)   Swallow Screening   Is the patient unable to remain alert for testing? No   Is the patient on a modified diet (thickened liquids) due to pre-existing dysphagia? No   Is there presence of existing enteral tube feeding via the stomach or nose? No   Is there presence of head-of-bed restrictions (less than 30 degrees)? No   Is there presence of tracheotomy tube? No   Is the patient ordered nothing-by-mouth status? No   3 oz Water Swallow Screen Pass   [place] Coma Scale   Eye Opening 4   Best Verbal Response 5   Best Motor Response 6   [place] Coma Scale Score 15   HEENT (Head, Ears, Eyes, Nose, & Throat)   Right Eye Glasses; Impaired vision   Left Eye Glasses; Impaired vision   Teeth Edentulous   Respiratory   Respiratory (WDL) WDL   Respiratory Pattern Regular   Respiratory Depth Normal   Respiratory Quality/Effort Unlabored   Chest Assessment Chest expansion symmetrical   Breath Sounds   Right Upper Lobe Clear   Right Middle Lobe Clear   Right Lower Lobe Diminished   Left Upper Lobe Clear   Left Lower Lobe Diminished   Cardiac   Cardiac Regularity Irregular   Heart Sounds S1, S2   Cardiac Rhythm Atrial fib   Cardiac Monitor   Cardiac/Telemetry Monitor On Portable telemetry pack applied   Telemetry Box Number 72   Alarm Audible Yes   Gastrointestinal   Abdomen Inspection Rounded; Soft   RUQ Bowel Sounds Active   LUQ Bowel Sounds Active   RLQ Bowel Sounds Active   LLQ Bowel Sounds Active   Genitourinary   Genitourinary (WDL) WDL   Suprapubic Tenderness No   Dysuria (Pain/Burning w/Urination) No   Peripheral Vascular   Peripheral Vascular (WDL) WDL   RUE Neurovascular Assessment   Capillary Refill Less than/Equal to 3 seconds   Color Appropriate for Ethnicity   Temperature Warm   RUE Sensation  Full sensation   R Radial Pulse +2 (Moderate)   LUE Neurovascular Assessment   Capillary Refill Less than/Equal to 3 seconds   Color Appropriate for Ethnicity   Temperature Warm   LUE Sensation  Full sensation   L Radial Pulse +2 (Moderate)   RLE Neurovascular Assessment   Capillary Refill   (great toe nail thick, discolored)   Color Appropriate for Ethnicity   Temperature Warm   RLE Sensation  Full sensation   R Pedal Pulse Doppler   R Calf Tenderness  Negative   LLE Neurovascular Assessment   Capillary Refill Less than/Equal to 3 seconds   Color Appropriate for Ethnicity   Temperature Warm   L Pedal Pulse Doppler   L Calf Tenderness Negative   Skin Integumentary    Skin Color Ecchymosis (comment)   Skin Integrity Abrasion;Ecchymosis   Location scattered   Location of Wound Prevention Coccyx   Orientation of Wound Prevention Medial   Wound Offloading (Prevention Methods) Pillows;Repositioning   Dressing Present  Yes   Skin Condition/Temp Dry; Warm   Wound Prevention/Protection Method Yes   Skin Assessed Underneath Dressing This Shift Yes  (skin pink, inact)   Nail Condition Thick; Other (comment)  (discolored ( refer to POd. notes))   Nails X   Musculoskeletal   RL Extremity Unsteady   LL Extremity Unsteady   Wound 05/13/22 Toe (Comment  which one) Right great toe   Date First Assessed/Time First Assessed: 05/13/22 2200   Present on Hospital Admission: Yes  Primary Wound Type: Diabetic Ulcer  Location: (c) Toe (Comment  which one)  Wound Location Orientation: Right  Wound Description (Comments): great toe   Wound Etiology Diabetic   Dressing/Treatment Open to air   Wound Assessment Dry   Drainage Amount None   Yazmin-wound Assessment Dry/flaky; Intact   Wound 05/13/22 Toe (Comment  which one) Right digit 4   Date First Assessed/Time First Assessed: 05/13/22 2200   Present on Hospital Admission: Yes  Primary Wound Type: Diabetic Ulcer  Location: (c) Toe (Comment  which one)  Wound Location Orientation: Right  Wound Description (Comments): digit 4   Wound Etiology Diabetic Dressing/Treatment Open to air   Wound Assessment Dry   Drainage Amount None   Care Plan - Metabolic/Fluid and Electrolytes Goals   Electrolytes maintained within normal limits Monitor labs and assess patient for signs and symptoms of electrolyte imbalances; Administer electrolyte replacement as ordered;Monitor response to electrolyte replacements, including repeat lab results as appropriate   Hemodynamic stability and optimal renal function maintained Monitor labs and assess for signs and symptoms of volume excess or deficit;Monitor intake, output and patient weight   Glucose maintained within prescribed range Monitor blood glucose as ordered   Care Plan - Hematologic Goals   Maintains hematologic stability Assess for signs and symptoms of bleeding or hemorrhage   Care Plan - Chronic Conditions and Co-Morbidity   Care Plan - Patient's Chronic Conditions and Co-Morbidity Symptoms are Monitored and Maintained or Improved Monitor and assess patient's chronic conditions and comorbid symptoms for stability, deterioration, or improvement   Care Plan - Discharge Planning Goals   Discharge to home or other facility with appropriate resources Identify barriers to discharge with patient and caregiver

## 2022-05-22 NOTE — PROGRESS NOTES
Doppler pulses pedal and post tib on left foot and only post tib on right. No changes to previous assessment, pt to have amputation of first and fourth toes tomorrow.

## 2022-05-22 NOTE — PLAN OF CARE
Problem: Pain  Goal: Verbalizes/displays adequate comfort level or baseline comfort level  Outcome: Progressing     Problem: Safety - Adult  Goal: Free from fall injury  Outcome: Progressing     Problem: Chronic Conditions and Co-morbidities  Goal: Patient's chronic conditions and co-morbidity symptoms are monitored and maintained or improved  Outcome: Progressing     Problem: Discharge Planning  Goal: Discharge to home or other facility with appropriate resources  Outcome: Progressing     Problem: ABCDS Injury Assessment  Goal: Absence of physical injury  Outcome: Progressing   Reviewed poc 30 mins

## 2022-05-22 NOTE — FLOWSHEET NOTE
05/22/22 1954   Assessment   Charting Type Shift assessment   Psychosocial   Psychosocial (WDL) WDL   Neurological   Neuro (WDL) WDL   Level of Consciousness Alert (0)   Swallow Screening   Is the patient unable to remain alert for testing? No   Is the patient on a modified diet (thickened liquids) due to pre-existing dysphagia? No   Is there presence of existing enteral tube feeding via the stomach or nose? No   Is there presence of head-of-bed restrictions (less than 30 degrees)? No   Is there presence of tracheotomy tube? No   Is the patient ordered nothing-by-mouth status? No   3 oz Water Swallow Screen Pass   [place] Coma Scale   Eye Opening 4   Best Verbal Response 5   Best Motor Response 6   [place] Coma Scale Score 15   HEENT (Head, Ears, Eyes, Nose, & Throat)   Right Eye Glasses; Impaired vision   Left Eye Glasses; Impaired vision   Teeth Edentulous   Respiratory   Respiratory (WDL) WDL   Respiratory Pattern Regular   Respiratory Depth Normal   Respiratory Quality/Effort Unlabored   Chest Assessment Chest expansion symmetrical   Breath Sounds   Right Upper Lobe Clear   Right Middle Lobe Clear   Right Lower Lobe Diminished   Left Upper Lobe Clear   Left Lower Lobe Diminished   Cardiac   Cardiac Regularity Irregular   Heart Sounds S1, S2   Cardiac Rhythm Atrial fib  (on tele monitor)   Cardiac Monitor   Telemetry Box Number 72   Alarm Audible Yes   Gastrointestinal   Abdomen Inspection Rounded; Soft   RUQ Bowel Sounds Active   LUQ Bowel Sounds Active   RLQ Bowel Sounds Active   LLQ Bowel Sounds Active   GI Symptoms Constipation   Tenderness Soft; No guarding   Genitourinary   Genitourinary (WDL) WDL   Suprapubic Tenderness No   Dysuria (Pain/Burning w/Urination) No   Peripheral Vascular   Peripheral Vascular (WDL) WDL   RUE Neurovascular Assessment   Capillary Refill Less than/Equal to 3 seconds   Color Appropriate for Ethnicity   Temperature Warm   RUE Sensation  Full sensation   R Radial Pulse +2 (Moderate) LUE Neurovascular Assessment   Capillary Refill Less than/Equal to 3 seconds   Color Appropriate for Ethnicity   Temperature Warm   LUE Sensation  Full sensation   L Radial Pulse +2 (Moderate)   RLE Neurovascular Assessment   Color Appropriate for Ethnicity   Temperature Warm   RLE Sensation  Full sensation   R Pedal Pulse Doppler   R Calf Tenderness  Negative   LLE Neurovascular Assessment   Capillary Refill Less than/Equal to 3 seconds   Color Appropriate for Ethnicity   Temperature Warm   L Pedal Pulse Doppler   L Calf Tenderness Negative   Skin Integumentary    Skin Color Ecchymosis (comment)   Skin Integrity Abrasion;Ecchymosis   Location scattered   Location of Wound Prevention Coccyx  (skin pink, blanchable, intact)   Orientation of Wound Prevention Medial   Wound Offloading (Prevention Methods) Repositioning   Dressing Present  Yes   Skin Condition/Temp Dry; Warm   Wound Prevention/Protection Method Yes   Nail Condition Discolored; Thick  (Rt big toe)   Musculoskeletal   RL Extremity Unsteady   LL Extremity Unsteady   Wound 05/13/22 Toe (Comment  which one) Right great toe   Date First Assessed/Time First Assessed: 05/13/22 2200   Present on Hospital Admission: Yes  Primary Wound Type: Diabetic Ulcer  Location: (c) Toe (Comment  which one)  Wound Location Orientation: Right  Wound Description (Comments): great toe   Wound Etiology Diabetic   Dressing/Treatment Open to air   Wound Assessment Dry   Drainage Amount None   Yazmin-wound Assessment Dry/flaky; Intact   Wound 05/13/22 Toe (Comment  which one) Right digit 4   Date First Assessed/Time First Assessed: 05/13/22 2200   Present on Hospital Admission: Yes  Primary Wound Type: Diabetic Ulcer  Location: (c) Toe (Comment  which one)  Wound Location Orientation: Right  Wound Description (Comments): digit 4   Wound Etiology Diabetic   Dressing/Treatment Open to air   Wound Assessment Dry   Drainage Amount None   Care Plan - Metabolic/Fluid and Electrolytes Goals Electrolytes maintained within normal limits Monitor labs and assess patient for signs and symptoms of electrolyte imbalances   Hemodynamic stability and optimal renal function maintained Monitor labs and assess for signs and symptoms of volume excess or deficit;Monitor intake, output and patient weight   Glucose maintained within prescribed range Monitor blood glucose as ordered   Care Plan - Hematologic Goals   Maintains hematologic stability Assess for signs and symptoms of bleeding or hemorrhage   Care Plan - Chronic Conditions and Co-Morbidity   Care Plan - Patient's Chronic Conditions and Co-Morbidity Symptoms are Monitored and Maintained or Improved Monitor and assess patient's chronic conditions and comorbid symptoms for stability, deterioration, or improvement   Care Plan - Discharge Planning Goals   Discharge to home or other facility with appropriate resources Identify barriers to discharge with patient and caregiver

## 2022-05-23 ENCOUNTER — APPOINTMENT (OUTPATIENT)
Dept: VASCULAR LAB | Age: 77
DRG: 253 | End: 2022-05-23
Attending: HOSPITALIST
Payer: MEDICARE

## 2022-05-23 ENCOUNTER — ANESTHESIA (OUTPATIENT)
Dept: OPERATING ROOM | Age: 77
DRG: 253 | End: 2022-05-23
Payer: MEDICARE

## 2022-05-23 ENCOUNTER — ANESTHESIA EVENT (OUTPATIENT)
Dept: OPERATING ROOM | Age: 77
DRG: 253 | End: 2022-05-23
Payer: MEDICARE

## 2022-05-23 LAB
ANION GAP SERPL CALCULATED.3IONS-SCNC: 11 MMOL/L (ref 3–16)
BASOPHILS ABSOLUTE: 0.1 K/UL (ref 0–0.2)
BASOPHILS RELATIVE PERCENT: 0.8 %
BUN BLDV-MCNC: 28 MG/DL (ref 7–20)
CALCIUM SERPL-MCNC: 8.8 MG/DL (ref 8.3–10.6)
CHLORIDE BLD-SCNC: 100 MMOL/L (ref 99–110)
CO2: 20 MMOL/L (ref 21–32)
CREAT SERPL-MCNC: 0.9 MG/DL (ref 0.6–1.2)
EOSINOPHILS ABSOLUTE: 0.2 K/UL (ref 0–0.6)
EOSINOPHILS RELATIVE PERCENT: 1.3 %
GFR AFRICAN AMERICAN: >60
GFR NON-AFRICAN AMERICAN: >60
GLUCOSE BLD-MCNC: 131 MG/DL (ref 70–99)
GLUCOSE BLD-MCNC: 134 MG/DL (ref 70–99)
GLUCOSE BLD-MCNC: 135 MG/DL (ref 70–99)
GLUCOSE BLD-MCNC: 137 MG/DL (ref 70–99)
GLUCOSE BLD-MCNC: 162 MG/DL (ref 70–99)
GLUCOSE BLD-MCNC: 175 MG/DL (ref 70–99)
HCT VFR BLD CALC: 27.5 % (ref 36–48)
HEMOGLOBIN: 9.1 G/DL (ref 12–16)
LYMPHOCYTES ABSOLUTE: 1 K/UL (ref 1–5.1)
LYMPHOCYTES RELATIVE PERCENT: 8.8 %
MCH RBC QN AUTO: 26.4 PG (ref 26–34)
MCHC RBC AUTO-ENTMCNC: 33.2 G/DL (ref 31–36)
MCV RBC AUTO: 79.6 FL (ref 80–100)
MONOCYTES ABSOLUTE: 0.8 K/UL (ref 0–1.3)
MONOCYTES RELATIVE PERCENT: 6.7 %
NEUTROPHILS ABSOLUTE: 9.5 K/UL (ref 1.7–7.7)
NEUTROPHILS RELATIVE PERCENT: 82.4 %
PDW BLD-RTO: 15 % (ref 12.4–15.4)
PERFORMED ON: ABNORMAL
PLATELET # BLD: 204 K/UL (ref 135–450)
PMV BLD AUTO: 7.6 FL (ref 5–10.5)
POTASSIUM REFLEX MAGNESIUM: 4.4 MMOL/L (ref 3.5–5.1)
RBC # BLD: 3.46 M/UL (ref 4–5.2)
SODIUM BLD-SCNC: 131 MMOL/L (ref 136–145)
VANCOMYCIN RANDOM: 16 UG/ML
WBC # BLD: 11.5 K/UL (ref 4–11)

## 2022-05-23 PROCEDURE — 0Y6P0Z0 DETACHMENT AT RIGHT 1ST TOE, COMPLETE, OPEN APPROACH: ICD-10-PCS | Performed by: PODIATRIST

## 2022-05-23 PROCEDURE — 6360000002 HC RX W HCPCS: Performed by: INTERNAL MEDICINE

## 2022-05-23 PROCEDURE — 3700000001 HC ADD 15 MINUTES (ANESTHESIA): Performed by: PODIATRIST

## 2022-05-23 PROCEDURE — 2500000003 HC RX 250 WO HCPCS: Performed by: PODIATRIST

## 2022-05-23 PROCEDURE — 1200000000 HC SEMI PRIVATE

## 2022-05-23 PROCEDURE — 2580000003 HC RX 258: Performed by: PODIATRIST

## 2022-05-23 PROCEDURE — 6360000002 HC RX W HCPCS: Performed by: NURSE ANESTHETIST, CERTIFIED REGISTERED

## 2022-05-23 PROCEDURE — 2709999900 HC NON-CHARGEABLE SUPPLY: Performed by: PODIATRIST

## 2022-05-23 PROCEDURE — 6360000002 HC RX W HCPCS: Performed by: ANESTHESIOLOGY

## 2022-05-23 PROCEDURE — 80202 ASSAY OF VANCOMYCIN: CPT

## 2022-05-23 PROCEDURE — 3600000012 HC SURGERY LEVEL 2 ADDTL 15MIN: Performed by: PODIATRIST

## 2022-05-23 PROCEDURE — 36415 COLL VENOUS BLD VENIPUNCTURE: CPT

## 2022-05-23 PROCEDURE — 6370000000 HC RX 637 (ALT 250 FOR IP): Performed by: SURGERY

## 2022-05-23 PROCEDURE — 6370000000 HC RX 637 (ALT 250 FOR IP): Performed by: INTERNAL MEDICINE

## 2022-05-23 PROCEDURE — 88305 TISSUE EXAM BY PATHOLOGIST: CPT

## 2022-05-23 PROCEDURE — 0Y6V0Z0 DETACHMENT AT RIGHT 4TH TOE, COMPLETE, OPEN APPROACH: ICD-10-PCS | Performed by: PODIATRIST

## 2022-05-23 PROCEDURE — 7100000000 HC PACU RECOVERY - FIRST 15 MIN: Performed by: PODIATRIST

## 2022-05-23 PROCEDURE — 6360000002 HC RX W HCPCS: Performed by: PODIATRIST

## 2022-05-23 PROCEDURE — 2580000003 HC RX 258: Performed by: INTERNAL MEDICINE

## 2022-05-23 PROCEDURE — 93971 EXTREMITY STUDY: CPT

## 2022-05-23 PROCEDURE — 3600000002 HC SURGERY LEVEL 2 BASE: Performed by: PODIATRIST

## 2022-05-23 PROCEDURE — 2580000003 HC RX 258: Performed by: NURSE ANESTHETIST, CERTIFIED REGISTERED

## 2022-05-23 PROCEDURE — 3700000000 HC ANESTHESIA ATTENDED CARE: Performed by: PODIATRIST

## 2022-05-23 PROCEDURE — 6370000000 HC RX 637 (ALT 250 FOR IP): Performed by: STUDENT IN AN ORGANIZED HEALTH CARE EDUCATION/TRAINING PROGRAM

## 2022-05-23 PROCEDURE — 7100000001 HC PACU RECOVERY - ADDTL 15 MIN: Performed by: PODIATRIST

## 2022-05-23 PROCEDURE — 80048 BASIC METABOLIC PNL TOTAL CA: CPT

## 2022-05-23 PROCEDURE — 85025 COMPLETE CBC W/AUTO DIFF WBC: CPT

## 2022-05-23 RX ORDER — LABETALOL HYDROCHLORIDE 5 MG/ML
10 INJECTION, SOLUTION INTRAVENOUS
Status: DISCONTINUED | OUTPATIENT
Start: 2022-05-23 | End: 2022-05-23 | Stop reason: HOSPADM

## 2022-05-23 RX ORDER — ACETAMINOPHEN 500 MG
1000 TABLET ORAL 3 TIMES DAILY
Status: COMPLETED | OUTPATIENT
Start: 2022-05-23 | End: 2022-05-24

## 2022-05-23 RX ORDER — HYDRALAZINE HYDROCHLORIDE 20 MG/ML
5 INJECTION INTRAMUSCULAR; INTRAVENOUS ONCE
Status: COMPLETED | OUTPATIENT
Start: 2022-05-23 | End: 2022-05-23

## 2022-05-23 RX ORDER — DIPHENHYDRAMINE HYDROCHLORIDE 50 MG/ML
12.5 INJECTION INTRAMUSCULAR; INTRAVENOUS
Status: DISCONTINUED | OUTPATIENT
Start: 2022-05-23 | End: 2022-05-23 | Stop reason: HOSPADM

## 2022-05-23 RX ORDER — DEXTROSE MONOHYDRATE 50 MG/ML
100 INJECTION, SOLUTION INTRAVENOUS PRN
Status: DISCONTINUED | OUTPATIENT
Start: 2022-05-23 | End: 2022-05-25 | Stop reason: HOSPADM

## 2022-05-23 RX ORDER — LIDOCAINE HYDROCHLORIDE 20 MG/ML
INJECTION, SOLUTION EPIDURAL; INFILTRATION; INTRACAUDAL; PERINEURAL PRN
Status: DISCONTINUED | OUTPATIENT
Start: 2022-05-23 | End: 2022-05-23 | Stop reason: ALTCHOICE

## 2022-05-23 RX ORDER — HYDROXYZINE HYDROCHLORIDE 10 MG/1
25 TABLET, FILM COATED ORAL EVERY 6 HOURS PRN
Status: DISCONTINUED | OUTPATIENT
Start: 2022-05-23 | End: 2022-05-25 | Stop reason: HOSPADM

## 2022-05-23 RX ORDER — OXYCODONE HYDROCHLORIDE 5 MG/1
2.5 TABLET ORAL EVERY 4 HOURS PRN
Status: DISCONTINUED | OUTPATIENT
Start: 2022-05-23 | End: 2022-05-25 | Stop reason: HOSPADM

## 2022-05-23 RX ORDER — SODIUM CHLORIDE 0.9 % (FLUSH) 0.9 %
5-40 SYRINGE (ML) INJECTION PRN
Status: DISCONTINUED | OUTPATIENT
Start: 2022-05-23 | End: 2022-05-23 | Stop reason: HOSPADM

## 2022-05-23 RX ORDER — PROPOFOL 10 MG/ML
INJECTION, EMULSION INTRAVENOUS PRN
Status: DISCONTINUED | OUTPATIENT
Start: 2022-05-23 | End: 2022-05-23 | Stop reason: SDUPTHER

## 2022-05-23 RX ORDER — SODIUM CHLORIDE, SODIUM LACTATE, POTASSIUM CHLORIDE, CALCIUM CHLORIDE 600; 310; 30; 20 MG/100ML; MG/100ML; MG/100ML; MG/100ML
INJECTION, SOLUTION INTRAVENOUS CONTINUOUS PRN
Status: DISCONTINUED | OUTPATIENT
Start: 2022-05-23 | End: 2022-05-23 | Stop reason: SDUPTHER

## 2022-05-23 RX ORDER — SODIUM CHLORIDE 9 MG/ML
25 INJECTION, SOLUTION INTRAVENOUS PRN
Status: DISCONTINUED | OUTPATIENT
Start: 2022-05-23 | End: 2022-05-23 | Stop reason: HOSPADM

## 2022-05-23 RX ORDER — OXYCODONE HYDROCHLORIDE 5 MG/1
5 TABLET ORAL PRN
Status: DISCONTINUED | OUTPATIENT
Start: 2022-05-23 | End: 2022-05-23 | Stop reason: HOSPADM

## 2022-05-23 RX ORDER — OXYCODONE HYDROCHLORIDE 5 MG/1
5 TABLET ORAL EVERY 4 HOURS PRN
Status: DISCONTINUED | OUTPATIENT
Start: 2022-05-23 | End: 2022-05-25 | Stop reason: HOSPADM

## 2022-05-23 RX ORDER — ONDANSETRON 2 MG/ML
4 INJECTION INTRAMUSCULAR; INTRAVENOUS
Status: DISCONTINUED | OUTPATIENT
Start: 2022-05-23 | End: 2022-05-23 | Stop reason: HOSPADM

## 2022-05-23 RX ORDER — OXYCODONE HYDROCHLORIDE 5 MG/1
10 TABLET ORAL PRN
Status: DISCONTINUED | OUTPATIENT
Start: 2022-05-23 | End: 2022-05-23 | Stop reason: HOSPADM

## 2022-05-23 RX ORDER — SODIUM CHLORIDE 0.9 % (FLUSH) 0.9 %
5-40 SYRINGE (ML) INJECTION EVERY 12 HOURS SCHEDULED
Status: DISCONTINUED | OUTPATIENT
Start: 2022-05-23 | End: 2022-05-23 | Stop reason: HOSPADM

## 2022-05-23 RX ORDER — BUPIVACAINE HYDROCHLORIDE 5 MG/ML
INJECTION, SOLUTION EPIDURAL; INTRACAUDAL PRN
Status: DISCONTINUED | OUTPATIENT
Start: 2022-05-23 | End: 2022-05-23 | Stop reason: ALTCHOICE

## 2022-05-23 RX ADMIN — HYDRALAZINE HYDROCHLORIDE 50 MG: 50 TABLET, FILM COATED ORAL at 05:36

## 2022-05-23 RX ADMIN — AMLODIPINE BESYLATE 10 MG: 5 TABLET ORAL at 08:52

## 2022-05-23 RX ADMIN — VANCOMYCIN HYDROCHLORIDE 750 MG: 750 INJECTION, POWDER, LYOPHILIZED, FOR SOLUTION INTRAVENOUS at 13:20

## 2022-05-23 RX ADMIN — DIGOXIN 125 MCG: 125 TABLET ORAL at 08:52

## 2022-05-23 RX ADMIN — LISINOPRIL 20 MG: 20 TABLET ORAL at 09:19

## 2022-05-23 RX ADMIN — CEFEPIME HYDROCHLORIDE 2000 MG: 2 INJECTION, POWDER, FOR SOLUTION INTRAVENOUS at 22:18

## 2022-05-23 RX ADMIN — CEFEPIME HYDROCHLORIDE 2000 MG: 2 INJECTION, POWDER, FOR SOLUTION INTRAVENOUS at 08:59

## 2022-05-23 RX ADMIN — HYDRALAZINE HYDROCHLORIDE 5 MG: 20 INJECTION INTRAMUSCULAR; INTRAVENOUS at 20:16

## 2022-05-23 RX ADMIN — SODIUM CHLORIDE 25 ML: 9 INJECTION, SOLUTION INTRAVENOUS at 22:13

## 2022-05-23 RX ADMIN — SODIUM CHLORIDE, PRESERVATIVE FREE 10 ML: 5 INJECTION INTRAVENOUS at 09:07

## 2022-05-23 RX ADMIN — ATORVASTATIN CALCIUM 20 MG: 10 TABLET, FILM COATED ORAL at 21:51

## 2022-05-23 RX ADMIN — HYDRALAZINE HYDROCHLORIDE 50 MG: 50 TABLET, FILM COATED ORAL at 21:51

## 2022-05-23 RX ADMIN — HYDROXYZINE HYDROCHLORIDE 25 MG: 10 TABLET ORAL at 09:20

## 2022-05-23 RX ADMIN — ACETAMINOPHEN 1000 MG: 500 TABLET ORAL at 21:49

## 2022-05-23 RX ADMIN — INSULIN LISPRO 1 UNITS: 100 INJECTION, SOLUTION INTRAVENOUS; SUBCUTANEOUS at 09:00

## 2022-05-23 RX ADMIN — DOCUSATE SODIUM 100 MG: 100 CAPSULE, LIQUID FILLED ORAL at 08:52

## 2022-05-23 RX ADMIN — HYDRALAZINE HYDROCHLORIDE 50 MG: 50 TABLET, FILM COATED ORAL at 13:19

## 2022-05-23 RX ADMIN — ENOXAPARIN SODIUM 40 MG: 100 INJECTION SUBCUTANEOUS at 08:52

## 2022-05-23 RX ADMIN — POLYETHYLENE GLYCOL 3350 17 G: 17 POWDER, FOR SOLUTION ORAL at 08:52

## 2022-05-23 RX ADMIN — ACETAMINOPHEN 1000 MG: 500 TABLET ORAL at 14:35

## 2022-05-23 RX ADMIN — POLYETHYLENE GLYCOL 3350 17 G: 17 POWDER, FOR SOLUTION ORAL at 22:21

## 2022-05-23 RX ADMIN — PROPOFOL 100 MG: 10 INJECTION, EMULSION INTRAVENOUS at 18:56

## 2022-05-23 RX ADMIN — SODIUM CHLORIDE, PRESERVATIVE FREE 10 ML: 5 INJECTION INTRAVENOUS at 22:08

## 2022-05-23 RX ADMIN — CLOPIDOGREL BISULFATE 75 MG: 75 TABLET ORAL at 08:52

## 2022-05-23 RX ADMIN — OXYCODONE HYDROCHLORIDE 2.5 MG: 5 TABLET ORAL at 09:19

## 2022-05-23 RX ADMIN — ASPIRIN 81 MG: 81 TABLET, COATED ORAL at 08:52

## 2022-05-23 RX ADMIN — ACETAMINOPHEN 650 MG: 325 TABLET ORAL at 08:51

## 2022-05-23 RX ADMIN — SODIUM CHLORIDE, SODIUM LACTATE, POTASSIUM CHLORIDE, AND CALCIUM CHLORIDE: .6; .31; .03; .02 INJECTION, SOLUTION INTRAVENOUS at 18:56

## 2022-05-23 ASSESSMENT — PAIN DESCRIPTION - PAIN TYPE: TYPE: ACUTE PAIN;NEUROPATHIC PAIN

## 2022-05-23 ASSESSMENT — PAIN DESCRIPTION - DESCRIPTORS
DESCRIPTORS: ACHING;DISCOMFORT

## 2022-05-23 ASSESSMENT — PAIN DESCRIPTION - FREQUENCY
FREQUENCY: CONTINUOUS

## 2022-05-23 ASSESSMENT — PAIN DESCRIPTION - ORIENTATION
ORIENTATION: LEFT;RIGHT
ORIENTATION: OTHER (COMMENT)
ORIENTATION: LEFT;OTHER (COMMENT)
ORIENTATION: RIGHT;LEFT

## 2022-05-23 ASSESSMENT — PAIN DESCRIPTION - ONSET
ONSET: ON-GOING

## 2022-05-23 ASSESSMENT — PAIN DESCRIPTION - LOCATION
LOCATION: GENERALIZED
LOCATION: FOOT;ANKLE;KNEE
LOCATION: FOOT;ANKLE;KNEE
LOCATION: KNEE

## 2022-05-23 ASSESSMENT — PAIN SCALES - GENERAL
PAINLEVEL_OUTOF10: 2
PAINLEVEL_OUTOF10: 5
PAINLEVEL_OUTOF10: 10

## 2022-05-23 ASSESSMENT — PAIN - FUNCTIONAL ASSESSMENT
PAIN_FUNCTIONAL_ASSESSMENT: PREVENTS OR INTERFERES WITH ALL ACTIVE AND SOME PASSIVE ACTIVITIES
PAIN_FUNCTIONAL_ASSESSMENT: PREVENTS OR INTERFERES WITH MANY ACTIVE NOT PASSIVE ACTIVITIES

## 2022-05-23 NOTE — CARE COORDINATION
Patient is from home environment but scheduled for surgery at 6:30 pm this evening. States she wants to go home after surgery. RN reports to writer that patient admits to being a hoarder and does not want anyone in her home; likely to decline services after surgery but following for needs. Has been to [facility] before and patient does not plan for SNF at this time but writer did send a  referral due to reported living situation and likely inability to care for needs post op; repeated attempts to reach family today unsuccessful:     Multiple calls to spouse at [phone]; call to daughter in law Jennifer Gallegos at [phone]; calls to son Toy Medrano at [phone] without reaching any of above to discuss and confirm a plan. Await recs by PT/OT after surgery today; patient likely to need skilled level of care and unsure if patient's home environment safe for patient at [place]. Writer recommends if patient returns home at [facility] against medical advice to call [facility]PS and notify of concerns for safety at home.       Maryse Nevarez MSW LSW

## 2022-05-23 NOTE — ANESTHESIA PRE PROCEDURE
Department of Anesthesiology  Preprocedure Note       Name:  Atif Wilson   Age:  68 y.o.  :  1945                                          MRN:  [id number]         Date:  2022      Surgeon: Francisco Bentley):  Valeriy Santos DPM    Procedure: Procedure(s):  AMPUTATION OF RIGHT FIRST AND FOURTH TOES    Medications prior to admission:   Prior to Admission medications    Medication Sig Start Date End Date Taking? Authorizing Provider   aspirin 81 MG EC tablet Take 81 mg by mouth daily    Historical Provider, MD   Ibuprofen (ADVIL PO) Take by mouth    Historical Provider, MD   vitamin D3 (CHOLECALCIFEROL) 25 MCG (1000 UT) TABS tablet Take 1 tablet by mouth daily 3/25/22   MARVIN Arias CNP   metFORMIN (GLUCOPHAGE) 500 MG tablet TAKE 2 TABLETS BY MOUTH EVERY DAY WITH BREAKFAST 3/3/22   MARVIN Arias CNP   digoxin (LANOXIN) 125 MCG tablet Take 1 tablet daily.  3/3/22   MARVIN Arias CNP   amLODIPine (NORVASC) 10 MG tablet TAKE 1 TABLET BY MOUTH EVERY DAY 3/3/22   MARVIN Arias CNP   lisinopril (PRINIVIL;ZESTRIL) 20 MG tablet TAKE 1 TABLET BY MOUTH EVERY DAY 3/3/22   MARVIN Arias CNP   simvastatin (ZOCOR) 20 MG tablet TAKE 1 TABLET BY MOUTH EVERY DAY AT NIGHT 3/3/22   MARVIN Arias CNP       Current medications:    Current Facility-Administered Medications   Medication Dose Route Frequency Provider Last Rate Last Admin    oxyCODONE (ROXICODONE) immediate release tablet 2.5 mg  2.5 mg Oral Q4H PRN Yamila Burger MD   2.5 mg at 22 9591    Or    oxyCODONE (ROXICODONE) immediate release tablet 5 mg  5 mg Oral Q4H PRN Champ Valdes MD        hydrOXYzine (ATARAX) tablet 25 mg  25 mg Oral Q6H PRN Yamila Burger MD   25 mg at 22 0920    diclofenac sodium (VOLTAREN) 1 % gel 2 g  2 g Topical BID PRN Champ Valdes MD        acetaminophen (TYLENOL) tablet 1,000 mg  1,000 mg Oral TID Yamila MCKEON MD Jennyfer   1,000 mg at 05/23/22 1435    glucose chewable tablet 16 g  4 tablet Oral PRN Yamila Burger MD        dextrose bolus 10% 125 mL  125 mL IntraVENous PRN Yamila Burger MD        Or    dextrose bolus 10% 250 mL  250 mL IntraVENous PRN Yamila Burger MD        glucagon (rDNA) injection 1 mg  1 mg IntraMUSCular PRN Yamila Burger MD        dextrose 5 % solution  100 mL/hr IntraVENous PRN Yamila Burger MD        polyethylene glycol (GLYCOLAX) packet 17 g  17 g Oral BID Kathryn Patel MD   17 g at 05/23/22 5076    docusate sodium (COLACE) capsule 100 mg  100 mg Oral Daily Kathryn Patel MD   100 mg at 05/23/22 8883    hydrALAZINE (APRESOLINE) tablet 50 mg  50 mg Oral 3 times per day Kathryn Patel MD   50 mg at 05/23/22 1319    clopidogrel (PLAVIX) tablet 75 mg  75 mg Oral Daily Barbara Srinivasan MD   75 mg at 05/23/22 0852    amLODIPine (NORVASC) tablet 10 mg  10 mg Oral Daily Jagjit Cloud MD   10 mg at 05/23/22 4720    aspirin EC tablet 81 mg  81 mg Oral Daily Jagjit Cloud MD   81 mg at 05/23/22 0852    digoxin (LANOXIN) tablet 125 mcg  125 mcg Oral Daily Jagjit Cloud MD   125 mcg at 05/23/22 0852    lisinopril (PRINIVIL;ZESTRIL) tablet 20 mg  20 mg Oral Daily Jagjit Cloud MD   20 mg at 05/23/22 0919    atorvastatin (LIPITOR) tablet 20 mg  20 mg Oral Daily Jagjit Cloud MD   20 mg at 05/22/22 2002    cefepime (MAXIPIME) 2000 mg IVPB minibag  2,000 mg IntraVENous Q12H Jagjit Cloud MD   Stopped at 05/23/22 1302    [Held by provider] insulin lispro (HUMALOG) injection vial 0-3 Units  0-3 Units SubCUTAneous Nightly Jagjit Cloud MD   1 Units at 05/22/22 2114    [Held by provider] insulin lispro (HUMALOG) injection vial 0-6 Units  0-6 Units SubCUTAneous TID WC Jagjit Cloud MD   1 Units at 05/23/22 0900    [START ON 5/14/2023] vancomycin (VANCOCIN) intermittent dosing (placeholder) Other Kim Molina MD        sodium chloride flush 0.9 % injection 5-40 mL  5-40 mL IntraVENous 2 times per day Matias Roy MD   10 mL at 05/23/22 0907    sodium chloride flush 0.9 % injection 5-40 mL  5-40 mL IntraVENous PRN Matias Roy MD        0.9 % sodium chloride infusion   IntraVENous PRN Matias Roy MD   Stopped at 05/22/22 2002    enoxaparin (LOVENOX) injection 40 mg  40 mg SubCUTAneous Daily Matias Roy MD   40 mg at 05/23/22 0852    ondansetron (ZOFRAN-ODT) disintegrating tablet 4 mg  4 mg Oral Q8H PRN Matias Roy MD        Or    ondansetron (ZOFRAN) injection 4 mg  4 mg IntraVENous Q6H PRN Matias Roy MD           Allergies:     Allergies   Allergen Reactions    Pcn [Penicillins] Other (See Comments)     Unsure of reaction       Problem List:    Patient Active Problem List   Diagnosis Code    Osteoarthritis M19.90    Paroxysmal atrial fibrillation ([facility] I48.0    Noncompliance of patient with dietary regimen Z91.11    PAD (peripheral artery disease) ([facility] I73.9    Pure hypercholesterolemia E78.00    Coronary artery disease involving native coronary artery of native heart without angina pectoris I25.10    Essential hypertension I10    Type 2 diabetes mellitus with diabetic polyneuropathy, without long-term current use of insulin ([facility]) E11.42    Vitamin D deficiency E55.9    Acute osteomyelitis of left foot ([address] M86.172    Hammertoe, bilateral M20.41, M20.42    Colonoscopy refused Z53.20    Chronic deep vein thrombosis (DVT) of left peroneal vein ([facility]) I82.552    Acute osteomyelitis of right foot ([address] M86.171    Osteomyelitis of right foot ([address] M86.9    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, with bone involvement without evidence of necrosis ([address] S25.706, L97.516    Primary hypertension I10    Cellulitis of right lower extremity L03.115    PVD (peripheral vascular disease) ([address] I73.9 Past Medical History:        Diagnosis Date    Atrial fibrillation ([facility]     off coumadin after bleed, declined restart    Blood transfusion     CAD (coronary artery disease)     Diabetes mellitus type II     Diabetic neuropathy ([facility] 2011    Gastric ulcer     H. pylori infection 2009    Hyperlipidemia     Hypertension     Numbness and tingling of left leg     Osteoarthritis     knees    Poor historian     PVD (peripheral vascular disease) ([facility]     PTA distal sfa/pop/peroneal, Dr. Maximino Owens 12/2015    Unspecified cerebral artery occlusion with cerebral infarction     TIA affected left eye    upper gi bleed 12/2009       Past Surgical History:        Procedure Laterality Date    ANGIOPLASTY  12/30/15    Dr. Maximino Owens, LLE, PTA of distal sfa/pop/peroneal    CARDIAC CATHETERIZATION  9/21/12    done for surgical clearance, cath was negative    CORONARY ANGIOPLASTY WITH STENT PLACEMENT  [id number]    FOOT SURGERY Left 01/11/2018    DEBRIDEMENT OF ULCERS LEFT FOOT AND LEG WITH GRAFT    OTHER SURGICAL HISTORY Left 06/05/2017    Left Femoral Peroneal Bypass    SHOULDER ARTHROPLASTY  10/5/12    RIGHT SHOULDER TOTAL ARTHROPLASTY, BICEPS TENODESIS DEPUY, GLOBAL ADVANTAGE AP       Social History:    Social History     Tobacco Use    Smoking status: Never Smoker    Smokeless tobacco: Never Used    Tobacco comment: Not needed   Substance Use Topics    Alcohol use:  No                                Counseling given: Not Answered  Comment: Not needed      Vital Signs (Current):   Vitals:    05/23/22 1158 05/23/22 1159 05/23/22 1608 05/23/22 1730   BP:  (!) 176/63 (!) 176/69 (!) 165/80   Pulse:  66 76 82   Resp:  16 18 16   Temp:  98.1 °F (36.7 °C) 97.9 °F (36.6 °C) 96.5 °F (35.8 °C)   TempSrc:  Oral Oral Temporal   SpO2: 96%  97% 96%   Weight:       Height:                                                  BP Readings from Last 3 Encounters:   05/23/22 (!) 165/80   05/18/22 (!) 153/63   05/12/22 (!) 155/66 NPO Status: Time of last liquid consumption: 2300                        Time of last solid consumption: 2300                        Date of last liquid consumption: 05/22/22                        Date of last solid food consumption: 05/22/22    BMI:   Wt Readings from Last 3 Encounters:   05/23/22 135 lb 14.4 oz (61.6 kg)   05/15/22 135 lb 4.8 oz (61.4 kg)   05/14/22 136 lb 6.4 oz (61.9 kg)     Body mass index is 24.07 kg/m².     CBC:   Lab Results   Component Value Date    WBC 11.5 05/23/2022    RBC 3.46 05/23/2022    HGB 9.1 05/23/2022    HCT 27.5 05/23/2022    MCV 79.6 05/23/2022    RDW 15.0 05/23/2022     05/23/2022       CMP:   Lab Results   Component Value Date     05/23/2022    K 4.4 05/23/2022     05/23/2022    CO2 20 05/23/2022    BUN 28 05/23/2022    CREATININE 0.9 05/23/2022    GFRAA >60 05/23/2022    GFRAA >60 05/23/2013    AGRATIO 1.1 05/22/2022    LABGLOM >60 05/23/2022    GLUCOSE 131 05/23/2022    PROT 6.5 05/22/2022    PROT 7.3 09/10/2012    CALCIUM 8.8 05/23/2022    BILITOT 0.3 05/22/2022    ALKPHOS 64 05/22/2022    AST 17 05/22/2022    ALT 16 05/22/2022       POC Tests:   Recent Labs     05/23/22  1650   POCGLU 162*       Coags:   Lab Results   Component Value Date    PROTIME 13.5 12/17/2020    PROTIME 19.8 01/02/2010    INR 1.16 12/17/2020    APTT 33.9 12/17/2020       HCG (If Applicable): No results found for: PREGTESTUR, PREGSERUM, HCG, HCGQUANT     ABGs: No results found for: PHART, PO2ART, JEI3CCX, DHV0EVH, BEART, M1LHUWYJ     Type & Screen (If Applicable):  Lab Results   Component Value Date    LABABO O 09/26/2012    [address] Positive 09/26/2012       Drug/Infectious Status (If Applicable):  No results found for: HIV, HEPCAB    COVID-19 Screening (If Applicable):   Lab Results   Component Value Date    COVID19 NOT DETECTED 05/13/2022           Anesthesia Evaluation   no history of anesthetic complications:   Airway: Mallampati: II  TM distance: >3 FB   Neck ROM: limited  Mouth opening: > = 3 FB   Dental:    (+) upper dentures      Pulmonary:                              Cardiovascular:    (+) hypertension:, CAD:, CABG/stent (s/p PTCA and stent):, dysrhythmias: atrial fibrillation, hyperlipidemia                  Neuro/Psych:   (+) CVA:, neuromuscular disease:,             GI/Hepatic/Renal:   (+) PUD,      (-) GERD       Endo/Other:    (+) DiabetesType II DM, , : arthritis: OA., .                 Abdominal:             Vascular:   + PVD, aortic or cerebral, DVT, . Other Findings: Few left lower teeth in poor repair. Rest not present          Anesthesia Plan      MAC     ASA 3     (Risks, benefits and alternatives of MAC anesthesia/ GA as needed discussed with pt. Questions answered. Willing to proceed.)  Induction: intravenous. Anesthetic plan and risks discussed with patient.                       Maximino Pizarro MD   5/23/2022

## 2022-05-23 NOTE — PROGRESS NOTES
Called surgery reviewed poc, etp 630p-7m  Called out surgeon regarding a.m. meds. Per lauren park give all a.m. meds as scheduled.

## 2022-05-23 NOTE — PROGRESS NOTES
Hospitalist Progress Note      PCP: MARVIN Spears - CNP    Date of Admission: 5/18/2022    Chief Complaint: Osteomyelitis of right foot     Hospital Course: Gretta Courtney is a 68 y.o. female with a PMH of PVD with fem pop hx, Afib, CAD, DM2, HTN, and HLD who was a direct admit from [facility] to [facility] for osteomyelitis of the right foot. She presented on 5/13 to the [facility] ED after her PCP evaluated her for diabetic foot ulcers and imaging suggested osteomyelitis in her right foot. In [facility], she was started on IV vancomycin and cefepime. Podiatry was consulted and they recommended a vascular consult for a right hallus and 4th toe amputation. Therefore, she was transferred to our facility for a vascular consultation/workup. Vascular consulted, angiogram showed good distal flow in right lower extremity. Podiatric will proceed with amputation intervention on Monday. Subjective:   Patient was seen and examined at bedside this morning. Patient stated that her left forearm pain is not hurting anymore. She complained of left shoulder pain worse with movement. She noticed it when she started being more active. Rest makes the pain better. Patient denies any radiating symptoms. Patient denies any SOB, cough, chest pain, abdominal pain, fevers, or N/V/D.       Medications:  Reviewed    Infusion Medications    sodium chloride Stopped (05/22/22 2002)     Scheduled Medications    vancomycin  750 mg IntraVENous Once    polyethylene glycol  17 g Oral BID    docusate sodium  100 mg Oral Daily    hydrALAZINE  50 mg Oral 3 times per day    clopidogrel  75 mg Oral Daily    amLODIPine  10 mg Oral Daily    aspirin  81 mg Oral Daily    digoxin  125 mcg Oral Daily    lisinopril  20 mg Oral Daily    atorvastatin  20 mg Oral Daily    cefepime  2,000 mg IntraVENous Q12H    insulin lispro  0-3 Units SubCUTAneous Nightly    insulin lispro  0-6 Units SubCUTAneous TID WC    [START ON 5/14/2023] vancomycin (VANCOCIN) intermittent dosing (placeholder)   Other RX Placeholder    sodium chloride flush  5-40 mL IntraVENous 2 times per day    enoxaparin  40 mg SubCUTAneous Daily     PRN Meds: oxyCODONE **OR** oxyCODONE, hydrOXYzine, sodium chloride flush, sodium chloride, ondansetron **OR** ondansetron, acetaminophen **OR** acetaminophen      Intake/Output Summary (Last 24 hours) at 5/23/2022 1231  Last data filed at 5/23/2022 0537  Gross per 24 hour   Intake 1710.19 ml   Output 1750 ml   Net -39.81 ml       Physical Exam Performed:    BP (!) 176/63   Pulse 66   Temp 98.1 °F (36.7 °C) (Oral)   Resp 16   Ht 5' 3\" (1.6 m)   Wt 135 lb 14.4 oz (61.6 kg)   SpO2 96%   BMI 24.07 kg/m²     General appearance: No apparent distress, appears stated age and cooperative. HEENT: Pupils equal, round, and reactive to light. Conjunctivae/corneas clear. Neck: Supple, with full range of motion. No jugular venous distention. Trachea midline. Respiratory:  Normal respiratory effort. Clear to auscultation, bilaterally without Rales/Wheezes/Rhonchi. Cardiovascular: Regular rate and rhythm with normal S1/S2 without murmurs, rubs or gallops. Abdomen: Soft, non-tender, non-distended with normal bowel sounds. Musculoskeletal: No clubbing, cyanosis or edema bilaterally. Full range of motion without deformity. Skin: Skin color, texture, turgor normal.  No rashes or lesions. Neurologic:  Neurovascularly intact without any focal sensory/motor deficits.  Cranial nerves: II-XII intact, grossly non-focal.  Psychiatric: Alert and oriented, thought content appropriate, normal insight  Capillary Refill: Brisk,3 seconds, normal   Peripheral Pulses: +2 palpable, equal bilaterally       Labs:   Recent Labs     05/21/22  1045 05/22/22  0427 05/23/22  1014   WBC 9.5 9.6 11.5*   HGB 9.1* 8.7* 9.1*   HCT 27.0* 26.2* 27.5*    177 204     Recent Labs     05/21/22  1045 05/22/22  0427 05/23/22  1014   * 132* 131*   K 4.7 4.5 4.4    100 100   CO2 19* 20* 20*   BUN 23* 26* 28*   CREATININE 1.1 1.1 0.9   CALCIUM 8.9 9.0 8.8       Active Hospital Problems    Diagnosis     PVD (peripheral vascular disease) ([facility]) [I73.9]      Priority: Medium    Acute osteomyelitis of right foot ([facility] [M86.171]      Priority: Medium    Essential hypertension [I10]     Coronary artery disease involving native coronary artery of native heart without angina pectoris [I25.10]     Type 2 diabetes mellitus with diabetic polyneuropathy, without long-term current use of insulin ([facility]) [E11.42]     Paroxysmal atrial fibrillation ([facility]) [I48.0]      Assessment/Plan:    Jodi Meyre is a 68 y.o. female with a PMH of PVD with fem pop hx, Afib, CAD, DM2, HTN, and HLD who was a direct admit from [facility] to [facility] for osteomyelitis of the right foot. Acute osteomyelitis of right foot in patient with known PVD  -MRI right foot on 5/16/2022 revealed: osteomyelitis t/o the 1st distal phalanx. Adjacent deep soft tissue ulceration and cellulitis. Osteomyelitis of 4th middle and distal phalanges.  Adjacent deep soft tissue ulceration along the dorsal lateral aspect of the toe with adjacent cellulitis  -Continue abx regimen of cefepime and vancomycin    -Checking vanc levels   -neurovascular checks  -Vascular surgery consulted and recommendations below:   - Angiogram showed good distal flow of right lower extremity.    - Recommended to proceed with podiatry intervention  - Podiatry consulted and recommendations below:   - Amputation of the right hallux and fourth toe tonight    - NPO today   - Recommended weight bear as tolerated for now.   -Pain control:   -Acetaminophen 1000 mg TID    -Roxicodone 2.5 mg Q4H moderate pain    -Roxicodone 5 mg Q4H severe pain   - Will continue monitoring     Situational anxiety   -Patient anxious about surgery tonight   -Hydroxyzine 25 mg PRN Q6H     Osteoarthritis   -Present in bilateral knees and left upper extremity   -Acetaminophen 1000 mg TID   -Ordered voltaren gel BID PRN     CAD  -Continue ASA, lisinopril, amlodipine, atorvastatin, plavix    HTN   -Continue amlodipine, lisinopril and hydralazine TID      HLD  -Continue home atorvastatin     DM2, controlled  -HA1C on 5/13/2022: 5.6%   -Holding home medicationis  -Holding Low dose SSI for procedure.  May restart after     PAF  -Continue digoxin   -checking digoxin levels   -Holding home eliquis      Chronic normocytic anemia  -No s/s of bleeding at this time  -Monitoring CBC     DVT Prophylaxis: Lovenox  Diet: Diet NPO  Code Status: Full Code    PT/OT Eval Status: N/a     Dispo - Pending podiatry intervention     Timmy Alva MD   5/23/2022

## 2022-05-23 NOTE — PROGRESS NOTES
Pt arrived to PACU. Oral airway in place. Report received. Pt not yet responsive. This writer remains at bedside.  Tsering Basilio RN

## 2022-05-23 NOTE — BRIEF OP NOTE
Brief Postoperative Note      Patient: Massimo Calzada  YOB: 1945  MRN: [id number]    Date of Procedure: 5/23/2022    Pre-Op Diagnosis: OSTEOMYELITIS RIGHT FIRST AND FOURTH TOES    Post-Op Diagnosis: Same       Procedure(s):  AMPUTATION OF RIGHT FIRST AND FOURTH TOES    Surgeon(s):  Ana Paula Peng DPM    Assistant:  Surgical Assistant: Jed Medrano    Anesthesia: Choice    Estimated Blood Loss (mL): less than 50     Complications: None    Specimens:   ID Type Source Tests Collected by Time Destination   A : RIGHT HALLUX Tissue Tissue SURGICAL PATHOLOGY [facility] 5/23/2022 1907    B : RIGHT FOURTH TOE Tissue Tissue SURGICAL PATHOLOGY [facility] 5/23/2022 1909        Implants:  * No implants in log *      Drains: * No LDAs found *    Findings:  Consistent with diagnosis. No further infected bone or tissue remains. All surgical sites were closed. Patient will be okay for discharge from my standpoint as early as tomorrow.     Electronically signed by Ana Paula Peng DPM on 5/23/2022 at 7:28 PM

## 2022-05-23 NOTE — PROGRESS NOTES
Angel Mccain MD   Patient:   Deanna Valencia    YOB: 1945  MRN:   [id number]  Location: [facility] 5546-77       5/23/22 8:50 AM  [phone] Hospital or Facility: [facility] From: [facility] RE: Fabi Reid 1945 RM: [id number] requesting pain meds other than tylenol. pt tearful, in pain of back, knees, elbows. states its \"killing her\". stated she could not get oob last night for commode, had to use bed pan. pt states she keeps crying. its awful. could be anxiety too. she has huge fear of needles and is scared of surgery this evening. place review and advise.  maybe one time dose of morphine? ty Need Callback: NO CALLBACK REQ C4 PROGRESSIVE CARE  Unread

## 2022-05-23 NOTE — PLAN OF CARE
Problem: Pain  Goal: Verbalizes/displays adequate comfort level or baseline comfort level  5/23/2022 1742 by Chanel Aceves RN  Outcome: Progressing  5/23/2022 0538 by Anitra Schwarz RN  Outcome: Progressing     Problem: Safety - Adult  Goal: Free from fall injury  5/23/2022 1742 by Chanel Aceves RN  Outcome: Progressing  5/23/2022 0538 by Anitra Schwarz RN  Outcome: Progressing     Problem: Chronic Conditions and Co-morbidities  Goal: Patient's chronic conditions and co-morbidity symptoms are monitored and maintained or improved  5/23/2022 1742 by Chanel Aceves RN  Outcome: Progressing  5/23/2022 0538 by Anitra Schwarz RN  Outcome: Progressing  Flowsheets (Taken 5/22/2022 1954)  Care Plan - Patient's Chronic Conditions and Co-Morbidity Symptoms are Monitored and Maintained or Improved: Monitor and assess patient's chronic conditions and comorbid symptoms for stability, deterioration, or improvement     Problem: Discharge Planning  Goal: Discharge to home or other facility with appropriate resources  5/23/2022 1742 by Chanel Aceves RN  Outcome: Progressing  Flowsheets (Taken 5/23/2022 1037)  Discharge to home or other facility with appropriate resources: Identify barriers to discharge with patient and caregiver  5/23/2022 0538 by Anitra Schwarz RN  Outcome: Progressing  Flowsheets (Taken 5/22/2022 1954)  Discharge to home or other facility with appropriate resources: Identify barriers to discharge with patient and caregiver   Reviewed poc today 30 mins

## 2022-05-23 NOTE — PROGRESS NOTES
Reviewed poc/procedure and consent. Pt verbalized understanding and was able to teach back. Pt signed consent. Waiting for meds for anxiety and pain. Pt is in moderated consolable panic over the toes being, \"cut off\". Educated comforted and warm blanket applied. Pt is currently resting, but when talked to or disturbed in the slightest she talks non stop with tears. Consolable.

## 2022-05-23 NOTE — PROGRESS NOTES
Pt removed oral airway. Now awake and responding to commands. Denies needs or discomforts at this time.  Quinton Musa RN

## 2022-05-23 NOTE — PROGRESS NOTES
Surgery called and report given. They area aware of pts tearful anxiety about needles and procedure.

## 2022-05-23 NOTE — PLAN OF CARE
Problem: Pain  Goal: Verbalizes/displays adequate comfort level or baseline comfort level  5/23/2022 0538 by Zoila Tirado RN  Outcome: Progressing     Problem: Safety - Adult  Goal: Free from fall injury  5/23/2022 0538 by Zoila Tirado RN  Outcome: Progressing

## 2022-05-23 NOTE — PROGRESS NOTES
Pt resting quietly in bed. No c/o pain. Fasting after midnight re instructed for possible procedure tomorrow. Pt verbalized understanding. No c/o voiced at this time. Safety/fall prevention maintained. Personal belongings and call light within reach.  CECILIARN

## 2022-05-24 LAB
ANION GAP SERPL CALCULATED.3IONS-SCNC: 8 MMOL/L (ref 3–16)
BASOPHILS ABSOLUTE: 0.1 K/UL (ref 0–0.2)
BASOPHILS RELATIVE PERCENT: 1.1 %
BUN BLDV-MCNC: 26 MG/DL (ref 7–20)
CALCIUM SERPL-MCNC: 8.8 MG/DL (ref 8.3–10.6)
CHLORIDE BLD-SCNC: 105 MMOL/L (ref 99–110)
CO2: 21 MMOL/L (ref 21–32)
CREAT SERPL-MCNC: 1 MG/DL (ref 0.6–1.2)
EOSINOPHILS ABSOLUTE: 0.3 K/UL (ref 0–0.6)
EOSINOPHILS RELATIVE PERCENT: 3.1 %
GFR AFRICAN AMERICAN: >60
GFR NON-AFRICAN AMERICAN: 54
GLUCOSE BLD-MCNC: 108 MG/DL (ref 70–99)
GLUCOSE BLD-MCNC: 123 MG/DL (ref 70–99)
GLUCOSE BLD-MCNC: 147 MG/DL (ref 70–99)
GLUCOSE BLD-MCNC: 148 MG/DL (ref 70–99)
GLUCOSE BLD-MCNC: 175 MG/DL (ref 70–99)
HCT VFR BLD CALC: 25.4 % (ref 36–48)
HEMOGLOBIN: 8.4 G/DL (ref 12–16)
LYMPHOCYTES ABSOLUTE: 1.4 K/UL (ref 1–5.1)
LYMPHOCYTES RELATIVE PERCENT: 16.7 %
MCH RBC QN AUTO: 27.1 PG (ref 26–34)
MCHC RBC AUTO-ENTMCNC: 33.3 G/DL (ref 31–36)
MCV RBC AUTO: 81.4 FL (ref 80–100)
MONOCYTES ABSOLUTE: 0.7 K/UL (ref 0–1.3)
MONOCYTES RELATIVE PERCENT: 8.7 %
NEUTROPHILS ABSOLUTE: 5.8 K/UL (ref 1.7–7.7)
NEUTROPHILS RELATIVE PERCENT: 70.4 %
PDW BLD-RTO: 15.5 % (ref 12.4–15.4)
PERFORMED ON: ABNORMAL
PLATELET # BLD: 201 K/UL (ref 135–450)
PMV BLD AUTO: 7.3 FL (ref 5–10.5)
POTASSIUM REFLEX MAGNESIUM: 4.5 MMOL/L (ref 3.5–5.1)
RBC # BLD: 3.12 M/UL (ref 4–5.2)
SODIUM BLD-SCNC: 134 MMOL/L (ref 136–145)
VANCOMYCIN RANDOM: 17.5 UG/ML
WBC # BLD: 8.3 K/UL (ref 4–11)

## 2022-05-24 PROCEDURE — 97535 SELF CARE MNGMENT TRAINING: CPT

## 2022-05-24 PROCEDURE — 6370000000 HC RX 637 (ALT 250 FOR IP): Performed by: PODIATRIST

## 2022-05-24 PROCEDURE — 97530 THERAPEUTIC ACTIVITIES: CPT

## 2022-05-24 PROCEDURE — 6370000000 HC RX 637 (ALT 250 FOR IP): Performed by: STUDENT IN AN ORGANIZED HEALTH CARE EDUCATION/TRAINING PROGRAM

## 2022-05-24 PROCEDURE — 97167 OT EVAL HIGH COMPLEX 60 MIN: CPT

## 2022-05-24 PROCEDURE — 80048 BASIC METABOLIC PNL TOTAL CA: CPT

## 2022-05-24 PROCEDURE — 6360000002 HC RX W HCPCS: Performed by: NURSE PRACTITIONER

## 2022-05-24 PROCEDURE — 97162 PT EVAL MOD COMPLEX 30 MIN: CPT

## 2022-05-24 PROCEDURE — 1200000000 HC SEMI PRIVATE

## 2022-05-24 PROCEDURE — 6360000002 HC RX W HCPCS: Performed by: PODIATRIST

## 2022-05-24 PROCEDURE — 80202 ASSAY OF VANCOMYCIN: CPT

## 2022-05-24 PROCEDURE — 36415 COLL VENOUS BLD VENIPUNCTURE: CPT

## 2022-05-24 PROCEDURE — 85025 COMPLETE CBC W/AUTO DIFF WBC: CPT

## 2022-05-24 PROCEDURE — 2580000003 HC RX 258: Performed by: PODIATRIST

## 2022-05-24 RX ORDER — MORPHINE SULFATE 2 MG/ML
2 INJECTION, SOLUTION INTRAMUSCULAR; INTRAVENOUS EVERY 4 HOURS PRN
Status: DISCONTINUED | OUTPATIENT
Start: 2022-05-24 | End: 2022-05-25 | Stop reason: HOSPADM

## 2022-05-24 RX ORDER — GABAPENTIN 100 MG/1
200 CAPSULE ORAL 2 TIMES DAILY
Status: DISCONTINUED | OUTPATIENT
Start: 2022-05-24 | End: 2022-05-25 | Stop reason: HOSPADM

## 2022-05-24 RX ADMIN — OXYCODONE 5 MG: 5 TABLET ORAL at 06:02

## 2022-05-24 RX ADMIN — INSULIN LISPRO 1 UNITS: 100 INJECTION, SOLUTION INTRAVENOUS; SUBCUTANEOUS at 21:08

## 2022-05-24 RX ADMIN — OXYCODONE 5 MG: 5 TABLET ORAL at 00:06

## 2022-05-24 RX ADMIN — CLOPIDOGREL BISULFATE 75 MG: 75 TABLET ORAL at 10:43

## 2022-05-24 RX ADMIN — APIXABAN 5 MG: 5 TABLET, FILM COATED ORAL at 13:19

## 2022-05-24 RX ADMIN — MORPHINE SULFATE 2 MG: 2 INJECTION, SOLUTION INTRAMUSCULAR; INTRAVENOUS at 13:19

## 2022-05-24 RX ADMIN — ATORVASTATIN CALCIUM 20 MG: 10 TABLET, FILM COATED ORAL at 21:05

## 2022-05-24 RX ADMIN — OXYCODONE 5 MG: 5 TABLET ORAL at 10:43

## 2022-05-24 RX ADMIN — MORPHINE SULFATE 2 MG: 2 INJECTION, SOLUTION INTRAMUSCULAR; INTRAVENOUS at 02:22

## 2022-05-24 RX ADMIN — POLYETHYLENE GLYCOL 3350 17 G: 17 POWDER, FOR SOLUTION ORAL at 21:05

## 2022-05-24 RX ADMIN — SODIUM CHLORIDE, PRESERVATIVE FREE 10 ML: 5 INJECTION INTRAVENOUS at 21:06

## 2022-05-24 RX ADMIN — HYDRALAZINE HYDROCHLORIDE 50 MG: 50 TABLET, FILM COATED ORAL at 13:19

## 2022-05-24 RX ADMIN — ACETAMINOPHEN 1000 MG: 500 TABLET ORAL at 13:19

## 2022-05-24 RX ADMIN — OXYCODONE 5 MG: 5 TABLET ORAL at 15:58

## 2022-05-24 RX ADMIN — DOCUSATE SODIUM 100 MG: 100 CAPSULE, LIQUID FILLED ORAL at 10:43

## 2022-05-24 RX ADMIN — APIXABAN 5 MG: 5 TABLET, FILM COATED ORAL at 21:05

## 2022-05-24 RX ADMIN — DICLOFENAC 2 G: 10 GEL TOPICAL at 13:20

## 2022-05-24 RX ADMIN — DIGOXIN 125 MCG: 125 TABLET ORAL at 10:43

## 2022-05-24 RX ADMIN — HYDRALAZINE HYDROCHLORIDE 50 MG: 50 TABLET, FILM COATED ORAL at 21:05

## 2022-05-24 RX ADMIN — GABAPENTIN 200 MG: 100 CAPSULE ORAL at 13:19

## 2022-05-24 RX ADMIN — HYDRALAZINE HYDROCHLORIDE 50 MG: 50 TABLET, FILM COATED ORAL at 06:01

## 2022-05-24 RX ADMIN — HYDROXYZINE HYDROCHLORIDE 25 MG: 10 TABLET ORAL at 01:54

## 2022-05-24 RX ADMIN — POLYETHYLENE GLYCOL 3350 17 G: 17 POWDER, FOR SOLUTION ORAL at 10:43

## 2022-05-24 RX ADMIN — GABAPENTIN 200 MG: 100 CAPSULE ORAL at 21:05

## 2022-05-24 RX ADMIN — ENOXAPARIN SODIUM 40 MG: 100 INJECTION SUBCUTANEOUS at 10:43

## 2022-05-24 RX ADMIN — SODIUM CHLORIDE, PRESERVATIVE FREE 10 ML: 5 INJECTION INTRAVENOUS at 10:44

## 2022-05-24 RX ADMIN — ACETAMINOPHEN 1000 MG: 500 TABLET ORAL at 10:43

## 2022-05-24 RX ADMIN — ASPIRIN 81 MG: 81 TABLET, COATED ORAL at 10:43

## 2022-05-24 RX ADMIN — LISINOPRIL 20 MG: 20 TABLET ORAL at 10:43

## 2022-05-24 RX ADMIN — AMLODIPINE BESYLATE 10 MG: 5 TABLET ORAL at 10:42

## 2022-05-24 ASSESSMENT — PAIN DESCRIPTION - ONSET: ONSET: ON-GOING

## 2022-05-24 ASSESSMENT — PAIN SCALES - GENERAL
PAINLEVEL_OUTOF10: 7
PAINLEVEL_OUTOF10: 7
PAINLEVEL_OUTOF10: 5
PAINLEVEL_OUTOF10: 4
PAINLEVEL_OUTOF10: 7
PAINLEVEL_OUTOF10: 8

## 2022-05-24 ASSESSMENT — PAIN DESCRIPTION - LOCATION
LOCATION: FOOT

## 2022-05-24 ASSESSMENT — PAIN DESCRIPTION - DESCRIPTORS
DESCRIPTORS: BURNING

## 2022-05-24 ASSESSMENT — PAIN DESCRIPTION - PAIN TYPE
TYPE: ACUTE PAIN;SURGICAL PAIN

## 2022-05-24 ASSESSMENT — PAIN DESCRIPTION - ORIENTATION
ORIENTATION: RIGHT

## 2022-05-24 ASSESSMENT — PAIN - FUNCTIONAL ASSESSMENT: PAIN_FUNCTIONAL_ASSESSMENT: PREVENTS OR INTERFERES SOME ACTIVE ACTIVITIES AND ADLS

## 2022-05-24 ASSESSMENT — PAIN DESCRIPTION - FREQUENCY: FREQUENCY: CONTINUOUS

## 2022-05-24 NOTE — ANESTHESIA POSTPROCEDURE EVALUATION
Department of Anesthesiology  Postprocedure Note    Patient: Gretta Courtney  MRN: [id number]  YOB: 1945  Date of evaluation: 5/23/2022  Time:  9:13 PM     Procedure Summary     Date: 05/23/22 Room / Location: [address] [place] [address]  / Aimee    Anesthesia Start: 1850 Anesthesia Stop: 1933    Procedure: AMPUTATION OF RIGHT FIRST AND FOURTH TOES (Right Foot) Diagnosis:       Other chronic osteomyelitis, other site [facility])      (OSTEOMYELITIS RIGHT FIRST AND FOURTH TOES)    Surgeons: Lawanda French DPM Responsible Provider: Benny Larry MD    Anesthesia Type: MAC ASA Status: 3          Anesthesia Type: No value filed. Moe Phase I: Moe Score: 4    Moe Phase II:      Last vitals: Reviewed and per EMR flowsheets. Anesthesia Post Evaluation    Patient location during evaluation: PACU  Patient participation: complete - patient participated  Level of consciousness: awake and alert  Airway patency: patent  Nausea & Vomiting: no nausea and no vomiting  Complications: no  Cardiovascular status: blood pressure returned to baseline  Respiratory status: acceptable  Hydration status: euvolemic  Comments: VSS on transfer to phase 2 recovery. No anesthetic complications.

## 2022-05-24 NOTE — OP NOTE
[address]                                OPERATIVE REPORT    PATIENT NAME: Chris Diaz                      :        1945  MED REC NO:   [id number]                          ROOM:       0425  ACCOUNT NO:   [de-identified]                           ADMIT DATE: 2022  PROVIDER:     Jinny Cruz DPM    DATE OF PROCEDURE:  2022    This is an inpatient operative note for [facility]:  1.  Osteomyelitis, right first toe.  2.  Osteomyelitis, right fourth toe. 3.  Diabetes with gangrene. POSTOPERATIVE DIAGNOSES:  1.  Osteomyelitis, right first toe.  2.  Osteomyelitis, right fourth toe. 3.  Diabetes with gangrene. PROCEDURES PERFORMED:  1. Amputation, right hallux. 2.  Amputation, right fourth toe. SURGEON:  Jinny Cruz DPM    ASSISTANT:      ANESTHESIA:  MAC and local.  Local was 10 mL of 1:1, 0.5% Marcaine plain  and 2.0% lidocaine plain. HEMOSTASIS:  Surgical resection. ESTIMATED BLOOD LOSS:  Less than 50 mL. MATERIALS:  3-0 nylon. PATHOLOGY:  The right hallux and right fourth toe were sent to Pathology  as permanent specimens. COMPLICATIONS:  None. INDICATIONS FOR THE PROCEDURES:  The patient had signs and symptoms both  clinically and radiographically consistent with the above-mentioned  preoperative diagnosis. Following vascular intervention, the decision  was made to bring the patient to the operating room for amputation of a  right hallux and fourth toe. Prior to the scheduling of surgery,  potential risks, benefits, and complications were discussed with the  patient. Her questions were answered and consent form was signed. Now  for the procedures:      OPERATIVE PROCEDURES:    1. Amputation of right hallux.   The patient was brought from the  preoperative area and placed on the operating room table in a supine  position. A local anesthetic block consisting of a total of 10 mL of  1:1, 0.5% Marcaine plain and 2.0% lidocaine plain were injected proximal  to the right hallux and the right fourth toe. The right lower extremity  was then scrubbed, prepped, and draped in the usual sterile fashion. A  #15 blade was used to make an incision circumferentially around the base  of the right hallux. The incision was full-thickness in nature down to  the level of the bone. At the level of the right first  metatarsophalangeal joint, the right hallux was disarticulated and sent  to Pathology as a permanent specimen. At the level of disarticulation,  there was no infected soft tissue or bone. Bleeders were  electrocoagulated for hemostasis. That site was irrigated with copious  amounts of sterile saline and closed with 3-0 nylon. Attention was then directed to the procedure #2 which is amputation of  right fourth toe. A #15 blade was used to make a full-thickness  incision circumferentially around the base of the right fourth toe. The  incision was down to the level of the metatarsophalangeal joint. At  that level, the right fourth toe was disarticulated and sent to  Pathology as a permanent specimen. A Bovie was used to achieve  hemostasis of any bleeders. The site was then irrigated with copious  amounts of sterile saline via pulse lavage. The site was then closed  with 3-0 nylon. A 10 mL of 0.5% Marcaine plain were provided for  postoperative pain relief at the right hallux and right fourth toe  amputation sites. The right foot was then dressed with a Xeroform, 4 x  4s, Kerlix, and an Ace wrap. The patient tolerated the procedures and  the anesthesia well. She was transported from the operating room to the  PACU with vital signs stable and vascular status intact to all aspects  of the right foot.   Following a period of postoperative monitoring, she  will be discharged from the PACU back to her room on the floor with  written and oral instructions from myself. She will be okay for  discharge from my standpoint as soon as tomorrow.         Haydee Reid DPM    D: 05/23/2022 19:50:02       T: 05/24/2022 0:41:06     [email or web address]  Job#: [id number]     Doc#: [id number]    CC:

## 2022-05-24 NOTE — PROGRESS NOTES
Hospitalist Progress Note      PCP: Guillaume Grande, MARVIN - CNP    Date of Admission: 5/18/2022    Chief Complaint: Osteomyelitis of right foot     Hospital Course: Homar Feliciano is a 68 y.o. female with a PMH of PVD with fem pop hx, Afib, CAD, DM2, HTN, and HLD who was a direct admit from [address] [place] [address] to [facility] for osteomyelitis of the right foot. She presented on 5/13 to the [facility] ED after her PCP evaluated her for diabetic foot ulcers and imaging suggested osteomyelitis in her right foot. In [facility], she was started on IV vancomycin and cefepime. Podiatry was consulted and they recommended a vascular consult for a right hallus and 4th toe amputation. Therefore, she was transferred to our facility for a vascular consultation/workup. Vascular consulted, angiogram showed good distal flow in right lower extremity. Right hallux and 4th toe amputation completed by podiatry. Discontinued abx per podiatry team and the team has signed off. Currently, managing her nerve pain and awaiting PT/OT treatment prior to discharge. Subjective: The patient is very tearful and upset about her amputation. She endorses burning pain in her right foot right now. Her osteoarthritis is still affecting her bilateral knees. This makes it difficult for her to ambulate. She denies nausea, vomiting, fevers, chills, shakes, SOB, or chest pain.         Medications:  Reviewed    Infusion Medications    dextrose      sodium chloride 25 mL (05/23/22 5288)     Scheduled Medications    gabapentin  200 mg Oral BID    acetaminophen  1,000 mg Oral TID    polyethylene glycol  17 g Oral BID    docusate sodium  100 mg Oral Daily    hydrALAZINE  50 mg Oral 3 times per day    clopidogrel  75 mg Oral Daily    amLODIPine  10 mg Oral Daily    aspirin  81 mg Oral Daily    digoxin  125 mcg Oral Daily    lisinopril  20 mg Oral Daily    atorvastatin  20 mg Oral Daily    insulin lispro  0-3 Units SubCUTAneous Nightly    insulin lispro  0-6 Units SubCUTAneous TID     sodium chloride flush  5-40 mL IntraVENous 2 times per day    enoxaparin  40 mg SubCUTAneous Daily     PRN Meds: morphine, oxyCODONE **OR** oxyCODONE, hydrOXYzine, diclofenac sodium, glucose, dextrose bolus **OR** dextrose bolus, glucagon (rDNA), dextrose, sodium chloride flush, sodium chloride, ondansetron **OR** ondansetron      Intake/Output Summary (Last 24 hours) at 5/24/2022 1051  Last data filed at 5/24/2022 0601  Gross per 24 hour   Intake 1130 ml   Output 1550 ml   Net -420 ml       Physical Exam Performed:    BP (!) 156/68   Pulse 97   Temp 98.3 °F (36.8 °C) (Oral)   Resp 16   Ht 5' 3\" (1.6 m)   Wt 135 lb 14.4 oz (61.6 kg)   SpO2 95%   BMI 24.07 kg/m²     General appearance: No apparent distress, appears stated age and cooperative. HEENT: Pupils equal, round, and reactive to light. Conjunctivae/corneas clear. Neck: Supple, with full range of motion. No jugular venous distention. Trachea midline. Respiratory:  Normal respiratory effort. Clear to auscultation, bilaterally without Rales/Wheezes/Rhonchi. Cardiovascular: Regular rate and rhythm with normal S1/S2 without murmurs, rubs or gallops. Abdomen: Soft, non-tender, non-distended with normal bowel sounds. Musculoskeletal: No clubbing, cyanosis or edema bilaterally. Decreased range of motion. Right foot bandaged this AM.   Skin: Skin color, texture, turgor normal.  No rashes or lesions. Neurologic:  Neurovascularly intact without any focal sensory/motor deficits. Cranial nerves: II-XII intact, grossly non-focal.  Psychiatric: Alert and oriented, thought content appropriate, normal insight.  Tearful affect   Capillary Refill: Brisk,3 seconds, normal   Peripheral Pulses: +2 palpable, equal bilaterally       Labs:   Recent Labs     05/22/22 0427 05/23/22  1014 05/24/22  0445   WBC 9.6 11.5* 8.3   HGB 8.7* 9.1* 8.4*   HCT 26.2* 27.5* 25.4*    204 201     Recent Labs     05/22/22 0427 05/23/22  1014 05/24/22  0445   * 131* 134*   K 4.5 4.4 4.5    100 105   CO2 20* 20* 21   BUN 26* 28* 26*   CREATININE 1.1 0.9 1.0   CALCIUM 9.0 8.8 8.8       Active Hospital Problems    Diagnosis     [facility]VD [facility]peripheral vascular disease) [facility] [I73.9]      Priority: Medium    Acute osteomyelitis of right foot ([facility] [M86.171]      Priority: Medium    Essential hypertension [I10]     Coronary artery disease involving native coronary artery of native heart without angina pectoris [I25.10]     Type 2 diabetes mellitus with diabetic polyneuropathy, without long-term current use of insulin ([facility]) [E11.42]     Paroxysmal atrial fibrillation ([facility]) [I48.0]      Assessment/Plan:    Thomas Guo is a 68 y.o. female with a PMH of PVD with fem pop hx, Afib, CAD, DM2, HTN, and HLD who was a direct admit from [facility] to [facility] for osteomyelitis of the right foot. Acute osteomyelitis of right foot in patient with known PVD s/p amputation of the right first and 4th toes   -MRI right foot on 5/16/2022 revealed: osteomyelitis t/o the 1st distal phalanx. Adjacent deep soft tissue ulceration and cellulitis. Osteomyelitis of 4th middle and distal phalanges.  Adjacent deep soft tissue ulceration along the dorsal lateral aspect of the toe with adjacent cellulitis  -neurovascular checks  -Vascular surgery consulted and recommendations below:   - Angiogram showed good distal flow of right lower extremity.   - Podiatry consulted and recommendations below:   -Recommended weight bear as tolerated for now in surgical shoe   -D/C abx regimen    -D/C from podiatry follow up   -Follow up in one week after d/c  -Pain control:   -Acetaminophen 1000 mg TID    -Gabapentin 200 mg BID    -Roxicodone 2.5 mg Q4H PRNmoderate pain    -Roxicodone 5 mg Q4H PRN severe pain   - Will continue monitoring     Situational anxiety   -Patient anxious about recent amputation   -Hydroxyzine 25 mg PRN Q6H     Osteoarthritis   -Present in bilateral knees and left upper extremity   -Acetaminophen 1000 mg TID   -Continue voltaren gel BID    CAD  -Continue ASA, lisinopril, amlodipine, atorvastatin, plavix    HTN   -Continue amlodipine, lisinopril and hydralazine TID      HLD  -Continue home atorvastatin     DM2, controlled  -HA1C on 5/13/2022: 5.6%   -Holding home medicationis  -Restarted low dose SSI   -Gabapentin 200 mg BID for nerve pain      PAF  -Continue digoxin   -checking digoxin levels   -Restarting home eliquis      Chronic normocytic anemia  -No s/s of bleeding at this time  -Monitoring CBC     DVT Prophylaxis: Eliquis  Diet: ADULT DIET;  Full Liquid; 4 carb choices (60 gm/meal)  Code Status: Full Code    PT/OT Eval Status: Ordered    Dispo - Tomorrow, pending PT/OT evaluation and proper pain control      Champ Valdes MD   5/24/2022

## 2022-05-24 NOTE — CARE COORDINATION
Patient underwent amputation of right first and fourth toes.  attempted to reach multiple family members without success to discuss plan yesterday. She documented attempted calls to spouse , daughter and son yesterday, unable to reach anyone. Patient historically declines services and is an admitted hoarder. She does not want people in her home. Patient has been to [facility] in past. Need PT/OT recommendations. If patient refuses SNF or home care CM will make an APS referral at d/c regarding potentially unsafe environment. However if patient is of sound mind and alert and oriented and can make own decisions APS can offer little if any intervention.

## 2022-05-24 NOTE — CARE COORDINATION
SNF recommendations noted. Patient is agreeable to rehab and knows she needs to go. She has been to [facility] AND [facility] in past ans would want to go there again if she has to go somewhere. Referral initiated with Luisa Davis in admission. If accepted with need a [employer] pre-cert and will start this as soon as facility lets writer know if they can take her.

## 2022-05-24 NOTE — FLOWSHEET NOTE
05/24/22 1035   Encounter Summary   Encounter Overview/Reason  Initial Encounter   Service Provided For: Patient   Referral/Consult From: [address] Family members   Last Encounter  05/24/22   Complexity of Encounter Moderate   Begin Time 1005   End Time  1035   Total Time Calculated 30 min   Spiritual/Emotional needs   Type Spiritual Support   Assessment/Intervention/Outcome   Assessment Hopeful  (Pt had 3 toes amputated previous day and is in some pain, yet focused on the future and getting better.)   Intervention Active listening;Discussed belief system/Hinduism practices/marce;Discussed relationship with God;Prayer (assurance of)/[place]   Outcome Expressed Gratitude        encountered pt while rounding. Struggling with some pain following surgery, yet quite talkative about family and marce.  mostly listened and concluded visit with prayer.

## 2022-05-24 NOTE — PROGRESS NOTES
Podiatry Progress Note  Subjective:   Patient seen at bedside this morning. No new pedal complaints. She denied n/v/f/c and SOB. Objective:   Vitals:  BP (!) 154/65   Pulse 65   Temp 98.3 °F (36.8 °C) (Oral)   Resp 16   Ht 5' 3\" (1.6 m)   Wt 135 lb 14.4 oz (61.6 kg)   SpO2 96%   BMI 24.07 kg/m²   Integument:  Well coapted right hallux and fourth toe amputation site. No active drainage. No erythema. No acute signs of infection. Otherwise, skin is thin, dry and atrophic, bilateral.  Neurologic:  Protective sensation is grossly intact to light touch at the level of the toes, bilateral.  Vascular:  DP and PT pulses are palpable, bilateral.  CFT is less than five seconds at all toes. No significant edema appreciated. Musculoskeletal:  Right hallux and fourth toe amputation.   No pain expressed with bandage change.      Labs:   CBC with Differential:    Lab Results   Component Value Date    WBC 8.3 05/24/2022    RBC 3.12 05/24/2022    HGB 8.4 05/24/2022    HCT 25.4 05/24/2022     05/24/2022    MCV 81.4 05/24/2022    MCH 27.1 05/24/2022    MCHC 33.3 05/24/2022    RDW 15.5 05/24/2022    SEGSPCT 71.4 05/23/2013    LYMPHOPCT 16.7 05/24/2022    MONOPCT 8.7 05/24/2022    EOSPCT 2.0 04/26/2010    BASOPCT 1.1 05/24/2022    MONOSABS 0.7 05/24/2022    LYMPHSABS 1.4 05/24/2022    EOSABS 0.3 05/24/2022    BASOSABS 0.1 05/24/2022    DIFFTYPE Auto-K 05/23/2013     CMP:    Lab Results   Component Value Date     05/24/2022    K 4.5 05/24/2022     05/24/2022    CO2 21 05/24/2022    BUN 26 05/24/2022    CREATININE 1.0 05/24/2022    GFRAA >60 05/24/2022    GFRAA >60 05/23/2013    AGRATIO 1.1 05/22/2022    LABGLOM 54 05/24/2022    GLUCOSE 123 05/24/2022    PROT 6.5 05/22/2022    PROT 7.3 09/10/2012    LABALBU 3.4 05/22/2022    CALCIUM 8.8 05/24/2022    BILITOT 0.3 05/22/2022    ALKPHOS 64 05/22/2022    AST 17 05/22/2022    ALT 16 05/22/2022     PT/INR:    Lab Results   Component Value Date    PROTIME 13.5 12/17/2020    PROTIME 19.8 01/02/2010    INR 1.16 12/17/2020     Last 3 Troponin:  No results found for: TROPONINI  HgBA1c:    Lab Results   Component Value Date    LABA1C 5.6 05/13/2022     Imaging:   MRI, Right Foot  Impression/Recommendations:  5/16/2022:  1. Osteomyelitis throughout the 1st distal phalanx.  Adjacent deep soft   tissue ulceration and cellulitis.  No associated abscess. 2. Osteomyelitis of the 4th middle and distal phalanges.  Adjacent deep soft   tissue ulceration along the dorsal lateral aspect of the toe with adjacent   cellulitis.  No abscess. Assessment/Plan:   The patient is a pleasant 68year old woman 1 day S/P right hallux and fourth toe amputation  1) Osteomyelitis, right hallux and fourth toe  - 1 day s/p right hallux and fourth toe amputation  - Weight-bear as tolerated in a surgical shoe  - Can D/C antibiotics from my standpoint  - Okay for D/C from my standpoint. D/C instructions are in the EMR.    2) PVD  - Angio 5/20/22  3) KAYLA Cabezasr, [address], [address], [address]  Office: [phone]  Cell: [phone]

## 2022-05-24 NOTE — PROGRESS NOTES
Physical Therapy  Facility/Department: [facility] C[facility] PCU  Physical Therapy Initial Assessment    Name: Aguila Lyons  : 1945  MRN: [id number]  Date of Service: 2022    Discharge Recommendations:  Subacute/Skilled Nursing Facility   PT Equipment Recommendations  Equipment Needed: No  Other: TBD at SNF      Patient Diagnosis(es): The encounter diagnosis was Other chronic osteomyelitis, other site [facility]). Past Medical History:  has a past medical history of Atrial fibrillation ([address] Blood transfusion, CAD (coronary artery disease), Diabetes mellitus type II, Diabetic neuropathy ([address] Gastric ulcer, H. pylori infection, Hyperlipidemia, Hypertension, Numbness and tingling of left leg, Osteoarthritis, Poor historian, PVD (peripheral vascular disease) ([address] Unspecified cerebral artery occlusion with cerebral infarction, and upper gi bleed. Past Surgical History:  has a past surgical history that includes Coronary angioplasty with stent (4240,5171); Cardiac catheterization (12); Total shoulder arthroplasty (10/5/12); angioplasty (12/30/15); other surgical history (Left, 2017); Foot surgery (Left, 2018); and Toe amputation (Right, 2022). Assessment   Body Structures, Functions, Activity Limitations Requiring Skilled Therapeutic Intervention: Decreased functional mobility ; Decreased strength;Decreased ROM; Decreased endurance; Increased pain;Decreased balance;Decreased posture  Assessment: Pt referred for PT evaluation during current hospital stay with dx of osteomyelitis of (R) 1st & 4th toes, s/p amputation R hallux & R 4th toe on 22. Pt cleared to begin working with PT post-surgery by podiatry team, permitted to be WBAT RLE with surgical shoe. Pt quite weak and unsteady post-surgery and limited in standing balance by R foot pain, requiring Suzi x 2 for safe transfers with support of std. walker.   Pt with limited social support at home, reporting that her  is in poor health and falls frequently. Pt also reports being a hoarder and that her home is crowded and dirty (i.e. pt reports a problem with mice, also has a cat that urinates around their house). Given pt's current deficits and concerns with a safe & clean home living environment for pt, recommend SNF at D/C.   Treatment Diagnosis: Difficulty walking  Specific Instructions for Next Treatment: Progress ther ex and mobility as tolerated  Therapy Prognosis: Good  Decision Making: Medium Complexity  Requires PT Follow-Up: Yes  Activity Tolerance: Patient tolerated evaluation without incident;Patient tolerated treatment well;Patient limited by pain     Plan   Plan: 3-5 times per week  Specific Instructions for Next Treatment: Progress ther ex and mobility as tolerated  Current Treatment Recommendations: Strengthening,Balance training,Functional mobility training,Transfer training,Gait training,Safety education & training,Patient/Caregiver education & training,Equipment evaluation, education, & procurement,Positioning,Home exercise program  Safety Devices: Gait belt,Left in chair,Call light within reach,Chair alarm in place,Heels elevated for pressure relief,Nurse notified,Patient at risk for falls,All fall risk precautions in place     Restrictions  Restrictions/Precautions  Restrictions/Precautions: Weight Bearing,Fall Risk,General Precautions  Required Braces or Orthoses?: Yes  Lower Extremity Weight Bearing Restrictions  Right Lower Extremity Weight Bearing: Weight Bearing As Tolerated  Required Braces or Orthoses  Right Lower Extremity Brace:  (R surgical shoe when OOB)  Position Activity Restriction  Other position/activity restrictions: \"WBAT RLE with surgical shoe\" per podiatry, telemetry, IV lines     Subjective   General  Chart Reviewed: Yes  Patient assessed for rehabilitation services?: Yes  Additional Pertinent Hx: B rotator cuff injuries with limited B shoulder mobility  Family / Caregiver Present: No  Referring Practitioner: Giovanni Marrufo DPM  Referral Date : 05/23/22  Diagnosis: Osteomyelitis of (R) 1st & 4th toes, s/p amputation R hallux & R 4th toe on 5/23/22  Follows Commands: Within Functional Limits  General Comment  Comments: Pt resting in bed upon entry of therapy staff  Subjective  Subjective: Pt agreeable to work with PT this morning. \"My foot is burning so bad. \"    Pain: Pt c/o \"7/10\" burning pain in R foot and heel, near surgical site. Social/Functional History  Social/Functional History  Lives With: Spouse (, retired but in poor health per pt)  Type of Home: [employer]: One level  Home Access: Stairs to enter without rails  Entrance Stairs - Number of Steps: 1+1 ALIDA  Bathroom Shower/Tub: Tub/Shower unit  Bathroom Toilet: Standard  Home Equipment:  (no home DME)  Has the patient had two or more falls in the past year or any fall with injury in the past year?: No  ADL Assistance: Independent  Homemaking Assistance: Independent  Ambulation Assistance: Independent (without AD)  Transfer Assistance: Independent  Active : Yes  Occupation: Other(comment)  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      Vision/Hearing  Vision Exceptions: Wears glasses at all times  Hearing: Within functional limits      Cognition   Orientation  Overall Orientation Status: Within Normal Limits     Objective   Pulse: 83  Heart Rate Source: Monitor  BP: (!) 142/73  BP Location: Right upper arm  BP Method: Automatic  Patient Position: Semi fowlers  MAP (Calculated): 96  Resp: 20  SpO2: 96 %  O2 Device: None (Room air)     Observation/Palpation  Posture: Fair  Gross Assessment  AROM: Generally decreased, functional  Strength: Grossly decreased, non-functional  Coordination: Grossly decreased, non-functional  Tone: Normal  Sensation: Impaired (decreased sensation in distal BLE)     Bed Mobility Training  Bed Mobility Training: Yes  Supine to Sit: Minimum assistance  Sit to Supine: Other (comment) (up to chair at end of session)    Balance  Sitting: Intact  Standing: Impaired (Suzi x 2 needed for standing activities with support of walker)    Transfer Training  Sit to Stand: Minimum assistance;Assist X2 (up to walker)  Stand to Sit: Minimum assistance  Bed to Chair: Minimum assistance;Assist X2 (using std. walker for support, pt very unsteady with noticeable BLE weakness when standing)    Gait Training  Gait Training: No (pt much too weak/unsteady with transfers to safely attempt)    AM-PAC Score  AM-PAC Inpatient Mobility Raw Score : 12 (05/24/22 1211)  AM-PAC Inpatient T-Scale Score : 35.33 (05/24/22 1211)  Mobility Inpatient CMS 0-100% Score: 68.66 (05/24/22 1211)  Mobility Inpatient CMS G-Code Modifier : CL (05/24/22 1211)    Goals  Short Term Goals  Time Frame for Short term goals: 1 week, 5/31/22 (unless otherwise specified)  Short term goal 1: Pt will transfer supine <-> sit with supervision/modified(I)  Short term goal 2: Pt will transfer sit <-> stand and bed>chair using walker with CGA x 1  Short term goal 3: Pt will ambulate x 25 feet using walker with Suzi x 1  Short term goal 4: By 5/27/22: pt will tolerate 12-15 reps BLE exercise for strengthening, balance, and endurance  Patient Goals   Patient goals : \"To get stronger and be able to walk\"       Education  Patient Education  Education Given To: Patient  Education Provided: Role of Therapy;Plan of Care;Precautions;Transfer Training;Equipment; Fall Prevention Strategies  Education Method: Demonstration;Verbal  Barriers to Learning: None  Education Outcome: Verbalized understanding;Demonstrated understanding;Continued education needed      Therapy Time   Individual Concurrent Group Co-treatment   Time In 0923         Time Out 0957         Minutes 34         Timed Code Treatment Minutes: [address], [place] #[id number]    If pt is unable to be seen after this session, please let this note serve as discharge summary. Please see case management note for discharge disposition. Thank you.

## 2022-05-24 NOTE — PROGRESS NOTES
Occupational Therapy  Facility/Department: [facility]  Occupational Therapy Initial Assessment/Treatment    Name: Gladys Patel  : 1945  MRN: [id number]  Date of Service: 2022    Discharge Recommendations:  [address]          Patient Diagnosis(es): The encounter diagnosis was Other chronic osteomyelitis, other site [facility]). Past Medical History:  has a past medical history of Atrial fibrillation ([facility] Blood transfusion, CAD (coronary artery disease), Diabetes mellitus type II, Diabetic neuropathy ([facility] [address] Gastric ulcer, H. pylori infection, Hyperlipidemia, Hypertension, Numbness and tingling of left leg, Osteoarthritis, Poor historian, PVD (peripheral vascular disease) ([facility] [address] Unspecified cerebral artery occlusion with cerebral infarction, and upper gi bleed. Past Surgical History:  has a past surgical history that includes Coronary angioplasty with stent (1327,3430); Cardiac catheterization (12); Total shoulder arthroplasty (10/5/12); angioplasty (12/30/15); other surgical history (Left, 2017); and Foot surgery (Left, 2018). Assessment   Performance deficits / Impairments: Decreased functional mobility ; Decreased safe awareness;Decreased balance;Decreased ADL status; Decreased cognition;Decreased endurance;Decreased strength  Assessment: Patient admitted from home, rather IPTA, though admits to being a horder and cleanliness is lacking. Pt S/P right hallux and fourth toe amputation 22. min of 2 for safe transfers with walker, maxA for LE dressing. Given home environment, patient weakness and decreased balance and ADLs skills patient would benefit from SNF at discharge for continued skilled OT services.   Prognosis: Good  Decision Making: Medium Complexity  REQUIRES OT FOLLOW-UP: Yes  Activity Tolerance  Activity Tolerance: Patient Tolerated treatment well        Plan   Plan  Times per Week: 3-5x's a week while in acute care Restrictions  Restrictions/Precautions  Restrictions/Precautions: Weight Bearing,Fall Risk,General Precautions  Required Braces or Orthoses?: Yes  Lower Extremity Weight Bearing Restrictions  Right Lower Extremity Weight Bearing: Weight Bearing As Tolerated  Required Braces or Orthoses  Right Lower Extremity Brace:  (R surgical shoe when OOB)  Position Activity Restriction  Other position/activity restrictions: \"WBAT RLE with surgical shoe\" per podiatry, telemetry, IV lines    Subjective   General  Chart Reviewed: Estephania Mu and Physical  Patient assessed for rehabilitation services?: Yes  Family / Caregiver Present: No  Referring Practitioner: Jinny Cruz DPM 5/23/22  Diagnosis: S/P right hallux and fourth toe amputation 5/22/22  Subjective  Subjective: Pt reports her  falls a lot and doesn't want to go to the doctor, she also reports having difficulty maintaining homemaking,  (cat feces throughout the house and pt admits to being a horder)     Social/Functional History  Social/Functional History  Lives With: Spouse (, retired but in poor health per pt)  Type of Home: House  Home Layout: One level  Home Access: Stairs to enter without rails  Entrance Stairs - Number of Steps: 1+1 ALIDA  Bathroom Shower/Tub: Tub/Shower unit  Bathroom Toilet: Standard  Home Equipment:  (no home DME)  Has the patient had two or more falls in the past year or any fall with injury in the past year?: No  ADL Assistance: Independent  Homemaking Assistance: Independent  Ambulation Assistance: Independent (without AD)  Transfer Assistance: Independent  Active : Yes  Occupation: Other(comment)  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, pt has 1 cat  Additional Comments: Pt admits to being a hoarder and says there isn't much space in her home to use a walker if needed.        Objective   Pulse: 83  Heart Rate Source: Monitor  BP: (!) 142/73  BP Location: Right upper arm  BP Method: Automatic  Patient Position: Semi fowlers  MAP (Calculated): 96  Resp: 20  SpO2: 96 %  O2 Device: None (Room air)       Pain: \"burning\" in R foot     Observation/Palpation  Posture: Fair  Safety Devices  Type of Devices: Gait belt;Left in chair;Call light within reach; Chair alarm in place; Heels elevated for pressure relief;Nurse notified     Bed Mobility Training  Bed Mobility Training: Yes  Supine to Sit: Minimum assistance  Sit to Supine: Other (comment) (up to chair at end of session)  Balance  Sitting: Intact  Standing: Impaired  Transfer Training  Transfer Training: Yes  Sit to Stand: Minimum assistance;Assist X2 (up to walker)  Stand to Sit: Minimum assistance  Bed to Chair: Minimum assistance;Assist X2     AROM: Within functional limits  Strength: Generally decreased, functional  Tone: Normal  Sensation: Intact     ADL  Feeding: Setup  Grooming: Minimal assistance;Maximum assistance (for thoroughness of combing tangles out of hair.)  UE Dressing: Minimal assistance  LE Dressing: Maximum assistance  Toileting: Maximum assistance     Activity Tolerance  Activity Tolerance: Patient tolerated evaluation without incident;Patient tolerated treatment well;Patient limited by pain           Cognition  Overall Cognitive Status: Exceptions  Arousal/Alertness: Appropriate responses to stimuli  Following Commands: Follows multistep commands with increased time; Follows multistep commands with repitition  Attention Span: Attends with cues to redirect; Difficulty attending to directions; Difficulty dividing attention  Insights: Decreased awareness of deficits  Initiation: Requires cues for some  Sequencing: Requires cues for some  Cognition Comment: decreased awareness of home safety and importance of decreaseing clutter in walk ways and cleanliness for wound care. Education Given To: Patient  Education Provided: Role of Therapy; ADL Adaptive Strategies; Fall Prevention Strategies; Plan of Care;Transfer Training;Precautions; Equipment  Education Method: Demonstration;Verbal  Barriers to Learning: None  Education Outcome: Verbalized understanding;Continued education needed                                                   AM-PAC Score        AM-PAC Inpatient Daily Activity Raw Score: 14 (05/24/22 1107)  AM-PAC Inpatient ADL T-Scale Score : 33.39 (05/24/22 1107)  ADL Inpatient CMS 0-100% Score: 59.67 (05/24/22 1107)  ADL Inpatient CMS G-Code Modifier : CK (05/24/22 1107)    Goals  Short Term Goals  Time Frame for Short term goals: 1 week 5/31  Short Term Goal 1: Pt will complete LE dressing with CGA by 5/29  Short Term Goal 2: Pt will complete toilet transfers with Suzi  Short Term Goal 3: Pt will complete grooming task with set-up  Patient Goals   Patient goals : \"to go home and walk\"       Therapy Time   Individual Concurrent Group Co-treatment   Time In 0923         Time Out 0956         Minutes 33         Timed Code Treatment Minutes: 23 Minutes       Marysol Larose, OTR/L  If pt is unable to be seen after this session, please let this note serve as discharge summary. Please see case management note for discharge disposition. Thank you.

## 2022-05-25 ENCOUNTER — HOSPITAL ENCOUNTER (OUTPATIENT)
Dept: WOUND CARE | Age: 77
Discharge: HOME | End: 2022-05-25

## 2022-05-25 VITALS
BODY MASS INDEX: 24.08 KG/M2 | SYSTOLIC BLOOD PRESSURE: 161 MMHG | HEART RATE: 73 BPM | TEMPERATURE: 98.1 F | HEIGHT: 63 IN | DIASTOLIC BLOOD PRESSURE: 56 MMHG | WEIGHT: 135.9 LBS | OXYGEN SATURATION: 97 % | RESPIRATION RATE: 20 BRPM

## 2022-05-25 LAB
ANION GAP SERPL CALCULATED.3IONS-SCNC: 10 MMOL/L (ref 3–16)
BASOPHILS ABSOLUTE: 0.1 K/UL (ref 0–0.2)
BASOPHILS RELATIVE PERCENT: 1.1 %
BUN BLDV-MCNC: 28 MG/DL (ref 7–20)
CALCIUM SERPL-MCNC: 8.9 MG/DL (ref 8.3–10.6)
CHLORIDE BLD-SCNC: 99 MMOL/L (ref 99–110)
CO2: 20 MMOL/L (ref 21–32)
CREAT SERPL-MCNC: 1.2 MG/DL (ref 0.6–1.2)
EOSINOPHILS ABSOLUTE: 0.2 K/UL (ref 0–0.6)
EOSINOPHILS RELATIVE PERCENT: 2.4 %
GFR AFRICAN AMERICAN: 53
GFR NON-AFRICAN AMERICAN: 44
GLUCOSE BLD-MCNC: 103 MG/DL (ref 70–99)
GLUCOSE BLD-MCNC: 107 MG/DL (ref 70–99)
GLUCOSE BLD-MCNC: 207 MG/DL (ref 70–99)
HCT VFR BLD CALC: 26 % (ref 36–48)
HEMOGLOBIN: 8.5 G/DL (ref 12–16)
LYMPHOCYTES ABSOLUTE: 1.5 K/UL (ref 1–5.1)
LYMPHOCYTES RELATIVE PERCENT: 16.1 %
MCH RBC QN AUTO: 26.7 PG (ref 26–34)
MCHC RBC AUTO-ENTMCNC: 32.8 G/DL (ref 31–36)
MCV RBC AUTO: 81.4 FL (ref 80–100)
MONOCYTES ABSOLUTE: 0.7 K/UL (ref 0–1.3)
MONOCYTES RELATIVE PERCENT: 7.4 %
NEUTROPHILS ABSOLUTE: 6.7 K/UL (ref 1.7–7.7)
NEUTROPHILS RELATIVE PERCENT: 73 %
PDW BLD-RTO: 15.2 % (ref 12.4–15.4)
PERFORMED ON: ABNORMAL
PERFORMED ON: ABNORMAL
PLATELET # BLD: 237 K/UL (ref 135–450)
PMV BLD AUTO: 7.2 FL (ref 5–10.5)
POTASSIUM REFLEX MAGNESIUM: 4.9 MMOL/L (ref 3.5–5.1)
RBC # BLD: 3.2 M/UL (ref 4–5.2)
SARS-COV-2, NAAT: NOT DETECTED
SODIUM BLD-SCNC: 129 MMOL/L (ref 136–145)
WBC # BLD: 9.2 K/UL (ref 4–11)

## 2022-05-25 PROCEDURE — 6370000000 HC RX 637 (ALT 250 FOR IP): Performed by: PODIATRIST

## 2022-05-25 PROCEDURE — 80048 BASIC METABOLIC PNL TOTAL CA: CPT

## 2022-05-25 PROCEDURE — 87635 SARS-COV-2 COVID-19 AMP PRB: CPT

## 2022-05-25 PROCEDURE — 36415 COLL VENOUS BLD VENIPUNCTURE: CPT

## 2022-05-25 PROCEDURE — 85025 COMPLETE CBC W/AUTO DIFF WBC: CPT

## 2022-05-25 PROCEDURE — 97530 THERAPEUTIC ACTIVITIES: CPT

## 2022-05-25 PROCEDURE — 6370000000 HC RX 637 (ALT 250 FOR IP): Performed by: STUDENT IN AN ORGANIZED HEALTH CARE EDUCATION/TRAINING PROGRAM

## 2022-05-25 PROCEDURE — 2580000003 HC RX 258: Performed by: PODIATRIST

## 2022-05-25 RX ORDER — CLOPIDOGREL BISULFATE 75 MG/1
75 TABLET ORAL DAILY
Qty: 30 TABLET | Refills: 3 | Status: ON HOLD | OUTPATIENT
Start: 2022-05-26 | End: 2022-06-09 | Stop reason: HOSPADM

## 2022-05-25 RX ORDER — HYDRALAZINE HYDROCHLORIDE 50 MG/1
50 TABLET, FILM COATED ORAL EVERY 8 HOURS SCHEDULED
Qty: 90 TABLET | Refills: 3 | Status: SHIPPED | OUTPATIENT
Start: 2022-05-25 | End: 2022-06-27 | Stop reason: SDUPTHER

## 2022-05-25 RX ORDER — DOCUSATE SODIUM 100 MG/1
100 CAPSULE, LIQUID FILLED ORAL DAILY
Qty: 30 CAPSULE | Refills: 0 | Status: SHIPPED | OUTPATIENT
Start: 2022-05-26 | End: 2022-06-27 | Stop reason: SDUPTHER

## 2022-05-25 RX ORDER — HYDROCODONE BITARTRATE AND ACETAMINOPHEN 5; 325 MG/1; MG/1
1 TABLET ORAL EVERY 6 HOURS PRN
Qty: 10 TABLET | Refills: 0 | Status: SHIPPED | OUTPATIENT
Start: 2022-05-25 | End: 2022-05-28

## 2022-05-25 RX ORDER — POLYETHYLENE GLYCOL 3350 17 G/17G
17 POWDER, FOR SOLUTION ORAL 2 TIMES DAILY
Qty: 527 G | Refills: 1 | Status: SHIPPED | OUTPATIENT
Start: 2022-05-25 | End: 2022-06-27 | Stop reason: SDUPTHER

## 2022-05-25 RX ORDER — GABAPENTIN 100 MG/1
200 CAPSULE ORAL 2 TIMES DAILY
Qty: 120 CAPSULE | Refills: 0 | Status: SHIPPED | OUTPATIENT
Start: 2022-05-25 | End: 2022-06-27 | Stop reason: SDUPTHER

## 2022-05-25 RX ADMIN — SODIUM CHLORIDE, PRESERVATIVE FREE 10 ML: 5 INJECTION INTRAVENOUS at 09:25

## 2022-05-25 RX ADMIN — DIGOXIN 125 MCG: 125 TABLET ORAL at 09:24

## 2022-05-25 RX ADMIN — POLYETHYLENE GLYCOL 3350 17 G: 17 POWDER, FOR SOLUTION ORAL at 09:25

## 2022-05-25 RX ADMIN — APIXABAN 5 MG: 5 TABLET, FILM COATED ORAL at 09:24

## 2022-05-25 RX ADMIN — CLOPIDOGREL BISULFATE 75 MG: 75 TABLET ORAL at 09:24

## 2022-05-25 RX ADMIN — INSULIN LISPRO 2 UNITS: 100 INJECTION, SOLUTION INTRAVENOUS; SUBCUTANEOUS at 13:41

## 2022-05-25 RX ADMIN — DOCUSATE SODIUM 100 MG: 100 CAPSULE, LIQUID FILLED ORAL at 09:24

## 2022-05-25 RX ADMIN — ASPIRIN 81 MG: 81 TABLET, COATED ORAL at 09:25

## 2022-05-25 RX ADMIN — HYDRALAZINE HYDROCHLORIDE 50 MG: 50 TABLET, FILM COATED ORAL at 13:40

## 2022-05-25 RX ADMIN — LISINOPRIL 20 MG: 20 TABLET ORAL at 09:24

## 2022-05-25 RX ADMIN — HYDRALAZINE HYDROCHLORIDE 50 MG: 50 TABLET, FILM COATED ORAL at 04:50

## 2022-05-25 RX ADMIN — GABAPENTIN 200 MG: 100 CAPSULE ORAL at 09:24

## 2022-05-25 RX ADMIN — AMLODIPINE BESYLATE 10 MG: 5 TABLET ORAL at 09:24

## 2022-05-25 NOTE — DISCHARGE SUMMARY
Hospital Medicine Discharge Summary    Patient ID: Roderick Oliveira      Patient's PCP: MARVIN Villarreal - CNP    Admit Date: 5/18/2022     Discharge Date:   5/25/22    Admitting Provider: Akila Montano MD     Discharge Provider: Diana Aburto MD     Discharge Diagnoses: Active Hospital Problems    Diagnosis     PVD (peripheral vascular disease) ([facility] [address] [I73.9]      Priority: Medium    Acute osteomyelitis of right foot ([facility] [M86.171]      Priority: Medium    Essential hypertension [I10]     Coronary artery disease involving native coronary artery of native heart without angina pectoris [I25.10]     Type 2 diabetes mellitus with diabetic polyneuropathy, without long-term current use of insulin (HCC) [E11.42]     Paroxysmal atrial fibrillation ([facility] [I48.0]        The patient was seen and examined on day of discharge and this discharge summary is in conjunction with any daily progress note from day of discharge. Hospital Course: Roderick Oliveira is a 68 y.o. female with a PMH of PVD with fem pop hx, Afib, CAD, DM2, HTN, and HLD who was a direct admit from [facility] to [facility] for osteomyelitis of the right foot. Podiatry was consulted and they recommended a vascular consult prior to right hallus and 4th toe amputation. Vascular consulted, angiogram showed good distal flow in right lower extremity. Right hallux and 4th toe amputation completed by podiatry 5/23. Discontinued abx per podiatry team and the team has signed off. Stable for discharge to SNF. Acute osteomyelitis of right foot in patient with known PVD s/p amputation of the right first and 4th toes   - Podiatry consulted and recommendations below:              -Recommended weight bear as tolerated for now in surgical shoe              -Follow up in one week after d/c  -Pain control:              -Norco 5-325 mg Q6H for 3 days              -Gabapentin 200 mg BID     Situational anxiety   -Patient anxious about recent amputation. Follow up with PCP     Osteoarthritis   -Present in bilateral knees and left upper extremity    -Continue voltaren gel BID     CAD  -Continue ASA, lisinopril, amlodipine, atorvastatin, plavix     HTN    -Continue amlodipine and lisinopril   -Started hydralazine 50 mg TID this hospital stay  -Follow up with PCP on BPs     HLD  -Continue home statin     DM2, controlled  -HA1C on 5/13/2022: 5.6%   -Continue home metformin   -Gabapentin 200 mg BID for nerve pain    -Follow up PCP      PAF  -Continue digoxin and home eliquis      Constipation, improving   -Continue daily miralax and colace  -Needs monitoring outpatient by PCP for diabetic gastroparesis      CKD Stage 3a  -Baseline creatinine 1.1-1.2, GFR in 55-44 range   -Follow up with PCP      Chronic normocytic anemia  -No s/s of bleeding at this time  -Follow up with PCP     Physical Exam Performed:   BP (!) 177/69   Pulse 80   Temp 97.9 °F (36.6 °C) (Oral)   Resp 20   Ht 5' 3\" (1.6 m)   Wt 135 lb 14.4 oz (61.6 kg)   SpO2 96%   BMI 24.07 kg/m²   General appearance:  No apparent distress, appears stated age and cooperative. HEENT:  Normal cephalic, atraumatic without obvious deformity. Pupils equal, round, and reactive to light. Extra ocular muscles intact. Conjunctivae/corneas clear. Neck: Supple, with full range of motion. No jugular venous distention. Trachea midline. Respiratory:  Normal respiratory effort. Clear to auscultation, bilaterally without Rales/Wheezes/Rhonchi. Cardiovascular:  Regular rate and rhythm with normal S1/S2 without murmurs, rubs or gallops. Abdomen: Soft, non-tender, non-distended with normal bowel sounds. Musculoskeletal:  No clubbing, cyanosis or edema bilaterally. Full range of motion without deformity. Skin: Skin color, texture, turgor normal.  No rashes or lesions. Neurologic:  Neurovascularly intact without any focal sensory/motor deficits.  Cranial nerves: II-XII intact, grossly non-focal.  Psychiatric:  Alert and oriented, thought content appropriate, normal insight  Capillary Refill: Brisk,< 3 seconds   Peripheral Pulses: +2 palpable, equal bilaterally     Labs: For convenience and continuity at follow-up the following most recent labs are provided:    CBC:    Lab Results   Component Value Date    WBC 9.2 05/25/2022    HGB 8.5 05/25/2022    HCT 26.0 05/25/2022     05/25/2022     Renal:    Lab Results   Component Value Date     05/25/2022    K 4.9 05/25/2022    CL 99 05/25/2022    CO2 20 05/25/2022    BUN 28 05/25/2022    CREATININE 1.2 05/25/2022    CALCIUM 8.9 05/25/2022    PHOS 2.2 12/29/2015       Significant Diagnostic Studies  Radiology:   VL Extremity Venous Left   Final Result      XR RADIUS ULNA LEFT (2 VIEWS)   Final Result   No acute osseous abnormality           Consults:   IP CONSULT TO PODIATRY  IP CONSULT TO VASCULAR SURGERY    Disposition:  [facility]     Condition at Discharge: Stable    Discharge Instructions/Follow-up:    -Follow up with podiatry one week after discharge   -Follow up with PCP for chronic care     Code Status:  Full Code     Activity: activity as tolerated    Diet: diabetic diet      Discharge Medications:   Current Discharge Medication List           Details   apixaban (ELIQUIS) 5 MG TABS tablet Take 1 tablet by mouth 2 times daily  Qty: 60 tablet, Refills: 0      gabapentin (NEURONTIN) 100 MG capsule Take 2 capsules by mouth 2 times daily for 30 days. Qty: 120 capsule, Refills: 0      hydrALAZINE (APRESOLINE) 50 MG tablet Take 1 tablet by mouth every 8 hours  Qty: 90 tablet, Refills: 3      diclofenac sodium (VOLTAREN) 1 % GEL Apply 2 g topically in the morning and at bedtime  Qty: 150 g, Refills: 0      miconazole (MICOTIN) 2 % powder Apply topically 2 times daily PRN.   Qty: 45 g, Refills: 1      docusate sodium (COLACE) 100 mg capsule Take 1 capsule by mouth daily  Qty: 30 capsule, Refills: 0      polyethylene glycol (GLYCOLAX) 17 g packet Take 17 g by mouth 2 times daily  Qty: 527 g, Refills: 1      clopidogrel (PLAVIX) 75 MG tablet Take 1 tablet by mouth daily  Qty: 30 tablet, Refills: 3      HYDROcodone-acetaminophen (NORCO) 5-325 MG per tablet Take 1 tablet by mouth every 6 hours as needed for Pain for up to 3 days. Intended supply: 3 days. Take lowest dose possible to manage pain  Qty: 10 tablet, Refills: 0    Comments: Reduce doses taken as pain becomes manageable  Associated Diagnoses: Status post amputation of toe of right foot (HCC)              Details   aspirin 81 MG EC tablet Take 81 mg by mouth daily      Ibuprofen (ADVIL PO) Take by mouth      vitamin D3 (CHOLECALCIFEROL) 25 MCG (1000 UT) TABS tablet Take 1 tablet by mouth daily  Qty: 1 tablet, Refills: 0      metFORMIN (GLUCOPHAGE) 500 MG tablet TAKE 2 TABLETS BY MOUTH EVERY DAY WITH BREAKFAST  Qty: 180 tablet, Refills: 0      digoxin (LANOXIN) 125 MCG tablet Take 1 tablet daily. Qty: 90 tablet, Refills: 5    Associated Diagnoses: Paroxysmal atrial fibrillation (HCC)      amLODIPine (NORVASC) 10 MG tablet TAKE 1 TABLET BY MOUTH EVERY DAY  Qty: 90 tablet, Refills: 5    Associated Diagnoses: Essential (primary) hypertension      lisinopril (PRINIVIL;ZESTRIL) 20 MG tablet TAKE 1 TABLET BY MOUTH EVERY DAY  Qty: 90 tablet, Refills: 5    Associated Diagnoses: Essential hypertension      simvastatin (ZOCOR) 20 MG tablet TAKE 1 TABLET BY MOUTH EVERY DAY AT NIGHT  Qty: 90 tablet, Refills: 5           Time Spent on discharge is more than 45 minutes in the examination, evaluation, counseling and review of medications and discharge plan. Signed:  Gucci Alexis MD, PGY-1   5/25/2022      Thank you MARVIN Borrero - SHEYLA for the opportunity to be involved in this patient's care. If you have any questions or concerns, please feel free to contact me at [phone].

## 2022-05-25 NOTE — PROGRESS NOTES
Hospitalist Progress Note      PCP: Tremayne Martinez, APRN - CNP    Date of Admission: 5/18/2022    Chief Complaint: Osteomyelitis of right foot     Hospital Course: Lin Max is a 68 y.o. female with a PMH of PVD with fem pop hx, Afib, CAD, DM2, HTN, and HLD who was a direct admit from [facility] to [facility] for osteomyelitis of the right foot. She presented on 5/13 to the [facility] ED after her PCP evaluated her for diabetic foot ulcers and imaging suggested osteomyelitis in her right foot. In [facility], she was started on IV vancomycin and cefepime. Podiatry was consulted and they recommended a vascular consult for a right hallus and 4th toe amputation. Therefore, she was transferred to our facility for a vascular consultation/workup. Vascular consulted, angiogram showed good distal flow in right lower extremity. Right hallux and 4th toe amputation completed by podiatry. Discontinued abx per podiatry team and the team has signed off. Subjective:   Delvis Tilley states her pain is improving and the gabapentin is helping with her nerve pain. She was in better spirits this morning and in less distress. She wants to go to [facility] for rehab. Her constipation is improving as well. She denies N/V/D, chest pain, abdominal pain, or SOB.        Medications:  Reviewed  Infusion Medications    dextrose      sodium chloride 25 mL (05/23/22 9066)     Scheduled Medications    gabapentin  200 mg Oral BID    apixaban  5 mg Oral BID    diclofenac sodium  2 g Topical BID    polyethylene glycol  17 g Oral BID    docusate sodium  100 mg Oral Daily    hydrALAZINE  50 mg Oral 3 times per day    clopidogrel  75 mg Oral Daily    amLODIPine  10 mg Oral Daily    aspirin  81 mg Oral Daily    digoxin  125 mcg Oral Daily    lisinopril  20 mg Oral Daily    atorvastatin  20 mg Oral Daily    insulin lispro  0-3 Units SubCUTAneous Nightly    insulin lispro  0-6 Units SubCUTAneous TID     sodium chloride flush  5-40 mL IntraVENous 2 times per day     PRN Meds: morphine, oxyCODONE **OR** oxyCODONE, hydrOXYzine, glucose, dextrose bolus **OR** dextrose bolus, glucagon (rDNA), dextrose, sodium chloride flush, sodium chloride, ondansetron **OR** ondansetron      Intake/Output Summary (Last 24 hours) at 5/25/2022 0843  Last data filed at 5/25/2022 0456  Gross per 24 hour   Intake 240 ml   Output 1600 ml   Net -1360 ml       Physical Exam Performed:    BP (!) 166/73   Pulse 80   Temp 97.9 °F (36.6 °C) (Oral)   Resp 20   Ht 5' 3\" (1.6 m)   Wt 135 lb 14.4 oz (61.6 kg)   SpO2 96%   BMI 24.07 kg/m²     General appearance: No apparent distress, appears stated age and cooperative. HEENT: Pupils equal, round, and reactive to light. Conjunctivae/corneas clear. Neck: Supple, with full range of motion. No jugular venous distention. Trachea midline. Respiratory:  Normal respiratory effort. Clear to auscultation, bilaterally without Rales/Wheezes/Rhonchi. Cardiovascular: Regular rate and rhythm with normal S1/S2 without murmurs, rubs or gallops. Abdomen: Soft, non-tender, non-distended with normal bowel sounds. Musculoskeletal: No clubbing, cyanosis or edema bilaterally. Decreased range of motion. Right foot bandaged this AM.   Skin: Skin color, texture, turgor normal.  No rashes or lesions. Neurologic:  Neurovascularly intact without any focal sensory/motor deficits. Cranial nerves: II-XII intact, grossly non-focal.  Psychiatric: Alert and oriented, thought content appropriate, normal insight.    Capillary Refill: Brisk,3 seconds, normal   Peripheral Pulses: +2 palpable, equal bilaterally       Labs:   Recent Labs     05/23/22  1014 05/24/22  0445 05/25/22  0434   WBC 11.5* 8.3 9.2   HGB 9.1* 8.4* 8.5*   HCT 27.5* 25.4* 26.0*    201 237     Recent Labs     05/23/22  1014 05/24/22  0445 05/25/22  0434   * 134* 129*   K 4.4 4.5 4.9    105 99   CO2 20* 21 20*   BUN 28* 26* 28*   CREATININE 0.9 1.0 1.2   CALCIUM 8.8 8.8 8. [address] Problems    Diagnosis     PVD (peripheral vascular disease) ([facility] [I73.9]      Priority: Medium    Acute osteomyelitis of right foot ([facility] [M86.171]      Priority: Medium    Essential hypertension [I10]     Coronary artery disease involving native coronary artery of native heart without angina pectoris [I25.10]     Type 2 diabetes mellitus with diabetic polyneuropathy, without long-term current use of insulin (HCC) [E11.42]     Paroxysmal atrial fibrillation (HCC) [I48.0]      Assessment/Plan:    Atif Wilson is a 68 y.o. female with a PMH of PVD with fem pop hx, Afib, CAD, DM2, HTN, and HLD who was a direct admit from [facility] to [facility] for osteomyelitis of the right foot. Acute osteomyelitis of right foot in patient with known PVD s/p amputation of the right first and 4th toes   -MRI right foot on 5/16/2022 revealed: osteomyelitis t/o the 1st distal phalanx. Adjacent deep soft tissue ulceration and cellulitis. Osteomyelitis of 4th middle and distal phalanges.  Adjacent deep soft tissue ulceration along the dorsal lateral aspect of the toe with adjacent cellulitis  -neurovascular checks  -Vascular surgery consulted and recommendations below:   - Angiogram showed good distal flow of right lower extremity.   - Podiatry consulted and recommendations below:   -Recommended weight bear as tolerated for now in surgical shoe   -Follow up in one week after d/c  -Pain control:   -Acetaminophen 1000 mg TID    -Gabapentin 200 mg BID    -Roxicodone 2.5 mg Q4H PRNmoderate pain    -Roxicodone 5 mg Q4H PRN severe pain   - Will continue monitoring     Situational anxiety   -Patient anxious about recent amputation   -Hydroxyzine 25 mg PRN Q6H     Osteoarthritis   -Present in bilateral knees and left upper extremity   -Acetaminophen 1000 mg TID   -Continue voltaren gel BID    CAD  -Continue ASA, lisinopril, amlodipine, atorvastatin, plavix    HTN   -Continue amlodipine, lisinopril and hydralazine TID      HLD  -Continue home atorvastatin     DM2, controlled  -HA1C on 5/13/2022: 5.6%   -Holding home medicationis  -Continue low dose SSI   -Gabapentin 200 mg BID for nerve pain      PAF  -Continue digoxin   -checking digoxin levels   -Continue home eliquis      Constipation, improving   -Continue daily miralax   -Needs monitoring outpatient by PCP for diabetic gastroparesis     CKD Stage 3a  -Baseline creatinine 1.1-1.2, GFR in 55-44 range   -Will continue to monitor     Chronic normocytic anemia  -No s/s of bleeding at this time  -Monitoring CBC     DVT Prophylaxis: Eliquis  Diet: ADULT DIET;  Full Liquid; 4 carb choices (60 gm/meal)  Code Status: Full Code    PT/OT Eval Status: Recommend SNF    Dispo - Pending SNF approval     Hyun Riggs MD   5/25/2022

## 2022-05-25 NOTE — CARE COORDINATION
Pre-cert approved for SNF with [facility] and next review date is 5/27 Karen [facility] number is [phone]. Plan is for [facility] . Transport arranged for 4 PM with [facility] . Awaiting d/c orders.

## 2022-05-25 NOTE — PROGRESS NOTES
Physical Therapy  Facility/Department: [facility] C4 PCU  Daily Treatment Note  NAME: Marion Speaker  : 1945  MRN: [id number]    Date of Service: 2022    Discharge Recommendations:  Subacute/Skilled Nursing Facility   PT Equipment Recommendations  Equipment Needed: No  Other: TBD at [facility]    Patient Diagnosis(es): The primary encounter diagnosis was Status post amputation of toe of right foot ([address] A diagnosis of Other chronic osteomyelitis, other site [facility]) was also pertinent to this visit. Assessment    Assessment: Patient seen for transfers training and LE strengthening. Patient cleared by RN for therapy participation this date. Patient agreeable to therapy. Patient completed STS and transfers with RW and mod A in post op shoe bed>recliner<>BSC for toileting. Pt limited by nayely foot pain. P.T .will continue to follow throughout LOS. Recommending DC to SNF d/t decreased independence with mobility. Activity Tolerance: Patient tolerated treatment well;Patient limited by pain; Patient limited by fatigue;Patient limited by endurance;Treatment limited secondary to decreased cognition  Equipment Needed: No  Other: TBD at [facility]     Plan    Plan  Plan: 3-5 times per week  Specific Instructions for Next Treatment: Progress ther ex and mobility as tolerated  Current Treatment Recommendations: Strengthening;Balance training;Functional mobility training;Transfer training;Gait training; Safety education & training;Patient/Caregiver education & training;Equipment evaluation, education, & procurement;Positioning;Home exercise program     Restrictions  Restrictions/Precautions  Restrictions/Precautions: Weight Bearing,Fall Risk,General Precautions  Required Braces or Orthoses?: Yes  Lower Extremity Weight Bearing Restrictions  Right Lower Extremity Weight Bearing: Weight Bearing As Tolerated  Required Braces or Orthoses  Right Lower Extremity Brace:  (R surgical shoe when OOB)  Position Activity Restriction  Other position/activity restrictions: \"WBAT RLE with surgical shoe\" per podiatry, telemetry, IV lines     Subjective    Subjective  Subjective: pt in bed, agreeable to therapy  Pain: pt reporting burning in feet with WB, 5/10 at rest  Orientation  Overall Orientation Status: Within Normal Limits  Cognition  Overall Cognitive Status: Exceptions  Arousal/Alertness: Appropriate responses to stimuli  Following Commands: Follows multistep commands with increased time; Follows multistep commands with repitition  Attention Span: Attends with cues to redirect; Difficulty attending to directions; Difficulty dividing attention  Problem Solving: Assistance required to generate solutions;Assistance required to implement solutions  Insights: Decreased awareness of deficits  Initiation: Requires cues for some  Sequencing: Requires cues for some     Objective   Vitals 95%, 93 bpm, 198/68 in supine; /62 up in chair, denies dizziness     Bed Mobility Training  Supine to Sit: Stand-by assistance (HOB elevated)  Transfer Training  Transfer Training: Yes  Sit to Stand: Moderate assistance (with RW)  Stand to Sit: Moderate assistance (with RW)  Bed to Chair: Moderate assistance (with RW bed>recliner<>BSC)  Gait Training  Gait Training: No     PT Exercises  Exercise Treatment: 1x 10 BLE LAQ, 1x 15 BLE ankle pumps, 1x 10 BLE SLR      Education: Pt educated on importance of OOB mobility, safe transfer technique, use of RW, and using BSC for toileting given pt not tolerating ambulation - requires reinforcement    Safety Devices  Type of Devices: Gait belt;Left in chair;Call light within reach; Chair alarm in place; Heels elevated for pressure relief;Nurse notified; Patient at risk for falls; All fall risk precautions in place  Restraints  Restraints Initially in Place: No       Goals  Short Term Goals  Time Frame for Short term goals: 1 week, 5/31/22 (unless otherwise specified)  Short term goal 1: Pt will transfer supine <-> sit with supervision/modified(I)  Short term goal 2: Pt will transfer sit <-> stand and bed>chair using walker with CGA x 1  Short term goal 3: Pt will ambulate x 25 feet using walker with Suzi x 1  Short term goal 4: By 5/27/22: pt will tolerate 12-15 reps BLE exercise for strengthening, balance, and endurance  Patient Goals   Patient goals : \"To get stronger and be able to walk\"    Education  Patient Education  Education Given To: Patient  Education Provided: Role of Therapy;Plan of Care;Precautions;Transfer Training;Equipment; Fall Prevention Strategies  Education Provided Comments: pt educated on importance of OOB mobility, safe use of RW, need for [facility] for toileting - requires reinforcement  Education Method: Demonstration;Verbal  Barriers to Learning: None  Education Outcome: Verbalized understanding;Demonstrated understanding;Continued education needed    Therapy Time   Individual Concurrent Group Co-treatment   Time In 0850         Time Out 0915         Minutes 25         Timed Code Treatment Minutes: 25 Minutes     If pt is unable to be seen after this session, please let this note serve as discharge summary. Please see case management note for discharge disposition. Thank you.     Allyson Jean Baptiste, PT

## 2022-05-25 NOTE — PROGRESS NOTES
Comprehensive Nutrition Assessment    Type and Reason for Visit:  RD Nutrition Re-Screen/LOS    Nutrition Recommendations/Plan:   1. Continue easy to chew, carb control diet  2. Encourage PO intakes  3. Monitor nutrition adequacy, pertinent labs, bowel habits, wt changes, and clinical progress     Malnutrition Assessment:  Malnutrition Status:  No malnutrition (05/25/22 1333)      Nutrition Assessment:    LOS assessment. Pt admitted for chronic DM ulcers to toes, osteomyelitis. PMH T2DM, CAD, HTN, CVA. S/p amputation of R 1st and 4th toes on 5/23. Pt on easy to chew, carb control diet with PO intakes % at meals. Pt reports good appetite/intakes. Weight appears stable per EMR. Pt reports chronic constipation, encouraged fiber and fluid intakes. Pt denies any nutritional need or questions at this time. Will continue to monitor. Nutrition Related Findings:    Chronic constipation, on colace and glycolax, +BM 5/22. Poor dentition- denies issues w/ chewing. Na 129. -175mg/dL x24hrs. +1 non-pitting LUE edema. Current Nutrition Intake & Therapies:    Average Meal Intake: %  Average Supplements Intake: None Ordered  ADULT DIET; Easy to Chew; 4 carb choices (60 gm/meal)    Anthropometric Measures:  Height: 5' 3\" (160 cm)  Ideal Body Weight (IBW): 115 lbs (52 kg)       Current Body Weight: 135 lb (61.2 kg), 117.4 % IBW. Weight Source: Standing Scale  Current BMI (kg/m2): 23.9                          BMI Categories: Normal Weight (BMI 18.5-24. 9)    Nutrition Diagnosis:   No nutrition diagnosis at this time     Nutrition Interventions:   Food and/or Nutrient Delivery: Continue Current Diet  Nutrition Education/Counseling: No recommendation at this time  Coordination of Nutrition Care: Continue to monitor while inpatient  Plan of Care discussed with: Patient    Goals:     Goals: PO intake 50% or greater,prior to discharge       Nutrition Monitoring and Evaluation:   Behavioral-Environmental Outcomes: None Identified  Food/Nutrient Intake Outcomes: Food and Nutrient Intake  Physical Signs/Symptoms Outcomes: Weight,Biochemical Data    Discharge Planning:    Continue current diet     [address], RD  Contact: [phone]

## 2022-05-25 NOTE — PROGRESS NOTES
Pt was up in the chair this afternoon watching tv and talkative and energetic. Pt got up several times and set the safety alarm off. She was restless about discharge. She was worried about leaving anything behind. She was watching the news of the [place] shooting, I just noticed that she picked at her scab on her right lower extremity and was picking at her left femoral dressing. I removed the old dressing and cleansed the areas. pts iv site bled some cleansed area and changed pts gown. Pt was assisted out by transport via cart. Pt was happy and in good spirits. Called report to jesus at the [phone] [place] [address] and also updated pts family. Son called in and paid for pts meds and transport picked them up. There is also a script for norco in a security bag. Pt has mentioned many times that she is a hoarder, she had 3 bags of belongings from her home and from her hospital room. She took her beautiful vase of roses with her too.

## 2022-05-25 NOTE — PLAN OF CARE
Problem: Pain  Goal: Verbalizes/displays adequate comfort level or baseline comfort level  Outcome: Completed  Flowsheets (Taken 5/24/2022 2347 by Shavonne Arcos RN)  Verbalizes/displays adequate comfort level or baseline comfort level:   Encourage patient to monitor pain and request assistance   Assess pain using appropriate pain scale   Administer analgesics based on type and severity of pain and evaluate response   Implement non-pharmacological measures as appropriate and evaluate response     Problem: Safety - Adult  Goal: Free from fall injury  Outcome: Completed     Problem: Chronic Conditions and Co-morbidities  Goal: Patient's chronic conditions and co-morbidity symptoms are monitored and maintained or improved  Outcome: Completed     Problem: Discharge Planning  Goal: Discharge to home or other facility with appropriate resources  Outcome: Completed     Problem: ABCDS Injury Assessment  Goal: Absence of physical injury  Outcome: Completed   20 mins on reviewing poc

## 2022-05-26 NOTE — ADT AUTH CERT
Utilization Reviews         Osteomyelitis - Care Day 6 (5/23/2022) by Renetta Oconnor RN       Review Status Review Entered   Completed 5/24/2022 18:08      Criteria Review      Care Day: 6 Care Date: 5/23/2022 Level of Care: Inpatient Floor    Guideline Day 3    Level Of Care    (X) Floor    5/24/2022 6:08 PM EDT by Candido Rodriguez      Inpatient    Clinical Status    ( ) * Hemodynamic stability    5/24/2022 6:08 PM EDT by Candido Rodriguez      BP: 187/70,100/38,186/69  pR: 32,89,59    Activity    (X) As tolerated    5/24/2022 6:08 PM EDT by Candido Rodriguez      as tolerated    Routes    (X) Oral hydration    5/24/2022 6:08 PM EDT by Candido Rodriguez      po hydration    (X) Parenteral medications    5/24/2022 6:08 PM EDT by Candido Rodriguez      Apresoline 5mg iv once    (X) Usual diet    5/24/2022 6:08 PM EDT by Bakari Brock DIET; Full Liquid; 4 carb choices (60 gm/meal)    Interventions    ( ) Possible physical therapy    5/24/2022 6:08 PM EDT by Flavia Fruits none noted    (X) WBC, ESR, C-reactive protein    5/24/2022 6:08 PM EDT by Candido Rodriguez      WBC    Medications    (X) Parenteral antibiotics    5/24/2022 6:08 PM EDT by Candido Rodriguez      Maxipime 2000mg iv q12h  Vancocin 750mg iv once    * Milestone   Additional Notes   DATE: 5/23/22      Pertinent Updates:   -Right foot pain. Patient complaining of pain in toes. She is also complaining of bilateral knee pain. -pain level of 10/10   -continue still w/ IV antibiotics   -AMPUTATION OF RIGHT FIRST AND FOURTH TOES         Vitals:   BP: 187/70   VA: 56   RR: 26   T: 98.9   SpO2: 95% on ra      Abnl/Pertinent Labs/Radiology/Diagnostic Studies:   Na: 131   Co2: 20   BUN: 28   GLUCOSE, FASTING,GF: 131   POC Glucose: 135,137   WBC: 11.5   RBC: 3.46   H/H: 9.1/27.5      Physical Exam:   Musculoskeletal:  Significant bony deformity/enlargement in bilateral knees. No synovitis.        MD Consults/Assessments & Plans:   *VANNESA GALVAN NOTES*   PVD (peripheral vascular disease) ([facility] [I73.9] 05/18/2022   Priority: Medium   Acute osteomyelitis of right foot [facility]) Milind Sanabria 05/13/2022   Priority: Medium   Essential hypertension [I10] 07/19/2016   Coronary artery disease involving native coronary artery of native heart without angina pectoris [I25.10] 07/19/2016   Type 2 diabetes mellitus with diabetic polyneuropathy, without long-term current use of insulin ([facility] [E11.42] 07/19/2016   Paroxysmal atrial fibrillation ([facility] [I48.0] 04/25/2011       1. OM of right toes - Continue antibiotics. Plan for amputation per podiatry. 2.OA knee - Patient with OA in knees. Treat with topical voltaren gel and tylenol. 3.DMII - Continue to monitor BS and continue insulin SS.    4.CAD - Continue ASA, plavix and statin.          *PODIATRY NOTES*   Date of Procedure: 5/23/2022   Pre-Op Diagnosis: OSTEOMYELITIS RIGHT FIRST AND FOURTH TOES   Post-Op Diagnosis: Same   Procedure(s):   AMPUTATION OF RIGHT FIRST AND FOURTH TOES   Anesthesia: Choice   Estimated Blood Loss (mL): less than 50    Complications: None         Medications:   acetaminophen (TYLENOL) tablet 1,000 mg   Dose: 1,000 mg   Freq: 3 times daily Route: PO x2      amLODIPine (NORVASC) tablet 10 mg   Dose: 10 mg   Freq: DAILY Route: PO      aspirin EC tablet 81 mg   Dose: 81 mg   Freq: DAILY Route: PO      atorvastatin (LIPITOR) tablet 20 mg   Dose: 20 mg   Freq: DAILY Route: PO      clopidogrel (PLAVIX) tablet 150 mg   Dose: 150 mg   Freq: ONCE Route: PO      digoxin (LANOXIN) tablet 125 mcg   Dose: 125 mcg   Freq: DAILY Route: PO      docusate sodium (COLACE) capsule 100 mg   Dose: 100 mg   Freq: DAILY Route: PO      enoxaparin (LOVENOX) injection 40 mg   Dose: 40 mg   Freq: DAILY Route: SC      hydrALAZINE (APRESOLINE) tablet 50 mg   Dose: 50 mg   Freq: 3 times per day Route: PO      insulin lispro (HUMALOG) injection vial 0-6 Units   Dose: 0-6 Units   Freq: 3 TIMES DAILY WITH MEALS Route: SC      lisinopril (PRINIVIL;ZESTRIL) tablet 20 mg Dose: 20 mg      polyethylene glycol (GLYCOLAX) packet 17 g   Dose: 17 g   Freq: 2 TIMES DAILY Route: PO      0.9 % sodium chloride infusion   Freq: PRN Route: IV  dose: 100ml/hr x1      acetaminophen (TYLENOL) tablet 650 mg   Dose: 650 mg   Freq: EVERY 6 HOURS PRN Route: PO x1      bupivacaine (PF) (MARCAINE) 0.5 % injection   Freq: PRN  dose: 15ml x1      hydrOXYzine (ATARAX) tablet 25 mg   Dose: 25 mg   Freq: EVERY 6 HOURS PRN Route: PO x1      lidocaine PF 2 % injection   Freq: PRN  dose: 5ml x1      oxyCODONE (ROXICODONE) immediate release tablet 2.5 mg   Dose: 2.5 mg   Freq: EVERY 4 HOURS PRN Route: PO x1      sodium chloride 0.9 % 3,000 mL with gentamicin (GARAMYCIN) 80 mg   Freq: PRN x1   Freq: DAILY Route: PO         Orders:   -POCT Glucose   -Basic Metabolic Panel w/ Reflex to MG    -CBC with Auto Differential    -Vancomycin Level, Random    -HYPOGLYCEMIA TREATMENT: blood glucose less than 50 mg/dL and patient  ALERT and TOLERATING PO    -Continue above meds   -Surgical Pathology    -ADULT DIET; Full Liquid; 4 carb choices (60 gm/meal)   -Nursing communication    -PT/OT eval and treat                 Osteomyelitis - Care Day 5 (5/22/2022) by Luis Fernando Rodriguez RN       Review Status Review Entered   Completed 5/24/2022 17:36      Criteria Review      Care Day: 5 Care Date: 5/22/2022 Level of Care: Inpatient Floor    Guideline Day 3    Level Of Care    (X) Floor    5/24/2022 5:35 PM EDT by Bipin Bruce      Inpatient    Clinical Status    ( ) * Hemodynamic stability    5/24/2022 5:35 PM EDT by Bipin Bruce      BP: 165/66,162/61,160/60  VX:12,21,83    Activity    (X) As tolerated    5/24/2022 5:35 PM EDT by Sara Rodriguez as tolerated    Routes    (X) Oral hydration    5/24/2022 5:35 PM EDT by Bipin Bruce      po hydration    (X) Usual diet    5/24/2022 5:35 PM EDT by Baltazar Zavala DIET;  Regular; 5 carb choices (75 gm/meal)    Interventions    ( ) Possible physical therapy    5/24/2022 5:35 PM EDT by Orjesu Romeo none noted    (X) WBC, ESR, C-reactive protein    5/24/2022 5:35 PM EDT by Georgia Payment      WBC    Medications    (X) Parenteral antibiotics    5/24/2022 5:35 PM EDT by Georgia Payment      Maxipime 2000mg iv q12h  Vancocin 750mg iv once    * Milestone   Additional Notes   DATE: 5/22/22      Pertinent Updates:   -pain level of 5/10   -continue with IV antibiotics   -added Hydralazine to prescribed medications      Vitals:   BP: 165/66   TN: 66   RR: 20   T: 98.3   SpO2: 95% on ra      Abnl/Pertinent Labs/Radiology/Diagnostic Studies:   Na: 132   CO2: 20   BUN: 26   GFR Non-: 48   GFR : 58   GLUCOSE, FASTING,GF: 125   POC Glucose: 147,180   RBC: 3.26   H/H: 8.7/26.2      Physical Exam:   Musculoskeletal:    Left upper extremity examination:   Inspection: no Abraisons, no swelling,  no acute bleeding. Palpation: Tender to touch on extensor surface of forearm.    Range of motion: passive and active range of motion intact, patient was complaining of pain during supination and pronation of left upper extremity. Crepitus present when palpating and during passive exam.    Strengths: Biceps flexion 4 out of 5, triceps flexion 4 out of 5, wrist extension 4 out of 5, finger flexion 5 out of 5, Finger abduction 4 out of 5   Sensation intact. Neurovascular intact in left upper extremity.    No clubbing, cyanosis or edema bilaterally.  Full range of motion without deformity. Skin: right foot with great toe and fourth toe tip mildly necrotic ulcer. Erythema present. Tenderness.       MD Consults/Assessments & Plans:   *PODIATRY NOTES*   1) Osteomyelitis, right hallux and fourth toe   - Antibiotics per Primary Team (Cefepime & Vanc)   - Patient will benefit from amputation of the right hallux and fourth toe.  Will schedule for Monday   - Weight-bear as tolerated for now   - NPO at midnight   2) PVD   - Angio 5/20/22   3) DM      IM MD NOTES*   Assessment   Right foot osteomyelitis   CAD   HTN   HLD   DMII   PAF       Plan   Podiatry consulted   ID consulted   Foot viable per vascular surgery recs   Plan for OR Monday   Continue cefepime, vanc   Continue ASA, lisinopril, norvasc, statin, plavix, digoxin. Added hydralazine.  Holding eliquis      Medications:   amLODIPine (NORVASC) tablet 10 mg   Dose: 10 mg   Freq: DAILY Route: PO      aspirin EC tablet 81 mg   Dose: 81 mg   Freq: DAILY Route: PO      atorvastatin (LIPITOR) tablet 20 mg   Dose: 20 mg   Freq: DAILY Route: PO      clopidogrel (PLAVIX) tablet 75 mg   Dose: 75 mg   Freq: DAILY Route: PO      digoxin (LANOXIN) tablet 125 mcg   Dose: 125 mcg   Freq: DAILY Route: PO      docusate sodium (COLACE) capsule 100 mg   Dose: 100 mg   Freq: DAILY Route: PO      enoxaparin (LOVENOX) injection 40 mg   Dose: 40 mg   Freq: DAILY Route: SC      hydrALAZINE (APRESOLINE) tablet 50 mg   Dose: 50 mg   Freq: 3 times per day Route: PO      insulin lispro (HUMALOG) injection vial 0-3 Units   Dose: 0-3 Units   Freq: NIGHTLY Route: SC      insulin lispro (HUMALOG) injection vial 0-6 Units   Dose: 0-6 Units   Freq: 3 TIMES DAILY WITH MEALS Route: SC      lisinopril (PRINIVIL;ZESTRIL) tablet 20 mg   Dose: 20 mg   Freq: DAILY Route: PO      polyethylene glycol (GLYCOLAX) packet 17 g   Dose: 17 g   Freq: DAILY Route: PO      polyethylene glycol (GLYCOLAX) packet 17 g   Dose: 17 g   Freq: 2 TIMES DAILY Route: PO x1      0.9 % sodium chloride infusion   Freq: PRN Route: IV x3      acetaminophen (TYLENOL) tablet 650 mg   Dose: 650 mg   Freq: EVERY 6 HOURS PRN Route: PO x1      Orders:   -POCT Glucose    -Vancomycin Level, Random    -Comprehensive Metabolic Panel w/ Reflex to MG    -CBC with Auto Differential    -Continue above meds           Osteomyelitis - Care Day 4 (5/21/2022) by Katelynn Pennington RN       Review Status Review Entered   Completed 5/24/2022 17:10      Criteria Review      Care Day: 4 Care Date: 5/21/2022 Level of Care: Inpatient Floor Guideline Day 3    Level Of Care    (X) Floor    5/24/2022 5:10 PM EDT by Rubio Castillo      Inpatient    Clinical Status    ( ) * Hemodynamic stability    5/24/2022 5:10 PM EDT by Rubio Castillo      BP: 181/76,180/69,177/64  ND: 86,78    Activity    (X) As tolerated    5/24/2022 5:10 PM EDT by Melida Peaches as tolerated    Routes    (X) Oral hydration    5/24/2022 5:10 PM EDT by Ruboi Castillo      oral hydration    (X) Usual diet    5/24/2022 5:10 PM EDT by Javier Johnson DIET; Regular; 5 carb choices (75 gm/meal)    Interventions    ( ) Possible physical therapy    5/24/2022 5:10 PM EDT by Rubio Castillo      none noted    (X) WBC, ESR, C-reactive protein    5/24/2022 5:10 PM EDT by Rubio Castillo      WBC    Medications    (X) Parenteral antibiotics    5/24/2022 5:10 PM EDT by Rubio Castillo      Maxipime 2000mg iv q12h  Vancocin 750mg iv once    * Milestone   Additional Notes   DATE: 5/21/22      Pertinent Updates:   -remains on IV antibiotics   - Having some right arm swelling and some tenderness   -pt is hypertensive           Vitals:   BP: 181/76   ND: 86   RR: 20   T: 99.4   SpO2: 96% on ra      Abnl/Pertinent Labs/Radiology/Diagnostic Studies:   Na: 132   CO2: 19   BUN: 23   GFR Non-: 48   GFR : 58   GLUCOSE, FASTING,GF: 205   POC Glucose: 203,182   RBC: 3.36   H/H: 9.1/27.0   XR RADIUS ULNA LEFT (2 VIEWS):   No acute osseous abnormality       Physical Exam:   Musculoskeletal:    Left upper extremity examination:   Inspection: no Abraisons, no swelling,  no acute bleeding. Palpation: Tender to touch on extensor surface of forearm.    Range of motion: passive and active range of motion intact, patient was complaining of pain during supination and pronation of left upper extremity.  Crepitus present when palpating and during passive exam.    Strengths: Biceps flexion 4 out of 5, triceps flexion 4 out of 5, wrist extension 4 out of 5, finger flexion 5 out of 5, Finger abduction 4 out of 5   Sensation intact. Neurovascular intact in left upper extremity.    No clubbing, cyanosis or edema bilaterally.  Full range of motion without deformity. Skin: right foot with great toe and fourth toe tip mildly necrotic ulcer. Erythema present. Tenderness.          MD Consults/Assessments & Plans:   *PODIATRY NOTES*   1) Osteomyelitis, right hallux and fourth toe   - Antibiotics per Primary Team (Cefepime & Vanc)   - Patient will benefit from amputation of the right hallux and fourth toe.  Tentatively Monday   - Weight-bear as tolerated for now   2) PVD   - Angio yesterday   3) DM      *IM MD NOTES*   Assessment   Right foot osteomyelitis   CAD   HTN   HLD   DMII   PAF       Plan   Podiatry consulted   ID consulted   Foot viable per vascular surgery recs   Plan for OR Monday   Continue cefepime, vanc   Continue ASA, lisinopril, norvasc, statin, plavix, digoxin.  Holding eliquis           Medications:   amLODIPine (NORVASC) tablet 10 mg   Dose: 10 mg   Freq: DAILY Route: PO      aspirin EC tablet 81 mg   Dose: 81 mg   Freq: DAILY Route: PO      atorvastatin (LIPITOR) tablet 20 mg   Dose: 20 mg   Freq: DAILY Route: PO      clopidogrel (PLAVIX) tablet 150 mg   Dose: 75 mg   Freq: ONCE Route: PO      digoxin (LANOXIN) tablet 125 mcg   Dose: 125 mcg   Freq: DAILY Route: PO      enoxaparin (LOVENOX) injection 40 mg   Dose: 40 mg   Freq: DAILY Route: SC      hydrALAZINE (APRESOLINE) tablet 50 mg   Dose: 50 mg   Freq: 3 times per day Route: PO      hydrALAZINE (APRESOLINE) tablet 50 mg   Dose: 50 mg   Freq: 2 times per day Route: PO      insulin lispro (HUMALOG) injection vial 0-3 Units   Dose: 0-3 Units   Freq: NIGHTLY Route: SC      insulin lispro (HUMALOG) injection vial 0-6 Units   Dose: 0-6 Units   Freq: 3 TIMES DAILY WITH MEALS Route: SC      lisinopril (PRINIVIL;ZESTRIL) tablet 20 mg   Dose: 20 mg   Freq: DAILY Route: PO      polyethylene glycol (GLYCOLAX) packet 17 g   Dose: 17 g   Freq: DAILY Route: PO      0.9 % sodium chloride infusion   Freq: PRN Route: IV  dose: 5ml/hr x3         Orders:   -POCT Glucose    -VL Extremity Venous Left    -XR RADIUS ULNA LEFT (2 VIEWS)    -Vancomycin Level, Random    -Comprehensive Metabolic Panel w/ Reflex to MG    -CBC with Auto Differential            Osteomyelitis - Care Day 3 (5/20/2022) by Jeferson Mcintyre RN       Review Status Review Entered   Completed 5/24/2022 15:13      Criteria Review      Care Day: 3 Care Date: 5/20/2022 Level of Care: Inpatient Floor    Guideline Day 3    Level Of Care    (X) Floor    5/24/2022 3:13 PM EDT by Tonmahad Slice      Inpatient    Clinical Status    ( ) * Hemodynamic stability    5/24/2022 3:13 PM EDT by Tonna Slice      BP:162/58,183/72,162/58  DE: 80,70,05    Activity    (X) As tolerated    5/24/2022 3:13 PM EDT by Yomahad Davalos      up as tolerated    Routes    (X) Oral hydration    5/24/2022 3:13 PM EDT by Zane Davalos      on NPO status    (X) Parenteral medications    5/24/2022 3:13 PM EDT by [facility]      fentaNYL (SUBLIMAZE) injection  Freq: ONCE PRN   dose: 25mcg  route: iv x9  Heparin 1000U once prn x 1  Heparin 5000U once prn x 1  Versed 1mg once iv prn x 2    ( ) Usual diet    5/24/2022 3:13 PM EDT by Jairo Casper currently NPO    Interventions    ( ) Possible physical therapy    5/24/2022 3:13 PM EDT by [facility]      none noted    (X) WBC, ESR, C-reactive protein    5/24/2022 3:13 PM EDT by [facility]      WBC (8.2) within normal value    Medications    (X) Parenteral antibiotics    5/24/2022 3:13 PM EDT by Tonna [facility]      Maxipime 2000mg iv q12h  Vancocin 500mg iv once    * Milestone   Additional Notes   DATE: 5/20/22      Pertinent Updates:   -Patient in angiogram today   -pain level: 5/10   -currently on IV antibiotics   - Awaiting final decision regarding amputation       Vitals:   BP: 185/61   DE: 76   RR: 19   T: 98   SpO2: 95% on ra      Abnl/Pertinent Labs/Radiology/Diagnostic Studies: CO2: 20   BUN: 34   GFR Non-: 54   GLUCOSE, FASTING,GF: 128   POC Glucose: 130,134   RBC: 3.23   H/H: 8.8/25.9      Physical Exam:   Integument:  Dry, black, necrotic changes at the distal right hallux and lateral right fourth toe.  No surrounding erythema.  No acute signs of infection.  Otherwise, skin is thin, dry and atrophic, bilateral.      MD Consults/Assessments & Plans:   *IM MD NOTES*   Acute osteomyelitis of right foot in patient with known PVD   -MRI right foot on 5/16/2022 revealed: osteomyelitis t/o the 1st distal phalanx. Adjacent deep soft tissue ulceration and cellulitis. Osteomyelitis of 4th middle and distal phalanges. Adjacent deep soft tissue ulceration along the dorsal lateral aspect of the toe with adjacent cellulitis   -Continue abx regimen of cefepime and vancomycin    -Checking vanc levels    -neurovascular checks   -Vascular surgery consulted and recommendations below:   -Angiogram today   CAD   -Continue ASA, lisinopril, amlodipine, atorvastatin   HTN    -Continue amlodipine, lisinopril and hydralazine   HLD   -Continue home atorvastatin   DM2, controlled   -HA1C on 5/13/2022: 5.6%    -Holding home medicationis   -Low dose SSI    PAF   -Continue digoxin   -checking digoxin levels    -Holding home eliquis    Chronic normocytic anemia   -No s/s of bleeding at this time   -Monitoring   DVT Prophylaxis: Lovenox   Diet: Diet NPO   Code Status: Full Code   PT/OT Eval Status: N/a       *PODIATRIST NOTES*   1) Osteomyelitis, right hallux and fourth toe   - Antibiotics per Primary Team (Cefepime & Vanc)   - Patient will benefit from amputation of the right hallux and fourth toe.  Tentatively Monday   - Weight-bear as tolerated for now   2) PVD   - Angio earlier today   3) DM      *VASCULAR MD NOTES*   DATE OF PROCEDURE:  05/20/2022   PREOPERATIVE DIAGNOSES:  Peripheral vascular disease with ischemic   gangrene of several toes of the right foot and evidence of   osteomyelitis. POSTOPERATIVE DIAGNOSES:  Peripheral vascular disease with ischemic   gangrene of several toes of the right foot and evidence of   osteomyelitis. PROCEDURES PERFORMED:   1.  Ultrasound-guided left femoral artery access. 2.  Insertion of catheter into the distal aorta. 3.  Bilateral lower extremity angiograms. 4.  Angioplasty and intravascular lithotripsy of the right superficial   femoral artery, popliteal artery, and anterior tibial artery. ANESTHESIA:  Local with moderate monitored sedation.           Medications:   aspirin EC tablet 81 mg   Dose: 81 mg   Freq: DAILY Route: PO      atorvastatin (LIPITOR) tablet 20 mg   Dose: 20 mg   Freq: DAILY Route: PO      clopidogrel (PLAVIX) tablet 150 mg   Dose: 150 mg   Freq: ONCE Route: PO      hydrALAZINE (APRESOLINE) tablet 50 mg   Dose: 50 mg   Freq: 2 times per day Route: PO x1      0.9 % sodium chloride infusion   Freq: PRN Route: IV   dose: 10ml/hr x3         Orders:   -POCT Glucose    -Vancomycin Level, Random    -Vancomycin Level, Random    -Continue above meds      CM Assessments or Notes:   Patient from home with spouse with HX of frequent falls. She won't let anyone into home. She has [facility] in past. Normally independent with ADL's. Vascular following and patient had BLE Angiogram. PTA L SFA /Pop/AT. Podiatry following also. Awaiting final decision regarding amputation to be determined based on testing.  Continue current  Management per MD. Following for needs.

## 2022-06-07 ENCOUNTER — HOSPITAL ENCOUNTER (INPATIENT)
Age: 77
LOS: 3 days | Discharge: NURSING, REHAB OR LONG-TERM CARE | DRG: 378 | End: 2022-06-10
Attending: STUDENT IN AN ORGANIZED HEALTH CARE EDUCATION/TRAINING PROGRAM | Admitting: INTERNAL MEDICINE
Payer: MEDICARE

## 2022-06-07 DIAGNOSIS — I48.0 PAROXYSMAL ATRIAL FIBRILLATION (HCC): ICD-10-CM

## 2022-06-07 DIAGNOSIS — D62 ACUTE BLOOD LOSS ANEMIA: Primary | ICD-10-CM

## 2022-06-07 DIAGNOSIS — K92.1 MELENA: ICD-10-CM

## 2022-06-07 PROBLEM — D64.9 ANEMIA: Status: ACTIVE | Noted: 2022-06-07

## 2022-06-07 LAB
A/G RATIO: 1.3 (ref 1.1–2.2)
ABO/RH: NORMAL
ALBUMIN SERPL-MCNC: 3.9 G/DL (ref 3.4–5)
ALP BLD-CCNC: 76 U/L (ref 40–129)
ALT SERPL-CCNC: 15 U/L (ref 10–40)
AMORPHOUS: ABNORMAL /HPF
ANION GAP SERPL CALCULATED.3IONS-SCNC: 14 MMOL/L (ref 3–16)
ANTIBODY SCREEN: NORMAL
AST SERPL-CCNC: 16 U/L (ref 15–37)
BACTERIA: ABNORMAL /HPF
BASOPHILS ABSOLUTE: 0.1 K/UL (ref 0–0.2)
BASOPHILS RELATIVE PERCENT: 1.2 %
BILIRUB SERPL-MCNC: <0.2 MG/DL (ref 0–1)
BILIRUBIN URINE: NEGATIVE
BLOOD, URINE: ABNORMAL
BUN BLDV-MCNC: 38 MG/DL (ref 7–20)
CALCIUM SERPL-MCNC: 9.1 MG/DL (ref 8.3–10.6)
CHLORIDE BLD-SCNC: 101 MMOL/L (ref 99–110)
CLARITY: CLEAR
CO2: 20 MMOL/L (ref 21–32)
COLOR: YELLOW
CREAT SERPL-MCNC: 1.1 MG/DL (ref 0.6–1.2)
EOSINOPHILS ABSOLUTE: 0.3 K/UL (ref 0–0.6)
EOSINOPHILS RELATIVE PERCENT: 4.1 %
EPITHELIAL CELLS, UA: ABNORMAL /HPF (ref 0–5)
GFR AFRICAN AMERICAN: 58
GFR NON-AFRICAN AMERICAN: 48
GLUCOSE BLD-MCNC: 140 MG/DL (ref 70–99)
GLUCOSE BLD-MCNC: 141 MG/DL (ref 70–99)
GLUCOSE URINE: NEGATIVE MG/DL
HCT VFR BLD CALC: 19.7 % (ref 36–48)
HCT VFR BLD CALC: 20.6 % (ref 36–48)
HEMOGLOBIN: 6.5 G/DL (ref 12–16)
HEMOGLOBIN: 6.8 G/DL (ref 12–16)
KETONES, URINE: NEGATIVE MG/DL
LEUKOCYTE ESTERASE, URINE: NEGATIVE
LYMPHOCYTES ABSOLUTE: 1.6 K/UL (ref 1–5.1)
LYMPHOCYTES RELATIVE PERCENT: 19.4 %
MCH RBC QN AUTO: 27.1 PG (ref 26–34)
MCHC RBC AUTO-ENTMCNC: 32.8 G/DL (ref 31–36)
MCV RBC AUTO: 82.7 FL (ref 80–100)
MICROSCOPIC EXAMINATION: YES
MONOCYTES ABSOLUTE: 0.4 K/UL (ref 0–1.3)
MONOCYTES RELATIVE PERCENT: 5.1 %
NEUTROPHILS ABSOLUTE: 5.8 K/UL (ref 1.7–7.7)
NEUTROPHILS RELATIVE PERCENT: 70.2 %
NITRITE, URINE: NEGATIVE
OCCULT BLOOD DIAGNOSTIC: ABNORMAL
PDW BLD-RTO: 16.7 % (ref 12.4–15.4)
PERFORMED ON: ABNORMAL
PH UA: 5.5 (ref 5–8)
PLATELET # BLD: 342 K/UL (ref 135–450)
PMV BLD AUTO: 6.4 FL (ref 5–10.5)
POTASSIUM SERPL-SCNC: 4.7 MMOL/L (ref 3.5–5.1)
PROTEIN UA: NEGATIVE MG/DL
RBC # BLD: 2.39 M/UL (ref 4–5.2)
RBC UA: ABNORMAL /HPF (ref 0–4)
SARS-COV-2, NAAT: NOT DETECTED
SODIUM BLD-SCNC: 135 MMOL/L (ref 136–145)
SPECIFIC GRAVITY UA: <=1.005 (ref 1–1.03)
TOTAL PROTEIN: 6.9 G/DL (ref 6.4–8.2)
URINE REFLEX TO CULTURE: ABNORMAL
URINE TYPE: ABNORMAL
UROBILINOGEN, URINE: 0.2 E.U./DL
WBC # BLD: 8.3 K/UL (ref 4–11)
WBC UA: ABNORMAL /HPF (ref 0–5)

## 2022-06-07 PROCEDURE — 86901 BLOOD TYPING SEROLOGIC RH(D): CPT

## 2022-06-07 PROCEDURE — 81001 URINALYSIS AUTO W/SCOPE: CPT

## 2022-06-07 PROCEDURE — 2580000003 HC RX 258: Performed by: INTERNAL MEDICINE

## 2022-06-07 PROCEDURE — 6370000000 HC RX 637 (ALT 250 FOR IP): Performed by: INTERNAL MEDICINE

## 2022-06-07 PROCEDURE — P9016 RBC LEUKOCYTES REDUCED: HCPCS

## 2022-06-07 PROCEDURE — 87635 SARS-COV-2 COVID-19 AMP PRB: CPT

## 2022-06-07 PROCEDURE — 85018 HEMOGLOBIN: CPT

## 2022-06-07 PROCEDURE — 80053 COMPREHEN METABOLIC PANEL: CPT

## 2022-06-07 PROCEDURE — 1200000000 HC SEMI PRIVATE

## 2022-06-07 PROCEDURE — 86850 RBC ANTIBODY SCREEN: CPT

## 2022-06-07 PROCEDURE — 86923 COMPATIBILITY TEST ELECTRIC: CPT

## 2022-06-07 PROCEDURE — 96374 THER/PROPH/DIAG INJ IV PUSH: CPT

## 2022-06-07 PROCEDURE — C9113 INJ PANTOPRAZOLE SODIUM, VIA: HCPCS | Performed by: PHYSICIAN ASSISTANT

## 2022-06-07 PROCEDURE — 99285 EMERGENCY DEPT VISIT HI MDM: CPT

## 2022-06-07 PROCEDURE — 85014 HEMATOCRIT: CPT

## 2022-06-07 PROCEDURE — 83036 HEMOGLOBIN GLYCOSYLATED A1C: CPT

## 2022-06-07 PROCEDURE — 86900 BLOOD TYPING SEROLOGIC ABO: CPT

## 2022-06-07 PROCEDURE — 85025 COMPLETE CBC W/AUTO DIFF WBC: CPT

## 2022-06-07 PROCEDURE — 82270 OCCULT BLOOD FECES: CPT

## 2022-06-07 PROCEDURE — 36415 COLL VENOUS BLD VENIPUNCTURE: CPT

## 2022-06-07 PROCEDURE — 6360000002 HC RX W HCPCS: Performed by: PHYSICIAN ASSISTANT

## 2022-06-07 RX ORDER — GABAPENTIN 100 MG/1
200 CAPSULE ORAL 2 TIMES DAILY
Status: DISCONTINUED | OUTPATIENT
Start: 2022-06-07 | End: 2022-06-10 | Stop reason: HOSPADM

## 2022-06-07 RX ORDER — ACETAMINOPHEN 325 MG/1
650 TABLET ORAL EVERY 6 HOURS PRN
Status: DISCONTINUED | OUTPATIENT
Start: 2022-06-07 | End: 2022-06-10 | Stop reason: HOSPADM

## 2022-06-07 RX ORDER — SODIUM CHLORIDE 0.9 % (FLUSH) 0.9 %
5-40 SYRINGE (ML) INJECTION PRN
Status: DISCONTINUED | OUTPATIENT
Start: 2022-06-07 | End: 2022-06-10 | Stop reason: HOSPADM

## 2022-06-07 RX ORDER — SODIUM CHLORIDE 9 MG/ML
INJECTION, SOLUTION INTRAVENOUS CONTINUOUS
Status: DISCONTINUED | OUTPATIENT
Start: 2022-06-07 | End: 2022-06-08

## 2022-06-07 RX ORDER — DOCUSATE SODIUM 100 MG/1
100 CAPSULE, LIQUID FILLED ORAL DAILY
Status: DISCONTINUED | OUTPATIENT
Start: 2022-06-08 | End: 2022-06-10 | Stop reason: HOSPADM

## 2022-06-07 RX ORDER — VITAMIN B COMPLEX
1000 TABLET ORAL DAILY
Status: DISCONTINUED | OUTPATIENT
Start: 2022-06-08 | End: 2022-06-10 | Stop reason: HOSPADM

## 2022-06-07 RX ORDER — INSULIN LISPRO 100 [IU]/ML
0-3 INJECTION, SOLUTION INTRAVENOUS; SUBCUTANEOUS NIGHTLY
Status: DISCONTINUED | OUTPATIENT
Start: 2022-06-07 | End: 2022-06-10 | Stop reason: HOSPADM

## 2022-06-07 RX ORDER — ATORVASTATIN CALCIUM 10 MG/1
10 TABLET, FILM COATED ORAL DAILY
Status: DISCONTINUED | OUTPATIENT
Start: 2022-06-08 | End: 2022-06-10 | Stop reason: HOSPADM

## 2022-06-07 RX ORDER — SODIUM CHLORIDE 0.9 % (FLUSH) 0.9 %
5-40 SYRINGE (ML) INJECTION EVERY 12 HOURS SCHEDULED
Status: DISCONTINUED | OUTPATIENT
Start: 2022-06-07 | End: 2022-06-10 | Stop reason: HOSPADM

## 2022-06-07 RX ORDER — ACETAMINOPHEN 650 MG/1
650 SUPPOSITORY RECTAL EVERY 6 HOURS PRN
Status: DISCONTINUED | OUTPATIENT
Start: 2022-06-07 | End: 2022-06-10 | Stop reason: HOSPADM

## 2022-06-07 RX ORDER — DIGOXIN 125 MCG
62.5 TABLET ORAL DAILY
Status: DISCONTINUED | OUTPATIENT
Start: 2022-06-08 | End: 2022-06-10

## 2022-06-07 RX ORDER — HYDRALAZINE HYDROCHLORIDE 25 MG/1
50 TABLET, FILM COATED ORAL EVERY 8 HOURS SCHEDULED
Status: DISCONTINUED | OUTPATIENT
Start: 2022-06-07 | End: 2022-06-10 | Stop reason: HOSPADM

## 2022-06-07 RX ORDER — SODIUM CHLORIDE 9 MG/ML
INJECTION, SOLUTION INTRAVENOUS PRN
Status: DISCONTINUED | OUTPATIENT
Start: 2022-06-07 | End: 2022-06-10 | Stop reason: HOSPADM

## 2022-06-07 RX ORDER — AMLODIPINE BESYLATE 5 MG/1
10 TABLET ORAL DAILY
Status: DISCONTINUED | OUTPATIENT
Start: 2022-06-08 | End: 2022-06-10 | Stop reason: HOSPADM

## 2022-06-07 RX ORDER — POLYETHYLENE GLYCOL 3350 17 G/17G
17 POWDER, FOR SOLUTION ORAL 2 TIMES DAILY
Status: DISCONTINUED | OUTPATIENT
Start: 2022-06-07 | End: 2022-06-10 | Stop reason: HOSPADM

## 2022-06-07 RX ORDER — PANTOPRAZOLE SODIUM 40 MG/10ML
40 INJECTION, POWDER, LYOPHILIZED, FOR SOLUTION INTRAVENOUS ONCE
Status: COMPLETED | OUTPATIENT
Start: 2022-06-07 | End: 2022-06-07

## 2022-06-07 RX ORDER — ONDANSETRON 4 MG/1
4 TABLET, ORALLY DISINTEGRATING ORAL EVERY 8 HOURS PRN
Status: DISCONTINUED | OUTPATIENT
Start: 2022-06-07 | End: 2022-06-10 | Stop reason: HOSPADM

## 2022-06-07 RX ORDER — ONDANSETRON 2 MG/ML
4 INJECTION INTRAMUSCULAR; INTRAVENOUS EVERY 6 HOURS PRN
Status: DISCONTINUED | OUTPATIENT
Start: 2022-06-07 | End: 2022-06-10 | Stop reason: HOSPADM

## 2022-06-07 RX ORDER — DEXTROSE MONOHYDRATE 50 MG/ML
100 INJECTION, SOLUTION INTRAVENOUS PRN
Status: DISCONTINUED | OUTPATIENT
Start: 2022-06-07 | End: 2022-06-10 | Stop reason: HOSPADM

## 2022-06-07 RX ORDER — INSULIN LISPRO 100 [IU]/ML
0-6 INJECTION, SOLUTION INTRAVENOUS; SUBCUTANEOUS
Status: DISCONTINUED | OUTPATIENT
Start: 2022-06-08 | End: 2022-06-10 | Stop reason: HOSPADM

## 2022-06-07 RX ADMIN — SODIUM CHLORIDE: 9 INJECTION, SOLUTION INTRAVENOUS at 23:00

## 2022-06-07 RX ADMIN — PANTOPRAZOLE SODIUM 40 MG: 40 INJECTION, POWDER, FOR SOLUTION INTRAVENOUS at 17:20

## 2022-06-07 RX ADMIN — HYDRALAZINE HYDROCHLORIDE 50 MG: 25 TABLET, FILM COATED ORAL at 22:58

## 2022-06-07 RX ADMIN — GABAPENTIN 200 MG: 100 CAPSULE ORAL at 22:58

## 2022-06-07 RX ADMIN — POLYETHYLENE GLYCOL (3350) 17 G: 17 POWDER, FOR SOLUTION ORAL at 22:58

## 2022-06-07 ASSESSMENT — PAIN - FUNCTIONAL ASSESSMENT: PAIN_FUNCTIONAL_ASSESSMENT: NONE - DENIES PAIN

## 2022-06-07 NOTE — ED NOTES
Patient advised she has abnormal labs related to the need for a blood transfusion. Patient transported to our facility by EMS reported phantom pain related to a new amputation. Currently 0/10 pain despite previous mention of pain.      Uriel Hanks RN  06/07/22 [id number]

## 2022-06-07 NOTE — PLAN OF CARE
Anemia, GI bleed. Patient currently at rehab at [facility] after right hallux amputation. She has history of CAD, A. Fib, diabetes mellitus and peripheral vascular disease  She has been having melena. She is on chronic anticoagulation with Eliquis. Also on aspirin and Plavix. And taking iron supplements. Hemoccult positive in ED, hemoglobin down to 6.5. Admitted for acute blood loss anemia, GI bleed. 1 unit of PRBC transfusion initiated in ED. IV PPI.   GI consult

## 2022-06-07 NOTE — ED NOTES
1646 - Perfect serve sent to Dr. Ruperto Martines  06/07/22 [id number] - 2nd perfect serve on this pt. Ruben Apple  06/07/22 [id number]    #2 page to Dr. Ramirez   06/07/22 [id number] - Dr. Imani Navarrete called back.       Ruben Apple  06/07/22 0998

## 2022-06-07 NOTE — ED NOTES
Critical labs from [address] [place] [address],[address] in lab.   Hemoglogin 6.5 and Hematocrit 19.7 notified provider Irina Carrasco, [address] [facility]  06/07/22 0987

## 2022-06-07 NOTE — ED PROVIDER NOTES
[address] ED  EMERGENCY DEPARTMENT ENCOUNTER        Pt Name: Elvis Lundberg  MRN: [id number]  [place] 1945  Date of evaluation: 6/7/2022  Provider: FAN Morris  PCP: Luz Marina Perkins, APRN - CNP    This patient was seen and evaluated by the attending physician MD John Lozano       Chief Complaint   Patient presents with    Abnormal Lab     Sent by [place] in [facility]; there for rehab; Hgb. 6.0 today,       HISTORY OF PRESENT ILLNESS   (Location/Symptom, Timing/Onset, Context/Setting, Quality, Duration, Modifying Factors, Severity)  Note limiting factors. Elvis Lundberg is a 68 y.o. female with past medical history of hypertension, hyperlipidemia, peripheral arterial disease, diabetes,  atrial fibrillation, anticoagulated on Eliquis, on Plavix who [facility] of low hemoglobin level. Patient had routine blood work done earlier today, was noted to have low hemoglobin. Patient denies any symptoms aside from having melena which she notes is chronic for her. She notes that she took her self off of her Eliquis months ago because she did not think she needed it anymore however the nursing facility put her back on it when she was admitted as she was told that she needs to be on it for the rest of her life. She denies any shortness of breath or chest pain. Endorses right hallux pain where she had amputation but denies any fevers chills or wound problems. She denies any urinary symptoms such as frequency urgency or hematuria. She denies any epigastric or other abdominal pain. She denies any recent falls or injuries. She is on iron. Nursing Notes were all reviewed and agreed with or any disagreements were addressed  in the HPI. Pt was seen during the [place] 19 pandemic. Appropriate PPE worn by ME during patient encounters.  Pt seen during a time with constrained hospital bed capacity and other potential CLOPIDOGREL (PLAVIX) 75 MG TABLET    Take 1 tablet by mouth daily    DICLOFENAC SODIUM (VOLTAREN) 1 % GEL    Apply 2 g topically in the morning and at bedtime    DIGOXIN (LANOXIN) 125 MCG TABLET    Take 1 tablet daily. DOCUSATE SODIUM (COLACE) 100 MG CAPSULE    Take 1 capsule by mouth daily    GABAPENTIN (NEURONTIN) 100 MG CAPSULE    Take 2 capsules by mouth 2 times daily for 30 days. HYDRALAZINE (APRESOLINE) 50 MG TABLET    Take 1 tablet by mouth every 8 hours    IBUPROFEN (ADVIL PO)    Take by mouth    LISINOPRIL (PRINIVIL;ZESTRIL) 20 MG TABLET    TAKE 1 TABLET BY MOUTH EVERY DAY    METFORMIN (GLUCOPHAGE) 500 MG TABLET    TAKE 2 TABLETS BY MOUTH EVERY DAY WITH BREAKFAST    MICONAZOLE (MICOTIN) 2 % POWDER    Apply topically 2 times daily PRN.     POLYETHYLENE GLYCOL (GLYCOLAX) 17 G PACKET    Take 17 g by mouth 2 times daily    SIMVASTATIN (ZOCOR) 20 MG TABLET    TAKE 1 TABLET BY MOUTH EVERY DAY AT NIGHT    VITAMIN D3 (CHOLECALCIFEROL) 25 MCG (1000 UT) TABS TABLET    Take 1 tablet by mouth daily         ALLERGIES     Pcn [penicillins]    FAMILYHISTORY       Family History   Problem Relation Age of Onset    Heart Disease Mother     Stroke Mother     Heart Disease Father     Diabetes Sister     Diabetes Brother     Diabetes Maternal Grandmother           SOCIAL HISTORY       Social History     Socioeconomic History    Marital status:      Spouse name: Not on file    Number of children: Not on file    Years of education: Not on file    Highest education level: Not on file   Occupational History    Not on file   Tobacco Use    Smoking status: Never Smoker    Smokeless tobacco: Never Used    Tobacco comment: Not needed   Vaping Use    Vaping Use: Never used   Substance and Sexual Activity    Alcohol use: No    Drug use: No    Sexual activity: Yes     Partners: Male   Other Topics Concern    Not on file   Social History Narrative    Not on file     Social Determinants of Health Financial Resource Strain:     Difficulty of Paying Living Expenses: Not on file   Food Insecurity:     Worried About Running Out of Food in the Last Year: Not on file    [address] of Food in the Last Year: Not on file   Transportation Needs:     Lack of Transportation (Medical): Not on file    Lack of Transportation (Non-Medical): Not on file   Physical Activity:     Days of Exercise per Week: Not on file    Minutes of Exercise per Session: Not on file   Stress:     Feeling of Stress : Not on file   Social Connections:     Frequency of Communication with Friends and Family: Not on file    Frequency of Social Gatherings with Friends and Family: Not on file    Attends Sabianism Services: Not on file    Active Member of [address] [place] [address] SHIFT or Organizations: Not on file    Attends Club or Organization Meetings: Not on file    Marital Status: Not on file   Intimate Partner Violence:     Fear of Current or Ex-Partner: Not on file    Emotionally Abused: Not on file    Physically Abused: Not on file    Sexually Abused: Not on file   Housing Stability:     Unable to Pay for Housing in the Last Year: Not on file    Number of [place] in the Last Year: Not on file    Unstable Housing in the Last Year: Not on file       SCREENINGS             PHYSICAL EXAM    (up to 7 for level 4, 8 or more for level 5)     ED Triage Vitals   BP Temp Temp src Pulse Resp SpO2 Height Weight   -- -- -- -- -- -- -- --       Physical Exam  PHYSICAL EXAM  BP (!) 149/54   Pulse 77   Temp 97.8 °F (36.6 °C) (Oral)   Resp 18   Ht 5' 3\" (1.6 m)   Wt 139 lb (63 kg)   SpO2 99%   BMI 24.62 kg/m²   GENERAL APPEARANCE: Awake and alert. Cooperative. Elderly chronically ill-appearing adult female sitting upright in exam bed, she has nondiaphoretic, breathing comfortably on room air showed no sign of acute respiratory distress. HEAD: Normocephalic. Atraumatic. EYES: PERRL. EOM's grossly intact. Bulbar conjunctival pallor.   ENT: Mucous membranes are moist.. NECK: Supple. HEART: Regular rate, irregular rhythm. No murmurs. Radial pulses 2+, symmetric. LUNGS: Respirations unlabored. CTAB. Good air exchange. Speaking comfortably in full sentences. ABDOMEN: Soft. Non-distended. Non-tender. : Exam performed under chaperone present. External examination negative for sign of external hemorrhoids, no anal fissures. There is dark melanic appearing stool in the rectal vault. No internal hemorrhoids appreciated. EXTREMITIES: No peripheral edema. Moves all extremities equally. All extremities neurovascularly intact. Right lower extremity is bandaged postoperatively, wound is with evidence of good healing no evidence of acute dehiscence or infection. SKIN: Warm and dry. No acute rashes. Scattered bruises to upper and lower extremities. NEUROLOGICAL: Alert and oriented. CN grossly intact. No gross facial drooping. Power intact to UE and LE, sensation intact x 4. No tremors or ataxia. PSYCHIATRIC: Normal mood and affect. DIAGNOSTIC RESULTS   LABS:    Labs Reviewed - No data to display    All other labs were within normal range or not returned as of this dictation. EKG: All EKG's are interpreted by the Emergency Department Physician who either signs orCo-signs this chart in the absence of a cardiologist.  Please see their note for interpretation of EKG. RADIOLOGY:   Non-plain film images such as CT, Ultrasound and MRI are read by the radiologist. Plain radiographic images are visualized andpreliminarily interpreted by the  ED Provider with the below findings:        Interpretation [facility] Radiologist below, if available at the time of this note:    No orders to display     No results found. PROCEDURES   Unless otherwise noted below, none     Procedures    CRITICAL CARE TIME   I personally saw the patient and independently provided 31 minutes of non-concurrent critical care out of the total shared critical care time provided. This excludes time spent doing separately billable procedures. This includes time at the bedside, data interpretation, medication management, obtaining critical history from collateral sources if the patient is unable to provide it directly, and physician consultation. Specifics of interventions taken and potentially life-threatening diagnostic considerations are listed above in the medical decision making. If this was a shared visit with a Physician the time in this attestation is non-concurrent critical care time out of the total shared critical care time provided by the Physician and myself. CONSULTS:  None      EMERGENCY DEPARTMENT COURSE and DIFFERENTIALDIAGNOSIS/MDM:   Vitals: There were no vitals filed for this visit. Patient was given thefollowing medications:  Medications - No data to display    PDMP Monitoring:    Last PDMP Nikhil Mancia as Reviewed [facility]):  Review User Review Instant Review Result          Last Controlled Substance Monitoring Documentation      ED [facility] 12/17/2020 in [facility]  ED   Comments prescriptions x2 given filed at 12/17/2020 1818        Urine Drug Screenings (1 yr)    No resulted procedures found. Medication Contract and Consent for Opioid Use Documents Filed     Patient Documents     Type of Document Status Date Received Received By Description    Medication Contract Received 1/24/2018  3:18 PM HIMANSHU GONSALES Medication sign out log 01/24/2018                MDM:   Patient seen and evaluated. Old records reviewed. Diagnostic testing reviewed and results discussed. 49-year-old female presents for evaluation of abnormal blood work. Patient found to be acutely anemic in the department. She has melena. Concern for upper GI bleed. Elevated BUN. She remains hemodynamically stable. She received blood products in the department.   Given her history of requiring anticoagulation and upper GI bleeding concern for upper GI bleed presently patient will be admitted. I anticipate that she will require multiple units of blood given her history of coronary artery disease ideal hemoglobin level of 8. No active chest pain or shortness of breath I have no concern for active type II NSTEMI. I have discussed with the patient the rationale for blood component transfusion; its benefits in treating or preventing fatigue, organ damage, or death; and its risk which includes mild transfusion reactions, rare risk of blood borne infection, or more serious but rare reactions. I have discussed the alternatives to transfusion, including the risk and consequences of not receiving transfusion. The patient had an opportunity to ask questions and had agreed to proceed with transfusion of blood components. All information including ED workup, results, treatment, diagnosis has been reviewed and discussed with ED attending physician and directly discussed with Hospitalist who is the admitting physician. Pt will be admitted in stable condition. Pt advised of admission and is in full agreement. Is this patient to be included in the SEP-1 Core Measure due to severe sepsis or septic shock? No   Exclusion criteria - the patient is NOT to be included for SEP-1 Core Measure due to: Infection is not suspected          FINAL IMPRESSION      1. Acute blood loss anemia    2. Melena          DISPOSITION/PLAN   DISPOSITION        PATIENT REFERREDTO:  No follow-up provider specified.     DISCHARGE MEDICATIONS:  New Prescriptions    No medications on file       DISCONTINUED MEDICATIONS:  Discontinued Medications    No medications on file              (Please note that portions ofthis note were completed with a voice recognition program.  Efforts were made to edit the dictations but occasionally words are mis-transcribed.)    FAN Villafana (electronically signed)        FAN Villafana  06/07/22 [address] [place] [address], [facility]  06/08/22 0229

## 2022-06-08 LAB
ANION GAP SERPL CALCULATED.3IONS-SCNC: 10 MMOL/L (ref 3–16)
APTT: 31.8 SEC (ref 23–34.3)
BLOOD BANK DISPENSE STATUS: NORMAL
BLOOD BANK DISPENSE STATUS: NORMAL
BLOOD BANK PRODUCT CODE: NORMAL
BLOOD BANK PRODUCT CODE: NORMAL
BPU ID: NORMAL
BPU ID: NORMAL
BUN BLDV-MCNC: 32 MG/DL (ref 7–20)
CALCIUM SERPL-MCNC: 9.3 MG/DL (ref 8.3–10.6)
CHLORIDE BLD-SCNC: 111 MMOL/L (ref 99–110)
CO2: 22 MMOL/L (ref 21–32)
CREAT SERPL-MCNC: 0.8 MG/DL (ref 0.6–1.2)
DESCRIPTION BLOOD BANK: NORMAL
DESCRIPTION BLOOD BANK: NORMAL
ESTIMATED AVERAGE GLUCOSE: 105.4 MG/DL
GFR AFRICAN AMERICAN: >60
GFR NON-AFRICAN AMERICAN: >60
GLUCOSE BLD-MCNC: 119 MG/DL (ref 70–99)
GLUCOSE BLD-MCNC: 121 MG/DL (ref 70–99)
GLUCOSE BLD-MCNC: 121 MG/DL (ref 70–99)
GLUCOSE BLD-MCNC: 129 MG/DL (ref 70–99)
GLUCOSE BLD-MCNC: 170 MG/DL (ref 70–99)
GLUCOSE BLD-MCNC: 183 MG/DL (ref 70–99)
HBA1C MFR BLD: 5.3 %
HCT VFR BLD CALC: 23.9 % (ref 36–48)
HCT VFR BLD CALC: 25.2 % (ref 36–48)
HCT VFR BLD CALC: 25.7 % (ref 36–48)
HEMOGLOBIN: 8.1 G/DL (ref 12–16)
HEMOGLOBIN: 8.2 G/DL (ref 12–16)
HEMOGLOBIN: 8.5 G/DL (ref 12–16)
INR BLD: 1.29 (ref 0.87–1.14)
PERFORMED ON: ABNORMAL
POTASSIUM REFLEX MAGNESIUM: 4.5 MMOL/L (ref 3.5–5.1)
PROTHROMBIN TIME: 15.9 SEC (ref 11.7–14.5)
SODIUM BLD-SCNC: 143 MMOL/L (ref 136–145)

## 2022-06-08 PROCEDURE — 80048 BASIC METABOLIC PNL TOTAL CA: CPT

## 2022-06-08 PROCEDURE — 85730 THROMBOPLASTIN TIME PARTIAL: CPT

## 2022-06-08 PROCEDURE — C9113 INJ PANTOPRAZOLE SODIUM, VIA: HCPCS | Performed by: INTERNAL MEDICINE

## 2022-06-08 PROCEDURE — 36430 TRANSFUSION BLD/BLD COMPNT: CPT

## 2022-06-08 PROCEDURE — 6370000000 HC RX 637 (ALT 250 FOR IP): Performed by: INTERNAL MEDICINE

## 2022-06-08 PROCEDURE — 7100000010 HC PHASE II RECOVERY - FIRST 15 MIN: Performed by: INTERNAL MEDICINE

## 2022-06-08 PROCEDURE — 85018 HEMOGLOBIN: CPT

## 2022-06-08 PROCEDURE — 3609017100 HC EGD: Performed by: INTERNAL MEDICINE

## 2022-06-08 PROCEDURE — 85014 HEMATOCRIT: CPT

## 2022-06-08 PROCEDURE — 36415 COLL VENOUS BLD VENIPUNCTURE: CPT

## 2022-06-08 PROCEDURE — 99223 1ST HOSP IP/OBS HIGH 75: CPT | Performed by: INTERNAL MEDICINE

## 2022-06-08 PROCEDURE — 6360000002 HC RX W HCPCS: Performed by: INTERNAL MEDICINE

## 2022-06-08 PROCEDURE — 2580000003 HC RX 258: Performed by: INTERNAL MEDICINE

## 2022-06-08 PROCEDURE — 7100000011 HC PHASE II RECOVERY - ADDTL 15 MIN: Performed by: INTERNAL MEDICINE

## 2022-06-08 PROCEDURE — 85610 PROTHROMBIN TIME: CPT

## 2022-06-08 PROCEDURE — 1200000000 HC SEMI PRIVATE

## 2022-06-08 PROCEDURE — 2709999900 HC NON-CHARGEABLE SUPPLY: Performed by: INTERNAL MEDICINE

## 2022-06-08 PROCEDURE — 83036 HEMOGLOBIN GLYCOSYLATED A1C: CPT

## 2022-06-08 PROCEDURE — 0DJ08ZZ INSPECTION OF UPPER INTESTINAL TRACT, VIA NATURAL OR ARTIFICIAL OPENING ENDOSCOPIC: ICD-10-PCS | Performed by: INTERNAL MEDICINE

## 2022-06-08 RX ORDER — MIDAZOLAM HYDROCHLORIDE 5 MG/ML
INJECTION INTRAMUSCULAR; INTRAVENOUS PRN
Status: DISCONTINUED | OUTPATIENT
Start: 2022-06-08 | End: 2022-06-08 | Stop reason: ALTCHOICE

## 2022-06-08 RX ORDER — POLYETHYLENE GLYCOL 3350 17 G/17G
238 POWDER, FOR SOLUTION ORAL ONCE
Status: COMPLETED | OUTPATIENT
Start: 2022-06-08 | End: 2022-06-08

## 2022-06-08 RX ORDER — SODIUM CHLORIDE 9 MG/ML
INJECTION, SOLUTION INTRAVENOUS PRN
Status: DISCONTINUED | OUTPATIENT
Start: 2022-06-08 | End: 2022-06-08

## 2022-06-08 RX ORDER — FENTANYL CITRATE 50 UG/ML
INJECTION, SOLUTION INTRAMUSCULAR; INTRAVENOUS PRN
Status: DISCONTINUED | OUTPATIENT
Start: 2022-06-08 | End: 2022-06-08 | Stop reason: ALTCHOICE

## 2022-06-08 RX ORDER — POLYETHYLENE GLYCOL 3350 17 G/17G
119 POWDER, FOR SOLUTION ORAL ONCE
Status: COMPLETED | OUTPATIENT
Start: 2022-06-09 | End: 2022-06-09

## 2022-06-08 RX ORDER — SODIUM CHLORIDE 9 MG/ML
INJECTION, SOLUTION INTRAVENOUS PRN
Status: DISCONTINUED | OUTPATIENT
Start: 2022-06-08 | End: 2022-06-10 | Stop reason: HOSPADM

## 2022-06-08 RX ADMIN — DOCUSATE SODIUM 100 MG: 100 CAPSULE, LIQUID FILLED ORAL at 10:23

## 2022-06-08 RX ADMIN — Medication 10 ML: at 21:30

## 2022-06-08 RX ADMIN — POLYETHYLENE GLYCOL (3350) 17 G: 17 POWDER, FOR SOLUTION ORAL at 21:29

## 2022-06-08 RX ADMIN — Medication 10 ML: at 10:24

## 2022-06-08 RX ADMIN — Medication 238 G: at 18:51

## 2022-06-08 RX ADMIN — HYDRALAZINE HYDROCHLORIDE 50 MG: 25 TABLET, FILM COATED ORAL at 05:36

## 2022-06-08 RX ADMIN — Medication 1000 UNITS: at 10:23

## 2022-06-08 RX ADMIN — Medication 40 MG: at 10:23

## 2022-06-08 RX ADMIN — HYDRALAZINE HYDROCHLORIDE 50 MG: 25 TABLET, FILM COATED ORAL at 21:29

## 2022-06-08 RX ADMIN — DIGOXIN 62.5 MCG: 125 TABLET ORAL at 10:23

## 2022-06-08 RX ADMIN — INSULIN LISPRO 1 UNITS: 100 INJECTION, SOLUTION INTRAVENOUS; SUBCUTANEOUS at 21:32

## 2022-06-08 RX ADMIN — GABAPENTIN 200 MG: 100 CAPSULE ORAL at 21:29

## 2022-06-08 RX ADMIN — BISACODYL 20 MG: 5 TABLET, COATED ORAL at 14:48

## 2022-06-08 RX ADMIN — INSULIN LISPRO 1 UNITS: 100 INJECTION, SOLUTION INTRAVENOUS; SUBCUTANEOUS at 16:51

## 2022-06-08 RX ADMIN — ATORVASTATIN CALCIUM 10 MG: 10 TABLET, FILM COATED ORAL at 10:23

## 2022-06-08 RX ADMIN — GABAPENTIN 200 MG: 100 CAPSULE ORAL at 10:23

## 2022-06-08 RX ADMIN — AMLODIPINE BESYLATE 10 MG: 5 TABLET ORAL at 10:23

## 2022-06-08 RX ADMIN — HYDRALAZINE HYDROCHLORIDE 50 MG: 25 TABLET, FILM COATED ORAL at 14:48

## 2022-06-08 ASSESSMENT — LIFESTYLE VARIABLES: HOW OFTEN DO YOU HAVE A DRINK CONTAINING ALCOHOL: NEVER

## 2022-06-08 NOTE — PROGRESS NOTES
Phase II: patient placed on EKG monitor sinus ferdinand    1. Patient is identified using name and the date of birth. 2.  The patient is free from signs and symptoms of chemical, electrical, laser, radiation, positioning, or transfer/transport injury. 3.  The patient receives appropriate medication(s), safely administered during the Perioperative period. 4.  The patient has wound/tissue perfusion consistent with or improved from baseline levels established preoperatively. 5.  The patient is at or returning to normothermia at the conclusion of the immediate postoperative period. 6.  The patient's fluid, electrolyte, and acid base balances are consistent with or improved from baseline levels established preoperatively. 7.  The patient's pulmonary function is consistent with or improved from baseline levels established preoperatively. 8.  The patient's cardiovascular status is consistent with or improved from baseline levels established preoperatively. 9.  The patient/caregiver demonstrates knowledge of nutritional management related to the operative or other invasive procedure. 10. The patient/caregiver demonstrates knowledge of medication, pain, and wound management. 11. The patient participates in the rehabilitation process as applicable. 12.  The patient/caregiver participates in decisions affection his or her Perioperative plan of care. 13.  The patient's care is consistent with the individualized Perioperative plan of care. 14.  The patient's right to privacy is maintained. 15. The patient is the recipient of competent and ethical care within legal standards of practice. 16.  The patient's value system, lifestyle, ethnicity, and culture are considered, respected, and incorporated in the Perioperative plan of care and understands special services available. 17.  The patient demonstrates and/or reports adequate pain control throughout the the Perioperative period. 18.   The patient's neurological status is consistent with or improved from baseline levels established preoperatively. 23.  The patient/caregiver demonstrates knowledge of the expected responses to the operative or invasive procedure. 20.  Patient/Caregiver has reduced anxiety. Interventions- Familiarize with environment and equipment.   21. Other:  22. Other:

## 2022-06-08 NOTE — FLOWSHEET NOTE
06/08/22 0430   Vital Signs   Temp 97 °F (36.1 °C)   Temp Source Oral   Heart Rate 57   Heart Rate Source Monitor   Resp 14   BP (!) 166/61   BP Location Right upper arm   MAP (Calculated) 96   Level of Consciousness Alert (0)   MEWS Score 0   Oxygen Therapy   SpO2 98 %     Blood transfusion completed at this time. VS as shown above. Pt denies any further needs at this time. Call light in reach and bed in lowest position.

## 2022-06-08 NOTE — CONSULTS
CONSULT    Admit Date:  6/7/2022    Subjective:  68 y.o. female who is seen for evaluation of right foot S/P amputation right hallux and 4th digit. Underwent amputation with Dr. Jeanie Lala on 5/23/22. Does not remember why she was transferred to [facility] from [facility] during her last visit. States no pain in the foot at this time. Admitted due to anemia. I asked her several times when was the last time she saw Dr. Jeanie Lala. She does not know remember seeing him since being in the nursing home.        Past Medical History:        Diagnosis Date    Atrial fibrillation ([facility]     off coumadin after bleed, declined restart    Blood transfusion     CAD (coronary artery disease)     Diabetes mellitus type II     Diabetic neuropathy ([facility] 2011    Gastric ulcer     H. pylori infection 2009    Hyperlipidemia     Hypertension     Numbness and tingling of left leg     Osteoarthritis     knees    Poor historian     PVD (peripheral vascular disease) ([facility]     PTA distal sfa/pop/peroneal, Dr. Ha Reyes 12/2015    Unspecified cerebral artery occlusion with cerebral infarction     TIA affected left eye    upper gi bleed 12/2009       Past Surgical History:        Procedure Laterality Date    ANGIOPLASTY  12/30/15    Dr. Ha Reyes, E, PTA of distal sfa/pop/peroneal    CARDIAC CATHETERIZATION  9/21/12    done for surgical clearance, cath was negative    CORONARY ANGIOPLASTY WITH STENT PLACEMENT  [id number]    FOOT SURGERY Left 01/11/2018    DEBRIDEMENT OF ULCERS LEFT FOOT AND LEG WITH GRAFT    OTHER SURGICAL HISTORY Left 06/05/2017    Left Femoral Peroneal Bypass    SHOULDER ARTHROPLASTY  10/5/12    RIGHT SHOULDER TOTAL ARTHROPLASTY, BICEPS TENODESIS DEPUY, GLOBAL ADVANTAGE AP    TOE AMPUTATION Right 5/23/2022    AMPUTATION OF RIGHT FIRST AND FOURTH TOES performed by Idris Velarde DPM at [address] [place] [address] 6/8/2022    EGD IV SEDATION performed by Ha Jimenez MD at [facility]       Current Medications:     [START ON 6/9/2022] polyethylene glycol  119 g Oral Once    polyethylene glycol  238 g Oral Once    amLODIPine  10 mg Oral Daily    digoxin  62.5 mcg Oral Daily    docusate sodium  100 mg Oral Daily    gabapentin  200 mg Oral BID    hydrALAZINE  50 mg Oral 3 times per day    polyethylene glycol  17 g Oral BID    atorvastatin  10 mg Oral Daily    Vitamin D  1,000 Units Oral Daily    insulin lispro  0-6 Units SubCUTAneous TID WC    insulin lispro  0-3 Units SubCUTAneous Nightly    sodium chloride flush  5-40 mL IntraVENous 2 times per day    pantoprazole (PROTONIX) 40 mg injection  40 mg IntraVENous Daily       Allergies:  Pcn [penicillins]    Social History:    Social History     Tobacco Use    Smoking status: Never Smoker    Smokeless tobacco: Never Used    Tobacco comment: Not needed   Vaping Use    Vaping Use: Never used   Substance Use Topics    Alcohol use: No    Drug use: No       Family History:       Problem Relation Age of Onset    Heart Disease Mother     Stroke Mother     Heart Disease Father     Diabetes Sister     Diabetes Brother     Diabetes Maternal Grandmother        Review of Systems    CONSTITUTIONAL:  negative  EYES:  negative  HEENT:  negative  RESPIRATORY:  negative  CARDIOVASCULAR:  negative  GASTROINTESTINAL:  negative  GENITOURINARY:  negative  INTEGUMENT/BREAST:  positive for incisions  MUSCULOSKELETAL:  negative  NEUROLOGICAL:  positive for numbness      Objective:   /64   Pulse 59   Temp 98.1 °F (36.7 °C) (Oral)   Resp 14   Ht 5' 3\" (1.6 m)   Wt 132 lb (59.9 kg)   SpO2 97%   BMI 23.38 kg/m²     Data:  CBC:   Recent Labs     06/07/22  1435 06/07/22  1435 06/07/22  2341 06/08/22  0605 06/08/22  1425   WBC 8.3  --   --   --   --    HGB 6.5*   < > 6.8* 8.2* 8.1*   HCT 19.7*   < > 20.6* 25.2* 23.9*   MCV 82.7  --   --   --   --      --   --   --   --     < > = values in this interval not displayed. BMP:   Recent Labs     06/07/22  1435 06/08/22  0605   * 143   K 4.7 4.5    111*   CO2 20* 22   BUN 38* 32*   CREATININE 1.1 0.8     LIVER PROFILE:   Recent Labs     06/07/22  1435   AST 16   ALT 15   BILITOT <0.2   ALKPHOS 76     PT/INR:   Recent Labs     06/07/22  1435 06/08/22  0605   PROT 6.9  --    INR  --  1.29*     HgBA1c:  Lab Results   Component Value Date    LABA1C 5.3 06/07/2022       Cultures:     Imaging:     Physical Exam:    General Appearance: alert and oriented to person, place and time, well developed and well- nourished, in no acute distress  Head: normocephalic and atraumatic  Eyes: pupils equal, round  Neck: trachea midline  Pulmonary/Chest: no wheezes  Cardiovascular: normal rate, regular rhythm  Abdomen: soft, non-tender, non-distended    Dressing right foot - clean, dry and intact. Sutures intact right hallux and 4th digit amputation site. No active drainage noted. Dried heme noted along both incision sites. Minimal erythema at amputation sites. No edema noted. No signs of infection noted. No pain on palpation noted to amputation sites. Right hallux and 4th digit amputations noted.          Assessment:  Patient Active Problem List   Diagnosis Code    Osteoarthritis M19.90    Paroxysmal atrial fibrillation ([address] I48.0    Noncompliance of patient with dietary regimen Z91.11    PAD (peripheral artery disease) ([address] I73.9    Pure hypercholesterolemia E78.00    Coronary artery disease involving native coronary artery of native heart without angina pectoris I25.10    Essential hypertension I10    Diabetes mellitus ([address] E11.9    Vitamin D deficiency E55.9    Acute osteomyelitis of left foot ([address] M86.172    Hammertoe, bilateral M20.41, M20.42    Colonoscopy refused Z53.20    Chronic deep vein thrombosis (DVT) of left peroneal vein (HCC) I82.552    Acute osteomyelitis of right foot (HCC) M86.171    Osteomyelitis of right foot ([address] M86.9    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, with bone involvement without evidence of necrosis ([facility] D59.402, L97.516    Primary hypertension I10    Cellulitis of right lower extremity L03.115    PVD (peripheral vascular disease) ([facility] I73.9    Anemia D64.9    Gastrointestinal hemorrhage K92.2    Acute blood loss anemia D62     S/P amputation right hallux and 4th digits for osteomyelitis  diabetes mellitus   PAD - S/P intervention with Dr. Marci Jamison 5/20    Plan  Patient examined. Reviewed labs and notes. Dressing removed. No active bleeding from the foot. Applied xeroform and well padded dry dressing. Patient is OK to [facility] with PT and for short distances such as bathroom. Patient does not remember when she saw Dr. Butch Amezcua so unsure if he has seen her since her surgery. She appears to be healing well at this time. I left the sutures intact to allow incisions site to completely heal given her DM and PAD. I would recommend return to [facility] after D/C here and follow up with Dr. Butch Amezcua next week as outpatient. Control glucose levels to prevent future complications. Thank you for allowing me to participate in the care of your patient.          Electronically signed by Malinda Alberto DPM on 6/8/2022 at 5:52 PM.

## 2022-06-08 NOTE — CARE COORDINATION
Case Management Assessment  Initial Evaluation      Patient Name: Laney Rosario  YOB: 1945  Diagnosis: Melena [K92.1]  Acute blood loss anemia [D62]  Anemia [D64.9]  Date / Time: 6/7/2022  2:19 PM    Admission status/Date:6/7/2022  Chart Reviewed: Yes      Patient Interviewed: Yes   Family Interviewed:  No      Hospitalization in the last 30 days:  Yes      Health Care Decision Maker :   Primary Decision Maker: Kingsley Kyle - [phone]    Secondary Decision Maker: Kasia Lu - Daughter-in-Law - [phone]    Secondary Decision Maker: Darren Clemente - Child - [phone]    (CM - must 1st enter selection under Navigator - emergency contact- [address] Relationship and pick relationship)   Who do you trust or have selected to make healthcare decisions for you      Met with: pt  Interview conducted  (bedside/phone):bedside    Current PCP: MARVIN York CNP      Financial  Commercial  Precert required for SNF : Y          3 night stay required - [employer]  Support Systems/Care Needs: Spouse/Significant Other,Children,Family Members  Transportation: self    Meal Preparation: self    Housing  Living Arrangements: here from [address] [place] @ [address] (pt lives in house with spouse)  Steps: 1  Intent for return to present living arrangements: Yes  Identified Issues: pt will return to [address] with [address] : No Agency:(Services)     Passport/Waiver : No  :                      Phone Number:    Passport/Waiver Services:           Durable Medical Equiptment   DME Provider: none  Equipment: one  Walker___Cane___RTS___ BSC___Shower Chair___Hospital Bed___W/C____Other________  02 at ____Liter(s)---wears(frequency)_______ [facility] ___ CPAP___ BiPap___   N/A____      Home O2 Use :  No    If No for home O2---if presently on O2 during hospitalization:  No      Community Service Affiliation  Dialysis:  No      Other Community Services:none     DISCHARGE

## 2022-06-08 NOTE — H&P
Hospital Medicine History & Physical      PCP: Isabel Tate, MARVIN - CNP    Date of Admission: 6/7/2022    Date of Service: Pt seen/examined on 6/8/2022     Chief Complaint:    Chief Complaint   Patient presents with    Abnormal Lab     Sent by [facility] in [facility]; there for rehab; Hgb. 6.0 today,       History Of Present Illness: The patient is a 68 y.o. female with PVD, OA, hypertension, hyperlipidemia, DM, CAD, A-fib who presents to [facility] from the [facility] with c/o abnormal lab (hgb 6).       Past Medical History:        Diagnosis Date    Atrial fibrillation ([facility] [address]     off coumadin after bleed, declined restart    Blood transfusion     CAD (coronary artery disease)     Diabetes mellitus type II     Diabetic neuropathy ([facility] [address] 2011    Gastric ulcer     H. pylori infection 2009    Hyperlipidemia     Hypertension     Numbness and tingling of left leg     Osteoarthritis     knees    Poor historian     PVD (peripheral vascular disease) ([facility] [address]     PTA distal sfa/pop/peroneal, Dr. Elie Choudhury 12/2015    Unspecified cerebral artery occlusion with cerebral infarction     TIA affected left eye    upper gi bleed 12/2009       Past Surgical History:        Procedure Laterality Date    ANGIOPLASTY  12/30/15    Dr. Elie Choudhury, [facility], PTA of distal sfa/pop/peroneal    CARDIAC CATHETERIZATION  9/21/12    done for surgical clearance, cath was negative    CORONARY ANGIOPLASTY WITH STENT PLACEMENT  [id number],[id number]    FOOT SURGERY Left 01/11/2018    DEBRIDEMENT OF ULCERS LEFT FOOT AND LEG WITH GRAFT    OTHER SURGICAL HISTORY Left 06/05/2017    Left Femoral Peroneal Bypass    SHOULDER ARTHROPLASTY  10/5/12    RIGHT SHOULDER TOTAL ARTHROPLASTY, BICEPS TENODESIS DEPUY, GLOBAL ADVANTAGE AP    TOE AMPUTATION Right 5/23/2022    AMPUTATION OF RIGHT FIRST AND FOURTH TOES performed by Edilia Layton DPM at [facility]       Medications Prior to Admission:    Prior to Admission medications    Medication Sig Start Date End Date Taking? Authorizing Provider   apixaban (ELIQUIS) 5 MG TABS tablet Take 1 tablet by mouth 2 times daily 5/25/22 6/24/22  Vidal Farmer MD   gabapentin (NEURONTIN) 100 MG capsule Take 2 capsules by mouth 2 times daily for 30 days. 5/25/22 6/24/22  Vidal Farmer MD   hydrALAZINE (APRESOLINE) 50 MG tablet Take 1 tablet by mouth every 8 hours 5/25/22   Vidal Farmer MD   diclofenac sodium (VOLTAREN) 1 % GEL Apply 2 g topically in the morning and at bedtime 5/25/22   Yamila Burger MD   miconazole (MICOTIN) 2 % powder Apply topically 2 times daily PRN. 5/25/22   Vidal Farmer MD   docusate sodium (COLACE) 100 mg capsule Take 1 capsule by mouth daily 5/26/22 6/25/22  Yamila Burger MD   polyethylene glycol (GLYCOLAX) 17 g packet Take 17 g by mouth 2 times daily 5/25/22 6/24/22  Vidal Farmer MD   clopidogrel (PLAVIX) 75 MG tablet Take 1 tablet by mouth daily 5/26/22   Vidal Farmer MD   aspirin 81 MG EC tablet Take 81 mg by mouth daily    Historical Provider, MD   Ibuprofen (ADVIL PO) Take by mouth    Historical Provider, MD   vitamin D3 (CHOLECALCIFEROL) 25 MCG (1000 UT) TABS tablet Take 1 tablet by mouth daily 3/25/22   MARVIN Ashton CNP   metFORMIN (GLUCOPHAGE) 500 MG tablet TAKE 2 TABLETS BY MOUTH EVERY DAY WITH BREAKFAST 3/3/22   MARVIN Ashton CNP   digoxin (LANOXIN) 125 MCG tablet Take 1 tablet daily.  3/3/22   MARVIN Ashton CNP   amLODIPine (NORVASC) 10 MG tablet TAKE 1 TABLET BY MOUTH EVERY DAY 3/3/22   MARVIN Ashton CNP   lisinopril (PRINIVIL;ZESTRIL) 20 MG tablet TAKE 1 TABLET BY MOUTH EVERY DAY 3/3/22   MARVIN Ashton CNP   simvastatin (ZOCOR) 20 MG tablet TAKE 1 TABLET BY MOUTH EVERY DAY AT NIGHT 3/3/22   Janae Snyder MARVIN - CNP       Allergies:  Pcn [penicillins]    Social History:  The patient currently is at the [facility]:   reports that she has never smoked. She has never used smokeless tobacco.  ETOH:   reports no history of alcohol use. Family History:   Positive as follows:        Problem Relation Age of Onset    Heart Disease Mother     Stroke Mother     Heart Disease Father     Diabetes Sister     Diabetes Brother     Diabetes Maternal Grandmother        REVIEW OF SYSTEMS:       Constitutional: Negative for fever   Respiratory: Negative  for dyspnea, cough   Cardiovascular: Negative for chest pain   Gastrointestinal: Negative for vomiting, diarrhea + dark stools  Genitourinary: Negative for hematuria   Musculoskeletal: Negative for arthralgias   Skin: Negative for rash   Neurological: Negative for syncope   Psychiatric/Behavorial: Negative for anxiety    PHYSICAL EXAM:    BP (!) 172/68   Pulse 62   Temp 97.1 °F (36.2 °C) (Temporal)   Resp 16   Ht 5' 3\" (1.6 m)   Wt 132 lb (59.9 kg)   SpO2 97%   BMI 23.38 kg/m²     Gen: No distress. Alert. Eyes: PERRL. No sclera icterus. No conjunctival injection. ENT: No discharge. Pharynx clear. Neck: No JVD. No Carotid Bruit. Trachea midline. Resp: No accessory muscle use. No crackles. No wheezes. No rhonchi. CV: Regular rate. Regular rhythm. No murmur. No rub. No edema. GI: Non-tender. Non-distended. No masses. No organomegaly. Normal bowel sounds. No hernia. Skin: Warm and dry. No nodule on exposed extremities. No rash on exposed extremities. M/S: No cyanosis. No joint deformity. No clubbing. Neuro: Awake. Grossly nonfocal    Psych: Oriented x 3. No anxiety or agitation. Beba Quinonez MD have reviewed the chart on [facility] and personally interviewed and examined patient, reviewed the data (labs and imaging) and after discussion with my PA formulated the plan. Agree with note with the following edits.     HPI: A 77-year-old female with a history of hypertension, type 2 diabetes, coronary artery disease, atrial fibrillation and peripheral vessel disease presented to the [facility] emergency room from a nursing home with abnormal labs. Her hemoglobin was found to be at 6. Patient admits to having shortness of breath. Denies any rectal bleeding or hematemesis. Denies any nausea vomiting or diarrhea. I reviewed the patient's Past Medical History, Past Surgical History, Medications, and Allergies. Physical exam:    /64   Pulse 59   Temp 98.1 °F (36.7 °C) (Oral)   Resp 14   Ht 5' 3\" (1.6 m)   Wt 132 lb (59.9 kg)   SpO2 97%   BMI 23.38 kg/m²   Gen: No distress. Alert. Eyes: PERRL. No sclera icterus. No conjunctival injection. ENT: No discharge. Pharynx clear. Neck: Trachea midline. Normal thyroid. Resp: No accessory muscle use. No crackles. No wheezes. No rhonchi. No dullness on percussion. CV: Regular rate. Regular rhythm. No murmur or rub. No edema. GI: Non-tender. Non-distended. No masses. No organomegaly. Normal bowel sounds. No hernia. Skin: dressing right foot and ankle   Lymph: No cervical LAD. No supraclavicular LAD. M/S: No cyanosis. No joint deformity. No clubbing. Neuro: Awake. Moves all 4 extremities, non focal  Psych: Oriented x 3. No anxiety or agitation.            YASMIN Arndt.    CBC:   Recent Labs     06/07/22  1435 06/07/22  2341 06/08/22  0605   WBC 8.3  --   --    HGB 6.5* 6.8* 8.2*   HCT 19.7* 20.6* 25.2*   MCV 82.7  --   --      --   --      BMP:   Recent Labs     06/07/22  1435 06/08/22  0605   * 143   K 4.7 4.5    111*   CO2 20* 22   BUN 38* 32*   CREATININE 1.1 0.8     LIVER PROFILE:   Recent Labs     06/07/22  1435   AST 16   ALT 15   BILITOT <0.2   ALKPHOS 76     PT/INR:   Recent Labs     06/08/22  0605   PROTIME 15.9*   INR 1.29*     APTT:   Recent Labs     06/08/22  0605   APTT 31.8     UA:  Recent Labs     06/07/22  1355   COLORU Yellow   PHUR 5.5   WBCUA 0-2   RBCUA 3-4   BACTERIA Rare*   CLARITYU Clear   SPECGRAV <=1.005   LEUKOCYTESUR Negative UROBILINOGEN 0.2   BILIRUBINUR Negative   BLOODU LARGE*   GLUCOSEU Negative   AMORPHOUS Rare         CULTURES  COVID: not detected    EKG:  I have reviewed the EKG with the following interpretation:   N/A    RADIOLOGY  No orders to display         Principal Problem:    Anemia  Resolved Problems:    * No resolved hospital problems. *        ASSESSMENT/PLAN:  #Anemia  #GI Bleed  -admitted to PCU on tele. GI Consulted  -EGD negative  -PPI daily  -monitor H/H  -Clear liquid diet  -Colonoscopy tomorrow. #Recent Osteomyelitis of right fourth toe distal and middle  phalynx and osteomyelitis of distal phalynx of great toe   #Chronic diabetic foot ulcers. likely ischemic ulcers given CTA findings  -S/p Top amputation right 1st and 4th digit     #Paroxysmal Atrial fibrillation   -on digoxin   -rate controlled   -Held Eliquis     #HTN - uncontrolled  -continue norvasc and Held ace-i  -Cont  hydralazine     #DM type 2   -SSI   -monitor BS      #HX of CVA   #CAD  #PVD   -continue statin   Held Aspirin     PAD - s.p left LE intervention previously  Seen by vascular at [facility]    DVT Prophylaxis: SCD's  Diet: Diet NPO Exceptions are: Ice Chips  ADULT DIET; Clear Liquid  Code Status: Full Code    Rajeevadelita Sean FNP-C  6/8/2022      Agree venecia Arndt M.D.

## 2022-06-08 NOTE — ACP (ADVANCE CARE PLANNING)
Advance Care Planning     General Advance Care Planning (ACP) Conversation    Date of Conversation: 6/7/2022  Conducted with: Patient with Decision Making Capacity    Healthcare Decision Maker:    Primary Decision Maker: Bradley Flynndanny - [phone]    Secondary Decision Maker: Howard Dakin - Daughter-in-Law - [phone]    Secondary Decision Maker: Ashley Carmichael - Child - [phone]  Click here to complete Healthcare Decision Makers including selection of the Healthcare Decision Maker Relationship (ie \"Primary\"). Today we documented Decision Maker(s) consistent with Legal Next of Kin hierarchy.     Content/Action Overview:    Reviewed DNR/DNI and patient elects Full Code (Attempt Resuscitation)        Length of Voluntary ACP Conversation in minutes:  <16 minutes (Non-Billable)    Mirian Castillo RN

## 2022-06-08 NOTE — BRIEF OP NOTE
Brief Postoperative Note      Patient: Sabino Mata  YOB: 1945  MRN: [id number]    Date of Procedure: 6/8/2022    Pre-Op Diagnosis: MELENA    Post-Op Diagnosis: normal EGD       Procedure(s):  EGD IV SEDATION    Surgeon(s):  Fidencio Cordova MD    Assistant:  * No surgical staff found *    Anesthesia: Monitor Anesthesia Care    Estimated Blood Loss (mL): Minimal    Complications: None    Specimens:   * No specimens in log *    Implants:  * No implants in log *      Drains:   External Urinary Catheter (Active)   Urine Color Yellow 06/08/22 0052   Urine Appearance Clear 06/08/22 0052   Output (mL) 950 mL 06/08/22 0052       Findings: normal EGD    Recommendation  1. Continue supportive care  2. PPI daily  3. Colonoscopy tomorrow  4. MOnitor Hgb  5. Observe for signs of bleeding  6. Start clear diet    Electronically signed by Fidencio Cordova MD on 6/8/2022 at 1:17 PM

## 2022-06-08 NOTE — PROGRESS NOTES
Patient admitted to room 325 from ED. Patient oriented to room, call light, bed rails, phone, lights and bathroom. Patient instructed about the schedule of the day including: vital sign frequency, lab draws, possible tests, frequency of MD and staff rounds, daily weights, I &O's and prescribed diet. Telemetry box in place, patient aware of placement and reason. Bed locked, in lowest position, side rails up 2/4, call light within reach. Recliner Assessment  Patient is able to demonstrate the ability to move from a reclining position to an upright position within the recliner. 4 Eyes Skin Assessment     The patient is being assess for   Admission    I agree that 2 RN's have performed a thorough Head to Toe Skin Assessment on the patient. ALL assessment sites listed below have been assessed. Areas assessed for pressure by both nurses:   [x]   Head, Face, and Ears   [x]   Shoulders, Back, and Chest, Abdomen  [x]   Arms, Elbows, and Hands   [x]   Coccyx, Sacrum, and Ischium  [x]   Legs, Feet, and Heels   Scattered bruising and abrasions     Skin Assessed Under all Medical Devices by both nurses:  N/A              All Mepilex Borders were peeled back and area peeked at by both nurses:  Yes  Please list where Mepilex Borders are located:  Right leg             **SHARE this note so that the co-signing nurse is able to place an eSignature**    Co-signer eSignature: {Esignature:[id number]}    Does the Patient have Skin Breakdown related to pressure?   No     (Insert Photo here)         Jc Prevention initiated:  Yes   Wound Care Orders initiated:  No      [facility] nurse consulted for Pressure Injury (Stage 3,4, Unstageable, DTI, NWPT, Complex wounds)and New or Established Ostomies:  No      Primary Nurse eSignature: Electronically signed by Yolanda Palacios RN on 6/8/22 at 4:03 AM EDT

## 2022-06-08 NOTE — H&P
History and Physical / Pre-Sedation Assessment    Patient:  Destin Hagan   :   1945     Intended Procedure:  EGD    HPI: 68year old female with history of A fib, HTN, HLD, CAD, DM, PVD s/p bypass, osteomyelitis s/p toe amputation admitted with severe GI blood loss anemia    Past Medical History:   Diagnosis Date    Atrial fibrillation (HCC)     off coumadin after bleed, declined restart    Blood transfusion     CAD (coronary artery disease)     Diabetes mellitus type II     Diabetic neuropathy ([facility] [address]     Gastric ulcer     H. pylori infection     Hyperlipidemia     Hypertension     Numbness and tingling of left leg     Osteoarthritis     knees    Poor historian     PVD (peripheral vascular disease) ([facility] [address]     PTA distal sfa/pop/peroneal, Dr. Nieves  2015    Unspecified cerebral artery occlusion with cerebral infarction     TIA affected left eye    upper gi bleed 2009     Past Surgical History:   Procedure Laterality Date    ANGIOPLASTY  12/30/15    Dr. Nieves , [facility], PTA of distal sfa/pop/peroneal    CARDIAC CATHETERIZATION  12    done for surgical clearance, cath was negative    CORONARY ANGIOPLASTY WITH STENT PLACEMENT  8519,3805    FOOT SURGERY Left 2018    DEBRIDEMENT OF ULCERS LEFT FOOT AND LEG WITH GRAFT    OTHER SURGICAL HISTORY Left 2017    Left Femoral Peroneal Bypass    SHOULDER ARTHROPLASTY  10/5/12    RIGHT SHOULDER TOTAL ARTHROPLASTY, BICEPS TENODESIS DEPUY, GLOBAL ADVANTAGE AP    TOE AMPUTATION Right 2022    AMPUTATION OF RIGHT FIRST AND FOURTH TOES performed by Thomasina Cranker, DPM at [facility] 1       Medications reviewed  Prior to Admission medications    Medication Sig Start Date End Date Taking? Authorizing Provider   apixaban (ELIQUIS) 5 MG TABS tablet Take 1 tablet by mouth 2 times daily 22  Aziza Connor MD   gabapentin (NEURONTIN) 100 MG capsule Take 2 capsules by mouth 2 times daily for 30 days.  22 6/24/22  Yamila Burger MD   hydrALAZINE (APRESOLINE) 50 MG tablet Take 1 tablet by mouth every 8 hours 5/25/22   Aziza Connor MD   diclofenac sodium (VOLTAREN) 1 % GEL Apply 2 g topically in the morning and at bedtime 5/25/22   Yamila Burger MD   miconazole (MICOTIN) 2 % powder Apply topically 2 times daily PRN. 5/25/22   Aziza Connor MD   docusate sodium (COLACE) 100 mg capsule Take 1 capsule by mouth daily 5/26/22 6/25/22  Yamila Burger MD   polyethylene glycol (GLYCOLAX) 17 g packet Take 17 g by mouth 2 times daily 5/25/22 6/24/22  Aziza Connor MD   clopidogrel (PLAVIX) 75 MG tablet Take 1 tablet by mouth daily 5/26/22   Aziza Connor MD   aspirin 81 MG EC tablet Take 81 mg by mouth daily    Historical Provider, MD   Ibuprofen (ADVIL PO) Take by mouth    Historical Provider, MD   vitamin D3 (CHOLECALCIFEROL) 25 MCG (1000 UT) TABS tablet Take 1 tablet by mouth daily 3/25/22   Simon Smith APRN - CNP   metFORMIN (GLUCOPHAGE) 500 MG tablet TAKE 2 TABLETS BY MOUTH EVERY DAY WITH BREAKFAST 3/3/22   Simon Smith APRN - CNP   digoxin (LANOXIN) 125 MCG tablet Take 1 tablet daily. 3/3/22   Simon Smith APRN - CNP   amLODIPine (NORVASC) 10 MG tablet TAKE 1 TABLET BY MOUTH EVERY DAY 3/3/22   Simon Smith APRN - CNP   lisinopril (PRINIVIL;ZESTRIL) 20 MG tablet TAKE 1 TABLET BY MOUTH EVERY DAY 3/3/22   Simoncynthia Smith APRN - CNP   simvastatin (ZOCOR) 20 MG tablet TAKE 1 TABLET BY MOUTH EVERY DAY AT NIGHT 3/3/22   Simon Smith APRSHARI - CNP        Allergies: Allergies   Allergen Reactions    Pcn [Penicillins] Other (See Comments)     Unsure of reaction       Nurses notes reviewed and agreed.     Physical Exam:  Vital Signs: BP (!) 172/68   Pulse 62   Temp 97.1 °F (36.2 °C) (Temporal)   Resp 16   Ht 5' 3\" (1.6 m)   Wt 132 lb (59.9 kg)   SpO2 97%   BMI 23.38 kg/m²    Airway: Mallampati: II (soft palate, uvula, fauces visible)  Pulmonary:Normal  Cardiac:Normal  Abdomen:Normal    Pre-Procedure Assessment / Plan:  ASA: Class 3 - A patient with severe systemic disease that limits activity but is not incapacitating  Level of Sedation Plan: Moderate sedation  Post Procedure plan: Return to same level of care    I assessed the patient and find that the patient is in satisfactory condition to proceed with the planned procedure and sedation plan. I have explained the risk, benefits, and alternatives to the procedure; the patient understands and agrees to proceed.        Earl Camacho MD  6/8/2022

## 2022-06-08 NOTE — FLOWSHEET NOTE
06/08/22 1020   Vital Signs   Temp 97.5 °F (36.4 °C)   Temp Source Oral   Heart Rate 60   Resp 16   BP (!) 181/68   BP Location Right upper arm   MAP (Calculated) 105.67   Level of Consciousness Alert (0)   MEWS Score 1   Oxygen Therapy   SpO2 97 %   O2 Device None (Room air)     Patient resting quietly in bed. No s/s of distress noted. Patient aware of plan for EGD this afternoon with Dr. Otilio Epley, consent obtained. Shift assessment complete, see flow sheet. Denies needs at this time. Call light in reach. Will monitor.

## 2022-06-08 NOTE — ED PROVIDER NOTES
I independently examined and evaluated Brie Crawley. I personally saw the patient and performed a substantive portion of the visit including all aspects of the medical decision making    In brief, Brie Crawley is a 68 y.o. female with a past medical history of diabetes, hypertension, peripheral vascular disease, atrial fibrillation on anticoagulation, hyperlipidemia, coronary artery disease, and DVT, who presents to the ED complaining of anemia on laboratory studies. Patient had routine blood work completed and was noted to have low hemoglobin. Patient does report some chronic melena. Patient is on Eliquis and Plavix. She had self-elected to take herself off of Eliquis a few months ago but then was replaced back on it by the nursing home. Patient denies chest pain or shortness of breath. She does report some pain in her right big toe where she had previous amputation. Denies fevers, dysuria, hematuria, abdominal pain, falls. She states she would not come to the hospital had she not had abnormal labs. REVIEW OF SYSTEMS  All systems reviewed, pertinent positives per HPI otherwise noted to be negative. Focused exam revealed   PHYSICAL EXAM  BP (!) 149/54   Pulse 77   Temp 97.5 °F (36.4 °C) (Oral)   Resp 18   Ht 5' 3\" (1.6 m)   Wt 132 lb (59.9 kg)   SpO2 99%   BMI 23.38 kg/m²    GENERAL APPEARANCE: Awake and alert. Cooperative. HENT: Normocephalic. Atraumatic. Mucous membranes are moist  NECK: Supple. Full range of motion of the neck without stiffness or pain. EYES: PERRL. EOM's grossly intact. HEART/CHEST: RRR. No murmurs. Chest wall is not tender to palpation. LUNGS: Respirations unlabored. CTAB. Good air exchange. Speaking comfortably in full sentences. ABDOMEN: No tenderness. Soft. Non-distended. No masses. No organomegaly. No guarding or rebound. See P note for rectal exam documentation   MUSCULOSKELETAL: No extremity edema. Compartments soft. No deformity.   No tenderness in the (1.6 m), weight 132 lb (59.9 kg), SpO2 97 %, not currently breastfeeding. Woo Garcia was admitted in stable condition. All diagnostic, treatment, and disposition decisions were made by myself in conjunction with the advanced practice provider. For all further details of the patient's emergency department visit, please see the advanced practice provider's documentation. Comment: Please note this report has been produced using speech recognition software and may contain errors related to that system including errors in grammar, punctuation, and spelling, as well as words and phrases that may be inappropriate. If there are any questions or concerns please feel free to contact the dictating provider for clarification.         Yolanda Mallory MD  06/11/22 9757

## 2022-06-08 NOTE — PROGRESS NOTES
Per RN at [facility] patient was supposed to follow up with Dr. Severa Pierre today for her post op appointment after right hallux and fourth toe amputation on 5/23. RN states patient's wound has not been checked since surgery. Spoke with Dr. Serrato Second, new order for podiatry consult. Dr. Bandar Zhu made aware of consult via [facility].

## 2022-06-09 LAB
ESTIMATED AVERAGE GLUCOSE: 108.3 MG/DL
FERRITIN: 102.8 NG/ML (ref 15–150)
FOLATE: 12.16 NG/ML (ref 4.78–24.2)
GLUCOSE BLD-MCNC: 110 MG/DL (ref 70–99)
GLUCOSE BLD-MCNC: 120 MG/DL (ref 70–99)
GLUCOSE BLD-MCNC: 172 MG/DL (ref 70–99)
HBA1C MFR BLD: 5.4 %
HCT VFR BLD CALC: 25.3 % (ref 36–48)
HCT VFR BLD CALC: 25.6 % (ref 36–48)
HCT VFR BLD CALC: 26.6 % (ref 36–48)
HEMOGLOBIN: 8.6 G/DL (ref 12–16)
HEMOGLOBIN: 8.7 G/DL (ref 12–16)
HEMOGLOBIN: 8.9 G/DL (ref 12–16)
IRON SATURATION: 17 % (ref 15–50)
IRON: 45 UG/DL (ref 37–145)
PERFORMED ON: ABNORMAL
TOTAL IRON BINDING CAPACITY: 258 UG/DL (ref 260–445)
TRANSFERRIN: 219 MG/DL (ref 200–360)
VITAMIN B-12: 537 PG/ML (ref 211–911)

## 2022-06-09 PROCEDURE — 3609009900 HC COLONOSCOPY W/CONTROL BLEEDING ANY METHOD: Performed by: INTERNAL MEDICINE

## 2022-06-09 PROCEDURE — 99153 MOD SED SAME PHYS/QHP EA: CPT | Performed by: INTERNAL MEDICINE

## 2022-06-09 PROCEDURE — 85014 HEMATOCRIT: CPT

## 2022-06-09 PROCEDURE — C9113 INJ PANTOPRAZOLE SODIUM, VIA: HCPCS | Performed by: INTERNAL MEDICINE

## 2022-06-09 PROCEDURE — 36415 COLL VENOUS BLD VENIPUNCTURE: CPT

## 2022-06-09 PROCEDURE — 6370000000 HC RX 637 (ALT 250 FOR IP): Performed by: INTERNAL MEDICINE

## 2022-06-09 PROCEDURE — 85018 HEMOGLOBIN: CPT

## 2022-06-09 PROCEDURE — 7100000010 HC PHASE II RECOVERY - FIRST 15 MIN: Performed by: INTERNAL MEDICINE

## 2022-06-09 PROCEDURE — 82728 ASSAY OF FERRITIN: CPT

## 2022-06-09 PROCEDURE — 97535 SELF CARE MNGMENT TRAINING: CPT

## 2022-06-09 PROCEDURE — 84466 ASSAY OF TRANSFERRIN: CPT

## 2022-06-09 PROCEDURE — 2720000010 HC SURG SUPPLY STERILE: Performed by: INTERNAL MEDICINE

## 2022-06-09 PROCEDURE — 99152 MOD SED SAME PHYS/QHP 5/>YRS: CPT | Performed by: INTERNAL MEDICINE

## 2022-06-09 PROCEDURE — 97116 GAIT TRAINING THERAPY: CPT

## 2022-06-09 PROCEDURE — 2580000003 HC RX 258: Performed by: INTERNAL MEDICINE

## 2022-06-09 PROCEDURE — 82746 ASSAY OF FOLIC ACID SERUM: CPT

## 2022-06-09 PROCEDURE — 0W3P8ZZ CONTROL BLEEDING IN GASTROINTESTINAL TRACT, VIA NATURAL OR ARTIFICIAL OPENING ENDOSCOPIC: ICD-10-PCS | Performed by: INTERNAL MEDICINE

## 2022-06-09 PROCEDURE — 2709999900 HC NON-CHARGEABLE SUPPLY: Performed by: INTERNAL MEDICINE

## 2022-06-09 PROCEDURE — 82607 VITAMIN B-12: CPT

## 2022-06-09 PROCEDURE — 97530 THERAPEUTIC ACTIVITIES: CPT

## 2022-06-09 PROCEDURE — 97162 PT EVAL MOD COMPLEX 30 MIN: CPT

## 2022-06-09 PROCEDURE — 6360000002 HC RX W HCPCS: Performed by: INTERNAL MEDICINE

## 2022-06-09 PROCEDURE — 99232 SBSQ HOSP IP/OBS MODERATE 35: CPT | Performed by: INTERNAL MEDICINE

## 2022-06-09 PROCEDURE — 83540 ASSAY OF IRON: CPT

## 2022-06-09 PROCEDURE — 1200000000 HC SEMI PRIVATE

## 2022-06-09 PROCEDURE — 7100000011 HC PHASE II RECOVERY - ADDTL 15 MIN: Performed by: INTERNAL MEDICINE

## 2022-06-09 PROCEDURE — 97166 OT EVAL MOD COMPLEX 45 MIN: CPT

## 2022-06-09 RX ORDER — FENTANYL CITRATE 50 UG/ML
INJECTION, SOLUTION INTRAMUSCULAR; INTRAVENOUS PRN
Status: DISCONTINUED | OUTPATIENT
Start: 2022-06-09 | End: 2022-06-09 | Stop reason: ALTCHOICE

## 2022-06-09 RX ORDER — MIDAZOLAM HYDROCHLORIDE 5 MG/ML
INJECTION INTRAMUSCULAR; INTRAVENOUS PRN
Status: DISCONTINUED | OUTPATIENT
Start: 2022-06-09 | End: 2022-06-09 | Stop reason: ALTCHOICE

## 2022-06-09 RX ORDER — DIGOXIN 125 MCG
TABLET ORAL
Qty: 1 TABLET | Refills: 0
Start: 2022-06-09 | End: 2022-06-10 | Stop reason: HOSPADM

## 2022-06-09 RX ORDER — FERROUS SULFATE 325(65) MG
325 TABLET ORAL 2 TIMES DAILY
Qty: 60 TABLET | Refills: 1
Start: 2022-06-09 | End: 2022-06-27 | Stop reason: SDUPTHER

## 2022-06-09 RX ADMIN — DIGOXIN 62.5 MCG: 125 TABLET ORAL at 10:21

## 2022-06-09 RX ADMIN — INSULIN LISPRO 1 UNITS: 100 INJECTION, SOLUTION INTRAVENOUS; SUBCUTANEOUS at 20:29

## 2022-06-09 RX ADMIN — POLYETHYLENE GLYCOL (3350) 17 G: 17 POWDER, FOR SOLUTION ORAL at 20:24

## 2022-06-09 RX ADMIN — Medication 10 ML: at 20:25

## 2022-06-09 RX ADMIN — HYDRALAZINE HYDROCHLORIDE 50 MG: 25 TABLET, FILM COATED ORAL at 20:24

## 2022-06-09 RX ADMIN — GABAPENTIN 200 MG: 100 CAPSULE ORAL at 10:21

## 2022-06-09 RX ADMIN — Medication 40 MG: at 10:20

## 2022-06-09 RX ADMIN — Medication 1000 UNITS: at 10:21

## 2022-06-09 RX ADMIN — Medication 10 ML: at 10:32

## 2022-06-09 RX ADMIN — HYDRALAZINE HYDROCHLORIDE 50 MG: 25 TABLET, FILM COATED ORAL at 06:04

## 2022-06-09 RX ADMIN — AMLODIPINE BESYLATE 10 MG: 5 TABLET ORAL at 10:21

## 2022-06-09 RX ADMIN — ATORVASTATIN CALCIUM 10 MG: 10 TABLET, FILM COATED ORAL at 10:21

## 2022-06-09 RX ADMIN — GABAPENTIN 200 MG: 100 CAPSULE ORAL at 20:24

## 2022-06-09 RX ADMIN — HYDRALAZINE HYDROCHLORIDE 50 MG: 25 TABLET, FILM COATED ORAL at 15:12

## 2022-06-09 RX ADMIN — Medication 119 G: at 03:03

## 2022-06-09 ASSESSMENT — PAIN SCALES - GENERAL
PAINLEVEL_OUTOF10: 0
PAINLEVEL_OUTOF10: 0

## 2022-06-09 ASSESSMENT — PAIN - FUNCTIONAL ASSESSMENT: PAIN_FUNCTIONAL_ASSESSMENT: NONE - DENIES PAIN

## 2022-06-09 NOTE — BRIEF OP NOTE
Brief Postoperative Note      Patient: Carlitos Atwood  YOB: 1945  MRN: [id number]    Date of Procedure: 6/9/2022    Pre-Op Diagnosis: MELENA    Post-Op Diagnosis: single bleeding AVM in ascending colon s/p APC       Procedure(s):  COLONOSCOPY CONTROL HEMORRHAGE    Surgeon(s):  Zane Branch MD    Assistant:  * No surgical staff found *    Anesthesia: IV Sedation    Estimated Blood Loss (mL): Minimal    Complications: None    Specimens:   * No specimens in log *    Implants:  * No implants in log *      Drains:   External Urinary Catheter (Active)   Urine Color Yellow 06/08/22 0052   Urine Appearance Clear 06/08/22 0052   Output (mL) 950 mL 06/08/22 0052       Findings: single bleeding AVM in ascending colon s/p APC    Recommendation  1. Continue supportive care  2. PPI daily  3. Monitor Hgb  4. Observe for signs of bleeding  5. Restart diet as tolerated  6.  OK to d/c if Hgb is stable    Electronically signed by Zane Branch MD on 6/9/2022 at 12:39 PM

## 2022-06-09 NOTE — FLOWSHEET NOTE
06/09/22 0304   Vital Signs   Temp 98 °F (36.7 °C)   Temp Source Oral   Heart Rate 67   Heart Rate Source Monitor   Resp 14   BP (!) 156/55   BP Location Right upper arm   MAP (Calculated) 88.67   Level of Consciousness Alert (0)   MEWS Score 0   Oxygen Therapy   SpO2 99 %   Pulse Oximeter Device Mode Intermittent   Pulse Oximeter Device Location Finger   O2 Device None (Room air)     Pt awake in bed. VS as shown above. Pt denies any further needs at this time. Call light in reach and bed in lowest position.

## 2022-06-09 NOTE — PROGRESS NOTES
Patient sedated for procedure with 2mg Versed and 50mcg of Fentanyl per Dr. Reta Kolb. Patient tolerated procedure well with no complications.

## 2022-06-09 NOTE — FLOWSHEET NOTE
06/09/22 1020   Vital Signs   Temp 97.9 °F (36.6 °C)   Temp Source Oral   Heart Rate 68   Resp 14   BP (!) 173/62   BP Location Right upper arm   MAP (Calculated) 99   Level of Consciousness Alert (0)   MEWS Score 0   Oxygen Therapy   SpO2 98 %   O2 Device None (Room air)     Patient resting quietly in bed. NPO for colonoscopy this afternoon. Shift assessment complete, see flow sheet. Denies needs at this time. Call light in reach. Will monitor.

## 2022-06-09 NOTE — DISCHARGE SUMMARY
Name:  Emily Jauregui  Room:  /7810-88  MRN:    [id number]    Discharge Summary      This discharge summary is in conjunction with a complete physical exam done on the day of discharge. Attending Physician: Dr. Dorothy Cabral  Discharging Physician: Dr. Jese Galdamez: 6/7/2022  Discharge:   6/10/2022    HPI:    The patient is a 68 y.o. female with PVD, OA, hypertension, hyperlipidemia, DM, CAD, A-fib who presents to [facility] from the [facility] with c/o abnormal lab (hgb 6). BP elevated. Labs with Hgb 6.5. occult positive. Admitted to PCU on tele. GI consulted. Diagnoses this Admission and Hospital Course   #Anemia  #GI Bleed  -admitted to PCU on tele.  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   likely ischemic ulcers given CTA findings  -S/p Top amputation right 1st and 4th digit     #Paroxysmal Atrial fibrillation   -on digoxin   -rate controlled   -Held Eliquis  Dc digoxin as HR is low      #HTN - uncontrolled  -continued norvasc and Held ace-i  -Cont'd  hydralazine     #DM type 2   -SSI   -monitored BS      #HX of CVA   #CAD  #PVD   -continued statin   Held Aspirin     PAD - s.p left LE intervention previously  Seen by vascular at [facility]     DVT Prophylaxis: SCD's    Procedures (Please Review Full Report for Details)  Colonoscopy  single bleeding AVM in ascending colon s/p APC    EGD  Normal     Consults    GI      Physical Exam at Discharge:    BP (!) 175/71   Pulse 66   Temp 97.1 °F (36.2 °C) (Oral)   Resp 16   Ht 5' 3\" (1.6 m)   Wt 132 lb (59.9 kg)   SpO2 97%   BMI 23.38 kg/m²     See today's progress note    CBC: Recent Labs     06/07/22  1435 06/07/22  2341 06/08/22  1821 06/09/22  0125 06/09/22 0617   WBC 8.3  --   --   --   --    HGB 6.5*   < > 8.5* 8.7* 8.6*   HCT 19.7*   < > 25.7* 25.3* 25.6*   MCV 82.7  --   --   --   --      --   --   --   --     < > = values in this interval not displayed. BMP:   Recent Labs     06/07/22  1435 06/08/22  0605   * 143   K 4.7 4.5    111*   CO2 20* 22   BUN 38* 32*   CREATININE 1.1 0.8     LIVER PROFILE:   Recent Labs     06/07/22  1435   AST 16   ALT 15   BILITOT <0.2   ALKPHOS 76     PT/INR:   Recent Labs     06/08/22 0605   PROTIME 15.9*   INR 1.29*     APTT:   Recent Labs     06/08/22 0605   APTT 31.8     UA:  Recent Labs     06/07/22  1355   COLORU Yellow   PHUR 5.5   WBCUA 0-2   RBCUA 3-4   BACTERIA Rare*   CLARITYU Clear   SPECGRAV <=1.005   LEUKOCYTESUR Negative   UROBILINOGEN 0.2   BILIRUBINUR Negative   BLOODU LARGE*   GLUCOSEU Negative   AMORPHOUS Rare       CULTURES  COVID: not detected    RADIOLOGY  No orders to display         Discharge Medications     Medication List      START taking these medications    ferrous sulfate 325 (65 Fe) MG tablet  Commonly known as: IRON 325  Take 1 tablet by mouth 2 times daily        CHANGE how you take these medications    digoxin 125 MCG tablet  Commonly known as: LANOXIN  Take 1/2 tablet daily. What changed: additional instructions        CONTINUE taking these medications    amLODIPine 10 MG tablet  Commonly known as: NORVASC  TAKE 1 TABLET BY MOUTH EVERY DAY     apixaban 5 MG Tabs tablet  Commonly known as: ELIQUIS  Take 1 tablet by mouth 2 times daily     aspirin 81 MG EC tablet     diclofenac sodium 1 % Gel  Commonly known as: VOLTAREN  Apply 2 g topically in the morning and at bedtime     docusate sodium 100 mg capsule  Commonly known as: COLACE  Take 1 capsule by mouth daily     gabapentin 100 MG capsule  Commonly known as: NEURONTIN  Take 2 capsules by mouth 2 times daily for 30 days.      hydrALAZINE 50 MG tablet  Commonly known as: APRESOLINE  Take 1 tablet by mouth every 8 hours     lisinopril 20 MG tablet  Commonly known as: PRINIVIL;ZESTRIL  TAKE 1 TABLET BY MOUTH EVERY DAY     metFORMIN 500 MG tablet  Commonly known as: GLUCOPHAGE  TAKE 2 TABLETS BY MOUTH EVERY DAY WITH BREAKFAST     miconazole 2 % powder  Commonly known as: MICOTIN  Apply topically 2 times daily PRN. polyethylene glycol 17 g packet  Commonly known as: GLYCOLAX  Take 17 g by mouth 2 times daily     simvastatin 20 MG tablet  Commonly known as: ZOCOR  TAKE 1 TABLET BY MOUTH EVERY DAY AT NIGHT     vitamin D3 25 MCG (1000 UT) Tabs tablet  Commonly known as: CHOLECALCIFEROL  Take 1 tablet by mouth daily        STOP taking these medications    ADVIL PO     clopidogrel 75 MG tablet  Commonly known as: PLAVIX           Where to Get Your Medications      Information about where to get these medications is not yet available    Ask your nurse or doctor about these medications  · digoxin 125 MCG tablet  · ferrous sulfate 325 (65 Fe) MG tablet           Discharged in stable condition to home. Follow Up: Follow up with PCP. YASMIN Arndt.

## 2022-06-09 NOTE — CARE COORDINATION
INTERDISCIPLINARY PLAN OF CARE CONFERENCE    Date/Time: 6/9/2022 9:34 AM  Completed by: Casilda Severs, RN, Case Management      Patient Name:  Yokasta Robledo  YOB: 1945  Admitting Diagnosis: Melena [K92.1]  Acute blood loss anemia [D62]  Anemia [D64.9]     Admit Date/Time:  6/7/2022  2:19 PM    Chart reviewed. Interdisciplinary team contacted or reviewed plan related to patient progress and discharge plans. Disciplines included Case Management, Nursing, and Dietitian. Current Status:ongoing; plan for colonoscopy today  PT/OT recommendation for discharge plan of care: pending    Expected D/C Disposition:  Skilled nursing facility  Confirmed plan with patient and/or family Yes confirmed with: (name) pt  Discharge Plan Comments: Chart reviewed. Plan continues for return to [address] [facility]. Per [address]  @ [facility] she will start precert today once PT/OT note is available. Spoke with Kailash red with PT and she will attempt to see pt next. Follow. 1510 DC order noted. Call to [address]  @ [facility] and precert still pending.      Home O2 in place on admit: ecf  Pt informed of need to bring portable home O2 tank on day of discharge for nursing to connect prior to leaving:  Not Indicated  Verbalized agreement/Understanding:  Not Indicated

## 2022-06-09 NOTE — PROGRESS NOTES
Inpatient Physical Therapy Evaluation and Treatment    Unit: PCU  Date:  6/9/2022  Patient Name:    Mei Meeks  Admitting diagnosis:  Melena [K92.1]  Acute blood loss anemia [D62]  Anemia [D64.9]  Admit Date:  6/7/2022  Precautions/Restrictions/WB Status/ Lines/ Wounds/ Oxygen: Fall risk, Bed/chair alarm and Telemetry, R foot post-op shoe 2/2 hallux and 4th digit amputation 5/23/22; Per  \"Patient is OK to WB with PT and for short distances such as bathroom. \"    Treatment Time:  9437-6458  Treatment Number:  1   Timed Code Treatment Minutes: 23 minutes  Total Treatment Minutes:  33 minutes    Patient Goals for Therapy: Pt reports that she would rather go home vs SNF, but states \"they probably want me to go back to the nursing home. \"      Discharge Recommendations: SNF  DME needs for discharge: defer to facility       Therapy recommendation for EMS Transport: can transport by wheelchair    Therapy recommendations for staff:   Assist of 1 with use of rolling walker (RW) and gait belt for all transfers and ambulation to/from [facility]  to/from chair  to/from bathroom  within room    History of Present Illness: Per ED H&P 6/7/22  \"76 y. o. female with past medical history of hypertension, hyperlipidemia, peripheral arterial disease, diabetes,  atrial fibrillation, anticoagulated on Eliquis, on Plavix who presents via EMS from [facility] for evaluation of low hemoglobin level.  Patient had routine blood work done earlier today, was noted to have low hemoglobin.  Patient denies any symptoms aside from having melena which she notes is chronic for her. Jose Luis Harper notes that she took her self off of her Eliquis months ago because she did not think she needed it anymore however the nursing facility put her back on it when she was admitted as she was told that she needs to be on it for the rest of her life. Jose Luis Harper denies any shortness of breath or chest pain.  Endorses right hallux pain where she had amputation but denies any fevers chills or wound problems.  She denies any urinary symptoms such as frequency urgency or hematuria.  She denies any epigastric or other abdominal pain.  She denies any recent falls or injuries.  She is on iron.    Nursing Notes were all reviewed and agreed with or any disagreements were addressed  in the HPI. \"    Per Podiatry  (6/8/22)  Nabeel.Qu[age] y.o. female who is seen for evaluation of right foot S/P amputation right hallux and 4th digit. Underwent amputation with Dr. Mckayla Henderson on 5/23/22. Does not remember why she was transferred to [facility] from [facility] during her last visit. States no pain in the foot at this time. Admitted due to anemia. I asked her several times when was the last time she saw Dr. Mckayla Henderson. She does not know remember seeing him since being in the nursing home. \"    Home Health S4 Level Recommendation:  NA  AM-PAC Mobility Score       AM-PAC Inpatient Mobility without Stair Climbing Raw Score : 16    Preadmission Environment    Pt has been at the SNF since she had toe amputation on the rt foot the end of May this yr   Pt. Lives with spouse- pt states her spouse falls a lot   Home environment:    one story home  Steps to enter first floor: 1 plus 1 steps to enter  Steps to second floor:   Bathroom: tub/shower unit and standard height commode  Equipment owned:  has two canes      Preadmission Status:  Pt. Able to drive: Yes  Pt Fully independent with ADLs: Yes  Pt. Required assistance from family for: Cleaning, Cooking and Laundry   Pt. independent for transfers and gait and walked with No Device  History of falls No     Pain  No     Cognition    A&O x4   Able to follow 2 step commands    Subjective  Patient lying supine in bed with no family present. Pt agreeable to this PT eval & tx. Upper Extremity ROM/Strength  Please see OT evaluation.       Lower Extremity ROM / Strength   AROM WFL: Yes  ROM limitations: NA  Comment: Severe arthritic changes to L medial knee resulting in anatomical knee valgus that worsens and is painful /c WB/amb    Strength Assessment (measured on a 0-5 scale):  R LE   Quad   5   Ant Tib  5   Hamstring 4+   Iliopsoas 4  L LE  Quad   4+   Ant Tib  5   Hamstring 4   Iliopsoas 4-    Lower Extremity Sensation    WFL     Lower Extremity Proprioception:   NT    Coordination and Tone  NT    Balance  Sitting:  Good - ; SBA  Comments: at EOB ~10 minutes during examination and vitals assessment. Intermittent use of UE on EOB for support, fwd flexed trunk that required VC for correction. On BSC x 10 min during toileting. Standing: Fair +; SBA /c RW  Comments: ~2 min, narrow HUMAIRA, fwd flexed posture, significantly incr L knee valgus and c/o pain /c WB. Bed Mobility   Supine to Sit:    SBA  Sit to Supine:   Not Tested   (up to chair to end session)  Rolling:   Not Tested  Scooting in sitting: Supervision  Scooting in supine:  Not Tested    Transfer Training (all performed /c RW)   Sit to stand:   CGA  Stand to sit:   CGA  Bed to Chair:   CGA with use of gait belt and rolling walker (RW)     Gait gait completed as indicated below  Distance:      5 ft + 10 ft + 40 ft  Deviations (firm surface/linoleum):  decreased zohra, narrow HUMAIRA, forward flexed posture, decreased step length bilaterally and significant anatomical L knee valgus that worsens /c WB (this and surgical shoe caused significant leg length discrepancy during amb)  Assistive Device Used:    gait belt and rolling walker (RW)  Level of Assist:    CGA  Comment: Attempted to walk pt into bathroom for toileting, however, became incontinent in bowels during amb which required OT to bring commode to pt for toileting. After, pt able to complete further gait training /c persistent fwd flexed trunk that required repeated VC to correct. VC also required to keep RW close in front of pt; pt responded well to this cueing.     Stair Training deferred, pt unsafe/ not appropriate to complete stairs at this time    Activity Tolerance   Pt completed therapy session with Pain noted with WB on L knee & decr balance  Sitting EOB  /70; Sp02 98% on RA; HR 76     Positioning Needs   Pt up in chair, alarm set, positioned in proper neutral alignment and pressure relief provided. Call light provided and all needs within reach    Exercises Initiated  Wayne deferred secondary to treatment focus on functional mobility  NA    Other  None. Patient/Family Education   Pt educated on role of inpatient PT, POC, importance of continued activity, DC recommendations, safety awareness, transfer techniques, pacing activity and calling for assist with mobility. Assessment  Pt seen for Physical Therapy evaluation in acute care setting. Pt demonstrated decreased Activity tolerance, Balance, Safety and Strength as well as decreased independence with Ambulation, Bed Mobility  and Transfers. Pt came from SNF but prior to that her PLOF includes IND for ADLs/amb /c some assistance for cleaning/cooking/laundry. Pt presents needing SBA for bed mobility and seated/standing balance /c CGA level of assist for transfers/amb. Pt demonstrated mild balance disturbances during amb exacerbated by new use of surgical shoe as well as significant anatomical L knee valgus (2/2 arthritic changes) causing a significant leg length discrepancy. Pt required repeated VC for upright posture and proper use of RW. Pt would benefit from continued skilled physical therapy services to improve strength, balance, and overall functional mobility. Recommending SNF upon discharge as patient functioning well below baseline, demonstrates good rehab potential and unable to return home due to limited or no family support, burden of care beyond caregiver ability and limited safety awareness. Goals : To be met in 3 visits:  1). Independent with LE Ex x 10 reps    To be met in 6 visits:  1). Supine to/from sit: Modified Independent  2).   Sit to/from stand: Supervision  3). Bed to chair: Supervision  4). Gait: Ambulate 150 ft.  with  Supervision and use of rolling walker (RW)  5). Tolerate B LE exercises 3 sets of 10-15 reps    Rehabilitation Potential: Good  Strengths for achieving goals include:   PLOF and Pt cooperative   Barriers to achieving goals include:    Pain and Weakness    Plan    To be seen 3-5 x / week  while in acute care setting for therapeutic exercises, bed mobility, transfers, progressive gait training, balance training, and family/patient education. Signature: JEROME Munoz  Co-signature: Deborah Lynn PT, DPT    If patient discharges from this facility prior to next visit, this note will serve as the Discharge Summary.

## 2022-06-09 NOTE — H&P
History and Physical / Pre-Sedation Assessment    Patient:  Yokasta Robledo   :   1945     Intended Procedure:  Colonoscopy    HPI: 68year old female with history of A fib, HTN, HLD, CAD, DM, PVD s/p bypass, osteomyelitis s/p toe amputation admitted with severe GI blood loss anemia. EGD negative    Past Medical History:   Diagnosis Date    Atrial fibrillation ([facility] [address]     off coumadin after bleed, declined restart    Blood transfusion     CAD (coronary artery disease)     Diabetes mellitus type II     Diabetic neuropathy ([facility] [address] [facility]     Gastric ulcer     H. pylori infection     Hyperlipidemia     Hypertension     Numbness and tingling of left leg     Osteoarthritis     knees    Poor historian     PVD (peripheral vascular disease) ([facility] [address]     PTA distal sfa/pop/peroneal, Dr. Rowena Morillo 2015    Unspecified cerebral artery occlusion with cerebral infarction     TIA affected left eye    upper gi bleed 2009     Past Surgical History:   Procedure Laterality Date    ANGIOPLASTY  12/30/15    Dr. Rowena Morillo, LLE, PTA of distal sfa/pop/peroneal    CARDIAC CATHETERIZATION  12    done for surgical clearance, cath was negative    CORONARY ANGIOPLASTY WITH STENT PLACEMENT  [id number]    FOOT SURGERY Left 2018    DEBRIDEMENT OF ULCERS LEFT FOOT AND LEG WITH GRAFT    OTHER SURGICAL HISTORY Left 2017    Left Femoral Peroneal Bypass    SHOULDER ARTHROPLASTY  10/5/12    RIGHT SHOULDER TOTAL ARTHROPLASTY, BICEPS TENODESIS DEPUY, GLOBAL ADVANTAGE AP    TOE AMPUTATION Right 2022    AMPUTATION OF RIGHT FIRST AND FOURTH TOES performed by Bianca Elizabeth DPM at [facility] 2022    EGD IV SEDATION performed by Mahsa Thao MD at [address] [facility]       Medications reviewed  Prior to Admission medications    Medication Sig Start Date End Date Taking?  Authorizing Provider   apixaban (ELIQUIS) 5 MG TABS tablet Take 1 tablet by mouth 2 times daily 5/25/22 6/24/22  Yamila Burger MD   gabapentin (NEURONTIN) 100 MG capsule Take 2 capsules by mouth 2 times daily for 30 days. 5/25/22 6/24/22  Carol Galindo MD   hydrALAZINE (APRESOLINE) 50 MG tablet Take 1 tablet by mouth every 8 hours 5/25/22   Carol Galindo MD   diclofenac sodium (VOLTAREN) 1 % GEL Apply 2 g topically in the morning and at bedtime 5/25/22   Yamila Burger MD   miconazole (MICOTIN) 2 % powder Apply topically 2 times daily PRN. 5/25/22   Carol Galindo MD   docusate sodium (COLACE) 100 mg capsule Take 1 capsule by mouth daily 5/26/22 6/25/22  Yamila Bugrer MD   polyethylene glycol (GLYCOLAX) 17 g packet Take 17 g by mouth 2 times daily 5/25/22 6/24/22  Carol Galindo MD   clopidogrel (PLAVIX) 75 MG tablet Take 1 tablet by mouth daily 5/26/22   Carol Galindo MD   aspirin 81 MG EC tablet Take 81 mg by mouth daily    Historical Provider, MD   Ibuprofen (ADVIL PO) Take by mouth    Historical Provider, MD   vitamin D3 (CHOLECALCIFEROL) 25 MCG (1000 UT) TABS tablet Take 1 tablet by mouth daily 3/25/22   MARVIN Luna CNP   metFORMIN (GLUCOPHAGE) 500 MG tablet TAKE 2 TABLETS BY MOUTH EVERY DAY WITH BREAKFAST 3/3/22   MARVIN Luna CNP   digoxin (LANOXIN) 125 MCG tablet Take 1 tablet daily. 3/3/22   MARVIN Luna CNP   amLODIPine (NORVASC) 10 MG tablet TAKE 1 TABLET BY MOUTH EVERY DAY 3/3/22   MARVIN Luna CNP   lisinopril (PRINIVIL;ZESTRIL) 20 MG tablet TAKE 1 TABLET BY MOUTH EVERY DAY 3/3/22   MARVIN Luna CNP   simvastatin (ZOCOR) 20 MG tablet TAKE 1 TABLET BY MOUTH EVERY DAY AT NIGHT 3/3/22   MARVIN Luna CNP        Allergies: Allergies   Allergen Reactions    Pcn [Penicillins] Other (See Comments)     Unsure of reaction       Nurses notes reviewed and agreed.     Physical Exam:  Vital Signs: BP (!) 175/47   Pulse 64   Temp 97.3 °F (36.3 °C) (Temporal)   Resp 16   Ht 5' 3\" (1.6 m)   Wt 132 lb (59.9 kg)   SpO2 99%   BMI 23.38 kg/m²    Airway: Mallampati: II (soft palate, uvula, fauces visible)  Pulmonary:Normal  Cardiac:Normal  Abdomen:Normal    Pre-Procedure Assessment / Plan:  ASA: Class 3 - A patient with severe systemic disease that limits activity but is not incapacitating  Level of Sedation Plan: Moderate sedation  Post Procedure plan: Return to same level of care    I assessed the patient and find that the patient is in satisfactory condition to proceed with the planned procedure and sedation plan. I have explained the risk, benefits, and alternatives to the procedure; the patient understands and agrees to proceed.        Earl Camacho MD  6/9/2022

## 2022-06-09 NOTE — PROGRESS NOTES
Pt awake in bed. Assessment completed and medications given. VS stable. A&Ox4. Pt denies any further needs at this time. Call light in reach and bed in lowest position.

## 2022-06-09 NOTE — PROGRESS NOTES
Inpatient Occupational Therapy  Evaluation and Treatment    Unit: PCU  Date:  6/9/2022  Patient Name:    Esme Whitfield  Admitting diagnosis:  Melena [K92.1]  Acute blood loss anemia [D62]  Anemia [D64.9]  Admit Date:  6/7/2022  Precautions/Restrictions/WB Status/ Lines/ Wounds/ Oxygen: Fall risk, Bed/chair alarm and Telemetry  WBAT per podiatrist , surgical shoe for the right foot   Treatment Time:  1025- 1100   Treatment Number: 1   Timed code treatment minutes 25 minutes   Total Treatment minutes:   35   minutes    Patient Goals for Therapy:  \" not stated  \"      Discharge Recommendations: SNF  DME needs for discharge: defer to facility       Therapy recommendations for staff:   Assist of 1 with use of rolling walker (RW) for all ambulation to/from [facility]  to/from chair and gait belt     History of Present Illness: per H&P   68 y.o. female with past medical history of hypertension, hyperlipidemia, peripheral arterial disease, diabetes,  atrial fibrillation, anticoagulated on Eliquis, on Plavix who presents via EMS from [facility] for evaluation of low hemoglobin level. Patient had routine blood work done earlier today, was noted to have low hemoglobin. Patient denies any symptoms aside from having melena which she notes is chronic for her. She notes that she took her self off of her Eliquis months ago because she did not think she needed it anymore however the nursing facility put her back on it when she was admitted as she was told that she needs to be on it for the rest of her life. She denies any shortness of breath or chest pain. Endorses right hallux pain where she had amputation but denies any fevers chills or wound problems. She denies any urinary symptoms such as frequency urgency or hematuria. She denies any epigastric or other abdominal pain. She denies any recent falls or injuries. She is on iron.     Nursing Notes were all reviewed and agreed with or any disagreements were addressed in the HPI. Home Health S4 Level Recommendation:  NA  AM-PAC Score: 21    Preadmission Environment    Pt has been at the SNF since she had toe amputation on the rt foot the end of May this yr     Pt. Lives with spouse- pt states her spouse falls a lot   Home environment:  one story home  Steps to enter first floor: 1 plus 1 steps to enter  Steps to second floor:   Bathroom: tub/shower unit and standard height commode  Equipment owned:  has two canes     Preadmission Status:  Pt. Able to drive: Yes  Pt Fully independent with ADLs: Yes  Pt. Required assistance from family for: Cleaning, Cooking and Laundry   Pt. independent for transfers and gait and walked with No Device  History of falls No    Pain  No    Cognition    A&O x4   Able to follow 2 step commands    Subjective  Patient lying supine in bed with no family present. Pt agreeable to this OT eval & tx.      Upper Extremity ROM:    WFL    Upper Extremity Strength:    Left shoulder limited to approx 100 degrees flex with some pain   Rt shoulder WFL     Upper Extremity Sensation    WFL    Upper Extremity Proprioception:  WFL    Coordination and Tone  WFL    Balance  Functional Sitting Balance:  WFL  Functional Standing Balance:Impaired    Bed mobility:    Supine to sit:   SBA  Sit to supine:   Not Tested  Rolling:    Not Tested  Scooting in sitting:  Supervision  Scooting to head of bed:   Not Tested    Bridging:   Not Tested    Transfers:    Sit to stand:  CGA  Stand to sit:  CGA  Bed to chair:   CGA with RW and gait belt   Standard toilet: Not Tested  Bed to [facility]:  Not Tested    Dressing:      UE:   Not Tested  LE:    SBA to don surgical shoe on rt foot     Bathing:    UE:  Not Tested  LE:  Not Tested    Eating:   Independent    Toileting:  SBA and CGA    Activity Tolerance   Pt completed therapy session with decreased balance   Sitting edge of bed /70 Sp02 98% on room air HR 76     Positioning Needs:   Pt up in chair, alarm set, positioned in proper neutral alignment and pressure relief provided. Exercise / Activities Initiated:   N/A    Patient/Family Education:   Role of OT    Assessment of Deficits: Pt seen for Occupational therapy evaluation in acute care setting. Pt demonstrated decreased Activity tolerance, IADLs, Balance , Bed mobility, ROM, Safety Awareness, Strength and Transfers. Pt functioning below baseline and will likely benefit from skilled occupational therapy services to maximize safety and independence. Goal(s) : To be met in 3 Visits:  1). Bed to toilet/BSC: SBA with RW    To be met in 5 Visits:  1). Supine to/from Sit:  Independent  2). Upper Body Bathing:   Independent  3). Lower Body Bathing:   SBA and CGA  4). Upper Body Dressing:  Not Tested  5). Lower Body Dressing:  Supervision  6). Pt to demonstrate UE exs x 15 reps with minimal cues    Rehabilitation Potential:  Fair for goals listed above. Strengths for achieving goals include: Pt cooperative  Barriers to achieving goals include:  Complexity of condition     Plan: To be seen 3-5 x/wk while in acute care setting for therapeutic exercises, bed mobility, transfers, dressing, bathing, family/patient education, ADL/IADL retraining, energy conservation training.      Omega Minus OTR/L 59559          If patient discharges from this facility prior to next visit, this note will serve as the Discharge Summary

## 2022-06-09 NOTE — PROGRESS NOTES
Admit: 2022    Name:  Kristie Dubon  Room:  [facility]/6036-89  MRN:    [id number]     Daily Progress Note for 2022     Admitted with anemia from a nursing home. Interval History:     Underwent esophagogastroduodenoscopy H&H is unremarkable  Plan for colonoscopy today    Scheduled Meds:   amLODIPine  10 mg Oral Daily    digoxin  62.5 mcg Oral Daily    docusate sodium  100 mg Oral Daily    gabapentin  200 mg Oral BID    hydrALAZINE  50 mg Oral 3 times per day    polyethylene glycol  17 g Oral BID    atorvastatin  10 mg Oral Daily    Vitamin D  1,000 Units Oral Daily    insulin lispro  0-6 Units SubCUTAneous TID WC    insulin lispro  0-3 Units SubCUTAneous Nightly    sodium chloride flush  5-40 mL IntraVENous 2 times per day    pantoprazole (PROTONIX) 40 mg injection  40 mg IntraVENous Daily       Continuous Infusions:   sodium chloride      sodium chloride      dextrose      sodium chloride         PRN Meds:  sodium chloride, sodium chloride, glucose, dextrose bolus **OR** dextrose bolus, glucagon (rDNA), dextrose, sodium chloride flush, sodium chloride, ondansetron **OR** ondansetron, acetaminophen **OR** acetaminophen                  Objective:     Temp  Av.7 °F (36.5 °C)  Min: 97.1 °F (36.2 °C)  Max: 98.2 °F (36.8 °C)  Pulse  Av.9  Min: 53  Max: 67  BP  Min: 139/64  Max: 181/68  SpO2  Av.5 %  Min: 96 %  Max: 99 %  Patient Vitals for the past 4 hrs:   BP Pulse   22 0601 (!) 173/61 60         Intake/Output Summary (Last 24 hours) at 2022 0811  Last data filed at 2022 2129  Gross per 24 hour   Intake 430 ml   Output --   Net 430 ml       Physical Exam:  Gen: No distress. Alert. Eyes: PERRL. No sclera icterus. No conjunctival injection. ENT: No discharge. Pharynx clear. Neck: Trachea midline. Normal thyroid. Resp: No accessory muscle use. No crackles. No wheezes. No rhonchi. No dullness on percussion. CV: Regular rate. Regular rhythm. No murmur or rub.  No edema.   GI: Non-tender. Non-distended. No masses. No organomegaly. Normal bowel sounds. No hernia. Skin: dressing right foot and ankle   Lymph: No cervical LAD. No supraclavicular LAD. M/S: No cyanosis. No joint deformity. No clubbing. Neuro: Awake. Moves all 4 extremities, non focal  Psych: Oriented x 3. No anxiety or agitation. Lab Data:  CBC:   Recent Labs     06/07/22  1435 06/07/22  2341 06/08/22  1821 06/09/22  0125 06/09/22  0617   WBC 8.3  --   --   --   --    RBC 2.39*  --   --   --   --    HGB 6.5*   < > 8.5* 8.7* 8.6*   HCT 19.7*   < > 25.7* 25.3* 25.6*   MCV 82.7  --   --   --   --    RDW 16.7*  --   --   --   --      --   --   --   --     < > = values in this interval not displayed. BMP:   Recent Labs     06/07/22  1435 06/08/22  0605   * 143   K 4.7 4.5    111*   CO2 20* 22   BUN 38* 32*   CREATININE 1.1 0.8     BNP: No results for input(s): BNP in the last 72 hours. PT/INR:   Recent Labs     06/08/22  0605   PROTIME 15.9*   INR 1.29*     APTT:  Recent Labs     06/08/22  0605   APTT 31.8     CARDIAC ENZYMES: No results for input(s): CKMB, CKMBINDEX, TROPONINI in the last 72 hours.     Invalid input(s): CKTOTAL;3  FASTING LIPID PANEL:  Lab Results   Component Value Date    CHOL 132 05/14/2021    HDL 54 05/14/2021    HDL 52 03/05/2012    TRIG 68 05/14/2021     LIVER PROFILE:   Recent Labs     06/07/22  1435   AST 16   ALT 15   BILITOT <0.2   ALKPHOS 76         No orders to display         Assessment & Plan:     Patient Active Problem List    Diagnosis Date Noted    Gastrointestinal hemorrhage     Acute blood loss anemia     Anemia 06/07/2022    PVD (peripheral vascular disease) ([facility] 05/18/2022    Cellulitis of right lower extremity     Osteomyelitis of right foot (HCC)     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, with bone involvement without evidence of necrosis ([address]     Primary hypertension     Acute osteomyelitis of right foot ([facility] 05/13/2022    Chronic deep vein thrombosis (DVT) of left peroneal vein ([facility] 05/28/2021    Colonoscopy refused 04/05/2019    Tyesha, bilateral 10/10/2018    Acute osteomyelitis of left foot ([facility]     Vitamin D deficiency 05/20/2017    Pure hypercholesterolemia 07/19/2016    Coronary artery disease involving native coronary artery of native heart without angina pectoris 07/19/2016    Essential hypertension 07/19/2016    Diabetes mellitus ([facility] 07/19/2016    PAD (peripheral artery disease) ([facility] 06/19/2015    Noncompliance of patient with dietary regimen 09/30/2013    Paroxysmal atrial fibrillation ([facility] 04/25/2011    Osteoarthritis      #Anemia  #GI Bleed  -admitted to PCU on tele. GI Consulted  -EGD negative  -PPI daily  -monitor H/H  -Clear liquid diet  -Colonoscopy today  Iron studies, B12 folate levels pending     #Recent Osteomyelitis of right fourth toe distal and middle  phalynx and osteomyelitis of distal phalynx of great toe   #Chronic diabetic foot ulcers. likely ischemic ulcers given CTA findings  -S/p Top amputation right 1st and 4th digit     #Paroxysmal Atrial fibrillation   -on digoxin   -rate controlled   -Held Eliquis     #HTN - uncontrolled  -continue norvasc and Held ace-i  -Cont  hydralazine     #DM type 2   -SSI   -monitor BS      #HX of CVA   #CAD  #PVD   -continue statin   Held Aspirin     PAD - s.p left LE intervention previously  Se[facility] by vascular at [facility]     DVT Prophylaxis: SCD's  Diet: Diet NPO Exceptions are: Ice Chips  ADULT DIET;  Clear Liquid  Code Status: Full Code         Beba Quinonez MD

## 2022-06-09 NOTE — OP NOTE
[address]                 [address] [address]                                OPERATIVE REPORT    PATIENT NAME: Roslyn Sanchez                      :        1945  MED REC NO:   [id number]                          ROOM:       946  ACCOUNT NO:   [de-identified]                           ADMIT DATE: 2022  PROVIDER:     Carrillo Johns MD    DATE OF PROCEDURE:  2022    PREPROCEDURE DIAGNOSES:  1.  GI bleed. 2.  Melena. POSTPROCEDURE DIAGNOSES:  1. Normal EGD. 2.  No active bleeding. PROCEDURE PERFORMED:  EGD. SURGEON:  Carrillo Johns MD    PROCEDURE INDICATIONS:  This is a 51-year-old female with history of  diabetes, hypertension, hyperlipidemia, atrial fibrillation, peripheral  vascular disease with status post angioplasty, coronary artery disease  status post stents, osteomyelitis status post toe amputation as recent  as 2 weeks ago, presented to the emergency room with abnormal labs  consistent with anemia. Her hemoglobin was found to be 6.0 on routine  check. She recently had a right hallux amputation. She has been off  anticoagulation for the surgery and has not resumed it. She denies any  NSAID use. She has previous history of large gastric ulcer in . There is no report of colonoscopy. MEDICATIONS:  Versed 2 mg and fentanyl 50 mcg. LABORATORY DATA:  White blood cell count 8.3, hemoglobin 6.5, hematocrit  19.7, platelets 870. Sodium 135, potassium 4.7, chloride 101, bicarb  20, BUN 38, creatinine 1.1, glucose 140. Liver profile, alk phos 76,  AST 16, ALT 15, total protein 6.0, albumin 3.9, total bilirubin 0.2. PROCEDURE IN DETAILS:  Informed consent was obtained after discussing  the risks, benefits, and alternatives. A full history and physical was  performed. The patient was classified as ASA class III. Medications  were given to achieve adequate sedation.   The cardiopulmonary status was  continuously monitored throughout the procedure. The patient was placed  in the left lateral decubitus position. Once adequately sedated, a  standard upper gastroscope was inserted in the mouth and advanced under  direct visualization to second portion of the duodenum. Entire mucosa  of esophagus, stomach and (stress retroflexion and forward views),  duodenum (bulb, sweep and second portion) were examined carefully during  withdrawal.  The patient tolerated the procedure well without any  difficulties. FINDINGS:  ESOPHAGUS. The entire esophagus appeared normal.  There was no evidence  of inflammation, ulcers, strictures or Magana's. STOMACH:  The entire stomach appeared normal both on forward and  retroflexed views. DUODENUM:  Examined portion of the duodenum appeared normal.    SUMMARY:  1. Normal EGD. 2.  No active bleeding. 3.  No source of anemia identified on this exam.    RECOMMENDATIONS:  1. Return the patient to floor for continuous medical care. 2.  Monitor hemoglobin. 3.  Observe for signs of bleeding. 4.  Continue to hold the anticoagulation. 5.  Colonoscopy tomorrow to rule out lower GI source of anemia. 6.  We will follow the patient with you.     EBL: <5ml    Michael Cárdenas MD    D: 06/08/2022 13:16:20       T: 06/08/2022 13:18:49     [id number]  Job#: [id number]     Doc#: [id number]    CC:  MD Chivo Mosquera CNP Joelene Bunde, MD

## 2022-06-10 VITALS
HEART RATE: 68 BPM | DIASTOLIC BLOOD PRESSURE: 68 MMHG | WEIGHT: 126.1 LBS | TEMPERATURE: 97.3 F | OXYGEN SATURATION: 98 % | SYSTOLIC BLOOD PRESSURE: 177 MMHG | HEIGHT: 63 IN | BODY MASS INDEX: 22.34 KG/M2 | RESPIRATION RATE: 18 BRPM

## 2022-06-10 LAB
GLUCOSE BLD-MCNC: 112 MG/DL (ref 70–99)
GLUCOSE BLD-MCNC: 165 MG/DL (ref 70–99)
HCT VFR BLD CALC: 25 % (ref 36–48)
HEMOGLOBIN: 8.3 G/DL (ref 12–16)
PERFORMED ON: ABNORMAL
PERFORMED ON: ABNORMAL
SARS-COV-2, NAAT: NOT DETECTED

## 2022-06-10 PROCEDURE — 2580000003 HC RX 258: Performed by: INTERNAL MEDICINE

## 2022-06-10 PROCEDURE — 99239 HOSP IP/OBS DSCHRG MGMT >30: CPT | Performed by: INTERNAL MEDICINE

## 2022-06-10 PROCEDURE — 87635 SARS-COV-2 COVID-19 AMP PRB: CPT

## 2022-06-10 PROCEDURE — 6370000000 HC RX 637 (ALT 250 FOR IP): Performed by: INTERNAL MEDICINE

## 2022-06-10 PROCEDURE — 85018 HEMOGLOBIN: CPT

## 2022-06-10 PROCEDURE — 85014 HEMATOCRIT: CPT

## 2022-06-10 PROCEDURE — 36415 COLL VENOUS BLD VENIPUNCTURE: CPT

## 2022-06-10 PROCEDURE — C9113 INJ PANTOPRAZOLE SODIUM, VIA: HCPCS | Performed by: INTERNAL MEDICINE

## 2022-06-10 PROCEDURE — 6360000002 HC RX W HCPCS: Performed by: INTERNAL MEDICINE

## 2022-06-10 PROCEDURE — 96376 TX/PRO/DX INJ SAME DRUG ADON: CPT

## 2022-06-10 RX ADMIN — Medication 1000 UNITS: at 10:05

## 2022-06-10 RX ADMIN — Medication 40 MG: at 10:04

## 2022-06-10 RX ADMIN — HYDRALAZINE HYDROCHLORIDE 50 MG: 25 TABLET, FILM COATED ORAL at 05:47

## 2022-06-10 RX ADMIN — ATORVASTATIN CALCIUM 10 MG: 10 TABLET, FILM COATED ORAL at 10:05

## 2022-06-10 RX ADMIN — DIGOXIN 62.5 MCG: 125 TABLET ORAL at 10:04

## 2022-06-10 RX ADMIN — GABAPENTIN 200 MG: 100 CAPSULE ORAL at 10:05

## 2022-06-10 RX ADMIN — Medication 10 ML: at 10:05

## 2022-06-10 RX ADMIN — INSULIN LISPRO 1 UNITS: 100 INJECTION, SOLUTION INTRAVENOUS; SUBCUTANEOUS at 11:38

## 2022-06-10 RX ADMIN — AMLODIPINE BESYLATE 10 MG: 5 TABLET ORAL at 10:05

## 2022-06-10 RX ADMIN — HYDRALAZINE HYDROCHLORIDE 50 MG: 25 TABLET, FILM COATED ORAL at 14:21

## 2022-06-10 NOTE — CARE COORDINATION
INTERDISCIPLINARY PLAN OF CARE CONFERENCE    Date/Time: 6/10/2022 9:52 AM  Completed by: Larry Littlejohn RN, Case Management      Patient Name:  Heddie Dance  YOB: 1945  Admitting Diagnosis: Melena [K92.1]  Acute blood loss anemia [D62]  Anemia [D64.9]     Admit Date/Time:  2022  2:19 PM    Chart reviewed. Interdisciplinary team contacted or reviewed plan related to patient progress and discharge plans. Disciplines included Case Management, Nursing, and Dietitian. Current Status:ongoing  PT/OT recommendation for discharge plan of care: SNF    Expected D/C Disposition:  Skilled nursing facility  Confirmed plan with patient and/or family Yes confirmed with: (name) pt  Met with:pt  Discharge Plan Comments: Reviewed chart. Role of discharge planner explained and patient verbalized understanding. Pt is a readmit. Pt is from [facility]. Pt is aware that PT/OT recommend SNF. Pt chose to return to [facility]. Precert was started on 1277. Per Marivel with [facility], precert is not back yet. CM spoke with pt. Pt is concerned about going to [facility] ([facility]), as she states that her sons were checking on the pt's spouse on Wednesday and that the pt's spouse \"had to be rescusitated because he almost \". Pt's sons reported this to the pt. Pt states that she feels like she may go home because of this. CM explained that CM understands her concerns, but questioned pt how she could help her spouse if she was too weak to help herself. Pt agreed. Pt agreed for CM to call her sons or D-I-L to inquire about them checking on their dad, which is pt's spouse. CM called Chan ([phone]) and his VM is not set up and CM could not leave a VM. CM called her D-I-L Roscoe Uday) [phone], but her VM has not been set up yet. Then CM called [phone], CM asked for Trena Syed, and they state that Trena Syed or no one with the last name Heather lives there. CM unable to touch base with children.     Pt does state that she is agreeable with discharge. Awaiting precert for [facility]. Rapid covid for SNF ordered and Rocío Arrington RN, informed. Addendum 6/10/2022 1245: Jonelle Trinidad with [facility] states that precert is still not back and she is having DON call [facility] to get precert escalated, as they are aware of discharge. Chan (pt's son) called CM back and stated he was checking on pt as his phone was off. Cm inquired about pt's spouse and possible resuscitation on Wednesday. Per Chan, pt's spouse \"was not resuscitated on Wednesday. Chan states that the spouse  Walks around naked and he closed the door and locked it and Chan could not get in. Chan called the squad and they got into the house and offered to take pt to the hospital and to did not want to go to the hospital. He stated he did not want to go to the hospital. [facility] states he informed pt of this. CM informed John Brito of this. CM re-informed pt of this. KARLO called [facility] ([phone]) to try to expedite the precert. KARLO spoke with [facility]. She states she did nto see anything started, but was calling Marivel with [facility] to inquire. Jonelle Trinidad states she started it yesterday and faxed it to them with confirmation. [facility] was given Marivel's cell phone number to call. Discharge order noted, still awaiting precert. Addendum 1328: Per Marivel with [facility]. [facility] had attached to the previous stay with [facility], instead of creating a new one. Jonelle Trinidad is speaking with them now. Currently, the reference number is [id number]. Addendum 1733: Precert is still not back. Tentatively have set pt up for a 1700 transport to [facility] via [facility]     Await precert. Addendum [phone]: per Marivel with [facility], she is on hold with [facility] again to get precert for pt. Addendum 1519: Marivel with [facility] states that [facility] states that they are still looking at the precert. Karlo called [facility] again ([phone]) and spoke with Taylor.   Per Taylor, pt is approved to go to [facility] with reference # [id number] and valid for 5 days, starting today will corina updates sent on 2022. CM informed Marivel with [facility] and she is to call [employer] to let them know that pt is coming and Chan pt, and Carmen RN. DISCHARGE ORDER  Date/Time 6/10/2022 3:38 PM  Completed by: Yoly Ronquillo RN, Case Management    Patient Name: Elaine Stout    : 1945      Admit order Date and Status:2022  Noted discharge order. (verify MD's last order for status of admission/Traditional Medicare 3 [place] Inpatient qualifying stay required for SNF)    Confirmed discharge plan with:              Patient:  Yes              When pt confirms DC plan does any support person need to be contacted by CM Yes if yes who__Chan (son)____                      Discharge to Facility: [address] (SNF)    · Facility phone number for staff giving report: Name: Charmaine Stone  · Address: [facility] [address], [address] [place] [address]  · Phone: [phone]  · Fax: [phone]     Pre-certification completed: yes Per Taylor, pt is approved to go to [address] with reference # [id number] and valid for 5 days    Hospital Exemption Notification (HENS) completed: not needed, returning   Discharge orders and Continuity of Care faxed to facility:  [phone]      Transportation:               Medical Transport explained with choice list offered to pt/family. Choice:Yes(no preference)  [facility]   time:   0510-3467      Pt/family/Nursing/Facility aware of  time: yes  Yes Names: pt, pt's son Marcelo [place] with Tenzin Bachelor RN  Ambulance form completed:  yes:      Comments:Faxed RAFA/AVS/neg covid test from 6/10/2022 to [phone]. Pt is being d/c'd to [facility] today. Pt's O2 sats are 98% on RA. Discharge timeout done with John Haque RN.  All discharge needs and concerns addressed. Discharging nurse to complete RAFA, reconcile AVS, and place final copy with patient's discharge packet. Discharging RN to ensure that written prescriptions for  Level II medications are sent with patient to the facility as per protocol.             Home O2 in place on admit: No  Pt informed of need to bring portable home O2 tank on day of discharge for nursing to connect prior to leaving:  Not Indicated  Verbalized agreement/Understanding:  Not Indicated

## 2022-06-10 NOTE — PROGRESS NOTES
Admit: 2022    Name:  Magali Jimenez  Room:  /0674-59  MRN:    [id number]     Daily Progress Note for 6/10/2022     Admitted with anemia from a nursing home. Interval History:     No rectal bleeding  Bradycardic today    Scheduled Meds:   amLODIPine  10 mg Oral Daily    digoxin  62.5 mcg Oral Daily    docusate sodium  100 mg Oral Daily    gabapentin  200 mg Oral BID    hydrALAZINE  50 mg Oral 3 times per day    polyethylene glycol  17 g Oral BID    atorvastatin  10 mg Oral Daily    Vitamin D  1,000 Units Oral Daily    insulin lispro  0-6 Units SubCUTAneous TID WC    insulin lispro  0-3 Units SubCUTAneous Nightly    sodium chloride flush  5-40 mL IntraVENous 2 times per day    pantoprazole (PROTONIX) 40 mg injection  40 mg IntraVENous Daily       Continuous Infusions:   sodium chloride      sodium chloride      dextrose      sodium chloride         PRN Meds:  sodium chloride, sodium chloride, glucose, dextrose bolus **OR** dextrose bolus, glucagon (rDNA), dextrose, sodium chloride flush, sodium chloride, ondansetron **OR** ondansetron, acetaminophen **OR** acetaminophen                  Objective:     Temp  Av.3 °F (36.3 °C)  Min: 96.5 °F (35.8 °C)  Max: 99 °F (37.2 °C)  Pulse  Av  Min: 57  Max: 75  BP  Min: 126/36  Max: 175/71  SpO2  Av.9 %  Min: 96 %  Max: 100 %  Patient Vitals for the past 4 hrs:   BP Temp Temp src Pulse Resp SpO2   06/10/22 1004 (!) 153/70 (!) 96.6 °F (35.9 °C) Oral 64 18 98 %         Intake/Output Summary (Last 24 hours) at 6/10/2022 1102  Last data filed at 6/10/2022 0906  Gross per 24 hour   Intake 100 ml   Output 1500 ml   Net -1400 ml       Physical Exam:  Gen: No distress. Alert. Eyes: PERRL. No sclera icterus. No conjunctival injection. ENT: No discharge. Pharynx clear. Neck: Trachea midline. Normal thyroid. Resp: No accessory muscle use. No crackles. No wheezes. No rhonchi. No dullness on percussion. CV: Regular rate. Regular rhythm.  No murmur or rub. No edema. GI: Non-tender. Non-distended. No masses. No organomegaly. Normal bowel sounds. No hernia. Skin: dressing right foot and ankle   Lymph: No cervical LAD. No supraclavicular LAD. M/S: No cyanosis. No joint deformity. No clubbing. Neuro: Awake. Moves all 4 extremities, non focal  Psych: Oriented x 3. No anxiety or agitation. Lab Data:  CBC:   Recent Labs     06/07/22  1435 06/07/22  2341 06/09/22  0617 06/09/22  1826 06/10/22  0533   WBC 8.3  --   --   --   --    RBC 2.39*  --   --   --   --    HGB 6.5*   < > 8.6* 8.9* 8.3*   HCT 19.7*   < > 25.6* 26.6* 25.0*   MCV 82.7  --   --   --   --    RDW 16.7*  --   --   --   --      --   --   --   --     < > = values in this interval not displayed. BMP:   Recent Labs     06/07/22  1435 06/08/22  0605   * 143   K 4.7 4.5    111*   CO2 20* 22   BUN 38* 32*   CREATININE 1.1 0.8     BNP: No results for input(s): BNP in the last 72 hours. PT/INR:   Recent Labs     06/08/22  0605   PROTIME 15.9*   INR 1.29*     APTT:  Recent Labs     06/08/22  0605   APTT 31.8     CARDIAC ENZYMES: No results for input(s): CKMB, CKMBINDEX, TROPONINI in the last 72 hours.     Invalid input(s): CKTOTAL;3  FASTING LIPID PANEL:  Lab Results   Component Value Date    CHOL 132 05/14/2021    HDL 54 05/14/2021    HDL 52 03/05/2012    TRIG 68 05/14/2021     LIVER PROFILE:   Recent Labs     06/07/22  1435   AST 16   ALT 15   BILITOT <0.2   ALKPHOS 76         No orders to display         Assessment & Plan:     Patient Active Problem List    Diagnosis Date Noted    Gastrointestinal hemorrhage     Acute blood loss anemia     Anemia 06/07/2022    PVD (peripheral vascular disease) ([facility] 05/18/2022    Cellulitis of right lower extremity     Osteomyelitis of right foot (HCC)     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

## 2022-06-10 NOTE — PROGRESS NOTES
PROGRESS NOTE  S:76 yrs Patient  admitted on 6/7/2022 with Melena [K92.1]  Acute blood loss anemia [D62]  Anemia [D64.9] . Today she feels well. She is tolerating diet. Exam:   Vitals:    06/10/22 1420   BP: (!) 177/68   Pulse: 68   Resp: 18   Temp: 97.3 °F (36.3 °C)   SpO2: 98%      General appearance: alert, appears stated age, cooperative and no distress  HEENT: Neck supple with midline trachea  Neck: no adenopathy and supple, symmetrical, trachea midline  Lungs: clear to auscultation bilaterally  Heart: regular rate and rhythm, S1, S2 normal, no murmur, click, rub or gallop  Abdomen: soft, non-tender; bowel sounds normal; no masses,  no organomegaly  Extremities: edema 1+ BLE     Medications: Reviewed    Labs:  CBC:   Recent Labs     06/09/22  0617 06/09/22  1826 06/10/22  0533   HGB 8.6* 8.9* 8.3*   HCT 25.6* 26.6* 25.0*     BMP:   Recent Labs     06/08/22  0605      K 4.5   *   CO2 22   BUN 32*   CREATININE 0.8     LIVER PROFILE: No results for input(s): AST, ALT, LIPASE, PROT, BILIDIR, BILITOT, ALKPHOS in the last 72 hours. Invalid input(s): AMYLASE,  ALB  PT/INR:   Recent Labs     06/08/22  0605   INR 1.29*       DATE OF PROCEDURE:  06/09/2022  PRE-PROCEDURE DIAGNOSIS:  GI blood loss anemia. POST-PROCEDURE DIAGNOSES:  1. 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 2.  Sigmoid diverticulosis. 3.  Internal hemorrhoids. OPERATION PERFORMED:  Colonoscopy to the cecum with control of  hemorrhage. SURGEON:  Jaya Ga MD    Attending Supervising [de-identified] Attestation Statement  The patient is a 68 y.o. female. I have performed a history and physical examination of the patient. I discussed the case with my physician assistant Brianna Manrique PA-C    I reviewed the patient's Past Medical History, Past Surgical History, Medications, and Allergies.      Physical Exam:  Vitals:    06/10/22 0425 06/10/22 0544 06/10/22 1004 06/10/22 1420   BP: (!) 164/61 (!) 172/68 (!) 153/70 (!) 177/68   Pulse: 63 60 64 68   Resp: 18  18 18   Temp: 97.7 °F (36.5 °C)  (!) 96.6 °F (35.9 °C) 97.3 °F (36.3 °C)   TempSrc: Oral  Oral Oral   SpO2: 100%  98% 98%   Weight: 126 lb 1.6 oz (57.2 kg)      Height:           Physical Examination: General appearance - chronically ill appearing  Mental status - alert, oriented to person, place, and time  Eyes - sclera anicteric  Neck - supple, no significant adenopathy  Chest - no tachypnea, retractions or cyanosis  Heart - normal rate and regular rhythm  Abdomen - soft, nontender, nondistended, no masses or organomegaly  Extremities - no pedal edema noted        Impression: 68year old female with a history of HTN, HLD, A fib, CAD, DM, PVD s/p bypass, and osteomyelitis s/p toe amputation admitted with severe BI blood loss anemia. EGD negative. Colonoscopy showed bleeding AVM in ascending colon s/p APC. Recommendation:  1. Continue supportive care  2. Monitor Hgb  3. Observe for signs of bleeding  4. Monitor and document output  5. Continue Pantoprazole 40 mg qAM  6. Continue diet as tolerated  7. Ok to d/c from GI standpoint  8. Repeat CBC in 2-4 weeks upon d/c       Yolanda Moore PA-C  3:34 PM 6/10/2022                      68year old female with history of A fib, HTN, HLD, CAD, DM, PVD s/p bypass, osteomyelitis s/p toe amputation admitted with severe GI blood loss anemia. EGD negative but colonoscopy showed bleeding AVM s/p APC    Continue supportive care. Monitor Hgb. Observe for signs of bleeding. Continue PPI daily. Advance diet as tolerated. Ok to d/c from GI standpoint.     Scott Urias MD          [phone]  [phone]

## 2022-06-10 NOTE — PROGRESS NOTES
Pt awake in bed. Assessment completed and medications given. VS stable. A&Ox4. Pt currently on RA. Purwick in place and hooked to suction. Pt denies any further needs at this time. Call light in reach and bed in lowest position.

## 2022-06-10 NOTE — DISCHARGE INSTR - COC
Continuity of Care Form    Patient Name: Esme Whitfield   :    MRN:  [id number]    Admit date:  2022  Discharge date:  6/10/22    Code Status Order: Full Code   Advance Directives:      Admitting Physician:  Lennox Mody, MD  PCP: MARVIN Lieberman CNP    Discharging Nurse: David Sherman [facility] Unit/Room#: /3037-95  Discharging Unit Phone Number: [phone]    Emergency Contact:   Extended Emergency Contact Information  Primary Emergency Contact: Dillon Romero  Address: [address], [address] [place] [address]  [address] Phone: [phone]  Work Phone: [phone]  Mobile Phone: [phone]  Relation: Spouse  Secondary Emergency Contact: [address] Phone: [phone]  Mobile Phone: [phone]  Relation: Daughter-in-Law    Past Surgical History:  Past Surgical History:   Procedure Laterality Date    ANGIOPLASTY  12/30/15    JALEESA Nieves, PTA of distal sfa/pop/peroneal    CARDIAC CATHETERIZATION  12    done for surgical clearance, cath was negative    COLONOSCOPY  2022    COLONOSCOPY N/A 2022    COLONOSCOPY CONTROL HEMORRHAGE performed by Tory Santos MD at [address] [place] [address], [place] [id number],[facility]    FOOT SURGERY Left 2018    DEBRIDEMENT OF ULCERS LEFT FOOT AND LEG WITH GRAFT    OTHER SURGICAL HISTORY Left 2017    Left Femoral Peroneal Bypass    SHOULDER ARTHROPLASTY  10/5/12    RIGHT SHOULDER TOTAL ARTHROPLASTY, BICEPS TENODESIS DEPUY, GLOBAL ADVANTAGE AP    TOE AMPUTATION Right 2022    AMPUTATION OF RIGHT FIRST AND FOURTH TOES performed by Jonathan Haskins DPM at [facility] N/A 2022    EGD IV SEDATION performed by Tory Santos MD at [address] [place] [address]       Immunization History:   Immunization History   Administered Date(s) Administered    Pneumococcal Conjugate 13-valent Mari Pedersen) 2016 Pneumococcal Polysaccharide ([id number]) 07/25/2011    Tdap (Boostrix, Adacel) 06/03/2021       Active Problems:  Patient Active Problem List   Diagnosis Code    Osteoarthritis M19.90    Paroxysmal atrial fibrillation ([facility]) I48.0    Noncompliance of patient with dietary regimen Z91.11    PAD (peripheral artery disease) ([facility]) I73.9    Pure hypercholesterolemia E78.00    Coronary artery disease involving native coronary artery of native heart without angina pectoris I25.10    Essential hypertension I10    Diabetes mellitus ([facility] E11.9    Vitamin D deficiency E55.9    Acute osteomyelitis of left foot ([facility]) M86.172    Hammertoe, bilateral M20.41, M20.42    Colonoscopy refused Z53.20    Chronic deep vein thrombosis (DVT) of left peroneal vein ([facility]) I82.552    Acute osteomyelitis of right foot ([facility]) M86.171    Osteomyelitis of right foot ([facility]) M86.9    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, with bone involvement without evidence of necrosis ([facility]) J14.972, L97.516    Primary hypertension I10    Cellulitis of right lower extremity L03.115    PVD (peripheral vascular disease) ([facility]) I73.9    Anemia D64.9    Gastrointestinal hemorrhage K92.2    Acute blood loss anemia D62       Isolation/Infection:   Isolation            No Isolation          Patient Infection Status       Infection Onset Added Last Indicated Last Indicated By Review Planned Expiration Resolved Resolved By    None active    Resolved    COVID-19 (Rule Out) 05/13/22 05/13/22 05/13/22 COVID-19 & Influenza Combo (Ordered)   05/13/22 Rule-Out Test Resulted            Nurse Assessment:  Last Vital Signs: BP (!) 153/70   Pulse 64   Temp (!) 96.6 °F (35.9 °C) (Oral)   Resp 18   Ht 5' 3\" (1.6 m)   Wt 126 lb 1.6 oz (57.2 kg)   SpO2 98%   BMI 22.34 kg/m²     Last documented pain score (0-10 scale): Pain Level: 0  Last Weight:   Wt Readings from Last 1 Encounters:   06/10/22 126 lb 1.6 oz (57.2 kg)     Mental Status: oriented and alert    IV Access:  - None    Nursing Mobility/ADLs:  Walking   Assisted  Transfer  Assisted  Bathing  Assisted  Dressing  Assisted  Toileting  Assisted  Feeding  Independent  Med Admin  Assisted  Med Delivery   whole    Wound Care Documentation and Therapy:  Wound 05/13/22 Toe (Comment  which one) Right great toe (Active)   Wound Etiology Diabetic 05/23/22 0534   Dressing Status Clean;Dry; Intact 05/25/22 0923   Dressing/Treatment Ace wrap;Dry dressing 05/25/22 0923   Wound Length (cm) 1.5 cm 05/18/22 1800   Wound Width (cm) 1.5 cm 05/18/22 1800   Wound Depth (cm) 0 cm 05/18/22 1800   Wound Surface Area (cm^2) 2.25 cm^2 05/18/22 1800   Change in Wound Size % (l*w) -36.36 05/18/22 1800   Wound Volume (cm^3) 0 cm^3 05/18/22 1800   Wound Assessment Dry 05/23/22 0534   Drainage Amount None 05/23/22 0534   Odor None 05/25/22 0923   Yazmin-wound Assessment Dry/flaky; Intact 05/23/22 0534   Number of days: 27       Wound 05/13/22 Toe (Comment  which one) Right digit 4 (Active)   Wound Etiology Diabetic 05/23/22 0534   Dressing Status Clean;Dry; Intact 05/25/22 0923   Dressing/Treatment Ace wrap;Dry dressing 05/25/22 0923   Wound Length (cm) 0.8 cm 05/18/22 1800   Wound Width (cm) 0.8 cm 05/18/22 1800   Wound Depth (cm) 0 cm 05/18/22 1800   Wound Surface Area (cm^2) 0.64 cm^2 05/18/22 1800   Change in Wound Size % (l*w) 0 05/18/22 1800   Wound Volume (cm^3) 0 cm^3 05/18/22 1800   Wound Assessment Dry 05/23/22 0534   Drainage Amount None 05/24/22 0500   Odor None 05/19/22 2139   Yazmin-wound Assessment Intact 05/19/22 2139   Number of days: 27       Incision 05/23/22 Toe (Comment  which one) Anterior;Right (Active)   Dressing Status Clean;Dry; Intact 06/09/22 2037   Dressing/Treatment Ace wrap;Dry dressing 06/09/22 2037   Drainage Amount None 05/24/22 0500   Number of days: 18       Incision 05/23/22 Toe (Comment  which one) Anterior;Right (Active)   Dressing Status Clean;Dry; Intact 06/09/22 2037   Dressing/Treatment Ace wrap;Dry dressing 06/09/22 2037   Drainage Amount None 05/24/22 0418   Number of days: 18       Incision 05/23/22 Foot Right (Active)   Number of days: 17        Elimination:  Continence: Bowel: Yes  Bladder: Yes  Urinary Catheter: None   Colostomy/Ileostomy/Ileal Conduit: No       Date of Last BM: 6/9    Intake/Output Summary (Last 24 hours) at 6/10/2022 1131  Last data filed at 6/10/2022 0906  Gross per 24 hour   Intake 100 ml   Output 1500 ml   Net -1400 ml     I/O last 3 completed shifts: In: 110 [I.V.:110]  Out: 1500 [Urine:1500]    Safety Concerns: At Risk for Falls    Impairments/Disabilities:      Amputation - Right foot toe amputation    Nutrition Therapy:  Current Nutrition Therapy:   - Oral Diet:  General and Carb Control 4 carbs/meal (1800kcals/day)    Routes of Feeding: Oral  Liquids: Thin Liquids  Daily Fluid Restriction: no  Last Modified Barium Swallow with Video (Video Swallowing Test): not done    Treatments at the Time of Hospital Discharge:   Respiratory Treatments:   Oxygen Therapy:  is not on home oxygen therapy.   Ventilator:    - No ventilator support    Rehab Therapies: Physical Therapy and Occupational Therapy, SN  Weight Bearing Status/Restrictions: No weight bearing restrictions- Surgical shoe to right foot when up  Other Medical Equipment (for information only, NOT a DME order):  walker and bedside commode  Other Treatments:     Patient's personal belongings (please select all that are sent with patient):  None    RN SIGNATURE:  Electronically signed by Samantha Joauqin RN on 6/10/22 at 3:15 PM EDT    CASE MANAGEMENT/SOCIAL WORK SECTION    Inpatient Status Date: 6/7/2022    Readmission Risk Assessment Score:  Readmission Risk              Risk of Unplanned Readmission:  18           Discharging to Facility/ Agency   Name: [facility]  Address: [address] [facility], [facility] [address], [address] [place] [address]  Phone: [phone]  Fax: [phone]        / signature:

## 2022-06-10 NOTE — FLOWSHEET NOTE
06/10/22 1004   Vital Signs   Temp (!) 96.6 °F (35.9 °C)   Temp Source Oral   Heart Rate 64   Resp 18   BP (!) 153/70   BP Location Right upper arm   MAP (Calculated) 97.67   Level of Consciousness Alert (0)   MEWS Score 1   Oxygen Therapy   SpO2 98 %   O2 Device None (Room air)     Patient resting quietly in bed. No s/s of distress noted. Shift assessment complete, see flow sheet. Denies needs at this time. Call light in reach. Will monitor.

## 2022-06-10 NOTE — FLOWSHEET NOTE
06/10/22 0425   Vital Signs   Temp 97.7 °F (36.5 °C)   Temp Source Oral   Heart Rate 63   Heart Rate Source Monitor   Resp 18   BP (!) 164/61   BP Location Left upper arm   BP Method Automatic   MAP (Calculated) 95.33   Patient Position Supine   Level of Consciousness Alert (0)   MEWS Score 1   Oxygen Therapy   SpO2 100 %   O2 Device None (Room air)   Height and Weight   Weight 126 lb 1.6 oz (57.2 kg)   Weight Method Bed scale   BMI (Calculated) 22.4     Pt awake in bed. VS as shown above. Pt denies any further needs at this time. Call light in reach and bed in lowest position.

## 2022-06-10 NOTE — OP NOTE
[address]                 [address] [place] [address]                                OPERATIVE REPORT    PATIENT NAME: Samuel Tabares                      :        1945  MED REC NO:   [id number]                          ROOM:       [facility]  ACCOUNT NO:   [de-identified]                           ADMIT DATE: 2022  PROVIDER:     Lupe Hernandes MD    DATE OF PROCEDURE:  2022    PREPROCEDURE DIAGNOSIS:  GI blood loss anemia. POSTPROCEDURE DIAGNOSES:  1. 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 2.  Sigmoid diverticulosis. 3.  Internal hemorrhoids. OPERATION PERFORMED:  Colonoscopy to the cecum with control of  hemorrhage. SURGEON:  Lupe Hernandes MD    MEDICATIONS:  Versed 2 mg and fentanyl 25 mcg. PROCEDURE INDICATIONS:  This is a 75-year-old female with history of  atrial fibrillation, hypertension, hyperlipidemia, coronary artery  disease, diabetes, peripheral vascular disease status post bypass, and  osteomyelitis status post toe amputation, admitted with severe blood  loss anemia. Her EGD was negative yesterday. Her hemoglobin has  remained stable since yesterday. Colonoscopy is being performed to rule  out lower GI source of anemia and melena. PROCEDURE IN DETAIL:  Informed consent obtained after discussing risks,  benefits, and alternatives. Full history and physical was performed. The patient was classified as ASA class III. Medications were given by  Anesthesia. Cardiopulmonary status was continuously monitored  throughout the procedure. The patient was placed in left lateral  decubitus position. Once adequately sedated, a standard pediatric  colonoscope was inserted in the anus and advanced to the cecum  identified by landmarks up until orifice and IC valve.   The entire  mucosa of the cecum, ascending colon, transverse colon, descending  colon, sigmoid colon, and rectum were examined carefully during  withdrawal.  The patient tolerated the procedure well without any  difficulties. FINDINGS:    RECTUM:  The digital rectal exam was normal.  No masses were felt. COLON:  There was active bleeding. A small 5 mm AVM in the ascending  colon, this was completely obliterated using APC. Remaining examined  colon mucosa appeared normal and healthy without any evidence of  inflammation, ulcers, pseudomembranes, polyps, or masses. Retroflexed  view of the rectum showed internal hemorrhoids. SUMMARY:  1. Active bleeding, AVM in the ascending colon, status post APC. 2.  Sigmoid diverticulosis. 3.  Internal hemorrhoids. RECOMMENDATIONS:  1. Return the patient to floor for continuous medical care. 2.  Continue PPI daily. 3.  Monitor hemoglobin. 4.  Observe for signs of bleeding. 5.  Restart diet as tolerated. 6.  Okay to discharge if hemoglobin is stable.     EBL: <5ml    Robbie Roberts MD    D: 06/09/2022 18:53:34       T: 06/09/2022 18:56:07     [email or web address]  Job#: [id number]     Doc#: [id number]    CC:  Juan Antonio Arroyo CNP       _____

## 2022-06-11 LAB
BLOOD BANK DISPENSE STATUS: NORMAL
BLOOD BANK PRODUCT CODE: NORMAL
BPU ID: NORMAL
DESCRIPTION BLOOD BANK: NORMAL

## 2022-06-15 NOTE — PROGRESS NOTES
Physician Progress Note      Carlotta Bradshaw  CSN #:                  [id number]  :                       1945  ADMIT DATE:       2022 2:19 PM  DISCH DATE:        6/10/2022 4:40 PM  RESPONDING  PROVIDER #:        El Morales MD          QUERY TEXT:    Patient admitted with melena and is on chronic anticoagulation. If possible,   please document in the progress notes and discharge summary if you are   evaluating and/or treating any of the following: The medical record reflects the following:  Risk Factors: Eliquis for afib, AVM  Clinical Indicators: melena, active bleeding from AVM  Treatment: Colonoscopy with APC, EGD, GI consult, hold Eliquis, serial labs    Thank you,  Fernanda Hammans, RN, CDS  [email or web address]  Options provided:  -- Melena associated with Eliquis  -- Bleeding unrelated to anticoagulation  -- Other - I will add my own diagnosis  -- Disagree - Not applicable / Not valid  -- Disagree - Clinically unable to determine / Unknown  -- Refer to Clinical Documentation Reviewer    PROVIDER RESPONSE TEXT:    Patient bleeding is unrelated to anticoagulation.     Query created by: Liv Tariq on 6/15/2022 8:16 AM      Electronically signed by:  El Morales MD 6/15/2022 3:28 PM

## 2022-06-24 ENCOUNTER — TELEPHONE (OUTPATIENT)
Dept: FAMILY MEDICINE CLINIC | Age: 77
End: 2022-06-24

## 2022-06-27 ENCOUNTER — TELEPHONE (OUTPATIENT)
Dept: FAMILY MEDICINE CLINIC | Age: 77
End: 2022-06-27

## 2022-06-27 DIAGNOSIS — I10 ESSENTIAL (PRIMARY) HYPERTENSION: ICD-10-CM

## 2022-06-27 DIAGNOSIS — I10 ESSENTIAL HYPERTENSION: ICD-10-CM

## 2022-06-27 RX ORDER — ASCORBIC ACID 500 MG
500 TABLET ORAL 2 TIMES DAILY
Qty: 60 TABLET | Refills: 1 | Status: SHIPPED | OUTPATIENT
Start: 2022-06-27 | End: 2022-10-31 | Stop reason: ALTCHOICE

## 2022-06-27 RX ORDER — ASPIRIN 81 MG/1
81 TABLET ORAL DAILY
Qty: 30 TABLET | Refills: 1 | Status: SHIPPED | OUTPATIENT
Start: 2022-06-27 | End: 2022-10-31

## 2022-06-27 RX ORDER — LISINOPRIL 20 MG/1
TABLET ORAL
Qty: 30 TABLET | Refills: 1 | Status: SHIPPED | OUTPATIENT
Start: 2022-06-27 | End: 2022-10-31 | Stop reason: SDUPTHER

## 2022-06-27 RX ORDER — MELATONIN
1000 DAILY
Qty: 30 TABLET | Refills: 1 | Status: SHIPPED | OUTPATIENT
Start: 2022-06-27 | End: 2022-09-01

## 2022-06-27 RX ORDER — SIMVASTATIN 20 MG
20 TABLET ORAL NIGHTLY
Qty: 90 TABLET | Refills: 0 | Status: SHIPPED | OUTPATIENT
Start: 2022-06-27 | End: 2022-09-14

## 2022-06-27 RX ORDER — GABAPENTIN 100 MG/1
200 CAPSULE ORAL 2 TIMES DAILY
Qty: 120 CAPSULE | Refills: 1 | OUTPATIENT
Start: 2022-06-27 | End: 2022-10-31 | Stop reason: SDUPTHER

## 2022-06-27 RX ORDER — FERROUS SULFATE 325(65) MG
325 TABLET ORAL 2 TIMES DAILY
Qty: 60 TABLET | Refills: 1 | Status: SHIPPED | OUTPATIENT
Start: 2022-06-27 | End: 2022-10-31 | Stop reason: SDUPTHER

## 2022-06-27 RX ORDER — POLYETHYLENE GLYCOL 3350 17 G/17G
17 POWDER, FOR SOLUTION ORAL 2 TIMES DAILY
Qty: 527 G | Refills: 1 | Status: SHIPPED | OUTPATIENT
Start: 2022-06-27 | End: 2022-07-27

## 2022-06-27 RX ORDER — DOCUSATE SODIUM 100 MG/1
100 CAPSULE, LIQUID FILLED ORAL DAILY
Qty: 30 CAPSULE | Refills: 1 | Status: SHIPPED | OUTPATIENT
Start: 2022-06-27 | End: 2022-07-27

## 2022-06-27 RX ORDER — HYDRALAZINE HYDROCHLORIDE 50 MG/1
50 TABLET, FILM COATED ORAL EVERY 8 HOURS SCHEDULED
Qty: 90 TABLET | Refills: 1 | Status: SHIPPED | OUTPATIENT
Start: 2022-06-27 | End: 2022-10-31 | Stop reason: SDUPTHER

## 2022-06-27 RX ORDER — ACETAMINOPHEN 325 MG/1
650 TABLET ORAL EVERY 6 HOURS PRN
Qty: 120 TABLET | Refills: 1 | Status: SHIPPED | OUTPATIENT
Start: 2022-06-27 | End: 2022-10-31

## 2022-06-27 RX ORDER — AMLODIPINE BESYLATE 10 MG/1
TABLET ORAL
Qty: 30 TABLET | Refills: 1 | Status: SHIPPED | OUTPATIENT
Start: 2022-06-27 | End: 2022-10-31 | Stop reason: SDUPTHER

## 2022-06-27 RX ORDER — M-VIT,TX,IRON,MINS/CALC/FOLIC 27MG-0.4MG
1 TABLET ORAL DAILY
Qty: 30 TABLET | Refills: 1 | Status: SHIPPED | OUTPATIENT
Start: 2022-06-27 | End: 2022-10-31 | Stop reason: ALTCHOICE

## 2022-06-27 NOTE — TELEPHONE ENCOUNTER
Spoke to home care nurse and we will send in refills for patient at [facility] per Patient's request. Home care nurse said patient's foot looks bad. They need to call the surgeon and let him know. There is also a concern if she is able to take care of herself and should be a recommendation for her to be in a NH.   I told the Home Care nurse about the recommendation for the NH and that would need to come from you.    '

## 2022-06-27 NOTE — TELEPHONE ENCOUNTER
Okay we will need to obtain last med rec from the [facility] in order to know what medications she is still taking her started on.

## 2022-06-27 NOTE — TELEPHONE ENCOUNTER
Patient was just recently released from [facility] and told them she did not need any medications filled. However she does not have any medications at home she told the nurse they were in her husbands truck and he is in [facility] in a hospital for a brain bleed. She is not sure exactly where.

## 2022-06-27 NOTE — TELEPHONE ENCOUNTER
Okay to continue meds and send in refills for patient. Please verify what pharmacy patient will be getting these from prior to sending them.

## 2022-06-27 NOTE — TELEPHONE ENCOUNTER
I am sorry I meant to put the list in that note.   Ferrous Sulfate 325 bid  amlodopine 10mg daily  Diclofenac sodium 1% gel  Colace 100 daily  Gabapentin 100 2 bid  Hydralazine 50mg daily  Lisinopril 20mg daily  Metformin 500mg 2 daily  Miconazole 2% powder  Glycolax 17 gram daily  Zocor 20mg daily  Vitamin D 1000U daily    Stop Plavix and Advil

## 2022-06-28 ENCOUNTER — TELEPHONE (OUTPATIENT)
Dept: FAMILY MEDICINE CLINIC | Age: 77
End: 2022-06-28

## 2022-06-28 NOTE — TELEPHONE ENCOUNTER
This patient called to schedule a NH follow up. Next week. with  You.  Would it be OK to contact [facility] to follow up with this paient

## 2022-06-28 NOTE — TELEPHONE ENCOUNTER
Home care called to report patient had high blood pressure but she has not had her medications. She said her son is going to  her medication today and she will take it today.   Patient is schedule for f/u appt tomorrow

## 2022-06-29 ENCOUNTER — OFFICE VISIT (OUTPATIENT)
Dept: FAMILY MEDICINE CLINIC | Age: 77
End: 2022-06-29
Payer: MEDICARE

## 2022-06-29 VITALS
OXYGEN SATURATION: 98 % | SYSTOLIC BLOOD PRESSURE: 156 MMHG | HEART RATE: 76 BPM | DIASTOLIC BLOOD PRESSURE: 68 MMHG | TEMPERATURE: 98.1 F

## 2022-06-29 DIAGNOSIS — S98.111A AMPUTATION OF RIGHT GREAT TOE (HCC): ICD-10-CM

## 2022-06-29 DIAGNOSIS — Z89.421 HISTORY OF COMPLETE RAY AMPUTATION OF SECOND TOE OF RIGHT FOOT (HCC): ICD-10-CM

## 2022-06-29 DIAGNOSIS — Z09 FOLLOW UP: Primary | ICD-10-CM

## 2022-06-29 PROCEDURE — G8420 CALC BMI NORM PARAMETERS: HCPCS | Performed by: NURSE PRACTITIONER

## 2022-06-29 PROCEDURE — 1123F ACP DISCUSS/DSCN MKR DOCD: CPT | Performed by: NURSE PRACTITIONER

## 2022-06-29 PROCEDURE — 1090F PRES/ABSN URINE INCON ASSESS: CPT | Performed by: NURSE PRACTITIONER

## 2022-06-29 PROCEDURE — G8400 PT W/DXA NO RESULTS DOC: HCPCS | Performed by: NURSE PRACTITIONER

## 2022-06-29 PROCEDURE — 99213 OFFICE O/P EST LOW 20 MIN: CPT | Performed by: NURSE PRACTITIONER

## 2022-06-29 PROCEDURE — 1036F TOBACCO NON-USER: CPT | Performed by: NURSE PRACTITIONER

## 2022-06-29 PROCEDURE — G8427 DOCREV CUR MEDS BY ELIG CLIN: HCPCS | Performed by: NURSE PRACTITIONER

## 2022-06-29 PROCEDURE — 1111F DSCHRG MED/CURRENT MED MERGE: CPT | Performed by: NURSE PRACTITIONER

## 2022-06-29 RX ORDER — DIGOXIN 125 MCG
TABLET ORAL
COMMUNITY
Start: 2022-06-27 | End: 2022-10-31 | Stop reason: SDUPTHER

## 2022-06-29 RX ORDER — ASCORBIC ACID, THIAMINE, RIBOFLAVIN, NIACINAMIDE, PYRIDOXINE, FOLIC ACID, COBALAMIN, BIOTIN, PANTOTHENIC ACID 100; 1.5; 1.7; 20; 10; 1; 6; 300; 1 MG/1; MG/1; MG/1; MG/1; MG/1; MG/1; UG/1; UG/1; MG/1
TABLET, COATED ORAL
COMMUNITY
Start: 2022-06-27 | End: 2022-10-31

## 2022-06-29 RX ORDER — POLYETHYLENE GLYCOL 3350 17 G/17G
POWDER ORAL
COMMUNITY
Start: 2022-06-27 | End: 2022-10-31

## 2022-06-29 ASSESSMENT — PATIENT HEALTH QUESTIONNAIRE - PHQ9
SUM OF ALL RESPONSES TO PHQ QUESTIONS 1-9: 0
1. LITTLE INTEREST OR PLEASURE IN DOING THINGS: 0
SUM OF ALL RESPONSES TO PHQ9 QUESTIONS 1 & 2: 0
2. FEELING DOWN, DEPRESSED OR HOPELESS: 0
SUM OF ALL RESPONSES TO PHQ QUESTIONS 1-9: 0

## 2022-06-29 ASSESSMENT — ENCOUNTER SYMPTOMS
GASTROINTESTINAL NEGATIVE: 1
RESPIRATORY NEGATIVE: 1
COLOR CHANGE: 1
EYES NEGATIVE: 1

## 2022-06-29 NOTE — PATIENT INSTRUCTIONS
Please read the healthy family handout that you were given and share it with your family. Please compare this printed medication list with your medications at home to be sure they are the same. If you have any medications that are different please contact us immediately at [phone]. Also review your allergies that we have listed, these may also include medications that you have not been able to tolerate, make sure everything listed is correct. If you have any allergies that are different please contact us immediately at [phone].

## 2022-06-29 NOTE — PROGRESS NOTES
CHIEF COMPLAINT  Chief Complaint   Patient presents with    Other     Nursing home follow up        HPI   Gina Lara is a 68 y.o. female who presents to the office for nursing home follow-up. She has recently been hospitalized multiple accounts for osteomyelitis of the right foot as well as amputation of the right foot/toes. Once patient was discharged from the hospital she was transferred to the nursing home for rehabilitation. Patient reports that she got out of rehab on Friday. Patient reports that she follows up with her podiatrist on Friday. Patient reports that home health nurse has been out twice. Patient reports that she is currently staying with her son and daughter-in-law because her  is in the hospital at [facility] for an aneurysm. Patient reports taking her medications as prescribed. No other complaints, modifying factors or associated symptoms. Nursing notes reviewed.    Past Medical History:   Diagnosis Date    Atrial fibrillation ([address]     off coumadin after bleed, declined restart    Blood transfusion     CAD (coronary artery disease)     Diabetes mellitus type II     Diabetic neuropathy ([facility] 2011    Gastric ulcer     H. pylori infection 2009    Hyperlipidemia     Hypertension     Numbness and tingling of left leg     Osteoarthritis     knees    Poor historian     PVD (peripheral vascular disease) ([facility]     PTA distal sfa/pop/peroneal, Dr. Jonny Lynch 12/2015    Unspecified cerebral artery occlusion with cerebral infarction     TIA affected left eye    upper gi bleed 12/2009     Past Surgical History:   Procedure Laterality Date    ANGIOPLASTY  12/30/15    Dr. Jonny Lynch, E, PTA of distal sfa/pop/peroneal    CARDIAC CATHETERIZATION  9/21/12    done for surgical clearance, cath was negative    COLONOSCOPY  06/09/2022    COLONOSCOPY N/A 6/9/2022    COLONOSCOPY CONTROL HEMORRHAGE performed by Yoko Romero MD at [address] [place] [address] STENT PLACEMENT  [id number]    FOOT SURGERY Left 01/11/2018    DEBRIDEMENT OF ULCERS LEFT FOOT AND LEG WITH GRAFT    OTHER SURGICAL HISTORY Left 06/05/2017    Left Femoral Peroneal Bypass    SHOULDER ARTHROPLASTY  10/5/12    RIGHT SHOULDER TOTAL ARTHROPLASTY, BICEPS TENODESIS DEPUY, GLOBAL ADVANTAGE AP    TOE AMPUTATION Right 5/23/2022    AMPUTATION OF RIGHT FIRST AND FOURTH TOES performed by Lars Mesa DPM at [address] N/A 6/8/2022    EGD IV SEDATION performed by Alison Sheldon MD at [address] [place] [address] History   Problem Relation Age of Onset    Heart Disease Mother     Stroke Mother     Heart Disease Father     Diabetes Sister     Diabetes Brother     Diabetes Maternal Grandmother      Social History     Socioeconomic History    Marital status:      Spouse name: Not on file    Number of children: Not on file    Years of education: Not on file    Highest education level: Not on file   Occupational History    Not on file   Tobacco Use    Smoking status: Never Smoker    Smokeless tobacco: Never Used    Tobacco comment: Not needed   Vaping Use    Vaping Use: Never used   Substance and Sexual Activity    Alcohol use: No    Drug use: No    Sexual activity: Yes     Partners: Male   Other Topics Concern    Not on file   Social History Narrative    Not on file     Social Determinants of Health     Financial Resource Strain:     Difficulty of Paying Living Expenses: Not on file   Food Insecurity:     Worried About Running Out of Food in the Last Year: Not on file    Donal of Food in the Last Year: Not on file   Transportation Needs:     Lack of Transportation (Medical): Not on file    Lack of Transportation (Non-Medical):  Not on file   Physical Activity:     Days of Exercise per Week: Not on file    Minutes of Exercise per Session: Not on file   Stress:     Feeling of Stress : Not on file   Social Connections:     Frequency of Communication with Friends and Family: Not on file    Frequency of Social Gatherings with Friends and Family: Not on file    Attends Confucianist Services: Not on file    Active Member of Clubs or Organizations: Not on file    Attends Club or Organization Meetings: Not on file    Marital Status: Not on file   Intimate Partner Violence:     Fear of Current or Ex-Partner: Not on file    Emotionally Abused: Not on file    Physically Abused: Not on file    Sexually Abused: Not on file   Housing Stability:     Unable to Pay for Housing in the Last Year: Not on file    Number of [place] in the Last Year: Not on file    Unstable Housing in the Last Year: Not on file     Current Outpatient Medications   Medication Sig Dispense Refill    B Complex-C-Folic Acid (DIALYVITE TABLET) TABS TAKE 1 TABLET BY MOUTH EVERY DAY      polyethylene glycol (MIRALAX) 17 GM/SCOOP POWD powder TAKE 17 GRAMS AND MIX WITH 4 TO 8 OUNCES OF BEVERAGE OF CHOICE AND DRINK TWICE DAILY      digoxin (LANOXIN) 125 MCG tablet TAKE 1 TABLET BY MOUTH EVERY DAY      docusate sodium (COLACE) 100 MG capsule Take 1 capsule by mouth daily 30 capsule 1    ferrous sulfate (IRON 325) 325 (65 Fe) MG tablet Take 1 tablet by mouth 2 times daily 60 tablet 1    gabapentin (NEURONTIN) 100 MG capsule Take 2 capsules by mouth 2 times daily for 30 days.  120 capsule 1    hydrALAZINE (APRESOLINE) 50 MG tablet Take 1 tablet by mouth every 8 hours 90 tablet 1    lisinopril (PRINIVIL;ZESTRIL) 20 MG tablet TAKE 1 TABLET BY MOUTH EVERY DAY 30 tablet 1    metFORMIN (GLUCOPHAGE) 500 MG tablet TAKE 2 TABLETS BY MOUTH EVERY DAY WITH BREAKFAST 60 tablet 1    polyethylene glycol (GLYCOLAX) 17 g packet Take 17 g by mouth 2 times daily 527 g 1    Multiple Vitamins-Minerals (THERAPEUTIC MULTIVITAMIN-MINERALS) tablet Take 1 tablet by mouth daily 30 tablet 1    simvastatin (ZOCOR) 20 MG tablet Take 1 tablet by mouth nightly 90 tablet 0    vitamin C (ASCORBIC ACID) 500 MG tablet Take 1 tablet by mouth 2 times daily 60 tablet 1    acetaminophen (TYLENOL) 325 MG tablet Take 2 tablets by mouth every 6 hours as needed for Pain 120 tablet 1    amLODIPine (NORVASC) 10 MG tablet TAKE 1 TABLET BY MOUTH EVERY DAY 30 tablet 1    aspirin 81 MG EC tablet Take 1 tablet by mouth daily 30 tablet 1    vitamin D3 (CHOLECALCIFEROL) 25 MCG (1000 UT) TABS tablet Take 1 tablet by mouth daily 30 tablet 1    magnesium hydroxide (MILK OF MAGNESIA) 400 MG/5ML suspension Take 30 mLs by mouth daily as needed for Constipation 1 each 1    diclofenac sodium (VOLTAREN) 1 % GEL Apply 2 g topically in the morning and at bedtime (Patient not taking: Reported on 6/29/2022) 150 g 1    miconazole (MICOTIN) 2 % powder Apply topically 2 times daily PRN. (Patient not taking: Reported on 6/29/2022) 45 g 1     No current facility-administered medications for this visit. Allergies   Allergen Reactions    Pcn [Penicillins] Other (See Comments)     Unsure of reaction       REVIEW OF SYSTEMS  Review of Systems   Constitutional: Negative. HENT: Negative. Eyes: Negative. Respiratory: Negative. Gastrointestinal: Negative. Genitourinary: Negative. Musculoskeletal: Positive for arthralgias. Skin: Positive for color change and wound. Neurological: Negative. Psychiatric/Behavioral: Negative. PHYSICAL EXAM  BP (!) 156/68   Pulse 76   Temp 98.1 °F (36.7 °C) (Oral)   SpO2 98%   Physical Exam  Constitutional:       Appearance: Normal appearance. She is not toxic-appearing. HENT:      Head: Normocephalic. Right Ear: External ear normal.      Left Ear: External ear normal.      Nose: Nose normal.      Mouth/Throat:      Mouth: Mucous membranes are moist.      Pharynx: Oropharynx is clear. Eyes:      Extraocular Movements: Extraocular movements intact.       Conjunctiva/sclera: Conjunctivae normal.   Cardiovascular:      Rate and Rhythm: Normal rate and regular rhythm. Pulses: Normal pulses. Heart sounds: Normal heart sounds. Pulmonary:      Effort: Pulmonary effort is normal.      Breath sounds: Normal breath sounds. Abdominal:      General: Bowel sounds are normal.      Palpations: Abdomen is soft. Tenderness: There is no guarding. Musculoskeletal:         General: No swelling. Cervical back: Normal range of motion and neck supple. Right lower leg: No edema. Left lower leg: No edema. Right foot: Deformity present. Feet:    Feet:      Comments: Amputation of right great and second toe. Serosanguineous drainage present to dressing. Skin:     General: Skin is warm and dry. Capillary Refill: Capillary refill takes less than 2 seconds. Comments: See image   Neurological:      Mental Status: She is alert and oriented to person, place, and time. ASSESSMENT/PLAN:   1. Follow up  Presents to the office with daughter-in-law for nursing home follow-up. Patient has been hospitalized multiple times in regards to osteomyelitis/cellulitis of the right foot. Patient underwent amputation of the right great and second toe. Patient reports that after she got to the nursing home she had to go back to the hospital because of GI bleed. Patient reports that she had colonoscopy as well as EGD done at that time. Patient reports that she went back to the nursing home and was discharged on Friday. Patient reports that she is currently staying with her son and daughter-in-law. Patient reports that her  is currently at [facility] for aneurysm. Patient reports that she is doing well continues to take her medications as prescribed. Patient reports that she will follow-up with her podiatrist on Friday. Patient does have home health services that started this week. Patient reports that the nurse has been there twice but she is unsure of the physical therapy and Occupational Therapy.   Patient reported that her dressing was falling off therefore I did change her dressing today. Patient recommended continue to follow-up with podiatry on Friday and not change dressing until she sees them. Patient denies any current fever, chills, nausea vomiting or diarrhea. Patient reports difficulty ambulating but denies any pain or need for pain medication. Patient will follow-up in the office in 4 months, sooner for new or worsening symptoms. Patient verbalized and acknowledges with plan of care at this time. 2. Amputation of right great toe ([facility]  See above #1    3. History of complete ray amputation of second toe of right foot ([facility]  See above #1      The note was completed using Dragon voice recognition transcription. Every effort was made to ensure accuracy; however, inadvertent  transcription errors may be present despite my best efforts to edit errors.     Edmundo Robbins, MARVIN - CNP

## 2022-07-07 ENCOUNTER — TELEPHONE (OUTPATIENT)
Dept: FAMILY MEDICINE CLINIC | Age: 77
End: 2022-07-07

## 2022-07-07 NOTE — TELEPHONE ENCOUNTER
[facility] called and would like to have order to [address] labs weekly.  They are orders that came from the [facility]

## 2022-07-22 ENCOUNTER — CARE COORDINATION (OUTPATIENT)
Dept: CARE COORDINATION | Age: 77
End: 2022-07-22

## 2022-07-22 NOTE — CARE COORDINATION
Ambulatory Care Coordination Note  2022    ACC: Dilcia Mtz, RN    Summary Note:   Received referral from PCP  Contacted patient-spoke with patient and her daughter; they are currently in their car and just left the podiatry office  Introduced role of ACM    Patient is unable to review medications; she is unsure what she is taking or if medications are   Patient agrees to have [address] nurse review all medications and ensure she does not have any  medications    PLAN:  [address] [place] [address] [facility] to schedule nurse visit to review all medications, set up medication box and check expiration dates  Contact Dr. Bisi Ca (podiatrist) office and request OV note faxed to PCP office  Patient will keep f/u appointment with podiatrist 2022 and appointment with vascular lab on 2022  Will continue CC assessment next f/u       Ambulatory Care Coordination Assessment    Care Coordination Protocol  Referral from Primary Care Provider: No  Week 1 - Initial Assessment           Ability to seek help/take action for Emergent Urgent situations i.e. fire, crime, inclement weather or health crisis.: Needs Assistance  Ability to manage Medications: Needs Assistance  Ability to drive and/or has transportation: Dependent  Ability to do shopping: Dependent  Is patient able to live independently?: No     Current Housing: Private Residence                 Suggested Interventions and Community Resources  Fall Risk Prevention: Not Started                  Prior to Admission medications    Medication Sig Start Date End Date Taking?  Authorizing Provider   B Complex-C-Folic Acid (DIALYVITE TABLET) TABS TAKE 1 TABLET BY MOUTH EVERY DAY 22   Historical Provider, MD   polyethylene glycol (MIRALAX) 17 GM/SCOOP POWD powder TAKE 17 GRAMS AND MIX WITH 4 TO 8 OUNCES OF BEVERAGE OF CHOICE AND DRINK TWICE DAILY 22   Historical Provider, MD   digoxin (LANOXIN) 125 MCG tablet TAKE 1 TABLET BY MOUTH EVERY DAY 6/27/22   Historical Provider, MD   docusate sodium (COLACE) 100 MG capsule Take 1 capsule by mouth daily 6/27/22 7/27/22  MARVIN Perez CNP   ferrous sulfate (IRON 325) 325 (65 Fe) MG tablet Take 1 tablet by mouth 2 times daily 6/27/22   MARVIN Perez - CNP   gabapentin (NEURONTIN) 100 MG capsule Take 2 capsules by mouth 2 times daily for 30 days.  6/27/22 7/27/22  MARVIN Perez CNP   hydrALAZINE (APRESOLINE) 50 MG tablet Take 1 tablet by mouth every 8 hours 6/27/22   MARVIN Perez CNP   lisinopril (PRINIVIL;ZESTRIL) 20 MG tablet TAKE 1 TABLET BY MOUTH EVERY DAY 6/27/22   MARVIN Perez CNP   metFORMIN (GLUCOPHAGE) 500 MG tablet TAKE 2 TABLETS BY MOUTH EVERY DAY WITH BREAKFAST 6/27/22   MARVIN Perez CNP   magnesium hydroxide (MILK OF MAGNESIA) 400 MG/5ML suspension Take 30 mLs by mouth daily as needed for Constipation 6/27/22   MARVIN Perez CNP   polyethylene glycol (GLYCOLAX) 17 g packet Take 17 g by mouth 2 times daily 6/27/22 7/27/22  MARVIN Perez CNP   Multiple Vitamins-Minerals (THERAPEUTIC MULTIVITAMIN-MINERALS) tablet Take 1 tablet by mouth daily 6/27/22 6/27/23  MARVIN Perez CNP   simvastatin (ZOCOR) 20 MG tablet Take 1 tablet by mouth nightly 6/27/22   MARVIN Perez CNP   vitamin C (ASCORBIC ACID) 500 MG tablet Take 1 tablet by mouth 2 times daily 6/27/22   MARVIN Perez CNP   diclofenac sodium (VOLTAREN) 1 % GEL Apply 2 g topically in the morning and at bedtime  Patient not taking: Reported on 6/29/2022 6/27/22   MARVIN Perez CNP   acetaminophen (TYLENOL) 325 MG tablet Take 2 tablets by mouth every 6 hours as needed for Pain 6/27/22   MARVIN Perez CNP   amLODIPine (NORVASC) 10 MG tablet TAKE 1 TABLET BY MOUTH EVERY DAY 6/27/22   MARVIN Perez CNP   aspirin 81 MG EC tablet Take 1 tablet by mouth daily 6/27/22   MARVIN Perez CNP   vitamin D3 (CHOLECALCIFEROL) 25 MCG (1000 UT) TABS tablet Take 1 tablet by mouth daily 6/27/22   MARVIN Barry CNP   miconazole (MICOTIN) 2 % powder Apply topically 2 times daily PRN.   Patient not taking: Reported on 6/29/2022 6/27/22   MARVIN Barry CNP       Future Appointments   Date Time Provider Clint Lozano   8/2/2022  2:00 PM [address], VASCULAR LAB ROOM 1 [facility]   10/31/2022  2:40 PM MARVIN Barry CNP [facility]

## 2022-07-27 ENCOUNTER — CARE COORDINATION (OUTPATIENT)
Dept: CARE COORDINATION | Age: 77
End: 2022-07-27

## 2022-07-27 NOTE — CARE COORDINATION
Attempted to contact patient; first number listed rings busy  Second number unable to leave message d/t voice mail not set up yet

## 2022-07-27 NOTE — CARE COORDINATION
Ambulatory Care Coordination Note  7/27/2022    ACC: Danis Garrison, RN    Summary Note:       Late entry:  Patient verbalized they may want to amputate her leg and she will refuse  Patient verbalized understanding of the risks including death  Patient adamant she will refuse amputation     PLAN:  Patient agrees to contact PCP office if she changes her mind regarding resuming [place] [address] nursing  Patient will keep f/u appointment with podiatrist on 7/29/2022 and with vascular on 8/2/2022; denies any difficulty with transportation  Placed referral for CC Dietician re: Nutrition to promote wound healing  Patient will contact PCP office/ACM  if she changes her mind and would like Advanced Care Planning (ACP) referral         FYI:  Patient's DIL Peterleslie Partida) returned call and placed this ACM on speaker with patient  Ginger Tijerina informs this ACM the [place] [address] nurse was scheduled to visit on Monday 7/25/2022 at 3 pm but no showed  This ACM placed DIL and patient on hold and contacted the [facility] [place] [address]  The [place] [address] agency informed this ACM the patient refused nursing services and so they discontinued nursing care  This ACM notified Ginger Tijerina and patient.   The patient reports she does not see a need for a nurse to visit her  Explained the benefits of home health care in depth; patient adamant she does not want home health care  This ACM can hear patient's son and DIL attempting to convince patient; patient continues to decline services    Discussed medications-She is using a medication box and she reports she is taking her medications correctly now  Explained risks if medications are not set up correctly in her box; patient verbalizes understanding  She reports she was initially having difficulty with her meds but this has resolved  Discussed alternative pharmacies that pre-package medications; she declines    Patient's DIL reports patient has had decreased appetite and has been loosing weight  Discussed importance of nutrition to promote wound healing  Patient reports she can add boost to her diet  Discussed RD referral to assist; patient and DIL agree to referral    Patient is not monitoring blood sugars  Patient reports she has a fear of needles      General Assessment    Do you have any symptoms that are causing concern?: No        Care Coordination Interventions    Referral from Primary Care Provider: No  Suggested Interventions and Community Resources  Fall Risk Prevention: Not Started          Goals Addressed    None         Prior to Admission medications    Medication Sig Start Date End Date Taking? Authorizing Provider   B Complex-C-Folic Acid (DIALYVITE TABLET) TABS TAKE 1 TABLET BY MOUTH EVERY DAY 6/27/22   Historical Provider, MD   polyethylene glycol (MIRALAX) 17 GM/SCOOP POWD powder TAKE 17 GRAMS AND MIX WITH 4 TO 8 OUNCES OF BEVERAGE OF CHOICE AND DRINK TWICE DAILY 6/27/22   Historical Provider, MD   digoxin (LANOXIN) 125 MCG tablet TAKE 1 TABLET BY MOUTH EVERY DAY 6/27/22   Historical Provider, MD   docusate sodium (COLACE) 100 MG capsule Take 1 capsule by mouth daily 6/27/22 7/27/22  MARVIN Romo CNP   ferrous sulfate (IRON 325) 325 (65 Fe) MG tablet Take 1 tablet by mouth 2 times daily 6/27/22   MARVIN Romo CNP   gabapentin (NEURONTIN) 100 MG capsule Take 2 capsules by mouth 2 times daily for 30 days.  6/27/22 7/27/22  MARVIN Romo CNP   hydrALAZINE (APRESOLINE) 50 MG tablet Take 1 tablet by mouth every 8 hours 6/27/22   MARVIN Romo CNP   lisinopril (PRINIVIL;ZESTRIL) 20 MG tablet TAKE 1 TABLET BY MOUTH EVERY DAY 6/27/22   MARVIN Romo CNP   metFORMIN (GLUCOPHAGE) 500 MG tablet TAKE 2 TABLETS BY MOUTH EVERY DAY WITH BREAKFAST 6/27/22   MARVIN Romo CNP   magnesium hydroxide (MILK OF MAGNESIA) 400 MG/5ML suspension Take 30 mLs by mouth daily as needed for Constipation 6/27/22   MARVIN Romo CNP   polyethylene glycol (GLYCOLAX) 17 g packet Take 17 g by mouth 2 times daily 6/27/22 7/27/22  James Mijares, APRN - CNP   Multiple Vitamins-Minerals (THERAPEUTIC MULTIVITAMIN-MINERALS) tablet Take 1 tablet by mouth daily 6/27/22 6/27/23  James Mijares, MARVIN Yap CNP   simvastatin (ZOCOR) 20 MG tablet Take 1 tablet by mouth nightly 6/27/22   James Mijares, APRN - CNP   vitamin C (ASCORBIC ACID) 500 MG tablet Take 1 tablet by mouth 2 times daily 6/27/22   James Mijares, APRN - CNP   diclofenac sodium (VOLTAREN) 1 % GEL Apply 2 g topically in the morning and at bedtime  Patient not taking: Reported on 6/29/2022 6/27/22   MARVIN Helms CNP   acetaminophen (TYLENOL) 325 MG tablet Take 2 tablets by mouth every 6 hours as needed for Pain 6/27/22   James Mijares, APRN - CNP   amLODIPine (NORVASC) 10 MG tablet TAKE 1 TABLET BY MOUTH EVERY DAY 6/27/22   James Mijares, APRN - CNP   aspirin 81 MG EC tablet Take 1 tablet by mouth daily 6/27/22   James Mijares, APRN - CNP   vitamin D3 (CHOLECALCIFEROL) 25 MCG (1000 UT) TABS tablet Take 1 tablet by mouth daily 6/27/22   James Mijares, APRN - CNP   miconazole (MICOTIN) 2 % powder Apply topically 2 times daily PRN.   Patient not taking: Reported on 6/29/2022 6/27/22   MARVIN Helms CNP       Future Appointments   Date Time Provider Clint Lozano   8/2/2022  2:00 PM [address], VASCULAR LAB ROOM 1 [facility]   10/31/2022  2:40 PM MARVIN Helms CNP [facility]

## 2022-07-29 ENCOUNTER — CARE COORDINATION (OUTPATIENT)
Dept: CARE COORDINATION | Age: 77
End: 2022-07-29

## 2022-07-29 NOTE — CARE COORDINATION
Attempted to contact Kemal Alanis regarding Dietitian referral. RD unable to reach patient at home phone number due to busy signal, and unable to reach patient via cell phone due to voicemail box not yet set up. Will follow up as appropriate.        Martín Marin, [facility],   [phone]

## 2022-08-02 ENCOUNTER — HOSPITAL ENCOUNTER (OUTPATIENT)
Dept: VASCULAR LAB | Age: 77
Discharge: HOME | End: 2022-08-02
Payer: MEDICARE

## 2022-08-02 DIAGNOSIS — E11.52 DIABETIC GANGRENE (HCC): ICD-10-CM

## 2022-08-02 PROCEDURE — 93923 UPR/LXTR ART STDY 3+ LVLS: CPT

## 2022-08-05 ENCOUNTER — CARE COORDINATION (OUTPATIENT)
Dept: CARE COORDINATION | Age: 77
End: 2022-08-05

## 2022-08-05 NOTE — CARE COORDINATION
Attempted to contact Kelsi Henriquez regarding Dietitian referral. RD unable to reach patient via cell phone due to voicemail box not yet set up. Will follow up as appropriate.     Electronically signed by Robert Branham RD on 8/5/2022 at 3:37 PM

## 2022-08-09 ENCOUNTER — CARE COORDINATION (OUTPATIENT)
Dept: CARE COORDINATION | Age: 77
End: 2022-08-09

## 2022-08-09 NOTE — CARE COORDINATION
Attempted to contact Rishi Cosby regarding Dietitian referral. Anikhari Freeze to leave voicemail with call back number as patient's voicemail box is not yet set up. RD will follow up as appropriate.        Kaylee Feliciano, [address] [place] [address],   [phone]

## 2022-08-16 ENCOUNTER — CARE COORDINATION (OUTPATIENT)
Dept: CARE COORDINATION | Age: 77
End: 2022-08-16

## 2022-08-16 NOTE — CARE COORDINATION
Attempted to contact Kelsi Henriquez regarding Dietitian referral.  RD has attempted to call patient four times. Unable to leave voicemail with call back number as patient's voicemail box is not yet set up. RD will continue to follow/assist with patient return call.        Milli Gutierrez, [address] [place] [address],    [phone]

## 2022-09-01 RX ORDER — MELATONIN
Qty: 30 TABLET | Refills: 0 | Status: SHIPPED | OUTPATIENT
Start: 2022-09-01 | End: 2022-10-31 | Stop reason: ALTCHOICE

## 2022-09-04 ENCOUNTER — APPOINTMENT (OUTPATIENT)
Dept: GENERAL RADIOLOGY | Age: 77
DRG: 239 | End: 2022-09-04
Payer: MEDICARE

## 2022-09-04 ENCOUNTER — HOSPITAL ENCOUNTER (INPATIENT)
Age: 77
LOS: 10 days | Discharge: NURSING, REHAB OR LONG-TERM CARE | DRG: 239 | End: 2022-09-14
Attending: EMERGENCY MEDICINE | Admitting: INTERNAL MEDICINE
Payer: MEDICARE

## 2022-09-04 DIAGNOSIS — L02.611 ABSCESS OF RIGHT FOOT: ICD-10-CM

## 2022-09-04 DIAGNOSIS — I10 ESSENTIAL HYPERTENSION: ICD-10-CM

## 2022-09-04 DIAGNOSIS — M86.9 OSTEOMYELITIS OF RIGHT FOOT, UNSPECIFIED TYPE (HCC): Primary | ICD-10-CM

## 2022-09-04 DIAGNOSIS — L08.9 RIGHT FOOT INFECTION: ICD-10-CM

## 2022-09-04 LAB
A/G RATIO: 1.2 (ref 1.1–2.2)
ALBUMIN SERPL-MCNC: 4.8 G/DL (ref 3.4–5)
ALP BLD-CCNC: 88 U/L (ref 40–129)
ALT SERPL-CCNC: 10 U/L (ref 10–40)
ANION GAP SERPL CALCULATED.3IONS-SCNC: 11 MMOL/L (ref 3–16)
AST SERPL-CCNC: 15 U/L (ref 15–37)
BASOPHILS ABSOLUTE: 0.1 K/UL (ref 0–0.2)
BASOPHILS RELATIVE PERCENT: 1.3 %
BILIRUB SERPL-MCNC: 0.4 MG/DL (ref 0–1)
BUN BLDV-MCNC: 38 MG/DL (ref 7–20)
C-REACTIVE PROTEIN: 11 MG/L (ref 0–5.1)
CALCIUM SERPL-MCNC: 9.9 MG/DL (ref 8.3–10.6)
CHLORIDE BLD-SCNC: 100 MMOL/L (ref 99–110)
CO2: 24 MMOL/L (ref 21–32)
CREAT SERPL-MCNC: 1.2 MG/DL (ref 0.6–1.2)
EOSINOPHILS ABSOLUTE: 0.3 K/UL (ref 0–0.6)
EOSINOPHILS RELATIVE PERCENT: 3 %
GFR AFRICAN AMERICAN: 53
GFR NON-AFRICAN AMERICAN: 44
GLUCOSE BLD-MCNC: 116 MG/DL (ref 70–99)
GLUCOSE BLD-MCNC: 140 MG/DL (ref 70–99)
HCT VFR BLD CALC: 33.3 % (ref 36–48)
HEMOGLOBIN: 10.9 G/DL (ref 12–16)
LACTIC ACID: 0.9 MMOL/L (ref 0.4–2)
LYMPHOCYTES ABSOLUTE: 1.8 K/UL (ref 1–5.1)
LYMPHOCYTES RELATIVE PERCENT: 20.8 %
MCH RBC QN AUTO: 26.3 PG (ref 26–34)
MCHC RBC AUTO-ENTMCNC: 32.7 G/DL (ref 31–36)
MCV RBC AUTO: 80.5 FL (ref 80–100)
MONOCYTES ABSOLUTE: 0.6 K/UL (ref 0–1.3)
MONOCYTES RELATIVE PERCENT: 6.4 %
NEUTROPHILS ABSOLUTE: 5.9 K/UL (ref 1.7–7.7)
NEUTROPHILS RELATIVE PERCENT: 68.5 %
PDW BLD-RTO: 16.5 % (ref 12.4–15.4)
PERFORMED ON: ABNORMAL
PLATELET # BLD: 259 K/UL (ref 135–450)
PMV BLD AUTO: 7.1 FL (ref 5–10.5)
POTASSIUM REFLEX MAGNESIUM: 5 MMOL/L (ref 3.5–5.1)
PRO-BNP: 2889 PG/ML (ref 0–449)
RBC # BLD: 4.13 M/UL (ref 4–5.2)
SEDIMENTATION RATE, ERYTHROCYTE: 12 MM/HR (ref 0–30)
SODIUM BLD-SCNC: 135 MMOL/L (ref 136–145)
SPECIMEN STATUS: NORMAL
TOTAL PROTEIN: 8.9 G/DL (ref 6.4–8.2)
TROPONIN: <0.01 NG/ML
WBC # BLD: 8.6 K/UL (ref 4–11)

## 2022-09-04 PROCEDURE — 86140 C-REACTIVE PROTEIN: CPT

## 2022-09-04 PROCEDURE — 1200000000 HC SEMI PRIVATE

## 2022-09-04 PROCEDURE — 71045 X-RAY EXAM CHEST 1 VIEW: CPT

## 2022-09-04 PROCEDURE — 87040 BLOOD CULTURE FOR BACTERIA: CPT

## 2022-09-04 PROCEDURE — 87186 SC STD MICRODIL/AGAR DIL: CPT

## 2022-09-04 PROCEDURE — 2580000003 HC RX 258: Performed by: NURSE PRACTITIONER

## 2022-09-04 PROCEDURE — 85025 COMPLETE CBC W/AUTO DIFF WBC: CPT

## 2022-09-04 PROCEDURE — 84484 ASSAY OF TROPONIN QUANT: CPT

## 2022-09-04 PROCEDURE — 73630 X-RAY EXAM OF FOOT: CPT

## 2022-09-04 PROCEDURE — 83880 ASSAY OF NATRIURETIC PEPTIDE: CPT

## 2022-09-04 PROCEDURE — 87070 CULTURE OTHR SPECIMN AEROBIC: CPT

## 2022-09-04 PROCEDURE — 83605 ASSAY OF LACTIC ACID: CPT

## 2022-09-04 PROCEDURE — 6360000002 HC RX W HCPCS: Performed by: PHYSICIAN ASSISTANT

## 2022-09-04 PROCEDURE — 96365 THER/PROPH/DIAG IV INF INIT: CPT

## 2022-09-04 PROCEDURE — 87205 SMEAR GRAM STAIN: CPT

## 2022-09-04 PROCEDURE — 6360000002 HC RX W HCPCS: Performed by: NURSE PRACTITIONER

## 2022-09-04 PROCEDURE — 87077 CULTURE AEROBIC IDENTIFY: CPT

## 2022-09-04 PROCEDURE — 93005 ELECTROCARDIOGRAM TRACING: CPT | Performed by: PHYSICIAN ASSISTANT

## 2022-09-04 PROCEDURE — 6370000000 HC RX 637 (ALT 250 FOR IP): Performed by: NURSE PRACTITIONER

## 2022-09-04 PROCEDURE — 2580000003 HC RX 258: Performed by: PHYSICIAN ASSISTANT

## 2022-09-04 PROCEDURE — 99285 EMERGENCY DEPT VISIT HI MDM: CPT

## 2022-09-04 PROCEDURE — 96367 TX/PROPH/DG ADDL SEQ IV INF: CPT

## 2022-09-04 PROCEDURE — 80053 COMPREHEN METABOLIC PANEL: CPT

## 2022-09-04 PROCEDURE — 85652 RBC SED RATE AUTOMATED: CPT

## 2022-09-04 RX ORDER — SODIUM CHLORIDE 9 MG/ML
INJECTION, SOLUTION INTRAVENOUS PRN
Status: DISCONTINUED | OUTPATIENT
Start: 2022-09-04 | End: 2022-09-14 | Stop reason: HOSPADM

## 2022-09-04 RX ORDER — DEXTROSE MONOHYDRATE 100 MG/ML
INJECTION, SOLUTION INTRAVENOUS CONTINUOUS PRN
Status: DISCONTINUED | OUTPATIENT
Start: 2022-09-04 | End: 2022-09-14 | Stop reason: HOSPADM

## 2022-09-04 RX ORDER — ENOXAPARIN SODIUM 100 MG/ML
40 INJECTION SUBCUTANEOUS DAILY
Status: DISCONTINUED | OUTPATIENT
Start: 2022-09-05 | End: 2022-09-14 | Stop reason: HOSPADM

## 2022-09-04 RX ORDER — ACETAMINOPHEN 325 MG/1
650 TABLET ORAL EVERY 6 HOURS PRN
Status: DISCONTINUED | OUTPATIENT
Start: 2022-09-04 | End: 2022-09-14 | Stop reason: HOSPADM

## 2022-09-04 RX ORDER — LISINOPRIL 20 MG/1
20 TABLET ORAL DAILY
Status: DISCONTINUED | OUTPATIENT
Start: 2022-09-05 | End: 2022-09-14 | Stop reason: HOSPADM

## 2022-09-04 RX ORDER — INSULIN GLARGINE 100 [IU]/ML
0.15 INJECTION, SOLUTION SUBCUTANEOUS NIGHTLY
Status: DISCONTINUED | OUTPATIENT
Start: 2022-09-04 | End: 2022-09-14 | Stop reason: HOSPADM

## 2022-09-04 RX ORDER — ONDANSETRON 2 MG/ML
4 INJECTION INTRAMUSCULAR; INTRAVENOUS EVERY 6 HOURS PRN
Status: DISCONTINUED | OUTPATIENT
Start: 2022-09-04 | End: 2022-09-14 | Stop reason: HOSPADM

## 2022-09-04 RX ORDER — FERROUS SULFATE 325(65) MG
325 TABLET ORAL 2 TIMES DAILY
Status: DISCONTINUED | OUTPATIENT
Start: 2022-09-04 | End: 2022-09-14 | Stop reason: HOSPADM

## 2022-09-04 RX ORDER — POLYETHYLENE GLYCOL 3350 17 G/17G
17 POWDER, FOR SOLUTION ORAL DAILY PRN
Status: DISCONTINUED | OUTPATIENT
Start: 2022-09-04 | End: 2022-09-14 | Stop reason: HOSPADM

## 2022-09-04 RX ORDER — AMLODIPINE BESYLATE 5 MG/1
10 TABLET ORAL DAILY
Status: DISCONTINUED | OUTPATIENT
Start: 2022-09-05 | End: 2022-09-14 | Stop reason: HOSPADM

## 2022-09-04 RX ORDER — ATORVASTATIN CALCIUM 10 MG/1
20 TABLET, FILM COATED ORAL DAILY
Status: DISCONTINUED | OUTPATIENT
Start: 2022-09-05 | End: 2022-09-14 | Stop reason: HOSPADM

## 2022-09-04 RX ORDER — M-VIT,TX,IRON,MINS/CALC/FOLIC 27MG-0.4MG
1 TABLET ORAL DAILY
Status: DISCONTINUED | OUTPATIENT
Start: 2022-09-05 | End: 2022-09-14 | Stop reason: HOSPADM

## 2022-09-04 RX ORDER — SODIUM CHLORIDE 0.9 % (FLUSH) 0.9 %
5-40 SYRINGE (ML) INJECTION EVERY 12 HOURS SCHEDULED
Status: DISCONTINUED | OUTPATIENT
Start: 2022-09-04 | End: 2022-09-14 | Stop reason: HOSPADM

## 2022-09-04 RX ORDER — DIGOXIN 125 MCG
125 TABLET ORAL DAILY
Status: DISCONTINUED | OUTPATIENT
Start: 2022-09-05 | End: 2022-09-14 | Stop reason: HOSPADM

## 2022-09-04 RX ORDER — ASPIRIN 81 MG/1
81 TABLET ORAL DAILY
Status: DISCONTINUED | OUTPATIENT
Start: 2022-09-05 | End: 2022-09-14 | Stop reason: HOSPADM

## 2022-09-04 RX ORDER — HYDRALAZINE HYDROCHLORIDE 50 MG/1
50 TABLET, FILM COATED ORAL EVERY 8 HOURS SCHEDULED
Status: DISCONTINUED | OUTPATIENT
Start: 2022-09-04 | End: 2022-09-14 | Stop reason: HOSPADM

## 2022-09-04 RX ORDER — INSULIN LISPRO 100 [IU]/ML
0-4 INJECTION, SOLUTION INTRAVENOUS; SUBCUTANEOUS NIGHTLY
Status: DISCONTINUED | OUTPATIENT
Start: 2022-09-04 | End: 2022-09-14 | Stop reason: HOSPADM

## 2022-09-04 RX ORDER — SODIUM CHLORIDE 0.9 % (FLUSH) 0.9 %
5-40 SYRINGE (ML) INJECTION PRN
Status: DISCONTINUED | OUTPATIENT
Start: 2022-09-04 | End: 2022-09-14 | Stop reason: HOSPADM

## 2022-09-04 RX ORDER — ACETAMINOPHEN 650 MG/1
650 SUPPOSITORY RECTAL EVERY 6 HOURS PRN
Status: DISCONTINUED | OUTPATIENT
Start: 2022-09-04 | End: 2022-09-14 | Stop reason: HOSPADM

## 2022-09-04 RX ORDER — ONDANSETRON 4 MG/1
4 TABLET, ORALLY DISINTEGRATING ORAL EVERY 8 HOURS PRN
Status: DISCONTINUED | OUTPATIENT
Start: 2022-09-04 | End: 2022-09-14 | Stop reason: HOSPADM

## 2022-09-04 RX ORDER — INSULIN LISPRO 100 [IU]/ML
0-4 INJECTION, SOLUTION INTRAVENOUS; SUBCUTANEOUS
Status: DISCONTINUED | OUTPATIENT
Start: 2022-09-05 | End: 2022-09-14 | Stop reason: HOSPADM

## 2022-09-04 RX ADMIN — CEFEPIME 2000 MG: 2 INJECTION, POWDER, FOR SOLUTION INTRAVENOUS at 19:36

## 2022-09-04 RX ADMIN — VANCOMYCIN HYDROCHLORIDE 1000 MG: 1 INJECTION, POWDER, LYOPHILIZED, FOR SOLUTION INTRAVENOUS at 23:46

## 2022-09-04 RX ADMIN — HYDRALAZINE HYDROCHLORIDE 50 MG: 50 TABLET, FILM COATED ORAL at 23:35

## 2022-09-04 RX ADMIN — VANCOMYCIN HYDROCHLORIDE 1000 MG: 1 INJECTION, POWDER, LYOPHILIZED, FOR SOLUTION INTRAVENOUS at 20:01

## 2022-09-04 RX ADMIN — FERROUS SULFATE TAB 325 MG (65 MG ELEMENTAL FE) 325 MG: 325 (65 FE) TAB at 23:35

## 2022-09-04 RX ADMIN — INSULIN GLARGINE 9 UNITS: 100 INJECTION, SOLUTION SUBCUTANEOUS at 23:26

## 2022-09-04 RX ADMIN — SODIUM CHLORIDE, PRESERVATIVE FREE 10 ML: 5 INJECTION INTRAVENOUS at 23:34

## 2022-09-04 ASSESSMENT — PAIN DESCRIPTION - LOCATION: LOCATION: FOOT

## 2022-09-04 ASSESSMENT — PAIN - FUNCTIONAL ASSESSMENT: PAIN_FUNCTIONAL_ASSESSMENT: 0-10

## 2022-09-04 ASSESSMENT — PAIN SCALES - GENERAL: PAINLEVEL_OUTOF10: 8

## 2022-09-04 ASSESSMENT — LIFESTYLE VARIABLES: HOW OFTEN DO YOU HAVE A DRINK CONTAINING ALCOHOL: NEVER

## 2022-09-04 NOTE — ED PROVIDER NOTES
[facility] Emergency Department    CHIEF COMPLAINT  Wound Check (Patient reports having L big toe amputated in April, was in SNF for a month for rehab, now is at her brother's house and reports working outside last night and there was pus and dirt in her wound, sent in by urgent care because there are magots in the wound.)      SHARED SERVICE VISIT  I have seen and evaluated this patient with my supervising physician, Dr. Brandon Rhodes. HISTORY OF PRESENT ILLNESS  Doug Morales is a 68 y.o. female who presents to the ED for wound check of previous right great toe amputation. Patient states that she went to an urgent care earlier today, and had her foot soaked in water and maggots came out of it. Patient states that she had her right great toe amputated in April and routinely goes to wound care specialist every Friday. Patient states she has been noticing pus and debris in her wound. Patient denies any current pain. Patient is able to ambulate and bear weight on her foot. Patient denies any foul odors. Patient denies any headaches, body aches, fevers or chills. Patient denies any cough or sneezing. Patient denies any sore throat. Patient denies any vision changes. Patient denies any chest pain, shortness of breath, or dyspnea on exertion. Patient denies any nausea, vomiting, diarrhea, or abdominal pain. Patient denies any urinary symptoms. Patient denies any recent travel or sick contacts. No other complaints, modifying factors or associated symptoms. Nursing notes reviewed.    Past Medical History:   Diagnosis Date    Atrial fibrillation ([facility]     off coumadin after bleed, declined restart    Blood transfusion     CAD (coronary artery disease)     Diabetes mellitus type II     Diabetic neuropathy ([facility] 2011    Gastric ulcer     H. pylori infection 2009    Hyperlipidemia     Hypertension     Numbness and tingling of left leg     Osteoarthritis     knees    Poor historian     PVD (peripheral vascular disease) ([facility]     PTA distal sfa/pop/peroneal, Dr. Becky Alvares 12/2015    Unspecified cerebral artery occlusion with cerebral infarction     TIA affected left eye    upper gi bleed 12/2009     Past Surgical History:   Procedure Laterality Date    ANGIOPLASTY  12/30/15    Dr. Becky Alvares, LLE, PTA of distal sfa/pop/peroneal    CARDIAC CATHETERIZATION  9/21/12    done for surgical clearance, cath was negative    COLONOSCOPY  06/09/2022    COLONOSCOPY N/A 6/9/2022    COLONOSCOPY CONTROL HEMORRHAGE performed by Fior Osborne MD at [place] [id number]    FOOT SURGERY Left 01/11/2018    DEBRIDEMENT OF ULCERS LEFT FOOT AND LEG WITH GRAFT    OTHER SURGICAL HISTORY Left 06/05/2017    Left Femoral Peroneal Bypass    SHOULDER ARTHROPLASTY  10/5/12    RIGHT SHOULDER TOTAL ARTHROPLASTY, BICEPS TENODESIS DEPUY, GLOBAL ADVANTAGE AP    TOE AMPUTATION Right 5/23/2022    AMPUTATION OF RIGHT FIRST AND FOURTH TOES performed by Appel Flores DPM at [address] N/A 6/8/2022    EGD IV SEDATION performed by Fior Osborne MD at [address] [place] [address] History   Problem Relation Age of Onset    Heart Disease Mother     Stroke Mother     Heart Disease Father     Diabetes Sister     Diabetes Brother     Diabetes Maternal Grandmother      Social History     Socioeconomic History    Marital status:      Spouse name: Not on file    Number of children: Not on file    Years of education: Not on file    Highest education level: Not on file   Occupational History    Not on file   Tobacco Use    Smoking status: Never    Smokeless tobacco: Never    Tobacco comments:     Not needed   Vaping Use    Vaping Use: Never used   Substance and Sexual Activity    Alcohol use: No    Drug use: No    Sexual activity: Yes     Partners: Male   Other Topics Concern    Not on file   Social History Narrative Not on file     Social Determinants of Health     Financial Resource Strain: Not on file   Food Insecurity: Not on file   Transportation Needs: Not on file   Physical Activity: Not on file   Stress: Not on file   Social Connections: Not on file   Intimate Partner Violence: Not on file   Housing Stability: Not on file     No current facility-administered medications for this encounter. Current Outpatient Medications   Medication Sig Dispense Refill    vitamin D3 (CHOLECALCIFEROL) 25 MCG (1000 UT) TABS tablet TAKE 1 TABLET BY MOUTH EVERY DAY 30 tablet 0    B Complex-C-Folic Acid (DIALYVITE TABLET) TABS TAKE 1 TABLET BY MOUTH EVERY DAY      polyethylene glycol (MIRALAX) 17 GM/SCOOP POWD powder TAKE 17 GRAMS AND MIX WITH 4 TO 8 OUNCES OF BEVERAGE OF CHOICE AND DRINK TWICE DAILY      digoxin (LANOXIN) 125 MCG tablet TAKE 1 TABLET BY MOUTH EVERY DAY      ferrous sulfate (IRON 325) 325 (65 Fe) MG tablet Take 1 tablet by mouth 2 times daily 60 tablet 1    gabapentin (NEURONTIN) 100 MG capsule Take 2 capsules by mouth 2 times daily for 30 days.  120 capsule 1    hydrALAZINE (APRESOLINE) 50 MG tablet Take 1 tablet by mouth every 8 hours 90 tablet 1    lisinopril (PRINIVIL;ZESTRIL) 20 MG tablet TAKE 1 TABLET BY MOUTH EVERY DAY 30 tablet 1    metFORMIN (GLUCOPHAGE) 500 MG tablet TAKE 2 TABLETS BY MOUTH EVERY DAY WITH BREAKFAST 60 tablet 1    magnesium hydroxide (MILK OF MAGNESIA) 400 MG/5ML suspension Take 30 mLs by mouth daily as needed for Constipation 1 each 1    Multiple Vitamins-Minerals (THERAPEUTIC MULTIVITAMIN-MINERALS) tablet Take 1 tablet by mouth daily 30 tablet 1    simvastatin (ZOCOR) 20 MG tablet Take 1 tablet by mouth nightly 90 tablet 0    vitamin C (ASCORBIC ACID) 500 MG tablet Take 1 tablet by mouth 2 times daily 60 tablet 1    diclofenac sodium (VOLTAREN) 1 % GEL Apply 2 g topically in the morning and at bedtime (Patient not taking: Reported on 6/29/2022) 150 g 1    acetaminophen (TYLENOL) 325 MG tablet Take 2 tablets by mouth every 6 hours as needed for Pain 120 tablet 1    amLODIPine (NORVASC) 10 MG tablet TAKE 1 TABLET BY MOUTH EVERY DAY 30 tablet 1    aspirin 81 MG EC tablet Take 1 tablet by mouth daily 30 tablet 1    miconazole (MICOTIN) 2 % powder Apply topically 2 times daily PRN. (Patient not taking: Reported on 6/29/2022) 45 g 1     Allergies   Allergen Reactions    Pcn [Penicillins] Other (See Comments)     Unsure of reaction       REVIEW OF SYSTEMS  10 systems reviewed, pertinent positives per HPI otherwise noted to be negative    PHYSICAL EXAM  BP (!) 188/83   Pulse 86   Temp 98.7 °F (37.1 °C) (Oral)   Resp 16   SpO2 99%   GENERAL APPEARANCE: Awake and alert. Cooperative. No acute distress. Nontoxic appearance. HEAD: Normocephalic. Atraumatic. No longoria signs or raccoon eyes. EYES:  EOM's grossly intact. No conjunctival injection or discharge. ENT: Mucous membranes are pink and moist.   NECK: Supple. Full range of motion. No nuchal rigidity. No tracheal tenderness or deviation. No stridor. HEART: RRR. No murmurs, rubs or gallops. Normal S1-S2. No S3 or S4.  LUNGS: Respirations unlabored. CTAB. Good air exchange. Speaking comfortably in full sentences. ABDOMEN: Soft. Non-distended. Non-tender. No guarding or rebound. No masses. No organomegaly. EXTREMITIES: Some purulent discharge noted to the right great toe. Wound does have follow-up for. There is a open tract localized to the base of the first MCP. Tissue around the wound is erythematous as well as edematous. Full range of motion of the foot. Strength 5/5. Neurovascular status intact. Capillary refill less than 2 seconds. NEUROLOGICAL: Alert and oriented. CN's 2-12 intact. No gross facial drooping. Strength 5/5, sensation intact. PSYCHIATRIC: Normal mood and affect. RADIOLOGY  No results found. ED COURSE  77-year-old female presents to the ED for evaluation of a right great toe amputation performed back in April. 80.5 80.0 - 100.0 fL    MCH 26.3 26.0 - 34.0 pg    MCHC 32.7 31.0 - 36.0 g/dL    RDW 16.5 (H) 12.4 - 15.4 %    Platelets 279 338 - 711 K/uL    MPV 7.1 5.0 - 10.5 fL    Neutrophils % 68.5 %    Lymphocytes % 20.8 %    Monocytes % 6.4 %    Eosinophils % 3.0 %    Basophils % 1.3 %    Neutrophils Absolute 5.9 1.7 - 7.7 K/uL    Lymphocytes Absolute 1.8 1.0 - 5.1 K/uL    Monocytes Absolute 0.6 0.0 - 1.3 K/uL    Eosinophils Absolute 0.3 0.0 - 0.6 K/uL    Basophils Absolute 0.1 0.0 - 0.2 K/uL   Comprehensive Metabolic Panel w/ Reflex to MG   Result Value Ref Range    Sodium 135 (L) 136 - 145 mmol/L    Potassium reflex Magnesium 5.0 3.5 - 5.1 mmol/L    Chloride 100 99 - 110 mmol/L    CO2 24 21 - 32 mmol/L    Anion Gap 11 3 - 16    Glucose 116 (H) 70 - 99 mg/dL    BUN 38 (H) 7 - 20 mg/dL    Creatinine 1.2 0.6 - 1.2 mg/dL    GFR Non- 44 (A) >60    GFR  53 (A) >60    Calcium 9.9 8.3 - 10.6 mg/dL    Total Protein 8.9 (H) 6.4 - 8.2 g/dL    Albumin 4.8 3.4 - 5.0 g/dL    Albumin/Globulin Ratio 1.2 1.1 - 2.2    Total Bilirubin 0.4 0.0 - 1.0 mg/dL    Alkaline Phosphatase 88 40 - 129 U/L    ALT 10 10 - 40 U/L    AST 15 15 - 37 U/L   Lactic Acid   Result Value Ref Range    Lactic Acid 0.9 0.4 - 2.0 mmol/L   Brain Natriuretic Peptide   Result Value Ref Range    Pro-BNP 2,889 (H) 0 - 449 pg/mL   Troponin   Result Value Ref Range    Troponin <0.01 <0.01 ng/mL   Sample possible blood bank testing   Result Value Ref Range    Specimen Status ONEL    Sedimentation Rate   Result Value Ref Range    Sed Rate 12 0 - 30 mm/Hr   C-Reactive Protein   Result Value Ref Range    CRP 11.0 (H) 0.0 - 5.1 mg/L   Basic Metabolic Panel w/ Reflex to MG   Result Value Ref Range    Sodium 138 136 - 145 mmol/L    Potassium reflex Magnesium 4.5 3.5 - 5.1 mmol/L    Chloride 104 99 - 110 mmol/L    CO2 22 21 - 32 mmol/L    Anion Gap 12 3 - 16    Glucose 109 (H) 70 - 99 mg/dL    BUN 30 (H) 7 - 20 mg/dL    Creatinine 0.9 0.6 - 1.2 mg/dL GFR Non-African American >60 >60    GFR African American >60 >60    Calcium 10.4 8.3 - 10.6 mg/dL   CBC with Auto Differential   Result Value Ref Range    WBC 7.9 4.0 - 11.0 K/uL    RBC 3.76 (L) 4.00 - 5.20 M/uL    Hemoglobin 9.8 (L) 12.0 - 16.0 g/dL    Hematocrit 30.5 (L) 36.0 - 48.0 %    MCV 81.2 80.0 - 100.0 fL    MCH 26.0 26.0 - 34.0 pg    MCHC 32.0 31.0 - 36.0 g/dL    RDW 16.3 (H) 12.4 - 15.4 %    Platelets 960 286 - 053 K/uL    MPV 7.9 5.0 - 10.5 fL    Neutrophils % 73.0 %    Lymphocytes % 15.3 %    Monocytes % 7.6 %    Eosinophils % 3.2 %    Basophils % 0.9 %    Neutrophils Absolute 5.8 1.7 - 7.7 K/uL    Lymphocytes Absolute 1.2 1.0 - 5.1 K/uL    Monocytes Absolute 0.6 0.0 - 1.3 K/uL    Eosinophils Absolute 0.2 0.0 - 0.6 K/uL    Basophils Absolute 0.1 0.0 - 0.2 K/uL   Vancomycin Level, Random   Result Value Ref Range    Vancomycin Rm 17.1 ug/mL   Hemoglobin A1C   Result Value Ref Range    Hemoglobin A1C 5.7 See comment %    eAG 116.9 mg/dL   C-Reactive Protein   Result Value Ref Range    CRP 12.2 (H) 0.0 - 5.1 mg/L   CBC   Result Value Ref Range    WBC 8.3 4.0 - 11.0 K/uL    RBC 3.52 (L) 4.00 - 5.20 M/uL    Hemoglobin 9.2 (L) 12.0 - 16.0 g/dL    Hematocrit 28.4 (L) 36.0 - 48.0 %    MCV 80.8 80.0 - 100.0 fL    MCH 26.1 26.0 - 34.0 pg    MCHC 32.3 31.0 - 36.0 g/dL    RDW 16.2 (H) 12.4 - 15.4 %    Platelets 154 333 - 989 K/uL    MPV 7.6 5.0 - 10.5 fL   Renal Function Panel   Result Value Ref Range    Sodium 133 (L) 136 - 145 mmol/L    Potassium 5.2 (H) 3.5 - 5.1 mmol/L    Chloride 101 99 - 110 mmol/L    CO2 22 21 - 32 mmol/L    Anion Gap 10 3 - 16    Glucose 86 70 - 99 mg/dL    BUN 32 (H) 7 - 20 mg/dL    Creatinine 1.1 0.6 - 1.2 mg/dL    GFR Non- 48 (A) >60    GFR  58 (A) >60    Calcium 9.0 8.3 - 10.6 mg/dL    Phosphorus 3.9 2.5 - 4.9 mg/dL    Albumin 3.8 3.4 - 5.0 g/dL   CBC   Result Value Ref Range    WBC 7.5 4.0 - 11.0 K/uL    RBC 3.29 (L) 4.00 - 5.20 M/uL    Hemoglobin 9.0 (L) 12.0 - 16.0 g/dL    Hematocrit 26.2 (L) 36.0 - 48.0 %    MCV 79.7 (L) 80.0 - 100.0 fL    MCH 27.4 26.0 - 34.0 pg    MCHC 34.4 31.0 - 36.0 g/dL    RDW 16.3 (H) 12.4 - 15.4 %    Platelets 604 260 - 763 K/uL    MPV 7.4 5.0 - 10.5 fL   Renal Function Panel   Result Value Ref Range    Sodium 132 (L) 136 - 145 mmol/L    Potassium 4.9 3.5 - 5.1 mmol/L    Chloride 102 99 - 110 mmol/L    CO2 21 21 - 32 mmol/L    Anion Gap 9 3 - 16    Glucose 101 (H) 70 - 99 mg/dL    BUN 30 (H) 7 - 20 mg/dL    Creatinine 1.1 0.6 - 1.2 mg/dL    GFR Non- 48 (A) >60    GFR  58 (A) >60    Calcium 9.7 8.3 - 10.6 mg/dL    Phosphorus 3.8 2.5 - 4.9 mg/dL    Albumin 3.5 3.4 - 5.0 g/dL   Vancomycin Level, Trough   Result Value Ref Range    Vancomycin Tr 18.6 10.0 - 20.0 ug/mL   CBC   Result Value Ref Range    WBC 6.7 4.0 - 11.0 K/uL    RBC 3.40 (L) 4.00 - 5.20 M/uL    Hemoglobin 9.2 (L) 12.0 - 16.0 g/dL    Hematocrit 27.3 (L) 36.0 - 48.0 %    MCV 80.3 80.0 - 100.0 fL    MCH 27.0 26.0 - 34.0 pg    MCHC 33.6 31.0 - 36.0 g/dL    RDW 16.1 (H) 12.4 - 15.4 %    Platelets 663 755 - 586 K/uL    MPV 7.4 5.0 - 10.5 fL   Renal Function Panel   Result Value Ref Range    Sodium 134 (L) 136 - 145 mmol/L    Potassium 4.4 3.5 - 5.1 mmol/L    Chloride 103 99 - 110 mmol/L    CO2 20 (L) 21 - 32 mmol/L    Anion Gap 11 3 - 16    Glucose 99 70 - 99 mg/dL    BUN 23 (H) 7 - 20 mg/dL    Creatinine 0.9 0.6 - 1.2 mg/dL    GFR Non-African American >60 >60    GFR African American >60 >60    Calcium 9.0 8.3 - 10.6 mg/dL    Phosphorus 3.4 2.5 - 4.9 mg/dL    Albumin 3.4 3.4 - 5.0 g/dL   POCT Glucose   Result Value Ref Range    POC Glucose 140 (H) 70 - 99 mg/dl    Performed on ACCU-CHEK    POCT Glucose   Result Value Ref Range    POC Glucose 128 (H) 70 - 99 mg/dl    Performed on ACCU-CHEK    POCT Glucose   Result Value Ref Range    POC Glucose 146 (H) 70 - 99 mg/dl    Performed on ACCU-CHEK    POCT Glucose   Result Value Ref Range    POC Glucose 139 (H) 70 - 99 mg/dl    Performed on ACCU-CHEK    POCT Glucose   Result Value Ref Range    POC Glucose 160 (H) 70 - 99 mg/dl    Performed on ACCU-CHEK    POCT Glucose   Result Value Ref Range    POC Glucose 196 (H) 70 - 99 mg/dl    Performed on ACCU-CHEK    POCT Glucose   Result Value Ref Range    POC Glucose 102 (H) 70 - 99 mg/dl    Performed on ACCU-CHEK    POCT Glucose   Result Value Ref Range    POC Glucose 101 (H) 70 - 99 mg/dl    Performed on ACCU-CHEK    POCT Glucose   Result Value Ref Range    POC Glucose 122 (H) 70 - 99 mg/dl    Performed on ACCU-CHEK    POCT Glucose   Result Value Ref Range    POC Glucose 136 (H) 70 - 99 mg/dl    Performed on ACCU-CHEK    POCT Glucose   Result Value Ref Range    POC Glucose 220 (H) 70 - 99 mg/dl    Performed on ACCU-CHEK    POCT Glucose   Result Value Ref Range    POC Glucose 86 70 - 99 mg/dl    Performed on ACCU-CHEK    POCT Glucose   Result Value Ref Range    POC Glucose 113 (H) 70 - 99 mg/dl    Performed on ACCU-CHEK    POCT Glucose   Result Value Ref Range    POC Glucose 91 70 - 99 mg/dl    Performed on ACCU-CHEK    POCT Glucose   Result Value Ref Range    POC Glucose 169 (H) 70 - 99 mg/dl    Performed on ACCU-CHEK    POCT Glucose   Result Value Ref Range    POC Glucose 126 (H) 70 - 99 mg/dl    Performed on ACCU-CHEK    EKG 12 Lead   Result Value Ref Range    Ventricular Rate 67 BPM    Atrial Rate 42 BPM    QRS Duration 108 ms    Q-T Interval 408 ms    QTc Calculation (Bazett) 431 ms    R Axis -18 degrees    T Axis 96 degrees    Diagnosis       Atrial fibrillationAbnormal QRS-T angle, consider primary T wave abnormalityAbnormal ECGWhen compared with ECG of 05-JUN-2017 12:36,Nonspecific T wave abnormality, improved in Lateral leadsConfirmed by Robert Bar ([id number]) on 9/5/2022 6:44:50 AM     I estimate there is a HIGH risk for OSTEOMYELITIS OF RIGHT FOOT and ESSENTIAL HYPERTENSION, thus I consider the decision for admission reasonable.  I estimate there is LOW risk for

## 2022-09-05 PROBLEM — Z59.1 DIRTY LIVING CONDITIONS: Status: ACTIVE | Noted: 2022-09-05

## 2022-09-05 PROBLEM — Z59.19 DIRTY LIVING CONDITIONS: Status: ACTIVE | Noted: 2022-09-05

## 2022-09-05 PROBLEM — L08.9 RIGHT FOOT INFECTION: Status: RESOLVED | Noted: 2022-09-04 | Resolved: 2022-09-05

## 2022-09-05 LAB
ANION GAP SERPL CALCULATED.3IONS-SCNC: 12 MMOL/L (ref 3–16)
BASOPHILS ABSOLUTE: 0.1 K/UL (ref 0–0.2)
BASOPHILS RELATIVE PERCENT: 0.9 %
BUN BLDV-MCNC: 30 MG/DL (ref 7–20)
C-REACTIVE PROTEIN: 12.2 MG/L (ref 0–5.1)
CALCIUM SERPL-MCNC: 10.4 MG/DL (ref 8.3–10.6)
CHLORIDE BLD-SCNC: 104 MMOL/L (ref 99–110)
CO2: 22 MMOL/L (ref 21–32)
CREAT SERPL-MCNC: 0.9 MG/DL (ref 0.6–1.2)
EKG ATRIAL RATE: 42 BPM
EKG DIAGNOSIS: NORMAL
EKG Q-T INTERVAL: 408 MS
EKG QRS DURATION: 108 MS
EKG QTC CALCULATION (BAZETT): 431 MS
EKG R AXIS: -18 DEGREES
EKG T AXIS: 96 DEGREES
EKG VENTRICULAR RATE: 67 BPM
EOSINOPHILS ABSOLUTE: 0.2 K/UL (ref 0–0.6)
EOSINOPHILS RELATIVE PERCENT: 3.2 %
GFR AFRICAN AMERICAN: >60
GFR NON-AFRICAN AMERICAN: >60
GLUCOSE BLD-MCNC: 109 MG/DL (ref 70–99)
GLUCOSE BLD-MCNC: 128 MG/DL (ref 70–99)
GLUCOSE BLD-MCNC: 139 MG/DL (ref 70–99)
GLUCOSE BLD-MCNC: 146 MG/DL (ref 70–99)
GLUCOSE BLD-MCNC: 160 MG/DL (ref 70–99)
GLUCOSE BLD-MCNC: 196 MG/DL (ref 70–99)
HCT VFR BLD CALC: 30.5 % (ref 36–48)
HEMOGLOBIN: 9.8 G/DL (ref 12–16)
LYMPHOCYTES ABSOLUTE: 1.2 K/UL (ref 1–5.1)
LYMPHOCYTES RELATIVE PERCENT: 15.3 %
MCH RBC QN AUTO: 26 PG (ref 26–34)
MCHC RBC AUTO-ENTMCNC: 32 G/DL (ref 31–36)
MCV RBC AUTO: 81.2 FL (ref 80–100)
MONOCYTES ABSOLUTE: 0.6 K/UL (ref 0–1.3)
MONOCYTES RELATIVE PERCENT: 7.6 %
NEUTROPHILS ABSOLUTE: 5.8 K/UL (ref 1.7–7.7)
NEUTROPHILS RELATIVE PERCENT: 73 %
PDW BLD-RTO: 16.3 % (ref 12.4–15.4)
PERFORMED ON: ABNORMAL
PLATELET # BLD: 211 K/UL (ref 135–450)
PMV BLD AUTO: 7.9 FL (ref 5–10.5)
POTASSIUM REFLEX MAGNESIUM: 4.5 MMOL/L (ref 3.5–5.1)
RBC # BLD: 3.76 M/UL (ref 4–5.2)
SODIUM BLD-SCNC: 138 MMOL/L (ref 136–145)
VANCOMYCIN RANDOM: 17.1 UG/ML
WBC # BLD: 7.9 K/UL (ref 4–11)

## 2022-09-05 PROCEDURE — 2580000003 HC RX 258: Performed by: NURSE PRACTITIONER

## 2022-09-05 PROCEDURE — 1200000000 HC SEMI PRIVATE

## 2022-09-05 PROCEDURE — 83036 HEMOGLOBIN GLYCOSYLATED A1C: CPT

## 2022-09-05 PROCEDURE — 93010 ELECTROCARDIOGRAM REPORT: CPT | Performed by: INTERNAL MEDICINE

## 2022-09-05 PROCEDURE — 80048 BASIC METABOLIC PNL TOTAL CA: CPT

## 2022-09-05 PROCEDURE — 80202 ASSAY OF VANCOMYCIN: CPT

## 2022-09-05 PROCEDURE — 6360000002 HC RX W HCPCS: Performed by: NURSE PRACTITIONER

## 2022-09-05 PROCEDURE — 36415 COLL VENOUS BLD VENIPUNCTURE: CPT

## 2022-09-05 PROCEDURE — 85025 COMPLETE CBC W/AUTO DIFF WBC: CPT

## 2022-09-05 PROCEDURE — 86140 C-REACTIVE PROTEIN: CPT

## 2022-09-05 PROCEDURE — 6370000000 HC RX 637 (ALT 250 FOR IP): Performed by: NURSE PRACTITIONER

## 2022-09-05 RX ADMIN — ATORVASTATIN CALCIUM 20 MG: 10 TABLET, FILM COATED ORAL at 09:00

## 2022-09-05 RX ADMIN — Medication 1 TABLET: at 08:59

## 2022-09-05 RX ADMIN — DIGOXIN 125 MCG: 125 TABLET ORAL at 09:00

## 2022-09-05 RX ADMIN — FERROUS SULFATE TAB 325 MG (65 MG ELEMENTAL FE) 325 MG: 325 (65 FE) TAB at 23:24

## 2022-09-05 RX ADMIN — VANCOMYCIN HYDROCHLORIDE 1000 MG: 1 INJECTION, POWDER, LYOPHILIZED, FOR SOLUTION INTRAVENOUS at 13:12

## 2022-09-05 RX ADMIN — AMLODIPINE BESYLATE 10 MG: 5 TABLET ORAL at 08:59

## 2022-09-05 RX ADMIN — ENOXAPARIN SODIUM 40 MG: 100 INJECTION SUBCUTANEOUS at 09:00

## 2022-09-05 RX ADMIN — CEFEPIME 2000 MG: 2 INJECTION, POWDER, FOR SOLUTION INTRAVENOUS at 06:30

## 2022-09-05 RX ADMIN — SODIUM CHLORIDE, PRESERVATIVE FREE 10 ML: 5 INJECTION INTRAVENOUS at 23:19

## 2022-09-05 RX ADMIN — ASPIRIN 81 MG: 81 TABLET, COATED ORAL at 09:00

## 2022-09-05 RX ADMIN — LISINOPRIL 20 MG: 20 TABLET ORAL at 09:00

## 2022-09-05 RX ADMIN — HYDRALAZINE HYDROCHLORIDE 50 MG: 50 TABLET, FILM COATED ORAL at 05:32

## 2022-09-05 RX ADMIN — HYDRALAZINE HYDROCHLORIDE 50 MG: 50 TABLET, FILM COATED ORAL at 23:24

## 2022-09-05 RX ADMIN — CEFEPIME 2000 MG: 2 INJECTION, POWDER, FOR SOLUTION INTRAVENOUS at 18:22

## 2022-09-05 RX ADMIN — FERROUS SULFATE TAB 325 MG (65 MG ELEMENTAL FE) 325 MG: 325 (65 FE) TAB at 09:00

## 2022-09-05 RX ADMIN — HYDRALAZINE HYDROCHLORIDE 50 MG: 50 TABLET, FILM COATED ORAL at 13:12

## 2022-09-05 RX ADMIN — POLYETHYLENE GLYCOL 3350 17 G: 17 POWDER, FOR SOLUTION ORAL at 13:08

## 2022-09-05 RX ADMIN — INSULIN GLARGINE 9 UNITS: 100 INJECTION, SOLUTION SUBCUTANEOUS at 23:24

## 2022-09-05 NOTE — CARE COORDINATION
CASE MANAGEMENT INITIAL ASSESSMENT      Reviewed chart and completed assessment with patient:yes  Family present: NA  Explained Case Management role/services. yes    Primary contact information:[address] :   Primary Decision Maker: Dillon Romero () - Spouse    Secondary Decision Maker: Mani Tony - Daughter-in-Law - [phone]    Secondary Decision Maker: Negro Osullivan - Child - [phone]          Can this person be reached and be able to respond quickly, such as within a few minutes or hours? Yes      Admit date/status:22  Diagnosis:osteomyelitis   Is this a Readmission?:  No      Insurance:[employer] medicare   Precert required for SNF: Yes       3 night stay required: No    Living arrangements, Adls, care needs, prior to admission:home alone, independent in ADLs    Durable Medical Equipment at home:  Walker__Cane_x_RTS__ BSC__Shower Chair__  02__ HHN__ CPAP__  BiPap__  Hospital Bed__ W/C___ Other_____    Services in the home and/or outpatient, prior to admission:none    Current PCP:Dr. Promise Shepherd                                Medications:yes Prescription coverage? Yes Will pt require financial assistance with medications No     Transportation needs: car     Dialysis Facility (if applicable)   Name:  Address:  Dialysis Schedule:  Phone:  Fax:    PT/OT recs:    Hospital Exemption Notification (HEN):    Barriers to discharge:medical complications    Plan/comments:Referred to patient for d/c planning. Spoke to patient. Patient is a 68year old female admitted for HTN and osteomyelitis. Patient usually lives at home alone. Reports her house is mice infested with feces everywhere. Reports family is trying to help her clean. But does not want to return there. Patient does not want to live with sons. Patient reports   22. Supportive counseling provided. Patient is interested in SNF. Discussed providers in network with insurance.   Patient interested in [facility]. Referral made. No other needs at this time.   Electronically signed by CARLOS Gar on 9/5/2022 at 4:39 PM      [facility] on chart for MD signature

## 2022-09-05 NOTE — PLAN OF CARE
Problem: Safety - Adult  Goal: Free from fall injury  Outcome: Progressing  Flowsheets (Taken 9/4/2022 2319)  Free From Fall Injury:   Instruct family/caregiver on patient safety   Based on caregiver fall risk screen, instruct family/caregiver to ask for assistance with transferring infant if caregiver noted to have fall risk factors     Problem: ABCDS Injury Assessment  Goal: Absence of physical injury  Outcome: Progressing  Flowsheets (Taken 9/4/2022 2319)  Absence of Physical Injury: Implement safety measures based on patient assessment     Problem: Skin/Tissue Integrity  Goal: Absence of new skin breakdown  Description: 1. Monitor for areas of redness and/or skin breakdown  2. Assess vascular access sites hourly  3. Every 4-6 hours minimum:  Change oxygen saturation probe site  4. Every 4-6 hours:  If on nasal continuous positive airway pressure, respiratory therapy assess nares and determine need for appliance change or resting period.   Outcome: Progressing

## 2022-09-05 NOTE — H&P
Hospital Medicine History & Physical      PCP: Francois Boland, APRN - CNP    Date of Admission: 09/04/2022    Time of Service: 2250    Date of Service: Pt seen/examined on 09/04/2022 and admitted to Inpatient with expected LOS greater than two midnights due to medical therapy. Historian: Self    Chief Concern:    Chief Complaint   Patient presents with    Wound Check     Patient reports having L big toe amputated in April, was in SNF for a month for rehab, now is at her brother's house and reports working outside last night and there was pus and dirt in her wound, sent in by urgent care because there are magots in the wound. History Of Present Illness:    Loco Bowman is a 71-year-old female with significant past medical history of hypertension, hyperlipidemia, type 2 diabetes with neuropathy, significant peripheral vascular disease status post recent right great toe amputation who presents to [address] [place] [address] with concerns for ongoing amputation infection. She is under the care of Dr. Valentino Paige, podiatry. Apparently, patient went to urgent care today, states that her foot was soaked and a bucket of water and there was visible maggots coming out of the wound. Urgent care staff encouraged her to report here for higher level care. Pleural, patient was evaluated laboratory studies, EKG, chest x-ray, and right foot x-ray. Right foot x-ray shows cortical irregularity of the first MTP neck region suggestive of osteomyelitis. Pertinent lab values show sodium 135, BUN 38, creatinine 1.2, GFR 44, and blood sugar 140. CRP was elevated at 11.  proBNP was elevated at 2889 and initial troponin less than 0.01. Cell count shows WBC of 8.6, H/H10.9/33.3, and platelets 380. ESR was 12. Blood cultures and wound culture are pending. Patient received Vanco and cefepime in the emergency department. Hospital team was consulted admit this patient for right foot osteomyelitis.     Past Medical History: Diagnosis Date    Atrial fibrillation ([facility]     off coumadin after bleed, declined restart    Blood transfusion     CAD (coronary artery disease)     Diabetes mellitus type II     Diabetic neuropathy ([facility] 2011    Gastric ulcer     H. pylori infection 2009    Hyperlipidemia     Hypertension     Numbness and tingling of left leg     Osteoarthritis     knees    Poor historian     PVD (peripheral vascular disease) ([facility]     PTA distal sfa/pop/peroneal, Dr. Sara Flores 12/2015    Unspecified cerebral artery occlusion with cerebral infarction     TIA affected left eye    upper gi bleed 12/2009     Past Surgical History:          Procedure Laterality Date    ANGIOPLASTY  12/30/15    Dr. Sara Flores, LLE, PTA of distal sfa/pop/peroneal    CARDIAC CATHETERIZATION  9/21/12    done for surgical clearance, cath was negative    COLONOSCOPY  06/09/2022    COLONOSCOPY N/A 6/9/2022    COLONOSCOPY CONTROL HEMORRHAGE performed by Bonifacio Leigh MD at [place] [id number]    FOOT SURGERY Left 01/11/2018    DEBRIDEMENT OF ULCERS LEFT FOOT AND LEG WITH GRAFT    OTHER SURGICAL HISTORY Left 06/05/2017    Left Femoral Peroneal Bypass    SHOULDER ARTHROPLASTY  10/5/12    RIGHT SHOULDER TOTAL ARTHROPLASTY, BICEPS TENODESIS DEPUY, GLOBAL ADVANTAGE AP    TOE AMPUTATION Right 5/23/2022    AMPUTATION OF RIGHT FIRST AND FOURTH TOES performed by Vanessa Cooper DPM at [address] [place] [address] 6/8/2022    EGD IV SEDATION performed by Bonifacio Leigh MD at [address] [place] [address]     Medications Prior to Admission:      Prior to Admission medications    Medication Sig Start Date End Date Taking?  Authorizing Provider   vitamin D3 (CHOLECALCIFEROL) 25 MCG (1000 UT) TABS tablet TAKE 1 TABLET BY MOUTH EVERY DAY 9/1/22   MARVIN Mendiola - CNP   B Complex-C-Folic Acid (DIALYVITE TABLET) TABS TAKE 1 TABLET BY MOUTH EVERY DAY 6/27/22   Historical Provider, MD polyethylene glycol (MIRALAX) 17 GM/SCOOP POWD powder TAKE 17 GRAMS AND MIX WITH 4 TO 8 OUNCES OF BEVERAGE OF CHOICE AND DRINK TWICE DAILY  Patient not taking: Reported on 9/4/2022 6/27/22   Historical Provider, MD   digoxin (LANOXIN) 125 MCG tablet TAKE 1 TABLET BY MOUTH EVERY DAY 6/27/22   Historical Provider, MD   ferrous sulfate (IRON 325) 325 (65 Fe) MG tablet Take 1 tablet by mouth 2 times daily 6/27/22   MARVIN Solis CNP   gabapentin (NEURONTIN) 100 MG capsule Take 2 capsules by mouth 2 times daily for 30 days.  6/27/22 7/27/22  MARVIN Solis CNP   hydrALAZINE (APRESOLINE) 50 MG tablet Take 1 tablet by mouth every 8 hours 6/27/22   MARVIN Solis CNP   lisinopril (PRINIVIL;ZESTRIL) 20 MG tablet TAKE 1 TABLET BY MOUTH EVERY DAY 6/27/22   MARVIN Solis CNP   metFORMIN (GLUCOPHAGE) 500 MG tablet TAKE 2 TABLETS BY MOUTH EVERY DAY WITH BREAKFAST  Patient not taking: Reported on 9/4/2022 6/27/22   MARVIN Solis CNP   magnesium hydroxide (MILK OF MAGNESIA) 400 MG/5ML suspension Take 30 mLs by mouth daily as needed for Constipation  Patient not taking: Reported on 9/4/2022 6/27/22   MARVIN Solis CNP   Multiple Vitamins-Minerals (THERAPEUTIC MULTIVITAMIN-MINERALS) tablet Take 1 tablet by mouth daily 6/27/22 6/27/23  MARVIN Solis CNP   simvastatin (ZOCOR) 20 MG tablet Take 1 tablet by mouth nightly  Patient not taking: Reported on 9/4/2022 6/27/22   MARVIN Solis CNP   vitamin C (ASCORBIC ACID) 500 MG tablet Take 1 tablet by mouth 2 times daily 6/27/22   MARVIN Solis CNP   diclofenac sodium (VOLTAREN) 1 % GEL Apply 2 g topically in the morning and at bedtime  Patient not taking: No sig reported 6/27/22   MARVIN Solis CNP   acetaminophen (TYLENOL) 325 MG tablet Take 2 tablets by mouth every 6 hours as needed for Pain  Patient not taking: Reported on 9/4/2022 6/27/22   MARVIN Solis - CNP   amLODIPine (NORVASC) 10 MG tablet TAKE 1 TABLET BY non-distended with normal bowel sounds. Musculoskeletal:  No clubbing, cyanosis or edema bilaterally. Full range of motion without deformity. Right foot inspection reveals obvious wound to amputation site of great toe, obvious odor with purulence and induration extending upward along dorsum of foot, 4th toe previously removed, well-healed. Skin: Skin color, texture, turgor normal.  No rashes or lesions. Neurologic:  Neurovascularly intact without any focal sensory/motor deficits. Cranial nerves: II-XII intact, grossly non-focal.  Psychiatric:  Alert and oriented, thought content appropriate, normal insight  Capillary Refill: Brisk,3 seconds, normal  Peripheral Pulses: +2 palpable, equal bilaterally     Labs:     Recent Labs     09/04/22 1830   WBC 8.6   HGB 10.9*   HCT 33.3*        Recent Labs     09/04/22 1830   *   K 5.0      CO2 24   BUN 38*   CREATININE 1.2   CALCIUM 9.9     Recent Labs     09/04/22 1830   AST 15   ALT 10   BILITOT 0.4   ALKPHOS 88     No results for input(s): INR in the last 72 hours. Recent Labs     09/04/22 1830   TROPONINI <0.01       Radiology:     I have reviewed the radiographic results for this encounter with the following interpretation(s); see report(s) below:    XR CHEST PORTABLE   Final Result   Cardiomegaly. No pneumonia or edema         XR FOOT RIGHT (MIN 3 VIEWS)   Final Result   Cortical irregularity of the 1st metatarsal head neck region is seen raising   the question of osteomyelitis. Post amputation changes of the 1st and 4th digit.              EKG:  I have reviewed the EKG with the following interpretation in the absence of a cardiologist: AF, rate controlled, slightly ferdinand, no acute ST-segment or T-wave deviations    Consults:    IP CONSULT TO PODIATRY  IP CONSULT TO HOSPITALIST  IP CONSULT TO PODIATRY  IP CONSULT TO PHARMACY  IP CONSULT TO SOCIAL WORK    ASSESSMENT:    Active Hospital Problems    Diagnosis Date Noted    Acute osteomyelitis of right foot [facility]) Enrico Jenkins 05/13/2022     Priority: High    Chronic anemia [D64.9] 06/07/2022     Priority: Medium    Primary hypertension [I10]      Priority: Medium    Diabetes mellitus ([facility] [E11.9] 07/19/2016     Priority: Medium     PLAN:    Right Foot OM  - Dr. Maria Esther Shukla consulted by ED; recommended Abx and inpatient, will see in AM  - Continue Cefepime and Vancocin; pharmacy to follow  - Wound cultures pending    Type 2 DM  - Hold Metformin  - Low-dose SSI AC/HS  - Lantus for basal control    Hypertension, Uncontrolled  - Continue home antihypertensives    Chronic Atrial Fibrillation  - Rate controlled, slightly ferdinand actually, not symptomatic  - Continue Digoxin  - Not on daily AC    Chronic Anemia, Normocytic  - Continue oral iron supplementation    History of CAD  - No acute symptoms  - Continue DAPT    Unsatisfactory Living Conditions (Z59.1)  -  consult placed      DVT Prophylaxis: Lovenox PPx  SRMB Prophylaxis: N/A  Diet: ADULT DIET; Regular; 3 carb choices (45 gm/meal); Low Fat/Low Chol/High Fiber/2 gm Na  Code Status: Full Code    PT/OT Eval Status: May benefit from PT/OT after specialty eval    Dispo - 2-3 days per clinical improvement    Joceline Cabral APRN - SHEYLA    Thank you MARVIN Pastor CNP for the opportunity to be involved in this patient's care.  If you have any questions or concerns please feel free to contact me at [phone].---Anticipated co-signer, Dr. Russel Reynolds    (Please note that portions of this note were completed with a voice recognition program. Efforts were made to edit the dictations but occasionally words are mis-transcribed.)

## 2022-09-05 NOTE — PROGRESS NOTES
Pt AOx4, poor historian at times, VSS, shift assessment completed and charted. Pt denying pain currently, will continue to monitor throughout this shift. Pt came via car, with \"friend\", after seeing the [facility]. Pt states, \"they soaked my foot in salt water, and there were maggots coming out, and they dug inside of it\". Upon assessment, wound does not have maggots, is moist with slough and blanchable erythema. LDA noted and wound dressed wet-to-dry with a foam dressing on top. Heel dressings and elevation ordered d/t blanchable erythema on heels. Pt is a q2 turn and check and change, with a purewick placed. Mepilex placed on coccyx and pt with pillow support. 4Eye skin assessment completed with Amanda Miranda RN. Pt resting comfortably on RA with mild hypertension - hydralazine PO given as ordered. Per pt, they \"do not take their medications because there are way too many to take\". Med rec completed. Per pt, they are living with their son and another son next door. Pt states that \"their house is a mess and there are rat droppings and dirt everywhere\".  consult requested d/t this. Pt denying further needs and was in stable condition when this RN left the room. Bed is low, locked, alarmed, and call light/bedside table within reach. All care per orders, will continue to monitor throughout this shift.  Electronically signed by Royce Elizalde RN on 9/5/2022 at 1:12 AM

## 2022-09-05 NOTE — CONSULTS
Podiatry Consult Note      History of Present Illness: The patient is a 68year old woman with DM2, neuropathy and PAD. We were asked to see her for a right foot infection. The patient is well known to me. I performed a right first and fourth toe amputation for her on 5/23/22 due to osteomyelitis. She had Vascular intervention prior to those amputations. The right fourth toe amputation site healed well  The right first toe amputation site dehisced. I got a laser perfusion study for her in early August which demonstrated poor probability of wound healing at the right foot. At that time I suggested either a TMA or BKA. The patient refused surgery. We have been doing local wound care on a weekly basis to help prevent infection. A couple days ago, the patient developed redness and swelling at the right foot. She came to the hospital concerned she might have an infection. At the time of encounter, she had no other pedal complaints. She denied n/v/f/c and SOB.     Past Medical History:        Diagnosis Date    Atrial fibrillation ([facility]     off coumadin after bleed, declined restart    Blood transfusion     CAD (coronary artery disease)     Diabetes mellitus type II     Diabetic neuropathy ([facility] 2011    Gastric ulcer     H. pylori infection 2009    Hyperlipidemia     Hypertension     Numbness and tingling of left leg     Osteoarthritis     knees    Poor historian     PVD (peripheral vascular disease) ([facility]     PTA distal sfa/pop/peroneal, Dr. Hector Shine 12/2015    Unspecified cerebral artery occlusion with cerebral infarction     TIA affected left eye    upper gi bleed 12/2009       Past Surgical History:        Procedure Laterality Date    ANGIOPLASTY  12/30/15    Dr. Hector Shine, E, PTA of distal sfa/pop/peroneal    CARDIAC CATHETERIZATION  9/21/12    done for surgical clearance, cath was negative    COLONOSCOPY  06/09/2022    COLONOSCOPY N/A 6/9/2022    COLONOSCOPY CONTROL HEMORRHAGE performed by Tierra Swenson Pulse 70   Temp 97.8 °F (36.6 °C) (Oral)   Resp 18   Ht 5' 3\" (1.6 m)   Wt 127 lb 3.3 oz (57.7 kg)   SpO2 97%   BMI 22.53 kg/m²   Integument:  Ulcer to bone, right hallux amputation site. There is slight surrounding erythema. No active drainage. No purulence. Neurologic:  Protective sensation is grossly diminished to light touch at the level of the toes, bilateral.  Vascular:  DP and PT pulses are non-palpable, bilateral.  CFT is less than five seconds to all toes. Mild edema at the right lower extremity. Musculoskeletal:  The right first and fourth toe have been amputated.       Labs:  CBC with Differential:    Lab Results   Component Value Date/Time    WBC 7.9 09/05/2022 06:01 AM    RBC 3.76 09/05/2022 06:01 AM    HGB 9.8 09/05/2022 06:01 AM    HCT 30.5 09/05/2022 06:01 AM     09/05/2022 06:01 AM    MCV 81.2 09/05/2022 06:01 AM    MCH 26.0 09/05/2022 06:01 AM    MCHC 32.0 09/05/2022 06:01 AM    RDW 16.3 09/05/2022 06:01 AM    SEGSPCT 71.4 05/23/2013 01:45 PM    LYMPHOPCT 15.3 09/05/2022 06:01 AM    MONOPCT 7.6 09/05/2022 06:01 AM    EOSPCT 2.0 04/26/2010 02:52 PM    BASOPCT 0.9 09/05/2022 06:01 AM    MONOSABS 0.6 09/05/2022 06:01 AM    LYMPHSABS 1.2 09/05/2022 06:01 AM    EOSABS 0.2 09/05/2022 06:01 AM    BASOSABS 0.1 09/05/2022 06:01 AM    DIFFTYPE Auto-K 05/23/2013 01:45 PM     CMP:    Lab Results   Component Value Date/Time     09/05/2022 06:01 AM    K 4.5 09/05/2022 06:01 AM     09/05/2022 06:01 AM    CO2 22 09/05/2022 06:01 AM    BUN 30 09/05/2022 06:01 AM    CREATININE 0.9 09/05/2022 06:01 AM    GFRAA >60 09/05/2022 06:01 AM    GFRAA >60 05/23/2013 01:45 PM    AGRATIO 1.2 09/04/2022 06:30 PM    LABGLOM >60 09/05/2022 06:01 AM    GLUCOSE 109 09/05/2022 06:01 AM    PROT 8.9 09/04/2022 06:30 PM    PROT 7.3 09/10/2012 02:55 PM    LABALBU 4.8 09/04/2022 06:30 PM    CALCIUM 10.4 09/05/2022 06:01 AM    BILITOT 0.4 09/04/2022 06:30 PM    ALKPHOS 88 09/04/2022 06:30 PM    AST 15 09/04/2022 06:30 PM    ALT 10 09/04/2022 06:30 PM     PT/INR:    Lab Results   Component Value Date/Time    PROTIME 15.9 06/08/2022 06:05 AM    PROTIME 19.8 01/02/2010 03:15 PM    INR 1.29 06/08/2022 06:05 AM     Last 3 Troponin:    Lab Results   Component Value Date/Time    TROPONINI <0.01 09/04/2022 06:30 PM     HgBA1c:    Lab Results   Component Value Date/Time    LABA1C 5.4 06/08/2022 06:05 AM       Imaging: Three Views, Right Foot 9/4/22:  Cortical irregularity of the 1st metatarsal head neck region is seen raising   the question of osteomyelitis. Post amputation changes of the 1st and 4th digit. Vascular:  Laser Skin Perfusion 8/2/22:  Summary        Poor probability of wound healing in the right foot. Good probability at all other levels tested bilaterally. Impression/Recommendations: The patient is a 68year old woman with:  1) Osteomyelitis, right foot  - Antibiotics per Primary Team  - Changed bandage this AM  - MRI pending  - Patient seems a little more open to surgery than she has before. Will discuss with patient again tomorrow. Possible TMA this hospital admission. She also realizes, if a TMA does not heal, she would need a more proximal amputation. 2) DM2 with neuropathy  - A1C 6/8/22: 5.4  3) DM2 with PAD  - Laser Skin Perfusion 8/2/22 - poor probability of wound healing in right foot  4) Afib    Thank you for the opportunity to take part in this patient's care.   Please feel free to contact me with any questions    Gill Cheng  Office: [phone]  Cell: [phone]

## 2022-09-05 NOTE — ED PROVIDER NOTES
Emergency Department Attending Provider Note  Location: [place] [address] TELE/MED SURG/ONC  9/4/2022     Patient Identification  Carlos Brown is a 68 y.o. female    Past Medical History:   Diagnosis Date    Atrial fibrillation ([facility]     off coumadin after bleed, declined restart    Blood transfusion     CAD (coronary artery disease)     Diabetes mellitus type II     Diabetic neuropathy ([facility] 2011    Gastric ulcer     H. pylori infection 2009    Hyperlipidemia     Hypertension     Numbness and tingling of left leg     Osteoarthritis     knees    Poor historian     PVD (peripheral vascular disease) ([facility]     PTA distal sfa/pop/peroneal, Dr. Rocio Jones 12/2015    Unspecified cerebral artery occlusion with cerebral infarction     TIA affected left eye    upper gi bleed 12/2009       Carlos Brown was evaluated in the Emergency Department for concerns of right foot infection. Patient says she has history with podiatry at this hospital.  She had a right first and fourth toe amputation back in May, and at that time and had osteomyelitis. Per report per patient, she was seen at an outside urgent care, where they soaked her foot and she had maggots come out of it. She says she does not have any pain today, but there is significant signs of infection today on physical exam.  She says she has been going to podiatry with a scrape her foot until it bleeds. She denies any fevers, no trauma, and otherwise says she is at the baseline of her health. Although initial history and physical exam information was obtained by FAN Albarran (who also dictated a record of this visit), I personally saw the patient and performed a substantive portion of the visit including all aspects of the medical decision making. XR CHEST PORTABLE   Final Result   Cardiomegaly.   No pneumonia or edema         XR FOOT RIGHT (MIN 3 VIEWS)   Final Result   Cortical irregularity of the 1st metatarsal head neck region is seen raising   the question of osteomyelitis. Post amputation changes of the 1st and 4th digit.          MRI FOOT RIGHT WO CONTRAST    (Results Pending)       Labs Reviewed   CBC WITH AUTO DIFFERENTIAL - Abnormal; Notable for the following components:       Result Value    Hemoglobin 10.9 (*)     Hematocrit 33.3 (*)     RDW 16.5 (*)     All other components within normal limits   COMPREHENSIVE METABOLIC PANEL W/ REFLEX TO MG FOR LOW K - Abnormal; Notable for the following components:    Sodium 135 (*)     Glucose 116 (*)     BUN 38 (*)     GFR Non- 44 (*)     GFR  53 (*)     Total Protein 8.9 (*)     All other components within normal limits   BRAIN NATRIURETIC PEPTIDE - Abnormal; Notable for the following components:    Pro-BNP 2,889 (*)     All other components within normal limits   C-REACTIVE PROTEIN - Abnormal; Notable for the following components:    CRP 11.0 (*)     All other components within normal limits   BASIC METABOLIC PANEL W/ REFLEX TO MG FOR LOW K - Abnormal; Notable for the following components:    Glucose 109 (*)     BUN 30 (*)     All other components within normal limits   CBC WITH AUTO DIFFERENTIAL - Abnormal; Notable for the following components:    RBC 3.76 (*)     Hemoglobin 9.8 (*)     Hematocrit 30.5 (*)     RDW 16.3 (*)     All other components within normal limits   C-REACTIVE PROTEIN - Abnormal; Notable for the following components:    CRP 12.2 (*)     All other components within normal limits   POCT GLUCOSE - Abnormal; Notable for the following components:    POC Glucose 140 (*)     All other components within normal limits   POCT GLUCOSE - Abnormal; Notable for the following components:    POC Glucose 128 (*)     All other components within normal limits   CULTURE, BLOOD 1   CULTURE, BLOOD 2   CULTURE, WOUND    Narrative:     ORDER#: [id number]                          ORDERED BY: PATEL ELIZALDE  SOURCE: Foot                               COLLECTED:  09/04/22 18:19  ANTIBIOTICS AT ONEL.:                      RECEIVED :  09/04/22 19:08   LACTIC ACID   TROPONIN   SAMPLE POSSIBLE BLOOD BANK TESTING   SEDIMENTATION RATE   VANCOMYCIN LEVEL, RANDOM   HEMOGLOBIN A1C   POCT GLUCOSE   POCT GLUCOSE   POCT GLUCOSE   POCT GLUCOSE         PLAN:   -Patient seen and evaluated. Relevant records reviewed. Midlevel provider had probed the area, x-rays obtained, concern for osteomyelitis continues with significant bone breakdown. No obvious maggots today in the ED. CRP slightly elevated, sed rate normal.  No leukocytosis. While I am concerned for osteomyelitis, she is not septic. Antibiotics initiated. Discussed with podiatry per PA. They know her quite well. We will plan for admission today for further management. Discussed with patient that she will likely have surgery.     Medications   amLODIPine (NORVASC) tablet 10 mg (10 mg Oral Given 9/5/22 0859)   aspirin EC tablet 81 mg (81 mg Oral Given 9/5/22 0900)   digoxin (LANOXIN) tablet 125 mcg (125 mcg Oral Given 9/5/22 0900)   ferrous sulfate (IRON 325) tablet 325 mg (325 mg Oral Given 9/5/22 0900)   hydrALAZINE (APRESOLINE) tablet 50 mg (50 mg Oral Given 9/5/22 0532)   lisinopril (PRINIVIL;ZESTRIL) tablet 20 mg (20 mg Oral Given 9/5/22 0900)   therapeutic multivitamin-minerals 1 tablet (1 tablet Oral Given 9/5/22 0859)   atorvastatin (LIPITOR) tablet 20 mg (20 mg Oral Given 9/5/22 0900)   sodium chloride flush 0.9 % injection 5-40 mL (5 mLs IntraVENous Not Given 9/5/22 0900)   sodium chloride flush 0.9 % injection 5-40 mL (has no administration in time range)   0.9 % sodium chloride infusion (has no administration in time range)   enoxaparin (LOVENOX) injection 40 mg (40 mg SubCUTAneous Given 9/5/22 0900)   ondansetron (ZOFRAN-ODT) disintegrating tablet 4 mg (has no administration in time range)     Or   ondansetron (ZOFRAN) injection 4 mg (has no administration in time range)   polyethylene glycol (GLYCOLAX) packet 17 g (has no administration in time range)   acetaminophen (TYLENOL) tablet 650 mg (has no administration in time range)     Or   acetaminophen (TYLENOL) suppository 650 mg (has no administration in time range)   cefepime (MAXIPIME) 2000 mg IVPB minibag (2,000 mg IntraVENous New Bag 9/5/22 0630)   insulin glargine (LANTUS) injection vial 9 Units (9 Units SubCUTAneous Given 9/4/22 2326)   insulin lispro (HUMALOG) injection vial 0-4 Units (0 Units SubCUTAneous Not Given 9/5/22 0900)   insulin lispro (HUMALOG) injection vial 0-4 Units (0 Units SubCUTAneous Not Given 9/4/22 2335)   glucose chewable tablet 16 g (has no administration in time range)   dextrose bolus 10% 125 mL (has no administration in time range)     Or   dextrose bolus 10% 250 mL (has no administration in time range)   glucagon (rDNA) injection 1 mg (has no administration in time range)   dextrose 10 % infusion (has no administration in time range)   vancomycin (VANCOCIN) intermittent dosing (placeholder) (has no administration in time range)   cefepime (MAXIPIME) 2000 mg IVPB minibag (0 mg IntraVENous Stopped 9/4/22 1959)   vancomycin 1000 mg IVPB in 250 mL D5W addavial (0 mg IntraVENous Stopped 9/4/22 2112)   vancomycin 1000 mg IVPB in 250 mL D5W addavial (0 mg IntraVENous Stopped 9/5/22 0053)       IMPRESSION:    ICD-10-CM    1. Osteomyelitis of right foot, unspecified type ([facility]  M86.9       2. Essential hypertension  I10           I, Venus Rojas DO am the primary clinician of record. This chart was generated in part by using Dragon Dictation system and may contain errors related to that system including errors in grammar, punctuation, and spelling, as well as words and phrases that may be inappropriate. If there are any questions or concerns please feel free to contact the dictating provider for clarification.      VENUS ROJAS DO  [address], DO  09/05/22 1024

## 2022-09-05 NOTE — PROGRESS NOTES
Shift assessment completed. Pt A&O, VSS. Dressing to right foot changed per podiatry. Denies any needs at this time. Bed locked and in lowest position, side rails up 2/4. Call light within reach. Will continue to monitor.

## 2022-09-05 NOTE — PROGRESS NOTES
Consult placed    Who: Umesh Saenz   Date: 9/5/2022  Time: 4:23 AM    SUNNY Yepez @ 0423 - \"Hi there. I have a routine consult for this pt and it was referred by Brandon Burr NP. Referral reads as: \"Pt with right great toe amputation, that is now infected\". Pt claims to see you outpatient. Thank you! \"    Electronically signed by Tonya Julio RN on 9/5/2022 at 4:23 AM

## 2022-09-05 NOTE — CONSULTS
[address] [facility] Dr. Noah Archuleta @ 2019  RE: possible osteomyelitis of amputated great toe on right foot Per ARTURO Dodson Dr. responded @ [phone] [id number]

## 2022-09-05 NOTE — PLAN OF CARE
Problem: Safety - Adult  Goal: Free from fall injury  9/5/2022 1123 by Tanvi Kim RN  Outcome: Progressing  9/4/2022 2319 by Elda Giordano RN  Outcome: Don Bennett (Taken 9/4/2022 2319)  Free From Fall Injury:   Arnaud Fishman family/caregiver on patient safety   Based on caregiver fall risk screen, instruct family/caregiver to ask for assistance with transferring infant if caregiver noted to have fall risk factors     Problem: ABCDS Injury Assessment  Goal: Absence of physical injury  9/5/2022 1123 by Tanvi Kim RN  Outcome: Progressing  9/4/2022 2319 by Elda Giordano RN  Outcome: Progressing  Flowsheets (Taken 9/4/2022 2319)  Absence of Physical Injury: Implement safety measures based on patient assessment     Problem: Skin/Tissue Integrity  Goal: Absence of new skin breakdown  Description: 1. Monitor for areas of redness and/or skin breakdown  2. Assess vascular access sites hourly  3. Every 4-6 hours minimum:  Change oxygen saturation probe site  4. Every 4-6 hours:  If on nasal continuous positive airway pressure, respiratory therapy assess nares and determine need for appliance change or resting period.   9/5/2022 1123 by Tanvi Kim RN  Outcome: Progressing  9/4/2022 2319 by Elda Giordano RN  Outcome: Progressing

## 2022-09-05 NOTE — PROGRESS NOTES
4 Eyes Skin Assessment     The patient is being assess for  Admission    I agree that 2 RN's have performed a thorough Head to Toe Skin Assessment on the patient. ALL assessment sites listed below have been assessed. Areas assessed by both nurses:   [x]   Head, Face, and Ears   [x]   Shoulders, Back, and Chest  [x]   Arms, Elbows, and Hands   [x]   Coccyx, Sacrum, and Ischum  [x]   Legs, Feet, and Heels        Does the Patient have Skin Breakdown? Yes a wound was noted on the Admission Assessment and an WOUND LDA was Initiated documentation include the Yazmin-wound, Wound Assessment, Measurements, Dressing Treatment, Drainage, and Color\",     Wound noted: R foot amputation wound with slough and blanchable erythema. Cleaned with saline, and then dressed with a wet to dry gauze dressing.         Jc Prevention initiated:  Yes   Wound Care Orders initiated:  Yes      [id number]  nurse consulted for Pressure Injury (Stage 3,4, Unstageable, DTI, NWPT, and Complex wounds):  No      Nurse 1 eSignature: Electronically signed by Erica Chatterjee RN on 9/5/22 at 1:04 AM EDT    Nurse 2 eSignature: Electronically signed by Cathi Pratt RN on 9/5/22 at 4:47 AM EDT

## 2022-09-05 NOTE — CONSULTS
Pharmacy to Dose Vancomycin    Dx: Osteomyelitis  Goal trough = 15-20  Pt wt = 56.7 kg  Recent Labs     09/04/22  1830   CREATININE 1.2     Recent Labs     09/04/22  1830   WBC 8.6     Give Vanc 1g x1 now. Pulse dose for now. Vancomycin random 9/5 1100. Shantel Echols [facility] 9/4/2022 11:11 PM    9/5/22 @1003  Vanc level = 17.1 mcg/ml  Vancomycin 1 gm x 2 doses given on 9/4  CrCl 43 ml/min  Recommend Vanc 1 gm q24h     Check level @1300 on 9/7  [facility], R. Ph. 9/5/2022 12:27 PM

## 2022-09-05 NOTE — PROGRESS NOTES
Hospitalist Progress Note      PCP: Jeannie Alarcon APRN - CNP    Date of Admission: 9/4/2022    Chief Complaint: L foot wound. Subjective: no new c/o. Medications:  Reviewed    Infusion Medications    sodium chloride      dextrose       Scheduled Medications    amLODIPine  10 mg Oral Daily    aspirin  81 mg Oral Daily    digoxin  125 mcg Oral Daily    ferrous sulfate  325 mg Oral BID    hydrALAZINE  50 mg Oral 3 times per day    lisinopril  20 mg Oral Daily    therapeutic multivitamin-minerals  1 tablet Oral Daily    atorvastatin  20 mg Oral Daily    sodium chloride flush  5-40 mL IntraVENous 2 times per day    enoxaparin  40 mg SubCUTAneous Daily    cefepime  2,000 mg IntraVENous Q12H    insulin glargine  0.15 Units/kg SubCUTAneous Nightly    insulin lispro  0-4 Units SubCUTAneous TID WC    insulin lispro  0-4 Units SubCUTAneous Nightly    vancomycin (VANCOCIN) intermittent dosing (placeholder)   Other RX Placeholder     PRN Meds: sodium chloride flush, sodium chloride, ondansetron **OR** ondansetron, polyethylene glycol, acetaminophen **OR** acetaminophen, glucose, dextrose bolus **OR** dextrose bolus, glucagon (rDNA), dextrose      Intake/Output Summary (Last 24 hours) at 9/5/2022 0854  Last data filed at 9/5/2022 0532  Gross per 24 hour   Intake --   Output 500 ml   Net -500 ml       Physical Exam Performed:    BP (!) 172/78   Pulse 73   Temp 97.4 °F (36.3 °C) (Oral)   Resp 18   Ht 5' 3\" (1.6 m)   Wt 127 lb 3.3 oz (57.7 kg)   SpO2 96%   BMI 22.53 kg/m²     General appearance: No apparent distress, appears stated age and cooperative. HEENT: Pupils equal, round, and reactive to light. Conjunctivae/corneas clear. Neck: Supple, with full range of motion. No jugular venous distention. Trachea midline. Respiratory:  Normal respiratory effort. Clear to auscultation, bilaterally without Rales/Wheezes/Rhonchi.   Cardiovascular: Regular rate and rhythm with normal S1/S2 without murmurs, rubs or gallops. Abdomen: Soft, non-tender, non-distended with normal bowel sounds. Musculoskeletal: No clubbing, cyanosis or edema bilaterally. Full range of motion without deformity. Skin: Skin color, texture, turgor normal.  No rashes or lesions. Neurologic:  Neurovascularly intact without any focal sensory/motor deficits. Cranial nerves: II-XII intact, grossly non-focal.  Psychiatric: Alert and oriented, thought content appropriate, normal insight  Capillary Refill: Brisk,< 3 seconds   Peripheral Pulses: +2 palpable, equal bilaterally       Labs:   Recent Labs     09/04/22 1830 09/05/22  0601   WBC 8.6 7.9   HGB 10.9* 9.8*   HCT 33.3* 30.5*    211     Recent Labs     09/04/22 1830 09/05/22  0601   * 138   K 5.0 4.5    104   CO2 24 22   BUN 38* 30*   CREATININE 1.2 0.9   CALCIUM 9.9 10.4     Recent Labs     09/04/22  1830   AST 15   ALT 10   BILITOT 0.4   ALKPHOS 88     No results for input(s): INR in the last 72 hours.   Recent Labs     09/04/22  1830   TROPONINI <0.01       Urinalysis:      Lab Results   Component Value Date/Time    NITRU Negative 06/07/2022 01:55 PM    [address] 0-2 06/07/2022 01:55 PM    BACTERIA Rare 06/07/2022 01:55 PM    RBCUA 3-4 06/07/2022 01:55 PM    BLOODU LARGE 06/07/2022 01:55 PM    SPECGRAV <=1.005 06/07/2022 01:55 PM    GLUCOSEU Negative 06/07/2022 01:55 PM       Consults:    IP CONSULT TO PODIATRY  IP CONSULT TO HOSPITALIST  IP CONSULT TO PODIATRY  IP CONSULT TO PHARMACY  IP CONSULT TO SOCIAL WORK      Assessment/Plan:    Active Hospital Problems    Diagnosis     Dirty living conditions [Z59.1]      Priority: Medium    Chronic anemia [D64.9]      Priority: Medium    Primary hypertension [I10]      Priority: Medium    Acute osteomyelitis of right foot ([facility] [address] [M86.171]      Priority: Medium    Diabetes mellitus ([facility] [address] [E11.9]        Right Foot OM  - Dr. Christiane Lennox consulted by ED; recommended Abx and inpatient, will see in AM  - Started on empiric Cefepime/Vancocin Sunday 4 Sept - continued pending Podiatry recs and possible wound culture results. MRI ordered and pending for possible amputation. DM2 - controlled on home oral antiGlycemics - held. Started on Low dose Lantus and follow FSBS/SSI low regimen. Last HbA1c 5.4% dated June 2022. Anticipate resuming/continuing home regimen at discharge. HTN/CAD - w/ known CAD but no evidence of active signs/sxs of ischemia/failure. Currently controlled on home meds w/ vitals reviewed and documented as above. Afib - chronic paroxysmal of unspecified and clinically unable to determine etiology. Normally rate controlled on Digoxin - continued. NOT Anticoagulated at baseline . Monitored on tele. Anemia - etiology clinically unable to determine, w/out evidence of active bleeding/hemolysis. Asymptomatic w/out indication for transfusion. Follow serial labs. Reviewed and documented as above. Unsatisfactory Living Conditions (Z59.1)  -  consult placed       DVT Prophylaxis: LMWH     Recent Labs     09/04/22  1830 09/05/22  0601    211     Diet: ADULT DIET; Regular  Code Status: Full Code      PT/OT Eval Status: not yet ordered.      Dispo - Possibly Thurs/Friday 8/9 Sept pending clinical course, subspecialty recs and eventual PT/OT eval/recs    Rhianna Luna MD

## 2022-09-06 ENCOUNTER — APPOINTMENT (OUTPATIENT)
Dept: VASCULAR LAB | Age: 77
DRG: 239 | End: 2022-09-06
Payer: MEDICARE

## 2022-09-06 ENCOUNTER — APPOINTMENT (OUTPATIENT)
Dept: MRI IMAGING | Age: 77
DRG: 239 | End: 2022-09-06
Payer: MEDICARE

## 2022-09-06 LAB
ALBUMIN SERPL-MCNC: 3.8 G/DL (ref 3.4–5)
ANION GAP SERPL CALCULATED.3IONS-SCNC: 10 MMOL/L (ref 3–16)
BUN BLDV-MCNC: 32 MG/DL (ref 7–20)
CALCIUM SERPL-MCNC: 9 MG/DL (ref 8.3–10.6)
CHLORIDE BLD-SCNC: 101 MMOL/L (ref 99–110)
CO2: 22 MMOL/L (ref 21–32)
CREAT SERPL-MCNC: 1.1 MG/DL (ref 0.6–1.2)
ESTIMATED AVERAGE GLUCOSE: 116.9 MG/DL
GFR AFRICAN AMERICAN: 58
GFR NON-AFRICAN AMERICAN: 48
GLUCOSE BLD-MCNC: 101 MG/DL (ref 70–99)
GLUCOSE BLD-MCNC: 102 MG/DL (ref 70–99)
GLUCOSE BLD-MCNC: 122 MG/DL (ref 70–99)
GLUCOSE BLD-MCNC: 136 MG/DL (ref 70–99)
GLUCOSE BLD-MCNC: 220 MG/DL (ref 70–99)
GLUCOSE BLD-MCNC: 86 MG/DL (ref 70–99)
GRAM STAIN RESULT: ABNORMAL
HBA1C MFR BLD: 5.7 %
HCT VFR BLD CALC: 28.4 % (ref 36–48)
HEMOGLOBIN: 9.2 G/DL (ref 12–16)
MCH RBC QN AUTO: 26.1 PG (ref 26–34)
MCHC RBC AUTO-ENTMCNC: 32.3 G/DL (ref 31–36)
MCV RBC AUTO: 80.8 FL (ref 80–100)
ORGANISM: ABNORMAL
ORGANISM: ABNORMAL
PDW BLD-RTO: 16.2 % (ref 12.4–15.4)
PERFORMED ON: ABNORMAL
PHOSPHORUS: 3.9 MG/DL (ref 2.5–4.9)
PLATELET # BLD: 194 K/UL (ref 135–450)
PMV BLD AUTO: 7.6 FL (ref 5–10.5)
POTASSIUM SERPL-SCNC: 5.2 MMOL/L (ref 3.5–5.1)
RBC # BLD: 3.52 M/UL (ref 4–5.2)
SODIUM BLD-SCNC: 133 MMOL/L (ref 136–145)
WBC # BLD: 8.3 K/UL (ref 4–11)
WOUND/ABSCESS: ABNORMAL

## 2022-09-06 PROCEDURE — 2580000003 HC RX 258: Performed by: NURSE PRACTITIONER

## 2022-09-06 PROCEDURE — 6370000000 HC RX 637 (ALT 250 FOR IP): Performed by: NURSE PRACTITIONER

## 2022-09-06 PROCEDURE — 80069 RENAL FUNCTION PANEL: CPT

## 2022-09-06 PROCEDURE — 93926 LOWER EXTREMITY STUDY: CPT

## 2022-09-06 PROCEDURE — 73718 MRI LOWER EXTREMITY W/O DYE: CPT

## 2022-09-06 PROCEDURE — 99221 1ST HOSP IP/OBS SF/LOW 40: CPT | Performed by: SURGERY

## 2022-09-06 PROCEDURE — 36415 COLL VENOUS BLD VENIPUNCTURE: CPT

## 2022-09-06 PROCEDURE — 1200000000 HC SEMI PRIVATE

## 2022-09-06 PROCEDURE — 85027 COMPLETE CBC AUTOMATED: CPT

## 2022-09-06 PROCEDURE — 6360000002 HC RX W HCPCS: Performed by: NURSE PRACTITIONER

## 2022-09-06 RX ADMIN — INSULIN GLARGINE 9 UNITS: 100 INJECTION, SOLUTION SUBCUTANEOUS at 21:47

## 2022-09-06 RX ADMIN — ATORVASTATIN CALCIUM 20 MG: 10 TABLET, FILM COATED ORAL at 09:35

## 2022-09-06 RX ADMIN — CEFEPIME 2000 MG: 2 INJECTION, POWDER, FOR SOLUTION INTRAVENOUS at 05:45

## 2022-09-06 RX ADMIN — FERROUS SULFATE TAB 325 MG (65 MG ELEMENTAL FE) 325 MG: 325 (65 FE) TAB at 09:35

## 2022-09-06 RX ADMIN — FERROUS SULFATE TAB 325 MG (65 MG ELEMENTAL FE) 325 MG: 325 (65 FE) TAB at 21:46

## 2022-09-06 RX ADMIN — ASPIRIN 81 MG: 81 TABLET, COATED ORAL at 09:35

## 2022-09-06 RX ADMIN — CEFEPIME 2000 MG: 2 INJECTION, POWDER, FOR SOLUTION INTRAVENOUS at 18:44

## 2022-09-06 RX ADMIN — POLYETHYLENE GLYCOL 3350 17 G: 17 POWDER, FOR SOLUTION ORAL at 10:28

## 2022-09-06 RX ADMIN — Medication 1 TABLET: at 09:38

## 2022-09-06 RX ADMIN — DIGOXIN 125 MCG: 125 TABLET ORAL at 09:35

## 2022-09-06 RX ADMIN — LISINOPRIL 20 MG: 20 TABLET ORAL at 09:35

## 2022-09-06 RX ADMIN — HYDRALAZINE HYDROCHLORIDE 50 MG: 50 TABLET, FILM COATED ORAL at 05:48

## 2022-09-06 RX ADMIN — ENOXAPARIN SODIUM 40 MG: 100 INJECTION SUBCUTANEOUS at 09:35

## 2022-09-06 RX ADMIN — AMLODIPINE BESYLATE 10 MG: 5 TABLET ORAL at 09:35

## 2022-09-06 RX ADMIN — HYDRALAZINE HYDROCHLORIDE 50 MG: 50 TABLET, FILM COATED ORAL at 21:46

## 2022-09-06 RX ADMIN — VANCOMYCIN HYDROCHLORIDE 1000 MG: 1 INJECTION, POWDER, LYOPHILIZED, FOR SOLUTION INTRAVENOUS at 14:32

## 2022-09-06 RX ADMIN — HYDRALAZINE HYDROCHLORIDE 50 MG: 50 TABLET, FILM COATED ORAL at 14:38

## 2022-09-06 NOTE — CONSULTS
Vascular Surgery Consultation    Date of Admission:  9/4/2022  5:51 PM  Date of Consultation:  9/6/2022    PCP:  MARVIN Cummins CNP       Chief Complaint: Non healing right foot wound. History of Present Illness: We are asked to see this patient in consultation by Dr. Chapin Duarte regarding PVD and wound healing. Luis Perea is a 68 y.o. female who has a history of PVD D/P interventions and LLE bypass. She underwent RLE angiogram and angioplasty on May prior to several toe amps. The great toe amp site has not healed. She is to undergo TMA and I have been consulted regarding wound healing.          Past Medical History:  Past Medical History:   Diagnosis Date    Atrial fibrillation ([address]     off coumadin after bleed, declined restart    Blood transfusion     CAD (coronary artery disease)     Diabetes mellitus type II     Diabetic neuropathy ([address] 2011    Gastric ulcer     H. pylori infection 2009    Hyperlipidemia     Hypertension     Numbness and tingling of left leg     Osteoarthritis     knees    Poor historian     PVD (peripheral vascular disease) [facility]     PTA distal sfa/pop/peroneal, Dr. Lorenza Mejía 12/2015    Unspecified cerebral artery occlusion with cerebral infarction     TIA affected left eye    upper gi bleed 12/2009       Past Surgical History:  Past Surgical History:   Procedure Laterality Date    ANGIOPLASTY  12/30/15    Dr. Lorenza Mejía, JALEESA, PTA of distal sfa/pop/peroneal    CARDIAC CATHETERIZATION  9/21/12    done for surgical clearance, cath was negative    COLONOSCOPY  06/09/2022    COLONOSCOPY N/A 6/9/2022    COLONOSCOPY CONTROL HEMORRHAGE performed by Jaylyn Ghotra MD at [facility]  [phone]    FOOT SURGERY Left 01/11/2018    DEBRIDEMENT OF ULCERS LEFT FOOT AND LEG WITH GRAFT    OTHER SURGICAL HISTORY Left 06/05/2017    Left Femoral Peroneal Bypass    SHOULDER ARTHROPLASTY  10/5/12    RIGHT SHOULDER TOTAL ARTHROPLASTY, BICEPS TENODESIS DEPUY, GLOBAL ADVANTAGE AP    TOE AMPUTATION Right 5/23/2022    AMPUTATION OF RIGHT FIRST AND FOURTH TOES performed by Tomas Vernon DPM at [facility] 6/8/2022    EGD IV SEDATION performed by Katya Werner MD at [address] Medications:   Prior to Admission medications    Medication Sig Start Date End Date Taking? Authorizing Provider   vitamin D3 (CHOLECALCIFEROL) 25 MCG (1000 UT) TABS tablet TAKE 1 TABLET BY MOUTH EVERY DAY 9/1/22   MARVIN Solis CNP   B Complex-C-Folic Acid (DIALYVITE TABLET) TABS TAKE 1 TABLET BY MOUTH EVERY DAY 6/27/22   Historical Provider, MD   polyethylene glycol (MIRALAX) 17 GM/SCOOP POWD powder TAKE 17 GRAMS AND MIX WITH 4 TO 8 OUNCES OF BEVERAGE OF CHOICE AND DRINK TWICE DAILY  Patient not taking: Reported on 9/4/2022 6/27/22   Historical Provider, MD   digoxin (LANOXIN) 125 MCG tablet TAKE 1 TABLET BY MOUTH EVERY DAY 6/27/22   Historical Provider, MD   ferrous sulfate (IRON 325) 325 (65 Fe) MG tablet Take 1 tablet by mouth 2 times daily 6/27/22   MARVIN Solis CNP   gabapentin (NEURONTIN) 100 MG capsule Take 2 capsules by mouth 2 times daily for 30 days.  6/27/22 7/27/22  MARVIN Solis CNP   hydrALAZINE (APRESOLINE) 50 MG tablet Take 1 tablet by mouth every 8 hours 6/27/22   MARVIN Solis CNP   lisinopril (PRINIVIL;ZESTRIL) 20 MG tablet TAKE 1 TABLET BY MOUTH EVERY DAY 6/27/22   MARVIN Solis CNP   metFORMIN (GLUCOPHAGE) 500 MG tablet TAKE 2 TABLETS BY MOUTH EVERY DAY WITH BREAKFAST  Patient not taking: Reported on 9/4/2022 6/27/22   MARVIN Solis CNP   magnesium hydroxide (MILK OF MAGNESIA) 400 MG/5ML suspension Take 30 mLs by mouth daily as needed for Constipation  Patient not taking: Reported on 9/4/2022 6/27/22   MARVIN Solis CNP   Multiple Vitamins-Minerals (THERAPEUTIC MULTIVITAMIN-MINERALS) tablet Take 1 tablet by mouth daily 6/27/22 6/27/23  MARVIN Swift CNP   simvastatin (ZOCOR) 20 MG tablet Take 1 tablet by mouth nightly  Patient not taking: Reported on 9/4/2022 6/27/22   MARVIN Swift CNP   vitamin C (ASCORBIC ACID) 500 MG tablet Take 1 tablet by mouth 2 times daily 6/27/22   MARVIN Swift CNP   diclofenac sodium (VOLTAREN) 1 % GEL Apply 2 g topically in the morning and at bedtime  Patient not taking: No sig reported 6/27/22   MARVIN Swift CNP   acetaminophen (TYLENOL) 325 MG tablet Take 2 tablets by mouth every 6 hours as needed for Pain  Patient not taking: Reported on 9/4/2022 6/27/22   MARVIN Swift CNP   amLODIPine (NORVASC) 10 MG tablet TAKE 1 TABLET BY MOUTH EVERY DAY 6/27/22   MARVIN Swift CNP   aspirin 81 MG EC tablet Take 1 tablet by mouth daily  Patient not taking: Reported on 9/4/2022 6/27/22   MARVIN Swift CNP   miconazole (MICOTIN) 2 % powder Apply topically 2 times daily PRN.   Patient not taking: No sig reported 6/27/22   MARVIN Swift CNP        Facility Administered Medications:    vancomycin  1,000 mg IntraVENous Q24H    amLODIPine  10 mg Oral Daily    aspirin  81 mg Oral Daily    digoxin  125 mcg Oral Daily    ferrous sulfate  325 mg Oral BID    hydrALAZINE  50 mg Oral 3 times per day    lisinopril  20 mg Oral Daily    therapeutic multivitamin-minerals  1 tablet Oral Daily    atorvastatin  20 mg Oral Daily    sodium chloride flush  5-40 mL IntraVENous 2 times per day    enoxaparin  40 mg SubCUTAneous Daily    cefepime  2,000 mg IntraVENous Q12H    insulin glargine  0.15 Units/kg SubCUTAneous Nightly    insulin lispro  0-4 Units SubCUTAneous TID WC    insulin lispro  0-4 Units SubCUTAneous Nightly       Allergies:  Pcn [penicillins]     Social History:      Social History     Socioeconomic History    Marital status:      Spouse name: Not on file    Number of children: Not on file    Years of education: Not on file    Highest education level: Not on file   Occupational History    Not on file   Tobacco Use    Smoking status: Never    Smokeless tobacco: Never    Tobacco comments:     Not needed   Vaping Use    Vaping Use: Never used   Substance and Sexual Activity    Alcohol use: No    Drug use: No    Sexual activity: Yes     Partners: Male   Other Topics Concern    Not on file   Social History Narrative    Not on file     Social Determinants of Health     Financial Resource Strain: Not on file   Food Insecurity: Not on file   Transportation Needs: Not on file   Physical Activity: Not on file   Stress: Not on file   Social Connections: Not on file   Intimate Partner Violence: Not on file   Housing Stability: Not on file       Family History:        Problem Relation Age of Onset    Heart Disease Mother     Stroke Mother     Heart Disease Father     Diabetes Sister     Diabetes Brother     Diabetes Maternal Grandmother        Review of Systems:  A 14 point review of systems was completed. Pertinent positives identified in the HPI, all other review of systems negative. Physical Examination:    BP (!) 151/74   Pulse 81   Temp 98.3 °F (36.8 °C) (Oral)   Resp 18   Ht 5' 3\" (1.6 m)   Wt 127 lb 3.3 oz (57.7 kg)   SpO2 97%   BMI 22.53 kg/m²        Admission Weight: 125 lb (56.7 kg)       General appearance: NAD  Eyes: PERRLA  Neck: no JVD, no lymphadenopathy. Respiratory: effort is unlabored, no crackles, wheezes or rubs. Cardiovascular: regular, no murmur. No carotid bruits. No edema or varicosities. Pulses:    femoral DP PT   RIGHT 2 - -   LEFT 2 - -   GI: abdomen soft, nondistended, no organomegaly. Musculoskeletal: strength and tone normal.  Extremities: Open wound R medial foot/amp site. Foot warm  Neuro/psychiatric: grossly intact. MEDICAL DECISION MAKING/TESTING      Arterial duplex:  personally reviewed. Popliteal stenosis. Angiogram 5/2022:  images reviewed again. Single vessel AT runoff in R Foot. Limited flow into foot.       Labs: CBC:   Recent Labs     09/04/22  1830 09/05/22  0601 09/06/22  0542   WBC 8.6 7.9 8.3   HGB 10.9* 9.8* 9.2*   HCT 33.3* 30.5* 28.4*   MCV 80.5 81.2 80.8    211 194     BMP:   Recent Labs     09/04/22  1830 09/05/22  0601 09/06/22  0542   * 138 133*   K 5.0 4.5 5.2*    104 101   CO2 24 22 22   PHOS  --   --  3.9   BUN 38* 30* 32*   CREATININE 1.2 0.9 1.1   CALCIUM 9.9 10.4 9.0     Cardiac Enzymes:   Recent Labs     09/04/22 1830   TROPONINI <0.01     PT/INR: No results for input(s): PROTIME, INR in the last 72 hours. APTT: No results for input(s): APTT in the last 72 hours. Liver Profile:  Lab Results   Component Value Date/Time    AST 15 09/04/2022 06:30 PM    ALT 10 09/04/2022 06:30 PM    BILITOT 0.4 09/04/2022 06:30 PM    ALKPHOS 88 09/04/2022 06:30 PM     Lab Results   Component Value Date/Time    CHOL 132 05/14/2021 02:55 PM    HDL 54 05/14/2021 02:55 PM    HDL 52 03/05/2012 02:51 PM    TRIG 68 05/14/2021 02:55 PM     TSH:  No results found for: TSH  UA:   Lab Results   Component Value Date/Time    NITRITE neg 05/02/2019 03:13 PM    COLORU Yellow 06/07/2022 01:55 PM    PHUR 5.5 06/07/2022 01:55 PM    WBCUA 0-2 06/07/2022 01:55 PM    RBCUA 3-4 06/07/2022 01:55 PM    BACTERIA Rare 06/07/2022 01:55 PM    CLARITYU Clear 06/07/2022 01:55 PM    SPECGRAV <=1.005 06/07/2022 01:55 PM    LEUKOCYTESUR Negative 06/07/2022 01:55 PM    UROBILINOGEN 0.2 06/07/2022 01:55 PM    BILIRUBINUR Negative 06/07/2022 01:55 PM    BILIRUBINUR neg 05/02/2019 03:13 PM    BLOODU LARGE 06/07/2022 01:55 PM    GLUCOSEU Negative 06/07/2022 01:55 PM    AMORPHOUS Rare 06/07/2022 01:55 PM           Assessment:  Non healing amp site R Foot. PVD- s/p SFA/AT intervention 5/2022, now with recurrent/residual popliteal stenosis. Plan: Angiogram with Popliteal intervention tomorrow.   I have discussed all risks (including but not limited to bleeding, thrombosis, contrast allergy, renal failure, and early and late failure of intervention), benefits and alternatives of catheter-based angiography. Patient freely consents and is eager to proceed. All questions and expectations addressed.

## 2022-09-06 NOTE — PROGRESS NOTES
Hospitalist Progress Note      PCP: Lester Mireles APRN - CNP    Date of Admission: 9/4/2022    Chief Complaint: L foot wound. Subjective: no new c/o. Medications:  Reviewed    Infusion Medications    sodium chloride      dextrose       Scheduled Medications    vancomycin  1,000 mg IntraVENous Q24H    amLODIPine  10 mg Oral Daily    aspirin  81 mg Oral Daily    digoxin  125 mcg Oral Daily    ferrous sulfate  325 mg Oral BID    hydrALAZINE  50 mg Oral 3 times per day    lisinopril  20 mg Oral Daily    therapeutic multivitamin-minerals  1 tablet Oral Daily    atorvastatin  20 mg Oral Daily    sodium chloride flush  5-40 mL IntraVENous 2 times per day    enoxaparin  40 mg SubCUTAneous Daily    cefepime  2,000 mg IntraVENous Q12H    insulin glargine  0.15 Units/kg SubCUTAneous Nightly    insulin lispro  0-4 Units SubCUTAneous TID WC    insulin lispro  0-4 Units SubCUTAneous Nightly     PRN Meds: sodium chloride flush, sodium chloride, ondansetron **OR** ondansetron, polyethylene glycol, acetaminophen **OR** acetaminophen, glucose, dextrose bolus **OR** dextrose bolus, glucagon (rDNA), dextrose      Intake/Output Summary (Last 24 hours) at 9/6/2022 0908  Last data filed at 9/6/2022 0903  Gross per 24 hour   Intake 1500 ml   Output 450 ml   Net 1050 ml         Physical Exam Performed:    BP (!) 151/74   Pulse 81   Temp 98.3 °F (36.8 °C) (Oral)   Resp 18   Ht 5' 3\" (1.6 m)   Wt 127 lb 3.3 oz (57.7 kg)   SpO2 97%   BMI 22.53 kg/m²     General appearance: No apparent distress, appears stated age and cooperative. HEENT: Pupils equal, round, and reactive to light. Conjunctivae/corneas clear. Neck: Supple, with full range of motion. No jugular venous distention. Trachea midline. Respiratory:  Normal respiratory effort. Clear to auscultation, bilaterally without Rales/Wheezes/Rhonchi. Cardiovascular: Regular rate and rhythm with normal S1/S2 without murmurs, rubs or gallops.   Abdomen: Soft, non-tender, non-distended with normal bowel sounds. Musculoskeletal: No clubbing, cyanosis or edema bilaterally. Full range of motion without deformity. Skin: Skin color, texture, turgor normal.  No rashes or lesions. Neurologic:  Neurovascularly intact without any focal sensory/motor deficits. Cranial nerves: II-XII intact, grossly non-focal.  Psychiatric: Alert and oriented, thought content appropriate, normal insight  Capillary Refill: Brisk,< 3 seconds   Peripheral Pulses: +2 palpable, equal bilaterally       Labs:   Recent Labs     09/04/22 1830 09/05/22  0601 09/06/22  0542   WBC 8.6 7.9 8.3   HGB 10.9* 9.8* 9.2*   HCT 33.3* 30.5* 28.4*    211 194       Recent Labs     09/04/22 1830 09/05/22  0601 09/06/22  0542   * 138 133*   K 5.0 4.5 5.2*    104 101   CO2 24 22 22   BUN 38* 30* 32*   CREATININE 1.2 0.9 1.1   CALCIUM 9.9 10.4 9.0   PHOS  --   --  3.9       Recent Labs     09/04/22  1830   AST 15   ALT 10   BILITOT 0.4   ALKPHOS 88       No results for input(s): INR in the last 72 hours. Recent Labs     09/04/22 1830   TROPONINI <0.01         Urinalysis:      Lab Results   Component Value Date/Time    NITRU Negative 06/07/2022 01:55 PM    [address] 0-2 06/07/2022 01:55 PM    BACTERIA Rare 06/07/2022 01:55 PM    RBCUA 3-4 06/07/2022 01:55 PM    BLOODU LARGE 06/07/2022 01:55 PM    SPECGRAV <=1.005 06/07/2022 01:55 PM    GLUCOSEU Negative 06/07/2022 01:55 PM       Consults:    IP CONSULT TO PODIATRY  IP CONSULT TO HOSPITALIST  IP CONSULT TO PODIATRY  IP CONSULT TO PHARMACY  IP CONSULT TO SOCIAL WORK      Assessment/Plan:    Active Hospital Problems    Diagnosis     Dirty living conditions [Z59.1]      Priority: Medium    Chronic anemia [D64.9]      Priority: Medium    Primary hypertension [I10]      Priority: Medium    Acute osteomyelitis of right foot ([address] [M86.171]      Priority: Medium    Diabetes mellitus ([facility] [E11.9]        Right Foot OM - Dr. Hitesh Welch consulted by ED.   Started on empiric Cefepime/Vancocin Sunday 4 Sept - continued pending Podiatry recs and possible wound culture results. MRI ordered and pending for possible amputation. DM2 - controlled on home oral antiGlycemics - held. Started on Low dose Lantus and follow FSBS/SSI low regimen. Last HbA1c 5.4% dated June 2022. Anticipate resuming/continuing home regimen at discharge. HTN/CAD - w/ known CAD but no evidence of active signs/sxs of ischemia/failure. Currently controlled on home meds w/ vitals reviewed and documented as above. Afib - chronic paroxysmal of unspecified and clinically unable to determine etiology. Normally rate controlled on Digoxin - continued. NOT Anticoagulated at baseline . Monitored on tele. Anemia - etiology clinically unable to determine, w/out evidence of active bleeding/hemolysis. Stable and asymptomatic w/out indication for transfusion. Follow serial labs. Reviewed and documented as above. Unsatisfactory Living Conditions (Z59.1)  -  consult placed       DVT Prophylaxis: LMWH     Recent Labs     09/04/22  1830 09/05/22  0601 09/06/22  0542    211 194       Diet: ADULT DIET; Regular  Code Status: Full Code      PT/OT Eval Status: not yet ordered.      Dispo - Possibly Thurs/Friday 8/9 Sept pending clinical course, subspecialty recs and eventual PT/OT eval/recs    Sayra Fuller MD

## 2022-09-06 NOTE — CARE COORDINATION
Spoke with Amilcar Saucedo at [facility] still in review. Aware waiting on plan of care.  Bob Wick RN

## 2022-09-06 NOTE — PROGRESS NOTES
Podiatry Progress Note  Subjective:   Talked with patient earlier today. She is now open to a transmetatarsal amputation. No other pedal complaints. Objective:   Vitals:  BP (!) 178/72   Pulse 75   Temp 97.9 °F (36.6 °C) (Oral)   Resp 20   Ht 5' 3\" (1.6 m)   Wt 127 lb 3.3 oz (57.7 kg)   SpO2 97%   BMI 22.53 kg/m²   Integument:  Ulcer to bone, right hallux amputation site. There is slight surrounding erythema. No active drainage. No purulence. Neurologic:  Protective sensation is grossly diminished to light touch at the level of the toes, bilateral.  Vascular:  DP and PT pulses are non-palpable, bilateral.  CFT is less than five seconds to all toes. Mild edema at the right lower extremity. Musculoskeletal:  The right first and fourth toe have been amputated.          Labs:   CBC with Differential:    Lab Results   Component Value Date/Time    WBC 8.3 09/06/2022 05:42 AM    RBC 3.52 09/06/2022 05:42 AM    HGB 9.2 09/06/2022 05:42 AM    HCT 28.4 09/06/2022 05:42 AM     09/06/2022 05:42 AM    MCV 80.8 09/06/2022 05:42 AM    MCH 26.1 09/06/2022 05:42 AM    MCHC 32.3 09/06/2022 05:42 AM    RDW 16.2 09/06/2022 05:42 AM    SEGSPCT 71.4 05/23/2013 01:45 PM    LYMPHOPCT 15.3 09/05/2022 06:01 AM    MONOPCT 7.6 09/05/2022 06:01 AM    EOSPCT 2.0 04/26/2010 02:52 PM    BASOPCT 0.9 09/05/2022 06:01 AM    MONOSABS 0.6 09/05/2022 06:01 AM    LYMPHSABS 1.2 09/05/2022 06:01 AM    EOSABS 0.2 09/05/2022 06:01 AM    BASOSABS 0.1 09/05/2022 06:01 AM    DIFFTYPE Auto-K 05/23/2013 01:45 PM     CMP:    Lab Results   Component Value Date/Time     09/06/2022 05:42 AM    K 5.2 09/06/2022 05:42 AM    K 4.5 09/05/2022 06:01 AM     09/06/2022 05:42 AM    CO2 22 09/06/2022 05:42 AM    BUN 32 09/06/2022 05:42 AM    CREATININE 1.1 09/06/2022 05:42 AM    GFRAA 58 09/06/2022 05:42 AM    GFRAA >60 05/23/2013 01:45 PM    AGRATIO 1.2 09/04/2022 06:30 PM    LABGLOM 48 09/06/2022 05:42 AM    GLUCOSE 86 09/06/2022

## 2022-09-06 NOTE — PLAN OF CARE
Talked with patient this morning. She is now open to trying a transmetatarsal amputation on the right. I will ask Vascular to see her before surgery to see if they need to do anything else to maximize her blood flow. I appreciate their recs.     Ashlie Higgins, [facility], [address]  Office: [phone]  Mobile: [phone]

## 2022-09-06 NOTE — CONSULTS
Consult placed    Who:Dr. John Almanzar  Date:9/6/2022,  Time:10:08 AM        Electronically signed by Alejo Weber on 9/6/2022 at 10:08 AM

## 2022-09-07 ENCOUNTER — APPOINTMENT (OUTPATIENT)
Dept: CARDIAC CATH/INVASIVE PROCEDURES | Age: 77
DRG: 239 | End: 2022-09-07
Payer: MEDICARE

## 2022-09-07 LAB
ALBUMIN SERPL-MCNC: 3.5 G/DL (ref 3.4–5)
ANION GAP SERPL CALCULATED.3IONS-SCNC: 9 MMOL/L (ref 3–16)
BUN BLDV-MCNC: 30 MG/DL (ref 7–20)
CALCIUM SERPL-MCNC: 9.7 MG/DL (ref 8.3–10.6)
CHLORIDE BLD-SCNC: 102 MMOL/L (ref 99–110)
CO2: 21 MMOL/L (ref 21–32)
CREAT SERPL-MCNC: 1.1 MG/DL (ref 0.6–1.2)
GFR AFRICAN AMERICAN: 58
GFR NON-AFRICAN AMERICAN: 48
GLUCOSE BLD-MCNC: 101 MG/DL (ref 70–99)
GLUCOSE BLD-MCNC: 113 MG/DL (ref 70–99)
GLUCOSE BLD-MCNC: 169 MG/DL (ref 70–99)
GLUCOSE BLD-MCNC: 86 MG/DL (ref 70–99)
GLUCOSE BLD-MCNC: 91 MG/DL (ref 70–99)
HCT VFR BLD CALC: 26.2 % (ref 36–48)
HEMOGLOBIN: 9 G/DL (ref 12–16)
MCH RBC QN AUTO: 27.4 PG (ref 26–34)
MCHC RBC AUTO-ENTMCNC: 34.4 G/DL (ref 31–36)
MCV RBC AUTO: 79.7 FL (ref 80–100)
PDW BLD-RTO: 16.3 % (ref 12.4–15.4)
PERFORMED ON: ABNORMAL
PERFORMED ON: ABNORMAL
PERFORMED ON: NORMAL
PERFORMED ON: NORMAL
PHOSPHORUS: 3.8 MG/DL (ref 2.5–4.9)
PLATELET # BLD: 192 K/UL (ref 135–450)
PMV BLD AUTO: 7.4 FL (ref 5–10.5)
POTASSIUM SERPL-SCNC: 4.9 MMOL/L (ref 3.5–5.1)
RBC # BLD: 3.29 M/UL (ref 4–5.2)
SODIUM BLD-SCNC: 132 MMOL/L (ref 136–145)
VANCOMYCIN TROUGH: 18.6 UG/ML (ref 10–20)
WBC # BLD: 7.5 K/UL (ref 4–11)

## 2022-09-07 PROCEDURE — 6370000000 HC RX 637 (ALT 250 FOR IP): Performed by: NURSE PRACTITIONER

## 2022-09-07 PROCEDURE — 36415 COLL VENOUS BLD VENIPUNCTURE: CPT

## 2022-09-07 PROCEDURE — C1725 CATH, TRANSLUMIN NON-LASER: HCPCS

## 2022-09-07 PROCEDURE — 80069 RENAL FUNCTION PANEL: CPT

## 2022-09-07 PROCEDURE — C1894 INTRO/SHEATH, NON-LASER: HCPCS

## 2022-09-07 PROCEDURE — 99152 MOD SED SAME PHYS/QHP 5/>YRS: CPT | Performed by: SURGERY

## 2022-09-07 PROCEDURE — 6360000004 HC RX CONTRAST MEDICATION

## 2022-09-07 PROCEDURE — 2500000003 HC RX 250 WO HCPCS

## 2022-09-07 PROCEDURE — 80202 ASSAY OF VANCOMYCIN: CPT

## 2022-09-07 PROCEDURE — 6360000002 HC RX W HCPCS

## 2022-09-07 PROCEDURE — 85027 COMPLETE CBC AUTOMATED: CPT

## 2022-09-07 PROCEDURE — 76937 US GUIDE VASCULAR ACCESS: CPT | Performed by: SURGERY

## 2022-09-07 PROCEDURE — 2060000000 HC ICU INTERMEDIATE R&B

## 2022-09-07 PROCEDURE — 6370000000 HC RX 637 (ALT 250 FOR IP)

## 2022-09-07 PROCEDURE — C1769 GUIDE WIRE: HCPCS

## 2022-09-07 PROCEDURE — 04FM3ZZ FRAGMENTATION OF RIGHT POPLITEAL ARTERY, PERCUTANEOUS APPROACH: ICD-10-PCS | Performed by: SURGERY

## 2022-09-07 PROCEDURE — 2580000003 HC RX 258: Performed by: NURSE PRACTITIONER

## 2022-09-07 PROCEDURE — 75710 ARTERY X-RAYS ARM/LEG: CPT | Performed by: SURGERY

## 2022-09-07 PROCEDURE — 6360000002 HC RX W HCPCS: Performed by: NURSE PRACTITIONER

## 2022-09-07 PROCEDURE — C1760 CLOSURE DEV, VASC: HCPCS

## 2022-09-07 PROCEDURE — C1887 CATHETER, GUIDING: HCPCS

## 2022-09-07 PROCEDURE — 2709999900 HC NON-CHARGEABLE SUPPLY

## 2022-09-07 PROCEDURE — 75710 ARTERY X-RAYS ARM/LEG: CPT

## 2022-09-07 PROCEDURE — 37224 PR REVSC OPN/PRG FEM/POP W/ANGIOPLASTY UNI: CPT | Performed by: SURGERY

## 2022-09-07 PROCEDURE — C9764 REVASC INTRAVASC LITHOTRIPSY: HCPCS

## 2022-09-07 PROCEDURE — 047M3ZZ DILATION OF RIGHT POPLITEAL ARTERY, PERCUTANEOUS APPROACH: ICD-10-PCS | Performed by: SURGERY

## 2022-09-07 RX ORDER — CLOPIDOGREL BISULFATE 75 MG/1
150 TABLET ORAL ONCE
Status: COMPLETED | OUTPATIENT
Start: 2022-09-07 | End: 2022-09-07

## 2022-09-07 RX ORDER — FENTANYL CITRATE 50 UG/ML
INJECTION, SOLUTION INTRAMUSCULAR; INTRAVENOUS
Status: COMPLETED | OUTPATIENT
Start: 2022-09-07 | End: 2022-09-07

## 2022-09-07 RX ORDER — MIDAZOLAM HYDROCHLORIDE 1 MG/ML
INJECTION INTRAMUSCULAR; INTRAVENOUS
Status: COMPLETED | OUTPATIENT
Start: 2022-09-07 | End: 2022-09-07

## 2022-09-07 RX ORDER — HEPARIN SODIUM 1000 [USP'U]/ML
INJECTION, SOLUTION INTRAVENOUS; SUBCUTANEOUS
Status: COMPLETED | OUTPATIENT
Start: 2022-09-07 | End: 2022-09-07

## 2022-09-07 RX ORDER — CLOPIDOGREL BISULFATE 75 MG/1
75 TABLET ORAL DAILY
Status: DISCONTINUED | OUTPATIENT
Start: 2022-09-08 | End: 2022-09-14 | Stop reason: HOSPADM

## 2022-09-07 RX ADMIN — FENTANYL CITRATE 25 MCG: 50 INJECTION, SOLUTION INTRAMUSCULAR; INTRAVENOUS at 13:26

## 2022-09-07 RX ADMIN — CEFEPIME 2000 MG: 2 INJECTION, POWDER, FOR SOLUTION INTRAVENOUS at 20:30

## 2022-09-07 RX ADMIN — FERROUS SULFATE TAB 325 MG (65 MG ELEMENTAL FE) 325 MG: 325 (65 FE) TAB at 20:23

## 2022-09-07 RX ADMIN — VANCOMYCIN HYDROCHLORIDE 1000 MG: 1 INJECTION, POWDER, LYOPHILIZED, FOR SOLUTION INTRAVENOUS at 18:25

## 2022-09-07 RX ADMIN — FENTANYL CITRATE 25 MCG: 50 INJECTION, SOLUTION INTRAMUSCULAR; INTRAVENOUS at 13:12

## 2022-09-07 RX ADMIN — CLOPIDOGREL BISULFATE 150 MG: 75 TABLET ORAL at 13:52

## 2022-09-07 RX ADMIN — MIDAZOLAM HYDROCHLORIDE 1 MG: 1 INJECTION INTRAMUSCULAR; INTRAVENOUS at 13:14

## 2022-09-07 RX ADMIN — HYDRALAZINE HYDROCHLORIDE 50 MG: 50 TABLET, FILM COATED ORAL at 17:32

## 2022-09-07 RX ADMIN — CEFEPIME 2000 MG: 2 INJECTION, POWDER, FOR SOLUTION INTRAVENOUS at 07:26

## 2022-09-07 RX ADMIN — FENTANYL CITRATE 25 MCG: 50 INJECTION, SOLUTION INTRAMUSCULAR; INTRAVENOUS at 13:14

## 2022-09-07 RX ADMIN — MIDAZOLAM HYDROCHLORIDE 1 MG: 1 INJECTION INTRAMUSCULAR; INTRAVENOUS at 13:13

## 2022-09-07 RX ADMIN — HYDRALAZINE HYDROCHLORIDE 50 MG: 50 TABLET, FILM COATED ORAL at 07:45

## 2022-09-07 RX ADMIN — FENTANYL CITRATE 25 MCG: 50 INJECTION, SOLUTION INTRAMUSCULAR; INTRAVENOUS at 13:28

## 2022-09-07 RX ADMIN — HEPARIN SODIUM 5000 UNITS: 1000 INJECTION, SOLUTION INTRAVENOUS; SUBCUTANEOUS at 13:17

## 2022-09-07 RX ADMIN — LISINOPRIL 20 MG: 20 TABLET ORAL at 09:40

## 2022-09-07 RX ADMIN — SODIUM CHLORIDE, PRESERVATIVE FREE 10 ML: 5 INJECTION INTRAVENOUS at 09:30

## 2022-09-07 RX ADMIN — ASPIRIN 81 MG: 81 TABLET, COATED ORAL at 09:40

## 2022-09-07 RX ADMIN — INSULIN GLARGINE 9 UNITS: 100 INJECTION, SOLUTION SUBCUTANEOUS at 20:30

## 2022-09-07 ASSESSMENT — PAIN SCALES - GENERAL
PAINLEVEL_OUTOF10: 0

## 2022-09-07 NOTE — PROGRESS NOTES
Hospitalist Progress Note      PCP: Cabrera Arizmendi, APRN - CNP    Date of Admission: 9/4/2022    Chief Complaint: L foot wound. Subjective: no new c/o. Medications:  Reviewed    Infusion Medications    sodium chloride      dextrose       Scheduled Medications    vancomycin  1,000 mg IntraVENous Q24H    amLODIPine  10 mg Oral Daily    aspirin  81 mg Oral Daily    digoxin  125 mcg Oral Daily    ferrous sulfate  325 mg Oral BID    hydrALAZINE  50 mg Oral 3 times per day    lisinopril  20 mg Oral Daily    therapeutic multivitamin-minerals  1 tablet Oral Daily    atorvastatin  20 mg Oral Daily    sodium chloride flush  5-40 mL IntraVENous 2 times per day    enoxaparin  40 mg SubCUTAneous Daily    cefepime  2,000 mg IntraVENous Q12H    insulin glargine  0.15 Units/kg SubCUTAneous Nightly    insulin lispro  0-4 Units SubCUTAneous TID WC    insulin lispro  0-4 Units SubCUTAneous Nightly     PRN Meds: sodium chloride flush, sodium chloride, ondansetron **OR** ondansetron, polyethylene glycol, acetaminophen **OR** acetaminophen, glucose, dextrose bolus **OR** dextrose bolus, glucagon (rDNA), dextrose      Intake/Output Summary (Last 24 hours) at 9/7/2022 0941  Last data filed at 9/6/2022 1420  Gross per 24 hour   Intake 240 ml   Output --   Net 240 ml         Physical Exam Performed:    /69   Pulse 77   Temp 97.7 °F (36.5 °C) (Oral)   Resp 16   Ht 5' 3\" (1.6 m)   Wt 127 lb 3.3 oz (57.7 kg)   SpO2 98%   BMI 22.53 kg/m²     General appearance: No apparent distress, appears stated age and cooperative. HEENT: Pupils equal, round, and reactive to light. Conjunctivae/corneas clear. Neck: Supple, with full range of motion. No jugular venous distention. Trachea midline. Respiratory:  Normal respiratory effort. Clear to auscultation, bilaterally without Rales/Wheezes/Rhonchi. Cardiovascular: Regular rate and rhythm with normal S1/S2 without murmurs, rubs or gallops.   Abdomen: Soft, non-tender, by ED. Started on empiric Cefepime/Vancocin Sunday 4 Sept - continued pending Podiatry recs and possible wound culture results - demonstrating S Marcenscens and M Morganii sensitive to current Cefepime. MRI c/w osteomyelitis w/ plans for TMA Friday 9 Sept. Anticipate d/c of Vanco post-amputation. DM2 - controlled on home oral antiGlycemics - held. Started on Low dose Lantus and follow FSBS/SSI low regimen. Last HbA1c 5.4% dated June 2022. Anticipate resuming/continuing home regimen at discharge. HTN/CAD - w/ known CAD but no evidence of active signs/sxs of ischemia/failure. Currently controlled on home meds w/ vitals reviewed and documented as above. Afib - chronic paroxysmal of unspecified and clinically unable to determine etiology. Normally rate controlled on Digoxin - continued. NOT Anticoagulated at baseline . Monitored on tele. Anemia - etiology clinically unable to determine, w/out evidence of active bleeding/hemolysis. Stable and asymptomatic w/out indication for transfusion. Follow serial labs. Reviewed and documented as above. Unsatisfactory Living Conditions (Z59.1)  -  consult placed       DVT Prophylaxis: LMWH     Recent Labs     09/05/22  0601 09/06/22  0542 09/07/22  0601    194 192       Diet: Diet NPO  Code Status: Full Code      PT/OT Eval Status: not yet ordered. Dispo - Patient is likely to remain in-house at least for the foreseeable future  pending clinical course, subspecialty recs and eventual PT/OT eval/recs w/ possible placement decision .        Fabiola Kim MD

## 2022-09-07 NOTE — PROGRESS NOTES
Pt alert and orientated. Call light within reach. Pt brief changed and turned and repositioned. Chino Burnett RN

## 2022-09-07 NOTE — PROGRESS NOTES
Podiatry Progress Note  Subjective:   Patient seen at bedside this afternoon with family in the room. She had an angioplasty earlier today. No new pedal complaints. She denied n/v/f/c and SOB. Objective:   Vitals:  BP (!) 161/73   Pulse 74   Temp 98 °F (36.7 °C) (Oral)   Resp 18   Ht 5' 3\" (1.6 m)   Wt 127 lb 3.3 oz (57.7 kg)   SpO2 96%   BMI 22.53 kg/m²   Integument:  Ulcer to bone, right hallux amputation site. There is slight surrounding erythema. No active drainage. No purulence. Neurologic:  Protective sensation is grossly diminished to light touch at the level of the toes, bilateral.  Vascular:  DP and PT pulses are non-palpable, bilateral.  CFT is less than five seconds to all toes. Mild edema at the right lower extremity. Musculoskeletal:  The right first and fourth toe have been amputated.          Labs:   CBC with Differential:    Lab Results   Component Value Date/Time    WBC 7.5 09/07/2022 06:01 AM    RBC 3.29 09/07/2022 06:01 AM    HGB 9.0 09/07/2022 06:01 AM    HCT 26.2 09/07/2022 06:01 AM     09/07/2022 06:01 AM    MCV 79.7 09/07/2022 06:01 AM    MCH 27.4 09/07/2022 06:01 AM    MCHC 34.4 09/07/2022 06:01 AM    RDW 16.3 09/07/2022 06:01 AM    SEGSPCT 71.4 05/23/2013 01:45 PM    LYMPHOPCT 15.3 09/05/2022 06:01 AM    MONOPCT 7.6 09/05/2022 06:01 AM    EOSPCT 2.0 04/26/2010 02:52 PM    BASOPCT 0.9 09/05/2022 06:01 AM    MONOSABS 0.6 09/05/2022 06:01 AM    LYMPHSABS 1.2 09/05/2022 06:01 AM    EOSABS 0.2 09/05/2022 06:01 AM    BASOSABS 0.1 09/05/2022 06:01 AM    DIFFTYPE Auto-K 05/23/2013 01:45 PM     CMP:    Lab Results   Component Value Date/Time     09/07/2022 06:01 AM    K 4.9 09/07/2022 06:01 AM    K 4.5 09/05/2022 06:01 AM     09/07/2022 06:01 AM    CO2 21 09/07/2022 06:01 AM    BUN 30 09/07/2022 06:01 AM    CREATININE 1.1 09/07/2022 06:01 AM    GFRAA 58 09/07/2022 06:01 AM    GFRAA >60 05/23/2013 01:45 PM    AGRATIO 1.2 09/04/2022 06:30 PM    LABGLOM 48 09/07/2022 06:01 AM    GLUCOSE 101 09/07/2022 06:01 AM    PROT 8.9 09/04/2022 06:30 PM    PROT 7.3 09/10/2012 02:55 PM    LABALBU 3.5 09/07/2022 06:01 AM    CALCIUM 9.7 09/07/2022 06:01 AM    BILITOT 0.4 09/04/2022 06:30 PM    ALKPHOS 88 09/04/2022 06:30 PM    AST 15 09/04/2022 06:30 PM    ALT 10 09/04/2022 06:30 PM     PT/INR:    Lab Results   Component Value Date/Time    PROTIME 15.9 06/08/2022 06:05 AM    PROTIME 19.8 01/02/2010 03:15 PM    INR 1.29 06/08/2022 06:05 AM     Last 3 Troponin:    Lab Results   Component Value Date/Time    TROPONINI <0.01 09/04/2022 06:30 PM     HgBA1c:    Lab Results   Component Value Date/Time    LABA1C 5.7 09/05/2022 06:01 AM       Imaging: Three Views, Right Foot 9/4/22:  Cortical irregularity of the 1st metatarsal head neck region is seen raising   the question of osteomyelitis. Post amputation changes of the 1st and 4th digit. MRI, Right Foot 9/7/22:  1. Findings consistent with acute osteomyelitis of the 1st metatarsal head,   neck and distal shaft. Acute osteomyelitis of the medial hallux sesamoid. Less pronounced osteomyelitis of the lateral hallux sesamoid is suspected. 2. Large soft tissue wound overlying the distal aspect of the 1st metatarsal.   Small amount of fluid emanating from the wound extending along the plantar   surface of the 1st metatarsal head. 3. Mild edema-like marrow signal abnormality in the 2nd metatarsal head. Reactive edema is favored over early osteomyelitis. Vascular:   Arterial Duplex 9/6/2022:  Conclusions        Summary        >50% right popliteal artery stenosis. Dense calcific disease in the tibial vessels- stenosis cannot be ruled out.      Plan:   The patient is a 68year old woman with:  1) Osteomyelitis, right foot  - Antibiotics per Primary Team  - MRI reviewed  - Transmetatarsal amputation schedule for Friday PM  - NWB on the right  2) DM2 with neuropathy  - A1C 6/8/22: 5.4  3) DM2 with PAD  - Laser Skin Perfusion 8/2/22 - poor probability of wound healing in right foot  - S/P RLE angiogram with angioplasty/lithotripsy 9/7/22  4) Afcruz Cuevas DPM  Office: [phone]  Cell: [phone]

## 2022-09-07 NOTE — PLAN OF CARE
I have called the OR. The patient's transmetatarsal amputation will be on Friday 9/9/22 in the afternoon.     Kerry Pearson, [address], [address], Sheila Vasques  Office: [phone]  Mobile: [phone]

## 2022-09-08 ENCOUNTER — ANESTHESIA EVENT (OUTPATIENT)
Dept: OPERATING ROOM | Age: 77
DRG: 239 | End: 2022-09-08
Payer: MEDICARE

## 2022-09-08 LAB
ALBUMIN SERPL-MCNC: 3.4 G/DL (ref 3.4–5)
ANION GAP SERPL CALCULATED.3IONS-SCNC: 11 MMOL/L (ref 3–16)
BLOOD CULTURE, ROUTINE: NORMAL
BUN BLDV-MCNC: 23 MG/DL (ref 7–20)
CALCIUM SERPL-MCNC: 9 MG/DL (ref 8.3–10.6)
CHLORIDE BLD-SCNC: 103 MMOL/L (ref 99–110)
CO2: 20 MMOL/L (ref 21–32)
CREAT SERPL-MCNC: 0.9 MG/DL (ref 0.6–1.2)
CULTURE, BLOOD 2: NORMAL
GFR AFRICAN AMERICAN: >60
GFR NON-AFRICAN AMERICAN: >60
GLUCOSE BLD-MCNC: 126 MG/DL (ref 70–99)
GLUCOSE BLD-MCNC: 138 MG/DL (ref 70–99)
GLUCOSE BLD-MCNC: 145 MG/DL (ref 70–99)
GLUCOSE BLD-MCNC: 179 MG/DL (ref 70–99)
GLUCOSE BLD-MCNC: 99 MG/DL (ref 70–99)
HCT VFR BLD CALC: 27.3 % (ref 36–48)
HEMOGLOBIN: 9.2 G/DL (ref 12–16)
MCH RBC QN AUTO: 27 PG (ref 26–34)
MCHC RBC AUTO-ENTMCNC: 33.6 G/DL (ref 31–36)
MCV RBC AUTO: 80.3 FL (ref 80–100)
PDW BLD-RTO: 16.1 % (ref 12.4–15.4)
PERFORMED ON: ABNORMAL
PHOSPHORUS: 3.4 MG/DL (ref 2.5–4.9)
PLATELET # BLD: 199 K/UL (ref 135–450)
PMV BLD AUTO: 7.4 FL (ref 5–10.5)
POTASSIUM SERPL-SCNC: 4.4 MMOL/L (ref 3.5–5.1)
RBC # BLD: 3.4 M/UL (ref 4–5.2)
SODIUM BLD-SCNC: 134 MMOL/L (ref 136–145)
WBC # BLD: 6.7 K/UL (ref 4–11)

## 2022-09-08 PROCEDURE — 6370000000 HC RX 637 (ALT 250 FOR IP): Performed by: INTERNAL MEDICINE

## 2022-09-08 PROCEDURE — 6370000000 HC RX 637 (ALT 250 FOR IP): Performed by: SURGERY

## 2022-09-08 PROCEDURE — 36415 COLL VENOUS BLD VENIPUNCTURE: CPT

## 2022-09-08 PROCEDURE — 2580000003 HC RX 258: Performed by: NURSE PRACTITIONER

## 2022-09-08 PROCEDURE — 2060000000 HC ICU INTERMEDIATE R&B

## 2022-09-08 PROCEDURE — 6360000002 HC RX W HCPCS: Performed by: NURSE PRACTITIONER

## 2022-09-08 PROCEDURE — 85027 COMPLETE CBC AUTOMATED: CPT

## 2022-09-08 PROCEDURE — 80069 RENAL FUNCTION PANEL: CPT

## 2022-09-08 PROCEDURE — 6370000000 HC RX 637 (ALT 250 FOR IP): Performed by: NURSE PRACTITIONER

## 2022-09-08 RX ORDER — SODIUM PHOSPHATE,MONO-DIBASIC 19G-7G/118
1 ENEMA (ML) RECTAL ONCE
Status: COMPLETED | OUTPATIENT
Start: 2022-09-08 | End: 2022-09-08

## 2022-09-08 RX ADMIN — FERROUS SULFATE TAB 325 MG (65 MG ELEMENTAL FE) 325 MG: 325 (65 FE) TAB at 20:38

## 2022-09-08 RX ADMIN — FERROUS SULFATE TAB 325 MG (65 MG ELEMENTAL FE) 325 MG: 325 (65 FE) TAB at 08:21

## 2022-09-08 RX ADMIN — Medication 1 TABLET: at 08:21

## 2022-09-08 RX ADMIN — CEFEPIME 2000 MG: 2 INJECTION, POWDER, FOR SOLUTION INTRAVENOUS at 20:44

## 2022-09-08 RX ADMIN — INSULIN GLARGINE 9 UNITS: 100 INJECTION, SOLUTION SUBCUTANEOUS at 20:39

## 2022-09-08 RX ADMIN — HYDRALAZINE HYDROCHLORIDE 50 MG: 50 TABLET, FILM COATED ORAL at 14:37

## 2022-09-08 RX ADMIN — HYDRALAZINE HYDROCHLORIDE 50 MG: 50 TABLET, FILM COATED ORAL at 20:38

## 2022-09-08 RX ADMIN — LISINOPRIL 20 MG: 20 TABLET ORAL at 08:21

## 2022-09-08 RX ADMIN — CEFEPIME 2000 MG: 2 INJECTION, POWDER, FOR SOLUTION INTRAVENOUS at 08:21

## 2022-09-08 RX ADMIN — CLOPIDOGREL BISULFATE 75 MG: 75 TABLET ORAL at 08:21

## 2022-09-08 RX ADMIN — SODIUM PHOSPHATE 1 ENEMA: 7; 19 ENEMA RECTAL at 11:45

## 2022-09-08 RX ADMIN — HYDRALAZINE HYDROCHLORIDE 50 MG: 50 TABLET, FILM COATED ORAL at 01:18

## 2022-09-08 RX ADMIN — VANCOMYCIN HYDROCHLORIDE 1000 MG: 1 INJECTION, POWDER, LYOPHILIZED, FOR SOLUTION INTRAVENOUS at 14:48

## 2022-09-08 RX ADMIN — DIGOXIN 125 MCG: 125 TABLET ORAL at 08:21

## 2022-09-08 RX ADMIN — BISACODYL 10 MG: 5 TABLET, COATED ORAL at 08:21

## 2022-09-08 RX ADMIN — SODIUM CHLORIDE, PRESERVATIVE FREE 10 ML: 5 INJECTION INTRAVENOUS at 08:23

## 2022-09-08 RX ADMIN — ENOXAPARIN SODIUM 40 MG: 100 INJECTION SUBCUTANEOUS at 08:21

## 2022-09-08 RX ADMIN — POLYETHYLENE GLYCOL 3350 17 G: 17 POWDER, FOR SOLUTION ORAL at 08:21

## 2022-09-08 RX ADMIN — ASPIRIN 81 MG: 81 TABLET, COATED ORAL at 08:21

## 2022-09-08 RX ADMIN — AMLODIPINE BESYLATE 10 MG: 5 TABLET ORAL at 08:21

## 2022-09-08 RX ADMIN — ATORVASTATIN CALCIUM 20 MG: 10 TABLET, FILM COATED ORAL at 08:21

## 2022-09-08 RX ADMIN — HYDRALAZINE HYDROCHLORIDE 50 MG: 50 TABLET, FILM COATED ORAL at 10:28

## 2022-09-08 ASSESSMENT — PAIN SCALES - GENERAL: PAINLEVEL_OUTOF10: 0

## 2022-09-08 ASSESSMENT — PAIN DESCRIPTION - LOCATION: LOCATION: ABDOMEN

## 2022-09-08 NOTE — PROGRESS NOTES
Vascular    S/P RLE angiogram and intervention  L groin access site without bleed or hematoma  TMA planned for Friday- blood flow is now maximized. Call if further input needed.

## 2022-09-08 NOTE — PLAN OF CARE
Problem: Safety - Adult  Goal: Free from fall injury  Outcome: Progressing     Problem: ABCDS Injury Assessment  Goal: Absence of physical injury  Outcome: Progressing     Problem: Skin/Tissue Integrity  Goal: Absence of new skin breakdown  Description: 1. Monitor for areas of redness and/or skin breakdown  2. Assess vascular access sites hourly  3. Every 4-6 hours minimum:  Change oxygen saturation probe site  4. Every 4-6 hours:  If on nasal continuous positive airway pressure, respiratory therapy assess nares and determine need for appliance change or resting period.   Outcome: Progressing     Problem: Chronic Conditions and Co-morbidities  Goal: Patient's chronic conditions and co-morbidity symptoms are monitored and maintained or improved  Outcome: Progressing     Problem: Pain  Goal: Verbalizes/displays adequate comfort level or baseline comfort level  Outcome: Progressing     Problem: Discharge Planning  Goal: Discharge to home or other facility with appropriate resources  Outcome: Progressing

## 2022-09-08 NOTE — OP NOTE
[address] [address]                                OPERATIVE REPORT    PATIENT NAME: Reza Puga                      :        1945  MED REC NO:   [id number]                          ROOM:       4255  ACCOUNT NO:   [de-identified]                           ADMIT DATE: 2022  PROVIDER:     Madison Beauchamp MD    DATE OF PROCEDURE:  2022    PREOPERATIVE DIAGNOSES:  1. Nonhealing right foot amputation. 2.  Peripheral vascular disease with high-grade popliteal stenosis. POSTOPERATIVE DIAGNOSES:  1. Nonhealing right foot amputation. 2.  Peripheral vascular disease with high-grade popliteal stenosis. PROCEDURES PERFORMED:  1. Ultrasound-guided left femoral artery access. 2.  Right lower extremity angiogram.  3.  Intravascular lithotripsy and angioplasty of popliteal stenosis. SURGEON:  Madison Beauchamp MD    ANESTHESIA:  Local with moderate monitored sedation. INDICATIONS:  The patient is a 80-year-old female with history of  peripheral vascular disease status post right lower extremity  interventions and several toe amputations due to ischemic gangrene. She  has had nonhealing of the great toe amputation site. Duplex examination  demonstrated patent superficial femoral artery with a high-grade  popliteal stenosis. The patient is brought to the angio suite at this  time to undergo angiography with intervention. PROCEDURE:  The patient was brought to the angio suite, placed in the  supine position. Under my direct supervision Versed and fentanyl were  administered for moderate sedation. The patient was monitored by an  independent trained nurse observer using continuous blood pressure, EKG  and pulse oximetry. I spent 28 minutes face-to-face with the patient  providing and directing sedation. The left femoral region was prepped  and draped in sterile fashion.   Ultrasound was used to identify the  common femoral artery, which was patent and pulsatile. Under direct  ultrasound visualization this was accessed and a 6-Nigerian introducer  sheath was placed. A copy of the ultrasound image was saved and placed  in the patient's record. Bentson wire was advanced into the abdominal  aorta. RIM catheter was advanced over the wire. The RIM catheter was  reformed in the distal aorta and used to cannulate the right common  femoral artery. Wire was used to cannulate the right iliac artery. The  wire was advanced onto the common femoral level. The RIM catheter was  removed and replaced with an angled glide catheter, which was advanced  onto the right common femoral artery. Right lower extremity angiograms  were then performed. The catheter was then removed over a Bentson wire. The short 6-Nigerian sheath was removed and replaced with a 6-Nigerian 45 cm  long destination sheath, which was advanced into the proximal right  superficial femoral artery. The patient was given 5000 units of  intravenous heparin. Using a roadmapping technique, a [employer] PT  extra-support wire was advanced through the superficial femoral artery  and across the popliteal stenosis into the below-knee popliteal artery. A 4 mm x 60 mm shockwave intravascular lithotripsy angioplasty balloon  was advanced over the wire and intravascular lithotripsy and angioplasty  was performed of the above-knee popliteal segment _____ the high-grade  stenosis. Post intervention angiograms performed demonstrating complete  resolution of the stenosis and markedly improved flow through the  single-vessel anterior tibial artery. Wire was removed. The long  sheath was removed over a Bentson wire, replaced with a short 6-Nigerian  introducer sheath. Retrograde femoral angiogram was performed and a  6-Nigerian Mynx closure device was then placed and deployed in standard  fashion achieving excellent hemostasis.   The patient was transferred to  the recovery area following this procedure. Estimated blood loss was  minimal.    ANGIOGRAPHIC FINDINGS:  Initial angiograms demonstrate patent common  femoral, deep femoral and superficial femoral artery. The superficial  femoral artery is diseased, but widely patent. There is a high-grade  stenosis of the popliteal artery just above the knee. There is single  vessel runoff via an anterior tibial artery. There are scattered  segments of filling of peroneal artery and the posterior tibial artery  appears to fill from collaterals. Posterior tibial artery at the ankle  filled through collaterals. Post intervention, there is markedly  improved flow throughout the lower leg and the foot.         Lydia Barfield MD    D: 09/07/2022 15:51:20       T: 09/07/2022 15:53:59     [id number]  Job#: [id number]     Doc#: [id number]    CC:

## 2022-09-08 NOTE — PROGRESS NOTES
Hospitalist Progress Note      PCP: Benitez Montes APRN - CNP    Date of Admission: 9/4/2022    Chief Complaint: L foot wound. Subjective: no new c/o. Medications:  Reviewed    Infusion Medications    sodium chloride      dextrose       Scheduled Medications    sodium phosphate  1 enema Rectal Once    clopidogrel  75 mg Oral Daily    vancomycin  1,000 mg IntraVENous Q24H    amLODIPine  10 mg Oral Daily    aspirin  81 mg Oral Daily    digoxin  125 mcg Oral Daily    ferrous sulfate  325 mg Oral BID    hydrALAZINE  50 mg Oral 3 times per day    lisinopril  20 mg Oral Daily    therapeutic multivitamin-minerals  1 tablet Oral Daily    atorvastatin  20 mg Oral Daily    sodium chloride flush  5-40 mL IntraVENous 2 times per day    enoxaparin  40 mg SubCUTAneous Daily    cefepime  2,000 mg IntraVENous Q12H    insulin glargine  0.15 Units/kg SubCUTAneous Nightly    insulin lispro  0-4 Units SubCUTAneous TID WC    insulin lispro  0-4 Units SubCUTAneous Nightly     PRN Meds: bisacodyl, sodium chloride flush, sodium chloride, ondansetron **OR** ondansetron, polyethylene glycol, acetaminophen **OR** acetaminophen, glucose, dextrose bolus **OR** dextrose bolus, glucagon (rDNA), dextrose      Intake/Output Summary (Last 24 hours) at 9/8/2022 0954  Last data filed at 9/8/2022 8096  Gross per 24 hour   Intake 120 ml   Output 2100 ml   Net -1980 ml         Physical Exam Performed:    BP (!) 165/73   Pulse 79   Temp 97.7 °F (36.5 °C) (Oral)   Resp 18   Ht 5' 3\" (1.6 m)   Wt 127 lb 3.3 oz (57.7 kg)   SpO2 99%   BMI 22.53 kg/m²     General appearance: No apparent distress, appears stated age and cooperative. HEENT: Pupils equal, round, and reactive to light. Conjunctivae/corneas clear. Neck: Supple, with full range of motion. No jugular venous distention. Trachea midline. Respiratory:  Normal respiratory effort. Clear to auscultation, bilaterally without Rales/Wheezes/Rhonchi.   Cardiovascular: Regular rate and rhythm with normal S1/S2 without murmurs, rubs or gallops. Abdomen: Soft, non-tender, non-distended with normal bowel sounds. Musculoskeletal: No clubbing, cyanosis or edema bilaterally. Full range of motion without deformity. Skin: Skin color, texture, turgor normal.  No rashes or lesions. Neurologic:  Neurovascularly intact without any focal sensory/motor deficits. Cranial nerves: II-XII intact, grossly non-focal.  Psychiatric: Alert and oriented, thought content appropriate, normal insight  Capillary Refill: Brisk,< 3 seconds   Peripheral Pulses: +2 palpable, equal bilaterally       Labs:   Recent Labs     09/06/22  0542 09/07/22  0601 09/08/22  0450   WBC 8.3 7.5 6.7   HGB 9.2* 9.0* 9.2*   HCT 28.4* 26.2* 27.3*    192 199       Recent Labs     09/06/22 0542 09/07/22  0601 09/08/22  0450   * 132* 134*   K 5.2* 4.9 4.4    102 103   CO2 22 21 20*   BUN 32* 30* 23*   CREATININE 1.1 1.1 0.9   CALCIUM 9.0 9.7 9.0   PHOS 3.9 3.8 3.4       No results for input(s): AST, ALT, BILIDIR, BILITOT, ALKPHOS in the last 72 hours. No results for input(s): INR in the last 72 hours. No results for input(s): Pati Kidder in the last 72 hours.       Urinalysis:      Lab Results   Component Value Date/Time    NITRU Negative 06/07/2022 01:55 PM    [address] 06/07/2022 01:55 PM    BACTERIA Rare 06/07/2022 01:55 PM    RBCUA 3-4 06/07/2022 01:55 PM    BLOODU LARGE 06/07/2022 01:55 PM    SPECGRAV <=1.005 06/07/2022 01:55 PM    GLUCOSEU Negative 06/07/2022 01:55 PM       Consults:    IP CONSULT TO PODIATRY  IP CONSULT TO HOSPITALIST  IP CONSULT TO PODIATRY  IP CONSULT TO PHARMACY  IP CONSULT TO SOCIAL WORK  IP CONSULT TO VASCULAR SURGERY      Assessment/Plan:    Active Hospital Problems    Diagnosis     Dirty living conditions [Z59.1]      Priority: Medium    Chronic anemia [D64.9]      Priority: Medium    Primary hypertension [I10]      Priority: Medium    Acute osteomyelitis of right foot ([address] Bianca Briggs Priority: Medium    Diabetes mellitus ([facility] [address] [E11.9]        Right Foot OM - Dr. Bisi Ca consulted by ED. Started on empiric Cefepime/Vancocin Sunday 4 Sept - continued pending Podiatry recs and possible wound culture results - demonstrating S Marcenscens and M Morganii sensitive to current Cefepime. MRI c/w osteomyelitis w/ plans for TMA Friday 9 Sept. Vascular Surgery consulted and appreciated s/p angioplasty of high-grade popliteal stenosis 7 Sept w/out complications. Anticipate d/c of Vanco post-amputation. DM2 - controlled on home oral antiGlycemics - held. Started on Low dose Lantus and follow FSBS/SSI low regimen. Last HbA1c 5.4% dated June 2022. Anticipate resuming/continuing home regimen at discharge. HTN/CAD - w/ known CAD but no evidence of active signs/sxs of ischemia/failure. Currently controlled on home meds w/ vitals reviewed and documented as above. Afib - chronic paroxysmal of unspecified and clinically unable to determine etiology. Normally rate controlled on Digoxin - continued. NOT Anticoagulated at baseline . Monitored on tele. Anemia - etiology clinically unable to determine, w/out evidence of active bleeding/hemolysis. Stable and asymptomatic w/out indication for transfusion. Follow serial labs. Reviewed and documented as above. Unsatisfactory Living Conditions (Z59.1)  -  consult placed       DVT Prophylaxis: LMWH     Recent Labs     09/06/22  0542 09/07/22  0601 09/08/22  0450    192 199       Diet: ADULT DIET; Regular  Code Status: Full Code      PT/OT Eval Status: not yet ordered. Dispo - Patient is likely to remain in-house at least for the foreseeable future pending clinical course, subspecialty recs and eventual PT/OT eval/recs w/ possible placement decision .        Gavino Weldon MD

## 2022-09-09 ENCOUNTER — ANESTHESIA (OUTPATIENT)
Dept: OPERATING ROOM | Age: 77
DRG: 239 | End: 2022-09-09
Payer: MEDICARE

## 2022-09-09 LAB
ALBUMIN SERPL-MCNC: 3.7 G/DL (ref 3.4–5)
ANION GAP SERPL CALCULATED.3IONS-SCNC: 13 MMOL/L (ref 3–16)
BUN BLDV-MCNC: 25 MG/DL (ref 7–20)
CALCIUM SERPL-MCNC: 10.1 MG/DL (ref 8.3–10.6)
CHLORIDE BLD-SCNC: 103 MMOL/L (ref 99–110)
CO2: 20 MMOL/L (ref 21–32)
CREAT SERPL-MCNC: 1 MG/DL (ref 0.6–1.2)
GFR AFRICAN AMERICAN: >60
GFR NON-AFRICAN AMERICAN: 54
GLUCOSE BLD-MCNC: 100 MG/DL (ref 70–99)
GLUCOSE BLD-MCNC: 78 MG/DL (ref 70–99)
GLUCOSE BLD-MCNC: 85 MG/DL (ref 70–99)
GLUCOSE BLD-MCNC: 85 MG/DL (ref 70–99)
GLUCOSE BLD-MCNC: 87 MG/DL (ref 70–99)
GLUCOSE BLD-MCNC: 88 MG/DL (ref 70–99)
HCT VFR BLD CALC: 26.9 % (ref 36–48)
HEMOGLOBIN: 8.7 G/DL (ref 12–16)
MCH RBC QN AUTO: 26.1 PG (ref 26–34)
MCHC RBC AUTO-ENTMCNC: 32.3 G/DL (ref 31–36)
MCV RBC AUTO: 80.8 FL (ref 80–100)
PDW BLD-RTO: 16.1 % (ref 12.4–15.4)
PERFORMED ON: ABNORMAL
PERFORMED ON: NORMAL
PHOSPHORUS: 3 MG/DL (ref 2.5–4.9)
PLATELET # BLD: 194 K/UL (ref 135–450)
PMV BLD AUTO: 7.7 FL (ref 5–10.5)
POTASSIUM SERPL-SCNC: 4 MMOL/L (ref 3.5–5.1)
RBC # BLD: 3.32 M/UL (ref 4–5.2)
SODIUM BLD-SCNC: 136 MMOL/L (ref 136–145)
WBC # BLD: 8.6 K/UL (ref 4–11)

## 2022-09-09 PROCEDURE — 0Y6M0ZC DETACHMENT AT RIGHT FOOT, PARTIAL 3RD RAY, OPEN APPROACH: ICD-10-PCS | Performed by: PODIATRIST

## 2022-09-09 PROCEDURE — 2580000003 HC RX 258: Performed by: PODIATRIST

## 2022-09-09 PROCEDURE — 87205 SMEAR GRAM STAIN: CPT

## 2022-09-09 PROCEDURE — 3600000014 HC SURGERY LEVEL 4 ADDTL 15MIN: Performed by: PODIATRIST

## 2022-09-09 PROCEDURE — 2500000003 HC RX 250 WO HCPCS: Performed by: PODIATRIST

## 2022-09-09 PROCEDURE — 2709999900 HC NON-CHARGEABLE SUPPLY: Performed by: PODIATRIST

## 2022-09-09 PROCEDURE — 3700000001 HC ADD 15 MINUTES (ANESTHESIA): Performed by: PODIATRIST

## 2022-09-09 PROCEDURE — 6360000002 HC RX W HCPCS: Performed by: PODIATRIST

## 2022-09-09 PROCEDURE — 88305 TISSUE EXAM BY PATHOLOGIST: CPT

## 2022-09-09 PROCEDURE — 6360000002 HC RX W HCPCS: Performed by: NURSE PRACTITIONER

## 2022-09-09 PROCEDURE — 80069 RENAL FUNCTION PANEL: CPT

## 2022-09-09 PROCEDURE — 0Y6M0Z9 DETACHMENT AT RIGHT FOOT, PARTIAL 1ST RAY, OPEN APPROACH: ICD-10-PCS | Performed by: PODIATRIST

## 2022-09-09 PROCEDURE — 87075 CULTR BACTERIA EXCEPT BLOOD: CPT

## 2022-09-09 PROCEDURE — 0Y6M0ZD DETACHMENT AT RIGHT FOOT, PARTIAL 4TH RAY, OPEN APPROACH: ICD-10-PCS | Performed by: PODIATRIST

## 2022-09-09 PROCEDURE — 2580000003 HC RX 258: Performed by: NURSE ANESTHETIST, CERTIFIED REGISTERED

## 2022-09-09 PROCEDURE — 3700000000 HC ANESTHESIA ATTENDED CARE: Performed by: PODIATRIST

## 2022-09-09 PROCEDURE — 6370000000 HC RX 637 (ALT 250 FOR IP): Performed by: NURSE PRACTITIONER

## 2022-09-09 PROCEDURE — 36415 COLL VENOUS BLD VENIPUNCTURE: CPT

## 2022-09-09 PROCEDURE — 7100000001 HC PACU RECOVERY - ADDTL 15 MIN: Performed by: PODIATRIST

## 2022-09-09 PROCEDURE — 3600000004 HC SURGERY LEVEL 4 BASE: Performed by: PODIATRIST

## 2022-09-09 PROCEDURE — 85027 COMPLETE CBC AUTOMATED: CPT

## 2022-09-09 PROCEDURE — 0Y6M0ZB DETACHMENT AT RIGHT FOOT, PARTIAL 2ND RAY, OPEN APPROACH: ICD-10-PCS | Performed by: PODIATRIST

## 2022-09-09 PROCEDURE — 2060000000 HC ICU INTERMEDIATE R&B

## 2022-09-09 PROCEDURE — 0Y6M0ZF DETACHMENT AT RIGHT FOOT, PARTIAL 5TH RAY, OPEN APPROACH: ICD-10-PCS | Performed by: INTERNAL MEDICINE

## 2022-09-09 PROCEDURE — 6360000002 HC RX W HCPCS

## 2022-09-09 PROCEDURE — 7100000000 HC PACU RECOVERY - FIRST 15 MIN: Performed by: PODIATRIST

## 2022-09-09 PROCEDURE — 87070 CULTURE OTHR SPECIMN AEROBIC: CPT

## 2022-09-09 PROCEDURE — 2580000003 HC RX 258: Performed by: NURSE PRACTITIONER

## 2022-09-09 PROCEDURE — 6360000002 HC RX W HCPCS: Performed by: NURSE ANESTHETIST, CERTIFIED REGISTERED

## 2022-09-09 RX ORDER — SODIUM CHLORIDE 0.9 % (FLUSH) 0.9 %
5-40 SYRINGE (ML) INJECTION PRN
Status: DISCONTINUED | OUTPATIENT
Start: 2022-09-09 | End: 2022-09-09 | Stop reason: HOSPADM

## 2022-09-09 RX ORDER — ONDANSETRON 2 MG/ML
4 INJECTION INTRAMUSCULAR; INTRAVENOUS EVERY 30 MIN PRN
Status: DISCONTINUED | OUTPATIENT
Start: 2022-09-09 | End: 2022-09-09 | Stop reason: HOSPADM

## 2022-09-09 RX ORDER — SODIUM CHLORIDE 9 MG/ML
25 INJECTION, SOLUTION INTRAVENOUS PRN
Status: DISCONTINUED | OUTPATIENT
Start: 2022-09-09 | End: 2022-09-09 | Stop reason: HOSPADM

## 2022-09-09 RX ORDER — DIPHENHYDRAMINE HYDROCHLORIDE 50 MG/ML
6.25 INJECTION INTRAMUSCULAR; INTRAVENOUS
Status: DISCONTINUED | OUTPATIENT
Start: 2022-09-09 | End: 2022-09-09 | Stop reason: HOSPADM

## 2022-09-09 RX ORDER — PROPOFOL 10 MG/ML
INJECTION, EMULSION INTRAVENOUS PRN
Status: DISCONTINUED | OUTPATIENT
Start: 2022-09-09 | End: 2022-09-09 | Stop reason: SDUPTHER

## 2022-09-09 RX ORDER — MIDAZOLAM HYDROCHLORIDE 1 MG/ML
2 INJECTION INTRAMUSCULAR; INTRAVENOUS
Status: DISCONTINUED | OUTPATIENT
Start: 2022-09-09 | End: 2022-09-09 | Stop reason: HOSPADM

## 2022-09-09 RX ORDER — OXYCODONE HYDROCHLORIDE 5 MG/1
5 TABLET ORAL
Status: DISCONTINUED | OUTPATIENT
Start: 2022-09-09 | End: 2022-09-09 | Stop reason: HOSPADM

## 2022-09-09 RX ORDER — SODIUM CHLORIDE 0.9 % (FLUSH) 0.9 %
5-40 SYRINGE (ML) INJECTION EVERY 12 HOURS SCHEDULED
Status: DISCONTINUED | OUTPATIENT
Start: 2022-09-09 | End: 2022-09-09 | Stop reason: HOSPADM

## 2022-09-09 RX ORDER — LABETALOL HYDROCHLORIDE 5 MG/ML
5 INJECTION, SOLUTION INTRAVENOUS EVERY 6 HOURS PRN
Status: DISCONTINUED | OUTPATIENT
Start: 2022-09-09 | End: 2022-09-14 | Stop reason: HOSPADM

## 2022-09-09 RX ORDER — BUPIVACAINE HYDROCHLORIDE 5 MG/ML
INJECTION, SOLUTION EPIDURAL; INTRACAUDAL PRN
Status: DISCONTINUED | OUTPATIENT
Start: 2022-09-09 | End: 2022-09-09 | Stop reason: ALTCHOICE

## 2022-09-09 RX ORDER — SODIUM CHLORIDE, SODIUM LACTATE, POTASSIUM CHLORIDE, CALCIUM CHLORIDE 600; 310; 30; 20 MG/100ML; MG/100ML; MG/100ML; MG/100ML
INJECTION, SOLUTION INTRAVENOUS CONTINUOUS PRN
Status: DISCONTINUED | OUTPATIENT
Start: 2022-09-09 | End: 2022-09-09 | Stop reason: SDUPTHER

## 2022-09-09 RX ORDER — MEPERIDINE HYDROCHLORIDE 50 MG/ML
12.5 INJECTION INTRAMUSCULAR; INTRAVENOUS; SUBCUTANEOUS EVERY 5 MIN PRN
Status: DISCONTINUED | OUTPATIENT
Start: 2022-09-09 | End: 2022-09-09 | Stop reason: HOSPADM

## 2022-09-09 RX ADMIN — Medication 1 TABLET: at 09:14

## 2022-09-09 RX ADMIN — HYDRALAZINE HYDROCHLORIDE 50 MG: 50 TABLET, FILM COATED ORAL at 06:09

## 2022-09-09 RX ADMIN — FERROUS SULFATE TAB 325 MG (65 MG ELEMENTAL FE) 325 MG: 325 (65 FE) TAB at 09:15

## 2022-09-09 RX ADMIN — CEFEPIME 2000 MG: 2 INJECTION, POWDER, FOR SOLUTION INTRAVENOUS at 20:48

## 2022-09-09 RX ADMIN — SODIUM CHLORIDE, PRESERVATIVE FREE 10 ML: 5 INJECTION INTRAVENOUS at 09:16

## 2022-09-09 RX ADMIN — INSULIN GLARGINE 9 UNITS: 100 INJECTION, SOLUTION SUBCUTANEOUS at 20:48

## 2022-09-09 RX ADMIN — CEFEPIME 2000 MG: 2 INJECTION, POWDER, FOR SOLUTION INTRAVENOUS at 09:13

## 2022-09-09 RX ADMIN — FERROUS SULFATE TAB 325 MG (65 MG ELEMENTAL FE) 325 MG: 325 (65 FE) TAB at 20:38

## 2022-09-09 RX ADMIN — HYDROMORPHONE HYDROCHLORIDE 0.5 MG: 1 INJECTION, SOLUTION INTRAMUSCULAR; INTRAVENOUS; SUBCUTANEOUS at 19:10

## 2022-09-09 RX ADMIN — AMLODIPINE BESYLATE 10 MG: 5 TABLET ORAL at 09:15

## 2022-09-09 RX ADMIN — HYDRALAZINE HYDROCHLORIDE 50 MG: 50 TABLET, FILM COATED ORAL at 12:53

## 2022-09-09 RX ADMIN — LISINOPRIL 20 MG: 20 TABLET ORAL at 09:15

## 2022-09-09 RX ADMIN — VANCOMYCIN HYDROCHLORIDE 1000 MG: 1 INJECTION, POWDER, LYOPHILIZED, FOR SOLUTION INTRAVENOUS at 13:35

## 2022-09-09 RX ADMIN — ACETAMINOPHEN 650 MG: 325 TABLET ORAL at 23:18

## 2022-09-09 RX ADMIN — PROPOFOL 100 MG: 10 INJECTION, EMULSION INTRAVENOUS at 17:44

## 2022-09-09 RX ADMIN — HYDRALAZINE HYDROCHLORIDE 50 MG: 50 TABLET, FILM COATED ORAL at 23:13

## 2022-09-09 RX ADMIN — ATORVASTATIN CALCIUM 20 MG: 10 TABLET, FILM COATED ORAL at 09:14

## 2022-09-09 RX ADMIN — SODIUM CHLORIDE, SODIUM LACTATE, POTASSIUM CHLORIDE, AND CALCIUM CHLORIDE: .6; .31; .03; .02 INJECTION, SOLUTION INTRAVENOUS at 17:39

## 2022-09-09 RX ADMIN — DIGOXIN 125 MCG: 125 TABLET ORAL at 09:15

## 2022-09-09 ASSESSMENT — PAIN DESCRIPTION - LOCATION: LOCATION: FOOT

## 2022-09-09 ASSESSMENT — PAIN - FUNCTIONAL ASSESSMENT: PAIN_FUNCTIONAL_ASSESSMENT: ACTIVITIES ARE NOT PREVENTED

## 2022-09-09 ASSESSMENT — PAIN DESCRIPTION - DESCRIPTORS
DESCRIPTORS: BURNING

## 2022-09-09 ASSESSMENT — PAIN DESCRIPTION - FREQUENCY
FREQUENCY: CONTINUOUS
FREQUENCY: CONTINUOUS

## 2022-09-09 ASSESSMENT — PAIN DESCRIPTION - ONSET: ONSET: GRADUAL

## 2022-09-09 ASSESSMENT — PAIN DESCRIPTION - PAIN TYPE: TYPE: ACUTE PAIN;SURGICAL PAIN

## 2022-09-09 ASSESSMENT — PAIN SCALES - GENERAL
PAINLEVEL_OUTOF10: 7
PAINLEVEL_OUTOF10: 5

## 2022-09-09 ASSESSMENT — PAIN DESCRIPTION - ORIENTATION: ORIENTATION: RIGHT

## 2022-09-09 NOTE — BRIEF OP NOTE
Brief Postoperative Note      Patient: Canadce Hoffman  YOB: 1945  MRN: [id number]    Date of Procedure: 9/9/2022    Pre-Op Diagnosis:   1) Abscess of right foot [L02.611]  2) Osteomyelitis, right foot  3) DM2 with PVD    Post-Op Diagnosis: Same       Procedure(s):  TRANSMETATARSAL AMPUTATION RIGHT FOOT    Surgeon(s):  Noah Archuleta DPM    Assistant:  Surgical Assistant: Ruba Turner    Anesthesia: General    Estimated Blood Loss (mL): less than 581     Complications: None    Specimens:   ID Type Source Tests Collected by Time Destination   1 : OSTEOMYELITIS RIGHT FOOT Specimen Foot CULTURE, SURGICAL Noah Archuleta DPM 9/9/2022 1811    A : RIGHT FOOT TRANSMETATARSAL AMPUTATION Specimen Foot SURGICAL PATHOLOGY Noah Archuleta DPM 9/9/2022 1802    B : CLEARANCE FRAGMENT RIGHT FIRST METATARSAL  Specimen Foot SURGICAL PATHOLOGY Noah Archuleta [facility] 9/9/2022 1813        Implants:  * No implants in log *      Drains:   [REMOVED] External Urinary Catheter (Removed)   Site Assessment Clean;Dry 09/08/22 0950   Placement Replaced 09/08/22 0950   Securement Method Securing device (Describe) 09/05/22 0532   Catheter Care Catheter/Wick replaced 09/05/22 2322   Perineal Care Yes 09/05/22 2322   Suction 40 mmgHg continuous 09/05/22 2322   Urine Color Yellow 09/08/22 0837   Urine Appearance Clear 09/05/22 2322   Urine Odor Malodorous 09/05/22 0532   Output (mL) 1000 mL 09/08/22 1321       [REMOVED] External Urinary Catheter (Removed)   Site Assessment Clean,dry & intact 09/07/22 1825   Placement Initiated 09/07/22 1825   Perineal Care Yes 09/07/22 1825   Suction 40 mmgHg continuous 09/07/22 1825   Urine Color Yellow 09/07/22 1825   Urine Appearance Clear 09/07/22 1825   Output (mL) 800 mL 09/08/22 0500       Findings:  Consistent with diagnosis. Transmetatarsal amputation was completed. I believe all infected soft tissue and bone was removed. The surgical site was closed. Bleeding was good. No further surgical intervention planned.       Electronically signed by Vanessa Cooper DPM on 9/9/2022 at 6:32 PM

## 2022-09-09 NOTE — PROGRESS NOTES
Hospitalist Progress Note      PCP: Chey Wolf, APRN - CNP    Date of Admission: 9/4/2022    Chief Complaint: L foot wound. Subjective: no new c/o. Medications:  Reviewed    Infusion Medications    sodium chloride      dextrose       Scheduled Medications    clopidogrel  75 mg Oral Daily    vancomycin  1,000 mg IntraVENous Q24H    amLODIPine  10 mg Oral Daily    aspirin  81 mg Oral Daily    digoxin  125 mcg Oral Daily    ferrous sulfate  325 mg Oral BID    hydrALAZINE  50 mg Oral 3 times per day    lisinopril  20 mg Oral Daily    therapeutic multivitamin-minerals  1 tablet Oral Daily    atorvastatin  20 mg Oral Daily    sodium chloride flush  5-40 mL IntraVENous 2 times per day    enoxaparin  40 mg SubCUTAneous Daily    cefepime  2,000 mg IntraVENous Q12H    insulin glargine  0.15 Units/kg SubCUTAneous Nightly    insulin lispro  0-4 Units SubCUTAneous TID WC    insulin lispro  0-4 Units SubCUTAneous Nightly     PRN Meds: labetalol, bisacodyl, sodium chloride flush, sodium chloride, ondansetron **OR** ondansetron, polyethylene glycol, acetaminophen **OR** acetaminophen, glucose, dextrose bolus **OR** dextrose bolus, glucagon (rDNA), dextrose      Intake/Output Summary (Last 24 hours) at 9/9/2022 0841  Last data filed at 9/8/2022 1321  Gross per 24 hour   Intake 120 ml   Output 1000 ml   Net -880 ml         Physical Exam Performed:    BP (!) 176/65   Pulse 69   Temp 97.8 °F (36.6 °C) (Oral)   Resp 20   Ht 5' 3\" (1.6 m)   Wt 136 lb 0.4 oz (61.7 kg)   SpO2 98%   BMI 24.10 kg/m²     General appearance: No apparent distress, appears stated age and cooperative. HEENT: Pupils equal, round, and reactive to light. Conjunctivae/corneas clear. Neck: Supple, with full range of motion. No jugular venous distention. Trachea midline. Respiratory:  Normal respiratory effort. Clear to auscultation, bilaterally without Rales/Wheezes/Rhonchi.   Cardiovascular: Regular rate and rhythm with normal S1/S2 without murmurs, rubs or gallops. Abdomen: Soft, non-tender, non-distended with normal bowel sounds. Musculoskeletal: No clubbing, cyanosis or edema bilaterally. Full range of motion without deformity. Skin: Skin color, texture, turgor normal.  No rashes or lesions. Neurologic:  Neurovascularly intact without any focal sensory/motor deficits. Cranial nerves: II-XII intact, grossly non-focal.  Psychiatric: Alert and oriented, thought content appropriate, normal insight  Capillary Refill: Brisk,< 3 seconds   Peripheral Pulses: +2 palpable, equal bilaterally       Labs:   Recent Labs     09/07/22  0601 09/08/22  0450 09/09/22  0500   WBC 7.5 6.7 8.6   HGB 9.0* 9.2* 8.7*   HCT 26.2* 27.3* 26.9*    199 194       Recent Labs     09/07/22  0601 09/08/22  0450 09/09/22  0500   * 134* 136   K 4.9 4.4 4.0    103 103   CO2 21 20* 20*   BUN 30* 23* 25*   CREATININE 1.1 0.9 1.0   CALCIUM 9.7 9.0 10.1   PHOS 3.8 3.4 3.0       No results for input(s): AST, ALT, BILIDIR, BILITOT, ALKPHOS in the last 72 hours. No results for input(s): INR in the last 72 hours. No results for input(s): Magdalene Easton in the last 72 hours.       Urinalysis:      Lab Results   Component Value Date/Time    NITRU Negative 06/07/2022 01:55 PM    [address] 06/07/2022 01:55 PM    BACTERIA Rare 06/07/2022 01:55 PM    RBCUA 3-4 06/07/2022 01:55 PM    BLOODU LARGE 06/07/2022 01:55 PM    SPECGRAV <=1.005 06/07/2022 01:55 PM    GLUCOSEU Negative 06/07/2022 01:55 PM       Consults:    IP CONSULT TO PODIATRY  IP CONSULT TO HOSPITALIST  IP CONSULT TO PODIATRY  IP CONSULT TO PHARMACY  IP CONSULT TO SOCIAL WORK  IP CONSULT TO VASCULAR SURGERY      Assessment/Plan:    Active Hospital Problems    Diagnosis     Dirty living conditions [Z59.1]      Priority: Medium    Chronic anemia [D64.9]      Priority: Medium    Primary hypertension [I10]      Priority: Medium    Acute osteomyelitis of right foot ([facility] [M86.171]      Priority: Medium Diabetes mellitus ([facility] [address] [E11.9]        Right Foot OM - Dr. Darrian Hwang consulted by ED. Started on empiric Cefepime/Vancocin Sunday 4 Sept - continued pending Podiatry recs and possible wound culture results - demonstrating S Marcenscens and M Morganii sensitive to current Cefepime. MRI c/w osteomyelitis w/ plans for TMA Friday 9 Sept. Vascular Surgery consulted and appreciated s/p angioplasty of high-grade popliteal stenosis 7 Sept w/out complications. Anticipate d/c of Vanco post-amputation. Follow serial labs w/out evidence of ABX induced nephrotoxicity. DM2 - controlled on home oral antiGlycemics - held. Started on Low dose Lantus and follow FSBS/SSI low regimen. Last HbA1c 5.4% dated June 2022. Anticipate resuming/continuing home regimen at discharge. HTN/CAD - w/ known CAD but no evidence of active signs/sxs of ischemia/failure. Currently controlled on home meds w/ vitals reviewed and documented as above. Afib - chronic paroxysmal of unspecified and clinically unable to determine etiology. Normally rate controlled on Digoxin - continued. NOT Anticoagulated at baseline . Monitored on tele. Anemia - etiology clinically unable to determine, w/out evidence of active bleeding/hemolysis. Stable and asymptomatic w/out indication for transfusion. Follow serial labs. Reviewed and documented as above. Unsatisfactory Living Conditions (Z59.1)  -  consult placed       DVT Prophylaxis: LMWH     Recent Labs     09/07/22  0601 09/08/22  0450 09/09/22  0500    199 194       Diet: Diet NPO Exceptions are: Sips of Water with Meds  Code Status: Full Code      PT/OT Eval Status: not yet ordered. Dispo - Patient is likely to remain in-house at least for the foreseeable future pending clinical course, subspecialty recs and eventual PT/OT eval/recs w/ possible placement decision .        Kennedi Azevedo MD

## 2022-09-09 NOTE — ANESTHESIA PRE PROCEDURE
Department of Anesthesiology  Preprocedure Note       Name:  Rubia Chicas   Age:  68 y.o.  :  1945                                          MRN:  [id number]         Date:  2022      Surgeon: Jacques Reeder):  Aj Persaud DPM    Procedure: Procedure(s):  TRANSMETATARSAL AMPUTATION RIGHT FOOT    Medications prior to admission:   Prior to Admission medications    Medication Sig Start Date End Date Taking? Authorizing Provider   vitamin D3 (CHOLECALCIFEROL) 25 MCG (1000 UT) TABS tablet TAKE 1 TABLET BY MOUTH EVERY DAY 22   MARVIN Aceves CNP   B Complex-C-Folic Acid (DIALYVITE TABLET) TABS TAKE 1 TABLET BY MOUTH EVERY DAY 22   Historical Provider, MD   polyethylene glycol (MIRALAX) 17 GM/SCOOP POWD powder TAKE 17 GRAMS AND MIX WITH 4 TO 8 OUNCES OF BEVERAGE OF CHOICE AND DRINK TWICE DAILY  Patient not taking: Reported on 2022   Historical Provider, MD   digoxin (LANOXIN) 125 MCG tablet TAKE 1 TABLET BY MOUTH EVERY DAY 22   Historical Provider, MD   ferrous sulfate (IRON 325) 325 (65 Fe) MG tablet Take 1 tablet by mouth 2 times daily 22   MARVIN Aceves CNP   gabapentin (NEURONTIN) 100 MG capsule Take 2 capsules by mouth 2 times daily for 30 days.  22  MARVIN Aceves CNP   hydrALAZINE (APRESOLINE) 50 MG tablet Take 1 tablet by mouth every 8 hours 22   MARVIN Aceves CNP   lisinopril (PRINIVIL;ZESTRIL) 20 MG tablet TAKE 1 TABLET BY MOUTH EVERY DAY 22   MARVIN Aceves CNP   metFORMIN (GLUCOPHAGE) 500 MG tablet TAKE 2 TABLETS BY MOUTH EVERY DAY WITH BREAKFAST  Patient not taking: Reported on 2022   MARVIN Aceves CNP   magnesium hydroxide (MILK OF MAGNESIA) 400 MG/5ML suspension Take 30 mLs by mouth daily as needed for Constipation  Patient not taking: Reported on 2022   MARVIN Aceves CNP   Multiple Vitamins-Minerals (THERAPEUTIC MULTIVITAMIN-MINERALS) tablet Take 1 tablet by mouth daily 6/27/22 6/27/23  MARVIN Mendiola CNP   simvastatin (ZOCOR) 20 MG tablet Take 1 tablet by mouth nightly  Patient not taking: Reported on 9/4/2022 6/27/22   MARVIN Mendiola CNP   vitamin C (ASCORBIC ACID) 500 MG tablet Take 1 tablet by mouth 2 times daily 6/27/22   MARVIN Mendiola CNP   diclofenac sodium (VOLTAREN) 1 % GEL Apply 2 g topically in the morning and at bedtime  Patient not taking: No sig reported 6/27/22   MARVIN Mendiola CNP   acetaminophen (TYLENOL) 325 MG tablet Take 2 tablets by mouth every 6 hours as needed for Pain  Patient not taking: Reported on 9/4/2022 6/27/22   MARVIN Mendiola CNP   amLODIPine (NORVASC) 10 MG tablet TAKE 1 TABLET BY MOUTH EVERY DAY 6/27/22   MARVIN Mendiola CNP   aspirin 81 MG EC tablet Take 1 tablet by mouth daily  Patient not taking: Reported on 9/4/2022 6/27/22   MARVIN Mendiola CNP   miconazole (MICOTIN) 2 % powder Apply topically 2 times daily PRN.   Patient not taking: No sig reported 6/27/22   MARVIN Mendiola CNP       Current medications:    Current Facility-Administered Medications   Medication Dose Route Frequency Provider Last Rate Last Admin    labetalol (NORMODYNE;TRANDATE) injection 5 mg  5 mg IntraVENous Q6H PRN MARVIN Nation CNP        bisacodyl (DULCOLAX) EC tablet 10 mg  10 mg Oral Daily PRN Dhruv Cutler MD   10 mg at 09/08/22 9769    clopidogrel (PLAVIX) tablet 75 mg  75 mg Oral Daily Carmen Caba MD   75 mg at 09/08/22 2101    vancomycin 1000 mg IVPB in 250 mL D5W addavial  1,000 mg IntraVENous Q24H MARVIN Nation CNP   Stopped at 09/09/22 1428    amLODIPine (NORVASC) tablet 10 mg  10 mg Oral Daily MARVIN Nation CNP   10 mg at 09/09/22 0915    aspirin EC tablet 81 mg  81 mg Oral Daily MARVIN Nation CNP   81 mg at 09/08/22 6281    digoxin (LANOXIN) tablet 125 mcg  125 mcg Oral Daily MARVIN Nation - CNP   125 mcg at 09/09/22 0915    ferrous sulfate (IRON 325) tablet 325 mg  325 mg Oral BID MARVIN Serrano - CNP   325 mg at 09/09/22 0915    hydrALAZINE (APRESOLINE) tablet 50 mg  50 mg Oral 3 times per day MARVIN Serrano - CNP   50 mg at 09/09/22 1253    lisinopril (PRINIVIL;ZESTRIL) tablet 20 mg  20 mg Oral Daily MARVIN Serrano - CNP   20 mg at 09/09/22 0915    therapeutic multivitamin-minerals 1 tablet  1 tablet Oral Daily Akash Lau APRN - CNP   1 tablet at 09/09/22 0914    atorvastatin (LIPITOR) tablet 20 mg  20 mg Oral Daily MARVIN Serrano - CNP   20 mg at 09/09/22 0914    sodium chloride flush 0.9 % injection 5-40 mL  5-40 mL IntraVENous 2 times per day MARVIN Serrano - CNP   10 mL at 09/09/22 0916    sodium chloride flush 0.9 % injection 5-40 mL  5-40 mL IntraVENous PRN MARVIN Serrano - CNP        0.9 % sodium chloride infusion   IntraVENous PRN MARVIN Serrano - CNP        enoxaparin (LOVENOX) injection 40 mg  40 mg SubCUTAneous Daily MARVIN Serrano - CNP   40 mg at 09/08/22 0821    ondansetron (ZOFRAN-ODT) disintegrating tablet 4 mg  4 mg Oral Q8H PRN MARVIN Serrano CNP        Or    ondansetron (ZOFRAN) injection 4 mg  4 mg IntraVENous Q6H PRN MARVIN Serrano CNP        polyethylene glycol (GLYCOLAX) packet 17 g  17 g Oral Daily PRN Akash Lau APRN - CNP   17 g at 09/08/22 6760    acetaminophen (TYLENOL) tablet 650 mg  650 mg Oral Q6H PRN MARVIN Serrano CNP        Or    acetaminophen (TYLENOL) suppository 650 mg  650 mg Rectal Q6H PRN MARVIN Serrano CNP        cefepime (MAXIPIME) 2000 mg IVPB minibag  2,000 mg IntraVENous Q12H MARVIN Serrano - CNP   Stopped at 09/09/22 1335    insulin glargine (LANTUS) injection vial 9 Units  0.15 Units/kg SubCUTAneous Nightly MARVIN Serrano CNP   9 Units at 09/08/22 2039    insulin lispro (HUMALOG) injection vial 0-4 Units  0-4 Units SubCUTAneous TID  MARVIN Serrano CNP        insulin lispro (HUMALOG) injection vial 0-4 Units  0-4 Units SubCUTAneous Nightly Preeti Gilding, APRN - CNP        glucose chewable tablet 16 g  4 tablet Oral PRN Preeti Gilding, APRN - CNP        dextrose bolus 10% 125 mL  125 mL IntraVENous PRN Preeti Gilding, APRN - CNP        Or    dextrose bolus 10% 250 mL  250 mL IntraVENous PRN Preeti Gilding, APRN - CNP        glucagon (rDNA) injection 1 mg  1 mg SubCUTAneous PRN Preeti Gilding, APRN - CNP        dextrose 10 % infusion   IntraVENous Continuous PRN Preeti Gilding, APRN - CNP           Allergies:     Allergies   Allergen Reactions    Pcn [Penicillins] Other (See Comments)     Unsure of reaction       Problem List:    Patient Active Problem List   Diagnosis Code    Osteoarthritis M19.90    Noncompliance of patient with dietary regimen Z91.11    PAD (peripheral artery disease) ([facility] I73.9    Pure hypercholesterolemia E78.00    Coronary artery disease involving native coronary artery of native heart without angina pectoris I25.10    Essential hypertension I10    Diabetes mellitus ([facility] E11.9    Vitamin D deficiency E55.9    Acute osteomyelitis of left foot ([address] M86.172    Hammertoe, bilateral M20.41, M20.42    Colonoscopy refused Z53.20    Chronic deep vein thrombosis (DVT) of left peroneal vein ([facility]) I82.552    Acute osteomyelitis of right foot ([facility] [facility] M86.171    Osteomyelitis of right foot ([facility] [facility] M86.9    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, with bone involvement without evidence of necrosis ([facility] [address] [facility] I00.868, L97.516    Primary hypertension I10    Cellulitis of right lower extremity L03.115    PVD (peripheral vascular disease) ([facility]) I73.9    Chronic anemia D64.9    Gastrointestinal hemorrhage K92.2    Acute blood loss anemia D62    Dirty living conditions Z59.1       Past Medical History:        Diagnosis Date    Atrial fibrillation ([facility])     off coumadin after bleed, declined restart    Blood transfusion     CAD (coronary artery disease)     Diabetes mellitus type II     Diabetic neuropathy ([facility] [address] [facility] 2011    Gastric ulcer     H. pylori infection 2009    Hyperlipidemia     Hypertension     Numbness and tingling of left leg     Osteoarthritis     knees    Poor historian     PVD (peripheral vascular disease) ([facility] [address]     PTA distal sfa/pop/peroneal, Dr. Miguel Resendiz 12/2015    Unspecified cerebral artery occlusion with cerebral infarction     TIA affected left eye    upper gi bleed 12/2009       Past Surgical History:        Procedure Laterality Date    ANGIOPLASTY  12/30/15    Dr. Miguel Resendiz, LLE, PTA of distal sfa/pop/peroneal    CARDIAC CATHETERIZATION  9/21/12    done for surgical clearance, cath was negative    COLONOSCOPY  06/09/2022    COLONOSCOPY N/A 6/9/2022    COLONOSCOPY CONTROL HEMORRHAGE performed by Masha England MD at [address] [place] [address],[facility]    FOOT SURGERY Left 01/11/2018    DEBRIDEMENT OF ULCERS LEFT FOOT AND LEG WITH GRAFT    OTHER SURGICAL HISTORY Left 06/05/2017    Left Femoral Peroneal Bypass    SHOULDER ARTHROPLASTY  10/5/12    RIGHT SHOULDER TOTAL ARTHROPLASTY, BICEPS TENODESIS DEPUY, GLOBAL ADVANTAGE AP    TOE AMPUTATION Right 5/23/2022    AMPUTATION OF RIGHT FIRST AND FOURTH TOES performed by Garry Thorne DPM at [address] [facility] N/A 6/8/2022    EGD IV SEDATION performed by Masha England MD at [address],  [address] History:    Social History     Tobacco Use    Smoking status: Never    Smokeless tobacco: Never    Tobacco comments:     Not needed   Substance Use Topics    Alcohol use:  No                                Counseling given: Not Answered  Tobacco comments: Not needed      Vital Signs (Current):   Vitals:    09/09/22 0139 09/09/22 0330 09/09/22 0608 09/09/22 1241   BP: (!) 153/70  (!) 176/65 (!) 166/68   Pulse:   69 81   Resp:   20 20   Temp: 97.8 °F (36.6 °C) 98.1 °F (36.7 °C)   TempSrc:   Oral    SpO2:   98% 94%   Weight:  136 lb 0.4 oz (61.7 kg)     Height:                                                  BP Readings from Last 3 Encounters:   09/09/22 (!) 166/68   06/29/22 (!) 156/68   06/10/22 (!) 177/68       NPO Status:                                                                                 BMI:   Wt Readings from Last 3 Encounters:   09/09/22 136 lb 0.4 oz (61.7 kg)   06/10/22 126 lb 1.6 oz (57.2 kg)   05/23/22 135 lb 14.4 oz (61.6 kg)     Body mass index is 24.1 kg/m².     CBC:   Lab Results   Component Value Date/Time    WBC 8.6 09/09/2022 05:00 AM    RBC 3.32 09/09/2022 05:00 AM    HGB 8.7 09/09/2022 05:00 AM    HCT 26.9 09/09/2022 05:00 AM    MCV 80.8 09/09/2022 05:00 AM    RDW 16.1 09/09/2022 05:00 AM     09/09/2022 05:00 AM       CMP:   Lab Results   Component Value Date/Time     09/09/2022 05:00 AM    K 4.0 09/09/2022 05:00 AM    K 4.5 09/05/2022 06:01 AM     09/09/2022 05:00 AM    CO2 20 09/09/2022 05:00 AM    BUN 25 09/09/2022 05:00 AM    CREATININE 1.0 09/09/2022 05:00 AM    GFRAA >60 09/09/2022 05:00 AM    GFRAA >60 05/23/2013 01:45 PM    AGRATIO 1.2 09/04/2022 06:30 PM    LABGLOM 54 09/09/2022 05:00 AM    GLUCOSE 78 09/09/2022 05:00 AM    PROT 8.9 09/04/2022 06:30 PM    PROT 7.3 09/10/2012 02:55 PM    CALCIUM 10.1 09/09/2022 05:00 AM    BILITOT 0.4 09/04/2022 06:30 PM    ALKPHOS 88 09/04/2022 06:30 PM    AST 15 09/04/2022 06:30 PM    ALT 10 09/04/2022 06:30 PM       POC Tests:   Recent Labs     09/09/22  1626   POCGLU 100*       Coags:   Lab Results   Component Value Date/Time    PROTIME 15.9 06/08/2022 06:05 AM    PROTIME 19.8 01/02/2010 03:15 PM    INR 1.29 06/08/2022 06:05 AM    APTT 31.8 06/08/2022 06:05 AM       HCG (If Applicable): No results found for: PREGTESTUR, PREGSERUM, HCG, HCGQUANT     ABGs: No results found for: PHART, PO2ART, PPD7YBU, IJZ7DEO, BEART, G4SQVARI     Type & Screen (If

## 2022-09-09 NOTE — CARE COORDINATION
Chart reviewed day 5. Plan for patient to go to OR today for Metatarsal amp. PT/OT will see her after surgery. Ref sent to [facility].  Will continue to follow    Virgen Valerio RN

## 2022-09-09 NOTE — CARE COORDINATION
Chart reviewed day 6. Patient going to OR today. PT/OT will follow after OR. LVM for Thao Patel @ [facility] for update on referral. Will continue to follow. LVM again for Thoa Patel at [place] to verify that she got the referral.       Shakila confirmed received referral, will follow.    Saud Velarde RN

## 2022-09-09 NOTE — PROGRESS NOTES
D: Patient here from OR via bed, taken to bay 7 in PACU, all current drips, treatments, skin issues, and plan of care were reviewed by both RN's, patient transferred in stable condition, patient is awake and alert x 4, C/O of right foot burning, call light is in reach. A: Medicated per doctors order, see MAR, assessment completed and documented, discussed plan of care with patient who agreed.

## 2022-09-10 LAB
ALBUMIN SERPL-MCNC: 3.7 G/DL (ref 3.4–5)
ANION GAP SERPL CALCULATED.3IONS-SCNC: 11 MMOL/L (ref 3–16)
BUN BLDV-MCNC: 24 MG/DL (ref 7–20)
CALCIUM SERPL-MCNC: 9.2 MG/DL (ref 8.3–10.6)
CHLORIDE BLD-SCNC: 102 MMOL/L (ref 99–110)
CO2: 20 MMOL/L (ref 21–32)
CREAT SERPL-MCNC: 1 MG/DL (ref 0.6–1.2)
GFR AFRICAN AMERICAN: >60
GFR NON-AFRICAN AMERICAN: 54
GLUCOSE BLD-MCNC: 174 MG/DL (ref 70–99)
GLUCOSE BLD-MCNC: 215 MG/DL (ref 70–99)
GLUCOSE BLD-MCNC: 250 MG/DL (ref 70–99)
GLUCOSE BLD-MCNC: 70 MG/DL (ref 70–99)
GLUCOSE BLD-MCNC: 90 MG/DL (ref 70–99)
HCT VFR BLD CALC: 25.4 % (ref 36–48)
HEMOGLOBIN: 8.4 G/DL (ref 12–16)
MCH RBC QN AUTO: 26.4 PG (ref 26–34)
MCHC RBC AUTO-ENTMCNC: 33.1 G/DL (ref 31–36)
MCV RBC AUTO: 79.6 FL (ref 80–100)
PDW BLD-RTO: 15.8 % (ref 12.4–15.4)
PERFORMED ON: ABNORMAL
PERFORMED ON: NORMAL
PHOSPHORUS: 2.9 MG/DL (ref 2.5–4.9)
PLATELET # BLD: 206 K/UL (ref 135–450)
PMV BLD AUTO: 7.5 FL (ref 5–10.5)
POTASSIUM SERPL-SCNC: 4 MMOL/L (ref 3.5–5.1)
RBC # BLD: 3.19 M/UL (ref 4–5.2)
SODIUM BLD-SCNC: 133 MMOL/L (ref 136–145)
WBC # BLD: 10.7 K/UL (ref 4–11)

## 2022-09-10 PROCEDURE — 2580000003 HC RX 258: Performed by: NURSE PRACTITIONER

## 2022-09-10 PROCEDURE — 6360000002 HC RX W HCPCS: Performed by: INTERNAL MEDICINE

## 2022-09-10 PROCEDURE — 2060000000 HC ICU INTERMEDIATE R&B

## 2022-09-10 PROCEDURE — 36415 COLL VENOUS BLD VENIPUNCTURE: CPT

## 2022-09-10 PROCEDURE — 6360000002 HC RX W HCPCS: Performed by: NURSE PRACTITIONER

## 2022-09-10 PROCEDURE — 80069 RENAL FUNCTION PANEL: CPT

## 2022-09-10 PROCEDURE — 6370000000 HC RX 637 (ALT 250 FOR IP): Performed by: SURGERY

## 2022-09-10 PROCEDURE — 6370000000 HC RX 637 (ALT 250 FOR IP): Performed by: NURSE PRACTITIONER

## 2022-09-10 PROCEDURE — 85027 COMPLETE CBC AUTOMATED: CPT

## 2022-09-10 PROCEDURE — 6370000000 HC RX 637 (ALT 250 FOR IP): Performed by: INTERNAL MEDICINE

## 2022-09-10 RX ORDER — MORPHINE SULFATE 2 MG/ML
2 INJECTION, SOLUTION INTRAMUSCULAR; INTRAVENOUS EVERY 4 HOURS PRN
Status: COMPLETED | OUTPATIENT
Start: 2022-09-10 | End: 2022-09-10

## 2022-09-10 RX ORDER — HYDROMORPHONE HCL 110MG/55ML
0.5 PATIENT CONTROLLED ANALGESIA SYRINGE INTRAVENOUS EVERY 4 HOURS PRN
Status: DISCONTINUED | OUTPATIENT
Start: 2022-09-10 | End: 2022-09-14 | Stop reason: HOSPADM

## 2022-09-10 RX ORDER — OXYCODONE HYDROCHLORIDE 5 MG/1
5 TABLET ORAL EVERY 6 HOURS PRN
Status: DISCONTINUED | OUTPATIENT
Start: 2022-09-10 | End: 2022-09-14 | Stop reason: HOSPADM

## 2022-09-10 RX ORDER — HALOPERIDOL 5 MG/ML
2 INJECTION INTRAMUSCULAR EVERY 6 HOURS PRN
Status: DISCONTINUED | OUTPATIENT
Start: 2022-09-10 | End: 2022-09-14 | Stop reason: HOSPADM

## 2022-09-10 RX ORDER — OXYCODONE HYDROCHLORIDE AND ACETAMINOPHEN 5; 325 MG/1; MG/1
1 TABLET ORAL ONCE
Status: DISCONTINUED | OUTPATIENT
Start: 2022-09-10 | End: 2022-09-14 | Stop reason: HOSPADM

## 2022-09-10 RX ORDER — GABAPENTIN 100 MG/1
100 CAPSULE ORAL 3 TIMES DAILY
Status: DISCONTINUED | OUTPATIENT
Start: 2022-09-10 | End: 2022-09-14 | Stop reason: HOSPADM

## 2022-09-10 RX ORDER — ACETAMINOPHEN 500 MG
1000 TABLET ORAL 3 TIMES DAILY
Status: DISCONTINUED | OUTPATIENT
Start: 2022-09-10 | End: 2022-09-14 | Stop reason: HOSPADM

## 2022-09-10 RX ORDER — OXYCODONE HYDROCHLORIDE 5 MG/1
5 TABLET ORAL
Status: COMPLETED | OUTPATIENT
Start: 2022-09-10 | End: 2022-09-10

## 2022-09-10 RX ADMIN — BISACODYL 10 MG: 5 TABLET, COATED ORAL at 08:41

## 2022-09-10 RX ADMIN — FERROUS SULFATE TAB 325 MG (65 MG ELEMENTAL FE) 325 MG: 325 (65 FE) TAB at 08:41

## 2022-09-10 RX ADMIN — HYDROMORPHONE HYDROCHLORIDE 0.5 MG: 2 INJECTION, SOLUTION INTRAMUSCULAR; INTRAVENOUS; SUBCUTANEOUS at 21:38

## 2022-09-10 RX ADMIN — OXYCODONE 5 MG: 5 TABLET ORAL at 02:17

## 2022-09-10 RX ADMIN — FERROUS SULFATE TAB 325 MG (65 MG ELEMENTAL FE) 325 MG: 325 (65 FE) TAB at 21:26

## 2022-09-10 RX ADMIN — GABAPENTIN 100 MG: 100 CAPSULE ORAL at 13:33

## 2022-09-10 RX ADMIN — CEFEPIME 2000 MG: 2 INJECTION, POWDER, FOR SOLUTION INTRAVENOUS at 08:41

## 2022-09-10 RX ADMIN — HYDRALAZINE HYDROCHLORIDE 50 MG: 50 TABLET, FILM COATED ORAL at 08:50

## 2022-09-10 RX ADMIN — ACETAMINOPHEN 1000 MG: 500 TABLET ORAL at 10:31

## 2022-09-10 RX ADMIN — INSULIN GLARGINE 9 UNITS: 100 INJECTION, SOLUTION SUBCUTANEOUS at 21:52

## 2022-09-10 RX ADMIN — ENOXAPARIN SODIUM 40 MG: 100 INJECTION SUBCUTANEOUS at 08:41

## 2022-09-10 RX ADMIN — HYDROMORPHONE HYDROCHLORIDE 0.5 MG: 2 INJECTION, SOLUTION INTRAMUSCULAR; INTRAVENOUS; SUBCUTANEOUS at 14:19

## 2022-09-10 RX ADMIN — GABAPENTIN 100 MG: 100 CAPSULE ORAL at 21:26

## 2022-09-10 RX ADMIN — ACETAMINOPHEN 1000 MG: 500 TABLET ORAL at 21:24

## 2022-09-10 RX ADMIN — MORPHINE SULFATE 2 MG: 2 INJECTION, SOLUTION INTRAMUSCULAR; INTRAVENOUS at 08:36

## 2022-09-10 RX ADMIN — DIGOXIN 125 MCG: 125 TABLET ORAL at 08:42

## 2022-09-10 RX ADMIN — CEFEPIME 2000 MG: 2 INJECTION, POWDER, FOR SOLUTION INTRAVENOUS at 21:31

## 2022-09-10 RX ADMIN — SODIUM CHLORIDE, PRESERVATIVE FREE 10 ML: 5 INJECTION INTRAVENOUS at 08:58

## 2022-09-10 RX ADMIN — HYDRALAZINE HYDROCHLORIDE 50 MG: 50 TABLET, FILM COATED ORAL at 21:25

## 2022-09-10 RX ADMIN — HALOPERIDOL LACTATE 2 MG: 5 INJECTION, SOLUTION INTRAMUSCULAR at 17:16

## 2022-09-10 RX ADMIN — HYDRALAZINE HYDROCHLORIDE 50 MG: 50 TABLET, FILM COATED ORAL at 13:33

## 2022-09-10 RX ADMIN — OXYCODONE 5 MG: 5 TABLET ORAL at 12:13

## 2022-09-10 RX ADMIN — SODIUM CHLORIDE, PRESERVATIVE FREE 10 ML: 5 INJECTION INTRAVENOUS at 21:26

## 2022-09-10 RX ADMIN — VANCOMYCIN HYDROCHLORIDE 1000 MG: 1 INJECTION, POWDER, LYOPHILIZED, FOR SOLUTION INTRAVENOUS at 13:33

## 2022-09-10 RX ADMIN — ASPIRIN 81 MG: 81 TABLET, COATED ORAL at 08:42

## 2022-09-10 RX ADMIN — Medication 1 TABLET: at 08:41

## 2022-09-10 RX ADMIN — Medication 10 ML: at 10:30

## 2022-09-10 RX ADMIN — ATORVASTATIN CALCIUM 20 MG: 10 TABLET, FILM COATED ORAL at 08:43

## 2022-09-10 RX ADMIN — ACETAMINOPHEN 1000 MG: 500 TABLET ORAL at 16:18

## 2022-09-10 RX ADMIN — OXYCODONE 5 MG: 5 TABLET ORAL at 17:16

## 2022-09-10 RX ADMIN — HALOPERIDOL LACTATE 2 MG: 5 INJECTION, SOLUTION INTRAMUSCULAR at 10:30

## 2022-09-10 RX ADMIN — AMLODIPINE BESYLATE 10 MG: 5 TABLET ORAL at 08:42

## 2022-09-10 RX ADMIN — LISINOPRIL 20 MG: 20 TABLET ORAL at 08:42

## 2022-09-10 RX ADMIN — MORPHINE SULFATE 2 MG: 2 INJECTION, SOLUTION INTRAMUSCULAR; INTRAVENOUS at 04:53

## 2022-09-10 RX ADMIN — HYDROMORPHONE HYDROCHLORIDE 0.5 MG: 2 INJECTION, SOLUTION INTRAMUSCULAR; INTRAVENOUS; SUBCUTANEOUS at 10:28

## 2022-09-10 RX ADMIN — CLOPIDOGREL BISULFATE 75 MG: 75 TABLET ORAL at 08:42

## 2022-09-10 ASSESSMENT — PAIN DESCRIPTION - DESCRIPTORS
DESCRIPTORS: ACHING;BURNING
DESCRIPTORS: BURNING;ACHING;DISCOMFORT
DESCRIPTORS: BURNING
DESCRIPTORS: ACHING;BURNING
DESCRIPTORS: BURNING

## 2022-09-10 ASSESSMENT — PAIN DESCRIPTION - LOCATION
LOCATION: FOOT

## 2022-09-10 ASSESSMENT — PAIN SCALES - GENERAL
PAINLEVEL_OUTOF10: 0
PAINLEVEL_OUTOF10: 0
PAINLEVEL_OUTOF10: 7
PAINLEVEL_OUTOF10: 8
PAINLEVEL_OUTOF10: 10
PAINLEVEL_OUTOF10: 0
PAINLEVEL_OUTOF10: 7
PAINLEVEL_OUTOF10: 5
PAINLEVEL_OUTOF10: 9
PAINLEVEL_OUTOF10: 0
PAINLEVEL_OUTOF10: 0
PAINLEVEL_OUTOF10: 7
PAINLEVEL_OUTOF10: 0
PAINLEVEL_OUTOF10: 9
PAINLEVEL_OUTOF10: 10
PAINLEVEL_OUTOF10: 10
PAINLEVEL_OUTOF10: 7
PAINLEVEL_OUTOF10: 5
PAINLEVEL_OUTOF10: 10
PAINLEVEL_OUTOF10: 0
PAINLEVEL_OUTOF10: 0

## 2022-09-10 ASSESSMENT — PAIN DESCRIPTION - ORIENTATION
ORIENTATION: RIGHT

## 2022-09-10 ASSESSMENT — PAIN DESCRIPTION - PAIN TYPE
TYPE: SURGICAL PAIN

## 2022-09-10 ASSESSMENT — PAIN DESCRIPTION - FREQUENCY
FREQUENCY: CONTINUOUS

## 2022-09-10 ASSESSMENT — PAIN - FUNCTIONAL ASSESSMENT
PAIN_FUNCTIONAL_ASSESSMENT: INTOLERABLE, UNABLE TO DO ANY ACTIVE OR PASSIVE ACTIVITIES
PAIN_FUNCTIONAL_ASSESSMENT: PREVENTS OR INTERFERES SOME ACTIVE ACTIVITIES AND ADLS
PAIN_FUNCTIONAL_ASSESSMENT: PREVENTS OR INTERFERES SOME ACTIVE ACTIVITIES AND ADLS
PAIN_FUNCTIONAL_ASSESSMENT: INTOLERABLE, UNABLE TO DO ANY ACTIVE OR PASSIVE ACTIVITIES

## 2022-09-10 ASSESSMENT — PAIN SCALES - WONG BAKER
WONGBAKER_NUMERICALRESPONSE: 0

## 2022-09-10 NOTE — PLAN OF CARE
Pts pain and anxiety are adequately covered. Pt dozes and wakes, calls out complaining of burning sensation in her absent toes and foot. Dressing cdi and elevated. Pt medicated as ordered and safely possible. When pt does call out, I enter room and she is asleep, with leg elevated and a relaxed face, no stress lines. This is a repetitive process.  Pt is kept comfortable, with stable vs.

## 2022-09-10 NOTE — OP NOTE
[address] [address]                                OPERATIVE REPORT    PATIENT NAME: Linda Patricia                      :        1945  MED REC NO:   [id number]                          ROOM:       6716  ACCOUNT NO:   [de-identified]                           ADMIT DATE: 2022  PROVIDER:     Kwabena Erazo DPM    DATE OF PROCEDURE:  2022    This is an inpatient operative note at [facility].    SURGEON:  Kwabena Erazo DPM    ASSISTANT:  .    PREOPERATIVE DIAGNOSES:  1.  Osteomyelitis, right foot. 2.  Ulcer to bone, right foot. 3.  Diabetes with peripheral arterial disease. POSTOPERATIVE DIAGNOSES:  1.  Osteomyelitis, right foot. 2.  Ulcer to bone, right foot. 3.  Diabetes with peripheral arterial disease. PROCEDURE PERFORMED:  Transmetatarsal amputation, right foot. ANESTHESIA:  MAC and local, local was 10 mL of 1:1, 0.5% Marcaine plain  and 2.0% lidocaine plain. HEMOSTASIS:  Surgical dissection. ESTIMATED BLOOD LOSS:  Less than 100 mL. MATERIALS:  3-0 nylon. INJECTABLES:  A 10 mL of 0.5% Marcaine plain. PATHOLOGY:  1. The amputated specimen was sent to Pathology as a permanent  specimen. 2.  A clearance fragment from the first metatarsal was sent to Pathology  as a permanent specimen. 3.  Culture from the first metatarsal head was sent. COMPLICATIONS:  None. INDICATIONS FOR THE PROCEDURE:  The patient had signs and symptoms both  clinically and radiographically consistent with the above-mentioned  preoperative diagnosis. Due to her osteomyelitis, a decision was made  to bring the patient to the operating room for surgical intervention. Prior to the scheduling of surgery, potential risks, benefits and  complications were discussed with the patient.   Her questions were  answered and consent form was signed now for the procedure  transmetatarsal amputation of right foot. OPERATIVE PROCEDURE:  The patient was brought from the preoperative area  and placed on the operating room table in a supine position. Local  anesthetic block consisting of 10 mL of 1:1, 0.5% Marcaine plain and  2.0% lidocaine plain was injected proximal to the surgical site. The  right lower extremity was scrubbed, prepped and draped in the usual  sterile fashion. A #15 blade was used to make an incision  circumferentially around the patient's remaining toes which were toes 2,  3 and 5. The incision was full thickness in nature down to the level of  the metatarsophalangeal joint. At that level, the toes 2, 3 and 5 were  disarticulated. They were sent to Pathology as a permanent specimen. I then carried my incisions proximally along the first metatarsal and  the fifth metatarsal.  I then created a dorsal flap by resecting the  soft tissue attachments to the dorsal aspect of the metatarsals. Once I  created the dorsal flap, I could clearly see the metatarsals 1 through  5. Some of the first metatarsal head was clearly infected, necrotic and  unhealthy. I used a rongeur and removed some of that bone and sent for  culture. The debridement of that bone was excisional in nature down to  the level of bone using a rongeur. I then used a bone saw to perform an  osteotomy in metatarsals 1 through 5. I disarticulated the distal  aspect of the metatarsal and sent them to Pathology as a permanent  specimen with the patient's toes. Following that, I did make another  osteotomy in the first metatarsal as a clearance fragment and sent that  to Pathology as a permanent specimen. At the level of that osteotomy,  the bone was hard, white and healthy in appearance. I believe all  infected bone and soft tissue was resected. There was some excess soft  tissue both dorsally and plantarly.   I used a #15 blade and pickup to  remove some of that excess soft tissue which allowed for the dorsal and  plantar flaps to come together nicely. I used a Bovie to achieve  hemostasis. I irrigated the site with copious amounts of sterile saline  via pulse lavage. I then closed the site with 3-0 nylon. I injected 10 mL of 0.5%  Marcaine plain for postoperative pain relief. I applied a sterile  bandage of Xeroform, 4 x 4s, ABDs, Kerlix and Coban. The patient  tolerated the procedure and the anesthesia well. She was transported  from the operating room to the PACU with vital signs stable and vascular  status intact to all aspects of the right foot. Following a period of  postoperative monitoring, she will be discharged back to her room on the  floor with written and oral instructions per myself.         Tanja Yusuf DPM    D: 09/09/2022 18:53:55       T: 09/10/2022 0:25:14     [email or web address]  Job#: [id number]     Doc#: [id number]    CC:

## 2022-09-10 NOTE — PROGRESS NOTES
Podiatry Progress Note  Subjective:   Patient seen at bedside this morning. She reported pain overnight following surgery. She denied n/v/f/c and SOB. Objective:   Vitals:  BP (!) 178/76   Pulse 88   Temp 97.8 °F (36.6 °C)   Resp 20   Ht 5' 3\" (1.6 m)   Wt 115 lb 1.3 oz (52.2 kg)   SpO2 98%   BMI 20.39 kg/m²   Integument:  Well coapted right foot transmetatarsal amputation with sutures in place. No erythema. No active drainage. No acute signs of infection. Neurologic:  Protective sensation is grossly diminished to light touch at the distal foot, bilateral.  Vascular:  DP and PT pulses are non-palpable, bilateral.  CFT is less than five seconds to the distal foot, bilateral.  Mild edema at the right lower extremity. Musculoskeletal:  Transmetatarsal amputation on the right. Some pain expressed with bandage change on the right.         Labs:   CBC with Differential:    Lab Results   Component Value Date/Time    WBC 10.7 09/10/2022 04:48 AM    RBC 3.19 09/10/2022 04:48 AM    HGB 8.4 09/10/2022 04:48 AM    HCT 25.4 09/10/2022 04:48 AM     09/10/2022 04:48 AM    MCV 79.6 09/10/2022 04:48 AM    MCH 26.4 09/10/2022 04:48 AM    MCHC 33.1 09/10/2022 04:48 AM    RDW 15.8 09/10/2022 04:48 AM    SEGSPCT 71.4 05/23/2013 01:45 PM    LYMPHOPCT 15.3 09/05/2022 06:01 AM    MONOPCT 7.6 09/05/2022 06:01 AM    EOSPCT 2.0 04/26/2010 02:52 PM    BASOPCT 0.9 09/05/2022 06:01 AM    MONOSABS 0.6 09/05/2022 06:01 AM    LYMPHSABS 1.2 09/05/2022 06:01 AM    EOSABS 0.2 09/05/2022 06:01 AM    BASOSABS 0.1 09/05/2022 06:01 AM    DIFFTYPE Auto-K 05/23/2013 01:45 PM     CMP:    Lab Results   Component Value Date/Time     09/10/2022 04:48 AM    K 4.0 09/10/2022 04:48 AM    K 4.5 09/05/2022 06:01 AM     09/10/2022 04:48 AM    CO2 20 09/10/2022 04:48 AM    BUN 24 09/10/2022 04:48 AM    CREATININE 1.0 09/10/2022 04:48 AM    GFRAA >60 09/10/2022 04:48 AM    GFRAA >60 05/23/2013 01:45 PM    AGRATIO 1.2 09/04/2022 06:30 PM    LABGLOM 54 09/10/2022 04:48 AM    GLUCOSE 70 09/10/2022 04:48 AM    PROT 8.9 09/04/2022 06:30 PM    PROT 7.3 09/10/2012 02:55 PM    LABALBU 3.7 09/10/2022 04:48 AM    CALCIUM 9.2 09/10/2022 04:48 AM    BILITOT 0.4 09/04/2022 06:30 PM    ALKPHOS 88 09/04/2022 06:30 PM    AST 15 09/04/2022 06:30 PM    ALT 10 09/04/2022 06:30 PM     PT/INR:    Lab Results   Component Value Date/Time    PROTIME 15.9 06/08/2022 06:05 AM    PROTIME 19.8 01/02/2010 03:15 PM    INR 1.29 06/08/2022 06:05 AM     Last 3 Troponin:    Lab Results   Component Value Date/Time    TROPONINI <0.01 09/04/2022 06:30 PM     HgBA1c:    Lab Results   Component Value Date/Time    LABA1C 5.7 09/05/2022 06:01 AM       Imaging: Three Views, Right Foot 9/4/22:  Cortical irregularity of the 1st metatarsal head neck region is seen raising   the question of osteomyelitis. Post amputation changes of the 1st and 4th digit. MRI, Right Foot 9/7/22:  1. Findings consistent with acute osteomyelitis of the 1st metatarsal head,   neck and distal shaft. Acute osteomyelitis of the medial hallux sesamoid. Less pronounced osteomyelitis of the lateral hallux sesamoid is suspected. 2. Large soft tissue wound overlying the distal aspect of the 1st metatarsal.   Small amount of fluid emanating from the wound extending along the plantar   surface of the 1st metatarsal head. 3. Mild edema-like marrow signal abnormality in the 2nd metatarsal head. Reactive edema is favored over early osteomyelitis. Vascular:   Arterial Duplex 9/6/2022:  Conclusions        Summary        >50% right popliteal artery stenosis. Dense calcific disease in the tibial vessels- stenosis cannot be ruled out. Plan:   The patient is a 68year old woman with:  1 day S/P transmetatarsal amputation, right foot  1) Osteomyelitis, right foot  - Antibiotics per Primary Team  - I believe all infected tissue and bone has been removed  - NWB on the right.   I appreciate PT/OT's help  - No further surgical intervention planned from my standpoint  2) DM2 with neuropathy  - A1C 6/8/22: 5.4  3) DM2 with PAD  - Laser Skin Perfusion 8/2/22 - poor probability of wound healing in right foot  - S/P RLE angiogram with angioplasty/lithotripsy 9/7/22  4) Jesús Dwyer DPM  Office: [phone]  Cell: [phone]

## 2022-09-10 NOTE — PLAN OF CARE
Pt crying out in agony. Pt crying and begging to God. She wants her surgical dressing removed, thinking it is going to help the pain. I have elevated foot and given morphine. Pt states the missing toe hurts so bad. She is experiencing phantom pain. No anxiety meds ordered. And current pain regimen is not working. I messaged attending. Waiting response.

## 2022-09-10 NOTE — PROGRESS NOTES
reactive to light. Conjunctivae/corneas clear. Neck: Supple, with full range of motion. No jugular venous distention. Trachea midline. Respiratory:  Normal respiratory effort. Clear to auscultation, bilaterally without Rales/Wheezes/Rhonchi. Cardiovascular: Regular rate and rhythm with normal S1/S2 without murmurs, rubs or gallops. Abdomen: Soft, non-tender, non-distended with normal bowel sounds. Musculoskeletal: No clubbing, cyanosis or edema bilaterally. Full range of motion without deformity. Right foot in ACE bandages. Unwrapped ace and gauze is not tight or soaked with blood  Skin: Skin color, texture, turgor normal.  No rashes or lesions. Neurologic:  Neurovascularly intact without any focal sensory/motor deficits. Cranial nerves: II-XII intact, grossly non-focal.  Psychiatric: Alert and oriented, thought content appropriate, normal insight  Capillary Refill: Brisk,< 3 seconds   Peripheral Pulses: +2 palpable, equal bilaterally       Labs:   Recent Labs     09/08/22  0450 09/09/22  0500 09/10/22  0448   WBC 6.7 8.6 10.7   HGB 9.2* 8.7* 8.4*   HCT 27.3* 26.9* 25.4*    194 206       Recent Labs     09/08/22  0450 09/09/22  0500 09/10/22  0448   * 136 133*   K 4.4 4.0 4.0    103 102   CO2 20* 20* 20*   BUN 23* 25* 24*   CREATININE 0.9 1.0 1.0   CALCIUM 9.0 10.1 9.2   PHOS 3.4 3.0 2.9       No results for input(s): AST, ALT, BILIDIR, BILITOT, ALKPHOS in the last 72 hours. No results for input(s): INR in the last 72 hours. No results for input(s): Rhae Childes in the last 72 hours.       Urinalysis:      Lab Results   Component Value Date/Time    NITRU Negative 06/07/2022 01:55 PM    WBCUA 0-2 06/07/2022 01:55 PM    BACTERIA Rare 06/07/2022 01:55 PM    RBCUA 3-4 06/07/2022 01:55 PM    BLOODU LARGE 06/07/2022 01:55 PM    SPECGRAV <=1.005 06/07/2022 01:55 PM    GLUCOSEU Negative 06/07/2022 01:55 PM       Consults:    IP CONSULT TO PODIATRY  IP CONSULT TO HOSPITALIST  IP CONSULT TO PODIATRY  IP CONSULT TO PHARMACY  IP CONSULT TO SOCIAL WORK  IP CONSULT TO VASCULAR SURGERY      Assessment/Plan:    Active Hospital Problems    Diagnosis     Dirty living conditions [Z59.1]      Priority: Medium    Chronic anemia [D64.9]      Priority: Medium    Primary hypertension [I10]      Priority: Medium    Acute osteomyelitis of right foot ([facility] [M86.171]      Priority: Medium    Diabetes mellitus ([facility] [E11.9]        Right Foot OM - Dr. Maxine Lang consulted by ED. Started on empiric Cefepime/Vancocin Sunday 4 Sept - continued pending Podiatry recs and possible wound culture results - demonstrating S Marcenscens and M Morganii sensitive to current Cefepime. MRI c/w osteomyelitis w/ plans for TMA Friday 9 Sept. Vascular Surgery consulted and appreciated s/p angioplasty of high-grade popliteal stenosis 7 Sept w/out complications. DC Vanco tomorrow 24 hours post-amputation. Follow serial labs w/out evidence of ABX induced nephrotoxicity. Pain control - Tylenol scheduled, oxycodone 5 prn for moderate pain and dilaudid for severe pain. Gabapentin low dose started due to burning pain at amputation site      DM2 - controlled on home oral antiGlycemics - held. Started on Low dose Lantus and follow FSBS/SSI low regimen. Last HbA1c 5.4% dated June 2022. Anticipate resuming/continuing home regimen at discharge. HTN/CAD - w/ known CAD but no evidence of active signs/sxs of ischemia/failure. Currently controlled on home meds w/ vitals reviewed and documented as above. Afib - chronic paroxysmal of unspecified and clinically unable to determine etiology. Normally rate controlled on Digoxin - continued. NOT Anticoagulated at baseline . Monitored on tele. Anemia - etiology clinically unable to determine, w/out evidence of active bleeding/hemolysis. Stable and asymptomatic w/out indication for transfusion. Follow serial labs. Reviewed and documented as above.      Unsatisfactory Living Conditions (Z59.1)  -  consult placed       DVT Prophylaxis: LMWH     Recent Labs     09/08/22  0450 09/09/22  0500 09/10/22  0448    194 206       Diet: ADULT DIET; Dysphagia - Minced and Moist  Code Status: Full Code      PT/OT Eval Status: not yet ordered. Dispo - Patient is likely to remain in-house at least for the foreseeable future pending clinical course, subspecialty recs and eventual PT/OT eval/recs w/ possible placement decision .        Wero Lafleur, DO

## 2022-09-10 NOTE — DISCHARGE INSTRUCTIONS
Podiatry Discharge Instructions:  - Keep your bandage clean, dry and intact at the right foot  - Do not put any weight on your right foot  - Follow up in the office with Dr. Stanley Huff within a week of discharge from the hospital.  Call [phone] for an appointment

## 2022-09-10 NOTE — ANESTHESIA POSTPROCEDURE EVALUATION
Department of Anesthesiology  Postprocedure Note    Patient: Matthew Rose  MRN: [id number]  YOB: 1945  Date of evaluation: 9/9/2022      Procedure Summary     Date: 09/09/22 Room / Location: [address] [place] [address]    Anesthesia Start: 1739 Anesthesia Stop: Alicia Simon    Procedure: TRANSMETATARSAL AMPUTATION RIGHT FOOT (Right: Foot) Diagnosis:       Abscess of right foot      (Abscess of right foot [L02.611])    Surgeons: Geetha Archuleta DPM Responsible Provider: Sherry Pate MD    Anesthesia Type: general ASA Status: 3          Anesthesia Type: No value filed.     Moe Phase I: Moe Score: 10    Moe Phase II:        Anesthesia Post Evaluation    Comments: Postoperative Anesthesia Note    Name:    Matthew Rose  MRN:      [id number]    Patient Vitals in the past 12 hrs:  09/09/22 2000, BP:(!) 147/70, Temp:97.2 °F (36.2 °C), Temp src:Axillary, Pulse:78, Resp:18, SpO2:97 %  09/09/22 1915, BP:(!) 145/70, Pulse:78, Resp:20, SpO2:98 %  09/09/22 1910, BP:(!) 140/64, Pulse:73, Resp:10, SpO2:97 %  09/09/22 1905, Pulse:77, Resp:14, SpO2:95 %  09/09/22 1900, BP:(!) 150/64, Pulse:77, Resp:18, SpO2:98 %  09/09/22 1856, BP:(!) 145/79, Temp:98.7 °F (37.1 °C), Temp src:Temporal, Pulse:79, Resp:16, SpO2:98 %  09/09/22 1241, BP:(!) 166/68, Temp:98.1 °F (36.7 °C), Pulse:81, Resp:20, SpO2:94 %     LABS:    CBC  Lab Results       Component                Value               Date/Time                  WBC                      8.6                 09/09/2022 05:00 AM        HGB                      8.7 (L)             09/09/2022 05:00 AM        HCT                      26.9 (L)            09/09/2022 05:00 AM        PLT                      194                 09/09/2022 05:00 AM   RENAL  Lab Results       Component                Value               Date/Time                  NA                       136                 09/09/2022 05:00 AM        K                        4.0 09/09/2022 05:00 AM        K                        4.5                 09/05/2022 06:01 AM        CL                       103                 09/09/2022 05:00 AM        CO2                      20 (L)              09/09/2022 05:00 AM        BUN                      25 (H)              09/09/2022 05:00 AM        CREATININE               1.0                 09/09/2022 05:00 AM        GLUCOSE                  78                  09/09/2022 05:00 AM   COAGS  Lab Results       Component                Value               Date/Time                  PROTIME                  15.9 (H)            06/08/2022 06:05 AM        PROTIME                  19.8 (H)            01/02/2010 03:15 PM        INR                      1.29 (H)            06/08/2022 06:05 AM        APTT                     31.8                06/08/2022 06:05 AM     Intake & Output: In: 120 (P.O.:120)  Out: 400 (Urine:400)    Nausea & Vomiting:  No    Level of Consciousness:  Awake    Pain Assessment:  Adequate analgesia    Anesthesia Complications:  No apparent anesthetic complications    SUMMARY      Vital signs stable  OK to discharge from Stage I post anesthesia care.   Care transferred from Anesthesiology department on discharge from perioperative area

## 2022-09-10 NOTE — PLAN OF CARE
Attending at bedside. Dilaudid and haldol given. Vs initial and every hour with continuous oxygen monitoring. Pt resting peacefully. Right lower extremity elevated above her heart.  Hob flat, per pt and or md. Rr 16

## 2022-09-11 LAB
GLUCOSE BLD-MCNC: 152 MG/DL (ref 70–99)
GLUCOSE BLD-MCNC: 183 MG/DL (ref 70–99)
GLUCOSE BLD-MCNC: 187 MG/DL (ref 70–99)
PERFORMED ON: ABNORMAL

## 2022-09-11 PROCEDURE — 6370000000 HC RX 637 (ALT 250 FOR IP): Performed by: SURGERY

## 2022-09-11 PROCEDURE — 2060000000 HC ICU INTERMEDIATE R&B

## 2022-09-11 PROCEDURE — 6360000002 HC RX W HCPCS: Performed by: INTERNAL MEDICINE

## 2022-09-11 PROCEDURE — 97530 THERAPEUTIC ACTIVITIES: CPT

## 2022-09-11 PROCEDURE — 51798 US URINE CAPACITY MEASURE: CPT

## 2022-09-11 PROCEDURE — 97166 OT EVAL MOD COMPLEX 45 MIN: CPT

## 2022-09-11 PROCEDURE — 97110 THERAPEUTIC EXERCISES: CPT

## 2022-09-11 PROCEDURE — 97162 PT EVAL MOD COMPLEX 30 MIN: CPT

## 2022-09-11 PROCEDURE — 6360000002 HC RX W HCPCS: Performed by: NURSE PRACTITIONER

## 2022-09-11 PROCEDURE — 6370000000 HC RX 637 (ALT 250 FOR IP): Performed by: INTERNAL MEDICINE

## 2022-09-11 PROCEDURE — 2580000003 HC RX 258: Performed by: NURSE PRACTITIONER

## 2022-09-11 PROCEDURE — 97535 SELF CARE MNGMENT TRAINING: CPT

## 2022-09-11 PROCEDURE — 6370000000 HC RX 637 (ALT 250 FOR IP): Performed by: NURSE PRACTITIONER

## 2022-09-11 RX ORDER — SENNA PLUS 8.6 MG/1
1 TABLET ORAL NIGHTLY PRN
Status: DISCONTINUED | OUTPATIENT
Start: 2022-09-11 | End: 2022-09-14 | Stop reason: HOSPADM

## 2022-09-11 RX ADMIN — DIGOXIN 125 MCG: 125 TABLET ORAL at 09:05

## 2022-09-11 RX ADMIN — Medication 1 TABLET: at 09:04

## 2022-09-11 RX ADMIN — INSULIN GLARGINE 9 UNITS: 100 INJECTION, SOLUTION SUBCUTANEOUS at 20:56

## 2022-09-11 RX ADMIN — HYDRALAZINE HYDROCHLORIDE 50 MG: 50 TABLET, FILM COATED ORAL at 20:55

## 2022-09-11 RX ADMIN — OXYCODONE 5 MG: 5 TABLET ORAL at 05:07

## 2022-09-11 RX ADMIN — FERROUS SULFATE TAB 325 MG (65 MG ELEMENTAL FE) 325 MG: 325 (65 FE) TAB at 20:55

## 2022-09-11 RX ADMIN — GABAPENTIN 100 MG: 100 CAPSULE ORAL at 12:41

## 2022-09-11 RX ADMIN — FERROUS SULFATE TAB 325 MG (65 MG ELEMENTAL FE) 325 MG: 325 (65 FE) TAB at 09:04

## 2022-09-11 RX ADMIN — LISINOPRIL 20 MG: 20 TABLET ORAL at 09:04

## 2022-09-11 RX ADMIN — GABAPENTIN 100 MG: 100 CAPSULE ORAL at 20:56

## 2022-09-11 RX ADMIN — ENOXAPARIN SODIUM 40 MG: 100 INJECTION SUBCUTANEOUS at 09:03

## 2022-09-11 RX ADMIN — ASPIRIN 81 MG: 81 TABLET, COATED ORAL at 09:04

## 2022-09-11 RX ADMIN — ONDANSETRON 4 MG: 4 TABLET, ORALLY DISINTEGRATING ORAL at 09:02

## 2022-09-11 RX ADMIN — AMLODIPINE BESYLATE 10 MG: 5 TABLET ORAL at 09:03

## 2022-09-11 RX ADMIN — SODIUM CHLORIDE 5 ML/HR: 9 INJECTION, SOLUTION INTRAVENOUS at 06:58

## 2022-09-11 RX ADMIN — CEFEPIME 2000 MG: 2 INJECTION, POWDER, FOR SOLUTION INTRAVENOUS at 07:35

## 2022-09-11 RX ADMIN — HYDROMORPHONE HYDROCHLORIDE 0.5 MG: 2 INJECTION, SOLUTION INTRAMUSCULAR; INTRAVENOUS; SUBCUTANEOUS at 06:15

## 2022-09-11 RX ADMIN — ACETAMINOPHEN 1000 MG: 500 TABLET ORAL at 15:51

## 2022-09-11 RX ADMIN — ACETAMINOPHEN 1000 MG: 500 TABLET ORAL at 20:55

## 2022-09-11 RX ADMIN — CLOPIDOGREL BISULFATE 75 MG: 75 TABLET ORAL at 09:04

## 2022-09-11 RX ADMIN — HYDRALAZINE HYDROCHLORIDE 50 MG: 50 TABLET, FILM COATED ORAL at 12:41

## 2022-09-11 RX ADMIN — HYDRALAZINE HYDROCHLORIDE 50 MG: 50 TABLET, FILM COATED ORAL at 05:28

## 2022-09-11 RX ADMIN — GABAPENTIN 100 MG: 100 CAPSULE ORAL at 09:04

## 2022-09-11 RX ADMIN — ATORVASTATIN CALCIUM 20 MG: 10 TABLET, FILM COATED ORAL at 09:04

## 2022-09-11 RX ADMIN — ACETAMINOPHEN 1000 MG: 500 TABLET ORAL at 09:04

## 2022-09-11 RX ADMIN — OXYCODONE 5 MG: 5 TABLET ORAL at 22:56

## 2022-09-11 ASSESSMENT — PAIN SCALES - WONG BAKER: WONGBAKER_NUMERICALRESPONSE: 0

## 2022-09-11 ASSESSMENT — PAIN SCALES - GENERAL
PAINLEVEL_OUTOF10: 0
PAINLEVEL_OUTOF10: 7
PAINLEVEL_OUTOF10: 5
PAINLEVEL_OUTOF10: 7
PAINLEVEL_OUTOF10: 0
PAINLEVEL_OUTOF10: 5
PAINLEVEL_OUTOF10: 5
PAINLEVEL_OUTOF10: 0

## 2022-09-11 ASSESSMENT — PAIN DESCRIPTION - FREQUENCY: FREQUENCY: INTERMITTENT

## 2022-09-11 ASSESSMENT — PAIN DESCRIPTION - PAIN TYPE: TYPE: SURGICAL PAIN

## 2022-09-11 NOTE — PROGRESS NOTES
Occupational Therapy  [facility]  Occupational Therapy Initial Assessment/Treatment    Name: Miryam Cárdenas  : 1945  MRN: [id number]  Date of Service: 2022    Discharge Recommendations:  [address] [place] [address]          Patient Diagnosis(es): The primary encounter diagnosis was Osteomyelitis of right foot, unspecified type ([facility] [address] Diagnoses of Essential hypertension and Abscess of right foot were also pertinent to this visit. Past Medical History:  has a past medical history of Atrial fibrillation ([address] Blood transfusion, CAD (coronary artery disease), Diabetes mellitus type II, Diabetic neuropathy ([facility] [address] Gastric ulcer, H. pylori infection, Hyperlipidemia, Hypertension, Numbness and tingling of left leg, Osteoarthritis, Poor historian, PVD (peripheral vascular disease) ([facility] [address] Unspecified cerebral artery occlusion with cerebral infarction, and upper gi bleed. Past Surgical History:  has a past surgical history that includes Coronary angioplasty with stent ([id number]); Cardiac catheterization (12); Total shoulder arthroplasty (10/5/12); angioplasty (12/30/15); other surgical history (Left, 2017); Foot surgery (Left, 2018); Toe amputation (Right, 2022); Upper gastrointestinal endoscopy (N/A, 2022); Colonoscopy (2022); and Colonoscopy (N/A, 2022). Assessment   Performance deficits / Impairments: Decreased functional mobility ; Decreased strength;Decreased endurance;Decreased ADL status; Decreased safe awareness;Decreased cognition;Decreased balance;Decreased high-level IADLs  Patient evaluated s/p R foot Transmetatarsal amputation on 22, now NWB on RLE. Patient maxA to stand with mo-maxA with SW to transfer bed to chair. Pt with poor safety awareness during transfer with difficulty maintaining NWB to RLE. Pt maxA for LE ADls, min-supervision for UE ADLs/feeding. Pt motivated to working with therapy.  After evaluation, pt found to be presenting with the above mentioned occupational performance deficits which are affecting participation in daily living skills. Pt would benefit from continued skilled occupational therapy to address ADLs, functional mobility, and safety while in acute care. Prognosis: Good  Decision Making: Medium Complexity  REQUIRES OT FOLLOW-UP: Yes  Activity Tolerance  Activity Tolerance: Patient Tolerated treatment well        Plan   Plan  Times per Week: 3-5x's a week while in acute care     Restrictions  Restrictions/Precautions  Restrictions/Precautions: Weight Bearing, Fall Risk, General Precautions  Lower Extremity Weight Bearing Restrictions  Right Lower Extremity Weight Bearing: Non Weight Bearing    Subjective   General  Chart Reviewed: Yes, Orders, Operative Notes, Previous Admission, Progress Notes, History and Physical  Patient assessed for rehabilitation services?: Yes  Family / Caregiver Present: No  Referring Practitioner: Peggy Lazaro DPM 9/11/22  Diagnosis: R foot osteomylenitis, s/p Transmetatarsal amputation, right foot 9/09/22  Subjective  Subjective: Pt pleasant and agreeable to OT evaluaiton, states hoping to d/c to SN[facility].   Denies pain:   Social/Functional History  Social/Functional History  Lives With: Son Sarai Elizalde)  Type of Home: House  Home Layout: One level  Home Access: Stairs to enter without rails  Entrance Stairs - Number of Steps: 1 patsy  Bathroom Shower/Tub: Tub/Shower unit  Bathroom Toilet: Standard  Home Equipment: Cane, quad  Has the patient had two or more falls in the past year or any fall with injury in the past year?: No  Receives Help From: Family  ADL Assistance: Independent (Hasn't really taken shower sink she has been out of rehab and staying at 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, homemaker  Leisure & Hobbies: Baking, shopping at [employer], pt has 1 cat       Objective   Vitals Bp 132/75, HR 73 bpm, O2 98% on RA        Safety Devices  Type of Devices: Gait belt;Left in chair;Call light within reach; Chair alarm in place    Bed Mobility Training  Bed Mobility Training: Yes  Supine to Sit: Stand-by assistance (to R with HOB elevated and use of bed rail)  Sit to Supine: Other (comment) (up to chair at end of session)    Balance  Sitting: Intact  Standing: Impaired  Standing - Static: Constant support;Poor; Fair  Standing - Dynamic: Poor;Constant support    Transfer Training  Transfer Training: Yes  Sit to Stand: Maximum assistance  Stand to Sit: Maximum assistance  Bed to Chair: Moderate assistance (to R with SW, increased difficulty maintaining NWB to RLE, unsafe \"plop\" into chair)     AROM: Within functional limits  PROM: Within functional limits  Strength: Generally decreased, functional  Coordination: Generally decreased, functional  Tone: Normal  Sensation: Intact    ADL  Feeding: Setup  UE Dressing: Minimal assistance  LE Dressing: Maximum assistance      Vision  Vision: Impaired  Vision Exceptions: Wears glasses at all times  Hearing  Hearing: Within functional limits    Cognition  Overall Cognitive Status: Exceptions  Arousal/Alertness: Appropriate responses to stimuli  Following Commands: Follows one step commands with repetition; Follows one step commands with increased time; Follows multistep commands with repitition  Attention Span: Difficulty dividing attention; Attends with cues to redirect; Difficulty attending to directions  Memory: Appears intact  Safety Judgement: Decreased awareness of need for safety  Problem Solving: Decreased awareness of errors  Insights: Decreased awareness of deficits  Initiation: Does not require cues  Sequencing: Requires cues for some  Orientation  Overall Orientation Status: Within Functional Limits          Education Given To: Patient  Education Provided: Role of Therapy; ADL Adaptive Strategies; Fall Prevention Strategies; Plan of Care;Transfer Training;Precautions; Equipment  Education Method: Verbal;Demonstration  Barriers to Learning: None;Cognition  Education Outcome: Verbalized understanding;Demonstrated understanding;Continued education needed      In salvador of RLE wb'ing restrictions, educated patient on compensatory methods for LE ADLs. Pt verbalized understanding. AM-PAC Score        AM-PAC Inpatient Daily Activity Raw Score: 15 (09/11/22 1407)  AM-PAC Inpatient ADL T-Scale Score : 34.69 (09/11/22 1407)  ADL Inpatient CMS 0-100% Score: 56.46 (09/11/22 1407)  ADL Inpatient CMS G-Code Modifier : CK (09/11/22 1407)       Goals  Short Term Goals  Time Frame for Short term goals: 1 week 9/18  Short Term Goal 1: Pt will complete LE dressing with Suzi  Short Term Goal 2: Pt will complete BSC/Functional transfers with Suzi of 1  maintaining NWB to RLE  Short Term Goal 3: Pt will complete toilet transfer from [address] [place] [address] with SBA maintaining NWB to RLE by 9/16  Short Term Goal 4: Pt will stand with CGA using SW for UE support for 2 mins w/o LOB for prep for standing level ADLs. Patient Goals   Patient goals : \"to go to rehab and get stronger\"       Therapy Time   Individual Concurrent Group Co-treatment   Time In 1315         Time Out 1350         Minutes 35         Timed Code Treatment Minutes: 25 Minutes       Nanette Miranda OTR/L  If pt is unable to be seen after this session, please let this note serve as discharge summary. Please see case management note for discharge disposition. Thank you.

## 2022-09-11 NOTE — PLAN OF CARE
4 Eyes Skin Assessment     The patient is being assess for   Shift Handoff    I agree that 2 RN's have performed a thorough Head to Toe Skin Assessment on the patient. ALL assessment sites listed below have been assessed. Areas assessed for pressure by both nurses:   [x]   Head, Face, and Ears   [x]   Shoulders, Back, and Chest, Abdomen  [x]   Arms, Elbows, and Hands   [x]   Coccyx, Sacrum, and Ischium  [x]   Legs, Feet, and Heels        Skin Assessed Under all Medical Devices by both nurses: Ace wrap covering sx wound is not to be disturbed at this time. All Mepilex Borders were peeled back and area peeked at by both nurses:  No: no mepilex  Please list where Mepilex Borders are located:  na             **SHARE this note so that the co-signing nurse is able to place an eSignature**    Co-signer eSignature: {Esignature:[id number]}    Does the Patient have Skin Breakdown related to pressure?   No     (Insert Photo herena)         Jc Prevention initiated:  Yes   Wound Care Orders initiated:  NA      WOC nurse consulted for Pressure Injury (Stage 3,4, Unstageable, DTI, NWPT, Complex wounds)and New or Established Ostomies:  NA      Primary Nurse eSignature: {Esignature:[id number]}     eyes

## 2022-09-11 NOTE — PROGRESS NOTES
Physical Therapy  Facility/Department: [address] [place], [place] [address]  Physical Therapy Initial Assessment and Treatment    Name: Miryam Cárdenas  : 1945  MRN: [id number]  Date of Service: 2022    Discharge Recommendations:  Subacute/Skilled Nursing Facility   PT Equipment Recommendations  Equipment Needed: No  Other: defer to patient's facility      Patient Diagnosis(es): The primary encounter diagnosis was Osteomyelitis of right foot, unspecified type ([facility] [address] Diagnoses of Essential hypertension and Abscess of right foot were also pertinent to this visit. Past Medical History:  has a past medical history of Atrial fibrillation ([facility] [address] Blood transfusion, CAD (coronary artery disease), Diabetes mellitus type II, Diabetic neuropathy ([facility] [address] Gastric ulcer, H. pylori infection, Hyperlipidemia, Hypertension, Numbness and tingling of left leg, Osteoarthritis, Poor historian, PVD (peripheral vascular disease) ([facility] [address] Unspecified cerebral artery occlusion with cerebral infarction, and upper gi bleed. Past Surgical History:  has a past surgical history that includes Coronary angioplasty with stent ([id number]); Cardiac catheterization (12); Total shoulder arthroplasty (10/5/12); angioplasty (12/30/15); other surgical history (Left, 2017); Foot surgery (Left, 2018); Toe amputation (Right, 2022); Upper gastrointestinal endoscopy (N/A, 2022); Colonoscopy (2022); and Colonoscopy (N/A, 2022). If pt is unable to be seen after this session, please let this note serve as discharge summary. Please see case management note for discharge disposition. Thank you.    Barriers to home discharge:   [x] Steps to access home entry or bed/bath:   [x] No rail with steps to access home entry or bed/bath   [x] Reported available assist at home upon discharge limited   [x] Patient or family requests DC to other than home    [x] Patient reports expectation of post acute Discharge disposition to other than home   [x] Other: NWB RLE per podiatry  Assessment   Body Structures, Functions, Activity Limitations Requiring Skilled Therapeutic Intervention: Decreased functional mobility ; Decreased ADL status; Decreased strength;Decreased safe awareness;Decreased cognition;Decreased endurance;Decreased balance; Increased pain;Decreased posture  Assessment: Patient is a 68year old female who was [facility] to [facility] on 9/4/22 with osteomyelitis of right foot. Patient received right transmetatarsal amputation on 9/9/22. Patient reports that she is normally able to ambulate with a quad cane with modified independence. Today she required maximum assistance to stand. She was unable to ambulate today due to her very poor balance, poor endurance. She completed her exercises with guidance. Patient is functioning below baseline and would benefit from skilled therapy to address current deficits mentioned above. PT recommends that this patient receive skilled PT in the SNF setting, when medically stable, in order to address these deficits and to help her maximize her safety and independence with all functional mobility. PT to continue to follow. Treatment Diagnosis: decreased independence with functional mobility  Specific Instructions for Next Treatment: progress mobility as tolerated  Therapy Prognosis: Good  Decision Making: Medium Complexity  Barriers to Learning: none  Requires PT Follow-Up: Yes  Activity Tolerance  Activity Tolerance: Patient limited by fatigue;Patient limited by pain; Patient limited by endurance;Treatment limited secondary to decreased cognition  Activity Tolerance Comments: Vitals: 120/67 103 BPM 98% on room air.      Plan   Plan  Plan: 3-5 times per week  Plan weeks: 1 week 9/18/22  Specific Instructions for Next Treatment: progress mobility as tolerated  Current Treatment Recommendations: Strengthening, Balance training, Functional mobility training, Transfer training, Endurance training, Gait training, Home exercise program, Safety education & training, Patient/Caregiver education & training, Pain management, Equipment evaluation, education, & procurement, Therapeutic activities  Safety Devices  Type of Devices: Gait belt, Left in chair, Call light within reach, Chair alarm in place, Nurse notified, Long Luis elevated for pressure relief, All fall risk precautions in place, Patient at risk for falls     Restrictions  Restrictions/Precautions  Restrictions/Precautions: Weight Bearing, Fall Risk, General Precautions  Lower Extremity Weight Bearing Restrictions  Right Lower Extremity Weight Bearing: Non Weight Bearing     Subjective   Pain: 5/10 pain in right foot. Nursing aware. General  Chart Reviewed: Yes  Patient assessed for rehabilitation services?: Yes  Additional Pertinent Hx: HPI per chart, \"Xuan Romero is a 77-year-old female with significant past medical history of hypertension, hyperlipidemia, type 2 diabetes with neuropathy, significant peripheral vascular disease status post recent right great toe amputation who presents to [facility] with concerns for ongoing amputation infection. She is under the care of Dr. Evon Yepez, podiatry. Apparently, patient went to urgent care today, states that her foot was soaked and a bucket of water and there was visible maggots coming out of the wound. Urgent care staff encouraged her to report here for higher level care. Pleural, patient was evaluated laboratory studies, EKG, chest x-ray, and right foot x-ray. Right foot x-ray shows cortical irregularity of the first MTP neck region suggestive of osteomyelitis. Initial troponin less than 0.01.\" Patient received right TMA on 9/9/22. Response To Previous Treatment: Not applicable  Family / Caregiver Present: Yes (son , daughter in law)  Referring Practitioner: Esteban Councilman, DPM  Referral Date : 09/11/22  Follows Commands: Within Functional Limits  General Comment  Comments: Supine in bed upon entry of therapy staff. Cleared for therapy by RN. Subjective  Subjective: Patient agreed to participate. Social/Functional History  Social/Functional History  Lives With: Son Jane Ring)  Type of Home: House  Home Layout: One level  Home Access: Stairs to enter without rails  Entrance Stairs - Number of Steps: 1 patsy  Bathroom Shower/Tub: Tub/Shower unit  Bathroom Toilet: Standard  Home Equipment: Cane, quad  Has the patient had two or more falls in the past year or any fall with injury in the past year?: No  Receives Help From: Family  ADL Assistance: Independent (Hasn't really taken shower sink she has been out of rehab and staying at 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, homemaker  Leisure & Hobbies: 3nder, shopping at [employer], pt has 1 cat  Vision/Hearing  Vision  Vision: Impaired  Vision Exceptions: Wears glasses at all times; Cataracts  Hearing  Hearing: Within functional limits    Cognition   Orientation  Overall Orientation Status: Within Functional Limits  Cognition  Overall Cognitive Status: Exceptions  Arousal/Alertness: Appropriate responses to stimuli  Following Commands: Follows one step commands with repetition; Follows one step commands with increased time; Follows multistep commands with repitition  Attention Span: Difficulty dividing attention; Attends with cues to redirect; Difficulty attending to directions  Memory: Appears intact  Safety Judgement: Decreased awareness of need for safety  Problem Solving: Decreased awareness of errors  Insights: Decreased awareness of deficits  Initiation: Does not require cues  Sequencing: Requires cues for some  Cognition Comment: cueing for NWB RLE     Objective   Heart Rate: (!) 103  Heart Rate Source: Monitor  BP: 120/67  BP Location: Right upper arm  BP Method: Automatic  Patient Position: Up in chair  MAP (Calculated): 84.67  Resp: 16  SpO2: 98 %  O2 Device: None (Room air)     Observation/Palpation  Posture: Fair  Gross Assessment  AROM: Within functional limits (except decreased right ankle DF/PF due to pain/ace wrap)  PROM: Within functional limits (except right ankle PF/DF not tested)  Strength: Generally decreased, functional (except right ankle PF/DF not tested)                 Bed Mobility Training  Bed Mobility Training: No  Supine to Sit: Stand-by assistance (to R with HOB elevated and use of bed rail)  Sit to Supine: Other (comment) (up to chair at end of session)  Balance  Sitting: Intact  Standing: Impaired  Standing - Static: Poor  Standing - Dynamic: Poor  Transfer Training  Transfer Training: Yes (No complaints of shortness of breath, chest pain or dizziness during session)  Overall Level of Assistance: Maximum assistance  Interventions: Safety awareness training; Tactile cues; Verbal cues  Sit to Stand: Maximum assistance  Stand to Sit: Maximum assistance (very poor eccentric control of hip extension)  Bed to Chair: Other (comment) (patient requested to sit in chair at end of session)  Gait Training  Gait Training: No (not safe due to patient's poor balance.)              Balance  Posture: Fair  Sitting - Static: Fair  Sitting - Dynamic: Fair  Standing - Static: Poor  Comments: with standard walker  Exercise Treatment: 2 x 10 bilateral: straight leg raises, hip abduction, hip adduction with pillow squeezes, hip flexion, long arc quads. 1 x 10 LLE ankle pumps  Static Standing Balance Exercises: patient was able to stand for about 1 minute with mod/max assist to maintain her standing balance. Dynamic Standing Balance Exercises: standing \"toe up\", ankle plantarflexion exercises. patient required mod assist to maintain her balance. she completed 1 x 10 exercises.           AM-PAC Score     AM-PAC Inpatient Mobility without Stair Climbing Raw Score : 11 (09/11/22 1505)  AM-PAC Inpatient without Stair Climbing T-Scale Score : 35.66 (09/11/22 1505)  Mobility Inpatient CMS 0-100% Score: 67.36 (09/11/22 1505)  Mobility Inpatient without Stair CMS G-Code Modifier : CL (09/11/22 1505)     Goals  Short Term Goals  Time Frame for Short term goals: 1 week 9/18/22  Short term goal 1: Supine <> sit with modified independence. Short term goal 2: Sit <> stand with moderate assistance  Short term goal 3: Bed <> chair with least restrictive assistive device and moderate assistance. Short term goal 4: By 9/14/22 Patient will tolerate 12-15 reps of her exercises and be independent with her HEP  Short term goal 5: Assess patient's ability to ambulate and provide appropriate ambulation goal when safe to attempt. Patient Goals   Patient goals : To go to rehab. Education  Patient Education  Education Given To: Patient  Education Provided: Role of Therapy;Plan of Care;Home Exercise Program;Equipment; Fall Prevention Strategies;Transfer Training  Education Provided Comments: Disease Specific Education: Pt educated on importance of OOB mobility, completing her exercises, prevention of complications of bedrest, weight bearing status, appropriate DME and general safety during hospitalization.   Education Method: Demonstration;Verbal  Barriers to Learning: None  Education Outcome: Verbalized understanding;Continued education needed      Therapy Time   Individual Concurrent Group Co-treatment   Time In 1414         Time Out 1447         Minutes 33         Timed Code Treatment Minutes: 23 Minutes (10 minute evaluation)       Neema Schmidt, PT

## 2022-09-11 NOTE — PROGRESS NOTES
Hospitalist Progress Note      PCP: Sylvie Younger, APRN - CNP    Date of Admission: 9/4/2022    Chief Complaint: L foot wound. Subjective: Pain well controlled. Patient is doing much better today. She did have some emesis after eating a meal too fast but nausea improved with zofran. Without fever, chills, abdominal pain, chest pain or shortness of breath. Medications:  Reviewed    Infusion Medications    sodium chloride 5 mL/hr (09/11/22 0658)    dextrose       Scheduled Medications    oxyCODONE-acetaminophen  1 tablet Oral Once    acetaminophen  1,000 mg Oral TID    gabapentin  100 mg Oral TID    clopidogrel  75 mg Oral Daily    amLODIPine  10 mg Oral Daily    aspirin  81 mg Oral Daily    digoxin  125 mcg Oral Daily    ferrous sulfate  325 mg Oral BID    hydrALAZINE  50 mg Oral 3 times per day    lisinopril  20 mg Oral Daily    therapeutic multivitamin-minerals  1 tablet Oral Daily    atorvastatin  20 mg Oral Daily    sodium chloride flush  5-40 mL IntraVENous 2 times per day    enoxaparin  40 mg SubCUTAneous Daily    insulin glargine  0.15 Units/kg SubCUTAneous Nightly    insulin lispro  0-4 Units SubCUTAneous TID WC    insulin lispro  0-4 Units SubCUTAneous Nightly     PRN Meds: senna, HYDROmorphone, oxyCODONE, haloperidol lactate, labetalol, bisacodyl, sodium chloride flush, sodium chloride, ondansetron **OR** ondansetron, polyethylene glycol, acetaminophen **OR** acetaminophen, glucose, dextrose bolus **OR** dextrose bolus, glucagon (rDNA), dextrose      Intake/Output Summary (Last 24 hours) at 9/11/2022 1258  Last data filed at 9/11/2022 0654  Gross per 24 hour   Intake 1849.94 ml   Output 350 ml   Net 1499.94 ml         Physical Exam Performed:    BP (!) 128/58   Pulse 75   Temp 97.5 °F (36.4 °C)   Resp 16   Ht 5' 3\" (1.6 m)   Wt 125 lb 1.6 oz (56.7 kg)   SpO2 98%   BMI 22.16 kg/m²     General appearance: no acute distress,  appears stated age and cooperative.   HEENT: Pupils equal, round, and reactive to light. Conjunctivae/corneas clear. Neck: Supple, with full range of motion. No jugular venous distention. Trachea midline. Respiratory:  Normal respiratory effort. Clear to auscultation, bilaterally without Rales/Wheezes/Rhonchi. Cardiovascular: Regular rate and rhythm with normal S1/S2 without murmurs, rubs or gallops. Abdomen: Soft, non-tender, non-distended with normal bowel sounds. Musculoskeletal: No clubbing, cyanosis or edema bilaterally. Full range of motion without deformity. Right foot in ACE bandages. ace and gauze is not tight or soaked with blood  Skin: Skin color, texture, turgor normal.  No rashes or lesions. Neurologic:  Neurovascularly intact without any focal sensory/motor deficits. Cranial nerves: II-XII intact, grossly non-focal.  Psychiatric: Alert and oriented, thought content appropriate, normal insight  Capillary Refill: Brisk,< 3 seconds   Peripheral Pulses: +2 palpable, equal bilaterally       Labs:   Recent Labs     09/09/22  0500 09/10/22  0448   WBC 8.6 10.7   HGB 8.7* 8.4*   HCT 26.9* 25.4*    206       Recent Labs     09/09/22  0500 09/10/22  0448    133*   K 4.0 4.0    102   CO2 20* 20*   BUN 25* 24*   CREATININE 1.0 1.0   CALCIUM 10.1 9.2   PHOS 3.0 2.9       No results for input(s): AST, ALT, BILIDIR, BILITOT, ALKPHOS in the last 72 hours. No results for input(s): INR in the last 72 hours. No results for input(s): Clary Bhagatarch in the last 72 hours.       Urinalysis:      Lab Results   Component Value Date/Time    NITRU Negative 06/07/2022 01:55 PM    WBCUA 0-2 06/07/2022 01:55 PM    BACTERIA Rare 06/07/2022 01:55 PM    RBCUA 3-4 06/07/2022 01:55 PM    BLOODU LARGE 06/07/2022 01:55 PM    SPECGRAV <=1.005 06/07/2022 01:55 PM    GLUCOSEU Negative 06/07/2022 01:55 PM       Consults:    IP CONSULT TO PODIATRY  IP CONSULT TO HOSPITALIST  IP CONSULT TO PODIATRY  IP CONSULT TO PHARMACY  IP CONSULT TO SOCIAL WORK  IP CONSULT TO VASCULAR SURGERY      Assessment/Plan:    Active Hospital Problems    Diagnosis     Dirty living conditions [Z59.1]      Priority: Medium    Chronic anemia [D64.9]      Priority: Medium    Primary hypertension [I10]      Priority: Medium    Acute osteomyelitis of right foot ([facility] [address] [M86.171]      Priority: Medium    Diabetes mellitus ([facility] [E11.9]        Right Foot OM - Dr. Maria Esther Shukla consulted by ED. Started on empiric Cefepime/Vancocin Sunday 4 Sept - continued pending Podiatry recs and possible wound culture results - demonstrating S Marcenscens and M Morganii sensitive to current Cefepime. MRI c/w osteomyelitis w/ plans for TMA Friday 9 Sept. Vascular Surgery consulted and appreciated s/p angioplasty of high-grade popliteal stenosis 7 Sept w/out complications. Discontinue vancomycin  No growth of surgical culture for 48 hours, clean margins in OR obtained, discontinue cefepime  Pain regimen- Tylenol scheduled, oxycodone 5 prn for moderate pain and dilaudid for severe pain. Gabapentin low dose started due to burning pain at amputation site      DM2 - controlled on home oral antiGlycemics - held. Started on Low dose Lantus and follow FSBS/SSI low regimen. Last HbA1c 5.4% dated June 2022. Anticipate resuming/continuing home regimen at discharge. HTN/CAD - w/ known CAD but no evidence of active signs/sxs of ischemia/failure. Currently controlled on home meds w/ vitals reviewed and documented as above. Afib - chronic paroxysmal of unspecified and clinically unable to determine etiology. Normally rate controlled on Digoxin - continued. NOT Anticoagulated at baseline . Monitored on tele. Anemia - etiology clinically unable to determine, w/out evidence of active bleeding/hemolysis. Stable and asymptomatic w/out indication for transfusion. Follow serial labs. Reviewed and documented as above.      Unsatisfactory Living Conditions (Z59.1)  -  consult placed       DVT Prophylaxis: LMWH     Recent Labs     09/09/22  0500 09/10/22  0448    206       Diet: ADULT DIET; Dysphagia - Minced and Moist  Code Status: Full Code      PT/OT Eval Status:  ordered. Dispo - Patient is likely to remain in-house at least for the foreseeable future pending clinical course, subspecialty recs and eventual PT/OT eval/recs w/ possible placement decision .        Osorio Del Toro, DO

## 2022-09-12 PROBLEM — E43 SEVERE MALNUTRITION (HCC): Status: ACTIVE | Noted: 2022-09-12

## 2022-09-12 LAB
GLUCOSE BLD-MCNC: 164 MG/DL (ref 70–99)
GLUCOSE BLD-MCNC: 172 MG/DL (ref 70–99)
GLUCOSE BLD-MCNC: 207 MG/DL (ref 70–99)
GLUCOSE BLD-MCNC: 76 MG/DL (ref 70–99)
PERFORMED ON: ABNORMAL
PERFORMED ON: NORMAL

## 2022-09-12 PROCEDURE — 6360000002 HC RX W HCPCS: Performed by: NURSE PRACTITIONER

## 2022-09-12 PROCEDURE — 6370000000 HC RX 637 (ALT 250 FOR IP): Performed by: SURGERY

## 2022-09-12 PROCEDURE — 6370000000 HC RX 637 (ALT 250 FOR IP): Performed by: INTERNAL MEDICINE

## 2022-09-12 PROCEDURE — 6370000000 HC RX 637 (ALT 250 FOR IP): Performed by: NURSE PRACTITIONER

## 2022-09-12 PROCEDURE — 2580000003 HC RX 258: Performed by: NURSE PRACTITIONER

## 2022-09-12 PROCEDURE — 6360000002 HC RX W HCPCS: Performed by: INTERNAL MEDICINE

## 2022-09-12 PROCEDURE — 2060000000 HC ICU INTERMEDIATE R&B

## 2022-09-12 RX ADMIN — HYDRALAZINE HYDROCHLORIDE 50 MG: 50 TABLET, FILM COATED ORAL at 14:17

## 2022-09-12 RX ADMIN — ACETAMINOPHEN 1000 MG: 500 TABLET ORAL at 20:53

## 2022-09-12 RX ADMIN — ACETAMINOPHEN 1000 MG: 500 TABLET ORAL at 08:38

## 2022-09-12 RX ADMIN — INSULIN GLARGINE 9 UNITS: 100 INJECTION, SOLUTION SUBCUTANEOUS at 20:59

## 2022-09-12 RX ADMIN — GABAPENTIN 100 MG: 100 CAPSULE ORAL at 14:16

## 2022-09-12 RX ADMIN — HYDRALAZINE HYDROCHLORIDE 50 MG: 50 TABLET, FILM COATED ORAL at 22:26

## 2022-09-12 RX ADMIN — DIGOXIN 125 MCG: 125 TABLET ORAL at 08:38

## 2022-09-12 RX ADMIN — ATORVASTATIN CALCIUM 20 MG: 10 TABLET, FILM COATED ORAL at 08:38

## 2022-09-12 RX ADMIN — FERROUS SULFATE TAB 325 MG (65 MG ELEMENTAL FE) 325 MG: 325 (65 FE) TAB at 20:54

## 2022-09-12 RX ADMIN — SODIUM CHLORIDE, PRESERVATIVE FREE 10 ML: 5 INJECTION INTRAVENOUS at 08:45

## 2022-09-12 RX ADMIN — FERROUS SULFATE TAB 325 MG (65 MG ELEMENTAL FE) 325 MG: 325 (65 FE) TAB at 08:38

## 2022-09-12 RX ADMIN — HYDROMORPHONE HYDROCHLORIDE 0.5 MG: 2 INJECTION, SOLUTION INTRAMUSCULAR; INTRAVENOUS; SUBCUTANEOUS at 02:56

## 2022-09-12 RX ADMIN — GABAPENTIN 100 MG: 100 CAPSULE ORAL at 08:39

## 2022-09-12 RX ADMIN — GABAPENTIN 100 MG: 100 CAPSULE ORAL at 20:54

## 2022-09-12 RX ADMIN — CLOPIDOGREL BISULFATE 75 MG: 75 TABLET ORAL at 08:39

## 2022-09-12 RX ADMIN — LISINOPRIL 20 MG: 20 TABLET ORAL at 08:39

## 2022-09-12 RX ADMIN — ASPIRIN 81 MG: 81 TABLET, COATED ORAL at 08:39

## 2022-09-12 RX ADMIN — ACETAMINOPHEN 1000 MG: 500 TABLET ORAL at 14:16

## 2022-09-12 RX ADMIN — AMLODIPINE BESYLATE 10 MG: 5 TABLET ORAL at 08:39

## 2022-09-12 RX ADMIN — Medication 1 TABLET: at 08:39

## 2022-09-12 RX ADMIN — HYDRALAZINE HYDROCHLORIDE 50 MG: 50 TABLET, FILM COATED ORAL at 07:11

## 2022-09-12 RX ADMIN — ENOXAPARIN SODIUM 40 MG: 100 INJECTION SUBCUTANEOUS at 08:38

## 2022-09-12 ASSESSMENT — PAIN SCALES - GENERAL: PAINLEVEL_OUTOF10: 2

## 2022-09-12 NOTE — PROGRESS NOTES
Hospitalist Progress Note      PCP: Niki Jimenez, APRN - CNP    Date of Admission: 9/4/2022    Chief Complaint: L foot wound. Subjective: no new c/o. Medications:  Reviewed    Infusion Medications    sodium chloride 5 mL/hr (09/11/22 0658)    dextrose       Scheduled Medications    oxyCODONE-acetaminophen  1 tablet Oral Once    acetaminophen  1,000 mg Oral TID    gabapentin  100 mg Oral TID    clopidogrel  75 mg Oral Daily    amLODIPine  10 mg Oral Daily    aspirin  81 mg Oral Daily    digoxin  125 mcg Oral Daily    ferrous sulfate  325 mg Oral BID    hydrALAZINE  50 mg Oral 3 times per day    lisinopril  20 mg Oral Daily    therapeutic multivitamin-minerals  1 tablet Oral Daily    atorvastatin  20 mg Oral Daily    sodium chloride flush  5-40 mL IntraVENous 2 times per day    enoxaparin  40 mg SubCUTAneous Daily    insulin glargine  0.15 Units/kg SubCUTAneous Nightly    insulin lispro  0-4 Units SubCUTAneous TID WC    insulin lispro  0-4 Units SubCUTAneous Nightly     PRN Meds: senna, HYDROmorphone, oxyCODONE, haloperidol lactate, labetalol, bisacodyl, sodium chloride flush, sodium chloride, ondansetron **OR** ondansetron, polyethylene glycol, acetaminophen **OR** acetaminophen, glucose, dextrose bolus **OR** dextrose bolus, glucagon (rDNA), dextrose      Intake/Output Summary (Last 24 hours) at 9/12/2022 0910  Last data filed at 9/12/2022 8142  Gross per 24 hour   Intake 360 ml   Output 1125 ml   Net -765 ml         Physical Exam Performed:    BP (!) 145/65   Pulse 60   Temp 97.3 °F (36.3 °C) (Axillary)   Resp 16   Ht 5' 3\" (1.6 m)   Wt 125 lb 1.6 oz (56.7 kg)   SpO2 100%   BMI 22.16 kg/m²     General appearance: No apparent distress, appears stated age and cooperative. HEENT: Pupils equal, round, and reactive to light. Conjunctivae/corneas clear. Neck: Supple, with full range of motion. No jugular venous distention. Trachea midline. Respiratory:  Normal respiratory effort.  Clear to Foot OM - Dr. Rosaura Yepez consulted by ED. Started on empiric Cefepime/Vancocin Sunday 4 Sept - continued pending Podiatry recs and possible wound culture results - demonstrating S Marcenscens and M Morganii sensitive to current Cefepime. MRI c/w osteomyelitis s/p TMA Friday 9 Sept w/out complications. Vascular Surgery consulted and appreciated s/p angioplasty of high-grade popliteal stenosis 7 Sept w/out complications. D/C'd ABX post-op. Follow serial labs w/out evidence of ABX induced nephrotoxicity. DM2 - controlled on home oral antiGlycemics - held. Started on Low dose Lantus and follow FSBS/SSI low regimen. Last HbA1c 5.4% dated June 2022. Anticipate resuming/continuing home regimen at discharge. HTN/CAD - w/ known CAD but no evidence of active signs/sxs of ischemia/failure. Currently controlled on home meds w/ vitals reviewed and documented as above. Afib - chronic paroxysmal of unspecified and clinically unable to determine etiology. Normally rate controlled on Digoxin - continued. NOT Anticoagulated at baseline . Monitored on tele. Anemia - etiology clinically unable to determine, w/out evidence of active bleeding/hemolysis. Stable and asymptomatic w/out indication for transfusion. Follow serial labs. Reviewed and documented as above. Unsatisfactory Living Conditions (Z59.1)  -  consult placed       DVT Prophylaxis: LMWH     Recent Labs     09/10/22  0448          Diet: ADULT DIET; Dysphagia - Minced and Moist  Code Status: Full Code      PT/OT Eval Status: seen w/ recs for SNF     Dispo - Patient is likely to remain in-house at least for the foreseeable future pending clinical course, subspecialty recs and placement decision .        Prasanna Quan MD

## 2022-09-12 NOTE — PLAN OF CARE
Problem: Chronic Conditions and Co-morbidities  Goal: Patient's chronic conditions and co-morbidity symptoms are monitored and maintained or improved  Outcome: Progressing     Problem: Pain  Goal: Verbalizes/displays adequate comfort level or baseline comfort level  Outcome: Progressing     Problem: Discharge Planning  Goal: Discharge to home or other facility with appropriate resources  Outcome: Progressing

## 2022-09-12 NOTE — CARE COORDINATION
9/12/22 [facility] does not have any open beds and likely will not have any for a few days, will get pt's second facility choice. Update: F/U with pt and [facility].

## 2022-09-13 LAB
ALBUMIN SERPL-MCNC: 3.1 G/DL (ref 3.4–5)
ANION GAP SERPL CALCULATED.3IONS-SCNC: 9 MMOL/L (ref 3–16)
BUN BLDV-MCNC: 38 MG/DL (ref 7–20)
CALCIUM SERPL-MCNC: 8.8 MG/DL (ref 8.3–10.6)
CHLORIDE BLD-SCNC: 99 MMOL/L (ref 99–110)
CO2: 21 MMOL/L (ref 21–32)
CREAT SERPL-MCNC: 1.3 MG/DL (ref 0.6–1.2)
GFR AFRICAN AMERICAN: 48
GFR NON-AFRICAN AMERICAN: 40
GLUCOSE BLD-MCNC: 113 MG/DL (ref 70–99)
GLUCOSE BLD-MCNC: 137 MG/DL (ref 70–99)
GLUCOSE BLD-MCNC: 143 MG/DL (ref 70–99)
GLUCOSE BLD-MCNC: 155 MG/DL (ref 70–99)
GLUCOSE BLD-MCNC: 83 MG/DL (ref 70–99)
HCT VFR BLD CALC: 23.1 % (ref 36–48)
HEMOGLOBIN: 7.8 G/DL (ref 12–16)
MCH RBC QN AUTO: 27.1 PG (ref 26–34)
MCHC RBC AUTO-ENTMCNC: 33.9 G/DL (ref 31–36)
MCV RBC AUTO: 79.8 FL (ref 80–100)
PDW BLD-RTO: 15.9 % (ref 12.4–15.4)
PERFORMED ON: ABNORMAL
PHOSPHORUS: 4.5 MG/DL (ref 2.5–4.9)
PLATELET # BLD: 202 K/UL (ref 135–450)
PMV BLD AUTO: 7.3 FL (ref 5–10.5)
POTASSIUM SERPL-SCNC: 5.4 MMOL/L (ref 3.5–5.1)
RBC # BLD: 2.89 M/UL (ref 4–5.2)
SODIUM BLD-SCNC: 129 MMOL/L (ref 136–145)
WBC # BLD: 7.2 K/UL (ref 4–11)

## 2022-09-13 PROCEDURE — 85027 COMPLETE CBC AUTOMATED: CPT

## 2022-09-13 PROCEDURE — 6370000000 HC RX 637 (ALT 250 FOR IP): Performed by: INTERNAL MEDICINE

## 2022-09-13 PROCEDURE — 6370000000 HC RX 637 (ALT 250 FOR IP): Performed by: SURGERY

## 2022-09-13 PROCEDURE — 2580000003 HC RX 258: Performed by: NURSE PRACTITIONER

## 2022-09-13 PROCEDURE — 2060000000 HC ICU INTERMEDIATE R&B

## 2022-09-13 PROCEDURE — 80069 RENAL FUNCTION PANEL: CPT

## 2022-09-13 PROCEDURE — 6370000000 HC RX 637 (ALT 250 FOR IP): Performed by: NURSE PRACTITIONER

## 2022-09-13 PROCEDURE — 6360000002 HC RX W HCPCS: Performed by: NURSE PRACTITIONER

## 2022-09-13 PROCEDURE — 36415 COLL VENOUS BLD VENIPUNCTURE: CPT

## 2022-09-13 RX ADMIN — AMLODIPINE BESYLATE 10 MG: 5 TABLET ORAL at 09:02

## 2022-09-13 RX ADMIN — INSULIN GLARGINE 9 UNITS: 100 INJECTION, SOLUTION SUBCUTANEOUS at 19:53

## 2022-09-13 RX ADMIN — CLOPIDOGREL BISULFATE 75 MG: 75 TABLET ORAL at 09:01

## 2022-09-13 RX ADMIN — Medication 1 TABLET: at 09:01

## 2022-09-13 RX ADMIN — GABAPENTIN 100 MG: 100 CAPSULE ORAL at 13:39

## 2022-09-13 RX ADMIN — ACETAMINOPHEN 1000 MG: 500 TABLET ORAL at 19:53

## 2022-09-13 RX ADMIN — GABAPENTIN 100 MG: 100 CAPSULE ORAL at 19:53

## 2022-09-13 RX ADMIN — ACETAMINOPHEN 1000 MG: 500 TABLET ORAL at 09:01

## 2022-09-13 RX ADMIN — DIGOXIN 125 MCG: 125 TABLET ORAL at 09:01

## 2022-09-13 RX ADMIN — HYDRALAZINE HYDROCHLORIDE 50 MG: 50 TABLET, FILM COATED ORAL at 06:18

## 2022-09-13 RX ADMIN — ENOXAPARIN SODIUM 40 MG: 100 INJECTION SUBCUTANEOUS at 09:01

## 2022-09-13 RX ADMIN — FERROUS SULFATE TAB 325 MG (65 MG ELEMENTAL FE) 325 MG: 325 (65 FE) TAB at 09:01

## 2022-09-13 RX ADMIN — ASPIRIN 81 MG: 81 TABLET, COATED ORAL at 09:01

## 2022-09-13 RX ADMIN — LISINOPRIL 20 MG: 20 TABLET ORAL at 09:01

## 2022-09-13 RX ADMIN — ACETAMINOPHEN 1000 MG: 500 TABLET ORAL at 13:38

## 2022-09-13 RX ADMIN — GABAPENTIN 100 MG: 100 CAPSULE ORAL at 09:01

## 2022-09-13 RX ADMIN — FERROUS SULFATE TAB 325 MG (65 MG ELEMENTAL FE) 325 MG: 325 (65 FE) TAB at 19:53

## 2022-09-13 RX ADMIN — ATORVASTATIN CALCIUM 20 MG: 10 TABLET, FILM COATED ORAL at 09:01

## 2022-09-13 RX ADMIN — HYDRALAZINE HYDROCHLORIDE 50 MG: 50 TABLET, FILM COATED ORAL at 21:35

## 2022-09-13 RX ADMIN — SODIUM CHLORIDE, PRESERVATIVE FREE 10 ML: 5 INJECTION INTRAVENOUS at 09:23

## 2022-09-13 RX ADMIN — HYDRALAZINE HYDROCHLORIDE 50 MG: 50 TABLET, FILM COATED ORAL at 13:38

## 2022-09-13 NOTE — CASE COMMUNICATION
Arranged transport with [facility] EMS at 7 PM for [employer]. Awaiting d/c orders. Per RN MD states he is okay to send today.

## 2022-09-13 NOTE — PROGRESS NOTES
Hospitalist Progress Note      PCP: Holly Burnett, APRN - CNP    Date of Admission: 9/4/2022    Chief Complaint: L foot wound. Subjective: no new c/o. Medications:  Reviewed    Infusion Medications    sodium chloride 5 mL/hr (09/11/22 0658)    dextrose       Scheduled Medications    oxyCODONE-acetaminophen  1 tablet Oral Once    acetaminophen  1,000 mg Oral TID    gabapentin  100 mg Oral TID    clopidogrel  75 mg Oral Daily    amLODIPine  10 mg Oral Daily    aspirin  81 mg Oral Daily    digoxin  125 mcg Oral Daily    ferrous sulfate  325 mg Oral BID    hydrALAZINE  50 mg Oral 3 times per day    lisinopril  20 mg Oral Daily    therapeutic multivitamin-minerals  1 tablet Oral Daily    atorvastatin  20 mg Oral Daily    sodium chloride flush  5-40 mL IntraVENous 2 times per day    enoxaparin  40 mg SubCUTAneous Daily    insulin glargine  0.15 Units/kg SubCUTAneous Nightly    insulin lispro  0-4 Units SubCUTAneous TID WC    insulin lispro  0-4 Units SubCUTAneous Nightly     PRN Meds: senna, HYDROmorphone, oxyCODONE, haloperidol lactate, labetalol, bisacodyl, sodium chloride flush, sodium chloride, ondansetron **OR** ondansetron, polyethylene glycol, acetaminophen **OR** acetaminophen, glucose, dextrose bolus **OR** dextrose bolus, glucagon (rDNA), dextrose      Intake/Output Summary (Last 24 hours) at 9/13/2022 0907  Last data filed at 9/12/2022 0943  Gross per 24 hour   Intake 120 ml   Output --   Net 120 ml         Physical Exam Performed:    BP (!) 158/64   Pulse 52   Temp 97.6 °F (36.4 °C) (Axillary)   Resp 16   Ht 5' 3\" (1.6 m)   Wt 125 lb 1.6 oz (56.7 kg)   SpO2 96%   BMI 22.16 kg/m²     General appearance: No apparent distress, appears stated age and cooperative. HEENT: Pupils equal, round, and reactive to light. Conjunctivae/corneas clear. Neck: Supple, with full range of motion. No jugular venous distention. Trachea midline. Respiratory:  Normal respiratory effort.  Clear to auscultation, bilaterally without Rales/Wheezes/Rhonchi. Cardiovascular: Regular rate and rhythm with normal S1/S2 without murmurs, rubs or gallops. Abdomen: Soft, non-tender, non-distended with normal bowel sounds. Musculoskeletal: No clubbing, cyanosis or edema bilaterally. Full range of motion without deformity. Skin: Skin color, texture, turgor normal.  No rashes or lesions. Neurologic:  Neurovascularly intact without any focal sensory/motor deficits. Cranial nerves: II-XII intact, grossly non-focal.  Psychiatric: Alert and oriented, thought content appropriate, normal insight  Capillary Refill: Brisk,< 3 seconds   Peripheral Pulses: +2 palpable, equal bilaterally       Labs:   Recent Labs     09/13/22  0450   WBC 7.2   HGB 7.8*   HCT 23.1*          Recent Labs     09/13/22  0450   *   K 5.4*   CL 99   CO2 21   BUN 38*   CREATININE 1.3*   CALCIUM 8.8   PHOS 4.5       No results for input(s): AST, ALT, BILIDIR, BILITOT, ALKPHOS in the last 72 hours. No results for input(s): INR in the last 72 hours. No results for input(s): Kathi Buster in the last 72 hours.       Urinalysis:      Lab Results   Component Value Date/Time    NITRU Negative 06/07/2022 01:55 PM    [address] 0-2 06/07/2022 01:55 PM    BACTERIA Rare 06/07/2022 01:55 PM    RBCUA 3-4 06/07/2022 01:55 PM    BLOODU LARGE 06/07/2022 01:55 PM    SPECGRAV <=1.005 06/07/2022 01:55 PM    GLUCOSEU Negative 06/07/2022 01:55 PM       Consults:    IP CONSULT TO PODIATRY  IP CONSULT TO HOSPITALIST  IP CONSULT TO PODIATRY  IP CONSULT TO PHARMACY  IP CONSULT TO SOCIAL WORK  IP CONSULT TO VASCULAR SURGERY      Assessment/Plan:    Active Hospital Problems    Diagnosis     Severe malnutrition ([facility] [E43]      Priority: Medium    Dirty living conditions [Z59.1]      Priority: Medium    Chronic anemia [D64.9]      Priority: Medium    Primary hypertension [I10]      Priority: Medium    Acute osteomyelitis of right foot ([facility] [M86.171]      Priority: Medium    Diabetes mellitus ([facility] [E11.9]        Right Foot OM - Dr. Norah Rapp consulted by ED. Started on empiric Cefepime/Vancocin Sunday 4 Sept - continued pending Podiatry recs and possible wound culture results - demonstrating S Marcenscens and M Morganii sensitive to current Cefepime. MRI c/w osteomyelitis s/p TMA Friday 9 Sept w/out complications. Vascular Surgery consulted and appreciated s/p angioplasty of high-grade popliteal stenosis 7 Sept w/out complications. D/C'd ABX post-op. Follow serial labs w/out evidence of ABX induced nephrotoxicity. DM2 - controlled on home oral antiGlycemics - held. Started on Low dose Lantus and follow FSBS/SSI low regimen. Last HbA1c 5.4% dated June 2022. Anticipate resuming/continuing home regimen at discharge. HTN/CAD - w/ known CAD but no evidence of active signs/sxs of ischemia/failure. Currently controlled on home meds w/ vitals reviewed and documented as above. Afib - chronic paroxysmal of unspecified and clinically unable to determine etiology. Normally rate controlled on Digoxin - continued. NOT Anticoagulated at baseline . Monitored on tele. Anemia - etiology clinically unable to determine, w/out evidence of active bleeding/hemolysis. Stable and asymptomatic w/out indication for transfusion. Follow serial labs. Reviewed and documented as above. HypoNatremia - etiology clinically unable to determine but likely hypovolemic. Follow serial labs on IVF. Reviewed and documented as above. HyperKalemia - etiology clinically unable to determine. Follow serial labs. Reviewed and documented as above. Unsatisfactory Living Conditions (Z59.1)  -  consult placed       DVT Prophylaxis: LMWH     Recent Labs     09/13/22  0450          Diet: ADULT DIET;  Dysphagia - Minced and Moist  ADULT ORAL NUTRITION SUPPLEMENT; Breakfast, Lunch, Dinner; Standard High Calorie/High Protein Oral Supplement  Code Status: Full Code      PT/OT Eval Status: seen w/ recs for SNF     Dispo - Patient is likely to remain in-house at least for the foreseeable future pending clinical course, subspecialty recs and placement decision - tentatively [facility] [address]. This patient has a high likelihood of being discharged in the morning and is appropriate for transfer to the discharge unit in the AM Wed 14 Sept pending clinical stability o/night. This has been communicated to the nursing staff.        Toni Gongora MD

## 2022-09-13 NOTE — CARE COORDINATION
[facility] accepted and precert received from [facility]. Okay to d/c to SNF today if ready. Will arrange transport if cleared for discharge. Per [place] RN labs abnormal and she has perfect served MD. Will discuss with MD at rounds as to whether he is ready to d/c to SNF or not. Cleared for d/c per podiatry .

## 2022-09-13 NOTE — CARE COORDINATION
Per Dr. Tawanda Carroll he is not sending her to EGS today and wants to monitor labs another day. Precert good until 2/25.

## 2022-09-13 NOTE — CARE COORDINATION
NADYA submitted a [phone] for patient to go to [facility] and it is approved. Cyndi Alter ID: [id number]    Dates good for: 09/13/2022-09/15/2022    Next Review Date: 09/15/2022    NADYA will update CM.

## 2022-09-13 NOTE — PLAN OF CARE
Problem: Safety - Adult  Goal: Free from fall injury  Outcome: Progressing     Problem: Skin/Tissue Integrity  Goal: Absence of new skin breakdown  Description: 1. Monitor for areas of redness and/or skin breakdown  2. Assess vascular access sites hourly  3. Every 4-6 hours minimum:  Change oxygen saturation probe site  4. Every 4-6 hours:  If on nasal continuous positive airway pressure, respiratory therapy assess nares and determine need for appliance change or resting period.   Outcome: Progressing     Problem: Chronic Conditions and Co-morbidities  Goal: Patient's chronic conditions and co-morbidity symptoms are monitored and maintained or improved  Outcome: Progressing

## 2022-09-13 NOTE — PROGRESS NOTES
Patient is 4 days S/P transmetatarsal amputation on the right. She is okay for D/C from a Podiatry standpoint with follow up in a week. She should keep her bandage clean, dry and intact.     Stanley Huff, [facility], [facility], [address] [place] [address]  Office: [phone]  Mobile: [phone]

## 2022-09-13 NOTE — PROGRESS NOTES
Physician Progress Note      Tayo Mireles  CSN #:                  [id number]  :                       1945  ADMIT DATE:       2022 5:51 PM  [address] [place] DATE:  [place]  PROVIDER #:        Antoine Mauro MD          QUERY TEXT:    Pt admitted with \"Right Foot OM -S/P transmetatarsal amputation on the   right\"-Noted documentation of Dietary consult- -Malnutrition Status:    Severe malnutrition (22 1233). If possible, please document in progress   notes and discharge summary:    The medical record reflects the following:  Risk Factors: chronic illness -dentures- Pt with notable muscle and fat loss. Pt requesting ONS instead of meals. Discussed importance of adequate PO   intakes of both meals and ONS. Pt voiced understanding. Agreeable to minced   and moist diet. Will order Ensure enlive w/ meals. Clinical Indicators: - ? Dietary consult-  \"Severe malnutrition related to   inadequate protein-energy intake, biting/chewing (masticatory) difficulty as   evidenced by intake 26-50%, intake 0-25%, poor intake prior to admission,   severe muscle loss, severe loss of subcutaneous fat\"  ? Treatment: Dietary consult, Continue Current Diet, Start Oral Nutrition   Supplement, Continue to monitor while inpatient. Plan of Care discussed with:   Patient, Goals: PO intake 50% or greater, prior to discharge    Thank-You, Regla Tejada RN, BSN, CCDS  Options provided:  -- Severe malnutrition  confirmed present on admission  -- Severe malnutrition ruled out  -- Other - I will add my own diagnosis  -- Disagree - Not applicable / Not valid  -- Disagree - Clinically unable to determine / Unknown  -- Refer to Clinical Documentation Reviewer    PROVIDER RESPONSE TEXT:    The diagnosis of severe malnutrition was confirmed as present on admission.     Query created by: Juve Mcintyre on 2022 2:39 PM      Electronically signed by:  Antoine Mauro MD 2022 3:12 PM

## 2022-09-14 VITALS
HEART RATE: 74 BPM | WEIGHT: 127.43 LBS | BODY MASS INDEX: 22.58 KG/M2 | OXYGEN SATURATION: 99 % | SYSTOLIC BLOOD PRESSURE: 144 MMHG | TEMPERATURE: 97.5 F | DIASTOLIC BLOOD PRESSURE: 56 MMHG | RESPIRATION RATE: 16 BRPM | HEIGHT: 63 IN

## 2022-09-14 LAB
ALBUMIN SERPL-MCNC: 3.5 G/DL (ref 3.4–5)
ANAEROBIC CULTURE: NORMAL
ANION GAP SERPL CALCULATED.3IONS-SCNC: 10 MMOL/L (ref 3–16)
BUN BLDV-MCNC: 40 MG/DL (ref 7–20)
CALCIUM SERPL-MCNC: 10.1 MG/DL (ref 8.3–10.6)
CHLORIDE BLD-SCNC: 100 MMOL/L (ref 99–110)
CO2: 22 MMOL/L (ref 21–32)
CREAT SERPL-MCNC: 1.3 MG/DL (ref 0.6–1.2)
CULTURE SURGICAL: NORMAL
GFR AFRICAN AMERICAN: 48
GFR NON-AFRICAN AMERICAN: 40
GLUCOSE BLD-MCNC: 214 MG/DL (ref 70–99)
GLUCOSE BLD-MCNC: 74 MG/DL (ref 70–99)
GLUCOSE BLD-MCNC: 95 MG/DL (ref 70–99)
GRAM STAIN RESULT: NORMAL
HCT VFR BLD CALC: 24.2 % (ref 36–48)
HEMOGLOBIN: 8 G/DL (ref 12–16)
MCH RBC QN AUTO: 26.7 PG (ref 26–34)
MCHC RBC AUTO-ENTMCNC: 33.1 G/DL (ref 31–36)
MCV RBC AUTO: 80.5 FL (ref 80–100)
PDW BLD-RTO: 15.9 % (ref 12.4–15.4)
PERFORMED ON: ABNORMAL
PERFORMED ON: NORMAL
PHOSPHORUS: 4.1 MG/DL (ref 2.5–4.9)
PLATELET # BLD: 243 K/UL (ref 135–450)
PMV BLD AUTO: 6.9 FL (ref 5–10.5)
POTASSIUM SERPL-SCNC: 5.1 MMOL/L (ref 3.5–5.1)
RBC # BLD: 3 M/UL (ref 4–5.2)
SODIUM BLD-SCNC: 132 MMOL/L (ref 136–145)
WBC # BLD: 7.4 K/UL (ref 4–11)

## 2022-09-14 PROCEDURE — 6370000000 HC RX 637 (ALT 250 FOR IP): Performed by: INTERNAL MEDICINE

## 2022-09-14 PROCEDURE — 6370000000 HC RX 637 (ALT 250 FOR IP): Performed by: NURSE PRACTITIONER

## 2022-09-14 PROCEDURE — 2580000003 HC RX 258: Performed by: NURSE PRACTITIONER

## 2022-09-14 PROCEDURE — 6360000002 HC RX W HCPCS: Performed by: NURSE PRACTITIONER

## 2022-09-14 PROCEDURE — 85027 COMPLETE CBC AUTOMATED: CPT

## 2022-09-14 PROCEDURE — 97530 THERAPEUTIC ACTIVITIES: CPT

## 2022-09-14 PROCEDURE — 6370000000 HC RX 637 (ALT 250 FOR IP): Performed by: SURGERY

## 2022-09-14 PROCEDURE — 80069 RENAL FUNCTION PANEL: CPT

## 2022-09-14 PROCEDURE — 36415 COLL VENOUS BLD VENIPUNCTURE: CPT

## 2022-09-14 PROCEDURE — 97535 SELF CARE MNGMENT TRAINING: CPT

## 2022-09-14 RX ORDER — OXYCODONE HYDROCHLORIDE 5 MG/1
5 TABLET ORAL EVERY 6 HOURS PRN
Qty: 28 TABLET | Refills: 0 | Status: SHIPPED | OUTPATIENT
Start: 2022-09-14 | End: 2022-09-21

## 2022-09-14 RX ORDER — CLOPIDOGREL BISULFATE 75 MG/1
75 TABLET ORAL DAILY
Qty: 30 TABLET | Refills: 0 | Status: SHIPPED | OUTPATIENT
Start: 2022-09-15 | End: 2022-10-31 | Stop reason: SDUPTHER

## 2022-09-14 RX ORDER — ATORVASTATIN CALCIUM 20 MG/1
20 TABLET, FILM COATED ORAL DAILY
Qty: 30 TABLET | Refills: 0 | Status: SHIPPED | OUTPATIENT
Start: 2022-09-15 | End: 2022-10-31 | Stop reason: SDUPTHER

## 2022-09-14 RX ADMIN — CLOPIDOGREL BISULFATE 75 MG: 75 TABLET ORAL at 09:31

## 2022-09-14 RX ADMIN — FERROUS SULFATE TAB 325 MG (65 MG ELEMENTAL FE) 325 MG: 325 (65 FE) TAB at 09:31

## 2022-09-14 RX ADMIN — ASPIRIN 81 MG: 81 TABLET, COATED ORAL at 09:30

## 2022-09-14 RX ADMIN — ACETAMINOPHEN 1000 MG: 500 TABLET ORAL at 14:26

## 2022-09-14 RX ADMIN — SODIUM CHLORIDE, PRESERVATIVE FREE 10 ML: 5 INJECTION INTRAVENOUS at 09:31

## 2022-09-14 RX ADMIN — ENOXAPARIN SODIUM 40 MG: 100 INJECTION SUBCUTANEOUS at 09:31

## 2022-09-14 RX ADMIN — HYDRALAZINE HYDROCHLORIDE 50 MG: 50 TABLET, FILM COATED ORAL at 05:46

## 2022-09-14 RX ADMIN — ACETAMINOPHEN 1000 MG: 500 TABLET ORAL at 09:30

## 2022-09-14 RX ADMIN — INSULIN LISPRO 1 UNITS: 100 INJECTION, SOLUTION INTRAVENOUS; SUBCUTANEOUS at 12:04

## 2022-09-14 RX ADMIN — BISACODYL 10 MG: 5 TABLET, COATED ORAL at 09:50

## 2022-09-14 RX ADMIN — AMLODIPINE BESYLATE 10 MG: 5 TABLET ORAL at 09:30

## 2022-09-14 RX ADMIN — DIGOXIN 125 MCG: 125 TABLET ORAL at 09:50

## 2022-09-14 RX ADMIN — Medication 1 TABLET: at 09:31

## 2022-09-14 RX ADMIN — GABAPENTIN 100 MG: 100 CAPSULE ORAL at 09:31

## 2022-09-14 RX ADMIN — HYDRALAZINE HYDROCHLORIDE 50 MG: 50 TABLET, FILM COATED ORAL at 14:26

## 2022-09-14 RX ADMIN — LISINOPRIL 20 MG: 20 TABLET ORAL at 09:31

## 2022-09-14 RX ADMIN — ATORVASTATIN CALCIUM 20 MG: 10 TABLET, FILM COATED ORAL at 09:31

## 2022-09-14 RX ADMIN — GABAPENTIN 100 MG: 100 CAPSULE ORAL at 15:25

## 2022-09-14 ASSESSMENT — PAIN SCALES - GENERAL
PAINLEVEL_OUTOF10: 0

## 2022-09-14 NOTE — DISCHARGE INSTR - COC
Continuity of Care Form    Patient Name: Haydee Halsted   :    MRN:  [id number]    Admit date:  2022  Discharge date:  2022    Code Status Order: Full Code   Advance Directives:     Admitting Physician:  Lacey Shipley MD  PCP: MARVIN Perez CNP    Discharging Nurse: [facility] Unit/Room#: 0117/0117-01  Discharging Unit Phone Number: ***    Emergency Contact:   Extended Emergency Contact Information  Primary Emergency Contact: jordan jacome  Home Phone: [phone]  Mobile Phone: [phone]  Relation: Child  Secondary Emergency Contact: [facility] Phone: [phone]  Mobile Phone: [phone]  Relation: Daughter-in-Law    Past Surgical History:  Past Surgical History:   Procedure Laterality Date    ANGIOPLASTY  12/30/15    Dr. Aleah Gastelum, LLE, PTA of distal sfa/pop/peroneal    CARDIAC CATHETERIZATION  12    done for surgical clearance, cath was negative    COLONOSCOPY  2022    COLONOSCOPY N/A 2022    COLONOSCOPY CONTROL HEMORRHAGE performed by Efren Aragon MD at [address] [place] [address], [address] [phone]    FOOT SURGERY Left 2018    DEBRIDEMENT OF ULCERS LEFT FOOT AND LEG WITH GRAFT    OTHER SURGICAL HISTORY Left 2017    Left Femoral Peroneal Bypass    SHOULDER ARTHROPLASTY  10/5/12    RIGHT SHOULDER TOTAL ARTHROPLASTY, BICEPS TENODESIS DEPUY, GLOBAL ADVANTAGE AP    TOE AMPUTATION Right 2022    AMPUTATION OF RIGHT FIRST AND FOURTH TOES performed by Rosemarie Mehta DPM at [address] [facility] Right 2022    TRANSMETATARSAL AMPUTATION RIGHT FOOT performed by Rosemarie Mehta DPM at [facility] N/A 2022    EGD IV SEDATION performed by Efren Aragon MD at [id number] [place] [address]       Immunization History:   Immunization History   Administered Date(s) Administered    Pneumococcal Conjugate 13-valent Becki Schroeder) 2016 Pneumococcal Polysaccharide ([id number]) 07/25/2011    Tdap (Boostrix, Adacel) 06/03/2021       Active Problems:  Patient Active Problem List   Diagnosis Code    Osteoarthritis M19.90    Noncompliance of patient with dietary regimen Z91.11    PAD (peripheral artery disease) ([facility] I73.9    Pure hypercholesterolemia E78.00    Coronary artery disease involving native coronary artery of native heart without angina pectoris I25.10    Essential hypertension I10    Diabetes mellitus ([facility] E11.9    Vitamin D deficiency E55.9    Acute osteomyelitis of left foot ([facility]) M86.172    Hammertoe, bilateral M20.41, M20.42    Colonoscopy refused Z53.20    Chronic deep vein thrombosis (DVT) of left peroneal vein ([facility]) I82.552    Acute osteomyelitis of right foot ([facility]) M86.171    Osteomyelitis of right foot ([facility]) M86.9    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, with bone involvement without evidence of necrosis ([facility]) C29.568, L97.516    Primary hypertension I10    Cellulitis of right lower extremity L03.115    PVD (peripheral vascular disease) ([facility]) I73.9    Chronic anemia D64.9    Gastrointestinal hemorrhage K92.2    Acute blood loss anemia D62    Dirty living conditions Z59.1    Severe malnutrition ([facility]) E43       Isolation/Infection:   Isolation            No Isolation          Patient Infection Status       Infection Onset Added Last Indicated Last Indicated By Review Planned Expiration Resolved Resolved By    None active    Resolved    COVID-19 (Rule Out) 05/13/22 05/13/22 05/13/22 COVID-19 & Influenza Combo (Ordered)   05/13/22 Rule-Out Test Resulted            Nurse Assessment:  Last Vital Signs: BP (!) 153/63   Pulse 73   Temp 97.5 °F (36.4 °C) (Oral)   Resp 16   Ht 5' 3\" (1.6 m)   Wt 127 lb 6.8 oz (57.8 kg)   SpO2 97%   BMI 22.57 kg/m²     Last documented pain score (0-10 scale): Pain Level: 0  Last Weight:   Wt Readings from Last 1 Encounters:   09/14/22 127 lb 6.8 oz (57.8 kg) Mental Status:  oriented and alert    IV Access:  - None    Nursing Mobility/ADLs:  Walking   Assisted  Transfer  Assisted  Bathing  Assisted  Dressing  Assisted  Toileting  Assisted  Feeding  Independent  Med Admin  Assisted  Med Delivery   whole    Wound Care Documentation and Therapy:  Incision 09/09/22 Foot Right (Active)   Dressing Status Clean;Dry; Intact 09/11/22 1029   Dressing/Treatment Other (comment) 09/10/22 0900   Closure Sutures 09/09/22 1835   Margins Approximated 09/09/22 1835   Number of days: 4        Elimination:  Continence: Bowel: No  Bladder: No  Urinary Catheter: None   Colostomy/Ileostomy/Ileal Conduit: No       Date of Last BM: 9/13/22    Intake/Output Summary (Last 24 hours) at 9/14/2022 0851  Last data filed at 9/13/2022 1934  Gross per 24 hour   Intake 0 ml   Output 1100 ml   Net -1100 ml     I/O last 3 completed shifts: In: 0   Out: 1100 [Urine:1100]    Safety Concerns: At Risk for Falls    Impairments/Disabilities:      None    Nutrition Therapy:  Current Nutrition Therapy:   - Oral Diet:  Dysphagia 2 mechanically altered    Routes of Feeding: Oral  Liquids: Thin Liquids  Daily Fluid Restriction: no  Last Modified Barium Swallow with Video (Video Swallowing Test): not done    Treatments at the Time of Hospital Discharge:   Respiratory Treatments:   Oxygen Therapy:  is not on home oxygen therapy.   Ventilator:    - No ventilator support    Rehab Therapies: Physical Therapy and Occupational Therapy  Weight Bearing Status/Restrictions: No weight bearing restrictions  Other Medical Equipment (for information only, NOT a DME order):  walker  Other Treatments: ***    Patient's personal belongings (please select all that are sent with patient):  None    RN SIGNATURE:  Electronically signed by Marcelle Saldaña RN on 9/14/22 at 3:33 PM EDT    CASE MANAGEMENT/SOCIAL WORK SECTION    Inpatient Status Date: ***    Readmission Risk Assessment Score:  Readmission Risk              Risk of

## 2022-09-14 NOTE — PROGRESS NOTES
Occupational Therapy  Facility/Department: [place] [address] [facility]RY  Daily Treatment Note  NAME: Rio Cornell  : 1945  MRN: [id number]    Date of Service: 2022    Discharge Recommendations:  Subacute/Skilled Nursing Facility  OT Equipment Recommendations  Other: defer      Patient Diagnosis(es): The primary encounter diagnosis was Osteomyelitis of right foot, unspecified type ([address] Diagnoses of Essential hypertension and Abscess of right foot were also pertinent to this visit. Assessment    Assessment: Pt pleasant and agreeable. Pt limited to bed level this day d/t fatigue/ pt request. Pt completed grooming SBA however needed Max A for LE bathing, and total A for brief/ toilet hygiene. Pt demo good bed mobility this day and was educated on WB restriction and safe bed level therex to perform. Cont per POC  Activity Tolerance: Patient tolerated treatment well;Patient limited by fatigue  Discharge Recommendations: Subacute/Skilled Nursing Facility  Other: defer      Plan   Plan  Times per Week: 3-5x's a week while in acute care     Restrictions  Restrictions/Precautions  Restrictions/Precautions: Weight Bearing; Fall Risk;General Precautions  Lower Extremity Weight Bearing Restrictions  Right Lower Extremity Weight Bearing: Non Weight Bearing    Subjective   Subjective  Subjective: Pt pleasant and agreeable to tx. Pt reports 4/10 pain in L toe. RN approved visit  Orientation  Overall Orientation Status: Within Functional Limits  Cognition  Overall Cognitive Status: Exceptions  Arousal/Alertness: Appropriate responses to stimuli  Following Commands: Follows one step commands with repetition; Follows one step commands with increased time; Follows multistep commands with repitition  Attention Span: Attends with cues to redirect  Memory: Appears intact  Safety Judgement: Decreased awareness of need for safety  Problem Solving: Decreased awareness of errors  Insights: Decreased awareness of deficits  Initiation: Does not require cues  Sequencing: Requires cues for some  Cognition Comment: cueing for NWB RLE        Objective    Vitals  Vitals  Heart Rate: 74  BP: (!) 146/62  BP Location: Right upper arm  Patient Position: Semi fowlers  MAP (Calculated): 90  SpO2: 99 %  O2 Device: None (Room air)  Bed Mobility Training  Bed Mobility Training: Yes  Overall Level of Assistance:  (HOB elevated and use of rails)  Rolling: Stand-by assistance  Supine to Sit: Stand-by assistance; Additional time; Adaptive equipment  Sit to Supine: Stand-by assistance; Adaptive equipment; Additional time  Transfer Training  Transfer Training: No     ADL  Feeding: Independent  Grooming: Setup;Stand by assistance  Grooming Skilled Clinical Factors: to wash face and comb hair  LE Bathing: Dependent/Total  LE Bathing Skilled Clinical Factors: to wash buttocks  UE Dressing: Minimal assistance  UE Dressing Skilled Clinical Factors: gown exchange  LE Dressing: Dependent/Total  LE Dressing Skilled Clinical Factors: Dep for brief exchange  Toileting: Dependent/Total  Toileting Skilled Clinical Factors: incontinent of bladder        Safety Devices  Type of Devices: Gait belt;Call light within reach;Nurse notified; Heels elevated for pressure relief; All fall risk precautions in place; Patient at risk for falls; Left in bed;Bed alarm in place     Patient Education  Education Given To: Patient  Education Provided: Role of Therapy; ADL Adaptive Strategies; Fall Prevention Strategies; Plan of Care;Transfer Training;Precautions; Equipment  Education Method: Verbal;Demonstration  Barriers to Learning: None;Cognition  Education Outcome: Verbalized understanding;Demonstrated understanding;Continued education needed    Goals  Short Term Goals  Time Frame for Short term goals: 1 week 9/18  Short Term Goal 1: Pt will complete LE dressing with Suzi  Short Term Goal 2: Pt will complete BSC/Functional transfers with Suzi of 1  maintaining NWB to RLE  Short Term Goal 3: Pt will complete toilet transfer from [address] [place] [address] with SBA maintaining NWB to RLE by 9/16  Short Term Goal 4: Pt will stand with CGA using SW for UE support for 2 mins w/o LOB for prep for standing level ADLs. Patient Goals   Patient goals : Gissel Ontiveros go to rehab and get stronger\"       Therapy Time   Individual Concurrent Group Co-treatment   Time In 31-70-28-28         Time Out 0950         Minutes 25         Timed Code Treatment Minutes: [address], OT  If pt is unable to be seen after this session, please let this note serve as discharge summary. Please see case management note for discharge disposition. Thank you.

## 2022-09-14 NOTE — CARE COORDINATION
Transport arranged with Taj Renner ([facility]) for 4 PM in anticipation that patient will discharge today. Precert is good with [facility] until 9/15. [facility] ready for patient today.

## 2022-09-14 NOTE — PROGRESS NOTES
Report given to [facility] RAO at [facility]. No questions or concerns at this time. Call back number given with any questions. Informed of estimated p/u 1600. Will continue to monitor patient.  Electronically signed by Jeff May RN on 9/14/22 at 3:11 PM EDT

## 2022-09-14 NOTE — PROGRESS NOTES
Hospitalist Progress Note      PCP: Niki Jimenez, APRN - CNP    Date of Admission: 9/4/2022    Chief Complaint: L foot wound. Subjective: no new c/o. Medications:  Reviewed    Infusion Medications    sodium chloride 5 mL/hr (09/11/22 0658)    dextrose       Scheduled Medications    oxyCODONE-acetaminophen  1 tablet Oral Once    acetaminophen  1,000 mg Oral TID    gabapentin  100 mg Oral TID    clopidogrel  75 mg Oral Daily    amLODIPine  10 mg Oral Daily    aspirin  81 mg Oral Daily    digoxin  125 mcg Oral Daily    ferrous sulfate  325 mg Oral BID    hydrALAZINE  50 mg Oral 3 times per day    lisinopril  20 mg Oral Daily    therapeutic multivitamin-minerals  1 tablet Oral Daily    atorvastatin  20 mg Oral Daily    sodium chloride flush  5-40 mL IntraVENous 2 times per day    enoxaparin  40 mg SubCUTAneous Daily    insulin glargine  0.15 Units/kg SubCUTAneous Nightly    insulin lispro  0-4 Units SubCUTAneous TID WC    insulin lispro  0-4 Units SubCUTAneous Nightly     PRN Meds: senna, HYDROmorphone, oxyCODONE, haloperidol lactate, labetalol, bisacodyl, sodium chloride flush, sodium chloride, ondansetron **OR** ondansetron, polyethylene glycol, acetaminophen **OR** acetaminophen, glucose, dextrose bolus **OR** dextrose bolus, glucagon (rDNA), dextrose      Intake/Output Summary (Last 24 hours) at 9/14/2022 0849  Last data filed at 9/13/2022 1934  Gross per 24 hour   Intake 0 ml   Output 1100 ml   Net -1100 ml         Physical Exam Performed:    BP (!) 153/63   Pulse 73   Temp 97.5 °F (36.4 °C) (Oral)   Resp 16   Ht 5' 3\" (1.6 m)   Wt 127 lb 6.8 oz (57.8 kg)   SpO2 97%   BMI 22.57 kg/m²     General appearance: No apparent distress, appears stated age and cooperative. HEENT: Pupils equal, round, and reactive to light. Conjunctivae/corneas clear. Neck: Supple, with full range of motion. No jugular venous distention. Trachea midline. Respiratory:  Normal respiratory effort.  Clear to auscultation, bilaterally without Rales/Wheezes/Rhonchi. Cardiovascular: Regular rate and rhythm with normal S1/S2 without murmurs, rubs or gallops. Abdomen: Soft, non-tender, non-distended with normal bowel sounds. Musculoskeletal: No clubbing, cyanosis or edema bilaterally. Full range of motion without deformity. Skin: Skin color, texture, turgor normal.  No rashes or lesions. Neurologic:  Neurovascularly intact without any focal sensory/motor deficits. Cranial nerves: II-XII intact, grossly non-focal.  Psychiatric: Alert and oriented, thought content appropriate, normal insight  Capillary Refill: Brisk,< 3 seconds   Peripheral Pulses: +2 palpable, equal bilaterally       Labs:   Recent Labs     09/13/22  0450 09/14/22  0500   WBC 7.2 7.4   HGB 7.8* 8.0*   HCT 23.1* 24.2*    243       Recent Labs     09/13/22  0450 09/14/22  0500   * 132*   K 5.4* 5.1   CL 99 100   CO2 21 22   BUN 38* 40*   CREATININE 1.3* 1.3*   CALCIUM 8.8 10.1   PHOS 4.5 4.1       No results for input(s): AST, ALT, BILIDIR, BILITOT, ALKPHOS in the last 72 hours. No results for input(s): INR in the last 72 hours. No results for input(s): Pati Bittinger in the last 72 hours.       Urinalysis:      Lab Results   Component Value Date/Time    NITRU Negative 06/07/2022 01:55 PM    [address] 0-2 06/07/2022 01:55 PM    BACTERIA Rare 06/07/2022 01:55 PM    RBCUA 3-4 06/07/2022 01:55 PM    BLOODU LARGE 06/07/2022 01:55 PM    SPECGRAV <=1.005 06/07/2022 01:55 PM    GLUCOSEU Negative 06/07/2022 01:55 PM       Consults:    IP CONSULT TO PODIATRY  IP CONSULT TO HOSPITALIST  IP CONSULT TO PODIATRY  IP CONSULT TO PHARMACY  IP CONSULT TO SOCIAL WORK  IP CONSULT TO VASCULAR SURGERY      Assessment/Plan:    Active Hospital Problems    Diagnosis     Severe malnutrition ([facility] [E43]      Priority: Medium    Dirty living conditions [Z59.1]      Priority: Medium    Chronic anemia [D64.9]      Priority: Medium    Primary hypertension [I10] SUPPLEMENT; Breakfast, Lunch, Dinner; Standard High Calorie/High Protein Oral Supplement  Code Status: Full Code      PT/OT Eval Status: seen w/ recs for SNF     Dispo - Patient is likely to remain in-house at least for the foreseeable future pending clinical course, subspecialty recs and placement decision - tentatively [facility]. This patient has a high likelihood of being discharged in the AM Wed 14 Sept. This has been communicated to the nursing staff.        Sharri Robbins MD

## 2022-09-14 NOTE — PROGRESS NOTES
Pt d/c'd to ECS. Removed  IV and stopped bleeding. Catheter intact. Pt tolerated well. No redness noted at site. Discharge instruction reviewed with patient and AVS send with EMS. Patient verbalized understanding.  Electronically signed by Yeny Owens RN on 9/14/22 at 3:55 PM EDT

## 2022-09-15 NOTE — DISCHARGE SUMMARY
Hospital Medicine Discharge Summary    Patient ID: Elisa Belle      Patient's PCP: Lisandro Jorge, APRN - CNP    Admit Date: 9/4/2022     Discharge Date: 9/14/2022      Admitting Physician: Kennedi Azevedo MD     Discharge Physician: Kennedi Azevedo MD     Discharge Diagnoses: Active Hospital Problems    Diagnosis     Severe malnutrition ([facility] [E43]      Priority: Medium    Dirty living conditions [Z59.1]      Priority: Medium    Chronic anemia [D64.9]      Priority: Medium    Primary hypertension [I10]      Priority: Medium    Acute osteomyelitis of right foot ([facility] [M86.171]      Priority: Medium    Diabetes mellitus ([facility] [E11.9]        The patient was seen and examined on day of discharge and this discharge summary is in conjunction with any daily progress note from day of discharge. Hospital Course:          Right Foot OM - Dr. Darrian Hwang consulted by ED. Started on empiric Cefepime/Vancocin Sunday 4 Sept - continued pending Podiatry recs and possible wound culture results - demonstrating S Marcenscens and M Morganii sensitive to current Cefepime. MRI c/w osteomyelitis s/p TMA Friday 9 Sept w/out complications. Vascular Surgery consulted and appreciated s/p angioplasty of high-grade popliteal stenosis 7 Sept w/out complications. D/C'd ABX post-op. Followed serial labs w/out evidence of ABX induced nephrotoxicity. DM2 - controlled on home oral antiGlycemics - held. Started on Low dose Lantus and follow FSBS/SSI low regimen. Last HbA1c 5.4% dated June 2022. Resumed home regimen at discharge. HTN/CAD - w/ known CAD but no evidence of active signs/sxs of ischemia/failure. Currently controlled on home meds      Afib - chronic paroxysmal of unspecified and clinically unable to determine etiology. Normally rate controlled on Digoxin - continued. NOT Anticoagulated at baseline . Monitored on tele.       Anemia - etiology clinically unable to determine, w/out evidence of active bleeding/hemolysis. Stable and asymptomatic w/out indication for transfusion. HypoNatremia - etiology clinically unable to determine but likely hypovolemic. Followed serial labs on IVF now taking adequate PO. HyperKalemia - etiology clinically unable to determine. Followed serial labs - stable. Unsatisfactory Living Conditions (Z59.1)  -  consult placed       Labs: For convenience and continuity at follow-up the following most recent labs are provided:      CBC:    Lab Results   Component Value Date/Time    WBC 7.4 09/14/2022 05:00 AM    HGB 8.0 09/14/2022 05:00 AM    HCT 24.2 09/14/2022 05:00 AM     09/14/2022 05:00 AM       Renal:    Lab Results   Component Value Date/Time     09/14/2022 05:00 AM    K 5.1 09/14/2022 05:00 AM    K 4.5 09/05/2022 06:01 AM     09/14/2022 05:00 AM    CO2 22 09/14/2022 05:00 AM    BUN 40 09/14/2022 05:00 AM    CREATININE 1.3 09/14/2022 05:00 AM    CALCIUM 10.1 09/14/2022 05:00 AM    PHOS 4.1 09/14/2022 05:00 AM         Significant Diagnostic Studies    Radiology:   MRI FOOT RIGHT WO CONTRAST   Final Result   1. Findings consistent with acute osteomyelitis of the 1st metatarsal head,   neck and distal shaft. Acute osteomyelitis of the medial hallux sesamoid. Less pronounced osteomyelitis of the lateral hallux sesamoid is suspected. 2. Large soft tissue wound overlying the distal aspect of the 1st metatarsal.   Small amount of fluid emanating from the wound extending along the plantar   surface of the 1st metatarsal head. 3. Mild edema-like marrow signal abnormality in the 2nd metatarsal head. Reactive edema is favored over early osteomyelitis. VL DUP LOWER EXTREMITY ARTERIES RIGHT   Final Result      XR CHEST PORTABLE   Final Result   Cardiomegaly.   No pneumonia or edema         XR FOOT RIGHT (MIN 3 VIEWS)   Final Result   Cortical irregularity of the 1st metatarsal head neck region is seen raising   the question of MULTIVITAMIN-MINERALS) tablet Take 1 tablet by mouth daily, Disp-30 tablet, R-1Normal      vitamin C (ASCORBIC ACID) 500 MG tablet Take 1 tablet by mouth 2 times daily, Disp-60 tablet, R-1Normal      acetaminophen (TYLENOL) 325 MG tablet Take 2 tablets by mouth every 6 hours as needed for Pain, Disp-120 tablet, R-1Normal      amLODIPine (NORVASC) 10 MG tablet TAKE 1 TABLET BY MOUTH EVERY DAY, Disp-30 tablet, R-1Normal      aspirin 81 MG EC tablet Take 1 tablet by mouth daily, Disp-30 tablet, R-1Normal             Time Spent on discharge is more than 30 minutes in the examination, evaluation, counseling and review of medications and discharge plan. Signed:    Prasanna Quan MD   9/15/2022      Thank you MARVIN Moreira CNP for the opportunity to be involved in this patient's care. If you have any questions or concerns please feel free to contact me at [phone].

## 2022-10-08 ENCOUNTER — APPOINTMENT (OUTPATIENT)
Dept: GENERAL RADIOLOGY | Age: 77
End: 2022-10-08
Payer: MEDICARE

## 2022-10-08 ENCOUNTER — HOSPITAL ENCOUNTER (EMERGENCY)
Age: 77
Discharge: HOME | End: 2022-10-08
Attending: EMERGENCY MEDICINE
Payer: MEDICARE

## 2022-10-08 VITALS
BODY MASS INDEX: 21.26 KG/M2 | RESPIRATION RATE: 16 BRPM | HEIGHT: 63 IN | DIASTOLIC BLOOD PRESSURE: 62 MMHG | OXYGEN SATURATION: 99 % | TEMPERATURE: 99.3 F | WEIGHT: 120 LBS | HEART RATE: 73 BPM | SYSTOLIC BLOOD PRESSURE: 164 MMHG

## 2022-10-08 DIAGNOSIS — S41.119A SKIN TEAR OF UPPER EXTREMITY: ICD-10-CM
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
## 2022-10-08 DIAGNOSIS — S81.811A SKIN TEAR OF LOWER LEG WITHOUT COMPLICATION, RIGHT, INITIAL ENCOUNTER: ICD-10-CM

## 2022-10-08 PROCEDURE — 73090 X-RAY EXAM OF FOREARM: CPT

## 2022-10-08 PROCEDURE — 73590 X-RAY EXAM OF LOWER LEG: CPT

## 2022-10-08 PROCEDURE — 99283 EMERGENCY DEPT VISIT LOW MDM: CPT

## 2022-10-08 ASSESSMENT — PAIN - FUNCTIONAL ASSESSMENT
PAIN_FUNCTIONAL_ASSESSMENT: NONE - DENIES PAIN
PAIN_FUNCTIONAL_ASSESSMENT: NONE - DENIES PAIN

## 2022-10-08 NOTE — ED NOTES
Pt instructed to follow up with PCP for wound recheck. Assessed per [facility] JAZZY.      Catrachita James, LPN  55/88/64 [id number]

## 2022-10-08 NOTE — ED NOTES
Pt arrived to ED with dried Bandages to right lower leg/upper arm. Bandages removed wounds cleansed with Hibiclens/Normal Saline. Pt has skin tears noted to right upper arm/lwer leg/bleeding controlled. Pt states \"I fell last night with my walker trying to go up the steps/I did not get knocked out\". Pt tolerated wound care.      Curtis Simon, TIAN  40/95/88 262 [facility], TIAN  46/52/21 4734

## 2022-10-08 NOTE — ED NOTES
Pt skin tears applied with PSO/Versatil dressing/gauze/kerlex dressing/pt small skin tear to right wrist cleansed with Hibiclens/Normal Saline applied with PSO/Mepilex dressing.      Nelly Bowles LPN  46/30/61 9904

## 2022-10-08 NOTE — ED PROVIDER NOTES
[facility] Emergency Department    CHIEF COMPLAINT  Fall (Patient presents via POV due to fall yesterday (mechanical on her outside steps). Denies head injury, neg. LOC. Dressings noted to DORIAN and KIMBERLY. Neg. Blood thinners.)      HISTORY OF PRESENT ILLNESS  Judie Chiang is a 68 y.o. female who presents to the ED complaining of fall. Patient reports that she tripped and fell on her outside concrete stairs causing multiple skin tears to her right forearm and shin. Fall actually happened yesterday. Patient wanted to come get checked out and get skin tears cleansed and dressed. Patient is not on blood thinning medications. Denies head injury or loss of consciousness. No other complaints, modifying factors or associated symptoms. Nursing notes reviewed.    Past Medical History:   Diagnosis Date    Atrial fibrillation ([address]     off coumadin after bleed, declined restart    Blood transfusion     CAD (coronary artery disease)     Diabetes mellitus type II     Diabetic neuropathy ([address] 2011    Gastric ulcer     H. pylori infection 2009    Hyperlipidemia     Hypertension     Numbness and tingling of left leg     Osteoarthritis     knees    Poor historian     PVD (peripheral vascular disease) ([address]     PTA distal sfa/pop/peroneal, Dr. Caren Galarza 12/2015    Unspecified cerebral artery occlusion with cerebral infarction     TIA affected left eye    upper gi bleed 12/2009     Past Surgical History:   Procedure Laterality Date    ANGIOPLASTY  12/30/15    Dr. Caren Galarza, [facility], PTA of distal sfa/pop/peroneal    CARDIAC CATHETERIZATION  9/21/12    done for surgical clearance, cath was negative    COLONOSCOPY  06/09/2022    COLONOSCOPY N/A 6/9/2022    COLONOSCOPY CONTROL HEMORRHAGE performed by Liam West MD at [facility]  [phone]    FOOT SURGERY Left 01/11/2018    DEBRIDEMENT OF ULCERS LEFT FOOT AND LEG WITH GRAFT    OTHER SURGICAL HISTORY Left 06/05/2017    Left Femoral Peroneal Bypass    SHOULDER ARTHROPLASTY  10/5/12    RIGHT SHOULDER TOTAL ARTHROPLASTY, BICEPS TENODESIS DEPUY, GLOBAL ADVANTAGE AP    TOE AMPUTATION Right 5/23/2022    AMPUTATION OF RIGHT FIRST AND FOURTH TOES performed by Eun Velasquez DPM at [address] Right 9/9/2022    TRANSMETATARSAL AMPUTATION RIGHT FOOT performed by Eun Velasquez DPM at [facility] [address] N/A 6/8/2022    EGD IV SEDATION performed by Gibran Almanza MD at [address] History   Problem Relation Age of Onset    Heart Disease Mother     Stroke Mother     Heart Disease Father     Diabetes Sister     Diabetes Brother     Diabetes Maternal Grandmother      Social History     Socioeconomic History    Marital status:      Spouse name: Not on file    Number of children: Not on file    Years of education: Not on file    Highest education level: Not on file   Occupational History    Not on file   Tobacco Use    Smoking status: Never    Smokeless tobacco: Never    Tobacco comments:     Not needed   Vaping Use    Vaping Use: Never used   Substance and Sexual Activity    Alcohol use: No    Drug use: No    Sexual activity: Yes     Partners: Male   Other Topics Concern    Not on file   Social History Narrative    Not on file     Social Determinants of Health     Financial Resource Strain: Not on file   Food Insecurity: Not on file   Transportation Needs: Not on file   Physical Activity: Not on file   Stress: Not on file   Social Connections: Not on file   Intimate Partner Violence: Not on file   Housing Stability: Not on file     No current facility-administered medications for this encounter.      Current Outpatient Medications   Medication Sig Dispense Refill    vitamin D3 (CHOLECALCIFEROL) 25 MCG (1000 UT) TABS tablet TAKE 1 TABLET BY MOUTH EVERY DAY 30 tablet 0    atorvastatin (LIPITOR) 20 MG tablet Take 1 tablet by mouth daily 30 tablet 0    clopidogrel (PLAVIX) 75 MG tablet Take 1 tablet by mouth daily 30 tablet 0    B Complex-C-Folic Acid (DIALYVITE TABLET) TABS TAKE 1 TABLET BY MOUTH EVERY DAY      polyethylene glycol (MIRALAX) 17 GM/SCOOP POWD powder TAKE 17 GRAMS AND MIX WITH 4 TO 8 OUNCES OF BEVERAGE OF CHOICE AND DRINK TWICE DAILY (Patient not taking: Reported on 9/4/2022)      digoxin (LANOXIN) 125 MCG tablet TAKE 1 TABLET BY MOUTH EVERY DAY      ferrous sulfate (IRON 325) 325 (65 Fe) MG tablet Take 1 tablet by mouth 2 times daily 60 tablet 1    gabapentin (NEURONTIN) 100 MG capsule Take 2 capsules by mouth 2 times daily for 30 days. 120 capsule 1    hydrALAZINE (APRESOLINE) 50 MG tablet Take 1 tablet by mouth every 8 hours 90 tablet 1    lisinopril (PRINIVIL;ZESTRIL) 20 MG tablet TAKE 1 TABLET BY MOUTH EVERY DAY 30 tablet 1    Multiple Vitamins-Minerals (THERAPEUTIC MULTIVITAMIN-MINERALS) tablet Take 1 tablet by mouth daily 30 tablet 1    vitamin C (ASCORBIC ACID) 500 MG tablet Take 1 tablet by mouth 2 times daily 60 tablet 1    acetaminophen (TYLENOL) 325 MG tablet Take 2 tablets by mouth every 6 hours as needed for Pain 120 tablet 1    amLODIPine (NORVASC) 10 MG tablet TAKE 1 TABLET BY MOUTH EVERY DAY 30 tablet 1    aspirin 81 MG EC tablet Take 1 tablet by mouth daily 30 tablet 1     Allergies   Allergen Reactions    Pcn [Penicillins] Other (See Comments)     Unsure of reaction       REVIEW OF SYSTEMS  6 systems reviewed, pertinent positives per HPI otherwise noted to be negative    PHYSICAL EXAM  BP (!) 164/62   Pulse 73   Temp 99.3 °F (37.4 °C) (Oral)   Resp 16   Ht 5' 3\" (1.6 m)   Wt 120 lb (54.4 kg)   SpO2 99%   BMI 21.26 kg/m²   GENERAL APPEARANCE: Awake and alert. Cooperative. No acute distress. HEAD: Normocephalic. Atraumatic. EYES: PERRL. EOM's grossly intact. ENT: Mucous membranes are moist.   NECK: Supple. Normal ROM. CHEST: Equal symmetric chest rise. LUNGS: Breathing is unlabored.  Speaking comfortably in full sentences. Abdomen: Nondistended  EXTREMITIES: MAEE. No acute deformities. Right forearm and shin skin tears. Ecchymosis. No bony tenderness or obvious deformity. SKIN: Warm and dry. NEUROLOGICAL: Alert and oriented. Strength is 5/5 in all extremities and sensation is intact. RADIOLOGY  XR RADIUS ULNA RIGHT (2 VIEWS)    Result Date: 10/8/2022  EXAMINATION: TWO XRAY VIEWS OF THE RIGHT FOREARM 10/8/2022 3:20 pm COMPARISON: None HISTORY: ORDERING SYSTEM PROVIDED HISTORY: fall TECHNOLOGIST PROVIDED HISTORY: Reason for exam:->fall Reason for Exam: fall FINDINGS: No acute fracture. Joints maintain anatomic alignment. Questionable skin irregularity in the lateral proximal forearm. No evident soft tissue gas nor radiopaque foreign body. Diffuse arterial calcifications. Questionable skin irregularity in the lateral portion of the proximal right forearm potentially related to laceration with no underlying acute bony abnormality nor radiopaque foreign body. XR TIBIA FIBULA RIGHT (2 VIEWS)    Result Date: 10/8/2022  EXAMINATION: 2 XRAY VIEWS OF THE RIGHT TIBIA AND FIBULA 10/8/2022 3:20 pm COMPARISON: None HISTORY: ORDERING SYSTEM PROVIDED HISTORY: fall TECHNOLOGIST PROVIDED HISTORY: Reason for exam:->fall Reason for Exam: fall, pt says no pain, only burning FINDINGS: No acute fracture. Mild lateral subluxation of the tibia with respect to the fibula with near complete loss of joint space loss in the medial and lateral compartments. Joints otherwise maintain anatomic alignment. Chondrocalcinosis in the medial meniscus with apparent extrusion of the body; possible similar finding associated with the lateral meniscus. No obvious acute soft tissue abnormality. Diffuse arterial calcifications. 1. No evident acute findings in the right leg. 2. Mild lateral subluxation of the right tibia likely related to severe joint space loss in the medial and lateral compartments of the knee. Associated osteoarthritic changes are incompletely evaluated. 3. Apparent extrusion of the body of the right medial meniscus highlighted by chondrocalcinosis from calcium pyrophosphate deposition. A similar though less prominent finding may be present in the lateral meniscus. The appearance suggests possible underlying meniscal tears. ED COURSE/MDM  Patient seen and evaluated. Old records reviewed. Diagnostic testing reviewed and results discussed. I have independently evaluated this patient based upon my scope of practice. Supervising physician was in the department as needed for consultation. Clary Marr presented to the ED today with above noted complaints. X-rays of right forearm and shin negative for fracture. Skin tears cleansed and dressed with Mepitel and soft gauze dressing      At this point I do not feel the patient requires further work up and it is reasonable to discharge the patient. A discussion was had with the patient and/or their surrogate regarding diagnosis, diagnostic testing results, treatment/ plan of care, and follow up. There was shared decision-making between myself as well as the patient and/or their surrogate and we are all in agreement with discharge home. There was an opportunity for questions and all questions were answered to the best of my ability and to the satisfaction of the patient and/or patient family. Patient will follow up with pcp for further evaluation/treatment. The patient was given strict return precautions as we discussed symptoms that would necessitate return to the ED. Patient will return to ED for new/worsening symptoms. The patient verbalized their understanding and agreement with the above plan. Please refer to AVS for further details regarding discharge instructions. No results found for this visit on 10/08/22.     I estimate there is LOW risk for COMPARTMENT SYNDROME, DEEP VENOUS THROMBOSIS, SEPTIC ARTHRITIS, TENDON OR NEUROVASCULAR INJURY, thus I consider the discharge disposition reasonable. Opal Console and I have discussed the diagnosis and risks, and we agree with discharging home to follow-up with their primary doctor or the referral orthopedist. We also discussed returning to the Emergency Department immediately if new or worsening symptoms occur. We have discussed the symptoms which are most concerning (e.g., changing or worsening pain, numbness, weakness) that necessitate immediate return. Final Impression    1. Fall, initial encounter    2. Skin tear of upper extremity    3. Skin tear of lower leg without complication, right, initial encounter        Blood pressure (!) 164/62, pulse 73, temperature 99.3 °F (37.4 °C), temperature source Oral, resp. rate 16, height 5' 3\" (1.6 m), weight 120 lb (54.4 kg), SpO2 99 %, not currently breastfeeding. mdm    Patient was sent home with a prescription for below medication/s. I did [place] patient on appropriate use of these medication. Discharge Medication List as of 10/8/2022  4:18 PM            FOLLOW UP  MARVIN Ortega CNP  [address] [address]  [phone]          [facility]  ED  [address] [facility] [id number] [phone]          DISPOSITION  Patient was discharged to home in good condition. Comment: Please note this report has been produced using speech recognition software and may contain errors related to that system including errors in grammar, punctuation, and spelling, as well as words and phrases that may be inappropriate. If there are any questions or concerns please feel free to contact the dictating provider for clarification.        MARVIN Grant CNP  10/09/22 9194

## 2022-10-31 ENCOUNTER — OFFICE VISIT (OUTPATIENT)
Dept: FAMILY MEDICINE CLINIC | Age: 77
End: 2022-10-31
Payer: MEDICARE

## 2022-10-31 VITALS
BODY MASS INDEX: 19.88 KG/M2 | SYSTOLIC BLOOD PRESSURE: 159 MMHG | HEART RATE: 80 BPM | OXYGEN SATURATION: 98 % | DIASTOLIC BLOOD PRESSURE: 66 MMHG | TEMPERATURE: 97.2 F | WEIGHT: 112.25 LBS

## 2022-10-31 DIAGNOSIS — E11.59 TYPE 2 DIABETES MELLITUS WITH OTHER CIRCULATORY COMPLICATION, WITHOUT LONG-TERM CURRENT USE OF INSULIN (HCC): ICD-10-CM

## 2022-10-31 DIAGNOSIS — I73.9 PVD (PERIPHERAL VASCULAR DISEASE) (HCC): ICD-10-CM

## 2022-10-31 DIAGNOSIS — D50.9 IRON DEFICIENCY ANEMIA, UNSPECIFIED IRON DEFICIENCY ANEMIA TYPE: ICD-10-CM

## 2022-10-31 DIAGNOSIS — I10 ESSENTIAL (PRIMARY) HYPERTENSION: ICD-10-CM

## 2022-10-31 DIAGNOSIS — E55.9 VITAMIN D DEFICIENCY: ICD-10-CM

## 2022-10-31 DIAGNOSIS — I10 ESSENTIAL HYPERTENSION: ICD-10-CM

## 2022-10-31 DIAGNOSIS — I73.9 PAD (PERIPHERAL ARTERY DISEASE) (HCC): ICD-10-CM

## 2022-10-31 DIAGNOSIS — Z91.81 AT HIGH RISK FOR FALLS: ICD-10-CM

## 2022-10-31 DIAGNOSIS — Z89.431 HISTORY OF TRANSMETATARSAL AMPUTATION OF RIGHT FOOT (HCC): ICD-10-CM

## 2022-10-31 DIAGNOSIS — E78.00 PURE HYPERCHOLESTEROLEMIA: ICD-10-CM

## 2022-10-31 DIAGNOSIS — I25.10 CORONARY ARTERY DISEASE INVOLVING NATIVE CORONARY ARTERY OF NATIVE HEART WITHOUT ANGINA PECTORIS: Primary | ICD-10-CM

## 2022-10-31 PROCEDURE — 1123F ACP DISCUSS/DSCN MKR DOCD: CPT | Performed by: NURSE PRACTITIONER

## 2022-10-31 PROCEDURE — 1036F TOBACCO NON-USER: CPT | Performed by: NURSE PRACTITIONER

## 2022-10-31 PROCEDURE — 1090F PRES/ABSN URINE INCON ASSESS: CPT | Performed by: NURSE PRACTITIONER

## 2022-10-31 PROCEDURE — 99214 OFFICE O/P EST MOD 30 MIN: CPT | Performed by: NURSE PRACTITIONER

## 2022-10-31 PROCEDURE — G8427 DOCREV CUR MEDS BY ELIG CLIN: HCPCS | Performed by: NURSE PRACTITIONER

## 2022-10-31 PROCEDURE — 3044F HG A1C LEVEL LT 7.0%: CPT | Performed by: NURSE PRACTITIONER

## 2022-10-31 PROCEDURE — G8420 CALC BMI NORM PARAMETERS: HCPCS | Performed by: NURSE PRACTITIONER

## 2022-10-31 PROCEDURE — G8484 FLU IMMUNIZE NO ADMIN: HCPCS | Performed by: NURSE PRACTITIONER

## 2022-10-31 PROCEDURE — 3074F SYST BP LT 130 MM HG: CPT | Performed by: NURSE PRACTITIONER

## 2022-10-31 PROCEDURE — 3078F DIAST BP <80 MM HG: CPT | Performed by: NURSE PRACTITIONER

## 2022-10-31 PROCEDURE — 36415 COLL VENOUS BLD VENIPUNCTURE: CPT | Performed by: NURSE PRACTITIONER

## 2022-10-31 PROCEDURE — G8400 PT W/DXA NO RESULTS DOC: HCPCS | Performed by: NURSE PRACTITIONER

## 2022-10-31 RX ORDER — GABAPENTIN 100 MG/1
200 CAPSULE ORAL 2 TIMES DAILY
Qty: 120 CAPSULE | Refills: 1 | Status: SHIPPED | OUTPATIENT
Start: 2022-10-31 | End: 2022-11-30

## 2022-10-31 RX ORDER — MELATONIN
Qty: 30 TABLET | Refills: 0
Start: 2022-10-31

## 2022-10-31 RX ORDER — FERROUS SULFATE 325(65) MG
325 TABLET ORAL 2 TIMES DAILY
Qty: 60 TABLET | Refills: 5 | Status: SHIPPED | OUTPATIENT
Start: 2022-10-31

## 2022-10-31 RX ORDER — CLOPIDOGREL BISULFATE 75 MG/1
75 TABLET ORAL DAILY
Qty: 30 TABLET | Refills: 5 | Status: SHIPPED | OUTPATIENT
Start: 2022-10-31 | End: 2022-11-30

## 2022-10-31 RX ORDER — HYDRALAZINE HYDROCHLORIDE 50 MG/1
50 TABLET, FILM COATED ORAL EVERY 8 HOURS SCHEDULED
Qty: 90 TABLET | Refills: 1 | Status: SHIPPED | OUTPATIENT
Start: 2022-10-31

## 2022-10-31 RX ORDER — AMLODIPINE BESYLATE 10 MG/1
TABLET ORAL
Qty: 30 TABLET | Refills: 5 | Status: SHIPPED | OUTPATIENT
Start: 2022-10-31

## 2022-10-31 RX ORDER — ATORVASTATIN CALCIUM 20 MG/1
20 TABLET, FILM COATED ORAL DAILY
Qty: 30 TABLET | Refills: 0 | Status: SHIPPED | OUTPATIENT
Start: 2022-10-31 | End: 2022-11-30

## 2022-10-31 RX ORDER — LISINOPRIL 20 MG/1
TABLET ORAL
Qty: 30 TABLET | Refills: 5 | Status: SHIPPED | OUTPATIENT
Start: 2022-10-31

## 2022-10-31 RX ORDER — DIGOXIN 125 MCG
TABLET ORAL
Qty: 30 TABLET | Refills: 5 | Status: SHIPPED | OUTPATIENT
Start: 2022-10-31

## 2022-10-31 ASSESSMENT — ENCOUNTER SYMPTOMS
GASTROINTESTINAL NEGATIVE: 1
RESPIRATORY NEGATIVE: 1
COLOR CHANGE: 1
EYES NEGATIVE: 1

## 2022-10-31 NOTE — PATIENT INSTRUCTIONS
Please read the healthy family handout that you were given and share it with your family. Please compare this printed medication list with your medications at home to be sure they are the same. If you have any medications that are different please contact us immediately at [phone]. Also review your allergies that we have listed, these may also include medications that you have not been able to tolerate, make sure everything listed is correct. If you have any allergies that are different please contact us immediately at [phone]. You may receive a survey in the mail or by email asking about your experience during your visit today. Please complete and return to us so we know how we are serving you.

## 2022-10-31 NOTE — PROGRESS NOTES
CHIEF COMPLAINT  Chief Complaint   Patient presents with    Check-Up     DM2        HPI   Brie Crawley is a 68 y.o. female who presents to the office for checkup. Patient has been rehospitalized since last visit. Patient had further toes amputated on her right foot. Patient reports that most recent hospitalization she was discharged on 9/14 to [facility]. Patient reports that she spent approximately 1 month there prior to going home to her son and daughter-in-law's home. Patient continues to see Dr. Glendora Harada on a regular basis. Patient unsure of medications but did provide a back full of empty bottles as well as filled of prescriptions upon arrival.  Patient reports difficulty with ambulation because of missing toes on her right foot. Patient did lose her balance walking into the doctor falling landing on her right side. No head injury or loss of consciousness. Patient has abrasion to right elbow, back, and right knee. Patient reports that she did file a few weeks ago and went to the ER for evaluation. Patient reports eating and drinking appropriately. Patient does not check blood sugar on a regular basis. Patient is accompanied by her daughter-in-law today. No other complaints, modifying factors or associated symptoms. Nursing notes reviewed.    Past Medical History:   Diagnosis Date    Atrial fibrillation ([address]     off coumadin after bleed, declined restart    Blood transfusion     CAD (coronary artery disease)     Diabetes mellitus type II     Diabetic neuropathy ([address] 2011    Gastric ulcer     H. pylori infection 2009    Hyperlipidemia     Hypertension     Numbness and tingling of left leg     Osteoarthritis     knees    Poor historian     Primary hypertension     PVD (peripheral vascular disease) ([address]     PTA distal sfa/pop/peroneal, Dr. Jaime Howard 898 12/2015    Unspecified cerebral artery occlusion with cerebral infarction     TIA affected left eye    upper gi bleed 12/2009 Past Surgical History:   Procedure Laterality Date    ANGIOPLASTY  12/30/15    Dr. Nieves , LLE, PTA of distal sfa/pop/peroneal    CARDIAC CATHETERIZATION  9/21/12    done for surgical clearance, cath was negative    COLONOSCOPY  06/09/2022    COLONOSCOPY N/A 6/9/2022    COLONOSCOPY CONTROL HEMORRHAGE performed by Nitesh Wetzel MD at [place] [id number]    FOOT SURGERY Left 01/11/2018    DEBRIDEMENT OF ULCERS LEFT FOOT AND LEG WITH GRAFT    OTHER SURGICAL HISTORY Left 06/05/2017    Left Femoral Peroneal Bypass    SHOULDER ARTHROPLASTY  10/5/12    RIGHT SHOULDER TOTAL ARTHROPLASTY, BICEPS TENODESIS DEPUY, GLOBAL ADVANTAGE AP    TOE AMPUTATION Right 5/23/2022    AMPUTATION OF RIGHT FIRST AND FOURTH TOES performed by Thomasina Cranker, DPM at [address] [place] [address] Right 9/9/2022    TRANSMETATARSAL AMPUTATION RIGHT FOOT performed by Thomasina Cranker, DPM at [address] [place] [address] N/A 6/8/2022    EGD IV SEDATION performed by Nitesh Wetzel MD at [address] [place] [address] History   Problem Relation Age of Onset    Heart Disease Mother     Stroke Mother     Heart Disease Father     Diabetes Sister     Diabetes Brother     Diabetes Maternal Grandmother      Social History     Socioeconomic History    Marital status:      Spouse name: Not on file    Number of children: Not on file    Years of education: Not on file    Highest education level: Not on file   Occupational History    Not on file   Tobacco Use    Smoking status: Never    Smokeless tobacco: Never    Tobacco comments:     Not needed   Vaping Use    Vaping Use: Never used   Substance and Sexual Activity    Alcohol use: No    Drug use: No    Sexual activity: Yes     Partners: Male   Other Topics Concern    Not on file   Social History Narrative    Not on file     Social Determinants of Health     Financial Resource Strain: Not on file   Food Skin:  Positive for color change and wound. Psychiatric/Behavioral: Negative. PHYSICAL EXAM  BP (!) 159/66   Pulse 80   Temp 97.2 °F (36.2 °C) (Oral)   Wt 112 lb 4 oz (50.9 kg)   SpO2 98%   BMI 19.88 kg/m²   Physical Exam  Constitutional:       Appearance: Normal appearance. She is not toxic-appearing. HENT:      Head: Normocephalic. Right Ear: External ear normal.      Left Ear: External ear normal.      Nose: Nose normal.      Mouth/Throat:      Mouth: Mucous membranes are moist.      Pharynx: Oropharynx is clear. Eyes:      Extraocular Movements: Extraocular movements intact. Conjunctiva/sclera: Conjunctivae normal.      Pupils: Pupils are equal, round, and reactive to light. Cardiovascular:      Rate and Rhythm: Normal rate and regular rhythm. Pulses: Normal pulses. Heart sounds: Normal heart sounds. Pulmonary:      Effort: Pulmonary effort is normal.      Breath sounds: Normal breath sounds. Abdominal:      Palpations: Abdomen is soft. Tenderness: There is no guarding. Musculoskeletal:         General: Normal range of motion. Cervical back: Normal range of motion and neck supple. Right foot: Deformity present. Feet:    Feet:      Right foot:      Skin integrity: Callus present. Comments: All toes/metatarsals amputated on right foot. See image  Skin:     General: Skin is warm and dry. Capillary Refill: Capillary refill takes 2 to 3 seconds. Comments: Abrasions noted to mid back, right elbow, and right knee. Various stages of healing ecchymotic areas noted to right knee. Neurological:      Mental Status: She is alert and oriented to person, place, and time. Gait: Gait abnormal.   Psychiatric:         Mood and Affect: Mood normal.         Behavior: Behavior normal.          ASSESSMENT/PLAN:   1. Coronary artery disease involving native coronary artery of native heart without angina pectoris  Stable.   Patient denies any episodes of dizziness, lightheadedness, chest pain or shortness of breath. Continue current treatment management follow-up in 6 months, sooner for new or worsening symptoms. 2. Essential hypertension  Stable. Blood pressure noted to be slightly elevated today, patient reports that she has not been taking her blood pressure medication. Confusion on what she has supposed to be taking. Patient did bring with her a bag of empty pill bottles as well as some partially filled bottles. We went through medications at Patient is to be on and has been on previous visits. Patient was restarted on lisinopril 20 mg daily, digoxin, amlodipine 10 mg daily, and hydralazine follow-up in 3 months, sooner for new or concerning symptoms. Patient and daughter-in-law both verbalized acknowledged plan of care at this time. - lisinopril (PRINIVIL;ZESTRIL) 20 MG tablet; TAKE 1 TABLET BY MOUTH EVERY DAY  Dispense: 30 tablet; Refill: 5  - digoxin (LANOXIN) 125 MCG tablet; TAKE 1 TABLET BY MOUTH EVERY DAY  Dispense: 30 tablet; Refill: 5  - hydrALAZINE (APRESOLINE) 50 MG tablet; Take 1 tablet by mouth every 8 hours  Dispense: 90 tablet; Refill: 1  - amLODIPine (NORVASC) 10 MG tablet; TAKE 1 TABLET BY MOUTH EVERY DAY  Dispense: 30 tablet; Refill: 5    3. PVD (peripheral vascular disease) ([facility]PAD (peripheral artery disease) ([facility])/History of transmetatarsal amputation of right foot ([address]  Stable  Patient reports doing well. Patient currently on gabapentin. New prescription to pharmacy. Patient has had transmetatarsal of the right foot. Patient doing well. We did discuss the importance of wearing postop shoe for better stabilization to decrease falls. Patient has had multiple falls since having toes amputated. Continue current management with vascular surgery and podiatry. Patient verbalized acknowledges with plan of care at this time. - gabapentin (NEURONTIN) 100 MG capsule;  Take 2 capsules by mouth 2 times daily for 30 days.  Dispense: 120 capsule; Refill: 1  - clopidogrel (PLAVIX) 75 MG tablet; Take 1 tablet by mouth daily  Dispense: 30 tablet; Refill: 5    4. Type 2 diabetes mellitus with other circulatory complication, without long-term current use of insulin (HCC)  Stable. Patient previously on metformin but reports that blood sugar had improved and she was no longer needing medication. Previous A1c 5.7. Labs ordered today and pending continue to monitor dietary intake follow-up in 3 to 6 months, sooner for new or worsening symptoms.  - Basic Metabolic Panel  - Hemoglobin A1C    5. Pure hypercholesterolemia  stable. Refill on atorvastatin sent to pharmacy. Patient encouraged to continue current medication follow-up in 6 months, sooner for new or worsening symptoms. - atorvastatin (LIPITOR) 20 MG tablet; Take 1 tablet by mouth daily  Dispense: 30 tablet; Refill: 0    6. Vitamin D deficiency  Stable. Patient encouraged to take vitamin D supplement daily. Patient verbalized and acknowledges. Follow-up in 6 months, sooner for new or worsening symptoms. 7. At high risk for falls  On the basis of positive falls risk screening, assessment and plan is as follows: home safety tips provided, patient will follow up in 3 month(s) for further evaluation, patient wearing postop shoe on right foot to avoid instability and frequent falls. .    8. Iron deficiency anemia, unspecified iron deficiency anemia type  Labs ordered and pending. Patient encouraged on taking iron supplement daily. Continue current treatment management follow-up in 4 to 6 months, sooner for new or worsening symptoms. - ferrous sulfate (IRON 325) 325 (65 Fe) MG tablet; Take 1 tablet by mouth 2 times daily  Dispense: 60 tablet; Refill: 5    The note was completed using Dragon voice recognition transcription. Every effort was made to ensure accuracy; however, inadvertent  transcription errors may be present despite my best efforts to edit errors.     Shira Fernandez Neeraj Mccray, APRN - CNP

## 2022-11-01 LAB
ANION GAP SERPL CALCULATED.3IONS-SCNC: 15 MMOL/L (ref 3–16)
BUN BLDV-MCNC: 24 MG/DL (ref 7–20)
CALCIUM SERPL-MCNC: 9.9 MG/DL (ref 8.3–10.6)
CHLORIDE BLD-SCNC: 103 MMOL/L (ref 99–110)
CO2: 20 MMOL/L (ref 21–32)
CREAT SERPL-MCNC: 1.2 MG/DL (ref 0.6–1.2)
ESTIMATED AVERAGE GLUCOSE: 105.4 MG/DL
GFR SERPL CREATININE-BSD FRML MDRD: 47 ML/MIN/{1.73_M2}
GLUCOSE BLD-MCNC: 142 MG/DL (ref 70–99)
HBA1C MFR BLD: 5.3 %
POTASSIUM SERPL-SCNC: 4.7 MMOL/L (ref 3.5–5.1)
SODIUM BLD-SCNC: 138 MMOL/L (ref 136–145)

## 2022-11-05 ENCOUNTER — HOSPITAL ENCOUNTER (EMERGENCY)
Age: 77
Discharge: HOME | End: 2022-11-05
Payer: MEDICARE

## 2022-11-05 VITALS
TEMPERATURE: 97.8 F | OXYGEN SATURATION: 98 % | DIASTOLIC BLOOD PRESSURE: 62 MMHG | SYSTOLIC BLOOD PRESSURE: 144 MMHG | HEART RATE: 62 BPM | RESPIRATION RATE: 20 BRPM

## 2022-11-05 DIAGNOSIS — S51.811A SKIN TEAR OF RIGHT FOREARM WITHOUT COMPLICATION, INITIAL ENCOUNTER: Primary | ICD-10-CM

## 2022-11-05 DIAGNOSIS — Z23 NEED FOR DIPHTHERIA-TETANUS-PERTUSSIS (TDAP) VACCINE: ICD-10-CM

## 2022-11-05 PROCEDURE — 99283 EMERGENCY DEPT VISIT LOW MDM: CPT

## 2022-11-05 PROCEDURE — 6370000000 HC RX 637 (ALT 250 FOR IP): Performed by: PHYSICIAN ASSISTANT

## 2022-11-05 RX ADMIN — Medication 3 ML: at 15:36

## 2022-11-05 ASSESSMENT — PAIN - FUNCTIONAL ASSESSMENT: PAIN_FUNCTIONAL_ASSESSMENT: NONE - DENIES PAIN

## 2022-11-05 NOTE — ED PROVIDER NOTES
[facility]  ED  EMERGENCY DEPARTMENT ENCOUNTER        Pt Name: Elaine Stout  MRN: [id number]  [place] 1945  Date of evaluation: 11/5/2022  Provider: Evi Cabrera PA-C  PCP: MARVIN Carlson CNP  Note Started: 2:04 PM EDT       RBEONNA. I have evaluated this patient. My supervising physician was available for consultation. CHIEF COMPLAINT       Chief Complaint   Patient presents with    Arm Injury     Skin tare R forearm after slipping in bathtub. Denies hitting head, LOC, or anticoagulation. HISTORY OF PRESENT ILLNESS   (Location, Timing/Onset, Context/Setting, Quality, Duration, Modifying Factors, Severity, Associated Signs and Symptoms)  Note limiting factors. Chief Complaint: Fall with skin tear    Elaine Stout is a 68 y.o. female who presents with daughter-in-law and in wheelchair. The patient recent amputation of the right forefoot due to diabetes and infection. Progressing well. Patient states at approximately 1030 this morning sitting on toilet when she lost her footing and fell causing a skin tear injury to the mid dorsal, right forearm. No active bleeding. Blood seepage noted. She will need wound care. No pain about the arm complaint. Does describe a burning sensation in the area of injury. The patient not strike head. No LOC. No other related complaints. Tetanus shot is up-to-date as of 6/3/2021. Nursing Notes were all reviewed and agreed with or any disagreements were addressed in the HPI. REVIEW OF SYSTEMS    (2-9 systems for level 4, 10 or more for level 5)     Review of Systems    Positives and Pertinent negatives as per HPI. Except as noted above in the ROS, all other systems were reviewed and negative.        PAST MEDICAL HISTORY     Past Medical History:   Diagnosis Date    Atrial fibrillation ([address]     off coumadin after bleed, declined restart    Blood transfusion     CAD (coronary artery disease)     Diabetes mellitus type II Diabetic neuropathy ([facility] 2011    Gastric ulcer     H. pylori infection 2009    Hyperlipidemia     Hypertension     Numbness and tingling of left leg     Osteoarthritis     knees    Poor historian     Primary hypertension     PVD (peripheral vascular disease) ([facility]     PTA distal sfa/pop/peroneal, Dr. Elie Choudhury 12/2015    Unspecified cerebral artery occlusion with cerebral infarction     TIA affected left eye    upper gi bleed 12/2009         SURGICAL HISTORY     Past Surgical History:   Procedure Laterality Date    ANGIOPLASTY  12/30/15    Dr. Elie Choudhury, LLE, PTA of distal sfa/pop/peroneal    CARDIAC CATHETERIZATION  9/21/12    done for surgical clearance, cath was negative    COLONOSCOPY  06/09/2022    COLONOSCOPY N/A 6/9/2022    COLONOSCOPY CONTROL HEMORRHAGE performed by Fidencio Cordova MD at [place] [address],[facility]    FOOT SURGERY Left 01/11/2018    DEBRIDEMENT OF ULCERS LEFT FOOT AND LEG WITH GRAFT    OTHER SURGICAL HISTORY Left 06/05/2017    Left Femoral Peroneal Bypass    SHOULDER ARTHROPLASTY  10/5/12    RIGHT SHOULDER TOTAL ARTHROPLASTY, BICEPS TENODESIS DEPUY, GLOBAL ADVANTAGE AP    TOE AMPUTATION Right 5/23/2022    AMPUTATION OF RIGHT FIRST AND FOURTH TOES performed by Edilia Layton DPM at [address] Right 9/9/2022    TRANSMETATARSAL AMPUTATION RIGHT FOOT performed by Edilia Layton DPM at [facility] 6/8/2022    EGD IV SEDATION performed by Fidencio Cordova MD at [address]       Previous Medications    AMLODIPINE (NORVASC) 10 MG TABLET    TAKE 1 TABLET BY MOUTH EVERY DAY    ATORVASTATIN (LIPITOR) 20 MG TABLET    Take 1 tablet by mouth daily    CLOPIDOGREL (PLAVIX) 75 MG TABLET    Take 1 tablet by mouth daily    DIGOXIN (LANOXIN) 125 MCG TABLET    TAKE 1 TABLET BY MOUTH EVERY DAY    FERROUS SULFATE (IRON 325) 325 (65 FE) MG TABLET    Take 1 tablet by mouth 2 times daily    GABAPENTIN (NEURONTIN) 100 MG CAPSULE    Take 2 capsules by mouth 2 times daily for 30 days. HYDRALAZINE (APRESOLINE) 50 MG TABLET    Take 1 tablet by mouth every 8 hours    LISINOPRIL (PRINIVIL;ZESTRIL) 20 MG TABLET    TAKE 1 TABLET BY MOUTH EVERY DAY    VITAMIN D3 (CHOLECALCIFEROL) 25 MCG (1000 UT) TABS TABLET    TAKE 1 TABLET BY MOUTH EVERY DAY         ALLERGIES     Pcn [penicillins]    FAMILYHISTORY       Family History   Problem Relation Age of Onset    Heart Disease Mother     Stroke Mother     Heart Disease Father     Diabetes Sister     Diabetes Brother     Diabetes Maternal Grandmother           SOCIAL HISTORY       Social History     Tobacco Use    Smoking status: Never    Smokeless tobacco: Never    Tobacco comments:     Not needed   Vaping Use    Vaping Use: Never used   Substance Use Topics    Alcohol use: No    Drug use: No       SCREENINGS    [place] Coma Scale  Eye Opening: Spontaneous  Best Verbal Response: Oriented  Best Motor Response: Obeys commands  [place] Coma Scale Score: 15        PHYSICAL EXAM    (up to 7 for level 4, 8 or more for level 5)     ED Triage Vitals [11/05/22 1322]   BP Temp Temp Source Heart Rate Resp SpO2 Height Weight   (!) 144/62 97.8 °F (36.6 °C) Oral 62 20 98 % -- --       Physical Exam  Vitals and nursing note reviewed. Constitutional:       Appearance: Normal appearance. She is well-developed and normal weight. HENT:      Head: Normocephalic and atraumatic. Right Ear: External ear normal.      Left Ear: External ear normal.   Eyes:      General: No scleral icterus. Right eye: No discharge. Left eye: No discharge. Conjunctiva/sclera: Conjunctivae normal.   Cardiovascular:      Rate and Rhythm: Normal rate. Pulmonary:      Effort: Pulmonary effort is normal.   Abdominal:      General: Abdomen is flat. Bowel sounds are normal.      Palpations: Abdomen is soft. Tenderness:  There is no abdominal tenderness. Musculoskeletal:         General: Signs of injury present. Normal range of motion. Cervical back: Normal range of motion and neck supple. Right lower leg: No edema. Left lower leg: No edema. Comments: The patient with a 4 cm skin tear on the dorsal aspect of the dominant right forearm. This will need wound care and reapproximation gently with dressing applied. Skin:     General: Skin is warm and dry. Neurological:      General: No focal deficit present. Mental Status: She is alert and oriented to person, place, and time. Mental status is at baseline. Psychiatric:         Mood and Affect: Mood normal.         Behavior: Behavior normal.         Thought Content: Thought content normal.         Judgment: Judgment normal.       DIAGNOSTIC RESULTS   LABS:    Labs Reviewed - No data to display    When ordered only abnormal lab results are displayed. All other labs were within normal range or not returned as of this dictation. EKG: When ordered, EKG's are interpreted by the Emergency Department Physician in the absence of a cardiologist.  Please see their note for interpretation of EKG. RADIOLOGY:   Non-plain film images such as CT, Ultrasound and MRI are read by the radiologist. Plain radiographic images are visualized and preliminarily interpreted by the ED Provider with the below findings:        Interpretation per the Radiologist below, if available at the time of this note:    No orders to display     No results found.         PROCEDURES   Unless otherwise noted below, none     Procedures    CRITICAL CARE TIME       CONSULTS:  None      EMERGENCY DEPARTMENT COURSE and DIFFERENTIAL DIAGNOSIS/MDM:   Vitals:    Vitals:    11/05/22 1322   BP: (!) 144/62   Pulse: 62   Resp: 20   Temp: 97.8 °F (36.6 °C)   TempSrc: Oral   SpO2: 98%       Patient was given the following medications:  Medications   lidocaine-EPINEPHrine-tetracaine (LET) topical solution 3 mL syringe (has no administration in time range)         Is this patient to be included in the SEP-1 Core Measure due to severe sepsis or septic shock? No   Exclusion criteria - the patient is NOT to be included for SEP-1 Core Measure due to: Infection is not suspected    Patient with history of fall causing tearing injury to the nondominant right forearm dorsal aspect and centrally positioned. This is a rather large skin tear. The area was cleansed and then dressed. X-ray not indicated as she had no bony tenderness on my exam.  Tetanus updated. Recommend dressing change in about 3 days by healthcare provider. The patient and daughter-in-law both expressed understanding of the diagnosis and the treatment plan. I did recommend Tylenol for pain control. FINAL IMPRESSION      1. Skin tear of right forearm without complication, initial encounter    2. Need for diphtheria-tetanus-pertussis (Tdap) vaccine          DISPOSITION/PLAN   DISPOSITION Decision To Discharge 11/05/2022 03:08:21 PM      PATIENT REFERRED TO:  MARVIN Anguiano CNP  [address] [facility] [address]    Schedule an appointment as soon as possible for a visit in 3 days  For wound re-check    [facility]  ED  [address] [facility] [address] [facility]  Go to   If symptoms worsen    DISCHARGE MEDICATIONS:  New Prescriptions    No medications on file       DISCONTINUED MEDICATIONS:  Discontinued Medications    No medications on file              (Please note that portions of this note were completed with a voice recognition program.  Efforts were made to edit the dictations but occasionally words are mis-transcribed. )    Josué Gerard PA-C (electronically signed)           Josué Gerard PA-C  11/05/22 1857

## 2022-11-05 NOTE — DISCHARGE INSTRUCTIONS
Skin tear cleansed and dressed. Leave dressing in place until wound check in about 3 days by your healthcare provider. Tylenol 1000 mg 3 times daily for pain control.   Tetanus was updated today

## 2022-11-08 ENCOUNTER — OFFICE VISIT (OUTPATIENT)
Dept: FAMILY MEDICINE CLINIC | Age: 77
End: 2022-11-08
Payer: MEDICARE

## 2022-11-08 VITALS
WEIGHT: 116.25 LBS | OXYGEN SATURATION: 98 % | TEMPERATURE: 98 F | DIASTOLIC BLOOD PRESSURE: 52 MMHG | BODY MASS INDEX: 20.59 KG/M2 | HEART RATE: 82 BPM | SYSTOLIC BLOOD PRESSURE: 129 MMHG

## 2022-11-08 DIAGNOSIS — Z51.89 VISIT FOR WOUND CHECK: Primary | ICD-10-CM

## 2022-11-08 PROCEDURE — G8484 FLU IMMUNIZE NO ADMIN: HCPCS | Performed by: NURSE PRACTITIONER

## 2022-11-08 PROCEDURE — G8427 DOCREV CUR MEDS BY ELIG CLIN: HCPCS | Performed by: NURSE PRACTITIONER

## 2022-11-08 PROCEDURE — 1090F PRES/ABSN URINE INCON ASSESS: CPT | Performed by: NURSE PRACTITIONER

## 2022-11-08 PROCEDURE — 1123F ACP DISCUSS/DSCN MKR DOCD: CPT | Performed by: NURSE PRACTITIONER

## 2022-11-08 PROCEDURE — 1036F TOBACCO NON-USER: CPT | Performed by: NURSE PRACTITIONER

## 2022-11-08 PROCEDURE — 3074F SYST BP LT 130 MM HG: CPT | Performed by: NURSE PRACTITIONER

## 2022-11-08 PROCEDURE — 3078F DIAST BP <80 MM HG: CPT | Performed by: NURSE PRACTITIONER

## 2022-11-08 PROCEDURE — G8400 PT W/DXA NO RESULTS DOC: HCPCS | Performed by: NURSE PRACTITIONER

## 2022-11-08 PROCEDURE — G8420 CALC BMI NORM PARAMETERS: HCPCS | Performed by: NURSE PRACTITIONER

## 2022-11-08 PROCEDURE — 99213 OFFICE O/P EST LOW 20 MIN: CPT | Performed by: NURSE PRACTITIONER

## 2022-11-08 ASSESSMENT — ENCOUNTER SYMPTOMS
RESPIRATORY NEGATIVE: 1
GASTROINTESTINAL NEGATIVE: 1
EYES NEGATIVE: 1

## 2022-11-08 NOTE — PROGRESS NOTES
Right 5/23/2022    AMPUTATION OF RIGHT FIRST AND FOURTH TOES performed by Cathi Tirado DPM at [address] Right 9/9/2022    TRANSMETATARSAL AMPUTATION RIGHT FOOT performed by Cathi Tirado DPM at [address] N/A 6/8/2022    EGD IV SEDATION performed by David Greer MD at [facility] [address] History   Problem Relation Age of Onset    Heart Disease Mother     Stroke Mother     Heart Disease Father     Diabetes Sister     Diabetes Brother     Diabetes Maternal Grandmother      Social History     Socioeconomic History    Marital status:      Spouse name: Not on file    Number of children: Not on file    Years of education: Not on file    Highest education level: Not on file   Occupational History    Not on file   Tobacco Use    Smoking status: Never    Smokeless tobacco: Never    Tobacco comments:     Not needed   Vaping Use    Vaping Use: Never used   Substance and Sexual Activity    Alcohol use: No    Drug use: No    Sexual activity: Yes     Partners: Male   Other Topics Concern    Not on file   Social History Narrative    Not on file     Social Determinants of Health     Financial Resource Strain: Not on file   Food Insecurity: Not on file   Transportation Needs: Not on file   Physical Activity: Not on file   Stress: Not on file   Social Connections: Not on file   Intimate Partner Violence: Not on file   Housing Stability: Not on file     Current Outpatient Medications   Medication Sig Dispense Refill    amLODIPine (NORVASC) 10 MG tablet TAKE 1 TABLET BY MOUTH EVERY DAY 30 tablet 5    ferrous sulfate (IRON 325) 325 (65 Fe) MG tablet Take 1 tablet by mouth 2 times daily 60 tablet 5    lisinopril (PRINIVIL;ZESTRIL) 20 MG tablet TAKE 1 TABLET BY MOUTH EVERY DAY 30 tablet 5    gabapentin (NEURONTIN) 100 MG capsule Take 2 capsules by mouth 2 times daily for 30 days.  120 capsule 1    digoxin (LANOXIN) 125 MCG tablet TAKE 1 TABLET BY MOUTH EVERY DAY 30 tablet 5    clopidogrel (PLAVIX) 75 MG tablet Take 1 tablet by mouth daily 30 tablet 5    hydrALAZINE (APRESOLINE) 50 MG tablet Take 1 tablet by mouth every 8 hours 90 tablet 1    atorvastatin (LIPITOR) 20 MG tablet Take 1 tablet by mouth daily 30 tablet 0    vitamin D3 (CHOLECALCIFEROL) 25 MCG (1000 UT) TABS tablet TAKE 1 TABLET BY MOUTH EVERY DAY 30 tablet 0     No current facility-administered medications for this visit. Allergies   Allergen Reactions    Pcn [Penicillins] Other (See Comments)     Unsure of reaction       REVIEW OF SYSTEMS  Review of Systems   Constitutional: Negative. HENT: Negative. Eyes: Negative. Respiratory: Negative. Cardiovascular: Negative. Gastrointestinal: Negative. Genitourinary: Negative. Musculoskeletal: Negative. Skin:  Positive for wound. Neurological: Negative. Psychiatric/Behavioral: Negative. PHYSICAL EXAM  BP (!) 129/52   Pulse 82   Temp 98 °F (36.7 °C) (Oral)   Wt 116 lb 4 oz (52.7 kg)   SpO2 98%   BMI 20.59 kg/m²   Physical Exam  Constitutional:       Appearance: Normal appearance. She is not toxic-appearing. HENT:      Head: Normocephalic. Right Ear: External ear normal.      Left Ear: External ear normal.      Nose: Nose normal.      Mouth/Throat:      Mouth: Mucous membranes are moist.      Pharynx: Oropharynx is clear. Eyes:      Conjunctiva/sclera: Conjunctivae normal.   Cardiovascular:      Rate and Rhythm: Normal rate. Pulses: Normal pulses. Pulmonary:      Effort: Pulmonary effort is normal.   Abdominal:      Palpations: Abdomen is soft. Tenderness: There is no guarding. Musculoskeletal:         General: Normal range of motion. Cervical back: Normal range of motion and neck supple. Skin:     General: Skin is warm and dry. Capillary Refill: Capillary refill takes less than 2 seconds. Findings: Wound present.       Comments: Skin tear to right mid forearm Neurological:      Mental Status: She is alert and oriented to person, place, and time. ASSESSMENT/PLAN:   1. Visit for wound check  Patient presents today with daughter-in-law for ER follow-up and wound recheck. Patient was seen in the ER on 11/5. Patient was treated for a skin tear on her right forearm. Patient reports updating her tetanus at that time and discharged home. Patient reports that she fell in the bathroom causing a skin tear. Patient denies any head injury or loss of consciousness. Patient reports that she is kept the dressing on since the ER. Dressing was soaked off patient tolerated well. Area was cleansed and PSO was applied. Nonadhesive dressing and gauze applied. Patient and daughter in law encouraged on changing dressing and monitoring signs and symptoms of infection. Patient and daughter-in-law both verbalized and acknowledged with plan of care at this time. The note was completed using Dragon voice recognition transcription. Every effort was made to ensure accuracy; however, inadvertent  transcription errors may be present despite my best efforts to edit errors.     Harini Sanchez, APRN - CNP

## 2022-11-08 NOTE — PATIENT INSTRUCTIONS
Please read the healthy family handout that you were given and share it with your family. Please compare this printed medication list with your medications at home to be sure they are the same. If you have any medications that are different please contact us immediately at [phone]. Also review your allergies that we have listed, these may also include medications that you have not been able to tolerate, make sure everything listed is correct. If you have any allergies that are different please contact us immediately at [phone]. You may receive a survey in the mail or by email asking about your experience during your visit today. Please complete and return to us so we know how we are serving you.

## 2022-11-14 ENCOUNTER — OFFICE VISIT (OUTPATIENT)
Dept: FAMILY MEDICINE CLINIC | Age: 77
End: 2022-11-14
Payer: MEDICARE

## 2022-11-14 VITALS
HEART RATE: 56 BPM | TEMPERATURE: 97.7 F | SYSTOLIC BLOOD PRESSURE: 144 MMHG | DIASTOLIC BLOOD PRESSURE: 64 MMHG | OXYGEN SATURATION: 98 %

## 2022-11-14 DIAGNOSIS — Z51.89 VISIT FOR WOUND CHECK: Primary | ICD-10-CM

## 2022-11-14 PROCEDURE — 1036F TOBACCO NON-USER: CPT | Performed by: NURSE PRACTITIONER

## 2022-11-14 PROCEDURE — G8484 FLU IMMUNIZE NO ADMIN: HCPCS | Performed by: NURSE PRACTITIONER

## 2022-11-14 PROCEDURE — 3078F DIAST BP <80 MM HG: CPT | Performed by: NURSE PRACTITIONER

## 2022-11-14 PROCEDURE — G8420 CALC BMI NORM PARAMETERS: HCPCS | Performed by: NURSE PRACTITIONER

## 2022-11-14 PROCEDURE — 1090F PRES/ABSN URINE INCON ASSESS: CPT | Performed by: NURSE PRACTITIONER

## 2022-11-14 PROCEDURE — 99213 OFFICE O/P EST LOW 20 MIN: CPT | Performed by: NURSE PRACTITIONER

## 2022-11-14 PROCEDURE — 3074F SYST BP LT 130 MM HG: CPT | Performed by: NURSE PRACTITIONER

## 2022-11-14 PROCEDURE — 1123F ACP DISCUSS/DSCN MKR DOCD: CPT | Performed by: NURSE PRACTITIONER

## 2022-11-14 PROCEDURE — G8400 PT W/DXA NO RESULTS DOC: HCPCS | Performed by: NURSE PRACTITIONER

## 2022-11-14 PROCEDURE — G8427 DOCREV CUR MEDS BY ELIG CLIN: HCPCS | Performed by: NURSE PRACTITIONER

## 2022-11-14 ASSESSMENT — ENCOUNTER SYMPTOMS
RESPIRATORY NEGATIVE: 1
GASTROINTESTINAL NEGATIVE: 1
EYES NEGATIVE: 1

## 2022-11-14 NOTE — PROGRESS NOTES
CHIEF COMPLAINT  Chief Complaint   Patient presents with    Wound Check        HPI   Jessenia Salazar is a 68 y.o. female who presents to the office evaluation of wound on the right forearm. Patient reports that she has been keeping the area clean and covered. Patient reports using over-the-counter antibiotic ointment for treatment. Patient reports eating and drinking appropriately. No fever or chills. Patient reports changing dressings daily. No other complaints, modifying factors or associated symptoms. Nursing notes reviewed.    Past Medical History:   Diagnosis Date    Atrial fibrillation ([address]     off coumadin after bleed, declined restart    Blood transfusion     CAD (coronary artery disease)     Diabetes mellitus type II     Diabetic neuropathy ([facility] [address] 2011    Gastric ulcer     H. pylori infection 2009    Hyperlipidemia     Hypertension     Numbness and tingling of left leg     Osteoarthritis     knees    Poor historian     Primary hypertension     PVD (peripheral vascular disease) ([facility] [address]     PTA distal sfa/pop/peroneal, Dr. Danise Scheuermann 12/2015    Unspecified cerebral artery occlusion with cerebral infarction     TIA affected left eye    upper gi bleed 12/2009     Past Surgical History:   Procedure Laterality Date    ANGIOPLASTY  12/30/15    Dr. Danise Scheuermann, E, PTA of distal sfa/pop/peroneal    CARDIAC CATHETERIZATION  9/21/12    done for surgical clearance, cath was negative    COLONOSCOPY  06/09/2022    COLONOSCOPY N/A 6/9/2022    COLONOSCOPY CONTROL HEMORRHAGE performed by David Greer MD at [facility]  [id number]    FOOT SURGERY Left 01/11/2018    DEBRIDEMENT OF ULCERS LEFT FOOT AND LEG WITH GRAFT    OTHER SURGICAL HISTORY Left 06/05/2017    Left Femoral Peroneal Bypass    SHOULDER ARTHROPLASTY  10/5/12    RIGHT SHOULDER TOTAL ARTHROPLASTY, BICEPS TENODESIS DEPUY, GLOBAL ADVANTAGE AP    TOE AMPUTATION Right 5/23/2022    AMPUTATION OF RIGHT FIRST AND FOURTH TOES performed by Trish Duff DPM at [address] Right 9/9/2022    TRANSMETATARSAL AMPUTATION RIGHT FOOT performed by Trish Duff DPM at [address] N/A 6/8/2022    EGD IV SEDATION performed by Radha Rankin MD at [address] History   Problem Relation Age of Onset    Heart Disease Mother     Stroke Mother     Heart Disease Father     Diabetes Sister     Diabetes Brother     Diabetes Maternal Grandmother      Social History     Socioeconomic History    Marital status:      Spouse name: Not on file    Number of children: Not on file    Years of education: Not on file    Highest education level: Not on file   Occupational History    Not on file   Tobacco Use    Smoking status: Never    Smokeless tobacco: Never    Tobacco comments:     Not needed   Vaping Use    Vaping Use: Never used   Substance and Sexual Activity    Alcohol use: No    Drug use: No    Sexual activity: Yes     Partners: Male   Other Topics Concern    Not on file   Social History Narrative    Not on file     Social Determinants of Health     Financial Resource Strain: Not on file   Food Insecurity: Not on file   Transportation Needs: Not on file   Physical Activity: Not on file   Stress: Not on file   Social Connections: Not on file   Intimate Partner Violence: Not on file   Housing Stability: Not on file     Current Outpatient Medications   Medication Sig Dispense Refill    amLODIPine (NORVASC) 10 MG tablet TAKE 1 TABLET BY MOUTH EVERY DAY 30 tablet 5    ferrous sulfate (IRON 325) 325 (65 Fe) MG tablet Take 1 tablet by mouth 2 times daily 60 tablet 5    lisinopril (PRINIVIL;ZESTRIL) 20 MG tablet TAKE 1 TABLET BY MOUTH EVERY DAY 30 tablet 5    gabapentin (NEURONTIN) 100 MG capsule Take 2 capsules by mouth 2 times daily for 30 days.  120 capsule 1    digoxin (LANOXIN) 125 MCG tablet TAKE 1 TABLET BY MOUTH EVERY DAY 30 tablet 5 clopidogrel (PLAVIX) 75 MG tablet Take 1 tablet by mouth daily 30 tablet 5    hydrALAZINE (APRESOLINE) 50 MG tablet Take 1 tablet by mouth every 8 hours 90 tablet 1    atorvastatin (LIPITOR) 20 MG tablet Take 1 tablet by mouth daily 30 tablet 0    vitamin D3 (CHOLECALCIFEROL) 25 MCG (1000 UT) TABS tablet TAKE 1 TABLET BY MOUTH EVERY DAY 30 tablet 0     No current facility-administered medications for this visit. Allergies   Allergen Reactions    Pcn [Penicillins] Other (See Comments)     Unsure of reaction       REVIEW OF SYSTEMS  Review of Systems   Constitutional: Negative. HENT: Negative. Eyes: Negative. Respiratory: Negative. Cardiovascular: Negative. Gastrointestinal: Negative. Genitourinary: Negative. Musculoskeletal: Negative. Skin:  Positive for wound. Neurological: Negative. Psychiatric/Behavioral: Negative. PHYSICAL EXAM  BP (!) 144/64   Pulse 56   Temp 97.7 °F (36.5 °C) (Oral)   SpO2 98%   Physical Exam  Constitutional:       Appearance: Normal appearance. She is not toxic-appearing. HENT:      Head: Normocephalic. Right Ear: External ear normal.      Left Ear: External ear normal.      Nose: Nose normal.      Mouth/Throat:      Mouth: Mucous membranes are moist.      Pharynx: Oropharynx is clear. Eyes:      Extraocular Movements: Extraocular movements intact. Conjunctiva/sclera: Conjunctivae normal.   Cardiovascular:      Rate and Rhythm: Normal rate. Pulses: Normal pulses. Pulmonary:      Effort: Pulmonary effort is normal.   Abdominal:      Palpations: Abdomen is soft. Tenderness: There is no guarding. Musculoskeletal:         General: Normal range of motion. Skin:     Capillary Refill: Capillary refill takes less than 2 seconds. Comments: See image   Neurological:      Mental Status: She is alert and oriented to person, place, and time. ASSESSMENT/PLAN:   1.  Visit for wound check  Patient presents today for follow-up in regards to wound on right forearm. Patient was  seen last week after ER visit on 11/5/2022. Patient was advised on keeping area clean, covered and use of antibiotic ointment. Patient reports that she has been doing that and denies any other associated symptoms or complaints at today's visit. Patient reports eating and drinking appropriately with no fever or chills. Wound appears to be healing appropriately. I did advise patient to leave open to air to promote healing at this time however patient aware that if it were to come in contact with any debris or infection to cover area with nonadhesive dressing. Patient verbalized acknowledged plan of care at this time. The note was completed using Dragon voice recognition transcription. Every effort was made to ensure accuracy; however, inadvertent  transcription errors may be present despite my best efforts to edit errors.     Feli Patel, MARVIN - CNP

## 2022-11-14 NOTE — PATIENT INSTRUCTIONS
Please read the healthy family handout that you were given and share it with your family. Please compare this printed medication list with your medications at home to be sure they are the same. If you have any medications that are different please contact us immediately at [phone]. Also review your allergies that we have listed, these may also include medications that you have not been able to tolerate, make sure everything listed is correct. If you have any allergies that are different please contact us immediately at [phone]. You may receive a survey in the mail or by email asking about your experience during your visit today. Please complete and return to us so we know how we are serving you.

## 2022-12-12 ENCOUNTER — TELEPHONE (OUTPATIENT)
Dept: FAMILY MEDICINE CLINIC | Age: 77
End: 2022-12-12

## 2022-12-12 NOTE — TELEPHONE ENCOUNTER
----- Message from Evon Echeverria sent at 12/9/2022  3:46 PM EST -----  Subject: Referral Request    Reason for referral request? Diabetic shoe order request  Provider patient wants to be referred to(if known): Mayelin Celis    Provider Phone Number(if known): [phone]    Additional Information for Provider? [phone] [facility] contact   #  ---------------------------------------------------------------------------  --------------  CALL BACK INFO    [phone]; OK to leave message on voicemail  ---------------------------------------------------------------------------  --------------

## 2022-12-13 NOTE — TELEPHONE ENCOUNTER
Patient said she dropped off the from but it could not be located. Advised patient I would call and have new from faxed to be filled out.     Called [facility] and the will send new form

## 2022-12-16 DIAGNOSIS — E78.00 PURE HYPERCHOLESTEROLEMIA: ICD-10-CM

## 2022-12-19 RX ORDER — MELATONIN
Qty: 30 TABLET | Refills: 5 | Status: SHIPPED | OUTPATIENT
Start: 2022-12-19

## 2022-12-19 RX ORDER — ATORVASTATIN CALCIUM 20 MG/1
TABLET, FILM COATED ORAL
Qty: 30 TABLET | Refills: 5 | Status: SHIPPED | OUTPATIENT
Start: 2022-12-19

## 2022-12-28 NOTE — TELEPHONE ENCOUNTER
Rito Marshall again spoke to [facility], gave her my email and fax number to try get this form sent us. I let her know we have called 2 other times and have not received.

## 2023-01-19 NOTE — TELEPHONE ENCOUNTER
Spoke to [facility] again, they had wrong information for doctor, they have updated and will get form faxed

## 2023-02-02 ENCOUNTER — OFFICE VISIT (OUTPATIENT)
Dept: FAMILY MEDICINE CLINIC | Age: 78
End: 2023-02-02
Payer: MEDICARE

## 2023-02-02 VITALS
HEART RATE: 71 BPM | OXYGEN SATURATION: 97 % | SYSTOLIC BLOOD PRESSURE: 146 MMHG | DIASTOLIC BLOOD PRESSURE: 65 MMHG | TEMPERATURE: 98 F | WEIGHT: 137 LBS | BODY MASS INDEX: 24.27 KG/M2

## 2023-02-02 DIAGNOSIS — I25.10 CORONARY ARTERY DISEASE INVOLVING NATIVE CORONARY ARTERY OF NATIVE HEART WITHOUT ANGINA PECTORIS: ICD-10-CM

## 2023-02-02 DIAGNOSIS — I73.9 PVD (PERIPHERAL VASCULAR DISEASE) (HCC): ICD-10-CM

## 2023-02-02 DIAGNOSIS — E78.00 PURE HYPERCHOLESTEROLEMIA: ICD-10-CM

## 2023-02-02 DIAGNOSIS — I10 ESSENTIAL HYPERTENSION: Primary | ICD-10-CM

## 2023-02-02 DIAGNOSIS — E55.9 VITAMIN D DEFICIENCY: ICD-10-CM

## 2023-02-02 DIAGNOSIS — I73.9 PAD (PERIPHERAL ARTERY DISEASE) (HCC): ICD-10-CM

## 2023-02-02 DIAGNOSIS — E11.59 TYPE 2 DIABETES MELLITUS WITH OTHER CIRCULATORY COMPLICATION, WITHOUT LONG-TERM CURRENT USE OF INSULIN (HCC): ICD-10-CM

## 2023-02-02 DIAGNOSIS — M19.90 OSTEOARTHRITIS, UNSPECIFIED OSTEOARTHRITIS TYPE, UNSPECIFIED SITE: ICD-10-CM

## 2023-02-02 PROCEDURE — 36415 COLL VENOUS BLD VENIPUNCTURE: CPT | Performed by: NURSE PRACTITIONER

## 2023-02-02 PROCEDURE — G8427 DOCREV CUR MEDS BY ELIG CLIN: HCPCS | Performed by: NURSE PRACTITIONER

## 2023-02-02 PROCEDURE — G8484 FLU IMMUNIZE NO ADMIN: HCPCS | Performed by: NURSE PRACTITIONER

## 2023-02-02 PROCEDURE — G8420 CALC BMI NORM PARAMETERS: HCPCS | Performed by: NURSE PRACTITIONER

## 2023-02-02 PROCEDURE — 1123F ACP DISCUSS/DSCN MKR DOCD: CPT | Performed by: NURSE PRACTITIONER

## 2023-02-02 PROCEDURE — 3078F DIAST BP <80 MM HG: CPT | Performed by: NURSE PRACTITIONER

## 2023-02-02 PROCEDURE — 99214 OFFICE O/P EST MOD 30 MIN: CPT | Performed by: NURSE PRACTITIONER

## 2023-02-02 PROCEDURE — 1036F TOBACCO NON-USER: CPT | Performed by: NURSE PRACTITIONER

## 2023-02-02 PROCEDURE — 1090F PRES/ABSN URINE INCON ASSESS: CPT | Performed by: NURSE PRACTITIONER

## 2023-02-02 PROCEDURE — G8400 PT W/DXA NO RESULTS DOC: HCPCS | Performed by: NURSE PRACTITIONER

## 2023-02-02 PROCEDURE — 3077F SYST BP >= 140 MM HG: CPT | Performed by: NURSE PRACTITIONER

## 2023-02-02 RX ORDER — ASCORBIC ACID 250 MG
250 TABLET ORAL DAILY
Qty: 30 TABLET | Refills: 3 | Status: SHIPPED | OUTPATIENT
Start: 2023-02-02

## 2023-02-02 RX ORDER — MELATONIN
Qty: 30 TABLET | Refills: 5 | Status: SHIPPED | OUTPATIENT
Start: 2023-02-02

## 2023-02-02 RX ORDER — GABAPENTIN 100 MG/1
200 CAPSULE ORAL DAILY
Qty: 120 CAPSULE | Refills: 1
Start: 2023-02-02 | End: 2023-03-04

## 2023-02-02 RX ORDER — HYDRALAZINE HYDROCHLORIDE 50 MG/1
50 TABLET, FILM COATED ORAL DAILY
Qty: 90 TABLET | Refills: 1 | Status: SHIPPED | OUTPATIENT
Start: 2023-02-02

## 2023-02-02 ASSESSMENT — PATIENT HEALTH QUESTIONNAIRE - PHQ9
1. LITTLE INTEREST OR PLEASURE IN DOING THINGS: 0
SUM OF ALL RESPONSES TO PHQ9 QUESTIONS 1 & 2: 0
2. FEELING DOWN, DEPRESSED OR HOPELESS: 0
SUM OF ALL RESPONSES TO PHQ QUESTIONS 1-9: 0

## 2023-02-02 ASSESSMENT — ENCOUNTER SYMPTOMS
RESPIRATORY NEGATIVE: 1
EYES NEGATIVE: 1
GASTROINTESTINAL NEGATIVE: 1

## 2023-02-02 NOTE — PROGRESS NOTES
CHIEF COMPLAINT  Chief Complaint   Patient presents with    Check-Up     DM2          HPI   Stephane Warren is a 68 y.o. female who presents to the office for check up. Patient is accompanied by her daughter in law. Patient reports doing well since last visit. No recent changes in medical history or medications. No Episodes of dizziness, lightheadedness, chest pain or shortness of breath. No abdominal pain or discomfort. No nausea, vomiting, diarrhea. No dark or tarry stools. No other complaints, modifying factors or associated symptoms. Nursing notes reviewed.    Past Medical History:   Diagnosis Date    Atrial fibrillation ([facility] [address]     off coumadin after bleed, declined restart    Blood transfusion     CAD (coronary artery disease)     Diabetes mellitus type II     Diabetic neuropathy ([facility] [address] 2011    Gastric ulcer     H. pylori infection 2009    Hyperlipidemia     Hypertension     Numbness and tingling of left leg     Osteoarthritis     knees    Poor historian     Primary hypertension     PVD (peripheral vascular disease) ([facility] [address]     PTA distal sfa/pop/peroneal, Dr. Benjamin Jaimes 12/2015    Unspecified cerebral artery occlusion with cerebral infarction     TIA affected left eye    upper gi bleed 12/2009     Past Surgical History:   Procedure Laterality Date    ANGIOPLASTY  12/30/15    Dr. Benjamin Jaimes, LLE, PTA of distal sfa/pop/peroneal    CARDIAC CATHETERIZATION  9/21/12    done for surgical clearance, cath was negative    COLONOSCOPY  06/09/2022    COLONOSCOPY N/A 6/9/2022    COLONOSCOPY CONTROL HEMORRHAGE performed by Kevin Ulloa MD at [facility]  [id number]    FOOT SURGERY Left 01/11/2018    DEBRIDEMENT OF ULCERS LEFT FOOT AND LEG WITH GRAFT    OTHER SURGICAL HISTORY Left 06/05/2017    Left Femoral Peroneal Bypass    SHOULDER ARTHROPLASTY  10/5/12    RIGHT SHOULDER TOTAL ARTHROPLASTY, BICEPS TENODESIS DEPUY, GLOBAL ADVANTAGE AP    TOE AMPUTATION Right 5/23/2022    AMPUTATION OF RIGHT FIRST AND FOURTH TOES performed by Eun Velasquez DPM at [address] Right 9/9/2022    TRANSMETATARSAL AMPUTATION RIGHT FOOT performed by Eun Velasquez DPM at [address] N/A 6/8/2022    EGD IV SEDATION performed by Gibran Almanza MD at [facility]     Family History   Problem Relation Age of Onset    Heart Disease Mother     Stroke Mother     Heart Disease Father     Diabetes Sister     Diabetes Brother     Diabetes Maternal Grandmother      Social History     Socioeconomic History    Marital status:      Spouse name: Not on file    Number of children: Not on file    Years of education: Not on file    Highest education level: Not on file   Occupational History    Not on file   Tobacco Use    Smoking status: Never    Smokeless tobacco: Never    Tobacco comments:     Not needed   Vaping Use    Vaping Use: Never used   Substance and Sexual Activity    Alcohol use: No    Drug use: No    Sexual activity: Yes     Partners: Male   Other Topics Concern    Not on file   Social History Narrative    Not on file     Social Determinants of Health     Financial Resource Strain: Not on file   Food Insecurity: Not on file   Transportation Needs: Not on file   Physical Activity: Not on file   Stress: Not on file   Social Connections: Not on file   Intimate Partner Violence: Not on file   Housing Stability: Not on file     Current Outpatient Medications   Medication Sig Dispense Refill    vitamin D3 (CHOLECALCIFEROL) 25 MCG (1000 UT) TABS tablet TAKE 1 TABLET BY MOUTH EVERY DAY 30 tablet 5    hydrALAZINE (APRESOLINE) 50 MG tablet Take 1 tablet by mouth daily 90 tablet 1    gabapentin (NEURONTIN) 100 MG capsule Take 2 capsules by mouth daily for 30 days.  120 capsule 1    ascorbic acid (V-R VITAMIN C) 250 MG tablet Take 1 tablet by mouth daily 30 tablet 3    atorvastatin (LIPITOR) 20 MG tablet TAKE 1 TABLET BY MOUTH EVERY DAY 30 tablet 5    amLODIPine (NORVASC) 10 MG tablet TAKE 1 TABLET BY MOUTH EVERY DAY 30 tablet 5    lisinopril (PRINIVIL;ZESTRIL) 20 MG tablet TAKE 1 TABLET BY MOUTH EVERY DAY 30 tablet 5    digoxin (LANOXIN) 125 MCG tablet TAKE 1 TABLET BY MOUTH EVERY DAY 30 tablet 5    ferrous sulfate (IRON 325) 325 (65 Fe) MG tablet Take 1 tablet by mouth 2 times daily (Patient not taking: Reported on 2/2/2023) 60 tablet 5    clopidogrel (PLAVIX) 75 MG tablet Take 1 tablet by mouth daily 30 tablet 5     No current facility-administered medications for this visit. Allergies   Allergen Reactions    Pcn [Penicillins] Other (See Comments)     Unsure of reaction       REVIEW OF SYSTEMS  Review of Systems   Constitutional: Negative. HENT: Negative. Eyes: Negative. Respiratory: Negative. Cardiovascular: Negative. Gastrointestinal: Negative. Genitourinary: Negative. Musculoskeletal:  Positive for arthralgias. Bilateral knee and left shoulder pain   Skin: Negative. Neurological: Negative. Psychiatric/Behavioral: Negative. PHYSICAL EXAM  BP (!) 146/65   Pulse 71   Temp 98 °F (36.7 °C) (Oral)   Wt 137 lb (62.1 kg)   SpO2 97%   BMI 24.27 kg/m²   Physical Exam  Constitutional:       Appearance: Normal appearance. She is not toxic-appearing. HENT:      Head: Normocephalic. Right Ear: External ear normal.      Left Ear: External ear normal.      Nose: Nose normal.      Mouth/Throat:      Mouth: Mucous membranes are moist.      Pharynx: Oropharynx is clear. Eyes:      Extraocular Movements: Extraocular movements intact. Conjunctiva/sclera: Conjunctivae normal.      Pupils: Pupils are equal, round, and reactive to light. Cardiovascular:      Rate and Rhythm: Normal rate and regular rhythm. Pulses: Normal pulses. Pulmonary:      Effort: Pulmonary effort is normal.      Breath sounds: Normal breath sounds.    Abdominal:      General: Bowel sounds are normal. Palpations: Abdomen is soft. Tenderness: There is no right CVA tenderness, left CVA tenderness or guarding. Musculoskeletal:         General: Deformity present. Normal range of motion. Cervical back: Normal range of motion and neck supple. Right foot: Deformity present. Comments: Limited ROM of bilateral knees, left shoulder      Right Lower Extremity: (metatarsal amputation)  Skin:     General: Skin is warm and dry. Capillary Refill: Capillary refill takes 2 to 3 seconds. Neurological:      Mental Status: She is alert and oriented to person, place, and time. Psychiatric:         Mood and Affect: Mood normal.         Behavior: Behavior normal.        ASSESSMENT/PLAN:   1. Essential hypertension  Stable. Patient compliant with amlodipine 10 mg daily, lisinopril 20 mg daily, digoxin 125 mcg daily and hydralazine 50 mg daily. Patient denies any episodes of dizziness, lightheadedness, chest pain or shortness of breath. Patient advised on monitoring her diet avoiding foods that are high in salt to avoid further elevation of her blood pressure. Continue with current treatment and management follow-up in 6 months, sooner for new or worsening symptoms. - hydrALAZINE (APRESOLINE) 50 MG tablet; Take 1 tablet by mouth daily  Dispense: 90 tablet; Refill: 1    2. Coronary artery disease involving native coronary artery of native heart without angina pectoris  Stable. Patient compliant with atorvastatin, amlodipine, lisinopril, digoxin, Plavix, and hydralazine. Current cardiology monitoring. Continue with current treatment management follow-up in 6 months, sooner for new or worsening symptoms. 3. PVD (peripheral vascular disease) ([facility]  See above #2  - gabapentin (NEURONTIN) 100 MG capsule; Take 2 capsules by mouth daily for 30 days. Dispense: 120 capsule; Refill: 1    4. Pure hypercholesterolemia  Stable. Labs ordered and pending.   Patient compliant with atorvastatin daily. Continue with current treatment management follow-up in 6 months, sooner for new or worsening symptoms.  - Lipid, Fasting    5. Type 2 diabetes mellitus with other circulatory complication, without long-term current use of insulin (HCC)  Stable. Previous A1c 5.3. Patient previously on metformin no current use of medications. Patient advised on monitoring blood glucose however she reports that she does not like to do so. Patient to monitor diet. Continue with current treatment management follow-up in 6 months, sooner for new or worsening symptoms.  - Hemoglobin S5Y  - Basic Metabolic Panel    6. Vitamin D deficiency  Stable. labs ordered and pending. Refill sent to pharmacy for vitamin D supplement. Continue with current treatment management follow-up in 6 months, sooner for new or worsening symptoms  - vitamin D3 (CHOLECALCIFEROL) 25 MCG (1000 UT) TABS tablet; TAKE 1 TABLET BY MOUTH EVERY DAY  Dispense: 30 tablet; Refill: 5  - Vitamin D 25 Hydroxy         The note was completed using Dragon voice recognition transcription. Every effort was made to ensure accuracy; however, inadvertent  transcription errors may be present despite my best efforts to edit errors.     MARVIN Perez - CNP

## 2023-02-03 LAB
ANION GAP SERPL CALCULATED.3IONS-SCNC: 14 MMOL/L (ref 3–16)
BUN BLDV-MCNC: 24 MG/DL (ref 7–20)
CALCIUM SERPL-MCNC: 9.5 MG/DL (ref 8.3–10.6)
CHLORIDE BLD-SCNC: 101 MMOL/L (ref 99–110)
CHOLESTEROL, FASTING: 162 MG/DL (ref 0–199)
CO2: 19 MMOL/L (ref 21–32)
CREAT SERPL-MCNC: 1.2 MG/DL (ref 0.6–1.2)
ESTIMATED AVERAGE GLUCOSE: 116.9 MG/DL
GFR SERPL CREATININE-BSD FRML MDRD: 46 ML/MIN/{1.73_M2}
GLUCOSE BLD-MCNC: 148 MG/DL (ref 70–99)
HBA1C MFR BLD: 5.7 %
HDLC SERPL-MCNC: 56 MG/DL (ref 40–60)
LDL CHOLESTEROL CALCULATED: 94 MG/DL
POTASSIUM SERPL-SCNC: 5.8 MMOL/L (ref 3.5–5.1)
SODIUM BLD-SCNC: 134 MMOL/L (ref 136–145)
TRIGLYCERIDE, FASTING: 62 MG/DL (ref 0–150)
VITAMIN D 25-HYDROXY: 24.5 NG/ML
VLDLC SERPL CALC-MCNC: 12 MG/DL

## 2023-02-19 DIAGNOSIS — I10 ESSENTIAL HYPERTENSION: ICD-10-CM

## 2023-02-20 RX ORDER — HYDRALAZINE HYDROCHLORIDE 50 MG/1
TABLET, FILM COATED ORAL
Qty: 90 TABLET | Refills: 0 | Status: SHIPPED | OUTPATIENT
Start: 2023-02-20

## 2023-02-24 ENCOUNTER — TELEMEDICINE (OUTPATIENT)
Dept: FAMILY MEDICINE CLINIC | Age: 78
End: 2023-02-24
Payer: MEDICARE

## 2023-02-24 DIAGNOSIS — Z00.00 INITIAL MEDICARE ANNUAL WELLNESS VISIT: Primary | ICD-10-CM

## 2023-02-24 PROCEDURE — 1123F ACP DISCUSS/DSCN MKR DOCD: CPT | Performed by: NURSE PRACTITIONER

## 2023-02-24 PROCEDURE — G0438 PPPS, INITIAL VISIT: HCPCS | Performed by: NURSE PRACTITIONER

## 2023-02-24 PROCEDURE — G8484 FLU IMMUNIZE NO ADMIN: HCPCS | Performed by: NURSE PRACTITIONER

## 2023-02-24 ASSESSMENT — PATIENT HEALTH QUESTIONNAIRE - PHQ9
2. FEELING DOWN, DEPRESSED OR HOPELESS: 0
SUM OF ALL RESPONSES TO PHQ QUESTIONS 1-9: 0
SUM OF ALL RESPONSES TO PHQ QUESTIONS 1-9: 0
SUM OF ALL RESPONSES TO PHQ9 QUESTIONS 1 & 2: 0
SUM OF ALL RESPONSES TO PHQ QUESTIONS 1-9: 0
1. LITTLE INTEREST OR PLEASURE IN DOING THINGS: 0
SUM OF ALL RESPONSES TO PHQ QUESTIONS 1-9: 0

## 2023-02-24 ASSESSMENT — LIFESTYLE VARIABLES
HOW OFTEN DO YOU HAVE A DRINK CONTAINING ALCOHOL: NEVER
HOW MANY STANDARD DRINKS CONTAINING ALCOHOL DO YOU HAVE ON A TYPICAL DAY: PATIENT DOES NOT DRINK

## 2023-02-24 NOTE — PROGRESS NOTES
Medicare Annual Wellness Visit    Heddie Dance is here for Medicare AWV    Assessment & Plan   Initial Medicare annual wellness visit      Recommendations for Preventive Services Due: see orders and patient instructions/AVS.  Recommended screening schedule for the next 5-10 years is provided to the patient in written form: see Patient Instructions/AVS.     Return for Medicare Annual Wellness Visit in 1 year. Subjective   Presents today via telephone encounter for annual Medicare wellness visit. Patient's complete Health Risk Assessment and screening values have been reviewed and are found in Flowsheets. The following problems were reviewed today and where indicated follow up appointments were made and/or referrals ordered. Positive Risk Factor Screenings with Interventions:    Fall Risk:  Do you feel unsteady or are you worried about falling? : (!) yes  2 or more falls in past year?: (!) yes  Fall with injury in past year?: no     Interventions:    Patient comments: Reports using her walker or a cart if she is at the store  Patient declines any further evaluation or treatment    Cognitive: Words recalled: 0 Words Recalled           Total Score Interpretation: Abnormal Mini-Cog      Interventions:  Patient comments: Reports that when she heard the words again she remembered them. Patient was able to recall them for myself. Patient declines any further evaluation or treatment             Weight and Activity:  Physical Activity: Inactive    Days of Exercise per Week: 0 days    Minutes of Exercise per Session: 0 min     On average, how many days per week do you engage in moderate to strenuous exercise (like a brisk walk)?: 0 days  Have you lost any weight without trying in the past 3 months?: No     Inactivity Interventions:  Patient comments: Patient reports limitations and difficulty due to partial amputation of her foot.   Patient declined any further interventions or treatment      Dentist Screen:  Have you seen the dentist within the past year?: (!) No    Intervention:  Advised to schedule with their dentist     Vision Screen:  Do you have difficulty driving, watching TV, or doing any of your daily activities because of your eyesight?: (!) Yes  Have you had an eye exam within the past year?: (!) No  No results found. Interventions:   Patient encouraged to make appointment with their eye specialist    Safety:  Do you always fasten your seatbelt when you are in a car?: (!) No  Interventions:  Patient declined any further interventions or treatment    ADL's:   Patient reports needing help with:  Select all that apply: Epimenio Baptiste, Housekeeping, Banking/Finances, Telephone Use, Food Preparation, Transportation  Interventions:  Patient comments: Patient currently lives with her son and daughter-in-law who assist with her ADLs. Objective      Patient-Reported Vitals  Patient-Reported Weight: 125lb       Unable to perform physical exam due to annual Medicare wellness performed via telephone encounter. Allergies   Allergen Reactions    Pcn [Penicillins] Other (See Comments)     Unsure of reaction     Prior to Visit Medications    Medication Sig Taking? Authorizing Provider   hydrALAZINE (APRESOLINE) 50 MG tablet TAKE 1 TABLET BY MOUTH EVERY EIGHT HOURS Yes MARVIN Swartz CNP   vitamin D3 (CHOLECALCIFEROL) 25 MCG (1000 UT) TABS tablet TAKE 1 TABLET BY MOUTH EVERY DAY Yes MARVIN Swartz CNP   gabapentin (NEURONTIN) 100 MG capsule Take 2 capsules by mouth daily for 30 days.  Yes MARVIN Swartz CNP   ascorbic acid (V-R VITAMIN C) 250 MG tablet Take 1 tablet by mouth daily Yes MARVIN Swartz CNP   amLODIPine (NORVASC) 10 MG tablet TAKE 1 TABLET BY MOUTH EVERY DAY Yes MARVIN Swartz CNP   lisinopril (PRINIVIL;ZESTRIL) 20 MG tablet TAKE 1 TABLET BY MOUTH EVERY DAY Yes MARVIN Swartz CNP   digoxin (LANOXIN) 125 MCG tablet TAKE 1 TABLET BY MOUTH EVERY DAY Yes MARVIN Kenney CNP   clopidogrel (PLAVIX) 75 MG tablet Take 1 tablet by mouth daily Yes MARVIN Kenney CNP   atorvastatin (LIPITOR) 20 MG tablet TAKE 1 TABLET BY MOUTH EVERY DAY  Patient not taking: Reported on 2/24/2023  John Garcia MD   metFORMIN (GLUCOPHAGE) 500 MG tablet TAKE 2 TABLETS BY MOUTH EVERY DAY WITH BREAKFAST  Patient not taking: Reported on 9/4/2022  MARVIN Kenney CNP   simvastatin (ZOCOR) 20 MG tablet Take 1 tablet by mouth nightly  Patient not taking: No sig reported  MARVIN Kenney CNP   diclofenac sodium (VOLTAREN) 1 % GEL Apply 2 g topically in the morning and at bedtime  Patient not taking: No sig reported  MARVIN Kenney CNP       CareTeam (Including outside providers/suppliers regularly involved in providing care):   Patient Care Team:  MARVIN Kenney CNP as PCP - General (Nurse Practitioner)  MARVIN Kenney CNP as PCP - Empaneled Provider     Reviewed and updated this visit:  Allergies  Meds Heddie Dance, was evaluated through a synchronous (real-time) audio-video encounter. The patient (or guardian if applicable) is aware that this is a billable service, which includes applicable co-pays. This Virtual Visit was conducted with patient's (and/or legal guardian's) consent. The visit was conducted pursuant to the emergency declaration under the [facility] authority and the [employer] General Act. Patient identification was verified, and a caregiver was present when appropriate.    The patient was located at Home: [address] [address]  Provider was located at [facility] (Appt Dept): Jackie Alcantar [address] [place] [address] 13,  [address] [facility] MARVIN Landaverde CNP

## 2023-02-24 NOTE — PATIENT INSTRUCTIONS
Preventing Falls: Care Instructions  Overview     Getting around your home safely can be a challenge if you have injuries or health problems that make it easy for you to fall. Loose rugs and furniture in walkways are among the dangers for many older people who have problems walking or who have poor eyesight. People who have conditions such as arthritis, osteoporosis, or dementia also have to be careful not to fall. You can make your home safer with a few simple measures. Follow-up care is a key part of your treatment and safety. Be sure to make and go to all appointments, and call your doctor if you are having problems. It's also a good idea to know your test results and keep a list of the medicines you take. How can you care for yourself at home? Taking care of yourself  Exercise regularly to improve your strength, muscle tone, and balance. Walk if you can. Swimming may be a good choice if you cannot walk easily. Have your vision and hearing checked each year or any time you notice a change. If you have trouble seeing and hearing, you might not be able to avoid objects and could lose your balance. Know the side effects of the medicines you take. Ask your doctor or pharmacist whether the medicines you take can affect your balance. Sleeping pills or sedatives can affect your balance. Limit the amount of alcohol you drink. Alcohol can impair your balance and other senses. Ask your doctor whether calluses or corns on your feet need to be removed. If you wear loose-fitting shoes because of calluses or corns, you can lose your balance and fall. Talk to your doctor if you have numbness in your feet. You may get dizzy if you do not drink enough water. To prevent dehydration, drink plenty of fluids. Choose water and other clear liquids. If you have kidney, heart, or liver disease and have to limit fluids, talk with your doctor before you increase the amount of fluids you drink.   Preventing falls at home  Remove raised doorway thresholds, throw rugs, and clutter. Repair loose carpet or raised areas in the floor. Move furniture and electrical cords to keep them out of walking paths. Use nonskid floor wax, and wipe up spills right away, especially on ceramic tile floors. If you use a walker or cane, put rubber tips on it. If you use crutches, clean the bottoms of them regularly with an abrasive pad, such as steel wool. Keep your house well lit, especially stairways, porches, and outside walkways. Use night-lights in areas such as hallways and bathrooms. Add extra light switches or use remote switches (such as switches that go on or off when you clap your hands) to make it easier to turn lights on if you have to get up during the night. Install sturdy handrails on stairways. Move items in your cabinets so that the things you use a lot are on the lower shelves (about waist level). Keep a cordless phone and a flashlight with new batteries by your bed. If possible, put a phone in each of the main rooms of your house, or carry a cell phone in case you fall and cannot reach a phone. Or, you can wear a device around your neck or wrist. You push a button that sends a signal for help. Wear low-heeled shoes that fit well and give your feet good support. Use footwear with nonskid soles. Check the heels and soles of your shoes for wear. Repair or replace worn heels or soles. Do not wear socks without shoes on smooth floors, such as wood. Walk on the grass when the sidewalks are slippery. If you live in an area that gets snow and ice in the winter, sprinkle salt on slippery steps and sidewalks. Or ask a family member or friend to do this for you. Preventing falls in the bath  Install grab bars and nonskid mats inside and outside your shower or tub and near the toilet and sinks. Use shower chairs and bath benches. Use a hand-held shower head that will allow you to sit while showering.   Get into a tub or shower by putting the weaker leg in first. Get out of a tub or shower with your strong side first.  Repair loose toilet seats and consider installing a raised toilet seat to make getting on and off the toilet easier. Keep your bathroom door unlocked while you are in the shower. Where can you learn more? Go to http://www.pratt.com/ and enter G117 to learn more about \"Preventing Falls: Care Instructions. \"  Current as of: May 4, 2022               Content Version: 13.5  © 5579-5878 Healthwise, [employer]. Care instructions adapted under license by [facility] ([facility]). If you have questions about a medical condition or this instruction, always ask your healthcare professional. [address] any warranty or liability for your use of this information. Learning About Being Active as an Older Adult  Why is being active important as you get older? Being active is one of the best things you can do for your health. And it's never too late to start. Being active--or getting active, if you aren't already--has definite benefits. It can:  Give you more energy,  Keep your mind sharp. Improve balance to reduce your risk of falls. Help you manage chronic illness with fewer medicines. No matter how old you are, how fit you are, or what health problems you have, there is a form of activity that will work for you. And the more physical activity you can do, the better your overall health will be. What kinds of activity can help you stay healthy? Being more active will make your daily activities easier. Physical activity includes planned exercise and things you do in daily life. There are four types of activity:  Aerobic. Doing aerobic activity makes your heart and lungs strong. Includes walking, dancing, and gardening. Aim for at least 2½ hours spread throughout the week. It improves your energy and can help you sleep better. Muscle-strengthening.   This type of activity can help maintain muscle and strengthen bones. Includes climbing stairs, using resistance bands, and lifting or carrying heavy loads. Aim for at least twice a week. It can help protect the knees and other joints. Stretching. Stretching gives you better range of motion in joints and muscles. Includes upper arm stretches, calf stretches, and gentle yoga. Aim for at least twice a week, preferably after your muscles are warmed up from other activities. It can help you function better in daily life. Balancing. This helps you stay coordinated and have good posture. Includes heel-to-toe walking, saulo chi, and certain types of yoga. Aim for at least 3 days a week. It can reduce your risk of falling. Even if you have a hard time meeting the recommendations, it's better to be more active than less active. All activity done in each category counts toward your weekly total. You'd be surprised how daily things like carrying groceries, keeping up with grandchildren, and taking the stairs can add up. What keeps you from being active? If you've had a hard time being more active, you're not alone. Maybe you remember being able to do more. Or maybe you've never thought of yourself as being active. It's frustrating when you can't do the things you want. Being more active can help. What's holding you back? Getting started. Have a goal, but break it into easy tasks. Small steps build into big accomplishments. Staying motivated. If you feel like skipping your activity, remember your goal. Maybe you want to move better and stay independent. Every activity gets you one step closer. Not feeling your best.  Start with 5 minutes of an activity you enjoy. Prove to yourself you can do it. As you get comfortable, increase your time. You may not be where you want to be. But you're in the process of getting there. Everyone starts somewhere. How can you find safe ways to stay active?   Talk with your doctor about any physical challenges you're facing. Make a plan with your doctor if you have a health problem or aren't sure how to get started with activity. If you're already active, ask your doctor if there is anything you should change to stay safe as your body and health change. If you tend to feel dizzy after you take medicine, avoid activity at that time. Try being active before you take your medicine. This will reduce your risk of falls. If you plan to be active at home, make sure to clear your space before you get started. Remove things like TV cords, coffee tables, and throw rugs. It's safest to have plenty of space to move freely. The key to getting more active is to take it slow and steady. Try to improve only a little bit at a time. Pick just one area to improve on at first. And if an activity hurts, stop and talk to your doctor. Where can you learn more? Go to http://www.pratt.com/ and enter P600 to learn more about \"Learning About Being Active as an Older Adult. \"  Current as of: October 10, 2022               Content Version: 13.5  © 9419-6407 Healthwise, [employer]. Care instructions adapted under license by [facility] ([facility]). If you have questions about a medical condition or this instruction, always ask your healthcare professional. [place] [address] any warranty or liability for your use of this information. Learning About Dental Care for Older Adults  Dental care for older adults: Overview  Dental care for older people is much the same as for younger adults. But older adults do have concerns that younger adults do not. Older adults may have problems with gum disease and decay on the roots of their teeth. They may need missing teeth replaced or broken fillings fixed. Or they may have dentures that need to be cared for. Some older adults may have trouble holding a toothbrush. You can help remind the person you are caring for to brush and floss their teeth or to clean their dentures.  In some cases, you may need to do the brushing and other dental care tasks. People who have trouble using their hands or who have dementia may need this extra help. How can you help with dental care? Normal dental care  To keep the teeth and gums healthy:  Brush the teeth with fluoride toothpaste twice a day--in the morning and at night--and floss at least once a day. Plaque can quickly build up on the teeth of older adults. Watch for the signs of gum disease. These signs include gums that bleed after brushing or after eating hard foods, such as apples. See a dentist regularly. Many experts recommend checkups every 6 months. Keep the dentist up to date on any new medications the person is taking. Encourage a balanced diet that includes whole grains, vegetables, and fruits, and that is low in saturated fat and sodium. Encourage the person you're caring for not to use tobacco products. They can affect dental and general health. Many older adults have a fixed income and feel that they can't afford dental care. But most [facility] and [facility] have programs in which dentists help older adults by lowering fees. Contact your area's public health offices or  for information about dental care in your area. Using a toothbrush  Older adults with arthritis sometimes have trouble brushing their teeth because they can't easily hold the toothbrush. Their hands and fingers may be stiff, painful, or weak. If this is the case, you can: Offer an electric toothbrush. Enlarge the handle of a non-electric toothbrush by wrapping a sponge, an elastic bandage, or adhesive tape around it. Push the toothbrush handle through a ball made of rubber or soft foam.  Make the handle longer and thicker by taping Popsicle sticks or tongue depressors to it. You may also be able to buy special toothbrushes, toothpaste dispensers, and floss holders.   Your doctor may recommend a soft-bristle toothbrush if the person you care for bleeds easily. Bleeding can happen because of a health problem or from certain medicines. A toothpaste for sensitive teeth may help if the person you care for has sensitive teeth. How do you brush and floss someone's teeth? If the person you are caring for has a hard time cleaning their teeth on their own, you may need to brush and floss their teeth for them. It may be easiest to have the person sit and face away from you, and to sit or stand behind them. That way you can steady their head against your arm as you reach around to floss and brush their teeth. Choose a place that has good lighting and is comfortable for both of you. Before you begin, gather your supplies. You will need gloves, floss, a toothbrush, and a container to hold water if you are not near a sink. Wash and dry your hands well and put on gloves. Start by flossing:  Gently work a piece of floss between each of the teeth toward the gums. A plastic flossing tool may make this easier, and they are available at most [facility]. Curve the floss around each tooth into a U-shape and gently slide it under the gum line. Move the floss firmly up and down several times to scrape off the plaque. After you've finished flossing, throw away the used floss and begin brushing:  Wet the brush and apply toothpaste. Place the brush at a 45-degree angle where the teeth meet the gums. Press firmly, and move the brush in small circles over the surface of the teeth. Be careful not to brush too hard. Vigorous brushing can make the gums pull away from the teeth and can scratch the tooth enamel. Brush all surfaces of the teeth, on the tongue side and on the cheek side. Pay special attention to the front teeth and all surfaces of the back teeth. Brush chewing surfaces with short back-and-forth strokes. After you've finished, help the person rinse the remaining toothpaste from their mouth. Where can you learn more?   Go to http://www.[employer].com/ and enter F944 to learn more about \"Learning About Dental Care for Older Adults. \"  Current as of: June 16, 2022               Content Version: 13.5  © 2006-2022 Healthwise, [employer]. Care instructions adapted under license by [facility] ([facility]). If you have questions about a medical condition or this instruction, always ask your healthcare professional. [address] any warranty or liability for your use of this information. Hearing Loss: Care Instructions  Overview     Hearing loss is a sudden or slow decrease in how well you hear. It can range from mild to severe. Permanent hearing loss can occur with aging. It also can happen when you are exposed long-term to loud noise. Examples include listening to loud music, riding motorcycles, or being around other loud machines. Hearing loss can affect your work and home life. It can make you feel lonely or depressed. You may feel that you have lost your independence. But hearing aids and other devices can help you hear better and feel connected to others. Follow-up care is a key part of your treatment and safety. Be sure to make and go to all appointments, and call your doctor if you are having problems. It's also a good idea to know your test results and keep a list of the medicines you take. How can you care for yourself at home? Avoid loud noises whenever possible. This helps keep your hearing from getting worse. Always wear hearing protection around loud noises. Wear a hearing aid as directed. See a professional who can help you pick a hearing aid that fits you. Have hearing tests as your doctor suggests. They can show whether your hearing has changed. Your hearing aid may need to be adjusted. Use other devices as needed. These may include:  Telephone amplifiers and hearing aids that can connect to a television, stereo, radio, or microphone. Devices that use lights or vibrations.  These alert you to the doorbell, a ringing telephone, or a baby monitor. Television closed-captioning. This shows the words at the bottom of the screen. Most new TVs can do this. TTY (text telephone). This lets you type messages back and forth on the telephone instead of talking or listening. These devices are also called TDD. When messages are typed on the keyboard, they are sent over the phone line to a receiving TTY. The message is shown on a monitor. Use text messaging, social media, and email if it is hard for you to communicate by telephone. Try to learn a listening technique called speechreading. It is not lipreading. You pay attention to people's gestures, expressions, posture, and tone of voice. These clues can help you understand what a person is saying. Face the person you are talking to, and have them face you. Make sure the lighting is good. You need to see the other person's face clearly. Think about counseling if you need help to adjust to your hearing loss. When should you call for help? Watch closely for changes in your health, and be sure to contact your doctor if:    You think your hearing is getting worse.     You have new symptoms, such as dizziness or nausea. Where can you learn more? Go to http://www.pratt.com/ and enter R798 to learn more about \"Hearing Loss: Care Instructions. \"  Current as of: May 4, 2022               Content Version: 13.5  © 2006-2022 Healthwise, Incorporated. Care instructions adapted under license by [facility] ([facility]). If you have questions about a medical condition or this instruction, always ask your healthcare professional. [place] [address] any warranty or liability for your use of this information. Learning About Vision Tests  What are vision tests? The four most common vision tests are visual acuity tests, refraction, visual field tests, and color vision tests. Visual acuity (sharpness) tests  These tests are used: To see if you need glasses or contact lenses.   To monitor an eye problem. To check an eye injury. Visual acuity tests are done as part of routine exams. You may also have this test when you get your 's license or apply for some types of jobs. Visual field tests  These tests are used: To check for vision loss in any area of your range of vision. To screen for certain eye diseases. To look for nerve damage after a stroke, head injury, or other problem that could reduce blood flow to the brain. Refraction and color tests  A refraction test is done to find the right prescription for glasses and contact lenses. A color vision test is done to check for color blindness. Color vision is often tested as part of a routine exam. You may also have this test when you apply for a job where recognizing different colors is important, such as , electronics, or the [employer]. How are vision tests done? Visual acuity test   You cover one eye at a time. You read aloud from a wall chart across the room. You read aloud from a small card that you hold in your hand. Refraction   You look into a special device. The device puts lenses of different strengths in front of each eye to see how strong your glasses or contact lenses need to be. Visual field tests   Your doctor may have you look through special machines. Or your doctor may simply have you stare straight ahead while they move a finger into and out of your field of vision. Color vision test   You look at pieces of printed test patterns in various colors. You say what number or symbol you see. Your doctor may have you trace the number or symbol using a pointer. How do these tests feel? There is very little chance of having a problem from this test. If dilating drops are used for a vision test, they may make the eyes sting and cause a medicine taste in the mouth. Follow-up care is a key part of your treatment and safety.  Be sure to make and go to all appointments, and call your doctor if you are having problems. It's also a good idea to know your test results and keep a list of the medicines you take. Where can you learn more? Go to http://www.pratt.com/ and enter G551 to learn more about \"Learning About Vision Tests. \"  Current as of: October 12, 2022               Content Version: 13.5  © 2006-2022 [email or web address]. Care instructions adapted under license by [facility] ([facility]). If you have questions about a medical condition or this instruction, always ask your healthcare professional. [address] any warranty or liability for your use of this information. Learning About Activities of Daily Living  What are activities of daily living? Activities of daily living (ADLs) are the basic self-care tasks you do every day. As you age, and if you have health problems, you may find that it's harder to do these things for yourself. That's when you may need some help. Your doctor uses ADLs to measure how much help you need. Knowing what you can and can't do for yourself is an important first step to getting help. And when you have the help you need, you can stay as independent as possible. Your doctor will want to know if you are able to do tasks such as: Take a bath or shower without help. Go to the bathroom by yourself. Dress and undress without help. Shave, comb your hair, and brush teeth on your own. Get in and out of bed or a chair without help. Feed yourself without help. If you are having trouble doing basic self-care tasks, talk with your doctor. You may want to bring a caregiver or family member who can help the doctor understand your needs and abilities. How will a doctor assess your ADLs? Asking about ADLs is part of a routine health checkup your doctor will likely do as you age.  Your health check might be done in a doctor's office, in your home, or at a hospital. The goal is to find out if you are having any problems that could make your health problems worse or that make it unsafe for you to be on your own. To measure your ADLs, your doctor will ask how hard it is for you to do routine tasks. He or she may also want to know if you have changed the way you do a task because of a health problem. He or she may watch how you:  Walk back and forth. Keep your balance while you stand or walk. Move from sitting to standing or from a bed to a chair. Button or unbutton a shirt or sweater. Remove and put on your shoes. It's normal to feel a little worried or anxious if you find you can't do all the things you used to be able to do. Talking with your doctor about ADLs isn't a test that you either pass or fail. It's just a way to get more information about your health and safety. Follow-up care is a key part of your treatment and safety. Be sure to make and go to all appointments, and call your doctor if you are having problems. It's also a good idea to know your test results and keep a list of the medicines you take. Current as of: October 6, 2021               Content Version: 13.5  © 2006-2022 Healthwise, [employer]. Care instructions adapted under license by [facility] ([facility]). If you have questions about a medical condition or this instruction, always ask your healthcare professional. [address] any warranty or liability for your use of this information. Advance Directives: Care Instructions  Overview  An advance directive is a legal way to state your wishes at the end of your life. It tells your family and your doctor what to do if you can't say what you want. There are two main types of advance directives. You can change them any time your wishes change. Living will. This form tells your family and your doctor your wishes about life support and other treatment. The form is also called a declaration. Medical power of .   This form lets you name a person to make treatment decisions for you when you can't speak for yourself. This person is called a health care agent (health care proxy, health care surrogate). The form is also called a durable power of  for health care. If you do not have an advance directive, decisions about your medical care may be made by a family member, or by a doctor or a  who doesn't know you. It may help to think of an advance directive as a gift to the people who care for you. If you have one, they won't have to make tough decisions by themselves. For more information, including forms for your state, see the [address] website (www.[email or web address]/advance-[email or web address]). Follow-up care is a key part of your treatment and safety. Be sure to make and go to all appointments, and call your doctor if you are having problems. It's also a good idea to know your test results and keep a list of the medicines you take. What should you include in an advance directive? Many states have a unique advance directive form. (It may ask you to address specific issues.) Or you might use a universal form that's approved by many states. If your form doesn't tell you what to address, it may be hard to know what to include in your advance directive. Use the questions below to help you get started. Who do you want to make decisions about your medical care if you are not able to? What life-support measures do you want if you have a serious illness that gets worse over time or can't be cured? What are you most afraid of that might happen? (Maybe you're afraid of having pain, losing your independence, or being kept alive by machines.)  Where would you prefer to die? (Your home? A hospital? A nursing home?)  Do you want to donate your organs when you die? Do you want certain Christianity practices performed before you die? When should you call for help? Be sure to contact your doctor if you have any questions. Where can you learn more?   Go to http://www.pratt.com/ and enter R264 to learn more about \"Advance Directives: Care Instructions. \"  Current as of: June 16, 2022               Content Version: 13.5  © 2006-2022 [employer]. Care instructions adapted under license by [facility] ([facility]). If you have questions about a medical condition or this instruction, always ask your healthcare professional. [facility] any warranty or liability for your use of this information. A Healthy Heart: Care Instructions  Your Care Instructions     Coronary artery disease, also called heart disease, occurs when a substance called plaque builds up in the vessels that supply oxygen-rich blood to your heart muscle. This can narrow the blood vessels and reduce blood flow. A heart attack happens when blood flow is completely blocked. A high-fat diet, smoking, and other factors increase the risk of heart disease. Your doctor has found that you have a chance of having heart disease. You can do lots of things to keep your heart healthy. It may not be easy, but you can change your diet, exercise more, and quit smoking. These steps really work to lower your chance of heart disease. Follow-up care is a key part of your treatment and safety. Be sure to make and go to all appointments, and call your doctor if you are having problems. It's also a good idea to know your test results and keep a list of the medicines you take. How can you care for yourself at home? Diet    Use less salt when you cook and eat. This helps lower your blood pressure. Taste food before salting. Add only a little salt when you think you need it. With time, your taste buds will adjust to less salt.     Eat fewer snack items, fast foods, canned soups, and other high-salt, high-fat, processed foods.     Read food labels and try to avoid saturated and trans fats.  They increase your risk of heart disease by raising cholesterol levels.     Limit the amount of solid fat-butter, margarine, and shortening-you eat. Use olive, peanut, or canola oil when you cook. Bake, broil, and steam foods instead of frying them.     Eat a variety of fruit and vegetables every day. Dark green, deep orange, red, or yellow fruits and vegetables are especially good for you. Examples include spinach, carrots, peaches, and berries.     Foods high in fiber can reduce your cholesterol and provide important vitamins and minerals. High-fiber foods include whole-grain cereals and breads, oatmeal, beans, brown rice, citrus fruits, and apples.     Eat lean proteins. Heart-healthy proteins include seafood, lean meats and poultry, eggs, beans, peas, nuts, seeds, and soy products.     Limit drinks and foods with added sugar. These include candy, desserts, and soda pop. Lifestyle changes    If your doctor recommends it, get more exercise. Walking is a good choice. Bit by bit, increase the amount you walk every day. Try for at least 30 minutes on most days of the week. You also may want to swim, bike, or do other activities.     Do not smoke. If you need help quitting, talk to your doctor about stop-smoking programs and medicines. These can increase your chances of quitting for good. Quitting smoking may be the most important step you can take to protect your heart. It is never too late to quit.     Limit alcohol to 2 drinks a day for men and 1 drink a day for women. Too much alcohol can cause health problems.     Manage other health problems such as diabetes, high blood pressure, and high cholesterol. If you think you may have a problem with alcohol or drug use, talk to your doctor. Medicines    Take your medicines exactly as prescribed. Call your doctor if you think you are having a problem with your medicine.     If your doctor recommends aspirin, take the amount directed each day. Make sure you take aspirin and not another kind of pain reliever, such as acetaminophen (Tylenol). When should you call for help?    Call 911 if you have symptoms of a heart attack. These may include:    Chest pain or pressure, or a strange feeling in the chest.     Sweating.     Shortness of breath.     Pain, pressure, or a strange feeling in the back, neck, jaw, or upper belly or in one or both shoulders or arms.     Lightheadedness or sudden weakness.     A fast or irregular heartbeat. After you call 911, the  may tell you to chew 1 adult-strength or 2 to 4 low-dose aspirin. Wait for an ambulance. Do not try to drive yourself. Watch closely for changes in your health, and be sure to contact your doctor if you have any problems. Where can you learn more? Go to http://www.pratt.com/ and enter F075 to learn more about \"A Healthy Heart: Care Instructions. \"  Current as of: September 7, 2022               Content Version: 13.5  © 2006-2022 [employer]. Care instructions adapted under license by [facility] ([facility]). If you have questions about a medical condition or this instruction, always ask your healthcare professional. [place] [address] any warranty or liability for your use of this information. Personalized Preventive Plan for Laney Rosario - 2/24/2023  Medicare offers a range of preventive health benefits. Some of the tests and screenings are paid in full while other may be subject to a deductible, co-insurance, and/or copay. Some of these benefits include a comprehensive review of your medical history including lifestyle, illnesses that may run in your family, and various assessments and screenings as appropriate. After reviewing your medical record and screening and assessments performed today your provider may have ordered immunizations, labs, imaging, and/or referrals for you. A list of these orders (if applicable) as well as your Preventive Care list are included within your After Visit Summary for your review.     Other Preventive Recommendations:    A preventive eye exam performed by an eye specialist is recommended every 1-2 years to screen for glaucoma; cataracts, macular degeneration, and other eye disorders. A preventive dental visit is recommended every 6 months. Try to get at least 150 minutes of exercise per week or 10,000 steps per day on a pedometer . Order or download the FREE \"Exercise & Physical Activity: Your Everyday Guide\" from [employer]. Call 8-711.196.8846 or search [employer] online. You need 7814-9744 mg of calcium and 7004-5531 IU of vitamin D per day. It is possible to meet your calcium requirement with diet alone, but a vitamin D supplement is usually necessary to meet this goal.  When exposed to the sun, use a sunscreen that protects against both UVA and UVB radiation with an SPF of 30 or greater. Reapply every 2 to 3 hours or after sweating, drying off with a towel, or swimming. Always wear a seat belt when traveling in a car. Always wear a helmet when riding a bicycle or motorcycle.

## 2023-05-03 ENCOUNTER — TELEPHONE (OUTPATIENT)
Dept: FAMILY MEDICINE CLINIC | Age: 78
End: 2023-05-03

## 2023-05-03 NOTE — TELEPHONE ENCOUNTER
----- Message from Evan Solomon sent at 5/3/2023 11:09 AM EDT -----  Subject: Message to Provider    QUESTIONS  Information for Provider? Patient saw Dr. Sindy Lopez in January, patient fell   and hurt her arm. She calling to find out where he wanted her to go to   have her shoulder put back in place. Please call patient and let her know   where she should go.  ---------------------------------------------------------------------------  --------------  [address]  [phone]; OK to leave message on voicemail  ---------------------------------------------------------------------------  --------------  SCRIPT ANSWERS  Relationship to Patient? Other/Third Party  Representative Name? Mony Ferrari  Is the representative on the Communication Release of Information (MIKAYLA)   form in Epic?  Yes

## 2023-05-03 NOTE — TELEPHONE ENCOUNTER
Daughter states that patient never went to have her shoulder put back in place from the referral that placed in February. Number was given to them to call and schedule.

## 2023-05-04 ENCOUNTER — OFFICE VISIT (OUTPATIENT)
Dept: ORTHOPEDIC SURGERY | Age: 78
End: 2023-05-04

## 2023-05-04 VITALS — BODY MASS INDEX: 24.27 KG/M2 | WEIGHT: 137 LBS | HEIGHT: 63 IN

## 2023-05-04 DIAGNOSIS — M25.512 LEFT SHOULDER PAIN, UNSPECIFIED CHRONICITY: Primary | ICD-10-CM

## 2023-05-04 DIAGNOSIS — M19.012 PRIMARY OSTEOARTHRITIS OF LEFT SHOULDER: ICD-10-CM

## 2023-05-04 DIAGNOSIS — S43.102A AC SEPARATION, LEFT, INITIAL ENCOUNTER: ICD-10-CM

## 2023-05-04 RX ORDER — TRIAMCINOLONE ACETONIDE 40 MG/ML
40 INJECTION, SUSPENSION INTRA-ARTICULAR; INTRAMUSCULAR ONCE
Status: COMPLETED | OUTPATIENT
Start: 2023-05-04 | End: 2023-05-04

## 2023-05-04 RX ORDER — BUPIVACAINE HYDROCHLORIDE 2.5 MG/ML
30 INJECTION, SOLUTION INFILTRATION; PERINEURAL ONCE
Status: COMPLETED | OUTPATIENT
Start: 2023-05-04 | End: 2023-05-04

## 2023-05-04 RX ORDER — LIDOCAINE HYDROCHLORIDE 10 MG/ML
20 INJECTION, SOLUTION INFILTRATION; PERINEURAL ONCE
Status: COMPLETED | OUTPATIENT
Start: 2023-05-04 | End: 2023-05-04

## 2023-05-04 RX ADMIN — BUPIVACAINE HYDROCHLORIDE 75 MG: 2.5 INJECTION, SOLUTION INFILTRATION; PERINEURAL at 15:15

## 2023-05-04 RX ADMIN — TRIAMCINOLONE ACETONIDE 40 MG: 40 INJECTION, SUSPENSION INTRA-ARTICULAR; INTRAMUSCULAR at 15:16

## 2023-05-04 RX ADMIN — LIDOCAINE HYDROCHLORIDE 20 ML: 10 INJECTION, SOLUTION INFILTRATION; PERINEURAL at 15:16

## 2023-05-04 NOTE — PROGRESS NOTES
pain.    Diagnoses and all orders for this visit:    Left shoulder pain, unspecified chronicity  -     XR SHOULDER LEFT (MIN 2 VIEWS); Future    Primary osteoarthritis of left shoulder    AC separation, left, initial encounter        Is suffering with advanced left shoulder osteoarthritis glenohumeral joint and a chronic AC separation. I will perform an intra-articular cortisone injection today and she will follow-up in 3 months or sooner if problems arise. I discussed with Genevieve Paige that her history, symptoms, signs, and imaging are most consistent with glenohumeral arthritis. We reviewed the natural history of these conditions and treatment options ranging from conservative measures (rest, icing, activity modification, physical therapy, pain meds, cortisone injection) to surgical options. In terms of treatment, I recommended continuing with rest, icing, avoidance of painful activities, NSAIDs or pain meds as tolerated, and physical therapy. If these are not effective, cortisone injection can be considered. We discussed surgical options as well, should conservative measures fail. Electronically signed by Yahir Warren MD on 5/4/2023 at 2:40 PM  This dictation was generated by voice recognition computer software. Although all attempts are made to edit the dictation for accuracy, there may be errors in the transcription that are not intended.

## 2023-05-28 DIAGNOSIS — I73.9 PAD (PERIPHERAL ARTERY DISEASE) (HCC): ICD-10-CM

## 2023-05-28 DIAGNOSIS — I10 ESSENTIAL (PRIMARY) HYPERTENSION: ICD-10-CM

## 2023-05-30 DIAGNOSIS — I10 ESSENTIAL HYPERTENSION: ICD-10-CM

## 2023-05-30 RX ORDER — DIGOXIN 125 MCG
TABLET ORAL
Qty: 30 TABLET | Refills: 3 | Status: SHIPPED | OUTPATIENT
Start: 2023-05-30

## 2023-05-30 RX ORDER — CLOPIDOGREL BISULFATE 75 MG/1
TABLET ORAL
Qty: 90 TABLET | Refills: 0 | Status: SHIPPED | OUTPATIENT
Start: 2023-05-30

## 2023-05-30 RX ORDER — AMLODIPINE BESYLATE 10 MG/1
TABLET ORAL
Qty: 90 TABLET | Refills: 0 | Status: SHIPPED | OUTPATIENT
Start: 2023-05-30

## 2023-07-05 DIAGNOSIS — I73.9 PAD (PERIPHERAL ARTERY DISEASE) (HCC): ICD-10-CM

## 2023-07-06 RX ORDER — CLOPIDOGREL BISULFATE 75 MG/1
TABLET ORAL
Qty: 90 TABLET | Refills: 1 | Status: SHIPPED | OUTPATIENT
Start: 2023-07-06

## 2023-07-16 ENCOUNTER — APPOINTMENT (OUTPATIENT)
Dept: MRI IMAGING | Age: 78
End: 2023-07-16
Attending: INTERNAL MEDICINE
Payer: MEDICARE

## 2023-07-16 ENCOUNTER — APPOINTMENT (OUTPATIENT)
Dept: CT IMAGING | Age: 78
End: 2023-07-16
Payer: MEDICARE

## 2023-07-16 ENCOUNTER — HOSPITAL ENCOUNTER (EMERGENCY)
Age: 78
Discharge: ANOTHER HOSPITAL | End: 2023-07-16
Attending: EMERGENCY MEDICINE
Payer: MEDICARE

## 2023-07-16 ENCOUNTER — APPOINTMENT (OUTPATIENT)
Dept: GENERAL RADIOLOGY | Age: 78
End: 2023-07-16
Payer: MEDICARE

## 2023-07-16 ENCOUNTER — HOSPITAL ENCOUNTER (INPATIENT)
Age: 78
LOS: 5 days | Discharge: NURSING, REHAB OR LONG-TERM CARE | End: 2023-07-21
Attending: INTERNAL MEDICINE | Admitting: INTERNAL MEDICINE
Payer: MEDICARE

## 2023-07-16 VITALS
SYSTOLIC BLOOD PRESSURE: 172 MMHG | TEMPERATURE: 98 F | HEART RATE: 58 BPM | WEIGHT: 120 LBS | OXYGEN SATURATION: 94 % | DIASTOLIC BLOOD PRESSURE: 57 MMHG | HEIGHT: 63 IN | BODY MASS INDEX: 21.26 KG/M2 | RESPIRATION RATE: 20 BRPM

## 2023-07-16 DIAGNOSIS — I48.20 CHRONIC ATRIAL FIBRILLATION (HCC): ICD-10-CM

## 2023-07-16 DIAGNOSIS — I10 ESSENTIAL HYPERTENSION: ICD-10-CM

## 2023-07-16 DIAGNOSIS — E78.2 MIXED HYPERLIPIDEMIA: ICD-10-CM

## 2023-07-16 DIAGNOSIS — R94.31 ABNORMAL FINDING ON EKG: ICD-10-CM

## 2023-07-16 DIAGNOSIS — I65.1: Primary | ICD-10-CM

## 2023-07-16 DIAGNOSIS — E11.9 DIABETES MELLITUS TYPE 2 IN NONOBESE (HCC): ICD-10-CM

## 2023-07-16 DIAGNOSIS — N39.0 URINARY TRACT INFECTION IN FEMALE: ICD-10-CM

## 2023-07-16 PROBLEM — I63.9 ACUTE CEREBROVASCULAR ACCIDENT (CVA) (HCC): Status: ACTIVE | Noted: 2023-07-16

## 2023-07-16 LAB
ALBUMIN SERPL-MCNC: 4.5 G/DL (ref 3.4–5)
ALBUMIN/GLOB SERPL: 1.3 {RATIO} (ref 1.1–2.2)
ALP SERPL-CCNC: 78 U/L (ref 40–129)
ALT SERPL-CCNC: 6 U/L (ref 10–40)
ANION GAP SERPL CALCULATED.3IONS-SCNC: 17 MMOL/L (ref 3–16)
AST SERPL-CCNC: 15 U/L (ref 15–37)
BACTERIA URNS QL MICRO: ABNORMAL /HPF
BASE EXCESS BLDV CALC-SCNC: -2.1 MMOL/L (ref -3–3)
BASOPHILS # BLD: 0.2 K/UL (ref 0–0.2)
BASOPHILS NFR BLD: 2.3 %
BILIRUB SERPL-MCNC: 0.7 MG/DL (ref 0–1)
BILIRUB UR QL STRIP.AUTO: NEGATIVE
BUN SERPL-MCNC: 12 MG/DL (ref 7–20)
CALCIUM SERPL-MCNC: 9.7 MG/DL (ref 8.3–10.6)
CHLORIDE SERPL-SCNC: 102 MMOL/L (ref 99–110)
CLARITY UR: ABNORMAL
CO2 BLDV-SCNC: 22 MMOL/L
CO2 SERPL-SCNC: 19 MMOL/L (ref 21–32)
COHGB MFR BLDV: 2.1 % (ref 0–1.5)
COLOR UR: YELLOW
CREAT SERPL-MCNC: 0.9 MG/DL (ref 0.6–1.2)
DEPRECATED RDW RBC AUTO: 16.7 % (ref 12.4–15.4)
DIGOXIN SERPL-MCNC: 1.3 NG/ML (ref 0.8–2)
EOSINOPHIL # BLD: 0.2 K/UL (ref 0–0.6)
EOSINOPHIL NFR BLD: 2.5 %
EPI CELLS #/AREA URNS HPF: ABNORMAL /HPF (ref 0–5)
GFR SERPLBLD CREATININE-BSD FMLA CKD-EPI: >60 ML/MIN/{1.73_M2}
GLUCOSE BLD-MCNC: 155 MG/DL (ref 70–99)
GLUCOSE SERPL-MCNC: 141 MG/DL (ref 70–99)
GLUCOSE UR STRIP.AUTO-MCNC: NEGATIVE MG/DL
HCO3 BLDV-SCNC: 20.7 MMOL/L (ref 23–29)
HCT VFR BLD AUTO: 29.8 % (ref 36–48)
HGB BLD-MCNC: 9.9 G/DL (ref 12–16)
HGB UR QL STRIP.AUTO: ABNORMAL
INR PPP: 1.05 (ref 0.84–1.16)
KETONES UR STRIP.AUTO-MCNC: NEGATIVE MG/DL
LACTATE BLDV-SCNC: 1 MMOL/L (ref 0.4–2)
LEUKOCYTE ESTERASE UR QL STRIP.AUTO: ABNORMAL
LYMPHOCYTES # BLD: 3.4 K/UL (ref 1–5.1)
LYMPHOCYTES NFR BLD: 34.9 %
MAGNESIUM SERPL-MCNC: 2.3 MG/DL (ref 1.8–2.4)
MCH RBC QN AUTO: 26.1 PG (ref 26–34)
MCHC RBC AUTO-ENTMCNC: 33.2 G/DL (ref 31–36)
MCV RBC AUTO: 78.6 FL (ref 80–100)
METHGB MFR BLDV: 0.3 %
MONOCYTES # BLD: 0.5 K/UL (ref 0–1.3)
MONOCYTES NFR BLD: 4.8 %
NEUTROPHILS # BLD: 5.4 K/UL (ref 1.7–7.7)
NEUTROPHILS NFR BLD: 55.5 %
NITRITE UR QL STRIP.AUTO: NEGATIVE
NT-PROBNP SERPL-MCNC: 2704 PG/ML (ref 0–449)
O2 CT VFR BLDV CALC: 15 VOL %
O2 THERAPY: ABNORMAL
PCO2 BLDV: 29.3 MMHG (ref 40–50)
PERFORMED ON: ABNORMAL
PH BLDV: 7.47 [PH] (ref 7.35–7.45)
PH UR STRIP.AUTO: 7 [PH] (ref 5–8)
PLATELET # BLD AUTO: 257 K/UL (ref 135–450)
PMV BLD AUTO: 7.2 FL (ref 5–10.5)
PO2 BLDV: 82.6 MMHG (ref 25–40)
POTASSIUM SERPL-SCNC: 3.9 MMOL/L (ref 3.5–5.1)
PROT SERPL-MCNC: 7.9 G/DL (ref 6.4–8.2)
PROT UR STRIP.AUTO-MCNC: 100 MG/DL
PROTHROMBIN TIME: 13.7 SEC (ref 11.5–14.8)
RBC # BLD AUTO: 3.79 M/UL (ref 4–5.2)
RBC #/AREA URNS HPF: ABNORMAL /HPF (ref 0–4)
SAO2 % BLDV: 97 %
SODIUM SERPL-SCNC: 138 MMOL/L (ref 136–145)
SP GR UR STRIP.AUTO: <=1.005 (ref 1–1.03)
TROPONIN, HIGH SENSITIVITY: 31 NG/L (ref 0–14)
TROPONIN, HIGH SENSITIVITY: 36 NG/L (ref 0–14)
UA COMPLETE W REFLEX CULTURE PNL UR: YES
UA DIPSTICK W REFLEX MICRO PNL UR: YES
URN SPEC COLLECT METH UR: ABNORMAL
UROBILINOGEN UR STRIP-ACNC: 0.2 E.U./DL
WBC # BLD AUTO: 9.7 K/UL (ref 4–11)
WBC #/AREA URNS HPF: >100 /HPF (ref 0–5)

## 2023-07-16 PROCEDURE — 3E03317 INTRODUCTION OF OTHER THROMBOLYTIC INTO PERIPHERAL VEIN, PERCUTANEOUS APPROACH: ICD-10-PCS | Performed by: INTERNAL MEDICINE

## 2023-07-16 PROCEDURE — 6360000002 HC RX W HCPCS

## 2023-07-16 PROCEDURE — 70551 MRI BRAIN STEM W/O DYE: CPT

## 2023-07-16 PROCEDURE — 80162 ASSAY OF DIGOXIN TOTAL: CPT

## 2023-07-16 PROCEDURE — 96375 TX/PRO/DX INJ NEW DRUG ADDON: CPT

## 2023-07-16 PROCEDURE — 96365 THER/PROPH/DIAG IV INF INIT: CPT

## 2023-07-16 PROCEDURE — 70498 CT ANGIOGRAPHY NECK: CPT

## 2023-07-16 PROCEDURE — 6360000002 HC RX W HCPCS: Performed by: EMERGENCY MEDICINE

## 2023-07-16 PROCEDURE — 87086 URINE CULTURE/COLONY COUNT: CPT

## 2023-07-16 PROCEDURE — 93005 ELECTROCARDIOGRAM TRACING: CPT | Performed by: EMERGENCY MEDICINE

## 2023-07-16 PROCEDURE — 70450 CT HEAD/BRAIN W/O DYE: CPT

## 2023-07-16 PROCEDURE — 71045 X-RAY EXAM CHEST 1 VIEW: CPT

## 2023-07-16 PROCEDURE — 82803 BLOOD GASES ANY COMBINATION: CPT

## 2023-07-16 PROCEDURE — 6360000004 HC RX CONTRAST MEDICATION: Performed by: EMERGENCY MEDICINE

## 2023-07-16 PROCEDURE — 83735 ASSAY OF MAGNESIUM: CPT

## 2023-07-16 PROCEDURE — 81001 URINALYSIS AUTO W/SCOPE: CPT

## 2023-07-16 PROCEDURE — 87186 SC STD MICRODIL/AGAR DIL: CPT

## 2023-07-16 PROCEDURE — 80053 COMPREHEN METABOLIC PANEL: CPT

## 2023-07-16 PROCEDURE — 70544 MR ANGIOGRAPHY HEAD W/O DYE: CPT

## 2023-07-16 PROCEDURE — 85025 COMPLETE CBC W/AUTO DIFF WBC: CPT

## 2023-07-16 PROCEDURE — 36415 COLL VENOUS BLD VENIPUNCTURE: CPT

## 2023-07-16 PROCEDURE — 87088 URINE BACTERIA CULTURE: CPT

## 2023-07-16 PROCEDURE — 84484 ASSAY OF TROPONIN QUANT: CPT

## 2023-07-16 PROCEDURE — 2580000003 HC RX 258: Performed by: EMERGENCY MEDICINE

## 2023-07-16 PROCEDURE — 85610 PROTHROMBIN TIME: CPT

## 2023-07-16 PROCEDURE — 51702 INSERT TEMP BLADDER CATH: CPT

## 2023-07-16 PROCEDURE — 83605 ASSAY OF LACTIC ACID: CPT

## 2023-07-16 PROCEDURE — 99291 CRITICAL CARE FIRST HOUR: CPT | Performed by: NURSE PRACTITIONER

## 2023-07-16 PROCEDURE — 83880 ASSAY OF NATRIURETIC PEPTIDE: CPT

## 2023-07-16 PROCEDURE — 2000000000 HC ICU R&B

## 2023-07-16 PROCEDURE — 99285 EMERGENCY DEPT VISIT HI MDM: CPT

## 2023-07-16 RX ORDER — DEXTROSE MONOHYDRATE 25 G/50ML
12.5 INJECTION, SOLUTION INTRAVENOUS
Status: DISCONTINUED | OUTPATIENT
Start: 2023-07-16 | End: 2023-07-16 | Stop reason: HOSPADM

## 2023-07-16 RX ORDER — ONDANSETRON 2 MG/ML
4 INJECTION INTRAMUSCULAR; INTRAVENOUS EVERY 6 HOURS PRN
Status: DISCONTINUED | OUTPATIENT
Start: 2023-07-16 | End: 2023-07-21 | Stop reason: HOSPADM

## 2023-07-16 RX ORDER — SODIUM CHLORIDE 9 MG/ML
INJECTION, SOLUTION INTRAVENOUS PRN
Status: DISCONTINUED | OUTPATIENT
Start: 2023-07-16 | End: 2023-07-16 | Stop reason: HOSPADM

## 2023-07-16 RX ORDER — ONDANSETRON 2 MG/ML
4 INJECTION INTRAMUSCULAR; INTRAVENOUS ONCE
Status: COMPLETED | OUTPATIENT
Start: 2023-07-16 | End: 2023-07-16

## 2023-07-16 RX ORDER — SODIUM CHLORIDE 0.9 % (FLUSH) 0.9 %
10 SYRINGE (ML) INJECTION ONCE
Status: COMPLETED | OUTPATIENT
Start: 2023-07-16 | End: 2023-07-16

## 2023-07-16 RX ORDER — POLYETHYLENE GLYCOL 3350 17 G/17G
17 POWDER, FOR SOLUTION ORAL DAILY PRN
Status: DISCONTINUED | OUTPATIENT
Start: 2023-07-16 | End: 2023-07-21 | Stop reason: HOSPADM

## 2023-07-16 RX ORDER — ASPIRIN 300 MG/1
300 SUPPOSITORY RECTAL DAILY
Status: DISCONTINUED | OUTPATIENT
Start: 2023-07-17 | End: 2023-07-21 | Stop reason: HOSPADM

## 2023-07-16 RX ORDER — SODIUM CHLORIDE 9 MG/ML
INJECTION, SOLUTION INTRAVENOUS CONTINUOUS
Status: DISCONTINUED | OUTPATIENT
Start: 2023-07-16 | End: 2023-07-16 | Stop reason: HOSPADM

## 2023-07-16 RX ORDER — ONDANSETRON 4 MG/1
4 TABLET, ORALLY DISINTEGRATING ORAL EVERY 8 HOURS PRN
Status: DISCONTINUED | OUTPATIENT
Start: 2023-07-16 | End: 2023-07-21 | Stop reason: HOSPADM

## 2023-07-16 RX ORDER — SODIUM CHLORIDE 0.9 % (FLUSH) 0.9 %
5-40 SYRINGE (ML) INJECTION EVERY 12 HOURS SCHEDULED
Status: DISCONTINUED | OUTPATIENT
Start: 2023-07-16 | End: 2023-07-16 | Stop reason: HOSPADM

## 2023-07-16 RX ORDER — ASPIRIN 81 MG/1
81 TABLET ORAL DAILY
Status: DISCONTINUED | OUTPATIENT
Start: 2023-07-17 | End: 2023-07-21 | Stop reason: HOSPADM

## 2023-07-16 RX ORDER — ENOXAPARIN SODIUM 100 MG/ML
40 INJECTION SUBCUTANEOUS DAILY
Status: DISCONTINUED | OUTPATIENT
Start: 2023-07-17 | End: 2023-07-21 | Stop reason: HOSPADM

## 2023-07-16 RX ORDER — ROSUVASTATIN CALCIUM 20 MG/1
40 TABLET, COATED ORAL NIGHTLY
Status: DISCONTINUED | OUTPATIENT
Start: 2023-07-17 | End: 2023-07-21 | Stop reason: HOSPADM

## 2023-07-16 RX ORDER — PROCHLORPERAZINE EDISYLATE 5 MG/ML
10 INJECTION INTRAMUSCULAR; INTRAVENOUS EVERY 6 HOURS PRN
Status: DISCONTINUED | OUTPATIENT
Start: 2023-07-16 | End: 2023-07-21 | Stop reason: HOSPADM

## 2023-07-16 RX ORDER — SODIUM CHLORIDE 0.9 % (FLUSH) 0.9 %
5-40 SYRINGE (ML) INJECTION PRN
Status: DISCONTINUED | OUTPATIENT
Start: 2023-07-16 | End: 2023-07-16 | Stop reason: HOSPADM

## 2023-07-16 RX ADMIN — IOMEPROL INJECTION 75 ML: 714 INJECTION, SOLUTION INTRAVASCULAR at 18:28

## 2023-07-16 RX ADMIN — SODIUM CHLORIDE: 9 INJECTION, SOLUTION INTRAVENOUS at 18:30

## 2023-07-16 RX ADMIN — PROCHLORPERAZINE EDISYLATE 10 MG: 5 INJECTION, SOLUTION INTRAMUSCULAR; INTRAVENOUS at 21:34

## 2023-07-16 RX ADMIN — Medication 10 ML: at 19:39

## 2023-07-16 RX ADMIN — Medication 10 ML: at 19:38

## 2023-07-16 RX ADMIN — Medication 14 MG: at 19:37

## 2023-07-16 RX ADMIN — ONDANSETRON 4 MG: 2 INJECTION INTRAMUSCULAR; INTRAVENOUS at 18:30

## 2023-07-16 RX ADMIN — CEFTRIAXONE SODIUM 1000 MG: 1 INJECTION, POWDER, FOR SOLUTION INTRAMUSCULAR; INTRAVENOUS at 19:45

## 2023-07-16 ASSESSMENT — LIFESTYLE VARIABLES
HOW MANY STANDARD DRINKS CONTAINING ALCOHOL DO YOU HAVE ON A TYPICAL DAY: PATIENT DOES NOT DRINK
HOW OFTEN DO YOU HAVE A DRINK CONTAINING ALCOHOL: NEVER

## 2023-07-16 ASSESSMENT — ENCOUNTER SYMPTOMS
SHORTNESS OF BREATH: 0
ABDOMINAL PAIN: 0
NAUSEA: 0
DIARRHEA: 0
RHINORRHEA: 0
COUGH: 0
VOMITING: 0
COUGH: 0

## 2023-07-16 ASSESSMENT — PAIN SCALES - GENERAL: PAINLEVEL_OUTOF10: 0

## 2023-07-16 ASSESSMENT — PAIN - FUNCTIONAL ASSESSMENT
PAIN_FUNCTIONAL_ASSESSMENT: NONE - DENIES PAIN
PAIN_FUNCTIONAL_ASSESSMENT: NONE - DENIES PAIN

## 2023-07-16 NOTE — ED PROVIDER NOTES
[facility]      Pt Name: Amee Gaspar  MRN: [id number]  [facility] 1945  Date of evaluation: 7/16/2023  Provider: Jere Chiang MD    [address]       Chief Complaint   Patient presents with    Dizziness         HISTORY OF PRESENT ILLNESS   (Location/Symptom, Timing/Onset, Context/Setting, Quality, Duration, Modifying Factors, Severity)  Note limiting factors. Amee Gaspar is a 66 y.o. female who presents to the emergency department     Patient presented to the emergency department history of sudden onset of about 515 just feeling extremely exhausted diaphoretic had diffuse visual changes  History of hyperlipidemia on Lipitor's cardiac stents  Type II diabetic her fingerstick by EMS was normal she presents diaphoretic somnolent lethargic. Her age and does know her. Her speech was slow but I would not say aphasic or significantly slurred while obviously patient has symptoms of a major event. Immediately established IV and center at the same time to see by the stroke team Dr. Maritza Rolon was on      The history is provided by the patient, the EMS personnel and a relative. Nursing Notes were reviewed. REVIEW OF SYSTEMS    (2-9 systems for level 4, 10 or more for level 5)     Review of Systems   Constitutional:  Positive for activity change and fatigue. HENT:  Negative for congestion. Respiratory:  Negative for cough. Allergic/Immunologic: Negative for immunocompromised state. Neurological:  Positive for dizziness, speech difficulty, weakness and light-headedness. Psychiatric/Behavioral:  Positive for confusion. All other systems reviewed and are negative. Except as noted above the remainder of the review of systems was reviewed and negative.        PAST MEDICAL HISTORY     Past Medical History:   Diagnosis Date    Atrial fibrillation ([address])     off coumadin after bleed, declined restart    Blood transfusion     CAD

## 2023-07-16 NOTE — ED NOTES
Dr Suzy Bergeron returned page at this time to speak with Dr Josephina Phalen.      Diogo Estrada RN  07/16/23 1936

## 2023-07-16 NOTE — ED TRIAGE NOTES
Patient to the ED via EMS after family called for sudden onset of dizziness, vomiting, and sweating at 1700. The patient arrives to the ED, complaining of dizziness with nausea. Denies pain, specifically denies headache or chest pain. + movement of extremities x4. Patient met at the ambulance entrance by Dr. Dain Ortega and sent directly to CT.

## 2023-07-17 ENCOUNTER — APPOINTMENT (OUTPATIENT)
Dept: CT IMAGING | Age: 78
End: 2023-07-17
Attending: INTERNAL MEDICINE
Payer: MEDICARE

## 2023-07-17 PROBLEM — I65.1 BASILAR ARTERY OCCLUSION: Status: ACTIVE | Noted: 2023-07-17

## 2023-07-17 PROBLEM — I63.50 CEREBROVASCULAR ACCIDENT (CVA) INVOLVING POSTERIOR CIRCULATION (HCC): Status: ACTIVE | Noted: 2023-07-16

## 2023-07-17 LAB
ANION GAP SERPL CALCULATED.3IONS-SCNC: 12 MMOL/L (ref 3–16)
BUN SERPL-MCNC: 13 MG/DL (ref 7–20)
CALCIUM SERPL-MCNC: 9 MG/DL (ref 8.3–10.6)
CHLORIDE SERPL-SCNC: 103 MMOL/L (ref 99–110)
CHOLEST SERPL-MCNC: 165 MG/DL (ref 0–199)
CO2 SERPL-SCNC: 21 MMOL/L (ref 21–32)
CREAT SERPL-MCNC: 0.9 MG/DL (ref 0.6–1.2)
DEPRECATED RDW RBC AUTO: 16.4 % (ref 12.4–15.4)
EKG ATRIAL RATE: 70 BPM
EKG ATRIAL RATE: 70 BPM
EKG DIAGNOSIS: NORMAL
EKG DIAGNOSIS: NORMAL
EKG Q-T INTERVAL: 454 MS
EKG Q-T INTERVAL: 454 MS
EKG QRS DURATION: 116 MS
EKG QRS DURATION: 116 MS
EKG QTC CALCULATION (BAZETT): 490 MS
EKG QTC CALCULATION (BAZETT): 490 MS
EKG R AXIS: -20 DEGREES
EKG R AXIS: -20 DEGREES
EKG T AXIS: 112 DEGREES
EKG T AXIS: 112 DEGREES
EKG VENTRICULAR RATE: 70 BPM
EKG VENTRICULAR RATE: 70 BPM
EST. AVERAGE GLUCOSE BLD GHB EST-MCNC: 91.1 MG/DL
GFR SERPLBLD CREATININE-BSD FMLA CKD-EPI: >60 ML/MIN/{1.73_M2}
GLUCOSE BLD-MCNC: 135 MG/DL (ref 70–99)
GLUCOSE BLD-MCNC: 142 MG/DL (ref 70–99)
GLUCOSE BLD-MCNC: 145 MG/DL (ref 70–99)
GLUCOSE BLD-MCNC: 155 MG/DL (ref 70–99)
GLUCOSE SERPL-MCNC: 183 MG/DL (ref 70–99)
HBA1C MFR BLD: 4.8 %
HCT VFR BLD AUTO: 24.8 % (ref 36–48)
HDLC SERPL-MCNC: 47 MG/DL (ref 40–60)
HGB BLD-MCNC: 8.3 G/DL (ref 12–16)
LDLC SERPL CALC-MCNC: 111 MG/DL
MCH RBC QN AUTO: 26.7 PG (ref 26–34)
MCHC RBC AUTO-ENTMCNC: 33.4 G/DL (ref 31–36)
MCV RBC AUTO: 80.2 FL (ref 80–100)
PERFORMED ON: ABNORMAL
PLATELET # BLD AUTO: 170 K/UL (ref 135–450)
PMV BLD AUTO: 7.5 FL (ref 5–10.5)
POTASSIUM SERPL-SCNC: 4.8 MMOL/L (ref 3.5–5.1)
RBC # BLD AUTO: 3.09 M/UL (ref 4–5.2)
SODIUM SERPL-SCNC: 136 MMOL/L (ref 136–145)
TRIGL SERPL-MCNC: 37 MG/DL (ref 0–150)
VLDLC SERPL CALC-MCNC: 7 MG/DL
WBC # BLD AUTO: 6.5 K/UL (ref 4–11)

## 2023-07-17 PROCEDURE — 6360000002 HC RX W HCPCS: Performed by: STUDENT IN AN ORGANIZED HEALTH CARE EDUCATION/TRAINING PROGRAM

## 2023-07-17 PROCEDURE — 97112 NEUROMUSCULAR REEDUCATION: CPT

## 2023-07-17 PROCEDURE — 6360000002 HC RX W HCPCS

## 2023-07-17 PROCEDURE — 97167 OT EVAL HIGH COMPLEX 60 MIN: CPT

## 2023-07-17 PROCEDURE — 92526 ORAL FUNCTION THERAPY: CPT

## 2023-07-17 PROCEDURE — 80048 BASIC METABOLIC PNL TOTAL CA: CPT

## 2023-07-17 PROCEDURE — 70450 CT HEAD/BRAIN W/O DYE: CPT

## 2023-07-17 PROCEDURE — 93010 ELECTROCARDIOGRAM REPORT: CPT | Performed by: INTERNAL MEDICINE

## 2023-07-17 PROCEDURE — 2000000000 HC ICU R&B

## 2023-07-17 PROCEDURE — 80061 LIPID PANEL: CPT

## 2023-07-17 PROCEDURE — 6370000000 HC RX 637 (ALT 250 FOR IP): Performed by: STUDENT IN AN ORGANIZED HEALTH CARE EDUCATION/TRAINING PROGRAM

## 2023-07-17 PROCEDURE — 97116 GAIT TRAINING THERAPY: CPT

## 2023-07-17 PROCEDURE — 97162 PT EVAL MOD COMPLEX 30 MIN: CPT

## 2023-07-17 PROCEDURE — 99222 1ST HOSP IP/OBS MODERATE 55: CPT | Performed by: INTERNAL MEDICINE

## 2023-07-17 PROCEDURE — 97530 THERAPEUTIC ACTIVITIES: CPT

## 2023-07-17 PROCEDURE — 85027 COMPLETE CBC AUTOMATED: CPT

## 2023-07-17 PROCEDURE — 36415 COLL VENOUS BLD VENIPUNCTURE: CPT

## 2023-07-17 PROCEDURE — 51798 US URINE CAPACITY MEASURE: CPT

## 2023-07-17 PROCEDURE — 4A03X5D MEASUREMENT OF ARTERIAL FLOW, INTRACRANIAL, EXTERNAL APPROACH: ICD-10-PCS | Performed by: RADIOLOGY

## 2023-07-17 PROCEDURE — 92610 EVALUATE SWALLOWING FUNCTION: CPT

## 2023-07-17 PROCEDURE — 83036 HEMOGLOBIN GLYCOSYLATED A1C: CPT

## 2023-07-17 PROCEDURE — 99232 SBSQ HOSP IP/OBS MODERATE 35: CPT

## 2023-07-17 RX ORDER — GLUCAGON 1 MG/ML
1 KIT INJECTION PRN
Status: DISCONTINUED | OUTPATIENT
Start: 2023-07-17 | End: 2023-07-21 | Stop reason: HOSPADM

## 2023-07-17 RX ORDER — INSULIN LISPRO 100 [IU]/ML
0-4 INJECTION, SOLUTION INTRAVENOUS; SUBCUTANEOUS NIGHTLY
Status: DISCONTINUED | OUTPATIENT
Start: 2023-07-17 | End: 2023-07-21 | Stop reason: HOSPADM

## 2023-07-17 RX ORDER — DEXTROSE MONOHYDRATE 100 MG/ML
INJECTION, SOLUTION INTRAVENOUS CONTINUOUS PRN
Status: DISCONTINUED | OUTPATIENT
Start: 2023-07-17 | End: 2023-07-21 | Stop reason: HOSPADM

## 2023-07-17 RX ORDER — INSULIN LISPRO 100 [IU]/ML
0-4 INJECTION, SOLUTION INTRAVENOUS; SUBCUTANEOUS
Status: DISCONTINUED | OUTPATIENT
Start: 2023-07-17 | End: 2023-07-21 | Stop reason: HOSPADM

## 2023-07-17 RX ORDER — HYDRALAZINE HYDROCHLORIDE 20 MG/ML
10 INJECTION INTRAMUSCULAR; INTRAVENOUS EVERY 6 HOURS PRN
Status: DISCONTINUED | OUTPATIENT
Start: 2023-07-17 | End: 2023-07-19

## 2023-07-17 RX ADMIN — ASPIRIN 81 MG: 81 TABLET, COATED ORAL at 20:52

## 2023-07-17 RX ADMIN — ENOXAPARIN SODIUM 40 MG: 100 INJECTION SUBCUTANEOUS at 20:52

## 2023-07-17 RX ADMIN — ONDANSETRON 4 MG: 2 INJECTION INTRAMUSCULAR; INTRAVENOUS at 08:19

## 2023-07-17 RX ADMIN — ROSUVASTATIN CALCIUM 40 MG: 20 TABLET, FILM COATED ORAL at 20:52

## 2023-07-17 RX ADMIN — HYDRALAZINE HYDROCHLORIDE 10 MG: 20 INJECTION INTRAMUSCULAR; INTRAVENOUS at 22:44

## 2023-07-17 RX ADMIN — PROCHLORPERAZINE EDISYLATE 10 MG: 5 INJECTION, SOLUTION INTRAMUSCULAR; INTRAVENOUS at 15:38

## 2023-07-17 ASSESSMENT — ENCOUNTER SYMPTOMS
SINUS PAIN: 0
EYE PAIN: 0
VOMITING: 1
SHORTNESS OF BREATH: 0
ABDOMINAL PAIN: 0
NAUSEA: 1
DIARRHEA: 0

## 2023-07-17 ASSESSMENT — PAIN SCALES - GENERAL
PAINLEVEL_OUTOF10: 0

## 2023-07-17 NOTE — PROGRESS NOTES
NEUROLOGY / NEUROCRITICAL CARE PROGRESS NOTE       Patient Name: Raleigh Hendricks YOB: 1945   Sex: Female Age: 66 yrs     CC / Reason for Consult: CVA     Interval Hx / Changes over last 24 hours:   -Hypertensive overnight in the 642'W-983'N systolic   -Some nausea and vomiting overnight   -MRI shows acute stroke in left cerebellum, MRA with narrowing at the basilar tip at the origin of PCA's R>L.     ROS: No headache this morning. Feels sleepy. Not dizzy. Not feeling as nauseous this morning. HISTORY   Admission HPI:   Raleigh Hendricks is a 66 y.o. y/o female with history significant for A-fib (not on anticoagulation), CAD - with cardiac stent placement, DM-II, HLD, HTN presents to the ED via EMS yesterday afternoon with sudden onset of exhaustion, dizziness, nausea, diaphoretic confusion and left hemiparesis. LKN 16:15. On arrival to the ED patient was taken to CT scan NIH 9. CT head shows no acute intracranial abnormality, and CTA concerning for age indeterminate basilar tip occlusion with left posterior cerebral and superior cerebellar artery occlusion. Stroke team was consulted and evaluated patient. Patient was given TNK at 19:37. There was also concern in the ED with the patient being diaphoretic that this could be a cardiac event. EKG showed some ST depression in V5 and V6 with elevated troponin. She was transferred to [facility] for further evaluation and post TNK management.      PMH Past Medical History:   Diagnosis Date    Atrial fibrillation ([address])     off coumadin after bleed, declined restart    Blood transfusion     CAD (coronary artery disease)     Diabetes mellitus type II     Diabetic neuropathy ([address]) 2011    Gastric ulcer     H. pylori infection 2009    Hyperlipidemia     Hypertension     Numbness and tingling of left leg     Osteoarthritis     knees    Poor historian     Primary hypertension     PVD (peripheral vascular disease) ([address])     PTA distal sfa/pop/peroneal,  Elis 12/2015    Unspecified cerebral artery occlusion with cerebral infarction     TIA affected left eye    upper gi bleed 12/2009      Allergies Allergies   Allergen Reactions    Pcn [Penicillins] Other (See Comments)     Unsure of reaction      Diet Diet NPO   Isolation No active isolations     CURRENT SCHEDULED MEDICATIONS   Inpatient Medications     insulin lispro, 0-4 Units, SubCUTAneous, TID WC    insulin lispro, 0-4 Units, SubCUTAneous, Nightly    enoxaparin, 40 mg, SubCUTAneous, Daily    aspirin, 81 mg, Oral, Daily **OR** aspirin, 300 mg, Rectal, Daily    rosuvastatin, 40 mg, Oral, Nightly   Infusions    dextrose        Antibiotics   Recent Abx Admin                     cefTRIAXone (ROCEPHIN) 1,000 mg in sodium chloride 0.9 % 50 mL IVPB (mini-bag) (mg) 1,000 mg New Bag 07/1945                      LABS   Metabolic Panel Recent Labs     07/16/23 1816 07/17/23  0405    136   K 3.9 4.8    103   CO2 19* 21   BUN 12 13   CREATININE 0.9 0.9   GLUCOSE 141* 183*   CALCIUM 9.7 9.0   LABALBU 4.5  --    MG 2.30  --    ALKPHOS 78  --    ALT 6*  --    AST 15  --       CBC / Coags Recent Labs     07/16/23 1816 07/17/23  0405   WBC 9.7 6.5   RBC 3.79* 3.09*   HGB 9.9* 8.3*   HCT 29.8* 24.8*    170   INR 1.05  --       Other No results for input(s): LABA1C, LDLCALC, TRIG, TSH, [id number], FOLATE, LABSALI, COVID19 in the last 72 hours. Recent Labs     07/16/23  1844   LACTA 1.0        DIAGNOSTICS   IMAGES:  Images personally reviewed and agree w/ radiology interpretation. MRI brain without Contrast; 7/16/23; 23:39  Area of diffusion restriction seen involving the superior aspect of the left  cerebellar hemisphere consistent with acute ischemia. Age-related atrophy and advanced White matter ischemic changes are noted  diffusely.           MRA Head without Contrast: 7/16/23; 23:38  Mild narrowing in basilar tip and at origin of the posterior cerebral arteries  bilaterally, right greater than

## 2023-07-17 NOTE — ED NOTES
Report to [address] [place] [address]. Dr. Edmundo Cisneros at bedside.      Ely Matthews RN  07/16/23 2020

## 2023-07-17 NOTE — CONSULTS
Clinical Pharmacy Consult Note  Medication History     Admit Date: 7/16/2023    Pharmacy consulted to verify home medication list by Dr. Phyllistine Aschoff. List of of current medications patient is taking is complete. Home Medication list in EPIC updated to reflect changes noted below. Source of information: Pharmacy dispense report, [address] [place] [address] records    Patient's home pharmacy: [address] [place] [address] [address] [place] [address] Dr Fracisco Arreaga: [phone]     Changes made to medication list:   Medications removed: (include reason, ex: therapy completed, patient no longer taking, etc.)  None  Medications added:   None  Medication doses adjusted:   None  Other notes:   Medication history consult completed via pharmacy fill history alone at this point as patient is unable to recall what medications she takes prior to admission and no family present at bedside at time of consult.  Pharmacy attempted to contact patient's daughter-in-law to verify home medication list but daughter-in-law was unable to be reached by telephone on 7/17/23 at 2:07 PM.   Patient's recent prescription fill history is as follows:  Amlodipine 10 mg (last filled #90 tabs for 90 day supply on 5/30/23)  Ascorbic acid 250 mg (last filled #30 for 30 day supply on 6/9/23)  Clopidogrel 75 mg (last filled #90 for 90 day supply on 5/30/23)  Digoxin 125 mcg (last filled #30 for 30 day supply on 7/5/23)  Hydralazine 50 mg (last filled #90 for 30 day supply on 2/20/23)  Lisinopril 20 mg (last filled #30 for 30 day supply on 4/27/23)  Cholecalciferol 1000 units (last filled #30 for 30 day supply on 6/3/23)    Current Outpatient Medications   Medication Instructions    amLODIPine (NORVASC) 10 MG tablet TAKE 1 TABLET BY MOUTH EVERY DAY    ascorbic acid (V-R VITAMIN C) 250 mg, Oral, DAILY    clopidogrel (PLAVIX) 75 MG tablet TAKE 1 TABLET BY MOUTH EVERY DAY    digoxin (LANOXIN) 125 MCG tablet TAKE 1 TABLET BY MOUTH EVERY DAY    hydrALAZINE (APRESOLINE) 50 MG tablet TAKE 1 TABLET BY MOUTH EVERY EIGHT HOURS    lisinopril (PRINIVIL;ZESTRIL) 20 MG tablet TAKE 1 TABLET BY MOUTH EVERY DAY    vitamin D3 (CHOLECALCIFEROL) 25 MCG (1000 UT) TABS tablet TAKE 1 TABLET BY MOUTH EVERY DAY       Please call with any questions.     Amee Castro, PharmD, [facility]  Clinical Pharmacist   Wireless: [phone]  7/17/2023 8:43 AM

## 2023-07-17 NOTE — ED NOTES
Patient out of the department. Son and Daughter-In-Law present at the time of transfer.       Annie Ojeda RN  07/16/23 6823

## 2023-07-17 NOTE — PROGRESS NOTES
\                        Speech Language Pathology  Facility/Department:[facility] ICU  Beside Swallowing Evaluation  Treatment   Name: Leda Olivier  : 1945  MRN: [id number]                                                         Patient Diagnosis(es):   Patient Active Problem List    Diagnosis Date Noted    Severe malnutrition ([address]) 2022    Dirty living conditions 2022    Gastrointestinal hemorrhage     Acute blood loss anemia     Chronic anemia 2022    PVD (peripheral vascular disease) ([address]) 2022    Cellulitis of right lower extremity     Osteomyelitis of right foot (HCC)     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, with bone involvement without evidence of necrosis ([address])     Acute osteomyelitis of right foot ([address]) 2022    Acute cerebrovascular accident (CVA) ([address]) 2023    Chronic deep vein thrombosis (DVT) of left peroneal vein ([address]) 2021    Colonoscopy refused 2019    Hammertoe, bilateral 10/10/2018    Acute osteomyelitis of left foot ([address])     Vitamin D deficiency 2017    Pure hypercholesterolemia 2016    Coronary artery disease involving native coronary artery of native heart without angina pectoris 2016    Essential hypertension 2016    Diabetes mellitus ([address]) 2016    PAD (peripheral artery disease) ([address]) 2015    Noncompliance of patient with dietary regimen 2013    Osteoarthritis        Past Medical History:   Diagnosis Date    Atrial fibrillation (HCC)     off coumadin after bleed, declined restart    Blood transfusion     CAD (coronary artery disease)     Diabetes mellitus type II     Diabetic neuropathy ([address])     Gastric ulcer     H. pylori infection     Hyperlipidemia     Hypertension     Numbness and tingling of left leg     Osteoarthritis     knees    Poor historian     Primary hypertension     PVD (peripheral vascular disease) ([address])     PTA distal sfa/pop/peroneal, goals:  1-The patient will tolerate least restrictive diet without s/s aspiration or respiratory decline. 2- The pt/caregiver will demonstrate understanding of swallowing recommendations and concerns. 7/17-  The pt was educated to purpose of the visit, anatomy and physiology of the swallow, concerns for aspiration, swallowing strategies, diet recommendations and possibility of being made NPO if s/s aspiration emerge. The pt stated comprehension. con't goal        Speech Goals:   The pt will be seen  2-4 x wk to address the following goals:   1- the pt will participate in full speech, language, cognitive assessment     Education  See above     Pt goal: pt denied difficulty with communication and swallowing so did not state goal  Pt discharge goal: to home     Total treatment time: 7:44-8:13- 29-  19 dysphagia dx, 10 dysphagia tx    Plan: Continue per POC 2-4x   Recommended Diet: Dysphagia III/Soft and Bite Sized with thin liquids-Make NPO if s/s aspiration emerge and alert SLP    Medication administration: whole with water     Strategies:   Upright for all PO intake  Only allow to eat when pt is fully alert    Discharge Recommendation: TBD closer to discharge   Discussed with RNVera   Needs met prior to leaving room, call light within reach    [place], [place], [address]  Speech-Language Pathologist  Pager [phone]    This document will serve as a dc summary if pt dc prior to next visit

## 2023-07-17 NOTE — PLAN OF CARE
Problem: Discharge Planning  Goal: Discharge to home or other facility with appropriate resources  Recent Flowsheet Documentation  Taken 7/17/2023 0300 by Linda Espinal RN  Discharge to home or other facility with appropriate resources: Identify barriers to discharge with patient and caregiver  Taken 7/17/2023 0000 by Linda Espinal RN  Discharge to home or other facility with appropriate resources: Identify barriers to discharge with patient and caregiver     Problem: Safety - Adult  Goal: Free from fall injury  Outcome: Progressing  Flowsheets (Taken 7/16/2023 2045)  Free From Fall Injury: Instruct family/caregiver on patient safety     Problem: Neurosensory - Adult  Goal: Achieves stable or improved neurological status  Outcome: Progressing  Flowsheets (Taken 7/17/2023 0000)  Achieves stable or improved neurological status: Assess for and report changes in neurological status     Problem: Neurosensory - Adult  Goal: Absence of seizures  Recent Flowsheet Documentation  Taken 7/17/2023 0000 by Linda Espinal RN  Absence of seizures: Monitor for seizure activity.   If seizure occurs, document type and location of movements and any associated apnea     Problem: Neurosensory - Adult  Goal: Achieves maximal functionality and self care  Outcome: Progressing  Flowsheets (Taken 7/17/2023 0000)  Achieves maximal functionality and self care: Monitor swallowing and airway patency with patient fatigue and changes in neurological status     Problem: Cardiovascular - Adult  Goal: Absence of cardiac dysrhythmias or at baseline  Outcome: Progressing  Flowsheets (Taken 7/17/2023 0000)  Absence of cardiac dysrhythmias or at baseline: Monitor cardiac rate and rhythm     Problem: Skin/Tissue Integrity - Adult  Goal: Skin integrity remains intact  Outcome: Progressing  Flowsheets (Taken 7/17/2023 0000)  Skin Integrity Remains Intact: Monitor for areas of redness and/or skin breakdown

## 2023-07-17 NOTE — PROGRESS NOTES
Occupational Therapy  Facility/Department: [facility] ICU  Occupational Therapy Initial Assessment and Treatment    Name: Choco Grace  : 1945  MRN: [id number]  Date of Service: 2023    Discharge Recommendations:  Choco Grace scored a 10/24 on the AM-PAC ADL Inpatient form. Current research shows that an AM-PAC score of 17 or less is typically not associated with a discharge to the patient's home setting. Based on the patient's AM-PAC score and their current ADL deficits, it is recommended that the patient have 5-7 sessions per week of Occupational Therapy at d/c to increase the patient's independence. At this time, this patient demonstrates complex nursing, medical, and rehabilitative needs, and would benefit from intensive rehabilitation services upon discharge from the Inpatient setting. This patient demonstrates the ability to participate in and benefit from an intensive therapy program with a coordinated interdisciplinary team approach to foster frequent, structured, and documented communication among disciplines, who will work together to establish, prioritize, and achieve treatment goals. Please see assessment section for further patient specific details. If patient discharges prior to next session this note will serve as a discharge summary. Please see below for the latest assessment towards goals. OT Equipment Recommendations  Equipment Needed: No       Patient Diagnosis(es): There were no encounter diagnoses. Past Medical History:  has a past medical history of Atrial fibrillation ([address]), Blood transfusion, CAD (coronary artery disease), Diabetes mellitus type II, Diabetic neuropathy ([address]), Gastric ulcer, H. pylori infection, Hyperlipidemia, Hypertension, Numbness and tingling of left leg, Osteoarthritis, Poor historian, Primary hypertension, PVD (peripheral vascular disease) ([address]), Unspecified cerebral artery occlusion with cerebral infarction, and upper gi bleed.   Past Surgical

## 2023-07-17 NOTE — H&P
Internal Medicine  PGY 3  History & Physical      CC CVA    History Obtained From:  patient, electronic medical record    HISTORY OF PRESENT ILLNESS:  The pt is a 66year old lady with a PMHx of Afib, CAD s/p PCI HTN, HLD, and DM who presented to the ED with exhaustion, diaphoresis, and visual changes. Pt's symptoms began at 4:15 PM on 7/16/2023. She had an acute onset of confusion that progressed to L hemiparesis. Her CT head was negative for acute intracranial abnormalities. A CTA of her age-indeterminate basilar tip occlusion with left posterior cerebral and superior cerebellar artery occlusions. The  Stroke team was consulted, NIHSS was 9, the pt was administered TNK 7:37 PM at OSH ED, and was transferred to [facility] ICU for neurological services. On arrival to the ICU, pt responsive and able to fully participate in writer's assessment and exam. MRI brain displayed an area of diffusion restriction seen involving the superior aspect of the left cerebellar hemisphere consistent with acute ischemia. A MRA displayed Mild narrowing in basilar tip and at origin of the posterior cerebral arteries   Bilaterally with her right greater than left.       Past Medical History:        Diagnosis Date    Atrial fibrillation ([address])     off coumadin after bleed, declined restart    Blood transfusion     CAD (coronary artery disease)     Diabetes mellitus type II     Diabetic neuropathy ([address]) 2011    Gastric ulcer     H. pylori infection 2009    Hyperlipidemia     Hypertension     Numbness and tingling of left leg     Osteoarthritis     knees    Poor historian     Primary hypertension     PVD (peripheral vascular disease) ([address])     PTA distal sfa/pop/peroneal, Dr. Diane Hayes 12/2015    Unspecified cerebral artery occlusion with cerebral infarction     TIA affected left eye    upper gi bleed 12/2009       Past Surgical History:        Procedure Laterality Date    ANGIOPLASTY  12/30/15    Dr. Diane Hayes, JALEESA, PTA of distal sfa/pop/peroneal

## 2023-07-17 NOTE — PROGRESS NOTES
Intractable nausea pt also coughing up large amount thick white phlegm. Dr Pernell Garcia notified compazine given, nausea subsided. Stat brain MRA wo contrast ordered. Screening form completed.

## 2023-07-17 NOTE — FLOWSHEET NOTE
4 Eyes Skin Assessment     NAME:  Deisi Salcedo  YOB: 1945  MEDICAL RECORD NUMBER:  [id number]    The patient is being assessed for  Admission    I agree that at least one RN has performed a thorough Head to Toe Skin Assessment on the patient. ALL assessment sites listed below have been assessed. Areas assessed by both nurses:    Head, Face, Ears, Shoulders, Back, Chest, Arms, Elbows, Hands, Sacrum. Buttock, Coccyx, Ischium, Legs. Feet and Heels, and Under Medical Devices         Does the Patient have a Wound?  No noted wound(s)       Jc Prevention initiated by RN: Yes  Wound Care Orders initiated by RN: Yes    Pressure Injury (Stage 3,4, Unstageable, DTI, NWPT, and Complex wounds) if present, place Wound referral order by RN under : No    New Ostomies, if present place, Ostomy referral order under : No     Nurse 1 eSignature: Electronically signed by Ai Rodriguez RN on 7/17/23 at 2:26 AM EDT    **SHARE this note so that the co-signing nurse can place an eSignature**    Nurse 2 eSignature: Electronically signed by Samantha Brito RN on 7/17/23 at 2:29 AM EDT

## 2023-07-17 NOTE — CARE COORDINATION
Case management is following for discharge planning. The chart was reviewed. Met with Ms. Kimberlyn Goldman at the bedside to complete the initial assessment. She was lying flat on the bed on approach. She is alert and oriented x 4, pleasant, and easy to engage in conversation. She is edentulous and speech is difficult to understand at times. She stated she was tired from all of the activity over the past 12 hours or so. Ms. Kimberlyn Goldman lives at home with her son in a mobile home in [place], [place]. There are 4-5 steps to enter with a railing. She is independent with self care and functional mobility at baseline. She ambulates with a rolling walker. Her family provides transportation. Son assists with iADL's as needed. No active community services. She would prefer to return home, but is amenable to going to rehab if it is recommended. Case Management Assessment  Initial Evaluation    Date/Time of Evaluation: 7/17/2023 12:19 PM  Assessment Completed by: Erin Tijerina RN    If patient is discharged prior to next notation, then this note serves as note for discharge by case management. 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 Yes    Chart Reviewed: Yes      History Provided by: Patient  Patient Orientation: Alert and Oriented    Patient Cognition: Alert    Hospitalization in the last 30 days (Readmission):  No    If yes, Readmission Assessment in  Navigator will be completed.     Advance Directives:      Code Status: Full Code   Patient's Primary Decision Maker is: Legal Next of Kin    Primary Decision MakerDimple Pour Child - [phone]    Secondary Decision complications    The Patient and/or Patient Representative Agree with the Discharge Plan?  y    Ric Polanco RN  Case Management Department  [phone]

## 2023-07-17 NOTE — PROGRESS NOTES
SLP Attempt    Pt remains on hold due to being up all night. Discussed with RN, Gladys Dotson. Will re-attempt cognitive-linguistic evaluation another time.      Shantelle Juan M.A., [address] [facility]  Speech-Language Pathologist

## 2023-07-17 NOTE — PROGRESS NOTES
Pt remains A&O. Very tired today, states she got no rest last night. Nauseous when she attempts to eat. Emesis x2. Physicians aware. Weak throughout. Lungs clear. Bowels active.

## 2023-07-17 NOTE — NURSE NAVIGATOR
NAME:  Bhavesh Harris  YOB: 1945  MEDICAL RECORD NUMBER:  [id number]  TODAYS DATE:  7/17/2023    Discussed personal risk factors for Stroke/TIA with patient/family, and ways to reduce the risk for a recurrent stroke. Patient's personal risk factors which were identified are:     [] Alcohol Abuse: check with your physician before any alcohol consumption. [x] Atrial fibrillation: may cause blood clots. [] Drug Abuse: Seek help, talk with your doctor  [] Clotting Disorder  [x] Diabetes  [x] Family history of stroke or heart disease  [x] High Blood Pressure/Hypertension: work with your physician. [x] High cholesterol: monitor cholesterol levels with your physician.   [] Overweight/Obesity: work with your physician for your ideal body weight. [x] Physical Inactivity: get regular exercise as directed by your physician. [x] Personal history of previous TIA or stroke  [] Poor Diet; decrease salt (sodium) in your diet, follow diet directed by physician. [] Smoking: Cigarette/Cigar: stop smoking. Advised patient and family members at bedside that you can reduce your risk for stroke/TIA by modifying/controlling the risk factors that you have. Pt.advised to take the medications as prescribed, which will be detailed in the discharge instructions, and to not stop taking them without consulting their physician. In addition, pt. advised to maintain a healthy diet, exercise regularly and to not smoke. [facility] [employer] Stroke treatment and prevention, Managing your recovery  notebook  provided and/or reviewed  with patient/family. The notebook includes, but not limited to, sections addressing warning signs & symptoms of a stroke, which are: sudden numbness or weakness especially on one side of the body, sudden confusion, difficulty speaking or understanding, sudden changes in vision, sudden dizziness or loss of balance/ coordination, sudden severe headache, syncope and seizure.   The need to call

## 2023-07-17 NOTE — PROGRESS NOTES
V2.0    [facility] [place]      Name:  [place] /Age/Sex: 1945  (66 y.o. female)   MRN & CSN:  [id number] & [id number] Encounter Date/Time: 2023 8:34 AM EDT   Location:  [id number]- PCP: MARVIN Rodriguez - CNP     Attending:Pa Lui MD       Hospital Day: 2    Assessment and Recommendations   [place] is a 66 y.o. female with pmh of Afib, CAD s/p PCI HTN, HLD, and DM  who presents with Acute cerebrovascular accident (CVA) ([address]) s/p TNK    #Acute CVA  - s/p TNK  @ ~1930  - Repeat CTH 24 hours post TNK  - FLP and A1c  - TTE  - Statin  - ASA 24 hours post-TNK  - DVT chemoprophylaxis 24 hours post TNK  - neuro checks  - Tele  - PT/OT/SLP    Chronic Medical Conditions  #Afib - previously on anticoagulation; had lower GI bleed 2022 and from record review off anticoagulation since then  #CAD - ASA and statin  #HTN - Continue home antihypertensives  #HLD - Crestor  #Type II DM, at goal (last A1c 5.7% 2023) - SSI, update A1c as >3 months ago    Diet Diet NPO   DVT Prophylaxis [] Lovenox, []  Heparin, [] SCDs, [] Ambulation,  [] Eliquis, [] Xarelto  [] Coumadin   Code Status Full Code   Disposition From: Home  Expected Disposition: ARU  Estimated Date of Discharge: 2023  Patient requires continued admission due to neuro monitoring   Surrogate Decision Maker/ CHRISTINA  Max Ulloa, daughter-in-law     Personally reviewed Lab Studies and Imaging       Subjective:     Chief Complaint: Weakness    [place] is a 66 y.o. female w/ PMHx of Afib, CAD s/p PCI HTN, HLD, and DM who presented on  with acute onset confusion that progressed to L hemiparesis. Diagnosed with acute CVA of left posterior cerebral and superior cerebellar arteries. Received TNK ~3 hours after symptom onset. Admitted to [facility] ICU for close neuro monitoring after thrombolytic administration. No acute clinical changes reported overnight. HR low in 40s-50s. BP elevated in 865T-840K systolic.  Currently feels

## 2023-07-17 NOTE — PROGRESS NOTES
Physical Therapy  Facility/Department: [facility] ICU  Physical Therapy Initial Assessment and Treatment    Name: Wells Soulier  : 1945  MRN: [id number]  Date of Service: 2023    Discharge Recommendations:  Wells Soulier scored a  13/24 on the AM-PAC short mobility form. Current research shows that an AM-PAC score of 17 or less is typically not associated with a discharge to the patient's home setting. Based on the patient's AM-PAC score and their current functional mobility deficits, it is recommended that the patient have 5-7 sessions per week of Physical Therapy at d/c to increase the patient's independence. At this time, this patient demonstrates complex nursing, medical, and rehabilitative needs, and would benefit from intensive rehabilitation services upon discharge from the Inpatient setting. This patient demonstrates the ability to participate in and benefit from an intensive therapy program with a coordinated interdisciplinary team approach to foster frequent, structured, and documented communication among disciplines, who will work together to establish, prioritize, and achieve treatment goals. Please see assessment section for further patient specific details. If patient discharges prior to next session this note will serve as a discharge summary. Please see below for the latest assessment towards goals. Patient would benefit from continued therapy after discharge   PT Equipment Recommendations  Other: Defer      Patient Diagnosis(es): There were no encounter diagnoses.   Past Medical History:  has a past medical history of Atrial fibrillation ([address]), Blood transfusion, CAD (coronary artery disease), Diabetes mellitus type II, Diabetic neuropathy ([address]), Gastric ulcer, H. pylori infection, Hyperlipidemia, Hypertension, Numbness and tingling of left leg, Osteoarthritis, Poor historian, Primary hypertension, PVD (peripheral vascular disease) ([address]), Unspecified cerebral artery occlusion with cerebral infarction, and upper gi bleed. Past Surgical History:  has a past surgical history that includes Coronary angioplasty with stent (5528,1448); Cardiac catheterization (9/21/12); Total shoulder arthroplasty (10/5/12); angioplasty (12/30/15); other surgical history (Left, 06/05/2017); Foot surgery (Left, 01/11/2018); Toe amputation (Right, 5/23/2022); Upper gastrointestinal endoscopy (N/A, 6/8/2022); Colonoscopy (06/09/2022); Colonoscopy (N/A, 6/9/2022); and Toe amputation (Right, 9/9/2022). Assessment   Assessment: Pt is 65 yo F admitted on 7/16/23 with CVA involving post circulation. Typically, pt lives at home with her son and daughter in law in a trailer. Pt ambulates with a wheeled walker, does not drive, sponge bathes. Presently, pt very tired with a tendency to keep her head rotated to R. Pt moves with assist of 2 due to L sided ataxia and overall weakness. Pt and family question why pt \"is shaking so much\". MD present to answer family questions. Pt nauseated with movement and with emesis, nursing notified. Rec continued inpt PT at d/c. Family agrees. Will follow.   Treatment Diagnosis: Decreased functional mobility  Therapy Prognosis: Good  Decision Making: Medium Complexity  Requires PT Follow-Up: Yes  Activity Tolerance  Activity Tolerance: Patient limited by fatigue  Activity Tolerance Comments: Pt limited by ataxia     Plan   Physcial Therapy Plan  General Plan: 5-7 times per week  Current Treatment Recommendations: Strengthening, Balance training, Functional mobility training, Transfer training, Gait training, Neuromuscular re-education, Safety education & training, Positioning, Therapeutic activities  Safety Devices  Type of Devices: Nurse notified, Call light within reach, Bed alarm in place, Left in bed, All fall risk precautions in place     Restrictions  Position Activity Restriction  Other position/activity restrictions: NPO; no activity restrictions noted     Subjective

## 2023-07-17 NOTE — PLAN OF CARE
Area of diffusion restriction seen involving the superior aspect of the left   cerebellar hemisphere consistent with acute ischemia. Age-related atrophy and advanced White matter ischemic changes are noted   diffusely. MR ANGIO OF THE HEAD WITHOUT CONTRAST      HISTORY: Posterior CVA      Technical factors: Noncontrast MR angio imaging was performed of the brain. FINDINGS:      There is normal anatomy in the region of the [place]. No focal dilatation is identified to suggest aneurysm. There is mild luminal narrowing in the region of the basilar tip and segmental   stenosis seen in the proximal aspect of the posterior cerebral arteries in the   bilaterally without evidence of flow significant stenosis or occlusion   identified. IMPRESSION:      Mild narrowing in basilar tip and at origin of the posterior cerebral arteries   bilaterally, right greater than left. Otherwise unremarkable intracranial MR angio               CT head without contrast    (Results Pending)       Assessment: 65 yo female w/ left cerebellar stroke. No evidence of basilar occlusion on MRA. There is mild luminal narrowing in basilar tip and segmental stenosis seen in the proximal aspect of bilateral PCAs without flow significant stenosis     Plan:  No occlusion identified on imaging. No role for neurovascular intervention. Recommend stroke work up/post stroke care by neurology service. Please call with any questions     Patient discussed/images reviewed with Dr. Ken Wong who agrees with above assessment and plan. Electronically signed by:  MARVIN Abrams NP

## 2023-07-17 NOTE — PROGRESS NOTES
Telemedicine Consult Note   Stroke Team                Patient Name: Lopez Sumner (34 y.o. female)  MRN: [id number]  : 1945  Admission Date: 2023   Current Date: 23    STROKE TIMELINE  Last Known to be Well (Stroke Diagnosis)  Date Last Known Well: 23  Time Last Known Well: 1700  ED arrival time: ED Arrival 1808    TENECTEPLASE (TNKase) bolus time: 193  DTN (minutes): 89 mins  (Initially with mild symptoms but worsened in ED)      Chief Complaint     Chief Complaint   Patient presents with    Dizziness     Patient was seen by family in usual state of health at 4:15 when preparing to go to [employer]. She had acute onset of confusion that progress to L hemiparesis while in ED. History of Present Illness   This is a 66 y.o., female with LKW of 4:15 PM presenting with acute confuiosn, nausea, vomiting and L sided weakness. She describes it as 'shaking' and her famiyl first noted it while they were getting ready for 5 PM [employer]. She lives with family and can get to bathroom by self but otherwise requires assistance with ADLs  The patient's clinical picture is consistent with acute ischemic stroke.  Risks and benefits of IV Tenecteplase were discussed with her son, including known contraindications as follows:    CT brain imaging exhibits extensive regions of clear hypoattenuation  Ischemic stroke within 3 months  Severe head trauma within 3 months  Intracranial/intraspinal surgery within 3 months  History of intracranial hemorrhage  Suspected subarachnoid hemorrhage  GI malignancy or recent GI bleed  Platelets <150 283/JQ3, INR >1.7, aPTT >40 s, or PT >15 s  LMWH within 24 hours  DOAC within 48 hours  High suspicion of infectious endocarditis  Suspected aortic dissection  Suspected intra-axial intracranial neoplasm    Past Medical History:   Diagnosis Date    Atrial fibrillation (HCC)     off coumadin after bleed, declined restart    Blood transfusion     CAD (coronary artery disease) hypertension. If tenecteplase given, do NOT give antiplatelets or anticoagulants until 24 hours and a safety scan has been completed. Additional Recommendations:   -NPO until passing bedside dysphagia screen or formal swallow  -MRI/CT at 24 hours as a safety scan  -Local Neurology consult  -PT/OT/ST     For a change in neuro exam or questions, call the  Stroke Team at [phone].           Electronically signed by Dione Yañez MD on 7/17/23 at 1:45 AM EDT    Stroke Team        Telemedicine Time: 50 minutes

## 2023-07-17 NOTE — CONSULTS
Neurology / Arash Blanco Consult Note      Kendra Holden MD is requesting this consult. Reason for Consult: CVA  Admission Chief Complaint: CVA    History of Present Illness     Luisa Palma is a 66 y.o. y/o female with history significant for A-fib, CAD - with cardiac stent placement, DM-II, HLD, HTN presents to the ED via EMS this afternoon with sudden onset of exhaustion, dizziness, nausea, diaphoretic confusion and left hemiparesis. LKN 16:15. On arrival to the ED patient was taken to CT scan NIH 9. CT head shows no acute intracranial abnormality, and CTA concerning for age indeterminate basilar tip occlusion with left posterior cerebral and superior cerebellar artery occlusion. Stroke team was consulted and evaluated patient. Patient was given TNK at 19:37. There was also concern in the ED with the patient being diaphoretic that this could be a cardiac event. EKG showed some ST depression in V5 and V6 with elevated troponin. She was transferred to [facility] for further evaluation and post TNK management.          History provided by:  Chart review        REVIEW OF SYSTEMS:   Constitutional- No weight loss or fevers   Neurologic- + dizziness, nausea, fatigue     Past Medical, Surgical, Family, and Social History   PAST MEDICAL HISTORY:  Past Medical History:   Diagnosis Date    Atrial fibrillation ([address])     off coumadin after bleed, declined restart    Blood transfusion     CAD (coronary artery disease)     Diabetes mellitus type II     Diabetic neuropathy ([address]) 2011    Gastric ulcer     H. pylori infection 2009    Hyperlipidemia     Hypertension     Numbness and tingling of left leg     Osteoarthritis     knees    Poor historian     Primary hypertension     PVD (peripheral vascular disease) ([address])     PTA distal sfa/pop/peroneal, Dr. Nate Sykes 12/2015    Unspecified cerebral artery occlusion with cerebral infarction     TIA affected left eye    upper gi bleed 12/2009     SURGICAL HISTORY:  Past

## 2023-07-17 NOTE — PLAN OF CARE
Problem: Safety - Adult  Goal: Free from fall injury  7/17/2023 1747 by Gorge Homans, RN  Outcome: Progressing  7/17/2023 8326 by Kemar Lucia RN  Outcome: Progressing  Flowsheets (Taken 7/16/2023 2045)  Free From Fall Injury: Instruct family/caregiver on patient safety     Problem: Neurosensory - Adult  Goal: Achieves stable or improved neurological status  7/17/2023 1747 by Gorge Homans, RN  Outcome: Progressing  7/17/2023 0633 by Kemar Lucia RN  Outcome: Progressing  Flowsheets (Taken 7/17/2023 0000)  Achieves stable or improved neurological status: Assess for and report changes in neurological status  Goal: Absence of seizures  Outcome: Progressing  Goal: Achieves maximal functionality and self care  7/17/2023 1747 by Gorge Homans, RN  Outcome: Progressing  7/17/2023 0633 by Kemar Lucia RN  Outcome: Progressing  Flowsheets (Taken 7/17/2023 0000)  Achieves maximal functionality and self care: Monitor swallowing and airway patency with patient fatigue and changes in neurological status     Problem: Neurosensory - Adult  Goal: Absence of seizures  Outcome: Progressing     Problem: Cardiovascular - Adult  Goal: Absence of cardiac dysrhythmias or at baseline  7/17/2023 1747 by Gorge Homans, RN  Outcome: Progressing  7/17/2023 0633 by Kemar Lucia RN  Outcome: Progressing  Flowsheets (Taken 7/17/2023 0000)  Absence of cardiac dysrhythmias or at baseline: Monitor cardiac rate and rhythm     Problem: Pain  Goal: Verbalizes/displays adequate comfort level or baseline comfort level  Outcome: Progressing     Problem: Skin/Tissue Integrity - Adult  Goal: Skin integrity remains intact  7/17/2023 1747 by Gorge Homans, RN  Outcome: Progressing  7/17/2023 0633 by Kemar Lucia RN  Outcome: Progressing  Flowsheets (Taken 7/17/2023 0000)  Skin Integrity Remains Intact: Monitor for areas of redness and/or skin breakdown     Problem: Chronic Conditions and Co-morbidities  Goal: Patient's chronic conditions and co-morbidity symptoms are monitored and maintained or improved  Outcome: Progressing     Problem: SLP Adult - Impaired Swallowing  Goal: By Discharge: Advance to least restrictive diet without signs or symptoms of aspiration for planned discharge setting. See evaluation for individualized goals. 7/17/2023 0831 by DARLENE Contreras  Note: Intervention: Speech Evaluation/treatment  SLP completed evaluation. Please refer to notes in EMR.

## 2023-07-17 NOTE — PROGRESS NOTES
Pt passed swallow screening eval. Jacob removed upon admission from [place]. [place] per nurse-driven protocol. Pt had not voided yet. Bladder scan completed showing 180 ml. Will monitor.

## 2023-07-17 NOTE — PROGRESS NOTES
Pt was transferred from [place]. [place] to [facility] ICU. Pt admitted for CVA s/p TNK. Alert & oriented x4 no focal deficits. NIHSS 0 GCS 15. Afib HR 50s /50 RA. ICU residents & NP Senia Johnston at [facility] to assess & evaluate pt.

## 2023-07-17 NOTE — PROGRESS NOTES
Physical Therapy/Occupational Therapy  HOLD    Came to see pt for PT/OT. Pt presently on hold from therapy due to needing to rest per RN following a very busy night for pt. Will return later as schedule allows.   [address] [facility] 6557

## 2023-07-18 PROBLEM — I48.11 LONGSTANDING PERSISTENT ATRIAL FIBRILLATION (HCC): Status: ACTIVE | Noted: 2023-07-18

## 2023-07-18 LAB
ANION GAP SERPL CALCULATED.3IONS-SCNC: 11 MMOL/L (ref 3–16)
BUN SERPL-MCNC: 13 MG/DL (ref 7–20)
CALCIUM SERPL-MCNC: 9 MG/DL (ref 8.3–10.6)
CHLORIDE SERPL-SCNC: 106 MMOL/L (ref 99–110)
CO2 SERPL-SCNC: 23 MMOL/L (ref 21–32)
CREAT SERPL-MCNC: 1 MG/DL (ref 0.6–1.2)
DEPRECATED RDW RBC AUTO: 16.8 % (ref 12.4–15.4)
GFR SERPLBLD CREATININE-BSD FMLA CKD-EPI: 58 ML/MIN/{1.73_M2}
GLUCOSE BLD-MCNC: 117 MG/DL (ref 70–99)
GLUCOSE BLD-MCNC: 128 MG/DL (ref 70–99)
GLUCOSE BLD-MCNC: 129 MG/DL (ref 70–99)
GLUCOSE BLD-MCNC: 155 MG/DL (ref 70–99)
GLUCOSE SERPL-MCNC: 111 MG/DL (ref 70–99)
HCT VFR BLD AUTO: 25.5 % (ref 36–48)
HGB BLD-MCNC: 8.4 G/DL (ref 12–16)
LV EF: 70 %
LVEF MODALITY: NORMAL
MCH RBC QN AUTO: 26.8 PG (ref 26–34)
MCHC RBC AUTO-ENTMCNC: 32.9 G/DL (ref 31–36)
MCV RBC AUTO: 81.2 FL (ref 80–100)
PERFORMED ON: ABNORMAL
PLATELET # BLD AUTO: 205 K/UL (ref 135–450)
PMV BLD AUTO: 7 FL (ref 5–10.5)
POTASSIUM SERPL-SCNC: 4 MMOL/L (ref 3.5–5.1)
RBC # BLD AUTO: 3.13 M/UL (ref 4–5.2)
SODIUM SERPL-SCNC: 140 MMOL/L (ref 136–145)
WBC # BLD AUTO: 7.4 K/UL (ref 4–11)

## 2023-07-18 PROCEDURE — 80048 BASIC METABOLIC PNL TOTAL CA: CPT

## 2023-07-18 PROCEDURE — 93306 TTE W/DOPPLER COMPLETE: CPT

## 2023-07-18 PROCEDURE — 2060000000 HC ICU INTERMEDIATE R&B

## 2023-07-18 PROCEDURE — 6370000000 HC RX 637 (ALT 250 FOR IP)

## 2023-07-18 PROCEDURE — 99222 1ST HOSP IP/OBS MODERATE 55: CPT | Performed by: INTERNAL MEDICINE

## 2023-07-18 PROCEDURE — 6360000002 HC RX W HCPCS: Performed by: STUDENT IN AN ORGANIZED HEALTH CARE EDUCATION/TRAINING PROGRAM

## 2023-07-18 PROCEDURE — 99232 SBSQ HOSP IP/OBS MODERATE 35: CPT

## 2023-07-18 PROCEDURE — 36415 COLL VENOUS BLD VENIPUNCTURE: CPT

## 2023-07-18 PROCEDURE — 92521 EVALUATION OF SPEECH FLUENCY: CPT

## 2023-07-18 PROCEDURE — 6370000000 HC RX 637 (ALT 250 FOR IP): Performed by: STUDENT IN AN ORGANIZED HEALTH CARE EDUCATION/TRAINING PROGRAM

## 2023-07-18 PROCEDURE — 51798 US URINE CAPACITY MEASURE: CPT

## 2023-07-18 PROCEDURE — 99232 SBSQ HOSP IP/OBS MODERATE 35: CPT | Performed by: INTERNAL MEDICINE

## 2023-07-18 PROCEDURE — 85027 COMPLETE CBC AUTOMATED: CPT

## 2023-07-18 PROCEDURE — 92526 ORAL FUNCTION THERAPY: CPT

## 2023-07-18 RX ORDER — HYDRALAZINE HYDROCHLORIDE 50 MG/1
50 TABLET, FILM COATED ORAL EVERY 8 HOURS SCHEDULED
Status: DISCONTINUED | OUTPATIENT
Start: 2023-07-18 | End: 2023-07-19

## 2023-07-18 RX ORDER — LISINOPRIL 20 MG/1
20 TABLET ORAL DAILY
Status: DISCONTINUED | OUTPATIENT
Start: 2023-07-18 | End: 2023-07-21 | Stop reason: HOSPADM

## 2023-07-18 RX ADMIN — ASPIRIN 81 MG: 81 TABLET, COATED ORAL at 09:52

## 2023-07-18 RX ADMIN — ROSUVASTATIN CALCIUM 40 MG: 20 TABLET, FILM COATED ORAL at 21:24

## 2023-07-18 RX ADMIN — LISINOPRIL 20 MG: 20 TABLET ORAL at 09:52

## 2023-07-18 RX ADMIN — HYDRALAZINE HYDROCHLORIDE 50 MG: 50 TABLET, FILM COATED ORAL at 06:37

## 2023-07-18 RX ADMIN — ENOXAPARIN SODIUM 40 MG: 100 INJECTION SUBCUTANEOUS at 09:52

## 2023-07-18 RX ADMIN — HYDRALAZINE HYDROCHLORIDE 50 MG: 50 TABLET, FILM COATED ORAL at 21:24

## 2023-07-18 RX ADMIN — ONDANSETRON 4 MG: 2 INJECTION INTRAMUSCULAR; INTRAVENOUS at 10:13

## 2023-07-18 RX ADMIN — HYDRALAZINE HYDROCHLORIDE 50 MG: 50 TABLET, FILM COATED ORAL at 15:42

## 2023-07-18 ASSESSMENT — PAIN SCALES - GENERAL
PAINLEVEL_OUTOF10: 0

## 2023-07-18 ASSESSMENT — ENCOUNTER SYMPTOMS
SHORTNESS OF BREATH: 0
BACK PAIN: 0
NAUSEA: 1
SINUS PAIN: 0
ABDOMINAL PAIN: 0

## 2023-07-18 NOTE — PROGRESS NOTES
07/18/23 1350   Encounter Summary   Encounter Overview/Reason  Initial Encounter   Service Provided For: Family   Referral/Consult From: [address] Family members   Last Encounter  07/18/23  ()   Complexity of Encounter Moderate   Begin Time 1348   End Time  1351   Total Time Calculated 3 min   Encounter    Type Initial Screen/Assessment   Crisis   Type Family Care   Spiritual/Emotional needs   Type Spiritual Support   Behavioral Health    Type  Initial Encounter   Assessment/Intervention/Outcome   Assessment Calm; Hopeful;Impaired social interaction;Peaceful   Intervention Active listening;Discussed belief system/Holiness practices/marce; Explored Coping Skills/Resources;Sustaining Presence/Ministry of presence   Outcome Coping;Comfort;Expressed Gratitude; Optimistic        entered open room. Patient was non-responsive.  offered spiritual care for the family. Family said that the patient's  from [address] [place] [address] visited yesterday. No further spiritual care required at this time.

## 2023-07-18 NOTE — PROGRESS NOTES
Spoke w/ Neuro NP about patient's neuro check frequency. Since outside of time frame of 24 hours post TNK, OK for Q4 neuros. Pt was able to take pills whole with sips of water.

## 2023-07-18 NOTE — CARE COORDINATION
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 Restart home meds. PTOTSLP    PT AM-PAC: 13 / 24 per last evaluation on: 7/17    OT AM-PAC: 10 / 24 per last evaluation on: 7/17    Discharge Plan: Therapy recommends ARU. PMR consult needed    Pre-cert required: YES [facility]    COVID Result:    Lab Results   Component Value Date/Time    COVID19 Not Detected 06/10/2022 11:40 AM    COVID19 NOT DETECTED 05/13/2022 04:39 PM       Transportation PLAN for discharge: EMS transportation    Case Management Notes: Ms. Luisa Ware is from home with her son in [place], [place]. The home setting is a mobile home. Prior to admission she was independent with all self care and functional mobility. The long-term goal would be to return home. Gladis Tello and her family were provided with choice of provider; she and her family are in agreement with the discharge plan. Emergency Contacts:  Extended Emergency Contact Information  Primary Emergency Contact: [facility] Phone: [phone]  Mobile Phone: [phone]  Relation: Daughter-in-Law  Secondary Emergency Contact: [address] [place] [address] Phone: [phone]  Mobile Phone: [phone]  Relation: Child    Care Transition Patient: No    IMM Status:      Joe Bernheim, RN  [address]  Case Management Department  [phone]  Ms. Romero has been accepted by Dr. Francoise Domingo to go to [facility] ARU.  [employer] [facility] pre-cert will be started today

## 2023-07-18 NOTE — PROGRESS NOTES
NEUROLOGY / NEUROCRITICAL CARE PROGRESS NOTE       Patient Name: Carlos Miller YOB: 1945   Sex: Female Age: 66 yrs     CC / Reason for Consult: CVA    Interval Hx / Changes over last 24 hours:   -Still hypertensive overnight, restarted home BP's medications this AM, hypertensive in the 160's 170's overnight. -Reports not feeling as nauseous or dizzy   -Weak and ataxic in left upper and left lower extremity   -Worked with PT/OT yesterday, didn't do well (13/24 & 10/24)  -24 hour post TNK stability CT scan stable     ROS: left side \"doesn't go where I want it to go\", feels tired     HISTORY   Admission HPI:   Carlos Miller is a 66 y.o. y/o female with history significant for A-fib (not on anticoagulation), CAD - with cardiac stent placement, DM-II, HLD, HTN presents to the ED via EMS yesterday afternoon with sudden onset of exhaustion, dizziness, nausea, diaphoretic confusion and left hemiparesis. LKN 16:15. On arrival to the ED patient was taken to CT scan NIH 9. CT head shows no acute intracranial abnormality, and CTA concerning for age indeterminate basilar tip occlusion with left posterior cerebral and superior cerebellar artery occlusion. Stroke team was consulted and evaluated patient. Patient was given TNK at 19:37. There was also concern in the ED with the patient being diaphoretic that this could be a cardiac event. EKG showed some ST depression in V5 and V6 with elevated troponin. She was transferred to [facility] for further evaluation and post TNK management. MRI with acute stroke in the left cerebellum.      PMH Past Medical History:   Diagnosis Date    Atrial fibrillation ([address])     off coumadin after bleed, declined restart    Blood transfusion     CAD (coronary artery disease)     Diabetes mellitus type II     Diabetic neuropathy ([address]) 2011    Gastric ulcer     H. pylori infection 2009    Hyperlipidemia     Hypertension     Numbness and tingling of left leg

## 2023-07-18 NOTE — PLAN OF CARE
Problem: Safety - Adult  Goal: Free from fall injury  Outcome: Progressing     Problem: Neurosensory - Adult  Goal: Achieves stable or improved neurological status  Outcome: Progressing  Goal: Absence of seizures  Outcome: Progressing  Goal: Achieves maximal functionality and self care  Outcome: Progressing     Problem: Cardiovascular - Adult  Goal: Absence of cardiac dysrhythmias or at baseline  Outcome: Progressing     Problem: Skin/Tissue Integrity - Adult  Goal: Skin integrity remains intact  Outcome: Progressing     Problem: Pain  Goal: Verbalizes/displays adequate comfort level or baseline comfort level  Outcome: Progressing     Problem: Chronic Conditions and Co-morbidities  Goal: Patient's chronic conditions and co-morbidity symptoms are monitored and maintained or improved  Outcome: Progressing     Problem: Skin/Tissue Integrity  Goal: Absence of new skin breakdown  Description: 1. Monitor for areas of redness and/or skin breakdown  2. Assess vascular access sites hourly  3. Every 4-6 hours minimum:  Change oxygen saturation probe site  4. Every 4-6 hours:  If on nasal continuous positive airway pressure, respiratory therapy assess nares and determine need for appliance change or resting period.   Outcome: Progressing

## 2023-07-18 NOTE — PLAN OF CARE
Problem: Safety - Adult  Goal: Free from fall injury  7/18/2023 0357 by Lise Negron RN  Outcome: Progressing  7/17/2023 1747 by Ratna Mendoza RN  Outcome: Progressing     Problem: Neurosensory - Adult  Goal: Achieves stable or improved neurological status  7/18/2023 0357 by Lise Negron RN  Outcome: Progressing  7/17/2023 1747 by Ratna Mendoza RN  Outcome: Progressing  Goal: Absence of seizures  7/18/2023 0357 by Lise Negron RN  Outcome: Progressing  7/17/2023 1747 by Ratna Mendoza RN  Outcome: Progressing  Goal: Achieves maximal functionality and self care  7/18/2023 0357 by Lise Negron RN  Outcome: Progressing  7/17/2023 1747 by Ratna Mendoza RN  Outcome: Progressing     Problem: Cardiovascular - Adult  Goal: Absence of cardiac dysrhythmias or at baseline  7/18/2023 0357 by Lise Negron RN  Outcome: Progressing  7/17/2023 1747 by Ratna Mendoza RN  Outcome: Progressing     Problem: Skin/Tissue Integrity - Adult  Goal: Skin integrity remains intact  7/18/2023 0357 by Lise Negron RN  Outcome: Progressing  7/17/2023 1747 by Ratna Mendoza RN  Outcome: Progressing     Problem: Pain  Goal: Verbalizes/displays adequate comfort level or baseline comfort level  7/18/2023 0357 by Lise Negron RN  Outcome: Progressing  7/17/2023 1747 by Ratna Mendoza RN  Outcome: Progressing     Problem: Chronic Conditions and Co-morbidities  Goal: Patient's chronic conditions and co-morbidity symptoms are monitored and maintained or improved  7/17/2023 1747 by Ratna Mendoza RN  Outcome: Progressing

## 2023-07-18 NOTE — PROGRESS NOTES
Spoke with Hospitalist and Neuro NP about Pt's blood pressure goal. According neuro note it is <180. Will need to restart Oral BP meds during day.     Electronically signed by Victoria Krause RN on 7/18/2023 at 4:02 AM

## 2023-07-18 NOTE — PROGRESS NOTES
\                        Speech Language Pathology  Facility/Department:[facility] ICU  Dysphagia treatment   Speech, language, cognitive Evaluation    Name: Madeline Mendoza  : 1945  MRN: [id number]                                                         Patient Diagnosis(es):   Patient Active Problem List    Diagnosis Date Noted    Severe malnutrition ([address]) 2022    Dirty living conditions 2022    Gastrointestinal hemorrhage     Acute blood loss anemia     Chronic anemia 2022    PVD (peripheral vascular disease) ([address]) 2022    Cellulitis of right lower extremity     Osteomyelitis of right foot (HCC)     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, with bone involvement without evidence of necrosis ([address])     Acute osteomyelitis of right foot ([address]) 2022    Basilar artery occlusion 2023    Cerebrovascular accident (CVA) involving posterior circulation ([address]) 2023    Chronic deep vein thrombosis (DVT) of left peroneal vein ([address]) 2021    Colonoscopy refused 2019    Hammertoe, bilateral 10/10/2018    Acute osteomyelitis of left foot ([address])     Vitamin D deficiency 2017    Pure hypercholesterolemia 2016    Coronary artery disease involving native coronary artery of native heart without angina pectoris 2016    Essential hypertension 2016    Diabetes mellitus ([address]) 2016    PAD (peripheral artery disease) ([address]) 2015    Noncompliance of patient with dietary regimen 2013    Osteoarthritis        Past Medical History:   Diagnosis Date    Atrial fibrillation ([address])     off coumadin after bleed, declined restart    Blood transfusion     CAD (coronary artery disease)     Diabetes mellitus type II     Diabetic neuropathy ([address])     Gastric ulcer     H. pylori infection     Hyperlipidemia     Hypertension     Numbness and tingling of left leg     Osteoarthritis     knees    Poor historian     Primary

## 2023-07-18 NOTE — PROGRESS NOTES
Update 7/21/2023: P2P was approved- can admit today pending medical status. Update 7/20/2023: P2P today at 2:00PM with Dr. Meredith Boewr    Update 0/07/77: Pre-cert was denied. P2P offered and due by 2pm 7/20/23. Number to schedule is [phone]    The [address] Unit   After review, this patient is felt to be:       []  Appropriate for Acute Inpatient Rehab    [x]  Appropriate for Acute Inpatient Rehab Pending Insurance Authorization and medically readiness    []  Not appropriate for Acute Inpatient Rehab    []  Referral received and ARU reviewing patient      Precert initiated 4/31/15 for ARU admission. Pt in agreement per  and CL's conversation with pt this AM. Ref#: [id number]. Will notify CM/SW with further updates.  Thank you for the referral.      Mckenna OROZCO, OTR/L  Clinical Liaison- [facility]   (P): [phone]  (F): [phone]

## 2023-07-18 NOTE — CONSULTS
Consult Note  Physical Medicine and Rehabilitation    Patient: Deb Age  [id number]  Date: 7/18/2023      Chief Complaint: fatigue/weakness    Interval history:  Patient seen at bedside. She is interested in being considered for short term rehab. She lives at home with her son who helps take care of her. She had a walker that she used at home, however reports she may need a new one. She is continuing to experience significant left sided weakness. She is able to raise her arms off the bed for a few moments but not able to keep raised against gravity. History of Present Illness/Hospital Course: The pt is a 66year old lady with a PMHx of Afib, CAD s/p PCI HTN, HLD, and DM who presented to the ED with exhaustion, diaphoresis, and visual changes. Pt's symptoms began at 4:15 PM on 7/16/2023. She had an acute onset of confusion that progressed to L hemiparesis. Her CT head was negative for acute intracranial abnormalities. A CTA of her age-indeterminate basilar tip occlusion with left posterior cerebral and superior cerebellar artery occlusions. The  Stroke team was consulted, NIHSS was 9, the pt was administered TNK 7:37 PM at [facility] ED, and was transferred to [facility] ICU for neurological services. On arrival to the ICU, pt responsive and able to fully participate in writer's assessment and exam. MRI brain displayed an area of diffusion restriction seen involving the superior aspect of the left cerebellar hemisphere consistent with acute ischemia. A MRA displayed Mild narrowing in basilar tip and at origin of the posterior cerebral arteries   Bilaterally with her right greater than left.     Prior Level of Function:  Independent for mobility, ADLs, and IADLs - required some assistance with ADL prior to admission, uses a walker at home    Current Level of Function:  Significant life sided weakness resulting in difficulty with independent ambulation as well as tasks of ADL    Pertinent Social History:  Support: diffusely. MR ANGIO OF THE HEAD WITHOUT CONTRAST      HISTORY: Posterior CVA      Technical factors: Noncontrast MR angio imaging was performed of the brain. FINDINGS:      There is normal anatomy in the region of the [place]. No focal dilatation is identified to suggest aneurysm. There is mild luminal narrowing in the region of the basilar tip and segmental   stenosis seen in the proximal aspect of the posterior cerebral arteries in the   bilaterally without evidence of flow significant stenosis or occlusion   identified. IMPRESSION:      Mild narrowing in basilar tip and at origin of the posterior cerebral arteries   bilaterally, right greater than left. Otherwise unremarkable intracranial MR angio               MRA HEAD WO CONTRAST   Final Result      Area of diffusion restriction seen involving the superior aspect of the left   cerebellar hemisphere consistent with acute ischemia. Age-related atrophy and advanced White matter ischemic changes are noted   diffusely. MR ANGIO OF THE HEAD WITHOUT CONTRAST      HISTORY: Posterior CVA      Technical factors: Noncontrast MR angio imaging was performed of the brain. FINDINGS:      There is normal anatomy in the region of the [place]. No focal dilatation is identified to suggest aneurysm. There is mild luminal narrowing in the region of the basilar tip and segmental   stenosis seen in the proximal aspect of the posterior cerebral arteries in the   bilaterally without evidence of flow significant stenosis or occlusion   identified. IMPRESSION:      Mild narrowing in basilar tip and at origin of the posterior cerebral arteries   bilaterally, right greater than left. Otherwise unremarkable intracranial MR angio                     Assessment:  1. CVA s/p TNK   2. HTN   3. Atrial fibrillation  4.  DM2    Impairments- Decreased functional mobility, Decreased

## 2023-07-18 NOTE — PROGRESS NOTES
V2.0    [facility] Progress Note      Name:  Jud Yadav /Age/Sex: 1945  (66 y.o. female)   MRN & CSN:  [id number] & [id number] Encounter Date/Time: 2023 8:34 AM EDT   Location:  4993/6323-15 PCP: MARVIN Dorman - CNP     Attending:Pa Little, [address] [address] Day: 3    Assessment and Recommendations   Jud Yadav is a 66 y.o. female with pmh of Afib, CAD s/p PCI HTN, HLD, and DM  who presents with Cerebrovascular accident (CVA) involving posterior circulation ([address]) s/p TNK    #Acute CVA  ---Etiology appears embolic 2/2 afib  - s/p TNK  @ ~1930  - Repeat CTH 24 hours post TNK with NO sign of hemorrhage  - FLP and A1c reviewed: A1c at goal 4.8%, LDL elevated at 111  - TTE pending  - Statin  - ASA   - DVT chemoprophylaxis  - neuro checks  - Tele  - PT/OT/SLP  -Cardiology consulted: recommended starting DOAC when okay with neurology  --Discussed with Neuro: plan to start Eliquis 7 days from admission    Chronic Medical Conditions  #Afib - previously on anticoagulation; had lower GI bleed 2022 and from record review off anticoagulation since then  #CAD - ASA and statin  #HTN - Continue home antihypertensives  #HLD - Crestor  #Type II DM, at goal (last A1c 5.7% 2023) - SSI, update A1c as >3 months ago    Diet ADULT DIET; Clear Liquid   DVT Prophylaxis [] Lovenox, []  Heparin, [] SCDs, [] Ambulation,  [] Eliquis, [] Xarelto  [] Coumadin   Code Status Full Code   Disposition From: Home  Expected Disposition: ARU  Estimated Date of Discharge: 2023  Patient requires continued admission due to neuro monitoring   Surrogate Decision Maker/ CHRISTINA  Rekha Terrence, daughter-in-law     Personally reviewed Lab Studies and Imaging       Subjective:     Chief Complaint: Weakness    Jud Yadav is a 66 y.o. female w/ PMHx of Afib, CAD s/p PCI HTN, HLD, and DM who presented on  with acute onset confusion that progressed to L hemiparesis.  Diagnosed with acute CVA of left posterior cerebral and obvious complication. Overlying heart monitor leads. Unchanged elevation of the right hemidiaphragm potentially due to right phrenic nerve palsy. No evident airspace opacity. Diffuse interstitial prominence with indistinct pulmonary vasculature but no definite interlobular septal thickening. No definite findings of pneumothorax or pleural effusion. Normal mediastinal contour. Mildly to moderately prominent hilar and cardiac contours. Atherosclerotic calcification in the aorta. No obvious acute fracture. Joints maintain anatomic alignment. 1. No evident acute findings in the chest. 2. Pulmonary vascular congestion and mild to moderate cardiomegaly. CTA HEAD NECK W CONTRAST    Result Date: 7/16/2023  EXAMINATION: CTA OF THE HEAD AND NECK WITH CONTRAST 7/16/2023 6:29 pm TECHNIQUE: CTA of the head and neck was performed with the administration of intravenous contrast. Multiplanar reformatted images are provided for review. MIP images are provided for review. Stenosis of the internal carotid arteries measured using NASCET criteria. Automated exposure control, iterative reconstruction, and/or weight based adjustment of the mA/kV was utilized to reduce the radiation dose to as low as reasonably achievable. This scan was analyzed using Zulu. ai contact LVO. Identification of suspected findings is not for diagnostic use beyond notification. Viz LVO is limited to analysis of imaging data and should not be used in-lieu of full patient evaluation or relied upon to make or confirm diagnosis. COMPARISON: None HISTORY: ORDERING SYSTEM PROVIDED HISTORY: Bleed versus stroke TECHNOLOGIST PROVIDED HISTORY: Reason for exam:->Bleed versus stroke Has a \"code stroke\" or \"stroke alert\" been called? ->Yes Reason for Exam: dizziness, headache, possible stroke FINDINGS: CTA NECK: AORTIC ARCH/ARCH VESSELS: No dissection or arterial injury. No significant stenosis of the brachiocephalic or subclavian arteries.  CAROTID ARTERIES:

## 2023-07-19 LAB
ANION GAP SERPL CALCULATED.3IONS-SCNC: 8 MMOL/L (ref 3–16)
BACTERIA UR CULT: ABNORMAL
BUN SERPL-MCNC: 14 MG/DL (ref 7–20)
CALCIUM SERPL-MCNC: 9.1 MG/DL (ref 8.3–10.6)
CHLORIDE SERPL-SCNC: 107 MMOL/L (ref 99–110)
CO2 SERPL-SCNC: 24 MMOL/L (ref 21–32)
CREAT SERPL-MCNC: 1 MG/DL (ref 0.6–1.2)
DEPRECATED RDW RBC AUTO: 16.7 % (ref 12.4–15.4)
GFR SERPLBLD CREATININE-BSD FMLA CKD-EPI: 58 ML/MIN/{1.73_M2}
GLUCOSE BLD-MCNC: 115 MG/DL (ref 70–99)
GLUCOSE BLD-MCNC: 125 MG/DL (ref 70–99)
GLUCOSE BLD-MCNC: 126 MG/DL (ref 70–99)
GLUCOSE BLD-MCNC: 140 MG/DL (ref 70–99)
GLUCOSE SERPL-MCNC: 107 MG/DL (ref 70–99)
HCT VFR BLD AUTO: 26.2 % (ref 36–48)
HGB BLD-MCNC: 8.7 G/DL (ref 12–16)
MAGNESIUM SERPL-MCNC: 2.6 MG/DL (ref 1.8–2.4)
MCH RBC QN AUTO: 26.8 PG (ref 26–34)
MCHC RBC AUTO-ENTMCNC: 33.1 G/DL (ref 31–36)
MCV RBC AUTO: 81 FL (ref 80–100)
ORGANISM: ABNORMAL
PERFORMED ON: ABNORMAL
PLATELET # BLD AUTO: 201 K/UL (ref 135–450)
PMV BLD AUTO: 7.3 FL (ref 5–10.5)
POTASSIUM SERPL-SCNC: 4 MMOL/L (ref 3.5–5.1)
RBC # BLD AUTO: 3.24 M/UL (ref 4–5.2)
SODIUM SERPL-SCNC: 139 MMOL/L (ref 136–145)
TSH SERPL DL<=0.005 MIU/L-ACNC: 1.16 UIU/ML (ref 0.27–4.2)
WBC # BLD AUTO: 7.3 K/UL (ref 4–11)

## 2023-07-19 PROCEDURE — 83735 ASSAY OF MAGNESIUM: CPT

## 2023-07-19 PROCEDURE — 97535 SELF CARE MNGMENT TRAINING: CPT

## 2023-07-19 PROCEDURE — 97112 NEUROMUSCULAR REEDUCATION: CPT

## 2023-07-19 PROCEDURE — 6360000002 HC RX W HCPCS: Performed by: NURSE PRACTITIONER

## 2023-07-19 PROCEDURE — 6360000002 HC RX W HCPCS: Performed by: STUDENT IN AN ORGANIZED HEALTH CARE EDUCATION/TRAINING PROGRAM

## 2023-07-19 PROCEDURE — 94150 VITAL CAPACITY TEST: CPT

## 2023-07-19 PROCEDURE — 6360000002 HC RX W HCPCS

## 2023-07-19 PROCEDURE — 97110 THERAPEUTIC EXERCISES: CPT

## 2023-07-19 PROCEDURE — 97530 THERAPEUTIC ACTIVITIES: CPT

## 2023-07-19 PROCEDURE — 36415 COLL VENOUS BLD VENIPUNCTURE: CPT

## 2023-07-19 PROCEDURE — 6370000000 HC RX 637 (ALT 250 FOR IP): Performed by: INTERNAL MEDICINE

## 2023-07-19 PROCEDURE — 85027 COMPLETE CBC AUTOMATED: CPT

## 2023-07-19 PROCEDURE — 6370000000 HC RX 637 (ALT 250 FOR IP): Performed by: STUDENT IN AN ORGANIZED HEALTH CARE EDUCATION/TRAINING PROGRAM

## 2023-07-19 PROCEDURE — 99232 SBSQ HOSP IP/OBS MODERATE 35: CPT

## 2023-07-19 PROCEDURE — 92526 ORAL FUNCTION THERAPY: CPT

## 2023-07-19 PROCEDURE — 80048 BASIC METABOLIC PNL TOTAL CA: CPT

## 2023-07-19 PROCEDURE — 84443 ASSAY THYROID STIM HORMONE: CPT

## 2023-07-19 PROCEDURE — 6370000000 HC RX 637 (ALT 250 FOR IP)

## 2023-07-19 PROCEDURE — 97129 THER IVNTJ 1ST 15 MIN: CPT

## 2023-07-19 PROCEDURE — 2060000000 HC ICU INTERMEDIATE R&B

## 2023-07-19 RX ORDER — HYDRALAZINE HYDROCHLORIDE 20 MG/ML
10 INJECTION INTRAMUSCULAR; INTRAVENOUS EVERY 6 HOURS PRN
Status: DISCONTINUED | OUTPATIENT
Start: 2023-07-19 | End: 2023-07-19

## 2023-07-19 RX ORDER — LABETALOL HYDROCHLORIDE 5 MG/ML
10 INJECTION, SOLUTION INTRAVENOUS EVERY 4 HOURS PRN
Status: DISCONTINUED | OUTPATIENT
Start: 2023-07-19 | End: 2023-07-19

## 2023-07-19 RX ORDER — HYDRALAZINE HYDROCHLORIDE 20 MG/ML
10 INJECTION INTRAMUSCULAR; INTRAVENOUS EVERY 4 HOURS PRN
Status: DISCONTINUED | OUTPATIENT
Start: 2023-07-19 | End: 2023-07-21

## 2023-07-19 RX ORDER — AMLODIPINE BESYLATE 10 MG/1
10 TABLET ORAL DAILY
Status: DISCONTINUED | OUTPATIENT
Start: 2023-07-19 | End: 2023-07-21

## 2023-07-19 RX ORDER — AMLODIPINE BESYLATE 10 MG/1
10 TABLET ORAL DAILY
Status: DISCONTINUED | OUTPATIENT
Start: 2023-07-20 | End: 2023-07-19

## 2023-07-19 RX ADMIN — HYDRALAZINE HYDROCHLORIDE 50 MG: 50 TABLET, FILM COATED ORAL at 05:47

## 2023-07-19 RX ADMIN — HYDRALAZINE HYDROCHLORIDE 10 MG: 20 INJECTION INTRAMUSCULAR; INTRAVENOUS at 21:32

## 2023-07-19 RX ADMIN — ASPIRIN 81 MG: 81 TABLET, COATED ORAL at 09:38

## 2023-07-19 RX ADMIN — ENOXAPARIN SODIUM 40 MG: 100 INJECTION SUBCUTANEOUS at 09:38

## 2023-07-19 RX ADMIN — AMLODIPINE BESYLATE 10 MG: 10 TABLET ORAL at 10:13

## 2023-07-19 RX ADMIN — ROSUVASTATIN CALCIUM 40 MG: 20 TABLET, FILM COATED ORAL at 20:47

## 2023-07-19 RX ADMIN — ONDANSETRON 4 MG: 2 INJECTION INTRAMUSCULAR; INTRAVENOUS at 13:42

## 2023-07-19 RX ADMIN — LISINOPRIL 20 MG: 20 TABLET ORAL at 09:38

## 2023-07-19 RX ADMIN — HYDRALAZINE HYDROCHLORIDE 10 MG: 20 INJECTION INTRAMUSCULAR; INTRAVENOUS at 00:06

## 2023-07-19 ASSESSMENT — PAIN SCALES - GENERAL
PAINLEVEL_OUTOF10: 0

## 2023-07-19 NOTE — PLAN OF CARE
Problem: Safety - Adult  Goal: Free from fall injury  7/19/2023 0132 by Lore Martinez RN  Outcome: Progressing  7/18/2023 1840 by Danilo Coppola RN  Outcome: Progressing     Problem: Neurosensory - Adult  Goal: Achieves stable or improved neurological status  7/19/2023 0132 by Lore Martinez RN  Outcome: Progressing  7/18/2023 1840 by Danilo Coppola RN  Outcome: Progressing  Goal: Absence of seizures  7/19/2023 0132 by Lore Martinez RN  Outcome: Progressing  7/18/2023 1840 by Danilo Coppola RN  Outcome: Progressing  Goal: Achieves maximal functionality and self care  7/19/2023 0132 by Lore Martinez RN  Outcome: Progressing  7/18/2023 1840 by Danilo Coppola RN  Outcome: Progressing     Problem: Cardiovascular - Adult  Goal: Absence of cardiac dysrhythmias or at baseline  7/19/2023 0132 by Lore Martinez RN  Outcome: Progressing  7/18/2023 1840 by Danilo Coppola RN  Outcome: Progressing     Problem: Skin/Tissue Integrity - Adult  Goal: Skin integrity remains intact  7/19/2023 0132 by Lore Martinez RN  Outcome: Progressing  7/18/2023 1840 by Danilo Coppola RN  Outcome: Progressing     Problem: Pain  Goal: Verbalizes/displays adequate comfort level or baseline comfort level  7/19/2023 0132 by Lore Martinez RN  Outcome: Progressing  7/18/2023 1840 by Danilo Coppola RN  Outcome: Progressing     Problem: Chronic Conditions and Co-morbidities  Goal: Patient's chronic conditions and co-morbidity symptoms are monitored and maintained or improved  7/19/2023 0132 by Lore Martinez RN  Outcome: Progressing  7/18/2023 1840 by Danilo Coppola RN  Outcome: Progressing     Problem: Skin/Tissue Integrity  Goal: Absence of new skin breakdown  Description: 1. Monitor for areas of redness and/or skin breakdown  2. Assess vascular access sites hourly  3. Every 4-6 hours minimum:  Change oxygen saturation probe site  4.   Every 4-6 hours:  If on nasal continuous positive airway

## 2023-07-19 NOTE — PROGRESS NOTES
Speech Language Pathology  Facility/Department:[facility] ICU  Dysphagia/Cognitive Treatment    Name: Petros Leone  : 1945  MRN: [id number]                                                         Patient Diagnosis(es):   Patient Active Problem List    Diagnosis Date Noted    Severe malnutrition ([address]) 2022    Dirty living conditions 2022    Gastrointestinal hemorrhage     Acute blood loss anemia     Chronic anemia 2022    PVD (peripheral vascular disease) ([address]) 2022    Cellulitis of right lower extremity     Osteomyelitis of right foot (HCC)     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, with bone involvement without evidence of necrosis ([address])     Acute osteomyelitis of right foot ([address]) 2022    Longstanding persistent atrial fibrillation ([address]) 2023    Basilar artery occlusion 2023    Cerebrovascular accident (CVA) involving posterior circulation ([address]) 2023    Chronic deep vein thrombosis (DVT) of left peroneal vein ([address]) 2021    Colonoscopy refused 2019    Hammertoe, bilateral 10/10/2018    Acute osteomyelitis of left foot ([address])     Vitamin D deficiency 2017    Pure hypercholesterolemia 2016    Coronary artery disease involving native coronary artery of native heart without angina pectoris 2016    Essential hypertension 2016    Diabetes mellitus ([address]) 2016    PAD (peripheral artery disease) ([address]) 2015    Noncompliance of patient with dietary regimen 2013    Osteoarthritis        Past Medical History:   Diagnosis Date    Atrial fibrillation (HCC)     off coumadin after bleed, declined restart    Blood transfusion     CAD (coronary artery disease)     Diabetes mellitus type II     Diabetic neuropathy ([address])     Gastric ulcer     H. pylori infection 2009    Hyperlipidemia     Hypertension     Numbness and tingling of left leg     Osteoarthritis     knees    Poor sized solids with thin liquids. Pt in agreement at this time. Cont. 2- The pt/caregiver will demonstrate understanding of swallowing recommendations and concerns. 7/17-  The pt was educated to purpose of the visit, anatomy and physiology of the swallow, concerns for aspiration, swallowing strategies, diet recommendations and possibility of being made NPO if s/s aspiration emerge. The pt stated comprehension. con't goal  7/18-  pt educated to the purpose of the visit, swallowing strategies, rational for clear liquid diet and plan to advance diet once nausea subside. Pt indicated comprehension. con't goal   7/19: Pt provided with explanation of rationale for SLP follow up and strategies to implement for safety of swallow function. Adequate comprehension and agreement. Cont. Speech Goals: The pt will be seen  2-4 x wk to address the following goals:   1- the pt will participate in full speech, language, cognitive assessment   7/18-goal met. New goals-  1- the pt will perform graded problem solving tasks with 70% accuracy with min cues  7/19: Pt demonstrated appropriate insight/response to 9-1-1 and provided accurate responses to rationale for why someone may need to call. Reduced insight/safety awareness in regards to questions related to getting up by self/safety. Pt benefited from min-mod cues to determine rec's for assistance with physical mobility at this time due to L side weakness/\"shaking\"-per pt. When provided with basic math/medication question, pt answered with ~75% accuracy given min-mod cues. Pt did express \"I'm in charge of taking my 4 pills every morning, and my son does all my finances/pays the bills\". Pt did also state son (who pt lives with), would be able to assist as needed at [place]. Cont. 2- the pt will recall targeted information with a 5 minute delay with mod cues  7/19: Pt with adequate recall of previous SLP session yesterday, regarding recall delay of x5 words.  Pt with adequate

## 2023-07-19 NOTE — PROGRESS NOTES
Occupational Therapy  Daily Treatment Note  Patient Name: Bridgette Mancia  MRN: [id number]    Chart Reviewed: Yes       Other position/activity restrictions: NPO; no activity restrictions noted     Discharge Recommendations: Bridgette Mancia scored a 10/24 on the AM-PAC ADL Inpatient form. Current research shows that an AM-PAC score of 17 or less is typically not associated with a discharge to the patient's home setting. Based on the patient's AM-PAC score and their current ADL deficits, it is recommended that the patient have 5-7 sessions per week of Occupational Therapy at d/c to increase the patient's independence. At this time, this patient demonstrates complex nursing, medical, and rehabilitative needs, and would benefit from intensive rehabilitation services upon discharge from the Inpatient setting. This patient demonstrates the ability to participate in and benefit from an intensive therapy program with a coordinated interdisciplinary team approach to foster frequent, structured, and documented communication among disciplines, who will work together to establish, prioritize, and achieve treatment goals. Please see assessment section for further patient specific details. If patient discharges prior to next session this note will serve as a discharge summary. Please see below for the latest assessment towards goals. Additional Pertinent Hx: Pt is 65 yo F admitted on 7/16/23 with CVA, s/p TNK. H/o Afib, CAD s/p PCI HTN, HLD, and DM. Diagnosis: CVA  Treatment Diagnosis: Impaired ADLs, mobility, motor control s/p stroke    Subjective: Pt semi supine in bed upon arrival, agreeable to OT session with encouragement. \"Every time I try to eat I throw up\"    Pain: reported unrated R shoulder pain    Objective:    Cognition/Orientation:  Pt required cueing for sequencing during functional tasks and cueing to initiate most ADLs.  Pt required positive encouragement to participate in therapy- pt encouraged to

## 2023-07-19 NOTE — PLAN OF CARE
Problem: Safety - Adult  Goal: Free from fall injury  7/19/2023 1116 by Henry Cerda RN  Outcome: Progressing  [address] (Taken 7/16/2023 2045 by Grace Rosales RN)  Free From Fall Injury: Instruct family/caregiver on patient safety  7/19/2023 0132 by Aryan Camacho RN  Outcome: Progressing     Problem: Chronic Conditions and Co-morbidities  Goal: Patient's chronic conditions and co-morbidity symptoms are monitored and maintained or improved  7/19/2023 1116 by Henry Cerda RN  Outcome: Progressing  Flowsheets (Taken 7/19/2023 1116)  Care Plan - Patient's Chronic Conditions and Co-Morbidity Symptoms are Monitored and Maintained or Improved: Monitor and assess patient's chronic conditions and comorbid symptoms for stability, deterioration, or improvement  7/19/2023 0132 by Aryan Camacho RN  Outcome: Progressing     Problem: Skin/Tissue Integrity  Goal: Absence of new skin breakdown  Description: 1. Monitor for areas of redness and/or skin breakdown  2. Assess vascular access sites hourly  3. Every 4-6 hours minimum:  Change oxygen saturation probe site  4. Every 4-6 hours:  If on nasal continuous positive airway pressure, respiratory therapy assess nares and determine need for appliance change or resting period. 7/19/2023 1116 by Henry Cerda RN  Outcome: Progressing  Note: Absence of new skin breakdown. Patient turned routinely with pillow support. Heels and arms elevated off of bed. Skin assessed. Prophylactic sacral heart in place. Patient on special air mattress. Patient educated on risks associated with prolonged bed rest. Patient checked routinely for incontinence, cleansed thoroughly in its presence. Lines off loaded from skin. Will continue to monitor and reassess.      7/19/2023 0132 by Aryan Camacho RN  Outcome: Progressing

## 2023-07-19 NOTE — CARE COORDINATION
Case management is following for discharge disposition. The chart was reviewed. Ms. Mario Petersen has been accepted by Dr. Mackey Bachelor to go to [facility] pre-cert was initiated 4/70.     Nova Bauman RN  Case Management  [phone]

## 2023-07-19 NOTE — RESULT ENCOUNTER NOTE
Culture reviewed, patient transferred to [facility] on 7-16. It does not appear she has been on antibiotics based on notes, but urine culture grew pan sensitive E coli, please inform provider at [address] [place] [address] Dr Unit that she will need UTI treatment if they had not seen this result already.

## 2023-07-19 NOTE — PROGRESS NOTES
NEUROLOGY / NEUROCRITICAL CARE PROGRESS NOTE       Patient Name: Delia Anand YOB: 1945   Sex: Female Age: 66 yrs     CC / Reason for Consult: CVA    Interval Hx / Changes over last 24 hours:   -Echo completed yesterday  -Cardiology consulted and saw patient   -Plan to start [address] on 0/39  -Precert started for ARU here at [facility]  -Not as dizzy or nauseous today, more awake and alert    ROS: left upper and lower extremity weakness. Says her left arm and leg feel dizzy. No headache. Still intermittently nauseous. Decreased appetite. HISTORY   Admission HPI:   Delia Anand is a 66 y.o. y/o female with history significant for A-fib (not on anticoagulation), CAD - with cardiac stent placement, DM-II, HLD, HTN presents to the ED via EMS yesterday afternoon with sudden onset of exhaustion, dizziness, nausea, diaphoretic confusion and left hemiparesis. LKN 16:15. On arrival to the ED patient was taken to CT scan NIH 9. CT head shows no acute intracranial abnormality, and CTA concerning for age indeterminate basilar tip occlusion with left posterior cerebral and superior cerebellar artery occlusion. Stroke team was consulted and evaluated patient. Patient was given TNK at 19:37. There was also concern in the ED with the patient being diaphoretic that this could be a cardiac event. EKG showed some ST depression in V5 and V6 with elevated troponin. She was transferred to [facility] for further evaluation and post TNK management. MRI with acute stroke in the left cerebellum.      PMH Past Medical History:   Diagnosis Date    Atrial fibrillation ([address])     off coumadin after bleed, declined restart    Blood transfusion     CAD (coronary artery disease)     Diabetes mellitus type II     Diabetic neuropathy ([address]) 2011    Gastric ulcer     H. pylori infection 2009    Hyperlipidemia     Hypertension     Numbness and tingling of left leg     Osteoarthritis     knees    Poor historian     Primary

## 2023-07-19 NOTE — PROGRESS NOTES
Pressures consistently >160 throughout the night. PRN hydralazine not very effective with BP management. Hospitalists notified.

## 2023-07-19 NOTE — PROGRESS NOTES
Patients SBP remains in the 160-170s after oral Norvasc given. Messaged Dr. Carmen Robertson due to having no PRN on board after Hydralazine was D/C'd. He said she is good in that range, so I will continue to monitor and message back if gets greater than 180.

## 2023-07-19 NOTE — PROGRESS NOTES
V2.0    [facility] Progress Note      Name:  Pedro Chavez /Age/Sex: 1945  (66 y.o. female)   MRN & CSN:  [id number] & [id number] Encounter Date/Time: 2023 5:40 PM EDT   Location:  [address] [place] [address] PCP: Tootie Carnes, APRN - CNP     Salud Hernandez MD       Hospital Day: 4    Assessment and Recommendations   Pedro Chavez is a 66 y.o. female with pmh of Afib, CAD s/p PCI HTN, HLD, and DM  who presents with Cerebrovascular accident (CVA) involving posterior circulation ([address]) s/p TNK - today she is lethargic and fatigued, ? Related to hydralazine      #Acute CVA  ---Etiology appears embolic 2/2 afib  - s/p TNK  @ ~1930  - Repeat CTH 24 hours post TNK with NO sign of hemorrhage  - FLP and A1c reviewed: A1c at goal 4.8%, LDL elevated at 111  - TTE pending  - Statin  - ASA   - DVT chemoprophylaxis  - neuro checks  - Tele  - PT/OT/SLP  -Cardiology consulted: recommended starting DOAC when okay with neurology  --Discussed with Neuro: plan to start Eliquis 7 days from admission     Chronic Medical Conditions  #Afib - previously on anticoagulation; had lower GI bleed 2022 and from record review off anticoagulation since then  #CAD - ASA and statin  #HTN - Continue home antihypertensives  #HLD - Crestor  #Type II DM, at goal (last A1c 5.7% 2023) - [facility], update A1c as >3 months ago  Plan:   Plan to go to acute rehab from here I will do a peer to peer. Patients bp needs to be addressed with home medication I think the hydralazine is making her feel ill, she had been on norvasc; will adjust an dmonitor  Discussed with nurse and social work      Diet ADULT DIET;  Dysphagia - Soft and Bite Sized  ADULT ORAL NUTRITION SUPPLEMENT; Breakfast, Lunch, Dinner; Standard High Calorie/High Protein Oral Supplement   DVT Prophylaxis [] Lovenox, []  Heparin, [] SCDs, [] Ambulation,  [] Eliquis, [] Xarelto  [] Coumadin   Code Status Full Code   Disposition    Surrogate Decision Maker/ POA       Personally reviewed Lab portion of the orbits demonstrate no acute abnormality. SINUSES: The visualized paranasal sinuses and mastoid air cells demonstrate no acute abnormality. SOFT TISSUES/SKULL:  No acute abnormality of the visualized skull or soft tissues. No acute intracranial abnormality. Moderate senescent changes with parenchymal volume loss and chronic small vessel ischemic changes. Communications: The above  time-sensitive findings were communicated personally by Dr. Victorino Cordero to Dr. Akash Diallo at 3 6:35 p.m. on 07/16/2023     MRA HEAD WO CONTRAST    Result Date: 7/16/2023  MRI OF THE BRAIN WITHOUT CONTRAST HISTORY: CVA COMPARISON: None TECHNICAL FACTORS: Noncontrast MR imaging was performed with T1 sagittal and axial, T2, FLAIR, and diffusion weighted imaging performed. FINDINGS: The ventricles and cortical sulci are prominent consistent with age-related atrophy. . No evidence of acute hemorrhage or extra-axial fluid collection is seen. There is a small focus of diffusion restriction seen within the superior aspect of the left cerebellar hemisphere. This is suspicious for a area of acute ischemia. There is extensive diffuse white matter signal abnormality suggesting chronic small vessel ischemic myelopathy. This is moderately advanced in extent. No abnormal masses are identified. Midline structures appear normal. Vascular structures are discussed below. Visualized portions of the paranasal sinuses appear clear. Area of diffusion restriction seen involving the superior aspect of the left cerebellar hemisphere consistent with acute ischemia. Age-related atrophy and advanced White matter ischemic changes are noted diffusely. MR ANGIO OF THE HEAD WITHOUT CONTRAST HISTORY: Posterior CVA Technical factors: Noncontrast MR angio imaging was performed of the brain. FINDINGS: There is normal anatomy in the region of the [place]. No focal dilatation is identified to suggest aneurysm.  There is mild luminal narrowing in

## 2023-07-19 NOTE — CARE COORDINATION
[employer] pre-cert denied acute rehab. P2P offered and due by 2pm 7/20/23. Number to schedule is [phone]. Spoke with the the , Marshal Ahuja. Dr. Viji Benedict is the physician from [facility] who will call to complete the P2P. Dr. Viji Benedict will call Dr. Aicha Cerda tomorrow 7/20/2023 2p-230p. Dr. Aicha Cerda was updated of the window to expect a call.     Carter Carrel, RN  Case Management  [phone]

## 2023-07-19 NOTE — PROGRESS NOTES
Physical Therapy  Daily Treatment Note    Discharge Recommendations: Wells Soulier scored a 9/24 on the AM-PAC short mobility form. Current research shows that an AM-PAC score of 17 or less is typically not associated with a discharge to the patient's home setting. Based on the patient's AM-PAC score and their current functional mobility deficits, it is recommended that the patient have 5-7 sessions per week of Physical Therapy at d/c to increase the patient's independence. At this time, this patient demonstrates complex nursing, medical, and rehabilitative needs, and would benefit from intensive rehabilitation services upon discharge from the Inpatient setting. This patient demonstrates the ability to participate in and benefit from an intensive therapy program with a coordinated interdisciplinary team approach to foster frequent, structured, and documented communication among disciplines, who will work together to establish, prioritize, and achieve treatment goals. Please see assessment section for further patient specific details. Assessment:  Pt needing some encouragement to participate due to anxiety and c/o nausea during session, but good participation overall. Pt needing assist x 2 for transfers with walker. Continues to demonstrate intermittent shaking with activity. Pt self-limiting at times, but responds well to encouragement. From home with family. She has stairs to enter her trailer. Pt is currently functioning well below her baseline s/p CVA. Would benefit from continued IP PT at D/C to maximize strength and function prior to returning home. Equipment Needs: Defer to next level of care    Chart Reviewed: Yes     Other position/activity restrictions: NPO; no activity restrictions noted   Additional Pertinent Hx: Pt is 67 yo F admitted on 7/16/23 with CVA, s/p TNK. H/o Afib, CAD s/p PCI HTN, HLD, and DM.       Diagnosis: CVA   Treatment Diagnosis: Decreased functional mobility    Subjective: Pt

## 2023-07-20 LAB
ANION GAP SERPL CALCULATED.3IONS-SCNC: 13 MMOL/L (ref 3–16)
BUN SERPL-MCNC: 15 MG/DL (ref 7–20)
CALCIUM SERPL-MCNC: 9.3 MG/DL (ref 8.3–10.6)
CHLORIDE SERPL-SCNC: 104 MMOL/L (ref 99–110)
CO2 SERPL-SCNC: 22 MMOL/L (ref 21–32)
CREAT SERPL-MCNC: 0.9 MG/DL (ref 0.6–1.2)
GFR SERPLBLD CREATININE-BSD FMLA CKD-EPI: >60 ML/MIN/{1.73_M2}
GLUCOSE BLD-MCNC: 103 MG/DL (ref 70–99)
GLUCOSE BLD-MCNC: 111 MG/DL (ref 70–99)
GLUCOSE BLD-MCNC: 145 MG/DL (ref 70–99)
GLUCOSE BLD-MCNC: 171 MG/DL (ref 70–99)
GLUCOSE SERPL-MCNC: 98 MG/DL (ref 70–99)
PERFORMED ON: ABNORMAL
POTASSIUM SERPL-SCNC: 3.9 MMOL/L (ref 3.5–5.1)
SODIUM SERPL-SCNC: 139 MMOL/L (ref 136–145)

## 2023-07-20 PROCEDURE — 97129 THER IVNTJ 1ST 15 MIN: CPT

## 2023-07-20 PROCEDURE — 6360000002 HC RX W HCPCS: Performed by: STUDENT IN AN ORGANIZED HEALTH CARE EDUCATION/TRAINING PROGRAM

## 2023-07-20 PROCEDURE — 6370000000 HC RX 637 (ALT 250 FOR IP): Performed by: INTERNAL MEDICINE

## 2023-07-20 PROCEDURE — 6360000002 HC RX W HCPCS: Performed by: NURSE PRACTITIONER

## 2023-07-20 PROCEDURE — 6370000000 HC RX 637 (ALT 250 FOR IP)

## 2023-07-20 PROCEDURE — 36415 COLL VENOUS BLD VENIPUNCTURE: CPT

## 2023-07-20 PROCEDURE — 2060000000 HC ICU INTERMEDIATE R&B

## 2023-07-20 PROCEDURE — 80048 BASIC METABOLIC PNL TOTAL CA: CPT

## 2023-07-20 PROCEDURE — 6370000000 HC RX 637 (ALT 250 FOR IP): Performed by: STUDENT IN AN ORGANIZED HEALTH CARE EDUCATION/TRAINING PROGRAM

## 2023-07-20 PROCEDURE — 92526 ORAL FUNCTION THERAPY: CPT

## 2023-07-20 PROCEDURE — 6360000002 HC RX W HCPCS

## 2023-07-20 RX ADMIN — PROCHLORPERAZINE EDISYLATE 10 MG: 5 INJECTION, SOLUTION INTRAMUSCULAR; INTRAVENOUS at 09:58

## 2023-07-20 RX ADMIN — LISINOPRIL 20 MG: 20 TABLET ORAL at 07:36

## 2023-07-20 RX ADMIN — ENOXAPARIN SODIUM 40 MG: 100 INJECTION SUBCUTANEOUS at 07:36

## 2023-07-20 RX ADMIN — HYDRALAZINE HYDROCHLORIDE 10 MG: 20 INJECTION INTRAMUSCULAR; INTRAVENOUS at 13:07

## 2023-07-20 RX ADMIN — ASPIRIN 81 MG: 81 TABLET, COATED ORAL at 07:36

## 2023-07-20 RX ADMIN — AMLODIPINE BESYLATE 10 MG: 10 TABLET ORAL at 07:36

## 2023-07-20 RX ADMIN — ROSUVASTATIN CALCIUM 40 MG: 20 TABLET, FILM COATED ORAL at 20:34

## 2023-07-20 RX ADMIN — HYDRALAZINE HYDROCHLORIDE 10 MG: 20 INJECTION INTRAMUSCULAR; INTRAVENOUS at 20:34

## 2023-07-20 RX ADMIN — HYDRALAZINE HYDROCHLORIDE 10 MG: 20 INJECTION INTRAMUSCULAR; INTRAVENOUS at 08:14

## 2023-07-20 RX ADMIN — HYDRALAZINE HYDROCHLORIDE 10 MG: 20 INJECTION INTRAMUSCULAR; INTRAVENOUS at 02:22

## 2023-07-20 ASSESSMENT — PAIN SCALES - GENERAL
PAINLEVEL_OUTOF10: 0

## 2023-07-20 NOTE — PROGRESS NOTES
Hydralazine and labetalol PRN ordered per hospitalist.  Discussed with ICU resident team, will give hydralazine for BP relief. Spoke with sisters esophageal lazara Menchacaen Naye pain mgmt waiting to see her    Plastics for her breast reduction  Stretching exercises    See karlene/lalita for lenses    setp hot flashes started 3-5/week up to 3 hours        Over the last 2 weeks, how often have you been bothered by the following problems?          PHQ2 Score: 2  PHQ2 Score Interpretation: No further screening needed  1. Little interest or pleasure in activity?: 1  2. Feeling down, depressed, or hopeless?: 1       62-year-old female calls and is seen via video at [facility] in [place] primarily because of her sisters recurrent variceal bleeding.  She is hospitalized again and had the varices banded.  She received 4 units of blood.  Her sister's an alcoholic.  We have discussed this length.    She continues to have significant joint pain.  She is waiting to see pain management doctor German Yancey.  She plans to wait until the pandemic has improved prior to going to see him.    She also continues to have problems with her eyes.  She will be seen by Optometry-Dr. Foley or Andree at [place] to try to her obtain a pair of glasses suitable for her eye condition.    She also reports she is having hot flashes.  She has had a menstrual cycle for more than 5 years.  She states that they started just last week.  He is having hot flashes lasting for several hours 3-5 times per week.  There has been no medication change.  No jitteriness, shakes or diarrhea.  No new supplements.    She also plans to see plastics regarding back pain.  We discussed her breast reduction.  Plastic surgery consult was placed.    PHYSICAL EXAM:  No vitals were obtained as this is a video visit.  Patient does not have access to a blood pressure cuff or scale.  GENERAL: on the video the patient is alert, cognitive function in tact, speech clear, rapid and fluid and without respiratory distress    ASSESSMENT/PLAN:  1)  Stress due to family illness  2) hot flashes-because of the  recurrence and severity TSH will be obtained to rule out hyperthyroidism as a component.  3)  Back pain - breast  Reduction will be evaluated for

## 2023-07-20 NOTE — PROGRESS NOTES
Perfectserved hospitalist the following message for her BP control. \"Elevated /52, 182/72. Need PRN BP medication order for worsening HTN. Hydralazine discontinued today d/t concern of her lethargy. Labetolol is not ordered because of sinus ferdinand. Pt is here for CVA, permissive BP <160-170 per care team.\"    Pt is lethargic, but easily arousable. Oriented x4, able to communicate and answer questions properly, follows commands, move all extremities, NIHSS 0, some weakness of left arm and leg, full sensation. Consumed two cups of ice cream and several bites of mashed potato.

## 2023-07-20 NOTE — PROGRESS NOTES
Vascular structures are discussed below. Visualized portions of the paranasal sinuses appear clear. Area of diffusion restriction seen involving the superior aspect of the left cerebellar hemisphere consistent with acute ischemia. Age-related atrophy and advanced White matter ischemic changes are noted diffusely. MR ANGIO OF THE HEAD WITHOUT CONTRAST HISTORY: Posterior CVA Technical factors: Noncontrast MR angio imaging was performed of the brain. FINDINGS: There is normal anatomy in the region of the [place]. No focal dilatation is identified to suggest aneurysm. There is mild luminal narrowing in the region of the basilar tip and segmental stenosis seen in the proximal aspect of the posterior cerebral arteries in the bilaterally without evidence of flow significant stenosis or occlusion identified. IMPRESSION: Mild narrowing in basilar tip and at origin of the posterior cerebral arteries bilaterally, right greater than left. Otherwise unremarkable intracranial MR angio     XR CHEST PORTABLE    Result Date: 7/16/2023  EXAMINATION: ONE XRAY VIEW OF THE CHEST 7/16/2023 6:14 pm COMPARISON: Chest radiograph 09/04/2022 HISTORY: ORDERING SYSTEM PROVIDED HISTORY: PAIN TECHNOLOGIST PROVIDED HISTORY: Reason for exam:->PAIN Reason for Exam: dizziness FINDINGS: Changes of right shoulder arthroplasty with no obvious complication. Overlying heart monitor leads. Unchanged elevation of the right hemidiaphragm potentially due to right phrenic nerve palsy. No evident airspace opacity. Diffuse interstitial prominence with indistinct pulmonary vasculature but no definite interlobular septal thickening. No definite findings of pneumothorax or pleural effusion. Normal mediastinal contour. Mildly to moderately prominent hilar and cardiac contours. Atherosclerotic calcification in the aorta. No obvious acute fracture. Joints maintain anatomic alignment.      1. No evident acute findings in the chest. 2. Pulmonary filling defect in the basilar tip which causes severe stenosis of the right P1 segment and occludes the left P1 segment. The left posterior cerebral artery immediately reconstitutes. The left superior cerebellar artery is also occluded. The right superior cerebral artery is patent. The posteroinferior cerebral arteries are patent. OTHER: No dural venous sinus thrombosis on this non-dedicated study. BRAIN: Findings are separately reported. Age-indeterminate basilar tip occlusion with left posterior cerebral and superior cerebellar artery occlusions. Right internal carotid artery proximal 80% or worse stenosis. Critical results were called by Dr. Ever Jay MD to Chayito Mosher on 7/16/2023 at 18:56. MRI BRAIN WO CONTRAST    Result Date: 7/16/2023  MRI OF THE BRAIN WITHOUT CONTRAST HISTORY: CVA COMPARISON: None TECHNICAL FACTORS: Noncontrast MR imaging was performed with T1 sagittal and axial, T2, FLAIR, and diffusion weighted imaging performed. FINDINGS: The ventricles and cortical sulci are prominent consistent with age-related atrophy. . No evidence of acute hemorrhage or extra-axial fluid collection is seen. There is a small focus of diffusion restriction seen within the superior aspect of the left cerebellar hemisphere. This is suspicious for a area of acute ischemia. There is extensive diffuse white matter signal abnormality suggesting chronic small vessel ischemic myelopathy. This is moderately advanced in extent. No abnormal masses are identified. Midline structures appear normal. Vascular structures are discussed below. Visualized portions of the paranasal sinuses appear clear. Area of diffusion restriction seen involving the superior aspect of the left cerebellar hemisphere consistent with acute ischemia. Age-related atrophy and advanced White matter ischemic changes are noted diffusely.  MR ANGIO OF THE HEAD WITHOUT CONTRAST HISTORY: Posterior CVA Technical factors: Noncontrast MR angio

## 2023-07-20 NOTE — CARE COORDINATION
P2P scheduled today at 2:00-2:30 pm. CM will follow conclusions of P2P. Electronically signed by Reece Sheppard RN on 7/20/2023 at 10:20 AM    Addendum:   Dr. Aicha Cerda stated that he called back the P2P line and left his number and no one has returned his call. This CM Diana in [facility]. CM will continue to follow patient until discharge. Electronically signed by Reece Sheppard RN on 7/20/2023 at 3:44 PM    Addendum:   Dr. Aicha Cerda has P2P tomorrow (rescheduled with insurance medical director) re-schedule: tomorrow at [phone]. CM will continue to follow patient until discharge.   Electronically signed by Reece Sheppard RN on 7/20/2023 at 6:21 PM

## 2023-07-20 NOTE — PROGRESS NOTES
Progress Note  Physical Medicine and Rehabilitation    Patient: Radha Carrasquillo  [id number]  Date: 7/20/2023      Chief Complaint: fatigue/weakness    Interval history:  Patient seen at bedside. She is somnolent with eyes closed, responding appropriately to questions. Hypertension remains an issue and she received 10 mg of IV hydralazine at 0200 and 0800. Continues to have nausea. History of Present Illness/Hospital Course: The pt is a 66year old lady with a PMHx of Afib, CAD s/p PCI HTN, HLD, and DM who presented to the ED with exhaustion, diaphoresis, and visual changes. Pt's symptoms began at 4:15 PM on 7/16/2023. She had an acute onset of confusion that progressed to L hemiparesis. Her CT head was negative for acute intracranial abnormalities. A CTA of her age-indeterminate basilar tip occlusion with left posterior cerebral and superior cerebellar artery occlusions. The  Stroke team was consulted, NIHSS was 9, the pt was administered TNK 7:37 PM at OSH ED, and was transferred to [facility] ICU for neurological services. On arrival to the ICU, pt responsive and able to fully participate in writer's assessment and exam. MRI brain displayed an area of diffusion restriction seen involving the superior aspect of the left cerebellar hemisphere consistent with acute ischemia. A MRA displayed Mild narrowing in basilar tip and at origin of the posterior cerebral arteries   Bilaterally with her right greater than left.         Past Medical History:   Diagnosis Date    Atrial fibrillation ([address])     off coumadin after bleed, declined restart    Blood transfusion     CAD (coronary artery disease)     Diabetes mellitus type II     Diabetic neuropathy ([address]) 2011    Gastric ulcer     H. pylori infection 2009    Hyperlipidemia     Hypertension     Numbness and tingling of left leg     Osteoarthritis     knees    Poor historian     Primary hypertension     PVD (peripheral vascular disease) ([address])     PTA distal sfa/pop/peroneal, Dr. Velia Glass 12/2015    Unspecified cerebral artery occlusion with cerebral infarction     TIA affected left eye    upper gi bleed 12/2009       Past Surgical History:   Procedure Laterality Date    ANGIOPLASTY  12/30/15    Dr. Velia Glass, E, PTA of distal sfa/pop/peroneal    CARDIAC CATHETERIZATION  9/21/12    done for surgical clearance, cath was negative    COLONOSCOPY  06/09/2022    COLONOSCOPY N/A 6/9/2022    COLONOSCOPY CONTROL HEMORRHAGE performed by Red Mckeon MD at [place] [address],[facility]    FOOT SURGERY Left 01/11/2018    DEBRIDEMENT OF ULCERS LEFT FOOT AND LEG WITH GRAFT    OTHER SURGICAL HISTORY Left 06/05/2017    Left Femoral Peroneal Bypass    SHOULDER ARTHROPLASTY  10/5/12    RIGHT SHOULDER TOTAL ARTHROPLASTY, BICEPS TENODESIS DEPUY, GLOBAL ADVANTAGE AP    TOE AMPUTATION Right 5/23/2022    AMPUTATION OF RIGHT FIRST AND FOURTH TOES performed by Blanca Campos DPM at [address] [facility] [address] Right 9/9/2022    TRANSMETATARSAL AMPUTATION RIGHT FOOT performed by Blanca Campos DPM at [address] N/A 6/8/2022    EGD IV SEDATION performed by Red Mckeon MD at [facility] History   Problem Relation Age of Onset    Heart Disease Mother     Stroke Mother     Heart Disease Father     Diabetes Sister     Diabetes Brother     Diabetes Maternal Grandmother        Social History     Socioeconomic History    Marital status:    Tobacco Use    Smoking status: Never    Smokeless tobacco: Never    Tobacco comments:     Not needed   Vaping Use    Vaping Use: Never used   Substance and Sexual Activity    Alcohol use: No    Drug use: No    Sexual activity: Yes     Partners: Male     Social Determinants of Health     Physical Activity: Inactive    Days of Exercise per Week: 0 days    Minutes of Exercise per Session: 0 min           REVIEW OF SYSTEMS:

## 2023-07-20 NOTE — PROGRESS NOTES
Speech Language Pathology  Facility/Department:[facility] ICU  Dysphagia treatment/discharge  Cognitive Treatment    Name: Dawit Verma  : 1945  MRN: [id number]                                                         Patient Diagnosis(es):   Patient Active Problem List    Diagnosis Date Noted    Severe malnutrition ([address]) 2022    Dirty living conditions 2022    Gastrointestinal hemorrhage     Acute blood loss anemia     Chronic anemia 2022    PVD (peripheral vascular disease) ([address]) 2022    Cellulitis of right lower extremity     Osteomyelitis of right foot (HCC)     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, with bone involvement without evidence of necrosis ([address])     Acute osteomyelitis of right foot ([address]) 2022    Longstanding persistent atrial fibrillation ([address]) 2023    Basilar artery occlusion 2023    Cerebrovascular accident (CVA) involving posterior circulation ([address]) 2023    Chronic deep vein thrombosis (DVT) of left peroneal vein ([address]) 2021    Colonoscopy refused 2019    Hammertoe, bilateral 10/10/2018    Acute osteomyelitis of left foot ([address])     Vitamin D deficiency 2017    Pure hypercholesterolemia 2016    Coronary artery disease involving native coronary artery of native heart without angina pectoris 2016    Essential hypertension 2016    Diabetes mellitus ([address]) 2016    PAD (peripheral artery disease) ([address]) 2015    Noncompliance of patient with dietary regimen 2013    Osteoarthritis        Past Medical History:   Diagnosis Date    Atrial fibrillation ([address])     off coumadin after bleed, declined restart    Blood transfusion     CAD (coronary artery disease)     Diabetes mellitus type II     Diabetic neuropathy ([address])     Gastric ulcer     H. pylori infection     Hyperlipidemia     Hypertension     Numbness and tingling of left leg     Osteoarthritis accurate responses to rationale for why someone may need to call. Reduced insight/safety awareness in regards to questions related to getting up by self/safety. Pt benefited from min-mod cues to determine rec's for assistance with physical mobility at this time due to L side weakness/\"shaking\"-per pt. When provided with basic math/medication question, pt answered with ~75% accuracy given min-mod cues. Pt did express \"I'm in charge of taking my 4 pills every morning, and my son does all my finances/pays the bills\". Pt did also state son (who pt lives with), would be able to assist as needed at NH. Cont. 7/20- pt able to state similarities and differences between 2 objects with 100% accuracy. Pt able to solve problems relating to everyday situations with 70% accuracy- pt required moderate cues to provide additional information. Con't goal     2- the pt will recall targeted information with a 5 minute delay with mod cues  7/19: Pt with adequate recall of previous SLP session yesterday, regarding recall delay of x5 words. Pt with adequate orientation/recall of etiology for hospitalization. Cont. 7/20-  pt was able to recall 2 of 3 words targeted on Tuesday but was then unable to recall the 3 words targeted this session with a 5 minute delay. Con't goal     3-the pt will recall 2 strategies to aid with memory  7/19: Not directly targeted this session. 7/20-  reviewed strategies to aid with recall with hand out for future reference. Pt unable to state any strategies following review. Reviewed strategies again, but pt still unable to restate any strategies. Con't goal     4-the pt will participate in ongoing assessment   7/19: Pt provided with reading material. No difficulty reading L-R. No words omitted and no perseverations etc. Cont. 7/20- did not address as session discontinued due to nausea .  Con't goal     Education  See above     Total treatment time: 10 minutes dysphagia; 12 minutes cognitive     Plan:

## 2023-07-20 NOTE — PLAN OF CARE
Problem: Safety - Adult  Goal: Free from fall injury  Outcome: Progressing  Flowsheets (Taken 7/19/2023 2000 by Maya Ann RN)  Free From Fall Injury: Instruct family/caregiver on patient safety     Problem: Neurosensory - Adult  Goal: Achieves stable or improved neurological status  Outcome: Progressing  Flowsheets (Taken 7/19/2023 1958 by Maya Ann RN)  Achieves stable or improved neurological status:   Assess for and report changes in neurological status   Monitor temperature, glucose, and sodium.  Initiate appropriate interventions as ordered     Problem: ABCDS Injury Assessment  Goal: Absence of physical injury  Outcome: Progressing  Flowsheets (Taken 7/20/2023 1035)  Absence of Physical Injury: Implement safety measures based on patient assessment

## 2023-07-21 ENCOUNTER — HOSPITAL ENCOUNTER (INPATIENT)
Age: 78
LOS: 6 days | Discharge: ANOTHER HOSPITAL | DRG: 057 | End: 2023-07-27
Attending: PHYSICAL MEDICINE & REHABILITATION | Admitting: PHYSICAL MEDICINE & REHABILITATION
Payer: MEDICARE

## 2023-07-21 ENCOUNTER — TELEPHONE (OUTPATIENT)
Dept: FAMILY MEDICINE CLINIC | Age: 78
End: 2023-07-21

## 2023-07-21 VITALS
BODY MASS INDEX: 21.95 KG/M2 | SYSTOLIC BLOOD PRESSURE: 141 MMHG | RESPIRATION RATE: 15 BRPM | HEART RATE: 53 BPM | TEMPERATURE: 98.9 F | WEIGHT: 123.9 LBS | OXYGEN SATURATION: 96 % | DIASTOLIC BLOOD PRESSURE: 52 MMHG

## 2023-07-21 PROBLEM — I61.9 CVA (CEREBROVASCULAR ACCIDENT DUE TO INTRACEREBRAL HEMORRHAGE) (HCC): Status: ACTIVE | Noted: 2023-07-21

## 2023-07-21 LAB
ANION GAP SERPL CALCULATED.3IONS-SCNC: 9 MMOL/L (ref 3–16)
BUN SERPL-MCNC: 18 MG/DL (ref 7–20)
CALCIUM SERPL-MCNC: 9.4 MG/DL (ref 8.3–10.6)
CHLORIDE SERPL-SCNC: 103 MMOL/L (ref 99–110)
CO2 SERPL-SCNC: 26 MMOL/L (ref 21–32)
CREAT SERPL-MCNC: 0.8 MG/DL (ref 0.6–1.2)
GFR SERPLBLD CREATININE-BSD FMLA CKD-EPI: >60 ML/MIN/{1.73_M2}
GLUCOSE BLD-MCNC: 113 MG/DL (ref 70–99)
GLUCOSE BLD-MCNC: 120 MG/DL (ref 70–99)
GLUCOSE BLD-MCNC: 128 MG/DL (ref 70–99)
GLUCOSE BLD-MCNC: 129 MG/DL (ref 70–99)
GLUCOSE SERPL-MCNC: 114 MG/DL (ref 70–99)
PERFORMED ON: ABNORMAL
POTASSIUM SERPL-SCNC: 4 MMOL/L (ref 3.5–5.1)
SODIUM SERPL-SCNC: 138 MMOL/L (ref 136–145)

## 2023-07-21 PROCEDURE — 6370000000 HC RX 637 (ALT 250 FOR IP): Performed by: STUDENT IN AN ORGANIZED HEALTH CARE EDUCATION/TRAINING PROGRAM

## 2023-07-21 PROCEDURE — 97530 THERAPEUTIC ACTIVITIES: CPT

## 2023-07-21 PROCEDURE — 97535 SELF CARE MNGMENT TRAINING: CPT

## 2023-07-21 PROCEDURE — 6370000000 HC RX 637 (ALT 250 FOR IP): Performed by: INTERNAL MEDICINE

## 2023-07-21 PROCEDURE — 80048 BASIC METABOLIC PNL TOTAL CA: CPT

## 2023-07-21 PROCEDURE — 6360000002 HC RX W HCPCS: Performed by: NURSE PRACTITIONER

## 2023-07-21 PROCEDURE — 94761 N-INVAS EAR/PLS OXIMETRY MLT: CPT

## 2023-07-21 PROCEDURE — 1280000000 HC REHAB R&B

## 2023-07-21 PROCEDURE — 6360000002 HC RX W HCPCS

## 2023-07-21 PROCEDURE — 36415 COLL VENOUS BLD VENIPUNCTURE: CPT

## 2023-07-21 PROCEDURE — 6370000000 HC RX 637 (ALT 250 FOR IP): Performed by: PHYSICAL MEDICINE & REHABILITATION

## 2023-07-21 PROCEDURE — 6370000000 HC RX 637 (ALT 250 FOR IP)

## 2023-07-21 RX ORDER — ROSUVASTATIN CALCIUM 40 MG/1
40 TABLET, COATED ORAL NIGHTLY
Qty: 30 TABLET | Refills: 3 | Status: ON HOLD
Start: 2023-07-21 | End: 2023-07-22

## 2023-07-21 RX ORDER — ASPIRIN 81 MG/1
81 TABLET ORAL DAILY
Status: DISCONTINUED | OUTPATIENT
Start: 2023-07-22 | End: 2023-07-27 | Stop reason: HOSPADM

## 2023-07-21 RX ORDER — ONDANSETRON 4 MG/1
4 TABLET, ORALLY DISINTEGRATING ORAL EVERY 8 HOURS PRN
Status: DISCONTINUED | OUTPATIENT
Start: 2023-07-21 | End: 2023-07-27 | Stop reason: HOSPADM

## 2023-07-21 RX ORDER — ONDANSETRON 2 MG/ML
4 INJECTION INTRAMUSCULAR; INTRAVENOUS EVERY 6 HOURS PRN
Status: CANCELLED | OUTPATIENT
Start: 2023-07-21

## 2023-07-21 RX ORDER — BISACODYL 5 MG/1
5 TABLET, DELAYED RELEASE ORAL DAILY
Status: DISCONTINUED | OUTPATIENT
Start: 2023-07-21 | End: 2023-07-27 | Stop reason: HOSPADM

## 2023-07-21 RX ORDER — INSULIN LISPRO 100 [IU]/ML
0-4 INJECTION, SOLUTION INTRAVENOUS; SUBCUTANEOUS NIGHTLY
Status: DISCONTINUED | OUTPATIENT
Start: 2023-07-21 | End: 2023-07-27 | Stop reason: HOSPADM

## 2023-07-21 RX ORDER — DEXTROSE MONOHYDRATE 100 MG/ML
INJECTION, SOLUTION INTRAVENOUS CONTINUOUS PRN
Status: DISCONTINUED | OUTPATIENT
Start: 2023-07-21 | End: 2023-07-27 | Stop reason: HOSPADM

## 2023-07-21 RX ORDER — ROSUVASTATIN CALCIUM 20 MG/1
40 TABLET, COATED ORAL NIGHTLY
Status: CANCELLED | OUTPATIENT
Start: 2023-07-21

## 2023-07-21 RX ORDER — PANTOPRAZOLE SODIUM 40 MG/1
40 TABLET, DELAYED RELEASE ORAL
Qty: 90 TABLET | Refills: 1 | Status: ON HOLD
Start: 2023-07-21 | End: 2023-07-22

## 2023-07-21 RX ORDER — PROCHLORPERAZINE EDISYLATE 5 MG/ML
10 INJECTION INTRAMUSCULAR; INTRAVENOUS EVERY 6 HOURS PRN
Status: DISCONTINUED | OUTPATIENT
Start: 2023-07-21 | End: 2023-07-27 | Stop reason: HOSPADM

## 2023-07-21 RX ORDER — NIFEDIPINE 30 MG/1
30 TABLET, FILM COATED, EXTENDED RELEASE ORAL DAILY
Status: DISCONTINUED | OUTPATIENT
Start: 2023-07-21 | End: 2023-07-21 | Stop reason: HOSPADM

## 2023-07-21 RX ORDER — ACETAMINOPHEN 325 MG/1
650 TABLET ORAL EVERY 4 HOURS PRN
Status: CANCELLED | OUTPATIENT
Start: 2023-07-21

## 2023-07-21 RX ORDER — LISINOPRIL 20 MG/1
20 TABLET ORAL DAILY
Status: DISCONTINUED | OUTPATIENT
Start: 2023-07-22 | End: 2023-07-22

## 2023-07-21 RX ORDER — ONDANSETRON 4 MG/1
4 TABLET, ORALLY DISINTEGRATING ORAL EVERY 8 HOURS PRN
Status: CANCELLED | OUTPATIENT
Start: 2023-07-21

## 2023-07-21 RX ORDER — NIFEDIPINE 30 MG/1
30 TABLET, FILM COATED, EXTENDED RELEASE ORAL DAILY
Status: CANCELLED | OUTPATIENT
Start: 2023-07-22

## 2023-07-21 RX ORDER — BISACODYL 5 MG/1
5 TABLET, DELAYED RELEASE ORAL DAILY
Status: CANCELLED | OUTPATIENT
Start: 2023-07-21

## 2023-07-21 RX ORDER — NIFEDIPINE 30 MG/1
30 TABLET, FILM COATED, EXTENDED RELEASE ORAL DAILY
Status: DISCONTINUED | OUTPATIENT
Start: 2023-07-22 | End: 2023-07-23

## 2023-07-21 RX ORDER — POLYETHYLENE GLYCOL 3350 17 G/17G
17 POWDER, FOR SOLUTION ORAL DAILY PRN
Status: DISCONTINUED | OUTPATIENT
Start: 2023-07-21 | End: 2023-07-27 | Stop reason: HOSPADM

## 2023-07-21 RX ORDER — ONDANSETRON 4 MG/1
4 TABLET, ORALLY DISINTEGRATING ORAL EVERY 8 HOURS PRN
Qty: 30 TABLET | Refills: 0 | Status: ON HOLD
Start: 2023-07-21 | End: 2023-07-22

## 2023-07-21 RX ORDER — ACETAMINOPHEN 325 MG/1
650 TABLET ORAL EVERY 4 HOURS PRN
Status: DISCONTINUED | OUTPATIENT
Start: 2023-07-21 | End: 2023-07-27 | Stop reason: HOSPADM

## 2023-07-21 RX ORDER — PROCHLORPERAZINE EDISYLATE 5 MG/ML
10 INJECTION INTRAMUSCULAR; INTRAVENOUS EVERY 6 HOURS PRN
Status: CANCELLED | OUTPATIENT
Start: 2023-07-21

## 2023-07-21 RX ORDER — ROSUVASTATIN CALCIUM 20 MG/1
40 TABLET, COATED ORAL NIGHTLY
Status: DISCONTINUED | OUTPATIENT
Start: 2023-07-21 | End: 2023-07-26

## 2023-07-21 RX ORDER — DEXTROSE MONOHYDRATE 100 MG/ML
INJECTION, SOLUTION INTRAVENOUS CONTINUOUS PRN
Status: CANCELLED | OUTPATIENT
Start: 2023-07-21

## 2023-07-21 RX ORDER — POLYETHYLENE GLYCOL 3350 17 G/17G
17 POWDER, FOR SOLUTION ORAL DAILY PRN
Status: CANCELLED | OUTPATIENT
Start: 2023-07-21

## 2023-07-21 RX ORDER — INSULIN LISPRO 100 [IU]/ML
0-4 INJECTION, SOLUTION INTRAVENOUS; SUBCUTANEOUS
Status: CANCELLED | OUTPATIENT
Start: 2023-07-21

## 2023-07-21 RX ORDER — INSULIN LISPRO 100 [IU]/ML
0-4 INJECTION, SOLUTION INTRAVENOUS; SUBCUTANEOUS
Status: DISCONTINUED | OUTPATIENT
Start: 2023-07-22 | End: 2023-07-27 | Stop reason: HOSPADM

## 2023-07-21 RX ORDER — INSULIN LISPRO 100 [IU]/ML
0-4 INJECTION, SOLUTION INTRAVENOUS; SUBCUTANEOUS NIGHTLY
Status: CANCELLED | OUTPATIENT
Start: 2023-07-21

## 2023-07-21 RX ORDER — ONDANSETRON 2 MG/ML
4 INJECTION INTRAMUSCULAR; INTRAVENOUS EVERY 6 HOURS PRN
Status: DISCONTINUED | OUTPATIENT
Start: 2023-07-21 | End: 2023-07-27 | Stop reason: HOSPADM

## 2023-07-21 RX ORDER — LISINOPRIL 20 MG/1
20 TABLET ORAL DAILY
Status: CANCELLED | OUTPATIENT
Start: 2023-07-22

## 2023-07-21 RX ORDER — NIFEDIPINE 30 MG/1
30 TABLET, FILM COATED, EXTENDED RELEASE ORAL DAILY
Qty: 30 TABLET | Refills: 3 | Status: ON HOLD
Start: 2023-07-22 | End: 2023-07-22

## 2023-07-21 RX ORDER — ENOXAPARIN SODIUM 100 MG/ML
40 INJECTION SUBCUTANEOUS DAILY
Status: DISCONTINUED | OUTPATIENT
Start: 2023-07-22 | End: 2023-07-24

## 2023-07-21 RX ORDER — ASPIRIN 81 MG/1
81 TABLET ORAL DAILY
Status: CANCELLED | OUTPATIENT
Start: 2023-07-22

## 2023-07-21 RX ORDER — ASPIRIN 300 MG/1
300 SUPPOSITORY RECTAL DAILY
Status: DISCONTINUED | OUTPATIENT
Start: 2023-07-22 | End: 2023-07-27 | Stop reason: HOSPADM

## 2023-07-21 RX ORDER — ASPIRIN 300 MG/1
300 SUPPOSITORY RECTAL DAILY
Status: CANCELLED | OUTPATIENT
Start: 2023-07-22

## 2023-07-21 RX ORDER — ENOXAPARIN SODIUM 100 MG/ML
40 INJECTION SUBCUTANEOUS DAILY
Status: CANCELLED | OUTPATIENT
Start: 2023-07-21

## 2023-07-21 RX ADMIN — ROSUVASTATIN CALCIUM 40 MG: 20 TABLET, FILM COATED ORAL at 23:54

## 2023-07-21 RX ADMIN — HYDRALAZINE HYDROCHLORIDE 10 MG: 20 INJECTION INTRAMUSCULAR; INTRAVENOUS at 02:17

## 2023-07-21 RX ADMIN — ASPIRIN 81 MG: 81 TABLET, COATED ORAL at 09:29

## 2023-07-21 RX ADMIN — ENOXAPARIN SODIUM 40 MG: 100 INJECTION SUBCUTANEOUS at 09:29

## 2023-07-21 RX ADMIN — HYDRALAZINE HYDROCHLORIDE 10 MG: 20 INJECTION INTRAMUSCULAR; INTRAVENOUS at 06:22

## 2023-07-21 RX ADMIN — NIFEDIPINE 30 MG: 30 TABLET, EXTENDED RELEASE ORAL at 10:02

## 2023-07-21 ASSESSMENT — PAIN SCALES - GENERAL
PAINLEVEL_OUTOF10: 0

## 2023-07-21 NOTE — PLAN OF CARE
Problem: Discharge Planning  Goal: Discharge to home or other facility with appropriate resources  Recent Flowsheet Documentation  Taken 7/20/2023 2000 by Aries Crum RN  Discharge to home or other facility with appropriate resources:   Identify barriers to discharge with patient and caregiver   Arrange for needed discharge resources and transportation as appropriate   Identify discharge learning needs (meds, wound care, etc)   Refer to discharge planning if patient needs post-hospital services based on physician order or complex needs related to functional status, cognitive ability or social support system     Problem: Safety - Adult  Goal: Free from fall injury  Outcome: Progressing  Flowsheets (Taken 7/20/2023 2000)  Free From Fall Injury: Instruct family/caregiver on patient safety     Problem: Neurosensory - Adult  Goal: Achieves stable or improved neurological status  Outcome: Progressing  Flowsheets (Taken 7/20/2023 2000)  Achieves stable or improved neurological status:   Assess for and report changes in neurological status   Initiate measures to prevent increased intracranial pressure   Maintain blood pressure and fluid volume within ordered parameters to optimize cerebral perfusion and minimize risk of hemorrhage   Monitor temperature, glucose, and sodium. Initiate appropriate interventions as ordered  Goal: Absence of seizures  Outcome: Progressing  Flowsheets (Taken 7/20/2023 2000)  Absence of seizures:   Monitor for seizure activity.   If seizure occurs, document type and location of movements and any associated apnea   If seizure occurs, turn head to side and suction secretions as needed   Administer anticonvulsants as ordered   Support airway/breathing, administer oxygen as needed  Goal: Achieves maximal functionality and self care  Outcome: Progressing  Flowsheets (Taken 7/20/2023 2000)  Achieves maximal functionality and self care: Monitor swallowing and airway patency with patient

## 2023-07-21 NOTE — PROGRESS NOTES
NURSING ASSESSMENT: ARU ADMISSION  The [address] [place] [address] Heather     Rehab Dx/Hx: CVA (cerebrovascular accident due to intracerebral hemorrhage) [facility]) [I61.9]   Rupali TREJO:[id number]  Date of Admit: 7/21/2023  Room #: 3106/3106-01    Subjective:   Patient admitted to room 3107@ from ICU via stretcher. Alert and oriented x4. Oriented to room and call light system. Oriented to rehab routine and therapy schedules. Informed about care conferences and ordering of meals. Drug / Medication Review:   Medications were reviewed by RN at time of admission  [x]  No potential or actual clinically significant medication issues were noted.      []   Yes, a clinically significant medication issue was identified                 []  Adverse Drug Event:                  []  Allergy:                  []  Side Effect:                  []  Ineffective Therapy:                  []  Drug Interaction:                 []  Duplicated Therapy:                 []  Untreated Indication:                  []  Non-adherence:                 []  Other:                  Nursing/Pharmacy contacted the physician:     Date:              Time:                  Actions recommended by physician were completed:   Date :            Time:    4 Eyes Skin Assessment   The patient is being assessed for: Admission     I agree that 2 RN's have performed a thorough Head to Toe Skin Assessment on the patient. ALL assessment sites listed below have been assessed. patient has scattered bruising on arms and legs, redness to coccyx blanchable, scab on left shin, TMA right foot, inverted left nipple according to patient from tick in childhood. Areas assessed by both nurses:   [x]   Head, Face, and Ears   [x]   Shoulders, Back, and Chest, Abdomen  [x]   Arms, Elbows, and Hands   [x]   Coccyx, Sacrum, and Ischium  [x]   Legs, Feet, and Heel     Does the Patient have Skin Breakdown?   No         Jc Prevention initiated: Yes  Wound Care Orders initiated:  Not Applicable      [facility] nurse consulted for Pressure Injury (Stage 3,4, Unstageable, DTI, NWPT, Complex wounds)and New or Established Ostomies: Not Applicable    Primary Nurse eSignature: Fuentes Almeida RN  Co-signer eSignature:   Marleny Long RN

## 2023-07-21 NOTE — PROGRESS NOTES
Progress Note  Physical Medicine and Rehabilitation    Patient: Pedro Chavez  [id number]  Date: 7/21/2023      Chief Complaint: fatigue/weakness    Interval history:  Patient seen at bedside. She is continuing to experience some nausea but no further emesis. She reports early satiety after eating and has decreased her PO intake as a result of this. She is consuming mainly water and liquids/shakes, trying to improve her nutrition. Her blood pressure has remained elevated, and received PRN hydralazine    History of Present Illness/Hospital Course: The pt is a 66year old lady with a PMHx of Afib, CAD s/p PCI HTN, HLD, and DM who presented to the ED with exhaustion, diaphoresis, and visual changes. Pt's symptoms began at 4:15 PM on 7/16/2023. She had an acute onset of confusion that progressed to L hemiparesis. Her CT head was negative for acute intracranial abnormalities. A CTA of her age-indeterminate basilar tip occlusion with left posterior cerebral and superior cerebellar artery occlusions. The  Stroke team was consulted, NIHSS was 9, the pt was administered TNK 7:37 PM at [facility] ED, and was transferred to [facility] ICU for neurological services. On arrival to the ICU, pt responsive and able to fully participate in writer's assessment and exam. MRI brain displayed an area of diffusion restriction seen involving the superior aspect of the left cerebellar hemisphere consistent with acute ischemia. A MRA displayed Mild narrowing in basilar tip and at origin of the posterior cerebral arteries   Bilaterally with her right greater than left.         Past Medical History:   Diagnosis Date    Atrial fibrillation ([address])     off coumadin after bleed, declined restart    Blood transfusion     CAD (coronary artery disease)     Diabetes mellitus type II     Diabetic neuropathy ([address]) 2011    Gastric ulcer     H. pylori infection 2009    Hyperlipidemia     Hypertension     Numbness and tingling of left leg     Osteoarthritis narrowing in the region of the basilar tip and segmental   stenosis seen in the proximal aspect of the posterior cerebral arteries in the   bilaterally without evidence of flow significant stenosis or occlusion   identified. IMPRESSION:      Mild narrowing in basilar tip and at origin of the posterior cerebral arteries   bilaterally, right greater than left. Otherwise unremarkable intracranial MR angio               MRA HEAD WO CONTRAST   Final Result      Area of diffusion restriction seen involving the superior aspect of the left   cerebellar hemisphere consistent with acute ischemia. Age-related atrophy and advanced White matter ischemic changes are noted   diffusely. MR ANGIO OF THE HEAD WITHOUT CONTRAST      HISTORY: Posterior CVA      Technical factors: Noncontrast MR angio imaging was performed of the brain. FINDINGS:      There is normal anatomy in the region of the [place]. No focal dilatation is identified to suggest aneurysm. There is mild luminal narrowing in the region of the basilar tip and segmental   stenosis seen in the proximal aspect of the posterior cerebral arteries in the   bilaterally without evidence of flow significant stenosis or occlusion   identified. IMPRESSION:      Mild narrowing in basilar tip and at origin of the posterior cerebral arteries   bilaterally, right greater than left. Otherwise unremarkable intracranial MR angio                   Assessment:  1. CVA s/p TNK    -daily ASA   -crestor 40 mg PO qdaily   -SLP/PT/OT  2. HTN    -lisinopril 20 m PO qdaily   -amlodipine 10 mg PO qdaily - as she has required additional PRN coverage while on this dose, will replace nifedipine 30 in place of amlodipine and cancel the hydralazine order.  Can also potentially add additional 30 if BP remains elevated above 160 to establish a stable PO regimen without addition of further PRN; discussed with nurse and Dr. Brenda Encarnacion   -her bradycardia

## 2023-07-21 NOTE — DISCHARGE SUMMARY
Discharge summary    Patient's PCP: MARVIN Concepcion CNP  Admit Date: 7/16/2023   Discharge Date: 7/21/2023    Admitting Physician: Dr. Kendra Holdne MD  Discharge Physician: Dr. Wong Smith MD   Consults: neurology    HPI and Brief hospital course:    Luisa Palma is a 66 y.o. y/o female with history significant for A-fib (not on anticoagulation), CAD - with cardiac stent placement, DM-II, HLD, HTN presents to the ED via EMS yesterday afternoon with sudden onset of exhaustion, dizziness, nausea, diaphoretic confusion and left hemiparesis. LKN 16:15. On arrival to the ED patient was taken to CT scan NIH 9. CT head shows no acute intracranial abnormality, and CTA concerning for age indeterminate basilar tip occlusion with left posterior cerebral and superior cerebellar artery occlusion. Stroke team was consulted and evaluated patient. Patient was given TNK at 19:37. There was also concern in the ED with the patient being diaphoretic that this could be a cardiac event. EKG showed some ST depression in V5 and V6 with elevated troponin. She was transferred to [facility] for further evaluation and post TNK management.     -There was initially concern for basilar artery occlusion. CTA showed basilar tip occlusion and left posterior cerebral and superior cerebellar artery occlusions. MRA showed mild narrowing at the basilar tip and at the origin of the posterior communicating arteries bilaterally R>L. She was not a candidate for endovascular therapy.      -She was found to have an acute stroke in in the Left cerebellum likely secondary to cardio embolic source vs. posterior circulation intracranial vessel disease. She is in a-fib. -She has right internal carotid artery proximal stenosis 80% or worse.      Her antihypertensives were adjusted she will be discharged to acute rehab unit; she is to start doac agent on 7/23 for afib; I have ordered this to be started on 7/23 and I have put that order on the

## 2023-07-21 NOTE — PROGRESS NOTES
Physical Therapy  Facility/Department: [facility] ICU  Daily Treatment Note  NAME: Purnima Gong  : 1945  MRN: [id number]    Date of Service: 2023    Discharge Recommendations:Xuan Romero scored a 9/24 on the AM-PAC short mobility form. Current research shows that an AM-PAC score of 17 or less is typically not associated with a discharge to the patient's home setting. Based on the patient's AM-PAC score and their current functional mobility deficits, it is recommended that the patient have 3-5 sessions per week of Physical Therapy at d/c to increase the patient's independence. Please see assessment section for further patient specific details. If patient discharges prior to next session this note will serve as a discharge summary. Please see below for the latest assessment towards goals. Patient would benefit from continued therapy after discharge   PT Equipment Recommendations  Other: Defer    Patient Diagnosis(es): There were no encounter diagnoses. Assessment   Assessment: Pt very anxious regarding mobility and requiring extended time to agree to mobilize with therapists. She required heavy two person assist to stand and transfer today. Unable to attempt ambulation. Pt would benefit from IP stay to improve her weakness, impaired sitting and standing balance, activity tolerance so that she can return to her mod I baseline. Activity Tolerance:  (treatment limited by anxiety)  Other: Defer     Plan    Physcial Therapy Plan  General Plan: 5-7 times per week  Current Treatment Recommendations: Strengthening;Balance training;Functional mobility training;Transfer training;Gait training;Neuromuscular re-education; Safety education & training;Positioning; Therapeutic activities     Restrictions  Position Activity Restriction  Other position/activity restrictions: NPO; no activity restrictions noted     Subjective    Subjective  Subjective: Pt presents supine in bed and resistive to therapy.  \"I don't

## 2023-07-21 NOTE — PROGRESS NOTES
Patient drowsy but answers questions. Denies discomfort or pain. Afebrile. On room air. PRN Hydralazine given for /62 HR 58. IV drsgs changed. Repositioned. Call light within reach. Bed low, locked, alarmed. Will continue to monitor.

## 2023-07-21 NOTE — PROGRESS NOTES
Occupational Therapy  Daily Treatment Note  Patient Name: Ofelia Jimenez  MRN: [id number]    Chart Reviewed: Yes       Other position/activity restrictions: NPO; no activity restrictions noted     Discharge Recommendations: Ofelia Jimenez scored a 9/24 on the AM-PAC ADL Inpatient form. Current research shows that an AM-PAC score of 17 or less is typically not associated with a discharge to the patient's home setting. Based on the patient's AM-PAC score and their current ADL deficits, it is recommended that the patient have 3-5 sessions per week of Occupational Therapy at d/c to increase the patient's independence. Please see assessment section for further patient specific details. If patient discharges prior to next session this note will serve as a discharge summary. Please see below for the latest assessment towards goals. Additional Pertinent Hx: Pt is 65 yo F admitted on 7/16/23 with CVA, s/p TNK. H/o Afib, CAD s/p PCI HTN, HLD, and DM. Diagnosis: CVA  Treatment Diagnosis: Impaired ADLs, mobility, motor control s/p stroke    Subjective: Pt semi supine in bed upon arrival, initially agreeable to OT/ PT session however after encouraging pt to attempt to don socks or complete supine to sit transfer/ move LEs pt reporting \"I can't I'm sick\" and noted with increased anxiety regarding mobility. Pt fearful of falling throughout, despite education and increased assistance. Noted pt occasionally avoiding attempting to move 2/2 anxiety, requiring consistent cueing throughout to participate in functional tasks. Pain: unrated L shoulder pain. Pt reporting 2/2 arthritis. Objective:    Cognition/Orientation:  Follows one step commands with increased time and repetition  Attends with cues to redirect  Decreased awareness of deficits  Requires cues for all initiation   Requires cues for some sequencing    Bed mobility   Supine to sit: assist x 2- mod A at trunk, min A at LEs.    Sitting balance: CGA to min A

## 2023-07-21 NOTE — DISCHARGE INSTR - COC
Continuity of Care Form    Patient Name: Derrick Mohr   :    MRN:  [id number]    Admit date:  2023  Discharge date:  23    Code Status Order: Full Code   Advance Directives:     Admitting Physician:  Ty Monk MD  PCP: MARVIN Jefferson CNP    Discharging Nurse:  [address] Unit/Room#: 3097/8954-58  Discharging Unit Phone Number: [phone]    Emergency Contact:   Extended Emergency Contact Information  Primary Emergency Contact: [facility] Phone: [phone]  Mobile Phone: [phone]  Relation: Daughter-in-Law  Secondary Emergency Contact: [address] Phone: [phone]  Mobile Phone: [phone]  Relation: Child    Past Surgical History:  Past Surgical History:   Procedure Laterality Date    ANGIOPLASTY  12/30/15    Dr. Tyra Guerrero, LLE, PTA of distal sfa/pop/peroneal    CARDIAC CATHETERIZATION  12    done for surgical clearance, cath was negative    COLONOSCOPY  2022    COLONOSCOPY N/A 2022    COLONOSCOPY CONTROL HEMORRHAGE performed by Jackson Flores MD at [place] [id number]    FOOT SURGERY Left 2018    DEBRIDEMENT OF ULCERS LEFT FOOT AND LEG WITH GRAFT    OTHER SURGICAL HISTORY Left 2017    Left Femoral Peroneal Bypass    SHOULDER ARTHROPLASTY  10/5/12    RIGHT SHOULDER TOTAL ARTHROPLASTY, BICEPS TENODESIS DEPUY, GLOBAL ADVANTAGE AP    TOE AMPUTATION Right 2022    AMPUTATION OF RIGHT FIRST AND FOURTH TOES performed by Louetta Skiff, DPM at [address] [facility] [address] Right 2022    TRANSMETATARSAL AMPUTATION RIGHT FOOT performed by Louetta Skiff, DPM at [address] N/A 2022    EGD IV SEDATION performed by Jackson Flores MD at [address] [place] [address]       Immunization History:   Immunization History   Administered Date(s) Administered    Pneumococcal, PCV-13, PREVNAR 15, (age 6w+),

## 2023-07-21 NOTE — CARE COORDINATION
Case management is following for discharge planning. The chart was reviewed. The goal is to go to [facility] ARU. Ms. Barbie Moreno was initially denied ARU by [facility] . A P2P is scheduled with the Medical Director and Dr. Joey Glasgow for today around [id number]. Waiting for a determination. If Ms. Romero id denied again, will pursue an expedited appeal.     She is from home with her son in a mobile home. She was independent with all self care and functional mobility at baseline. Given her scores with PTOT, she is at high risk for an adverse event if she returned to the prior level of care.     Sylvie Swanson, RN  Case Management  [phone]

## 2023-07-21 NOTE — CARE COORDINATION
Case Management Assessment            Discharge Note                    Date / Time of Note: 7/21/2023 2:33 PM                  Discharge Note Completed by: Ariel Bower RN    Patient Name: Pedro Chavez   YOB: 1945  Diagnosis: Acute cerebrovascular accident (CVA) [facility]) [I63.9]  Basilar artery occlusion [I65.1]   Date / Time: 7/16/2023  8:47 PM    Current PCP: MARVIN Francisco CNP    Advance Directives:  Code Status: Full Code    Financial:  Payor: Rolando Schuler / Plan: Jessica Cristy / Product Type: *No Product type* /      Pharmacy:    [address]  Phone: [phone] Fax: [phone]    [address] [phone] Jose Angel Olvera [phone]  [address] [place] [address]  Phone: [phone] Fax: [phone]    ADLS:  Current PT AM-PAC Score: 9 /24  Current OT AM-PAC Score: 10 /24    DISCHARGE Disposition: [facility] Acute Rehab Unit    Transportation:  Intrahospital transportation    COVID Result:    Lab Results   Component Value Date/Time    COVID19 Not Detected 06/10/2022 11:40 AM    COVID19 NOT DETECTED 05/13/2022 04:39 PM       The Plan for Transition of Care is related to the following treatment goals of Acute cerebrovascular accident (CVA) ([address]) [I63.9]  Basilar artery occlusion [I65.1]    The Patient and/or patient representative Cici Rose and her family were provided with a choice of provider and agrees with the discharge plan Yes    Freedom of choice list was provided with basic dialogue that supports the patient's individualized plan of care/goals and shares the quality data associated with the providers.  Yes      Ariel Bower RN  [facility]  Case Management Department  [phone]

## 2023-07-21 NOTE — TELEPHONE ENCOUNTER
----- Message from Virgiliocelso Jordannav sent at 7/21/2023  3:16 PM EDT -----  Subject: Message to Provider    QUESTIONS  Information for Provider? Pt is in [facility] due to a stroke  ---------------------------------------------------------------------------  --------------  [address]  [phone]; OK to leave message on voicemail  ---------------------------------------------------------------------------  --------------  SCRIPT ANSWERS  Relationship to Patient? Other/Third Party  Representative Name? Sayra Abrams  Is the representative on the Communication Release of Information (MIKAYLA)   form in Epic?  Yes

## 2023-07-22 PROBLEM — E46 MALNUTRITION (HCC): Chronic | Status: ACTIVE | Noted: 2023-07-22

## 2023-07-22 LAB
GLUCOSE BLD-MCNC: 104 MG/DL (ref 70–99)
GLUCOSE BLD-MCNC: 116 MG/DL (ref 70–99)
GLUCOSE BLD-MCNC: 127 MG/DL (ref 70–99)
GLUCOSE BLD-MCNC: 134 MG/DL (ref 70–99)
PERFORMED ON: ABNORMAL

## 2023-07-22 PROCEDURE — 97530 THERAPEUTIC ACTIVITIES: CPT

## 2023-07-22 PROCEDURE — 6370000000 HC RX 637 (ALT 250 FOR IP): Performed by: INTERNAL MEDICINE

## 2023-07-22 PROCEDURE — 97542 WHEELCHAIR MNGMENT TRAINING: CPT

## 2023-07-22 PROCEDURE — 97116 GAIT TRAINING THERAPY: CPT

## 2023-07-22 PROCEDURE — 99222 1ST HOSP IP/OBS MODERATE 55: CPT | Performed by: INTERNAL MEDICINE

## 2023-07-22 PROCEDURE — 6360000002 HC RX W HCPCS: Performed by: PHYSICAL MEDICINE & REHABILITATION

## 2023-07-22 PROCEDURE — 97162 PT EVAL MOD COMPLEX 30 MIN: CPT

## 2023-07-22 PROCEDURE — 92523 SPEECH SOUND LANG COMPREHEN: CPT

## 2023-07-22 PROCEDURE — 97535 SELF CARE MNGMENT TRAINING: CPT

## 2023-07-22 PROCEDURE — 97166 OT EVAL MOD COMPLEX 45 MIN: CPT

## 2023-07-22 PROCEDURE — 1280000000 HC REHAB R&B

## 2023-07-22 PROCEDURE — 6370000000 HC RX 637 (ALT 250 FOR IP): Performed by: PHYSICAL MEDICINE & REHABILITATION

## 2023-07-22 RX ORDER — HYDRALAZINE HYDROCHLORIDE 25 MG/1
25 TABLET, FILM COATED ORAL ONCE
Status: COMPLETED | OUTPATIENT
Start: 2023-07-22 | End: 2023-07-22

## 2023-07-22 RX ORDER — CLOPIDOGREL BISULFATE 75 MG/1
75 TABLET ORAL DAILY
Status: ON HOLD | COMMUNITY
End: 2023-07-31 | Stop reason: HOSPADM

## 2023-07-22 RX ORDER — HYDRALAZINE HYDROCHLORIDE 50 MG/1
50 TABLET, FILM COATED ORAL 3 TIMES DAILY
Status: ON HOLD | COMMUNITY
End: 2023-07-31 | Stop reason: HOSPADM

## 2023-07-22 RX ORDER — AMLODIPINE BESYLATE 10 MG/1
10 TABLET ORAL DAILY
Status: ON HOLD | COMMUNITY
End: 2023-07-31 | Stop reason: HOSPADM

## 2023-07-22 RX ORDER — DIGOXIN 125 MCG
125 TABLET ORAL DAILY
Status: ON HOLD | COMMUNITY
End: 2023-07-31 | Stop reason: HOSPADM

## 2023-07-22 RX ORDER — LISINOPRIL 20 MG/1
20 TABLET ORAL 2 TIMES DAILY
Status: DISCONTINUED | OUTPATIENT
Start: 2023-07-22 | End: 2023-07-27 | Stop reason: HOSPADM

## 2023-07-22 RX ORDER — AMLODIPINE BESYLATE 5 MG/1
5 TABLET ORAL ONCE
Status: DISCONTINUED | OUTPATIENT
Start: 2023-07-22 | End: 2023-07-22

## 2023-07-22 RX ORDER — PANTOPRAZOLE SODIUM 40 MG/1
40 TABLET, DELAYED RELEASE ORAL
Status: DISCONTINUED | OUTPATIENT
Start: 2023-07-23 | End: 2023-07-27 | Stop reason: HOSPADM

## 2023-07-22 RX ADMIN — BISACODYL 5 MG: 5 TABLET, COATED ORAL at 10:25

## 2023-07-22 RX ADMIN — ASPIRIN 81 MG: 81 TABLET, COATED ORAL at 10:26

## 2023-07-22 RX ADMIN — BISACODYL 5 MG: 5 TABLET, COATED ORAL at 05:26

## 2023-07-22 RX ADMIN — LISINOPRIL 20 MG: 20 TABLET ORAL at 21:45

## 2023-07-22 RX ADMIN — ONDANSETRON 4 MG: 4 TABLET, ORALLY DISINTEGRATING ORAL at 00:28

## 2023-07-22 RX ADMIN — NIFEDIPINE 30 MG: 30 TABLET, EXTENDED RELEASE ORAL at 10:25

## 2023-07-22 RX ADMIN — ROSUVASTATIN CALCIUM 40 MG: 20 TABLET, FILM COATED ORAL at 21:45

## 2023-07-22 RX ADMIN — HYDRALAZINE HYDROCHLORIDE 25 MG: 25 TABLET, FILM COATED ORAL at 01:02

## 2023-07-22 RX ADMIN — ENOXAPARIN SODIUM 40 MG: 100 INJECTION SUBCUTANEOUS at 10:25

## 2023-07-22 RX ADMIN — ONDANSETRON 4 MG: 2 INJECTION INTRAMUSCULAR; INTRAVENOUS at 10:20

## 2023-07-22 RX ADMIN — LISINOPRIL 20 MG: 20 TABLET ORAL at 10:26

## 2023-07-22 NOTE — PROGRESS NOTES
Goal 4: pt will negotiate 12 stairs with rail and SBA    PLAN OF CARE  Frequency: 1-2 treatment sessions per day, 5-7 days per week  Physcial Therapy Plan  Days Per Week: 5 Days  Hours Per Day: 1 hour  Therapy Duration: 3 Weeks  Current Treatment Recommendations: Strengthening;Balance training;Functional mobility training;Transfer training;Gait training;Neuromuscular re-education; Safety education & training;Positioning; Therapeutic activities; Endurance training;Stair training;Patient/Caregiver education & training; Wheelchair mobility training  Safety Devices  Type of Devices: Nurse notified;Call light within reach; All fall risk precautions in place; Chair alarm in place; Left in chair    EDUCATION  Education  Education Given To: Patient  Education Provided: Role of Therapy; Safety;Transfer Training;Plan of Care;Precautions; Mobility Training  Education Method: Verbal  Barriers to Learning: Cognition  Education Outcome: Continued education needed    ELOS: 3 weeks         Therapy Time   Individual Concurrent Group Co-treatment   Time In 0845     0900   Time Out 0900     1015   Minutes 15     75      Timed Code Treatment Minutes: 75    Total Treatment Minutes: [address] PT, DPT, NCS, [address] [place] [address]

## 2023-07-22 NOTE — PLAN OF CARE
Problem: Discharge Planning  Goal: Discharge to home or other facility with appropriate resources  Outcome: Progressing   Will identify barriers to discharge with patient and caregiver; Identify discharge learning needs (medications, wound care, etc.)       Problem: Safety - Adult  Goal: Free from fall injury  Outcome: Progressing   Pt remains free from falls during this stay on the ARU. 1:1 and education provided on the importance of using call light to ask for assistance prior to transfers and ambulation. Pt voices understanding. Will continue to monitor and re-educate as needed. Problem: Skin/Tissue Integrity  Goal: Absence of new skin breakdown  Description: 1. Monitor for areas of redness and/or skin breakdown  2. Assess vascular access sites hourly  3. Every 4-6 hours minimum:  Change oxygen saturation probe site  4. Every 4-6 hours:  If on nasal continuous positive airway pressure, respiratory therapy assess nares and determine need for appliance change or resting period. Outcome: Progressing   Pt shows no new signs of skin breakdown this shift. Pt turns self frequently in bed; sacral foam dressing in place; extremities elevated on cushions; will continue to monitor.

## 2023-07-22 NOTE — PROGRESS NOTES
SLP ALL NOTES  Facility/Department: [facility] ACUTE REHAB UNIT  Initial Speech/Language/Cognitive Assessment    NAME: Dawit Verma  : 1945   MRN: [id number]  ADMISSION DATE: 2023  ADMITTING DIAGNOSIS: has Osteoarthritis; Noncompliance of patient with dietary regimen; PAD (peripheral artery disease) ([address]); Pure hypercholesterolemia; Coronary artery disease involving native coronary artery of native heart without angina pectoris; Essential hypertension; Diabetes mellitus ([address]); Vitamin D deficiency; Acute osteomyelitis of left foot ([address]); Hammertoe, bilateral; Colonoscopy refused; Chronic deep vein thrombosis (DVT) of left peroneal vein ([address]); Acute osteomyelitis of right foot ([address]); Osteomyelitis of right foot ([address]); 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, with bone involvement without evidence of necrosis ([address]); Cellulitis of right lower extremity; PVD (peripheral vascular disease) ([address]); Chronic anemia; Gastrointestinal hemorrhage; Acute blood loss anemia; Dirty living conditions; Severe malnutrition ([address]); Cerebrovascular accident (CVA) involving posterior circulation ([address]); Basilar artery occlusion; Longstanding persistent atrial fibrillation ([address]); CVA (cerebrovascular accident due to intracerebral hemorrhage) ([address]); and Malnutrition ([address]) on their problem list.  DATE ONSET: 23    Date of Eval: 2023   Evaluating Therapist: DARLENE Dillard    RECENT RESULTS  CT OF HEAD: 23  FINDINGS:     There is an acute infarct in left cerebellar hemisphere. No evidence of  hemorrhagic transformation. No significant mass effect in the brain. There is  moderate multifocal white matter disease, similar to comparison MRI. No new  changes in the brain have developed. IMPRESSION:  Expected evolution of the left cerebellar hemisphere infarct. No evidence of  hemorrhagic transformation or any new areas of mass effect. Primary Complaint: Nausea and Neck pain.  \"My

## 2023-07-22 NOTE — CONSULTS
Comprehensive Nutrition Assessment    RECOMMENDATIONS:  PO Diet: Continue Regular diet (monitor need to add carb restriction, intakes are currently poor recommend continuing Regular until they improve)  ONS: Add Glucerna TID  Nutrition Education: Education not indicated     NUTRITION ASSESSMENT:   Nutritional summary & status: Consult for ONS: Pt working w/ PT on visit. Experiencing nausea, no emesis. Previously put on CLD d/t nausea, discussed w/ MD, modified to dysphagia - soft and bite sized. Pt is nutritionally compromised, Glucerna TID added. No significant wt loss (8% 1 year), however has subcutaneous fat and muscle mass loss indicative of malnutrition. Intakes have been poor this admission @ <50%. Will continue to monitor. Admission/PMH: CVA, T1DM, CAD, Afib    MALNUTRITION ASSESSMENT  Context of Malnutrition: Chronic Illness   Malnutrition Status:  Moderate malnutrition  Findings of the 6 clinical characteristics of malnutrition (Minimum of 2 out of 6 clinical characteristics is required to make the diagnosis of moderate or severe Protein Calorie Malnutrition based on AND/ASPEN Guidelines):  Energy Intake:  Mild decrease in energy intake (Comment)  Weight Loss:  Mild weight loss (specify amount and time period) (8% 1 year period)     Body Fat Loss:  Mild body fat loss Orbital, Buccal region   Muscle Mass Loss:  Mild muscle mass loss Clavicles (pectoralis & deltoids), Temples (temporalis), Thigh (quadraceps)  Fluid Accumulation:  No significant fluid accumulation      NUTRITION DIAGNOSIS   Moderate malnutrition related to inadequate protein-energy intake as evidenced by Criteria as identified in malnutrition assessment    Nutrition Monitoring and Evaluation:   Food/Nutrient Intake Outcomes:  Food and Nutrient Intake, Supplement Intake  Physical Signs/Symptoms Outcomes:  Biochemical Data, Nutrition Focused Physical Findings, Weight, Chewing or Swallowing, Nausea or Vomiting     OBJECTIVE DATA: Significant

## 2023-07-22 NOTE — PLAN OF CARE
Problem: Discharge Planning  Goal: Discharge to home or other facility with appropriate resources  Outcome: Progressing  Flowsheets (Taken 7/22/2023 1001)  Discharge to home or other facility with appropriate resources: Identify barriers to discharge with patient and caregiver     Problem: Safety - Adult  Goal: Free from fall injury  Outcome: Progressing     Problem: ABCDS Injury Assessment  Goal: Absence of physical injury  Outcome: Progressing

## 2023-07-22 NOTE — H&P
Department of Physical Medicine & Rehabilitation  History & Physical      Patient Identification:  Bhavesh Harris  : 1945  Admit date: 2023   Attending provider: Luis Bar DO        Primary care provider: MARVIN Holloway CNP     Chief Complaint: CVA    History of Present Illness/Hospital Course: The pt is a 66year old lady with a PMHx of Afib, CAD s/p PCI HTN, HLD, and DM who presented to the ED with exhaustion, diaphoresis, and visual changes. Pt's symptoms began at 4:15 PM on 2023. She had an acute onset of confusion that progressed to L hemiparesis. Her CT head was negative for acute intracranial abnormalities. A CTA of her age-indeterminate basilar tip occlusion with left posterior cerebral and superior cerebellar artery occlusions. The  Stroke team was consulted, NIHSS was 9, the pt was administered TNK 7:37 PM at OSH ED, and was transferred to [facility] ICU for neurological services. On arrival to the ICU, pt responsive and able to fully participate in writer's assessment and exam. MRI brain displayed an area of diffusion restriction seen involving the superior aspect of the left cerebellar hemisphere consistent with acute ischemia. A MRA displayed Mild narrowing in basilar tip and at origin of the posterior cerebral arteries   Bilaterally with her right greater than left. She is currently stable enough for ARU- not eating much and tired. Prior Level of Function:  Independent for mobility, ADLs, and IADLs    Current Level of Function:   Mod Assist       Past Medical History:   Diagnosis Date    Atrial fibrillation ([address])     off coumadin after bleed, declined restart    Blood transfusion     CAD (coronary artery disease)     Diabetes mellitus type II     Diabetic neuropathy ([address]) 2011    Gastric ulcer     H. pylori infection 2009    Hyperlipidemia     Hypertension     Numbness and tingling of left leg     Osteoarthritis     knees    Poor historian     Primary hypertension

## 2023-07-22 NOTE — PLAN OF CARE
ARU PATIENT TREATMENT PLAN  The [address] [place] [address]    Lizbeth Ocampo    : 1945  Acct #: [de-identified]  MRN: [id number]  PHYSICIAN:  Manan Hoang DO  Primary Problem    Patient Active Problem List   Diagnosis    Osteoarthritis    Noncompliance of patient with dietary regimen    PAD (peripheral artery disease) ([address])    Pure hypercholesterolemia    Coronary artery disease involving native coronary artery of native heart without angina pectoris    Essential hypertension    Diabetes mellitus ([address])    Vitamin D deficiency    Acute osteomyelitis of left foot ([address])    Hammertoe, bilateral    Colonoscopy refused    Chronic deep vein thrombosis (DVT) of left peroneal vein (HCC)    Acute osteomyelitis of right foot ([address])    Osteomyelitis of right foot (HCC)    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, with bone involvement without evidence of necrosis (HCC)    Cellulitis of right lower extremity    PVD (peripheral vascular disease) (HCC)    Chronic anemia    Gastrointestinal hemorrhage    Acute blood loss anemia    Dirty living conditions    Severe malnutrition ([address])    Cerebrovascular accident (CVA) involving posterior circulation ([address])    Basilar artery occlusion    Longstanding persistent atrial fibrillation ([address])    CVA (cerebrovascular accident due to intracerebral hemorrhage) ([address])    Malnutrition ([address])       Rehabilitation Diagnosis:  Stroke, 1.2, Right Body (L Brain)  ADMIT DATE:2023    Patient Goals: \"Walk with the walker\"  Admitting Impairments: moderate cognitive linguistic impairment; Decreased functional mobility ; Decreased ADL status; Decreased coordination;Decreased posture;Decreased endurance;Decreased strength;Decreased balance;Decreased safe awareness;Decreased fine motor control;Decreased high-level IADLs  Activity Restrictions: None  Participation Limitations: None   CARE PLAN

## 2023-07-22 NOTE — PROGRESS NOTES
A&O.  Denies pain. Gave IV Zofran. Attending therapy. Reports poor appetite. Constipated. Gave bowel meds. Assessment complete. Safety measures in place.

## 2023-07-22 NOTE — PLAN OF CARE
ARU Admission Assessment    Ethnicity  \"Are you of , /a, or Mauritian origin? \"  Check all that apply:  [x] A. No, not of , /a, or [place] Origin  [] B.  Yes, [place], [place] American, Chicano/a  [] C.  Yes, [facility]  [] D.  Yes, [place]  [] E.  Yes, another , , or Mauritian origin  [] X. Patient unable to respond  [] Y. Patient declines to respond    Race  \"What is your race? \"  Check all that apply:  [x] A. White  [] B. Black or   [] C. American Von or [place] Native  [] D.  Von  [] E. [place]  [] F. New Zealander  [] G. [place]  [] Jannet Correa  [] I. [place]  [] J.  Other   [] K.   [] L. Bhutanese or [place]  [] M. Austrian  [] N. Other [address] [place] [address]  [] X. Patient unable to respond  [] Y. Patient declines to respond  [] Z. None of the above    Language  A. \"What is your preferred language? \"   English    B. \"Do you need or want an  to communicate with a doctor or health care staff? \"  Check only one:  [x] 0. No  [] 1. Yes  [] 9. Unable to determine    Transportation  \"Has lack of transportation kept you from medical appointments, meetings, work, or from getting things needed for daily living? \"Check all that apply:  [x] A.  Yes, it has kept me from medical appointments or from getting my medications  [] B.  Yes, it has kept me from non-medical meetings, appointments, work, or from getting things that I need  [] C.  No  [] X. Patient unable to respond  [] Y. Patient declines to respond    Hearing  Ability to hear (with hearing aid or hearing appliances if normally used)  [x]  0. Adequate - no difficulty in normal conversation, social interaction, listening to TV  []  1. Minimal difficulty - difficulty in some environments (e.g. when person speaks softly or setting is noisy)  []  2. Moderate difficulty - speaker has to increase volume and speak distinctly   []  3.   Highly impaired - absence of

## 2023-07-22 NOTE — PROGRESS NOTES
Vitals 7/21/23 2113  Temp: 97.9  Pulse: 67  Resp: 16  BP: 170/57  SPO2: 96% RA    Shift assessment complete, pt alert and oriented x 4. VSS with elevated blood pressure 170/57. Dr. Lisa Perry notified and 25mg hydralazine ordered. At [address] [place] [address] pt complained of nausea stating the the noise from the vent in her room 3106 was making her feel sick and she could not sleep. Oral Zofran 4mg was given to pt at [facility]. Pt vomited small amount of yellow emesis. Pt continued to state that the noise in her room is continuing to make her feel sick. Pt moved to room 3111 pt states that the room is much quieter and she will be able to sleep. Pt is resting in bed with eyes closed. Call light within reach. Will continue to monitor.

## 2023-07-22 NOTE — PROGRESS NOTES
with UB/LB dressing and bathing this date. Max cues for motivation and participation in session. Pt with decreased cognition increased cues throughout. Pt would benefit from cont therapies. Cont POC. Treatment Diagnosis: Impaired ADLs, mobility, motor control s/p stroke  Decision Making: Medium Complexity  REQUIRES OT FOLLOW-UP: Yes  Activity Tolerance  Activity Tolerance: Patient limited by fatigue;Treatment limited secondary to decreased cognition;Patient limited by pain        Plan   Occupational Therapy Plan  Times Per Week: 5x week 60 min  Current Treatment Recommendations: Strengthening, Balance training, Self-Care / ADL, Safety education & training, Functional mobility training, Endurance training, Patient/Caregiver education & training, Equipment evaluation, education, & procurement, Neuromuscular re-education, Coordination training     Restrictions  Position Activity Restriction  Other position/activity restrictions: up with assist, amb pt    Subjective   General  Chart Reviewed: Yes  Patient assessed for rehabilitation services?: Yes  Additional Pertinent Hx: presented to the ED 7/16 with sudden onset of exhaustion, dizziness, nausea, diaphoretic confusion and left hemiparesis. CT head (-)  CTA concerning for age indeterminate basilar tip occlusion with left posterior cerebral and superior cerebellar artery occlusion. Patient was given TNK .  PMHx: R TMA, HTN, afib, CAD, DM, neuropathy, R TSA  Referring Practitioner: Viktor  Diagnosis: CVA     Social/Functional History  Social/Functional History  Lives With: Family  Type of Home: Trailer  Home Layout: One level  Home Access: Stairs to enter with rails  Entrance Stairs - Number of Steps: 4-5  Entrance Stairs - Rails: Right  Bathroom Shower/Tub: Walk-in shower (pt does not use \"it's dirty, they don't clean\", does sponge baths)  Bathroom Toilet: Standard  Bathroom Equipment: Grab bars around toilet  Home Equipment: delilah Moore  Has the patient had two or stance  Long Term Goal 5: Fx transfers with [facility]       Therapy Time   Individual Concurrent Group Co-treatment   Time In 1015     0900   Time Out 1030     1015   Minutes 15     75       Timed Code Treatment Minutes:   15+94    Total Treatment Minutes: Mitchell Albright OT

## 2023-07-23 LAB
GLUCOSE BLD-MCNC: 105 MG/DL (ref 70–99)
GLUCOSE BLD-MCNC: 106 MG/DL (ref 70–99)
GLUCOSE BLD-MCNC: 109 MG/DL (ref 70–99)
GLUCOSE BLD-MCNC: 122 MG/DL (ref 70–99)
PERFORMED ON: ABNORMAL

## 2023-07-23 PROCEDURE — 6370000000 HC RX 637 (ALT 250 FOR IP): Performed by: PHYSICAL MEDICINE & REHABILITATION

## 2023-07-23 PROCEDURE — 99232 SBSQ HOSP IP/OBS MODERATE 35: CPT | Performed by: INTERNAL MEDICINE

## 2023-07-23 PROCEDURE — 1280000000 HC REHAB R&B

## 2023-07-23 PROCEDURE — 6360000002 HC RX W HCPCS: Performed by: PHYSICAL MEDICINE & REHABILITATION

## 2023-07-23 PROCEDURE — 6370000000 HC RX 637 (ALT 250 FOR IP): Performed by: INTERNAL MEDICINE

## 2023-07-23 RX ORDER — NIFEDIPINE 30 MG/1
30 TABLET, FILM COATED, EXTENDED RELEASE ORAL 2 TIMES DAILY
Status: DISCONTINUED | OUTPATIENT
Start: 2023-07-23 | End: 2023-07-27 | Stop reason: HOSPADM

## 2023-07-23 RX ORDER — CASTOR OIL AND BALSAM, PERU 788; 87 MG/G; MG/G
OINTMENT TOPICAL 2 TIMES DAILY
Status: DISCONTINUED | OUTPATIENT
Start: 2023-07-23 | End: 2023-07-27 | Stop reason: HOSPADM

## 2023-07-23 RX ADMIN — BISACODYL 5 MG: 5 TABLET, COATED ORAL at 07:53

## 2023-07-23 RX ADMIN — LISINOPRIL 20 MG: 20 TABLET ORAL at 21:29

## 2023-07-23 RX ADMIN — PANTOPRAZOLE SODIUM 40 MG: 40 TABLET, DELAYED RELEASE ORAL at 06:35

## 2023-07-23 RX ADMIN — NIFEDIPINE 30 MG: 30 TABLET, EXTENDED RELEASE ORAL at 21:29

## 2023-07-23 RX ADMIN — Medication: at 13:26

## 2023-07-23 RX ADMIN — NIFEDIPINE 30 MG: 30 TABLET, EXTENDED RELEASE ORAL at 07:57

## 2023-07-23 RX ADMIN — LISINOPRIL 20 MG: 20 TABLET ORAL at 07:53

## 2023-07-23 RX ADMIN — ROSUVASTATIN CALCIUM 40 MG: 20 TABLET, FILM COATED ORAL at 21:29

## 2023-07-23 RX ADMIN — DICLOFENAC SODIUM 4 G: 10 GEL TOPICAL at 13:27

## 2023-07-23 RX ADMIN — DICLOFENAC SODIUM 4 G: 10 GEL TOPICAL at 21:30

## 2023-07-23 RX ADMIN — ONDANSETRON 4 MG: 2 INJECTION INTRAMUSCULAR; INTRAVENOUS at 07:53

## 2023-07-23 RX ADMIN — ENOXAPARIN SODIUM 40 MG: 100 INJECTION SUBCUTANEOUS at 07:53

## 2023-07-23 RX ADMIN — Medication: at 21:30

## 2023-07-23 RX ADMIN — ASPIRIN 81 MG: 81 TABLET, COATED ORAL at 07:53

## 2023-07-23 NOTE — PLAN OF CARE
Problem: Discharge Planning  Goal: Discharge to home or other facility with appropriate resources  Recent Flowsheet Documentation  Taken 7/23/2023 0840 by Bety Fang RN  Discharge to home or other facility with appropriate resources: Identify barriers to discharge with patient and caregiver     Problem: ABCDS Injury Assessment  Goal: Absence of physical injury  Outcome: Progressing  Flowsheets (Taken 7/23/2023 1307)  Absence of Physical Injury: Implement safety measures based on patient assessment

## 2023-07-23 NOTE — PLAN OF CARE
Problem: Discharge Planning  Goal: Discharge to home or other facility with appropriate resources  7/22/2023 2333 by Anibal Lara RN  Discharge to home or other facility with appropriate resources: Identify barriers to discharge with patient and caregiver     Problem: Nutrition Deficit:  Goal: Optimize nutritional status  Flowsheets (Taken 7/22/2023 2333)  Nutrient intake appropriate for improving, restoring, or maintaining nutritional needs: Monitor oral intake, labs, and treatment plans

## 2023-07-23 NOTE — CONSULTS
Nephrology Consult Note                                                                                                                                                                                                                                                                                                                                                               Office : [phone]     Fax :[phone]              Patient's Name: Carlos Miller  7:15 AM  7/23/2023      Assessment/Plan   CVA     2. HTN    3. Afib     4. Acid- base/ Electrolyte imbalance     5. DM 2     Plan   - BP meds adjusted   - Ur studies   - Monitor BP   - check orthostats   - monitor lytes         Reason for Consult:  HTN   Requesting Physician:  Mack Friday, APRN - CNP      Chief Complaint:  CVA      History of Present Ilness: The pt is a 66year old lady with a PMHx of Afib, CAD s/p PCI HTN, HLD, and DM who presented to the ED with exhaustion, diaphoresis, and visual changes. Pt's symptoms began at 4:15 PM on 7/16/2023. She had an acute onset of confusion that progressed to L hemiparesis. Her CT head was negative for acute intracranial abnormalities. A CTA of her age-indeterminate basilar tip occlusion with left posterior cerebral and superior cerebellar artery occlusions. The  Stroke team was consulted, NIHSS was 9, the pt was administered TNK 7:37 PM at [facility] ED, and was transferred to [facility] ICU for neurological services. On arrival to the ICU, pt responsive and able to fully participate in writer's assessment and exam. MRI brain displayed an area of diffusion restriction seen involving the superior aspect of the left cerebellar hemisphere consistent with acute ischemia. A MRA displayed Mild narrowing in basilar tip and at origin of the posterior cerebral arteries   Bilaterally with her right greater than left.          Interval hx       Past Medical History:   Diagnosis Date    Atrial fibrillation ([address])     off coumadin

## 2023-07-23 NOTE — PROGRESS NOTES
Nephrology Consult Note                                                                                                                                                                                                                                                                                                                                                               Office : [phone]     Fax :[phone]              Patient's Name: Lynette Torres  7:57 AM  7/23/2023      Assessment/Plan   CVA     2. HTN    3. Afib     4. Acid- base/ Electrolyte imbalance     5. DM 2     Plan   - BP meds adjusted   - Ur studies - reviewed   - Monitor BP   - check orthostats   - monitor lytes         Reason for Consult:  HTN   Requesting Physician:  MARVIN Irvin - CNP      Chief Complaint:  CVA      History of Present Ilness: The pt is a 66year old lady with a PMHx of Afib, CAD s/p PCI HTN, HLD, and DM who presented to the ED with exhaustion, diaphoresis, and visual changes. Pt's symptoms began at 4:15 PM on 7/16/2023. She had an acute onset of confusion that progressed to L hemiparesis. Her CT head was negative for acute intracranial abnormalities. A CTA of her age-indeterminate basilar tip occlusion with left posterior cerebral and superior cerebellar artery occlusions. The  Stroke team was consulted, NIHSS was 9, the pt was administered TNK 7:37 PM at [facility]SH ED, and was transferred to [facility] ICU for neurological services. On arrival to the ICU, pt responsive and able to fully participate in writer's assessment and exam. MRI brain displayed an area of diffusion restriction seen involving the superior aspect of the left cerebellar hemisphere consistent with acute ischemia. A MRA displayed Mild narrowing in basilar tip and at origin of the posterior cerebral arteries   Bilaterally with her right greater than left.          Interval hx     BP better   No HA       Past Medical History:   Diagnosis Date    Atrial Ca/Mg/Phos:   Recent Labs     07/21/23  0500   CALCIUM 9.4       Hepatic: No results for input(s): AST, ALT, ALB, BILITOT, ALKPHOS in the last 72 hours. Troponin: No results for input(s): TROPONINI in the last 72 hours. BNP: No results for input(s): BNP in the last 72 hours. Lipids: No results for input(s): CHOL, TRIG, HDL, LDLCALC, LABVLDL in the last 72 hours. ABGs: No results for input(s): PHART, PO2ART, DYH7OVK in the last 72 hours. INR: No results for input(s): INR in the last 72 hours. UA:No results for input(s): Natha Anglican, GLUCOSEU, BILIRUBINUR, Maudine Schooner, BLOODU, PHUR, PROTEINU, UROBILINOGEN, NITRU, LEUKOCYTESUR, URINETYPE in the last 72 hours. Invalid input(s): LABMICR   Urine Microscopic: No results for input(s): LABCAST, BACTERIA, COMU, HYALCAST, WBCUA, RBCUA, EPIU in the last 72 hours. Urine Culture: No results for input(s): LABURIN in the last 72 hours. Urine Chemistry: No results for input(s): Mark Stammer, PROTEINUR, NAUR in the last 72 hours. IMAGING:  No orders to display           Medical Decision Making:   The following items were considered in medical decision making:  Discussion of patient care with other providers  Reviewed clinical lab tests  Reviewed radiology tests  Reviewed other diagnostic tests/interventions  Independent review of radiologic images - reviewed     D/w Primary team           Thank you for allowing us to participate in care of Mayra Morales MD  Feel free to contact me   [facility]  Office : [phone]  Fax :[phone]

## 2023-07-23 NOTE — PROGRESS NOTES
A&O.  Denies pain, but c/o heels feeling sore. Repositioned and applied bilateral podus boots. New order for venelex to the heels. Assessment complete. Refuses to get into chair. Safety measures in place. Denies any needs att his time.

## 2023-07-24 PROBLEM — R53.81 DEBILITY: Status: ACTIVE | Noted: 2023-07-24

## 2023-07-24 LAB
ANION GAP SERPL CALCULATED.3IONS-SCNC: 8 MMOL/L (ref 3–16)
BASOPHILS # BLD: 0.1 K/UL (ref 0–0.2)
BASOPHILS NFR BLD: 1.1 %
BUN SERPL-MCNC: 15 MG/DL (ref 7–20)
CALCIUM SERPL-MCNC: 9.8 MG/DL (ref 8.3–10.6)
CHLORIDE SERPL-SCNC: 103 MMOL/L (ref 99–110)
CO2 SERPL-SCNC: 27 MMOL/L (ref 21–32)
CREAT SERPL-MCNC: 0.9 MG/DL (ref 0.6–1.2)
DEPRECATED RDW RBC AUTO: 16.4 % (ref 12.4–15.4)
EOSINOPHIL # BLD: 0.3 K/UL (ref 0–0.6)
EOSINOPHIL NFR BLD: 4.5 %
GFR SERPLBLD CREATININE-BSD FMLA CKD-EPI: >60 ML/MIN/{1.73_M2}
GLUCOSE BLD-MCNC: 117 MG/DL (ref 70–99)
GLUCOSE BLD-MCNC: 129 MG/DL (ref 70–99)
GLUCOSE BLD-MCNC: 129 MG/DL (ref 70–99)
GLUCOSE BLD-MCNC: 151 MG/DL (ref 70–99)
GLUCOSE SERPL-MCNC: 123 MG/DL (ref 70–99)
HCT VFR BLD AUTO: 29.1 % (ref 36–48)
HGB BLD-MCNC: 9.7 G/DL (ref 12–16)
LYMPHOCYTES # BLD: 1.8 K/UL (ref 1–5.1)
LYMPHOCYTES NFR BLD: 24.9 %
MCH RBC QN AUTO: 26.6 PG (ref 26–34)
MCHC RBC AUTO-ENTMCNC: 33.4 G/DL (ref 31–36)
MCV RBC AUTO: 79.4 FL (ref 80–100)
MONOCYTES # BLD: 0.7 K/UL (ref 0–1.3)
MONOCYTES NFR BLD: 8.8 %
NEUTROPHILS # BLD: 4.5 K/UL (ref 1.7–7.7)
NEUTROPHILS NFR BLD: 60.7 %
PERFORMED ON: ABNORMAL
PLATELET # BLD AUTO: 195 K/UL (ref 135–450)
PMV BLD AUTO: 7.7 FL (ref 5–10.5)
POTASSIUM SERPL-SCNC: 4.3 MMOL/L (ref 3.5–5.1)
RBC # BLD AUTO: 3.67 M/UL (ref 4–5.2)
SODIUM SERPL-SCNC: 138 MMOL/L (ref 136–145)
WBC # BLD AUTO: 7.4 K/UL (ref 4–11)

## 2023-07-24 PROCEDURE — 99232 SBSQ HOSP IP/OBS MODERATE 35: CPT | Performed by: INTERNAL MEDICINE

## 2023-07-24 PROCEDURE — 1280000000 HC REHAB R&B

## 2023-07-24 PROCEDURE — 97530 THERAPEUTIC ACTIVITIES: CPT

## 2023-07-24 PROCEDURE — 97130 THER IVNTJ EA ADDL 15 MIN: CPT

## 2023-07-24 PROCEDURE — 6370000000 HC RX 637 (ALT 250 FOR IP): Performed by: INTERNAL MEDICINE

## 2023-07-24 PROCEDURE — 6370000000 HC RX 637 (ALT 250 FOR IP): Performed by: PHYSICAL MEDICINE & REHABILITATION

## 2023-07-24 PROCEDURE — 6360000002 HC RX W HCPCS: Performed by: PHYSICAL MEDICINE & REHABILITATION

## 2023-07-24 PROCEDURE — 92507 TX SP LANG VOICE COMM INDIV: CPT

## 2023-07-24 PROCEDURE — 97535 SELF CARE MNGMENT TRAINING: CPT

## 2023-07-24 PROCEDURE — 80048 BASIC METABOLIC PNL TOTAL CA: CPT

## 2023-07-24 PROCEDURE — 97112 NEUROMUSCULAR REEDUCATION: CPT

## 2023-07-24 PROCEDURE — 85025 COMPLETE CBC W/AUTO DIFF WBC: CPT

## 2023-07-24 PROCEDURE — 36415 COLL VENOUS BLD VENIPUNCTURE: CPT

## 2023-07-24 PROCEDURE — 97129 THER IVNTJ 1ST 15 MIN: CPT

## 2023-07-24 RX ORDER — SENNA AND DOCUSATE SODIUM 50; 8.6 MG/1; MG/1
2 TABLET, FILM COATED ORAL NIGHTLY
Status: DISCONTINUED | OUTPATIENT
Start: 2023-07-24 | End: 2023-07-27 | Stop reason: HOSPADM

## 2023-07-24 RX ORDER — ATENOLOL 50 MG/1
50 TABLET ORAL DAILY
Status: DISCONTINUED | OUTPATIENT
Start: 2023-07-24 | End: 2023-07-26

## 2023-07-24 RX ORDER — ENOXAPARIN SODIUM 100 MG/ML
30 INJECTION SUBCUTANEOUS DAILY
Status: DISCONTINUED | OUTPATIENT
Start: 2023-07-25 | End: 2023-07-26 | Stop reason: ALTCHOICE

## 2023-07-24 RX ADMIN — LISINOPRIL 20 MG: 20 TABLET ORAL at 21:24

## 2023-07-24 RX ADMIN — PANTOPRAZOLE SODIUM 40 MG: 40 TABLET, DELAYED RELEASE ORAL at 05:13

## 2023-07-24 RX ADMIN — Medication: at 08:36

## 2023-07-24 RX ADMIN — ENOXAPARIN SODIUM 40 MG: 100 INJECTION SUBCUTANEOUS at 08:30

## 2023-07-24 RX ADMIN — NIFEDIPINE 30 MG: 30 TABLET, EXTENDED RELEASE ORAL at 08:29

## 2023-07-24 RX ADMIN — ONDANSETRON 4 MG: 4 TABLET, ORALLY DISINTEGRATING ORAL at 13:45

## 2023-07-24 RX ADMIN — NIFEDIPINE 30 MG: 30 TABLET, EXTENDED RELEASE ORAL at 21:24

## 2023-07-24 RX ADMIN — LISINOPRIL 20 MG: 20 TABLET ORAL at 08:30

## 2023-07-24 RX ADMIN — ROSUVASTATIN CALCIUM 40 MG: 20 TABLET, FILM COATED ORAL at 21:30

## 2023-07-24 RX ADMIN — SENNOSIDES AND DOCUSATE SODIUM 2 TABLET: 50; 8.6 TABLET ORAL at 21:24

## 2023-07-24 RX ADMIN — DICLOFENAC SODIUM 4 G: 10 GEL TOPICAL at 13:02

## 2023-07-24 RX ADMIN — BISACODYL 5 MG: 5 TABLET, COATED ORAL at 08:30

## 2023-07-24 RX ADMIN — Medication: at 21:24

## 2023-07-24 RX ADMIN — DICLOFENAC SODIUM 4 G: 10 GEL TOPICAL at 21:24

## 2023-07-24 RX ADMIN — ATENOLOL 50 MG: 50 TABLET ORAL at 13:03

## 2023-07-24 RX ADMIN — ASPIRIN 81 MG: 81 TABLET, COATED ORAL at 08:34

## 2023-07-24 NOTE — PROGRESS NOTES
ACUTE REHAB UNIT  SPEECH/LANGUAGE PATHOLOGY      [x] Daily  [] Weekly Care Conference Note  [] Discharge    Patient:Xuan Tillman      :1945  DIN:[id number]  Rehab Dx/Hx: CVA (cerebrovascular accident due to intracerebral hemorrhage) ([address]) [I61.9]    Precautions: [] Aspiration  [x] Fall risk  [] Sternal  [] Seizure [] Hip  [] Weight Bearing [] Other     ST Dx: [] Aphasia  [x] Dysarthria  [] Apraxia   [] Oropharyngeal dysphagia [x] Cognitive Impairment  [] Other:   Date of Admit: 2023  Room #: 3111/3111-01  Date: 2023          Current Diet Order:ADULT DIET; Dysphagia - Soft and Bite Sized  ADULT ORAL NUTRITION SUPPLEMENT; Breakfast, Lunch, Dinner; Diabetic Oral Supplement   Recommended Form of Meds: PO (As tolerated.)  Compensatory Swallowing Strategies : Upright as possible for all oral intake, Alternate solids and liquids, Eat/Feed slowly, Small bites/sips            Lives With: Family              Leisure & Hobbies: Goes to [employer] every night    Dentition: Edentulous (Pt reports owning dentures but choosing not where them. Stated \"I don't need them. I can chew anything. \" Missing dentures can cause pt to be difficult to understand at times.)  Vision  Vision: Impaired  Vision Exceptions: Wears glasses at all times (Pt endorsed that her doctor recommended a stronger glasses perscription but she hasn't changed them yet.)  Hearing  Hearing: Within functional limits  Barriers toward progress: Limited insight into deficits        Date: 2023      Tx session 1 Tx session 2   Total Timed Code Min 33 0   Total Treatment Minutes 33 28   Individual Treatment Minutes 33 28   Group Treatment Minutes 0 0   Co-Treat Minutes 0 0   Brief Exception: N/A N/A   Pain None indicated None indicated   Pain Intervention: [] RN notified  [] Repositioned  [] Intervention offered and patient declined  [x] N/A  [] Other: [] RN notified  [] Repositioned  [] Intervention offered and patient declined  [x] N/A  [] Other: Subjective:     Pt seated in bed upon entry and agreeable to tx session. RN at side administering medications. Resting in bed upon entry and agreeable to tx session. Objective / Goals:     Pt will demonstrate improved sustained attention with min cues. During tx tasks, pt appeared with adequate sustained attention, however, between items, pt consistently closing eyes. Pt required cues to remain alert for tx session. Pt will demonstrate improved self monitoring and attempt self corrections of errors in x3/3 attempts given min cues. No instances of independent self-monitoring. Pt able to correct errors given mod cues. Pt demo'd self-monitoring during breath support tasks as pt implementing cues from SLP. Pt will complete graded executive function tasks with 80% accuracy given min cues. See below. Goal not targeted this session. Pt will utilize strategies to increase speech intelligibility with min cues. Goal not targeted this session. Targeted via breath support exercises:  -incentive spirometer: completed 3x10; pt with impulsivity initially but improved as task progressed. Reps short.  -sustained exhalation against resistance: completed x3 for average 8 seconds.   -sustained phonation of neutral vowel: 4.67 seconds. X4 instances of reduced comprehensibility. Educated pt to speech comprehensibility strategies: pt able to recall at end of session. Demo'd comprehension x1. Pt will participate in ongoing cognitive-linguistic assessment. Patient was administered portions of the DAR (Assessment of Language-Related Functional Activities) with the following results:  Counting Money: 30% accuracy in 9 minutes and 6 seconds. Reduced organization and attention to details evidenced by reduced denomination distinguishment. Pt able to correct with mod cues.  This correlates with an independent function rating of 3 which suggests a high probability of inability to function

## 2023-07-24 NOTE — CARE COORDINATION
Case Management Assessment  Initial Evaluation    Date/Time of Evaluation: 7/24/2023 4:28 PM  Assessment Completed by: CARLOS Bal LSW    If patient is discharged prior to next notation, then this note serves as note for discharge by case management. Patient Name: Ofelia Jimenez                   YOB: 1945  Diagnosis: CVA (cerebrovascular accident due to intracerebral hemorrhage) [facility]) [I61.9]                   Date / Time: 7/21/2023  5:49 PM    Patient Admission Status: REHAB IP   Readmission Risk (Low < 19, Mod (19-27), High > 27): Readmission Risk Score: 14.7    Current PCP: MARVIN Norton CNP  PCP verified by CM? Yes    Chart Reviewed: Yes      History Provided by: Medical Record  Patient Orientation: Alert and Oriented    Patient Cognition: Alert    Hospitalization in the last 30 days (Readmission):  No    If yes, Readmission Assessment in CM Navigator will be completed. Advance Directives:      Code Status: Full Code   Patient's Primary Decision Maker is: Legal Next of Kin    Primary Decision MakerMyuriy Graham Child - [phone]    Secondary Decision Maker: Jaylan Phillip - Daughter-in-Law - [phone]    Discharge Planning:    Patient lives with: Children Type of Home: Trailer/Mobile Home  Primary Care Giver: Self  Patient Support Systems include: Children   Current Financial resources: Medicare  Current community resources:    Current services prior to admission: Durable Medical Equipment            Current DME: Walker            Type of Home Care services:  None    ADLS  Prior functional level: Independent in ADLs/IADLs  Current functional level: Assistance with the following:, Mobility, Shopping, Bathing, Dressing, Toileting, Cooking, Housework    PT AM-PAC:   /24  OT AM-PAC:   /24    Family can provide assistance at DC: No  Would you like Case Management to discuss the discharge plan with any other family members/significant others, and if so, who?  No  Plans to Return Patient   Patient Representative Name:       The Patient and/or Patient Representative Agree with the Discharge Plan?  Yes    CARLOS Alba, [facility]  Case Management Department  Ph: [phone]

## 2023-07-24 NOTE — PLAN OF CARE
Problem: Discharge Planning  Goal: Discharge to home or other facility with appropriate resources  Discharge to home or other facility with appropriate resources: Identify barriers to discharge with patient and caregiver     Problem: Nutrition Deficit:  Goal: Optimize nutritional status  Flowsheets (Taken 7/24/2023 0158)  Nutrient intake appropriate for improving, restoring, or maintaining nutritional needs: Monitor oral intake, labs, and treatment plans

## 2023-07-24 NOTE — PROGRESS NOTES
Pharmacist Review and Automatic Dose Adjustment of Prophylactic Enoxaparin    The reviewing pharmacist has made an adjustment to the ordered enoxaparin dose or converted to UFH per the approved [facility] protocol and table as identified below. Micheline Montgomery is a 66 y.o. female. Recent Labs     07/24/23  0607   CREATININE 0.9       Estimated Creatinine Clearance: 41 mL/min (based on SCr of 0.9 mg/dL). Recent Labs     07/24/23  0606   HGB 9.7*   HCT 29.1*        No results for input(s): INR in the last 72 hours. Height:   Ht Readings from Last 1 Encounters:   07/22/23 5' 3\" (1.6 m)     Weight:  Wt Readings from Last 1 Encounters:   07/24/23 109 lb 12.6 oz (49.8 kg)       Plan: Based upon the patient's weight and renal function, the ordered enoxaparin dose of 40 mg daily has been changed/converted to 30 mg daily.       Thank you,  Carmine Carrion, [facility]  7/24/2023, 3:39 PM

## 2023-07-24 NOTE — PATIENT CARE CONFERENCE
Inpatient Rehabilitation  Weekly Team Conference Note  The [address] [place] [address]  [phone]  Patient Name: Pedro Chavez        MRN: [id number]    : 1945  (66 y.o.)  Gender: female   Referring Practitioner: Earlene Sarmiento DO  Diagnosis: CVA  The team conference for this patient was held on 2023 at 10:30am by:  Severa Spoon. Heis, DO    Current/Goal QM SCORES  QM Current/Goal Score   Eating CARE Score: 5 / Discharge Goal: Independent   Oral Hygiene CARE Score: 4 / Discharge Goal: Independent   Shower/Bathing CARE Score: 2 / Discharge Goal: Independent   UB Dressing CARE Score: 3 / Discharge Goal: Independent   LB Dressing CARE Score: 1 / Discharge Goal: Independent   Putting on/off Footwear CARE Score: 1 / Discharge Goal: Independent   Toileting Hygiene CARE Score: 4 / Discharge Goal: Independent   Bladder Continence Bladder Continence: Always continent    Bowel Continence Bowel Continence: Not rated    Toilet Transfers CARE Score: 2 / Discharge Goal: Independent   Shower/Bathe Self  CARE Score: 2 / Discharge Goal: Independent   Rolling Left and Right CARE Score: 3 / Discharge Goal: Independent   Sit to Lying CARE Score: 4 / Discharge Goal: Independent   Lying to Sitting on Bedside CARE Score: 3 / Discharge Goal: Independent   Sit to Stand CARE Score: 1 / Discharge Goal: Supervision or touching assistance   Chair/Bed to Chair Transfer CARE Score: 1 / Discharge Goal: Supervision or touching assistance   Car Transfers CARE Score: 88 / Discharge Goal: Supervision or touching assistance   Walk 10 Feet CARE Score: 88 / Discharge Goal: Supervision or touching assistance   Walk 50 Feet with Two Turns CARE Score: 88 / Discharge Goal: Supervision or touching assistance   Walk 150 Feet CARE Score: 88 / Discharge Goal: Supervision or touching assistance   Walk 10 Feet on Uneven Surfaces CARE Score: 88 / Discharge Goal: Supervision or touching assistance   1 Respiratory/Pulmonary dysfunction  [] Integumentary complications  [] Peripheral Vascular dysfunction  [x] Musculoskeletal dysfunction  [x] Neurological dysfunction d/t:  [x] CVA  [] SCI  [] TBI  [] Other: __________  [] Renal dysfunction  [] Hematologic dysfunction    [] Endocrine disorders  [] GI disorders     [] Genito-Urinary dysfunction    Assessment/Plan:  [x] The patient is making good progression towards their LTG's, is actively participating in, and has a reasonable expectation to continue to benefit from the intensive rehabilitation program.  [] The estimated discharge date has been changed from initial team conference due to:   [] The estimated discharge destination has been changed from initial team conference due to:     Rehab Team Members in attendance for Team Conference:  ARU Supervisor/PPS Coordinator:  Suellen Mohan PT, DPT    Therapy Manager/ARU :  Kinga Woodard PT, DPT    Nursing Manager:  Mirta Bar RN    Social Work:  Mitchel Jett    Nursing:  Shawn Peguero, RN  Christina Guadarrama, RN    Therapy:  Jess Carpenter, [address], OTR/L  Claudia Thompson MA-[facility], SLP    Nutrition:  Radha Walton, [address] [facility]:    [place]: [phone]  Office:  [phone]    I approve the established interdisciplinary plan of care as documented within the medical record of Raleigh Hendricks.    AVANI LiceaO. M.P.H  PM&R  7/25/2023  11:43 AM

## 2023-07-24 NOTE — PROGRESS NOTES
Occupational Therapy  Facility/Department: [facility] ACUTE REHAB UNIT  Rehabilitation Occupational Therapy Daily Treatment Note    Date: 23  Patient Name: Madeline Mendoza       Room: 3111/3111-01  MRN: [id number]  Account: [de-identified]   : 1945  (74 y.o.) Gender: female                    Past Medical History:  has a past medical history of Atrial fibrillation ([address]), Blood transfusion, CAD (coronary artery disease), Diabetes mellitus type II, Diabetic neuropathy ([address]), Gastric ulcer, H. pylori infection, Hyperlipidemia, Hypertension, Numbness and tingling of left leg, Osteoarthritis, Poor historian, Primary hypertension, PVD (peripheral vascular disease) ([address]), Unspecified cerebral artery occlusion with cerebral infarction, and upper gi bleed. Past Surgical History:   has a past surgical history that includes Coronary angioplasty with stent (3516,1117); Cardiac catheterization (12); Total shoulder arthroplasty (10/5/12); angioplasty (12/30/15); other surgical history (Left, 2017); Foot surgery (Left, 2018); Toe amputation (Right, 2022); Upper gastrointestinal endoscopy (N/A, 2022); Colonoscopy (2022); Colonoscopy (N/A, 2022); and Toe amputation (Right, 2022). Restrictions  Other position/activity restrictions: up with assist, amb pt    Subjective  Subjective: Pt supine in bed upon arrival, agreeable to therapy. Co-tx indicated to maximize safety and therapeutic potential. Pt reporting pain in L shoulder throughout session, RN aware. \"This left arm just doesn't work since the stroke. \"                Objective     Cognition  Overall Cognitive Status: Exceptions  Arousal/Alertness: Delayed responses to stimuli  Following Commands:  Follows one step commands with increased time  Attention Span: Attends with cues to redirect  Memory: Decreased short term memory  Safety Judgement: Decreased awareness of need for assistance  Problem Solving: Decreased awareness of

## 2023-07-24 NOTE — PROGRESS NOTES
Physical Therapy  Facility/Department: [facility] ACUTE REHAB UNIT  Rehabilitation Physical Therapy Treatment Note    NAME: Caden Ledbetter  : 1945 (66 y.o.)  MRN: [id number]  CODE STATUS: Full Code    Date of Service: 23     This was a co-treatment of PT/OT to utilize the skills of both disciplines to provide the most optimal outcome and treatment for this patient      Restrictions:  Position Activity Restriction  Other position/activity restrictions: up with assist, amb pt     SUBJECTIVE  Subjective  Subjective: Pt in bed upon arrival, agreeablePT  Pain: Pt noting pain in her L shoulder but unable to rate        Post Treatment Pain Screening         OBJECTIVE  Cognition  Overall Cognitive Status: Exceptions  Arousal/Alertness: Delayed responses to stimuli  Following Commands: Follows one step commands with increased time  Attention Span: Attends with cues to redirect  Memory: Decreased short term memory  Safety Judgement: Decreased awareness of need for assistance  Problem Solving: Decreased awareness of errors;Assistance required to generate solutions  Insights: Decreased awareness of deficits  Initiation: Requires cues for some  Sequencing: Requires cues for all  Cognition Comment: Pt perseverating on decreased strength/functional use of LUE and pain in L shoulder, frequently closing eyes throughout session and required cues to maintain attention to task  Orientation  Overall Orientation Status: Within Functional Limits    Functional Mobility  Roll Right  Assistance Level: Minimal assistance  Skilled Clinical Factors: VCs for energy efficient technique, PT assists with reaching L UE over to R during roll  Sit to Supine  Assistance Level: Moderate assistance  Skilled Clinical Factors: VCs for energy efficient technique, ModA for trunk ascension.  req's consistent VCs for hand placement during trunk descension  Scooting  Assistance Level: Contact guard assist;Maximum assistance  Skilled Clinical Factors: CGA as

## 2023-07-25 ENCOUNTER — APPOINTMENT (OUTPATIENT)
Dept: GENERAL RADIOLOGY | Age: 78
DRG: 057 | End: 2023-07-25
Attending: PHYSICAL MEDICINE & REHABILITATION
Payer: MEDICARE

## 2023-07-25 LAB
GLUCOSE BLD-MCNC: 104 MG/DL (ref 70–99)
GLUCOSE BLD-MCNC: 110 MG/DL (ref 70–99)
GLUCOSE BLD-MCNC: 151 MG/DL (ref 70–99)
GLUCOSE BLD-MCNC: 151 MG/DL (ref 70–99)
PERFORMED ON: ABNORMAL

## 2023-07-25 PROCEDURE — 6370000000 HC RX 637 (ALT 250 FOR IP): Performed by: PHYSICAL MEDICINE & REHABILITATION

## 2023-07-25 PROCEDURE — 73030 X-RAY EXAM OF SHOULDER: CPT

## 2023-07-25 PROCEDURE — 97130 THER IVNTJ EA ADDL 15 MIN: CPT

## 2023-07-25 PROCEDURE — 97112 NEUROMUSCULAR REEDUCATION: CPT

## 2023-07-25 PROCEDURE — 92507 TX SP LANG VOICE COMM INDIV: CPT

## 2023-07-25 PROCEDURE — 97129 THER IVNTJ 1ST 15 MIN: CPT

## 2023-07-25 PROCEDURE — 6360000002 HC RX W HCPCS: Performed by: PHYSICAL MEDICINE & REHABILITATION

## 2023-07-25 PROCEDURE — 97530 THERAPEUTIC ACTIVITIES: CPT

## 2023-07-25 PROCEDURE — 99232 SBSQ HOSP IP/OBS MODERATE 35: CPT | Performed by: INTERNAL MEDICINE

## 2023-07-25 PROCEDURE — 6370000000 HC RX 637 (ALT 250 FOR IP): Performed by: INTERNAL MEDICINE

## 2023-07-25 PROCEDURE — 1280000000 HC REHAB R&B

## 2023-07-25 RX ORDER — GUAIFENESIN 600 MG/1
600 TABLET, EXTENDED RELEASE ORAL 2 TIMES DAILY
Status: DISCONTINUED | OUTPATIENT
Start: 2023-07-25 | End: 2023-07-27 | Stop reason: HOSPADM

## 2023-07-25 RX ORDER — FAMOTIDINE 20 MG/1
20 TABLET, FILM COATED ORAL DAILY
Status: DISCONTINUED | OUTPATIENT
Start: 2023-07-25 | End: 2023-07-27 | Stop reason: HOSPADM

## 2023-07-25 RX ORDER — LACTULOSE 10 G/15ML
20 SOLUTION ORAL 3 TIMES DAILY
Status: DISCONTINUED | OUTPATIENT
Start: 2023-07-25 | End: 2023-07-27 | Stop reason: HOSPADM

## 2023-07-25 RX ADMIN — LACTULOSE 20 G: 20 SOLUTION ORAL at 14:46

## 2023-07-25 RX ADMIN — Medication: at 21:59

## 2023-07-25 RX ADMIN — LACTULOSE 20 G: 20 SOLUTION ORAL at 21:59

## 2023-07-25 RX ADMIN — GUAIFENESIN 600 MG: 600 TABLET ORAL at 12:09

## 2023-07-25 RX ADMIN — DICLOFENAC SODIUM 4 G: 10 GEL TOPICAL at 09:35

## 2023-07-25 RX ADMIN — Medication: at 09:40

## 2023-07-25 RX ADMIN — GUAIFENESIN 600 MG: 600 TABLET ORAL at 21:59

## 2023-07-25 RX ADMIN — LISINOPRIL 20 MG: 20 TABLET ORAL at 21:59

## 2023-07-25 RX ADMIN — SENNOSIDES AND DOCUSATE SODIUM 2 TABLET: 50; 8.6 TABLET ORAL at 21:59

## 2023-07-25 RX ADMIN — ASPIRIN 81 MG: 81 TABLET, COATED ORAL at 09:34

## 2023-07-25 RX ADMIN — ROSUVASTATIN CALCIUM 40 MG: 20 TABLET, FILM COATED ORAL at 21:58

## 2023-07-25 RX ADMIN — NIFEDIPINE 30 MG: 30 TABLET, EXTENDED RELEASE ORAL at 09:34

## 2023-07-25 RX ADMIN — ENOXAPARIN SODIUM 30 MG: 100 INJECTION SUBCUTANEOUS at 09:35

## 2023-07-25 RX ADMIN — PANTOPRAZOLE SODIUM 40 MG: 40 TABLET, DELAYED RELEASE ORAL at 06:15

## 2023-07-25 RX ADMIN — ONDANSETRON 4 MG: 4 TABLET, ORALLY DISINTEGRATING ORAL at 12:55

## 2023-07-25 RX ADMIN — NIFEDIPINE 30 MG: 30 TABLET, EXTENDED RELEASE ORAL at 21:59

## 2023-07-25 RX ADMIN — LISINOPRIL 20 MG: 20 TABLET ORAL at 09:34

## 2023-07-25 RX ADMIN — BISACODYL 5 MG: 5 TABLET, COATED ORAL at 09:34

## 2023-07-25 RX ADMIN — FAMOTIDINE 20 MG: 20 TABLET, FILM COATED ORAL at 12:09

## 2023-07-25 RX ADMIN — ATENOLOL 50 MG: 50 TABLET ORAL at 09:34

## 2023-07-25 RX ADMIN — DICLOFENAC SODIUM 4 G: 10 GEL TOPICAL at 21:59

## 2023-07-25 ASSESSMENT — PAIN SCALES - GENERAL: PAINLEVEL_OUTOF10: 0

## 2023-07-25 NOTE — PLAN OF CARE
Problem: Skin/Tissue Integrity  Goal: Absence of new skin breakdown  Description: 1. Monitor for areas of redness and/or skin breakdown  2. Assess vascular access sites hourly  3. Every 4-6 hours minimum:  Change oxygen saturation probe site  4. Every 4-6 hours:  If on nasal continuous positive airway pressure, respiratory therapy assess nares and determine need for appliance change or resting period. 7/25/2023 1835 by Leonarda Gage RN  Note: Left heel continues to be red and boggy with pain. 7/25/2023 1834 by Leonarda Gage RN  Outcome: Not Progressing     Problem: Nutrition Deficit:  Goal: Optimize nutritional status  7/25/2023 1835 by Leonarda Gage RN  Outcome: Not Progressing  Note: Patient continues to have poor appetite and vomits after eating at times.    7/25/2023 1834 by Leonarda Gage, RN  Outcome: Not Progressing

## 2023-07-25 NOTE — PROGRESS NOTES
Famotidine ordered for patient. This medication is renally eliminated. Will change to famotidine 20 mg once daily per renal dose adjustment policy. Estimated Creatinine Clearance: 41 mL/min (based on SCr of 0.9 mg/dL). Pharmacy will continue to monitor renal function and adjust dose as necessary. Please call with any questions. Thanks!     Oswaldo Benites, [facility]  The [address] [place] [address]  7/25/2023   11:34 AM

## 2023-07-25 NOTE — PROGRESS NOTES
ACUTE REHAB UNIT  SPEECH/LANGUAGE PATHOLOGY      [x] Daily  [x] Weekly Care Conference Note  [] Discharge    Patient:Xuan Shultz      :1945  BAD:[id number]  Rehab Dx/Hx: CVA (cerebrovascular accident due to intracerebral hemorrhage) ([address]) [I61.9]    Precautions: [] Aspiration  [x] Fall risk  [] Sternal  [] Seizure [] Hip  [] Weight Bearing [] Other     ST Dx: [] Aphasia  [x] Dysarthria  [] Apraxia   [] Oropharyngeal dysphagia [x] Cognitive Impairment  [] Other:   Date of Admit: 2023  Room #: 3111/3111-01  Date: 2023          Current Diet Order:ADULT DIET; Dysphagia - Soft and Bite Sized  ADULT ORAL NUTRITION SUPPLEMENT; Breakfast, Lunch, Dinner; Diabetic Oral Supplement   Recommended Form of Meds: PO (As tolerated.)  Compensatory Swallowing Strategies : Upright as possible for all oral intake, Alternate solids and liquids, Eat/Feed slowly, Small bites/sips            Lives With: Family              Leisure & Hobbies: Goes to [employer] every night    Dentition: Edentulous (Pt reports owning dentures but choosing not where them. Stated \"I don't need them. I can chew anything. \" Missing dentures can cause pt to be difficult to understand at times.)  Vision  Vision: Impaired  Vision Exceptions: Wears glasses at all times (Pt endorsed that her doctor recommended a stronger glasses perscription but she hasn't changed them yet.)  Hearing  Hearing: Within functional limits  Barriers toward progress: Limited insight into deficits        Date: 2023       Tx session 1 Tx session 2 Weekly Summary   Total Timed Code Min 20 15    Total Treatment Minutes 30 30    Individual Treatment Minutes 30 30    Group Treatment Minutes 0 0    Co-Treat Minutes 0 0    Brief Exception: N/A N/A    Pain None indicated None indicated    Pain Intervention: [] RN notified  [] Repositioned  [] Intervention offered and patient declined  [x] N/A  [] Other: [] RN notified  [] Repositioned  [] Intervention offered and patient

## 2023-07-25 NOTE — PROGRESS NOTES
Physical Therapy  Facility/Department: [facility] ACUTE REHAB UNIT  Rehabilitation Physical Therapy Treatment Note    NAME: Carlos Miller  : 1945 (66 y.o.)  MRN: [id number]  CODE STATUS: Full Code    Date of Service: 23   This was a collaborative treatment of PT/OT services to allow for both to use their skills, providing the most optimal outcome for this patient    Restrictions:  Position Activity Restriction  Other position/activity restrictions: up with assist, amb pt     SUBJECTIVE  Subjective  Subjective: Pt in recliner upon arrival, agreeable PT  Pain: Pt noting pain in her L shoulder but unable to rate        Post Treatment Pain Screening         OBJECTIVE  Cognition  Overall Cognitive Status: Exceptions  Arousal/Alertness: Delayed responses to stimuli; Appropriate responses to stimuli  Following Commands: Follows one step commands with increased time; Follows one step commands with repetition  Attention Span: Attends with cues to redirect  Memory: Decreased short term memory  Safety Judgement: Decreased awareness of need for assistance  Problem Solving: Decreased awareness of errors;Assistance required to generate solutions  Insights: Decreased awareness of deficits  Initiation: Requires cues for some  Sequencing: Requires cues for all  Cognition Comment: Pt frequently closing eyes throughout session and required cues to maintain attention to task  Orientation  Overall Orientation Status: Within Functional Limits    Functional Mobility  Scooting  Assistance Level: Contact guard assist;Stand by assist  Skilled Clinical Factors: Pt able to scoot to edge of mat and posteriorly in short sitting.  Also scoots back in w/c  Balance  Sitting Balance: Contact guard assistance (CGA requiring VCs to maintain midline as tendency is to lean to L and close eyes)  Standing Balance: Minimal assistance (Stands with RW)  Standing Balance  Time: 1 min (without RW) x 3min (using 1 inch step stool under R LE) x

## 2023-07-25 NOTE — PROGRESS NOTES
Occupational Therapy  Facility/Department: [facility] ACUTE REHAB UNIT  Rehabilitation Occupational Therapy Daily Treatment Note    Date: 23  Patient Name: Bridgette Mancia       Room: 3111/3111-01  MRN: [id number]  Account: [de-identified]   : 1945  (74 y.o.) Gender: female                    Past Medical History:  has a past medical history of Atrial fibrillation ([address]), Blood transfusion, CAD (coronary artery disease), Diabetes mellitus type II, Diabetic neuropathy ([address]), Gastric ulcer, H. pylori infection, Hyperlipidemia, Hypertension, Numbness and tingling of left leg, Osteoarthritis, Poor historian, Primary hypertension, PVD (peripheral vascular disease) ([address]), Unspecified cerebral artery occlusion with cerebral infarction, and upper gi bleed. Past Surgical History:   has a past surgical history that includes Coronary angioplasty with stent (1118,7109); Cardiac catheterization (12); Total shoulder arthroplasty (10/5/12); angioplasty (12/30/15); other surgical history (Left, 2017); Foot surgery (Left, 2018); Toe amputation (Right, 2022); Upper gastrointestinal endoscopy (N/A, 2022); Colonoscopy (2022); Colonoscopy (N/A, 2022); and Toe amputation (Right, 2022). Restrictions  Other position/activity restrictions: up with assist, amb pt    Subjective  Subjective: Pt seated in recliner upon arrival, agreeable to therapy with encouragement. Pt verbalized feeling cold and having pain in L shoulder and knee. RN present to administer voltaren gel and Dr. Sandie Bowles notified of shoulder pain w/ all movement- stated he would order an x-ray. Co-tx indicated to maximize safety and therapeutic potential.                Objective     Cognition  Overall Cognitive Status: Exceptions  Arousal/Alertness: Delayed responses to stimuli; Appropriate responses to stimuli  Following Commands: Follows one step commands with increased time; Follows one step commands with repetition  Attention Span: hand placement   OT Exercises  Dynamic Sitting Balance Exercises: Pt sat on edge of mat in short sitting and completed dynamic reaching w/ RUE and LUE laterally and anteriorly outside [place] to targets ~ 2 mins with SBA and no loss of balance. To further challenge balance, mat raised to unweight feet from ground and pt completed dynamic reaching w/ RUE anteriorly ~5 mins to write names of states and cities on mirror, cued to place L hand in lap to reduce support, completed w/ CGA and no loss of balance. Pt required intermittent VC and TC to return to midline when beginning to lean to L and for erect posture when beginning to flex forward. Pt demo'd improved ability to maintain and return to midline w/ less assist today. Static Standing Balance Exercises: Pt completed the following activity to address standing balance and improve posture and stability for standing ADLs and functional mobility. Pt stood 1 min + 3 mins + 3 mins with BUE support on RW and CGA-Min A x1 + assist of a second therapist to facilitate erect posture. Pt w/ forward flexed trunk and required cues for hip and trunk extension as well as keeping her head up and eyes open. Pt's LLE w/ significant valgus and pt reporting pain in L knee w/ all weightbearing (states she has a built up R shoe that family will bring in tomorrow). Assessment  Assessment  Assessment: Pt is making good progress with functional transfers and required Min Ax1 from w/c >< mat. Pt also demo'd improved sitting and standing balance today and required less cues to maintain midline and erect posture. Pt cont to present below functional baseline and is limited by L sided weakness and L shoulder/L knee pain. Pt benefits from cont skilled OT to maximize independence and safety w/ functional tasks, cont OT per POC. Activity Tolerance: Patient limited by fatigue;Patient limited by endurance; Patient tolerated evaluation without incident (intermittent nausea)  Discharge

## 2023-07-25 NOTE — PROGRESS NOTES
Patient alert and oriented x4, shift assessments completed Q4H. Patient has continued to have a decreased appetite. Zofran given for nausea and vomiting.  Patient is spending more time up to the chair than in the past.

## 2023-07-25 NOTE — PLAN OF CARE
Problem: Skin/Tissue Integrity  Goal: Absence of new skin breakdown  Description: 1. Monitor for areas of redness and/or skin breakdown  2. Assess vascular access sites hourly  3. Every 4-6 hours minimum:  Change oxygen saturation probe site  4. Every 4-6 hours:  If on nasal continuous positive airway pressure, respiratory therapy assess nares and determine need for appliance change or resting period.   Outcome: Not Progressing     Problem: Nutrition Deficit:  Goal: Optimize nutritional status  Outcome: Not Progressing

## 2023-07-25 NOTE — CARE COORDINATION
SW met w/Pt at bedside. Pt is indp at baseline w/a RW. Pt has 4-5 ALIDA at home. Pt's son helps her as much as possible w/her ADLs. Pt plans to DC home w/HHC or OP therapy. SW informed pt of DC date and updated the whiteboard. Pt's son will transport her home. DC recs are still being determined, SW will continue to assess. DC Date: 8/11      GO spoke to Gómez Clifford from [facility], she is following for poss [address] needs. SW following.   Electronically signed by CARLOS Street, LSW on 7/25/2023 at 3:17 PM  [phone]

## 2023-07-26 PROBLEM — R00.1 BRADYCARDIA: Status: ACTIVE | Noted: 2023-07-26

## 2023-07-26 LAB
ANION GAP SERPL CALCULATED.3IONS-SCNC: 9 MMOL/L (ref 3–16)
BASOPHILS # BLD: 0.2 K/UL (ref 0–0.2)
BASOPHILS NFR BLD: 1.7 %
BUN SERPL-MCNC: 27 MG/DL (ref 7–20)
CALCIUM SERPL-MCNC: 9.1 MG/DL (ref 8.3–10.6)
CHLORIDE SERPL-SCNC: 100 MMOL/L (ref 99–110)
CO2 SERPL-SCNC: 26 MMOL/L (ref 21–32)
CREAT SERPL-MCNC: 1.4 MG/DL (ref 0.6–1.2)
DEPRECATED RDW RBC AUTO: 16.7 % (ref 12.4–15.4)
EOSINOPHIL # BLD: 0.3 K/UL (ref 0–0.6)
EOSINOPHIL NFR BLD: 2.9 %
GFR SERPLBLD CREATININE-BSD FMLA CKD-EPI: 38 ML/MIN/{1.73_M2}
GLUCOSE BLD-MCNC: 120 MG/DL (ref 70–99)
GLUCOSE BLD-MCNC: 135 MG/DL (ref 70–99)
GLUCOSE BLD-MCNC: 135 MG/DL (ref 70–99)
GLUCOSE BLD-MCNC: 159 MG/DL (ref 70–99)
GLUCOSE SERPL-MCNC: 108 MG/DL (ref 70–99)
HCT VFR BLD AUTO: 26.7 % (ref 36–48)
HGB BLD-MCNC: 8.8 G/DL (ref 12–16)
LYMPHOCYTES # BLD: 2.1 K/UL (ref 1–5.1)
LYMPHOCYTES NFR BLD: 22.4 %
MCH RBC QN AUTO: 26.3 PG (ref 26–34)
MCHC RBC AUTO-ENTMCNC: 33.1 G/DL (ref 31–36)
MCV RBC AUTO: 79.4 FL (ref 80–100)
MONOCYTES # BLD: 0.7 K/UL (ref 0–1.3)
MONOCYTES NFR BLD: 7.2 %
NEUTROPHILS # BLD: 6.2 K/UL (ref 1.7–7.7)
NEUTROPHILS NFR BLD: 65.8 %
PERFORMED ON: ABNORMAL
PLATELET # BLD AUTO: 213 K/UL (ref 135–450)
PMV BLD AUTO: 8.3 FL (ref 5–10.5)
POTASSIUM SERPL-SCNC: 4.7 MMOL/L (ref 3.5–5.1)
RBC # BLD AUTO: 3.36 M/UL (ref 4–5.2)
SODIUM SERPL-SCNC: 135 MMOL/L (ref 136–145)
WBC # BLD AUTO: 9.4 K/UL (ref 4–11)

## 2023-07-26 PROCEDURE — 36415 COLL VENOUS BLD VENIPUNCTURE: CPT

## 2023-07-26 PROCEDURE — 1280000000 HC REHAB R&B

## 2023-07-26 PROCEDURE — 92507 TX SP LANG VOICE COMM INDIV: CPT

## 2023-07-26 PROCEDURE — 99232 SBSQ HOSP IP/OBS MODERATE 35: CPT | Performed by: INTERNAL MEDICINE

## 2023-07-26 PROCEDURE — 6370000000 HC RX 637 (ALT 250 FOR IP): Performed by: INTERNAL MEDICINE

## 2023-07-26 PROCEDURE — 97129 THER IVNTJ 1ST 15 MIN: CPT

## 2023-07-26 PROCEDURE — 99223 1ST HOSP IP/OBS HIGH 75: CPT | Performed by: INTERNAL MEDICINE

## 2023-07-26 PROCEDURE — 85025 COMPLETE CBC W/AUTO DIFF WBC: CPT

## 2023-07-26 PROCEDURE — 6370000000 HC RX 637 (ALT 250 FOR IP): Performed by: PHYSICAL MEDICINE & REHABILITATION

## 2023-07-26 PROCEDURE — 2580000003 HC RX 258: Performed by: PHYSICAL MEDICINE & REHABILITATION

## 2023-07-26 PROCEDURE — 80048 BASIC METABOLIC PNL TOTAL CA: CPT

## 2023-07-26 PROCEDURE — 6360000002 HC RX W HCPCS: Performed by: PHYSICAL MEDICINE & REHABILITATION

## 2023-07-26 PROCEDURE — 93005 ELECTROCARDIOGRAM TRACING: CPT | Performed by: INTERNAL MEDICINE

## 2023-07-26 RX ORDER — ROSUVASTATIN CALCIUM 5 MG/1
10 TABLET, COATED ORAL NIGHTLY
Status: DISCONTINUED | OUTPATIENT
Start: 2023-07-26 | End: 2023-07-27 | Stop reason: HOSPADM

## 2023-07-26 RX ORDER — SODIUM CHLORIDE 0.9 % (FLUSH) 0.9 %
5-40 SYRINGE (ML) INJECTION 2 TIMES DAILY
Status: DISCONTINUED | OUTPATIENT
Start: 2023-07-26 | End: 2023-07-27 | Stop reason: HOSPADM

## 2023-07-26 RX ORDER — HEPARIN SODIUM 5000 [USP'U]/ML
5000 INJECTION, SOLUTION INTRAVENOUS; SUBCUTANEOUS 2 TIMES DAILY
Status: DISCONTINUED | OUTPATIENT
Start: 2023-07-27 | End: 2023-07-27 | Stop reason: HOSPADM

## 2023-07-26 RX ADMIN — ONDANSETRON 4 MG: 2 INJECTION INTRAMUSCULAR; INTRAVENOUS at 15:55

## 2023-07-26 RX ADMIN — GUAIFENESIN 600 MG: 600 TABLET ORAL at 09:50

## 2023-07-26 RX ADMIN — PANTOPRAZOLE SODIUM 40 MG: 40 TABLET, DELAYED RELEASE ORAL at 06:36

## 2023-07-26 RX ADMIN — Medication: at 11:37

## 2023-07-26 RX ADMIN — GUAIFENESIN 600 MG: 600 TABLET ORAL at 21:27

## 2023-07-26 RX ADMIN — LISINOPRIL 20 MG: 20 TABLET ORAL at 09:50

## 2023-07-26 RX ADMIN — DICLOFENAC SODIUM 4 G: 10 GEL TOPICAL at 21:28

## 2023-07-26 RX ADMIN — NIFEDIPINE 30 MG: 30 TABLET, EXTENDED RELEASE ORAL at 21:27

## 2023-07-26 RX ADMIN — FAMOTIDINE 20 MG: 20 TABLET, FILM COATED ORAL at 11:35

## 2023-07-26 RX ADMIN — DICLOFENAC SODIUM 4 G: 10 GEL TOPICAL at 11:37

## 2023-07-26 RX ADMIN — LISINOPRIL 20 MG: 20 TABLET ORAL at 21:27

## 2023-07-26 RX ADMIN — SENNOSIDES AND DOCUSATE SODIUM 2 TABLET: 50; 8.6 TABLET ORAL at 21:27

## 2023-07-26 RX ADMIN — ENOXAPARIN SODIUM 30 MG: 100 INJECTION SUBCUTANEOUS at 09:48

## 2023-07-26 RX ADMIN — BISACODYL 5 MG: 5 TABLET, COATED ORAL at 09:51

## 2023-07-26 RX ADMIN — Medication: at 21:28

## 2023-07-26 RX ADMIN — LACTULOSE 20 G: 20 SOLUTION ORAL at 14:31

## 2023-07-26 RX ADMIN — ASPIRIN 81 MG: 81 TABLET, COATED ORAL at 09:51

## 2023-07-26 RX ADMIN — NIFEDIPINE 30 MG: 30 TABLET, EXTENDED RELEASE ORAL at 09:50

## 2023-07-26 RX ADMIN — LACTULOSE 20 G: 20 SOLUTION ORAL at 09:50

## 2023-07-26 RX ADMIN — ONDANSETRON 4 MG: 2 INJECTION INTRAMUSCULAR; INTRAVENOUS at 09:51

## 2023-07-26 RX ADMIN — SODIUM CHLORIDE, PRESERVATIVE FREE 10 ML: 5 INJECTION INTRAVENOUS at 21:45

## 2023-07-26 RX ADMIN — ROSUVASTATIN CALCIUM 10 MG: 5 TABLET, FILM COATED ORAL at 21:27

## 2023-07-26 ASSESSMENT — PAIN SCALES - GENERAL: PAINLEVEL_OUTOF10: 0

## 2023-07-26 NOTE — PROGRESS NOTES
Cardiololgy consulted due to heart rate in 30's with dizziness and nausea. Will await new orders. Patient resting comfortably.

## 2023-07-26 NOTE — PROGRESS NOTES
Occupational Therapy    Attempted to see pt for scheduled 9:30 OT session. Per RN, pt on bed rest at this time d/t bradycardia and hypotension. Will follow up at a later time as appropriate to make up missed minutes. Followed up w/ RN in PM, RN stated pt on bedrest for the rest of the day.     Angie Anguiano, OTR/L   Variance: -90 Minutes OT

## 2023-07-26 NOTE — PROGRESS NOTES
Patient dizzy and nauseated- bp okay at 142/62, HR in 30's. Nephro messaged and will see if any new orders from them. Will let Heis know as well.

## 2023-07-26 NOTE — PROGRESS NOTES
ACUTE REHAB UNIT  SPEECH/LANGUAGE PATHOLOGY      [x] Daily  [] Weekly Care Conference Note  [] Discharge    Patient:Xuan Richards      :1945  SHARYN:[id number]  Rehab Dx/Hx: CVA (cerebrovascular accident due to intracerebral hemorrhage) ([address]) [I61.9]    Precautions: [] Aspiration  [x] Fall risk  [] Sternal  [] Seizure [] Hip  [] Weight Bearing [] Other     ST Dx: [] Aphasia  [x] Dysarthria  [] Apraxia   [] Oropharyngeal dysphagia [x] Cognitive Impairment  [] Other:   Date of Admit: 2023  Room #: 3111/3111-01  Date: 2023          Current Diet Order:ADULT DIET; Dysphagia - Soft and Bite Sized  ADULT ORAL NUTRITION SUPPLEMENT; Breakfast, Lunch, Dinner; Diabetic Oral Supplement   Recommended Form of Meds: PO (As tolerated.)  Compensatory Swallowing Strategies : Upright as possible for all oral intake, Alternate solids and liquids, Eat/Feed slowly, Small bites/sips     Lives With: Family    Leisure & Hobbies: Goes to [employer] every night    Dentition: Edentulous (Pt reports owning dentures but choosing not where them. Stated \"I don't need them. I can chew anything. \" Missing dentures can cause pt to be difficult to understand at times.)  Vision  Vision: Impaired  Vision Exceptions: Wears glasses at all times (Pt endorsed that her doctor recommended a stronger glasses perscription but she hasn't changed them yet.)  Hearing  Hearing: Within functional limits  Barriers toward progress: Limited insight into deficits        Date: 2023      Tx session 1 Tx session 2   Total Timed Code Min 15 0   Total Treatment Minutes 30 0   Individual Treatment Minutes 30 0   Group Treatment Minutes 0 0   Co-Treat Minutes 0 0   Brief Exception: N/A -30; pt placed on medical hold   Pain None indicated  N/A   Pain Intervention: [] RN notified  [] Repositioned  [] Intervention offered and patient declined  [x] N/A  [] Other: [] RN notified  [] Repositioned  [] Intervention offered and patient declined  [x] N/A  [] Other:

## 2023-07-26 NOTE — PROGRESS NOTES
Patient has been nauseated with vomiting, several loose stools (lactulose), continues to feel dizzy. BP remains elevated in 170's, HR now in 50's. Cardiology consulted, per MD order strict bedrest for today and vitals hourly until 1700.

## 2023-07-26 NOTE — PROGRESS NOTES
Vitals:    07/26/23 0807   BP:    Pulse: (!) 36   Resp:    Temp:    SpO2:      Dr. Marysol Jones wants patient to be monitored this AM- hourly vitals, bedrest, and another EKG ordered for 1200.

## 2023-07-26 NOTE — PROGRESS NOTES
Physical Therapy  Facility/Department: [facility] ACUTE REHAB UNIT    NAME: Irene Williamson  : 1945 (66 y.o.)  MRN: [id number]  CODE STATUS: Full Code    Date of Service: 23     PT attempted to see pt at sched visit time. Pt medically unstable at this time with both bradycardia and hypotension. Pt presently on HOLD. PT to f/u for pt to be rescheduled as pt is able to tolerate. No PT received on this date.    Variance minutes : 60 minutes       Vida Aguilar, PT, 23 at 2:24 PM

## 2023-07-26 NOTE — PLAN OF CARE
Problem: Nutrition Deficit:  Goal: Optimize nutritional status  Outcome: Not Progressing  Note: Unable to tolerate PO, frequent vomiting controled with zofran. Problem: Chronic Conditions and Co-morbidities  Goal: Patient's chronic conditions and co-morbidity symptoms are monitored and maintained or improved  Outcome: Not Progressing  Flowsheets (Taken 7/26/2023 0723)  Care Plan - Patient's Chronic Conditions and Co-Morbidity Symptoms are Monitored and Maintained or Improved: Monitor and assess patient's chronic conditions and comorbid symptoms for stability, deterioration, or improvement  Note: HR in 30-40's, 's. Patient on strict bedrest with hourly vitals. Problem: Discharge Planning  Goal: Discharge to home or other facility with appropriate resources  Outcome: Progressing  Flowsheets (Taken 7/26/2023 0784)  Discharge to home or other facility with appropriate resources: Identify discharge learning needs (meds, wound care, etc)     Problem: Safety - Adult  Goal: Free from fall injury  Outcome: Progressing     Problem: ABCDS Injury Assessment  Goal: Absence of physical injury  Outcome: Progressing     Problem: Skin/Tissue Integrity  Goal: Absence of new skin breakdown  Description: 1. Monitor for areas of redness and/or skin breakdown  2. Assess vascular access sites hourly  3. Every 4-6 hours minimum:  Change oxygen saturation probe site  4. Every 4-6 hours:  If on nasal continuous positive airway pressure, respiratory therapy assess nares and determine need for appliance change or resting period. Outcome: Progressing     Problem: Nutrition Deficit:  Goal: Optimize nutritional status  Outcome: Not Progressing  Note: Unable to tolerate PO, frequent vomiting controled with zofran.      Problem: Chronic Conditions and Co-morbidities  Goal: Patient's chronic conditions and co-morbidity symptoms are monitored and maintained or improved  Outcome: Not Progressing  Flowsheets (Taken 7/26/2023

## 2023-07-26 NOTE — CONSULTS
Reason for Consultation/Chief Complaint: CVA      History of Present Illness:  Melanie Keith is a 66 y.o. patient whom we were asked to see for bradycardia. Hx embolic CVA, afib, cad, PTCA. Transferred to ARU after CVA with TNK. Noted persistent afib. HR controlled with no slowing agents. Noted bradycardia in 50s at baseline. Late last pm 40s. This am persistently in 35s. Has had N/V last pm and currently. No cp/sob. Has dizziness this am.    Past Medical History:   has a past medical history of Atrial fibrillation ([address]), Blood transfusion, CAD (coronary artery disease), Diabetes mellitus type II, Diabetic neuropathy ([address]), Gastric ulcer, H. pylori infection, Hyperlipidemia, Hypertension, Numbness and tingling of left leg, Osteoarthritis, Poor historian, Primary hypertension, PVD (peripheral vascular disease) ([address]), Unspecified cerebral artery occlusion with cerebral infarction, and upper gi bleed. Surgical History:   has a past surgical history that includes Coronary angioplasty with stent (3273,7314); Cardiac catheterization (9/21/12); Total shoulder arthroplasty (10/5/12); angioplasty (12/30/15); other surgical history (Left, 06/05/2017); Foot surgery (Left, 01/11/2018); Toe amputation (Right, 5/23/2022); Upper gastrointestinal endoscopy (N/A, 6/8/2022); Colonoscopy (06/09/2022); Colonoscopy (N/A, 6/9/2022); and Toe amputation (Right, 9/9/2022). Social History:   reports that she has never smoked. She has never used smokeless tobacco. She reports that she does not drink alcohol and does not use drugs. Family History:  No evidence for sudden cardiac death or premature CAD    Home Medications:  Were reviewed and are listed in nursing record. and/or listed below  Prior to Admission medications    Medication Sig Start Date End Date Taking?  Authorizing Provider   amLODIPine (NORVASC) 10 MG tablet Take 1 tablet by mouth daily   Yes Historical Provider, MD   clopidogrel (PLAVIX) 75 MG tablet Take 1 tablet by mouth daily   Yes Historical Provider, MD   digoxin (LANOXIN) 125 MCG tablet Take 1 tablet by mouth daily   Yes Historical Provider, MD   hydrALAZINE (APRESOLINE) 50 MG tablet Take 1 tablet by mouth 3 times daily   Yes Historical Provider, MD   vitamin D3 (CHOLECALCIFEROL) 25 MCG (1000 UT) TABS tablet TAKE 1 TABLET BY MOUTH EVERY DAY 2/2/23   MARVIN Soriano CNP   ascorbic acid (V-R VITAMIN C) 250 MG tablet Take 1 tablet by mouth daily 2/2/23   MARVIN Soriano CNP   lisinopril (PRINIVIL;ZESTRIL) 20 MG tablet TAKE 1 TABLET BY MOUTH EVERY DAY 10/31/22   MARVIN Soriano CNP   metFORMIN (GLUCOPHAGE) 500 MG tablet TAKE 2 [address] WITH BREAKFAST  Patient not taking: Reported on 9/4/2022 6/27/22 9/14/22  MARVIN Soriano CNP   simvastatin (ZOCOR) 20 MG tablet Take 1 tablet by mouth nightly  Patient not taking: No sig reported 6/27/22 9/14/22  MARVIN Soriano CNP   diclofenac sodium (VOLTAREN) 1 % GEL Apply 2 g topically in the morning and at bedtime  Patient not taking: No sig reported 6/27/22 9/14/22  MARVIN Soriano CNP        Allergies:  Pcn [penicillins]     Review of Systems:     Constitutional: there has been no unanticipated weight loss. There's been no change in energy level, sleep pattern, or activity level since in ARU. Notes being cold. Eyes: No visual changes or diplopia. No scleral icterus. ENT: No Headaches, hearing loss or vertigo. No mouth sores or sore throat. Cardiovascular: No loss of consciousness. No hemoptysis, pleuritic pain, or phlebitis. Respiratory: No cough or wheezing, no sputum production. No hematemesis. Gastrointestinal: No abdominal pain, appetite loss, blood in stools. No change in bowel or bladder habits. Nausea as above  Genitourinary: No dysuria, trouble voiding, or hematuria. Musculoskeletal:  No gait disturbance, weakness or joint complaints. Integumentary: No rash or pruritis.   All other systems reviewed negative

## 2023-07-27 ENCOUNTER — HOSPITAL ENCOUNTER (INPATIENT)
Age: 78
LOS: 6 days | Discharge: NURSING, REHAB OR LONG-TERM CARE | DRG: 242 | End: 2023-08-02
Attending: INTERNAL MEDICINE | Admitting: INTERNAL MEDICINE
Payer: MEDICARE

## 2023-07-27 VITALS
RESPIRATION RATE: 12 BRPM | TEMPERATURE: 97.3 F | BODY MASS INDEX: 19.45 KG/M2 | HEART RATE: 36 BPM | SYSTOLIC BLOOD PRESSURE: 161 MMHG | OXYGEN SATURATION: 95 % | WEIGHT: 109.79 LBS | DIASTOLIC BLOOD PRESSURE: 68 MMHG | HEIGHT: 63 IN

## 2023-07-27 PROBLEM — R00.1 SYMPTOMATIC BRADYCARDIA: Status: ACTIVE | Noted: 2023-07-27

## 2023-07-27 LAB
ALBUMIN SERPL-MCNC: 3.5 G/DL (ref 3.4–5)
ANION GAP SERPL CALCULATED.3IONS-SCNC: 12 MMOL/L (ref 3–16)
BUN SERPL-MCNC: 25 MG/DL (ref 7–20)
CALCIUM SERPL-MCNC: 8.9 MG/DL (ref 8.3–10.6)
CHLORIDE SERPL-SCNC: 102 MMOL/L (ref 99–110)
CO2 SERPL-SCNC: 23 MMOL/L (ref 21–32)
CREAT SERPL-MCNC: 1.3 MG/DL (ref 0.6–1.2)
EKG ATRIAL RATE: 277 BPM
EKG ATRIAL RATE: 36 BPM
EKG ATRIAL RATE: 52 BPM
EKG DIAGNOSIS: NORMAL
EKG Q-T INTERVAL: 446 MS
EKG Q-T INTERVAL: 512 MS
EKG Q-T INTERVAL: 516 MS
EKG QRS DURATION: 112 MS
EKG QRS DURATION: 112 MS
EKG QRS DURATION: 116 MS
EKG QTC CALCULATION (BAZETT): 399 MS
EKG QTC CALCULATION (BAZETT): 418 MS
EKG QTC CALCULATION (BAZETT): 427 MS
EKG R AXIS: 26 DEGREES
EKG R AXIS: 29 DEGREES
EKG R AXIS: 46 DEGREES
EKG T AXIS: 72 DEGREES
EKG T AXIS: 74 DEGREES
EKG T AXIS: 93 DEGREES
EKG VENTRICULAR RATE: 36 BPM
EKG VENTRICULAR RATE: 42 BPM
EKG VENTRICULAR RATE: 53 BPM
GFR SERPLBLD CREATININE-BSD FMLA CKD-EPI: 42 ML/MIN/{1.73_M2}
GLUCOSE BLD-MCNC: 106 MG/DL (ref 70–99)
GLUCOSE BLD-MCNC: 117 MG/DL (ref 70–99)
GLUCOSE BLD-MCNC: 97 MG/DL (ref 70–99)
GLUCOSE BLD-MCNC: 99 MG/DL (ref 70–99)
GLUCOSE SERPL-MCNC: 102 MG/DL (ref 70–99)
PERFORMED ON: ABNORMAL
PERFORMED ON: ABNORMAL
PERFORMED ON: NORMAL
PERFORMED ON: NORMAL
PHOSPHATE SERPL-MCNC: 3.6 MG/DL (ref 2.5–4.9)
POTASSIUM SERPL-SCNC: 4.4 MMOL/L (ref 3.5–5.1)
SODIUM SERPL-SCNC: 137 MMOL/L (ref 136–145)

## 2023-07-27 PROCEDURE — 6360000002 HC RX W HCPCS

## 2023-07-27 PROCEDURE — 6370000000 HC RX 637 (ALT 250 FOR IP)

## 2023-07-27 PROCEDURE — 2580000003 HC RX 258: Performed by: STUDENT IN AN ORGANIZED HEALTH CARE EDUCATION/TRAINING PROGRAM

## 2023-07-27 PROCEDURE — 6370000000 HC RX 637 (ALT 250 FOR IP): Performed by: INTERNAL MEDICINE

## 2023-07-27 PROCEDURE — 99233 SBSQ HOSP IP/OBS HIGH 50: CPT | Performed by: INTERNAL MEDICINE

## 2023-07-27 PROCEDURE — 93010 ELECTROCARDIOGRAM REPORT: CPT | Performed by: INTERNAL MEDICINE

## 2023-07-27 PROCEDURE — 99223 1ST HOSP IP/OBS HIGH 75: CPT | Performed by: INTERNAL MEDICINE

## 2023-07-27 PROCEDURE — 2500000003 HC RX 250 WO HCPCS

## 2023-07-27 PROCEDURE — 36415 COLL VENOUS BLD VENIPUNCTURE: CPT

## 2023-07-27 PROCEDURE — 2580000003 HC RX 258: Performed by: PHYSICAL MEDICINE & REHABILITATION

## 2023-07-27 PROCEDURE — 80069 RENAL FUNCTION PANEL: CPT

## 2023-07-27 PROCEDURE — 6360000002 HC RX W HCPCS: Performed by: PHYSICAL MEDICINE & REHABILITATION

## 2023-07-27 PROCEDURE — 33206 INSERT HEART PM ATRIAL: CPT | Performed by: INTERNAL MEDICINE

## 2023-07-27 PROCEDURE — 2580000003 HC RX 258

## 2023-07-27 PROCEDURE — 99232 SBSQ HOSP IP/OBS MODERATE 35: CPT | Performed by: INTERNAL MEDICINE

## 2023-07-27 PROCEDURE — 6370000000 HC RX 637 (ALT 250 FOR IP): Performed by: PHYSICAL MEDICINE & REHABILITATION

## 2023-07-27 PROCEDURE — C9113 INJ PANTOPRAZOLE SODIUM, VIA: HCPCS

## 2023-07-27 PROCEDURE — 2000000000 HC ICU R&B

## 2023-07-27 RX ORDER — ONDANSETRON 2 MG/ML
4 INJECTION INTRAMUSCULAR; INTRAVENOUS EVERY 6 HOURS PRN
Status: DISCONTINUED | OUTPATIENT
Start: 2023-07-27 | End: 2023-08-02 | Stop reason: HOSPADM

## 2023-07-27 RX ORDER — INSULIN LISPRO 100 [IU]/ML
0-4 INJECTION, SOLUTION INTRAVENOUS; SUBCUTANEOUS NIGHTLY
Status: DISCONTINUED | OUTPATIENT
Start: 2023-07-27 | End: 2023-08-02 | Stop reason: HOSPADM

## 2023-07-27 RX ORDER — POLYETHYLENE GLYCOL 3350 17 G/17G
17 POWDER, FOR SOLUTION ORAL DAILY PRN
Status: DISCONTINUED | OUTPATIENT
Start: 2023-07-27 | End: 2023-08-02 | Stop reason: HOSPADM

## 2023-07-27 RX ORDER — SODIUM CHLORIDE 9 MG/ML
INJECTION, SOLUTION INTRAVENOUS CONTINUOUS
Status: DISCONTINUED | OUTPATIENT
Start: 2023-07-27 | End: 2023-07-27 | Stop reason: HOSPADM

## 2023-07-27 RX ORDER — ACETAMINOPHEN 325 MG/1
650 TABLET ORAL EVERY 6 HOURS PRN
Status: DISCONTINUED | OUTPATIENT
Start: 2023-07-27 | End: 2023-08-02 | Stop reason: HOSPADM

## 2023-07-27 RX ORDER — INSULIN LISPRO 100 [IU]/ML
0-8 INJECTION, SOLUTION INTRAVENOUS; SUBCUTANEOUS
Status: DISCONTINUED | OUTPATIENT
Start: 2023-07-27 | End: 2023-08-02 | Stop reason: HOSPADM

## 2023-07-27 RX ORDER — 0.9 % SODIUM CHLORIDE 0.9 %
500 INTRAVENOUS SOLUTION INTRAVENOUS ONCE
Status: DISCONTINUED | OUTPATIENT
Start: 2023-07-27 | End: 2023-07-27 | Stop reason: HOSPADM

## 2023-07-27 RX ORDER — HYDRALAZINE HYDROCHLORIDE 20 MG/ML
10 INJECTION INTRAMUSCULAR; INTRAVENOUS EVERY 6 HOURS PRN
Status: DISCONTINUED | OUTPATIENT
Start: 2023-07-27 | End: 2023-08-02 | Stop reason: HOSPADM

## 2023-07-27 RX ORDER — SODIUM CHLORIDE 0.9 % (FLUSH) 0.9 %
5-40 SYRINGE (ML) INJECTION EVERY 12 HOURS SCHEDULED
Status: DISCONTINUED | OUTPATIENT
Start: 2023-07-27 | End: 2023-08-02 | Stop reason: HOSPADM

## 2023-07-27 RX ORDER — SODIUM CHLORIDE 9 MG/ML
INJECTION, SOLUTION INTRAVENOUS PRN
Status: DISCONTINUED | OUTPATIENT
Start: 2023-07-27 | End: 2023-08-02 | Stop reason: HOSPADM

## 2023-07-27 RX ORDER — ROSUVASTATIN CALCIUM 10 MG/1
10 TABLET, COATED ORAL NIGHTLY
Status: DISCONTINUED | OUTPATIENT
Start: 2023-07-27 | End: 2023-08-02 | Stop reason: HOSPADM

## 2023-07-27 RX ORDER — ONDANSETRON 4 MG/1
4 TABLET, ORALLY DISINTEGRATING ORAL EVERY 8 HOURS PRN
Status: DISCONTINUED | OUTPATIENT
Start: 2023-07-27 | End: 2023-08-02 | Stop reason: HOSPADM

## 2023-07-27 RX ORDER — HEPARIN SODIUM 5000 [USP'U]/ML
5000 INJECTION, SOLUTION INTRAVENOUS; SUBCUTANEOUS EVERY 8 HOURS SCHEDULED
Status: COMPLETED | OUTPATIENT
Start: 2023-07-27 | End: 2023-07-28

## 2023-07-27 RX ORDER — SODIUM CHLORIDE 0.9 % (FLUSH) 0.9 %
5-40 SYRINGE (ML) INJECTION PRN
Status: DISCONTINUED | OUTPATIENT
Start: 2023-07-27 | End: 2023-08-02 | Stop reason: HOSPADM

## 2023-07-27 RX ORDER — PANTOPRAZOLE SODIUM 40 MG/10ML
40 INJECTION, POWDER, LYOPHILIZED, FOR SOLUTION INTRAVENOUS DAILY
Status: DISCONTINUED | OUTPATIENT
Start: 2023-07-27 | End: 2023-07-31

## 2023-07-27 RX ORDER — ACETAMINOPHEN 650 MG/1
650 SUPPOSITORY RECTAL EVERY 6 HOURS PRN
Status: DISCONTINUED | OUTPATIENT
Start: 2023-07-27 | End: 2023-08-02 | Stop reason: HOSPADM

## 2023-07-27 RX ORDER — DEXTROSE MONOHYDRATE 100 MG/ML
INJECTION, SOLUTION INTRAVENOUS CONTINUOUS PRN
Status: DISCONTINUED | OUTPATIENT
Start: 2023-07-27 | End: 2023-08-02 | Stop reason: HOSPADM

## 2023-07-27 RX ORDER — LISINOPRIL 20 MG/1
20 TABLET ORAL 2 TIMES DAILY
Status: DISCONTINUED | OUTPATIENT
Start: 2023-07-27 | End: 2023-08-02 | Stop reason: HOSPADM

## 2023-07-27 RX ORDER — NIFEDIPINE 30 MG/1
30 TABLET, FILM COATED, EXTENDED RELEASE ORAL 2 TIMES DAILY
Status: DISCONTINUED | OUTPATIENT
Start: 2023-07-27 | End: 2023-08-02 | Stop reason: HOSPADM

## 2023-07-27 RX ADMIN — GUAIFENESIN 600 MG: 600 TABLET ORAL at 10:23

## 2023-07-27 RX ADMIN — ROSUVASTATIN CALCIUM 10 MG: 5 TABLET, FILM COATED ORAL at 20:57

## 2023-07-27 RX ADMIN — ACETAMINOPHEN 650 MG: 325 TABLET ORAL at 00:52

## 2023-07-27 RX ADMIN — SODIUM CHLORIDE, PRESERVATIVE FREE 10 ML: 5 INJECTION INTRAVENOUS at 10:23

## 2023-07-27 RX ADMIN — SODIUM CHLORIDE: 9 INJECTION, SOLUTION INTRAVENOUS at 14:22

## 2023-07-27 RX ADMIN — NIFEDIPINE 30 MG: 30 TABLET, EXTENDED RELEASE ORAL at 20:57

## 2023-07-27 RX ADMIN — PANTOPRAZOLE SODIUM 40 MG: 40 INJECTION, POWDER, FOR SOLUTION INTRAVENOUS at 21:05

## 2023-07-27 RX ADMIN — ASPIRIN 81 MG: 81 TABLET, COATED ORAL at 10:23

## 2023-07-27 RX ADMIN — LISINOPRIL 20 MG: 20 TABLET ORAL at 20:57

## 2023-07-27 RX ADMIN — HEPARIN SODIUM 5000 UNITS: 5000 INJECTION INTRAVENOUS; SUBCUTANEOUS at 21:58

## 2023-07-27 RX ADMIN — Medication: at 10:23

## 2023-07-27 RX ADMIN — HEPARIN SODIUM 5000 UNITS: 5000 INJECTION INTRAVENOUS; SUBCUTANEOUS at 10:23

## 2023-07-27 RX ADMIN — ACETAMINOPHEN 650 MG: 325 TABLET ORAL at 23:57

## 2023-07-27 RX ADMIN — DICLOFENAC SODIUM 4 G: 10 GEL TOPICAL at 10:23

## 2023-07-27 RX ADMIN — BISACODYL 5 MG: 5 TABLET, COATED ORAL at 10:23

## 2023-07-27 RX ADMIN — LISINOPRIL 20 MG: 20 TABLET ORAL at 10:26

## 2023-07-27 RX ADMIN — ONDANSETRON 4 MG: 4 TABLET, ORALLY DISINTEGRATING ORAL at 06:20

## 2023-07-27 RX ADMIN — SODIUM CHLORIDE, PRESERVATIVE FREE 10 ML: 5 INJECTION INTRAVENOUS at 21:16

## 2023-07-27 RX ADMIN — FAMOTIDINE 20 MG: 20 TABLET, FILM COATED ORAL at 10:23

## 2023-07-27 RX ADMIN — LACTULOSE 20 G: 20 SOLUTION ORAL at 10:23

## 2023-07-27 RX ADMIN — PANTOPRAZOLE SODIUM 40 MG: 40 TABLET, DELAYED RELEASE ORAL at 06:20

## 2023-07-27 ASSESSMENT — ENCOUNTER SYMPTOMS
NAUSEA: 1
DIARRHEA: 1
SORE THROAT: 0
ABDOMINAL PAIN: 0
SHORTNESS OF BREATH: 0
VOMITING: 1
COUGH: 0
CHEST TIGHTNESS: 0

## 2023-07-27 ASSESSMENT — PAIN DESCRIPTION - DESCRIPTORS
DESCRIPTORS: ACHING
DESCRIPTORS: DISCOMFORT

## 2023-07-27 ASSESSMENT — PAIN - FUNCTIONAL ASSESSMENT: PAIN_FUNCTIONAL_ASSESSMENT: ACTIVITIES ARE NOT PREVENTED

## 2023-07-27 ASSESSMENT — PAIN SCALES - GENERAL
PAINLEVEL_OUTOF10: 0
PAINLEVEL_OUTOF10: 3
PAINLEVEL_OUTOF10: 0
PAINLEVEL_OUTOF10: 7
PAINLEVEL_OUTOF10: 0

## 2023-07-27 ASSESSMENT — PAIN DESCRIPTION - LOCATION
LOCATION: BACK
LOCATION: OTHER (COMMENT)

## 2023-07-27 ASSESSMENT — PAIN DESCRIPTION - ORIENTATION
ORIENTATION: MID
ORIENTATION: INNER

## 2023-07-27 ASSESSMENT — PAIN DESCRIPTION - FREQUENCY: FREQUENCY: INTERMITTENT

## 2023-07-27 ASSESSMENT — PAIN DESCRIPTION - PAIN TYPE: TYPE: ACUTE PAIN

## 2023-07-27 ASSESSMENT — PAIN DESCRIPTION - ONSET: ONSET: GRADUAL

## 2023-07-27 NOTE — PROGRESS NOTES
Speech-Language Pathology  [facility] Acute Rehabilitation Unit  Therapy Hold    Per Dr. Lalit Jones, pt is on therapy hold 2/2 low HR and dizziness this date. Will hold at this time. Variance: 60 minutes due to medial hold.       Mary Anne Lombardo, [address] [place] [address]  Speech-Language Pathologist

## 2023-07-27 NOTE — PROCEDURES
Lynette Torres (85 y.o., female)  1945  [id number]    7/27/2023    REFERRING MD:  Dr Amy Matta):  Weakness      PROCEDURE:    Transvenous pacemaker      Risk, benefits alternatives to transvenous pacemaker were discussed  with the patient including but not limited to bleeding, lead perforation. Informed consent was obtained. The patient was brought to the cath lab and was prepped and draped in the normal sterile technique. 1% Lidocaine was used to anesthetize the site. A 6 [place] venous sheath was inserted in the right common femoral vein without any complications. A 6 [place] balloontipped temporary pace maker lead was advanced under fluoroscopic guidance into the right ventricle and appropriate capture achieved. Continuous pulse-oximetry and ECG monitoring was performed, and frequent blood pressure assessment was performed. An independent trained observer pushed medications at my direction. We monitored the patient's level of consciousness and vital signs/physiologic status throughout the procedure (see start and stop times below). The sheath and pacemaker were secured. There were no complications.       Estimated blood loss: <10 mL      Zachery Arenas MD, [address] [place] [address] Ave Office Phone: [phone]  Fax: [phone]

## 2023-07-27 NOTE — PLAN OF CARE
Problem: Discharge Planning  Goal: Discharge to home or other facility with appropriate resources  Outcome: Not Progressing  Note: Internal medicine consult for acute discharge to higher level of care. Problem: Nutrition Deficit:  Goal: Optimize nutritional status  7/27/2023 1353 by Melisa Michelle RN  Outcome: Not Progressing  Note: Intake remains poor     Problem: Safety - Adult  Goal: Free from fall injury  Outcome: Progressing  Note: Patient remains free of falls or injury     Problem: Pain  Goal: Verbalizes/displays adequate comfort level or baseline comfort level  Recent Flowsheet Documentation  Taken 7/27/2023 0821 by Melisa Michelle RN  Verbalizes/displays adequate comfort level or baseline comfort level: Encourage patient to monitor pain and request assistance     Problem: Discharge Planning  Goal: Discharge to home or other facility with appropriate resources  Outcome: Not Progressing  Note: Internal medicine consult for acute discharge to higher level of care.      Problem: Nutrition Deficit:  Goal: Optimize nutritional status  7/27/2023 1353 by Melisa Michelle RN  Outcome: Not Progressing  Note: Intake remains poor  7/27/2023 0315 by Anastacio Holly RN  Flowsheets (Taken 7/27/2023 0315)  Nutrient intake appropriate for improving, restoring, or maintaining nutritional needs: Monitor oral intake, labs, and treatment plans

## 2023-07-27 NOTE — PLAN OF CARE
Problem: Discharge Planning  Goal: Discharge to home or other facility with appropriate resources  Outcome: Progressing  Flowsheets (Taken 7/27/2023 1700 by Marixa Zaragoza RN)  Discharge to home or other facility with appropriate resources: Identify barriers to discharge with patient and caregiver     Problem: Safety - Adult  Goal: Free from fall injury  Outcome: Progressing  Flowsheets (Taken 7/27/2023 1939)  Free From Fall Injury: Instruct family/caregiver on patient safety     Problem: Skin/Tissue Integrity  Goal: Absence of new skin breakdown  Description: 1. Monitor for areas of redness and/or skin breakdown  2. Assess vascular access sites hourly  3. Every 4-6 hours minimum:  Change oxygen saturation probe site  4. Every 4-6 hours:  If on nasal continuous positive airway pressure, respiratory therapy assess nares and determine need for appliance change or resting period.   Outcome: Progressing     Problem: ABCDS Injury Assessment  Goal: Absence of physical injury  Outcome: Progressing

## 2023-07-27 NOTE — H&P
History and Physical      Name:  Leda Olivier /Age/Sex: 1945  (75 y.o. female)   MRN & CSN:  [id number] & [id number] Admission Date/Time: 2023  5:49 PM   Location:  [id number] PCP: Elicia Castro [address] [place] [address] Day: 7    Assessment and Plan:   Leda Olivier is a 66 y.o.  female who was initially admitted to the hospital on 2023 for acute ischemic stroke requiring TNK and subsequently discharged to acute inpatient rehab unit on 2023. Hospital service was called to evaluate the patient for symptomatic bradycardia with heart rates in the low 30s. 1.  Symptomatic bradycardia, HR in the low 30s, BP stable, on room air, highly concerning for heart block  2. Acute ischemic stroke of the left cerebellar hemisphere, on MRI on 2023, s/p TNK  3. Paroxysmal A-fib  4. Acute kidney injury, baseline CR 0.9, peak creatinine 1.4  5. Type II DM  6. HTN  7. Hyperlipidemia  8. Nausea and vomiting    PLAN:  I discussed the case with the cardiology and temporary pacer will be placed today and patient will be admitted to the ICU  Resume Eliquis 5 mg twice daily tomorrow  Lisinopril 20 mg twice daily  Nifedipine 30 mg twice daily  Protonix 40 mg daily  Crestor 10 mg daily  Daily renal panel/magnesium/CBC    Diet Regular diet   DVT Prophylaxis [] Eliquis, []  Heparin, [] SCDs, [] Ambulation   GI Prophylaxis [] PPI,  [] H2 Blocker,   [] Diet/Tube Feeds   Code Status Full code   Disposition Acute rehab unit   MDM [] Low, [] Moderate,[x]  High     History of Present Illness:     Chief Complaint: Low heart rate  Leda Olivier is a 66 y.o.  female  who was initially hospitalized for acute ischemic stroke on 2023 and discharged to inpatient acute rehab unit on 2023. According to the bedside nurse, the patient is heart rate is static dropping down into the 50s on Monday/Tuesday. Bedside nurse indicated that she was not working on Wednesday.   However, when she returned to work Oral Daily    sennosides-docusate sodium  2 tablet Oral Nightly    NIFEdipine  30 mg Oral BID    diclofenac sodium  4 g Topical BID    balsum peru-castor oil   Topical BID    lisinopril  20 mg Oral BID    pantoprazole  40 mg Oral QAM AC    aspirin  81 mg Oral Daily    Or    aspirin  300 mg Rectal Daily    insulin lispro  0-4 Units SubCUTAneous TID WC    insulin lispro  0-4 Units SubCUTAneous Nightly    bisacodyl  5 mg Oral Daily      Infusions:    sodium chloride 100 mL/hr at 07/27/23 1422    dextrose       PRN Meds: ondansetron, 4 mg, Q8H PRN   Or  ondansetron, 4 mg, Q6H PRN  prochlorperazine, 10 mg, Q6H PRN  glucose, 4 tablet, PRN  dextrose bolus, 125 mL, PRN   Or  dextrose bolus, 250 mL, PRN  glucagon (rDNA), 1 mg, PRN  dextrose, , Continuous PRN  acetaminophen, 650 mg, Q4H PRN  magnesium hydroxide, 30 mL, Daily PRN  polyethylene glycol, 17 g, Daily PRN          Electronically signed by Duy Mendoza MD on 7/27/2023 at 3:01 PM

## 2023-07-27 NOTE — PROGRESS NOTES
Patient continues alert and oriented x 4. Patient remains bradycardic with Elevated blood pressure of 175/73. Patient asymptomatic at this time, Internal medicine seen patient gave order to transfer to ICU for Continued Cardiac Monitor and bradycardia. Dr. Maria Ines Guadarrama from ICU along with Dr. Katherine Houser on unit to see assess patient and need for ICU. Recommended PCU as patient is awake and asymptomatic. Received new orders for patient to receive NS at 100ml/hr continuous. Fluids started. Dr. Clarissa Delvaller in to see patient and requested patient have External Pacemaker placed today. EP down to assess patient and patient will be taken to cath lab today for external placement, physician in with patient discussing procedure, patient gave consent. MD requested this nurse to attempt to contact patient family once again as earlier attempts were unsuccessful. This nurse able to speak with Son Rosas who expressed understanding and was agreeable to procedure. This nurse accompanied patient down to Cath lab via stretcher along with personal belongings.  ICU notified and report given to Malena Vyas

## 2023-07-27 NOTE — PROGRESS NOTES
ARU Discharge Assessment    Transportation  \"Has lack of transportation kept you from medical appointments, meetings, work, or from getting things needed for daily living? \"Check all that apply:  [] A.  Yes, it has kept me from medical appointments or from getting my medications  [] B.  Yes, it has kept me from non-medical meetings, appointments, work, or from getting things that I need  [x] C.  No  [] X. Patient unable to respond  [] Y. Patient declines to respond    Provision of Current Reconciled Medication List to Subsequent Provider at Discharge  [x] No, current reconciled medication list not provided to the subsequent provider.  [] Yes, current reconciled medication list provided to the subsequent provider. (**Select route of transmission below**)   [] Via Electronic Health Record   [] [facility]  [] Verbal (e.g. in person, telephone, video conferencing)  [] Paper-based (e.g. fax, copies, printouts)   [] Other Methods (e.g. texting, email, CDs)    Provision of Current Reconciled Medication List to Patient at Discharge  [x] No, current reconciled medication list not provided to the patient, family and/or caregiver.   [] Yes, current reconciled medication list provided to the patient, family and/or caregiver.  (**Select route of transmission below**)   [] [facility] Record (e.g., electronic access to patient portal)   [] [facility]  [] Verbal (e.g. in person, telephone, video conferencing)  [] Paper-based (e.g. fax, copies, printouts)   [] Other Methods (e.g. texting, email, CDs)    Health Literacy  \"How often do you need to have someone help you when you read instructions, pamphlets, or other written material from your doctor or pharmacy? \"  [x]  0. Never  []  1. Rarely  []  2. Sometimes  []  3. Often  []  4. Always  []  7. Patient declines to respond  []  8.   Patient unable to respond    BIMS - **Must be completed in the Hypoglycemic (including insulin) [x] []   None of the above []     Special Treatments, Procedures, and Programs    Check all of the following treatments, procedures, and programs that apply at discharge. At Discharge (check all that apply)   Cancer Treatments   A1. Chemotherapy []           A2. IV []           A3. Oral []           A10. Other []   B1. Radiation []   Respiratory Therapies   C1. Oxygen Therapy []           C2. Continuous (continuously for at least 14 hours per day) []           C3. Intermittent [x]           C4. High-concentration []   D1. Suctioning (Does not include oral suctioning) []           D2. Scheduled []           D3. As needed []   E1. Tracheostomy Care []   F1. Invasive Mechanical Ventilator (ventilator or respirator) []   G1. Non-invasive Mechanical Ventilator []           G2. BiPAP []           G3. CPAP []   Other   H1. IV Medications (Do not include sub Q pumps, flushes, Dextrose 50% or lactated ringers) []           H2. Vasoactive medications []           H3. Antibiotics []           H4. Anticoagulation []           H10. Other []   I1. Transfusions []   J1. Dialysis []           J2. Hemodialysis []           J3. Peritoneal dialysis []   O1. IV access (including a catheter in a vein) []           O2. Peripheral []           O3. Midline []           O4.  Central (PICC, tunneled, port) []      None of the above (select if no Cancer, Respiratory, or Other boxes are checked) []

## 2023-07-27 NOTE — PROGRESS NOTES
Received a critical care consult for Ms Heather rey symptomatic bradycardia. I assessed the patient at bedside , she is AOX4, reveals is not experience any CP, SOB, palpitations, Lightheadedness or dizziness. He reports of endorsing nausea and vomiting that is only in relation to food. Her Vitals, afebrile, 97.3 F, RR 12, HR 40s (on chart review HR Trending in 30s-40s since 7/25/23), /68, SpO2 98%. On auscultations, irregular HR, normal b/l lung sounds. Her AM labs are remarkable for new CARY (Creatinine 1.3 < 1.4 < 0.9), making urine (unmeasured urine output). We ordered an EKG stat (last EKG was from yesterday concerning for Junctional rhythm). At this time, pt is hemodynamically stable, is not complaining of any symptoms of bradycardia (CP, SOB, lightheadedness/dizziness)   As well as on chart review it was also noted, she was on atenolol which was discontinued yesterday 7/26), could still be in her system in the setting of new CARY. We ordered a NS @ 100ml/hr, will transfer to PCU as she does need a tele. Will cont to monitor patient remotely.      Iesha Arizmendi MD

## 2023-07-27 NOTE — PROGRESS NOTES
Comprehensive Nutrition Assessment    RECOMMENDATIONS:  PO Diet: Dysphagia- soft & bite sized ; continue   ONS: Glucerna TID ; continue   Nutrition Education: Education not indicated     NUTRITION ASSESSMENT:   Nutritional summary & status: Follow up. Pt continues on a dysphagia- soft & bite sized diet w/ PO intake ave 1-25% most meals. Pt receiving Glucerna TID w/ meals w/ % acceptance. Spoke w/ pt in room this afternoon. Pt reports she is consuming most all of her glucerna supplements TID & wishes to continue receiving these on all trays. When asked about poor po intake, pt reports this is 2/2 emesis & nausea. Noted zofran scheduled. D/t adequate consumption of ONS, no indication for NS at this time. Should ONS intakes decline, would rec'd initiating NS. Will continue to monitor pt per [facility]; all labs reviewed. Pt having regular BM's. RD following. Admission/PMH: CVA, T1DM, CAD, Afib    MALNUTRITION ASSESSMENT  Context of Malnutrition: Chronic Illness   Malnutrition Status:  Moderate malnutrition  Findings of the 6 clinical characteristics of malnutrition (Minimum of 2 out of 6 clinical characteristics is required to make the diagnosis of moderate or severe Protein Calorie Malnutrition based on AND/ASPEN Guidelines):  Energy Intake:  Mild decrease in energy intake (Comment)  Weight Loss:  Mild weight loss (specify amount and time period) (8% 1 year period)     Body Fat Loss:  Mild body fat loss Orbital, Buccal region   Muscle Mass Loss:  Mild muscle mass loss Clavicles (pectoralis & deltoids), Temples (temporalis), Thigh (quadraceps)  Fluid Accumulation:  No significant fluid accumulation     Strength:  Not Performed    NUTRITION DIAGNOSIS   Moderate malnutrition related to inadequate protein-energy intake as evidenced by Criteria as identified in malnutrition assessment    Nutrition Monitoring and Evaluation:   Food/Nutrient Intake Outcomes:  Food and Nutrient Intake, Supplement Intake  Physical

## 2023-07-27 NOTE — PROGRESS NOTES
Patient alert & oriented x 4, denies pain/discomfort. Patient pulse remains in the low 30's. This am vitals showed 29-32. Dr. Susan Knight notified and patient on hold for therapy. Notified Cardiology, who will be down to see patient this am. Patient pulse remains in 30's throughout this day. Dr. Susan Knight in on rounds and placed order for Internal Medicine consult with plan to acute patient to higher level of care.

## 2023-07-27 NOTE — PLAN OF CARE
Problem: Pain  Goal: Verbalizes/displays adequate comfort level or baseline comfort level  Flowsheets (Taken 7/27/2023 0315)  Verbalizes/displays adequate comfort level or baseline comfort level: Encourage patient to monitor pain and request assistance        Problem: Nutrition Deficit:  Goal: Optimize nutritional status  7/27/2023 0315 by Maggie Ann RN  Flowsheets (Taken 7/27/2023 0315)  Nutrient intake appropriate for improving, restoring, or maintaining nutritional needs: Monitor oral intake, labs, and treatment plans

## 2023-07-27 NOTE — PROGRESS NOTES
Nephrology Progress Note                                                                                                                                                                                                                                                                                                                                                               Office : [phone]     Fax :[phone]              Patient's Name: Choco Grace  10:23 AM  7/27/2023      Assessment/Plan     CVA     2. HTN    3. Afib     4. Acid- base/ Electrolyte imbalance     5. DM 2    6. Bradycardia  - Cardiology consulted     6. CARY    Plan:  - Cr worsening.  Give 500 mL fluid bolus x 1 today  - Suspect CARY in setting of poor PO intake (recent N/V)  - BP controlled on current regimen  - Cards consulted for bradycardia   - Ur studies - reviewed   - Monitor BP   - Check orthostats   - Monitor lytes       Reason for Consult:  HTN   Requesting Physician:  MARVIN Anderson - CNP      Interval hx:    Laying in bed  Not eating much - has nausea and vomiting  Per RN - not urinating much    Gets dizzy/lightheaded when she gets up  HR still low     Past Medical History:   Diagnosis Date    Atrial fibrillation ([address])     off coumadin after bleed, declined restart    Blood transfusion     CAD (coronary artery disease)     Diabetes mellitus type II     Diabetic neuropathy ([address]) 2011    Gastric ulcer     H. pylori infection 2009    Hyperlipidemia     Hypertension     Numbness and tingling of left leg     Osteoarthritis     knees    Poor historian     Primary hypertension     PVD (peripheral vascular disease) ([address])     PTA distal sfa/pop/peroneal, Dr. Loyda Bustos 12/2015    Unspecified cerebral artery occlusion with cerebral infarction     TIA affected left eye    upper gi bleed 12/2009       Past Surgical History:   Procedure Laterality Date    ANGIOPLASTY  12/30/15    Dr. Loyda Bustos, LLE, PTA of distal sfa/pop/peroneal    CARDIAC

## 2023-07-27 NOTE — DISCHARGE SUMMARY
Physical Medicine & Rehabilitation  Discharge Summary     Patient Identification:  Saintclair Leer  : 1945  Admit date: 2023  Discharge date:  23  Attending provider: Ry Lazo DO        Primary care provider: MARVIN Awad CNP     Discharge Diagnoses:   Patient Active Problem List   Diagnosis    Hypertension    Atrial fibrillation ([address])    Osteoarthritis    Noncompliance of patient with dietary regimen    PAD (peripheral artery disease) ([address])    Pure hypercholesterolemia    Coronary artery disease involving native coronary artery of native heart without angina pectoris    Essential hypertension    Diabetes mellitus ([address])    Vitamin D deficiency    Acute osteomyelitis of left foot ([address])    Hammertoe, bilateral    Colonoscopy refused    Chronic deep vein thrombosis (DVT) of left peroneal vein (HCC)    Acute osteomyelitis of right foot ([address])    Osteomyelitis of right foot ([address])    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, with bone involvement without evidence of necrosis (HCC)    Cellulitis of right lower extremity    PVD (peripheral vascular disease) (HCC)    Chronic anemia    Gastrointestinal hemorrhage    Acute blood loss anemia    Dirty living conditions    Severe malnutrition ([address])    Cerebrovascular accident (CVA) involving posterior circulation ([address])    Basilar artery occlusion    Longstanding persistent atrial fibrillation ([address])    CVA (cerebrovascular accident due to intracerebral hemorrhage) ([address])    Malnutrition ([address])    Debility    Bradycardia       History of Present Illness/Acute Hospital Course: The pt is a 66year old lady with a PMHx of Afib, CAD s/p PCI HTN, HLD, and DM who presented to the ED with exhaustion, diaphoresis, and visual changes. Pt's symptoms began at 4:15 PM on 2023. She had an acute onset of confusion that progressed to L hemiparesis. Her CT head was negative for acute intracranial abnormalities.  A CTA of her 3. Atrial fibrillation              -Slow Afib with HR in 40-50s              -telemetry monitoring              -resuming anticoagulation 7 days from admission  4. DM2              -SSI              -hypoglycemia protocol     5. Right shoudler arthritis  - voltaren gel   - improving     Discharge Exam:  Constitutional: Alert, WDWN, Pleasant, no distress  Head: Normocephalic, atruamatic, MMM  Eyes: Conjunctiva noninjected, no icterus, no drainage  Pulm: CTA bilat. Respirations non-labored. CV: bradycardic  Abd: Soft, nontender. NABS+  Ext: No edema, no varicosities  Neuro: Alert, fully oriented, appropriate   MSK: No joint abnormalities noted       Discharge Functional Status:    Physical therapy:  Bed Mobility:     Sit>supine:  Assistance Level: Moderate assistance  Skilled Clinical Factors: VCs for energy efficient technique, ModA for trunk ascension. req's consistent VCs for hand placement during trunk descension  Supine>sit:     Transfers:  Surface: From mat, To mat, From chair with arms  Additional Factors: Set-up, Verbal cues, Hand placement cues, Increased time to complete, Mat lowered  Device: Walker (rolling)  Sit>stand:  Assistance Level: Minimal assistance  Skilled Clinical Factors: Needs assistance with anterior weight shift, consistent VCs on hand position prior to transfer  Stand>sit:  Assistance Level: Moderate assistance  Skilled Clinical Factors: Poor eccentric control, requires ModA to ease to surface. Consistent VCs on hand placement to reach back to surface to facilitate eccentric lowering to surface  Bed<>chair     Stand Pivot:     Lateral transfer:     Car transfer:     Ambulation:     Stairs:     Curb:      Wheelchair:  Surface: Level surface  Device: Sina wheelchair  Additional Factors: Set-up, Verbal cues, Hand placement cues, Increased time to complete  Assistance Required to Manage Parts: Brakes  Assistance Level for Propulsion: Supervision  Propulsion Method: Right lower

## 2023-07-27 NOTE — PROGRESS NOTES
Occupational/Physical Therapy  Hold   Hold per RN. RN reporting pt with HR of 32, extreme dizziness with any movement. Not appropriate for therapies at this time. Will hold. Maninder Evans, MOT, OTR/L    Per RN, pt on hold for rest of day.     Diana Adamson, OTR/L    Malena Cantrell    Variance: -60 Minutes OT & PT

## 2023-07-28 ENCOUNTER — APPOINTMENT (OUTPATIENT)
Dept: GENERAL RADIOLOGY | Age: 78
DRG: 242 | End: 2023-07-28
Attending: INTERNAL MEDICINE
Payer: MEDICARE

## 2023-07-28 ENCOUNTER — ANESTHESIA EVENT (OUTPATIENT)
Dept: CARDIAC CATH/INVASIVE PROCEDURES | Age: 78
End: 2023-07-28
Payer: MEDICARE

## 2023-07-28 ENCOUNTER — APPOINTMENT (OUTPATIENT)
Dept: CARDIAC CATH/INVASIVE PROCEDURES | Age: 78
DRG: 242 | End: 2023-07-28
Attending: INTERNAL MEDICINE
Payer: MEDICARE

## 2023-07-28 ENCOUNTER — ANESTHESIA (OUTPATIENT)
Dept: CARDIAC CATH/INVASIVE PROCEDURES | Age: 78
End: 2023-07-28
Payer: MEDICARE

## 2023-07-28 PROBLEM — E87.8 ELECTROLYTE IMBALANCE: Status: ACTIVE | Noted: 2023-07-28

## 2023-07-28 PROBLEM — N17.9 AKI (ACUTE KIDNEY INJURY) (HCC): Status: ACTIVE | Noted: 2023-07-28

## 2023-07-28 PROBLEM — Z95.0 PRESENCE OF TEMPORARY TRANSVENOUS CARDIAC PACEMAKER: Status: ACTIVE | Noted: 2023-07-28

## 2023-07-28 PROBLEM — E43 SEVERE MALNUTRITION (HCC): Chronic | Status: ACTIVE | Noted: 2022-09-12

## 2023-07-28 LAB
ALBUMIN SERPL-MCNC: 3.8 G/DL (ref 3.4–5)
ANION GAP SERPL CALCULATED.3IONS-SCNC: 14 MMOL/L (ref 3–16)
BASOPHILS # BLD: 0.1 K/UL (ref 0–0.2)
BASOPHILS NFR BLD: 1.3 %
BUN SERPL-MCNC: 23 MG/DL (ref 7–20)
CALCIUM SERPL-MCNC: 9.1 MG/DL (ref 8.3–10.6)
CHLORIDE SERPL-SCNC: 105 MMOL/L (ref 99–110)
CO2 SERPL-SCNC: 21 MMOL/L (ref 21–32)
CREAT SERPL-MCNC: 1.1 MG/DL (ref 0.6–1.2)
DEPRECATED RDW RBC AUTO: 17 % (ref 12.4–15.4)
EOSINOPHIL # BLD: 0.2 K/UL (ref 0–0.6)
EOSINOPHIL NFR BLD: 2.4 %
GFR SERPLBLD CREATININE-BSD FMLA CKD-EPI: 51 ML/MIN/{1.73_M2}
GLUCOSE BLD-MCNC: 124 MG/DL (ref 70–99)
GLUCOSE BLD-MCNC: 66 MG/DL (ref 70–99)
GLUCOSE BLD-MCNC: 72 MG/DL (ref 70–99)
GLUCOSE BLD-MCNC: 74 MG/DL (ref 70–99)
GLUCOSE BLD-MCNC: 76 MG/DL (ref 70–99)
GLUCOSE SERPL-MCNC: 87 MG/DL (ref 70–99)
HCT VFR BLD AUTO: 29.4 % (ref 36–48)
HGB BLD-MCNC: 9.5 G/DL (ref 12–16)
LYMPHOCYTES # BLD: 1.6 K/UL (ref 1–5.1)
LYMPHOCYTES NFR BLD: 19.7 %
MCH RBC QN AUTO: 26.3 PG (ref 26–34)
MCHC RBC AUTO-ENTMCNC: 32.4 G/DL (ref 31–36)
MCV RBC AUTO: 81.2 FL (ref 80–100)
MONOCYTES # BLD: 0.6 K/UL (ref 0–1.3)
MONOCYTES NFR BLD: 7.1 %
NEUTROPHILS # BLD: 5.5 K/UL (ref 1.7–7.7)
NEUTROPHILS NFR BLD: 69.5 %
PERFORMED ON: ABNORMAL
PERFORMED ON: ABNORMAL
PERFORMED ON: NORMAL
PHOSPHATE SERPL-MCNC: 2.9 MG/DL (ref 2.5–4.9)
PLATELET # BLD AUTO: 189 K/UL (ref 135–450)
PMV BLD AUTO: 8.4 FL (ref 5–10.5)
POTASSIUM SERPL-SCNC: 4.1 MMOL/L (ref 3.5–5.1)
RBC # BLD AUTO: 3.61 M/UL (ref 4–5.2)
SODIUM SERPL-SCNC: 140 MMOL/L (ref 136–145)
WBC # BLD AUTO: 7.9 K/UL (ref 4–11)

## 2023-07-28 PROCEDURE — 2000000000 HC ICU R&B

## 2023-07-28 PROCEDURE — 71045 X-RAY EXAM CHEST 1 VIEW: CPT

## 2023-07-28 PROCEDURE — 85025 COMPLETE CBC W/AUTO DIFF WBC: CPT

## 2023-07-28 PROCEDURE — 6370000000 HC RX 637 (ALT 250 FOR IP): Performed by: STUDENT IN AN ORGANIZED HEALTH CARE EDUCATION/TRAINING PROGRAM

## 2023-07-28 PROCEDURE — 99232 SBSQ HOSP IP/OBS MODERATE 35: CPT | Performed by: INTERNAL MEDICINE

## 2023-07-28 PROCEDURE — 02HK3JZ INSERTION OF PACEMAKER LEAD INTO RIGHT VENTRICLE, PERCUTANEOUS APPROACH: ICD-10-PCS | Performed by: INTERNAL MEDICINE

## 2023-07-28 PROCEDURE — 2580000003 HC RX 258

## 2023-07-28 PROCEDURE — 6360000002 HC RX W HCPCS: Performed by: INTERNAL MEDICINE

## 2023-07-28 PROCEDURE — 02PA3MZ REMOVAL OF CARDIAC LEAD FROM HEART, PERCUTANEOUS APPROACH: ICD-10-PCS | Performed by: INTERNAL MEDICINE

## 2023-07-28 PROCEDURE — C1894 INTRO/SHEATH, NON-LASER: HCPCS

## 2023-07-28 PROCEDURE — 99233 SBSQ HOSP IP/OBS HIGH 50: CPT | Performed by: INTERNAL MEDICINE

## 2023-07-28 PROCEDURE — C1887 CATHETER, GUIDING: HCPCS

## 2023-07-28 PROCEDURE — 2580000003 HC RX 258: Performed by: INTERNAL MEDICINE

## 2023-07-28 PROCEDURE — 2709999900 HC NON-CHARGEABLE SUPPLY

## 2023-07-28 PROCEDURE — 0JH604Z INSERTION OF PACEMAKER, SINGLE CHAMBER INTO CHEST SUBCUTANEOUS TISSUE AND FASCIA, OPEN APPROACH: ICD-10-PCS | Performed by: INTERNAL MEDICINE

## 2023-07-28 PROCEDURE — 6370000000 HC RX 637 (ALT 250 FOR IP)

## 2023-07-28 PROCEDURE — C1898 LEAD, PMKR, OTHER THAN TRANS: HCPCS

## 2023-07-28 PROCEDURE — C1786 PMKR, SINGLE, RATE-RESP: HCPCS

## 2023-07-28 PROCEDURE — 33207 INSERT HEART PM VENTRICULAR: CPT | Performed by: INTERNAL MEDICINE

## 2023-07-28 PROCEDURE — 3700000000 HC ANESTHESIA ATTENDED CARE

## 2023-07-28 PROCEDURE — 80069 RENAL FUNCTION PANEL: CPT

## 2023-07-28 PROCEDURE — 36415 COLL VENOUS BLD VENIPUNCTURE: CPT

## 2023-07-28 PROCEDURE — 6360000002 HC RX W HCPCS: Performed by: NURSE ANESTHETIST, CERTIFIED REGISTERED

## 2023-07-28 PROCEDURE — 99223 1ST HOSP IP/OBS HIGH 75: CPT | Performed by: NURSE PRACTITIONER

## 2023-07-28 PROCEDURE — 3700000001 HC ADD 15 MINUTES (ANESTHESIA)

## 2023-07-28 PROCEDURE — 2580000003 HC RX 258: Performed by: NURSE ANESTHETIST, CERTIFIED REGISTERED

## 2023-07-28 PROCEDURE — 6360000002 HC RX W HCPCS

## 2023-07-28 RX ORDER — SODIUM CHLORIDE 0.9 % (FLUSH) 0.9 %
5-40 SYRINGE (ML) INJECTION EVERY 12 HOURS SCHEDULED
Status: DISCONTINUED | OUTPATIENT
Start: 2023-07-28 | End: 2023-08-02 | Stop reason: HOSPADM

## 2023-07-28 RX ORDER — SODIUM CHLORIDE, SODIUM LACTATE, POTASSIUM CHLORIDE, CALCIUM CHLORIDE 600; 310; 30; 20 MG/100ML; MG/100ML; MG/100ML; MG/100ML
INJECTION, SOLUTION INTRAVENOUS CONTINUOUS PRN
Status: DISCONTINUED | OUTPATIENT
Start: 2023-07-28 | End: 2023-07-28 | Stop reason: SDUPTHER

## 2023-07-28 RX ORDER — SODIUM CHLORIDE 0.9 % (FLUSH) 0.9 %
5-40 SYRINGE (ML) INJECTION PRN
Status: DISCONTINUED | OUTPATIENT
Start: 2023-07-28 | End: 2023-08-02 | Stop reason: HOSPADM

## 2023-07-28 RX ORDER — SODIUM CHLORIDE 9 MG/ML
INJECTION, SOLUTION INTRAVENOUS PRN
Status: DISCONTINUED | OUTPATIENT
Start: 2023-07-28 | End: 2023-08-02 | Stop reason: HOSPADM

## 2023-07-28 RX ORDER — PROPOFOL 10 MG/ML
INJECTION, EMULSION INTRAVENOUS PRN
Status: DISCONTINUED | OUTPATIENT
Start: 2023-07-28 | End: 2023-07-28 | Stop reason: SDUPTHER

## 2023-07-28 RX ORDER — QUETIAPINE FUMARATE 25 MG/1
25 TABLET, FILM COATED ORAL ONCE
Status: COMPLETED | OUTPATIENT
Start: 2023-07-28 | End: 2023-07-28

## 2023-07-28 RX ORDER — PROPOFOL 10 MG/ML
INJECTION, EMULSION INTRAVENOUS CONTINUOUS PRN
Status: DISCONTINUED | OUTPATIENT
Start: 2023-07-28 | End: 2023-07-28 | Stop reason: SDUPTHER

## 2023-07-28 RX ADMIN — LISINOPRIL 20 MG: 20 TABLET ORAL at 20:33

## 2023-07-28 RX ADMIN — NIFEDIPINE 30 MG: 30 TABLET, EXTENDED RELEASE ORAL at 20:33

## 2023-07-28 RX ADMIN — VANCOMYCIN HYDROCHLORIDE 1000 MG: 10 INJECTION, POWDER, LYOPHILIZED, FOR SOLUTION INTRAVENOUS at 15:15

## 2023-07-28 RX ADMIN — PROPOFOL 30 MCG/KG/MIN: 10 INJECTION, EMULSION INTRAVENOUS at 15:20

## 2023-07-28 RX ADMIN — PROPOFOL 20 MG: 10 INJECTION, EMULSION INTRAVENOUS at 15:30

## 2023-07-28 RX ADMIN — SODIUM CHLORIDE, SODIUM LACTATE, POTASSIUM CHLORIDE, AND CALCIUM CHLORIDE: .6; .31; .03; .02 INJECTION, SOLUTION INTRAVENOUS at 15:15

## 2023-07-28 RX ADMIN — QUETIAPINE FUMARATE 25 MG: 25 TABLET ORAL at 02:14

## 2023-07-28 RX ADMIN — PROPOFOL 50 MG: 10 INJECTION, EMULSION INTRAVENOUS at 15:20

## 2023-07-28 RX ADMIN — SODIUM CHLORIDE, PRESERVATIVE FREE 10 ML: 5 INJECTION INTRAVENOUS at 20:34

## 2023-07-28 RX ADMIN — ROSUVASTATIN CALCIUM 10 MG: 5 TABLET, FILM COATED ORAL at 20:33

## 2023-07-28 RX ADMIN — HEPARIN SODIUM 5000 UNITS: 5000 INJECTION INTRAVENOUS; SUBCUTANEOUS at 06:36

## 2023-07-28 RX ADMIN — SODIUM CHLORIDE, PRESERVATIVE FREE 10 ML: 5 INJECTION INTRAVENOUS at 20:37

## 2023-07-28 ASSESSMENT — PAIN SCALES - GENERAL
PAINLEVEL_OUTOF10: 0
PAINLEVEL_OUTOF10: 3
PAINLEVEL_OUTOF10: 0
PAINLEVEL_OUTOF10: 0

## 2023-07-28 ASSESSMENT — ENCOUNTER SYMPTOMS
CHEST TIGHTNESS: 0
SORE THROAT: 0
NAUSEA: 1
SHORTNESS OF BREATH: 0

## 2023-07-28 NOTE — DISCHARGE INSTRUCTIONS
check.  Follow-up with NP at 1 month for a wound and device check. Follow-up with NP at 3 months for an appointment and device check. [address] [facility], [facility] [address] Phone: [phone]. If you are unable to make this appointment, please call to reschedule.

## 2023-07-28 NOTE — PLAN OF CARE
Problem: Discharge Planning  Goal: Discharge to home or other facility with appropriate resources  7/27/2023 2329 by Anabela Taylor RN  Outcome: Progressing     Problem: Safety - Adult  Goal: Free from fall injury  7/27/2023 2329 by Anabela Taylor RN  Outcome: Progressing     Problem: Skin/Tissue Integrity  Goal: Absence of new skin breakdown  Description: 1. Monitor for areas of redness and/or skin breakdown  2. Assess vascular access sites hourly  3. Every 4-6 hours minimum:  Change oxygen saturation probe site  4. Every 4-6 hours:  If on nasal continuous positive airway pressure, respiratory therapy assess nares and determine need for appliance change or resting period.   7/27/2023 2329 by Anabela Taylor RN  Outcome: Progressing     Problem: ABCDS Injury Assessment  Goal: Absence of physical injury  7/27/2023 2329 by Anabela Taylor RN  Outcome: Progressing

## 2023-07-28 NOTE — ANESTHESIA POSTPROCEDURE EVALUATION
Department of Anesthesiology  Postprocedure Note    Patient: Jud Yadav  MRN: [id number]  YOB: 1945  Date of evaluation: 7/28/2023      Procedure Summary     Date: 07/28/23 Room / Location: [facility] Cath Lab    Anesthesia Start: 9867 Anesthesia Stop: 1777    Procedures:       [address]      Procedure Not Yet Scheduled Diagnosis:     Scheduled Providers: Yash De Leon DO Responsible Provider: Yash De Leon DO    Anesthesia Type: MAC ASA Status: 4 - Emergent          Anesthesia Type: No value filed.     Moe Phase I:      Moe Phase II:        Anesthesia Post Evaluation    Patient location during evaluation: PACU  Patient participation: complete - patient participated  Level of consciousness: awake and alert  Airway patency: patent  Nausea & Vomiting: no nausea and no vomiting  Cardiovascular status: blood pressure returned to baseline  Respiratory status: acceptable  Hydration status: euvolemic  Pain management: adequate

## 2023-07-28 NOTE — PLAN OF CARE
Problem: Discharge Planning  Goal: Discharge to home or other facility with appropriate resources  7/27/2023 2329 by Alex Yang RN  Outcome: Progressing     Problem: Safety - Adult  Goal: Free from fall injury  7/28/2023 1030 by Jennifer Hager RN  Outcome: Progressing  7/27/2023 2329 by Alex Yang RN  Outcome: Progressing     Problem: Skin/Tissue Integrity  Goal: Absence of new skin breakdown  Description: 1. Monitor for areas of redness and/or skin breakdown  2. Assess vascular access sites hourly  3. Every 4-6 hours minimum:  Change oxygen saturation probe site  4. Every 4-6 hours:  If on nasal continuous positive airway pressure, respiratory therapy assess nares and determine need for appliance change or resting period.   7/28/2023 1030 by Jennifer Hager RN  Outcome: Progressing  7/27/2023 2329 by Alex Yang RN  Outcome: Progressing     Problem: ABCDS Injury Assessment  Goal: Absence of physical injury  7/28/2023 1030 by Jennifer Hager RN  Outcome: Progressing  7/27/2023 2329 by Alex Yang RN  Outcome: Progressing     Problem: Chronic Conditions and Co-morbidities  Goal: Patient's chronic conditions and co-morbidity symptoms are monitored and maintained or improved  Outcome: Progressing

## 2023-07-28 NOTE — PROCEDURES
[facility]     Electrophysiology Procedure Note       Date of Procedure: 7/28/2023  Patient's Name: Delia Anand  YOB: 1945   Medical Record Number: [id number]  Procedure Performed by: Florentino Estrella MD    Procedures performed:  Insertion of MRI compatible right ventricular pacing lead under fluoroscopy. Insertion of a MRI compatible single chamber Pacemaker. Removal of the previously placed a temporary pacemaker from right femoral vein. Electronic analysis of lead and device. Estimated blood loss less than 20 cc    Indication of the procedure:       Delia Anand is a 66 y.o. female with symptomatic bradycardia. She has longstanding persistent atrial fibrillation and she is minimally symptomatic with atrial fibrillation. She developed A-fib with very slow ventricular rate with a rate of 20 to 30 bpm.  In addition to this, she also have regular RR intervals with a rate of 40 bpm in the setting of atrial fibrillation, consistent with complete heart block. Her left atrium is severely enlarged. Hence, she underwent temporary pacemaker from right femoral vein and now, she is here for permanent pacemaker implantation. Secondary to longstanding persistent atrial fibrillation, we decided to proceed with a single-chamber pacemaker implantation. Details of procedure: The patient was brought to the electrophysiology laboratory in stable condition. The patient was in a fasting and non-sedated state. The risks, benefits and alternatives of the procedure were discussed with the patient. The risks including, but not limited to, the risks of vascular injury, bleeding, infection, device malfunction, lead dislodgement, radiation exposure, injury to cardiac and surrounding structures (including pneumothorax), stroke, myocardial infarction and death were discussed in detail. The patient opted to proceed with the device implantation.  Written informed consent was signed and placed in the

## 2023-07-29 ENCOUNTER — NURSE ONLY (OUTPATIENT)
Dept: CARDIOLOGY CLINIC | Age: 78
End: 2023-07-29

## 2023-07-29 DIAGNOSIS — Z95.0 CARDIAC PACEMAKER IN SITU: Primary | ICD-10-CM

## 2023-07-29 DIAGNOSIS — R00.1 BRADYCARDIA: ICD-10-CM

## 2023-07-29 DIAGNOSIS — I48.11 LONGSTANDING PERSISTENT ATRIAL FIBRILLATION (HCC): ICD-10-CM

## 2023-07-29 DIAGNOSIS — I48.91 ATRIAL FIBRILLATION, UNSPECIFIED TYPE (HCC): ICD-10-CM

## 2023-07-29 DIAGNOSIS — R00.1 SYMPTOMATIC BRADYCARDIA: ICD-10-CM

## 2023-07-29 LAB
ALBUMIN SERPL-MCNC: 3.8 G/DL (ref 3.4–5)
ANION GAP SERPL CALCULATED.3IONS-SCNC: 13 MMOL/L (ref 3–16)
BASOPHILS # BLD: 0.1 K/UL (ref 0–0.2)
BASOPHILS NFR BLD: 0.9 %
BUN SERPL-MCNC: 21 MG/DL (ref 7–20)
CALCIUM SERPL-MCNC: 9 MG/DL (ref 8.3–10.6)
CHLORIDE SERPL-SCNC: 105 MMOL/L (ref 99–110)
CO2 SERPL-SCNC: 23 MMOL/L (ref 21–32)
CREAT SERPL-MCNC: 0.9 MG/DL (ref 0.6–1.2)
DEPRECATED RDW RBC AUTO: 16.5 % (ref 12.4–15.4)
EOSINOPHIL # BLD: 0.3 K/UL (ref 0–0.6)
EOSINOPHIL NFR BLD: 3.3 %
GFR SERPLBLD CREATININE-BSD FMLA CKD-EPI: >60 ML/MIN/{1.73_M2}
GLUCOSE BLD-MCNC: 138 MG/DL (ref 70–99)
GLUCOSE BLD-MCNC: 161 MG/DL (ref 70–99)
GLUCOSE BLD-MCNC: 90 MG/DL (ref 70–99)
GLUCOSE BLD-MCNC: 94 MG/DL (ref 70–99)
GLUCOSE BLD-MCNC: 96 MG/DL (ref 70–99)
GLUCOSE SERPL-MCNC: 94 MG/DL (ref 70–99)
HCT VFR BLD AUTO: 29.3 % (ref 36–48)
HGB BLD-MCNC: 9.7 G/DL (ref 12–16)
LYMPHOCYTES # BLD: 1.6 K/UL (ref 1–5.1)
LYMPHOCYTES NFR BLD: 17.2 %
MCH RBC QN AUTO: 26.3 PG (ref 26–34)
MCHC RBC AUTO-ENTMCNC: 33.2 G/DL (ref 31–36)
MCV RBC AUTO: 79.4 FL (ref 80–100)
MONOCYTES # BLD: 0.7 K/UL (ref 0–1.3)
MONOCYTES NFR BLD: 7.4 %
NEUTROPHILS # BLD: 6.5 K/UL (ref 1.7–7.7)
NEUTROPHILS NFR BLD: 71.2 %
PERFORMED ON: ABNORMAL
PERFORMED ON: ABNORMAL
PERFORMED ON: NORMAL
PHOSPHATE SERPL-MCNC: 2.5 MG/DL (ref 2.5–4.9)
PLATELET # BLD AUTO: 203 K/UL (ref 135–450)
PMV BLD AUTO: 8.3 FL (ref 5–10.5)
POTASSIUM SERPL-SCNC: 4.2 MMOL/L (ref 3.5–5.1)
RBC # BLD AUTO: 3.7 M/UL (ref 4–5.2)
SODIUM SERPL-SCNC: 141 MMOL/L (ref 136–145)
WBC # BLD AUTO: 9.2 K/UL (ref 4–11)

## 2023-07-29 PROCEDURE — 6370000000 HC RX 637 (ALT 250 FOR IP): Performed by: INTERNAL MEDICINE

## 2023-07-29 PROCEDURE — 99233 SBSQ HOSP IP/OBS HIGH 50: CPT | Performed by: INTERNAL MEDICINE

## 2023-07-29 PROCEDURE — 99233 SBSQ HOSP IP/OBS HIGH 50: CPT | Performed by: NURSE PRACTITIONER

## 2023-07-29 PROCEDURE — 1200000000 HC SEMI PRIVATE

## 2023-07-29 PROCEDURE — 6360000002 HC RX W HCPCS

## 2023-07-29 PROCEDURE — 85025 COMPLETE CBC W/AUTO DIFF WBC: CPT

## 2023-07-29 PROCEDURE — 94761 N-INVAS EAR/PLS OXIMETRY MLT: CPT

## 2023-07-29 PROCEDURE — C9113 INJ PANTOPRAZOLE SODIUM, VIA: HCPCS

## 2023-07-29 PROCEDURE — 80069 RENAL FUNCTION PANEL: CPT

## 2023-07-29 PROCEDURE — 36415 COLL VENOUS BLD VENIPUNCTURE: CPT

## 2023-07-29 PROCEDURE — 2580000003 HC RX 258

## 2023-07-29 PROCEDURE — 2580000003 HC RX 258: Performed by: INTERNAL MEDICINE

## 2023-07-29 PROCEDURE — 99232 SBSQ HOSP IP/OBS MODERATE 35: CPT | Performed by: INTERNAL MEDICINE

## 2023-07-29 PROCEDURE — 6370000000 HC RX 637 (ALT 250 FOR IP)

## 2023-07-29 RX ADMIN — ROSUVASTATIN CALCIUM 10 MG: 5 TABLET, FILM COATED ORAL at 20:30

## 2023-07-29 RX ADMIN — APIXABAN 5 MG: 5 TABLET, FILM COATED ORAL at 08:35

## 2023-07-29 RX ADMIN — LISINOPRIL 20 MG: 20 TABLET ORAL at 08:35

## 2023-07-29 RX ADMIN — NIFEDIPINE 30 MG: 30 TABLET, EXTENDED RELEASE ORAL at 20:30

## 2023-07-29 RX ADMIN — LISINOPRIL 20 MG: 20 TABLET ORAL at 20:30

## 2023-07-29 RX ADMIN — SODIUM CHLORIDE, PRESERVATIVE FREE 10 ML: 5 INJECTION INTRAVENOUS at 21:00

## 2023-07-29 RX ADMIN — APIXABAN 5 MG: 5 TABLET, FILM COATED ORAL at 20:30

## 2023-07-29 RX ADMIN — SODIUM CHLORIDE, PRESERVATIVE FREE 10 ML: 5 INJECTION INTRAVENOUS at 08:35

## 2023-07-29 RX ADMIN — NIFEDIPINE 30 MG: 30 TABLET, EXTENDED RELEASE ORAL at 08:35

## 2023-07-29 RX ADMIN — PANTOPRAZOLE SODIUM 40 MG: 40 INJECTION, POWDER, FOR SOLUTION INTRAVENOUS at 08:36

## 2023-07-29 ASSESSMENT — ENCOUNTER SYMPTOMS
CHEST TIGHTNESS: 0
SHORTNESS OF BREATH: 0
NAUSEA: 1
SORE THROAT: 0

## 2023-07-29 ASSESSMENT — PAIN SCALES - GENERAL
PAINLEVEL_OUTOF10: 0

## 2023-07-29 NOTE — PLAN OF CARE
Problem: Discharge Planning  Goal: Discharge to home or other facility with appropriate resources  7/29/2023 1746 by Kierra Blanco RN  Outcome: Progressing  Flowsheets (Taken 7/29/2023 0800)  Discharge to home or other facility with appropriate resources:   Identify barriers to discharge with patient and caregiver   Arrange for needed discharge resources and transportation as appropriate     Problem: Safety - Adult  Goal: Free from fall injury  7/29/2023 1746 by Kierra Blanco RN  Outcome: Progressing     Problem: Skin/Tissue Integrity  Goal: Absence of new skin breakdown  Description: 1. Monitor for areas of redness and/or skin breakdown  2. Assess vascular access sites hourly  3. Every 4-6 hours minimum:  Change oxygen saturation probe site  4. Every 4-6 hours:  If on nasal continuous positive airway pressure, respiratory therapy assess nares and determine need for appliance change or resting period.   7/29/2023 1746 by Kierra Blanco RN  Outcome: Progressing     Problem: ABCDS Injury Assessment  Goal: Absence of physical injury  7/29/2023 1746 by Kierra Blanco RN  Outcome: Progressing     Problem: Pain  Goal: Verbalizes/displays adequate comfort level or baseline comfort level  7/29/2023 1746 by Kierra Blanco RN  Outcome: Progressing     Problem: Nutrition Deficit:  Goal: Optimize nutritional status  7/29/2023 1746 by Kierra Blanco RN  Outcome: Progressing     Problem: Chronic Conditions and Co-morbidities  Goal: Patient's chronic conditions and co-morbidity symptoms are monitored and maintained or improved  7/29/2023 1746 by Kierra Blanco RN  Outcome: Progressing  Flowsheets (Taken 7/29/2023 0800)  Care Plan - Patient's Chronic Conditions and Co-Morbidity Symptoms are Monitored and Maintained or Improved: Monitor and assess patient's chronic conditions and comorbid symptoms for stability, deterioration, or improvement

## 2023-07-30 LAB
ALBUMIN SERPL-MCNC: 3.6 G/DL (ref 3.4–5)
ANION GAP SERPL CALCULATED.3IONS-SCNC: 14 MMOL/L (ref 3–16)
BASOPHILS # BLD: 0.1 K/UL (ref 0–0.2)
BASOPHILS NFR BLD: 1.3 %
BUN SERPL-MCNC: 17 MG/DL (ref 7–20)
CALCIUM SERPL-MCNC: 9.2 MG/DL (ref 8.3–10.6)
CHLORIDE SERPL-SCNC: 105 MMOL/L (ref 99–110)
CO2 SERPL-SCNC: 20 MMOL/L (ref 21–32)
CREAT SERPL-MCNC: 0.9 MG/DL (ref 0.6–1.2)
DEPRECATED RDW RBC AUTO: 16.3 % (ref 12.4–15.4)
EOSINOPHIL # BLD: 0.4 K/UL (ref 0–0.6)
EOSINOPHIL NFR BLD: 4.4 %
GFR SERPLBLD CREATININE-BSD FMLA CKD-EPI: >60 ML/MIN/{1.73_M2}
GLUCOSE BLD-MCNC: 118 MG/DL (ref 70–99)
GLUCOSE BLD-MCNC: 147 MG/DL (ref 70–99)
GLUCOSE BLD-MCNC: 153 MG/DL (ref 70–99)
GLUCOSE BLD-MCNC: 86 MG/DL (ref 70–99)
GLUCOSE SERPL-MCNC: 123 MG/DL (ref 70–99)
HCT VFR BLD AUTO: 29.8 % (ref 36–48)
HGB BLD-MCNC: 9.8 G/DL (ref 12–16)
LYMPHOCYTES # BLD: 2 K/UL (ref 1–5.1)
LYMPHOCYTES NFR BLD: 23.4 %
MCH RBC QN AUTO: 26.5 PG (ref 26–34)
MCHC RBC AUTO-ENTMCNC: 32.9 G/DL (ref 31–36)
MCV RBC AUTO: 80.6 FL (ref 80–100)
MONOCYTES # BLD: 0.5 K/UL (ref 0–1.3)
MONOCYTES NFR BLD: 5.9 %
NEUTROPHILS # BLD: 5.5 K/UL (ref 1.7–7.7)
NEUTROPHILS NFR BLD: 65 %
PERFORMED ON: ABNORMAL
PERFORMED ON: NORMAL
PHOSPHATE SERPL-MCNC: 2.6 MG/DL (ref 2.5–4.9)
PLATELET # BLD AUTO: 230 K/UL (ref 135–450)
PMV BLD AUTO: 8.1 FL (ref 5–10.5)
POTASSIUM SERPL-SCNC: 4.4 MMOL/L (ref 3.5–5.1)
RBC # BLD AUTO: 3.7 M/UL (ref 4–5.2)
SODIUM SERPL-SCNC: 139 MMOL/L (ref 136–145)
WBC # BLD AUTO: 8.5 K/UL (ref 4–11)

## 2023-07-30 PROCEDURE — 2580000003 HC RX 258

## 2023-07-30 PROCEDURE — 99232 SBSQ HOSP IP/OBS MODERATE 35: CPT | Performed by: INTERNAL MEDICINE

## 2023-07-30 PROCEDURE — 6370000000 HC RX 637 (ALT 250 FOR IP): Performed by: INTERNAL MEDICINE

## 2023-07-30 PROCEDURE — 80069 RENAL FUNCTION PANEL: CPT

## 2023-07-30 PROCEDURE — 2580000003 HC RX 258: Performed by: INTERNAL MEDICINE

## 2023-07-30 PROCEDURE — 99232 SBSQ HOSP IP/OBS MODERATE 35: CPT | Performed by: NURSE PRACTITIONER

## 2023-07-30 PROCEDURE — 6360000002 HC RX W HCPCS

## 2023-07-30 PROCEDURE — C9113 INJ PANTOPRAZOLE SODIUM, VIA: HCPCS

## 2023-07-30 PROCEDURE — 6370000000 HC RX 637 (ALT 250 FOR IP)

## 2023-07-30 PROCEDURE — 36415 COLL VENOUS BLD VENIPUNCTURE: CPT

## 2023-07-30 PROCEDURE — 1200000000 HC SEMI PRIVATE

## 2023-07-30 PROCEDURE — 85025 COMPLETE CBC W/AUTO DIFF WBC: CPT

## 2023-07-30 RX ADMIN — NIFEDIPINE 30 MG: 30 TABLET, EXTENDED RELEASE ORAL at 08:21

## 2023-07-30 RX ADMIN — SODIUM CHLORIDE, PRESERVATIVE FREE 10 ML: 5 INJECTION INTRAVENOUS at 20:43

## 2023-07-30 RX ADMIN — NIFEDIPINE 30 MG: 30 TABLET, EXTENDED RELEASE ORAL at 20:42

## 2023-07-30 RX ADMIN — PANTOPRAZOLE SODIUM 40 MG: 40 INJECTION, POWDER, FOR SOLUTION INTRAVENOUS at 08:21

## 2023-07-30 RX ADMIN — ROSUVASTATIN CALCIUM 10 MG: 5 TABLET, FILM COATED ORAL at 20:42

## 2023-07-30 RX ADMIN — APIXABAN 5 MG: 5 TABLET, FILM COATED ORAL at 20:42

## 2023-07-30 RX ADMIN — SODIUM CHLORIDE, PRESERVATIVE FREE 10 ML: 5 INJECTION INTRAVENOUS at 08:22

## 2023-07-30 RX ADMIN — APIXABAN 5 MG: 5 TABLET, FILM COATED ORAL at 08:21

## 2023-07-30 RX ADMIN — LISINOPRIL 20 MG: 20 TABLET ORAL at 20:42

## 2023-07-30 RX ADMIN — SODIUM CHLORIDE, PRESERVATIVE FREE 10 ML: 5 INJECTION INTRAVENOUS at 08:21

## 2023-07-30 RX ADMIN — LISINOPRIL 20 MG: 20 TABLET ORAL at 08:21

## 2023-07-30 ASSESSMENT — PAIN SCALES - GENERAL
PAINLEVEL_OUTOF10: 0

## 2023-07-30 NOTE — PLAN OF CARE
Problem: Discharge Planning  Goal: Discharge to home or other facility with appropriate resources  Outcome: Progressing     Problem: Safety - Adult  Goal: Free from fall injury  Outcome: Progressing     Problem: Skin/Tissue Integrity  Goal: Absence of new skin breakdown  Description: 1. Monitor for areas of redness and/or skin breakdown  2. Assess vascular access sites hourly  3. Every 4-6 hours minimum:  Change oxygen saturation probe site  4. Every 4-6 hours:  If on nasal continuous positive airway pressure, respiratory therapy assess nares and determine need for appliance change or resting period.   Outcome: Progressing     Problem: ABCDS Injury Assessment  Goal: Absence of physical injury  Outcome: Progressing     Problem: Pain  Goal: Verbalizes/displays adequate comfort level or baseline comfort level  Outcome: Progressing     Problem: Nutrition Deficit:  Goal: Optimize nutritional status  Outcome: Progressing     Problem: Chronic Conditions and Co-morbidities  Goal: Patient's chronic conditions and co-morbidity symptoms are monitored and maintained or improved  Outcome: Progressing

## 2023-07-31 LAB
ANION GAP SERPL CALCULATED.3IONS-SCNC: 10 MMOL/L (ref 3–16)
BUN SERPL-MCNC: 18 MG/DL (ref 7–20)
CALCIUM SERPL-MCNC: 9.2 MG/DL (ref 8.3–10.6)
CHLORIDE SERPL-SCNC: 102 MMOL/L (ref 99–110)
CO2 SERPL-SCNC: 22 MMOL/L (ref 21–32)
CREAT SERPL-MCNC: 0.9 MG/DL (ref 0.6–1.2)
GFR SERPLBLD CREATININE-BSD FMLA CKD-EPI: >60 ML/MIN/{1.73_M2}
GLUCOSE BLD-MCNC: 111 MG/DL (ref 70–99)
GLUCOSE BLD-MCNC: 124 MG/DL (ref 70–99)
GLUCOSE BLD-MCNC: 150 MG/DL (ref 70–99)
GLUCOSE BLD-MCNC: 175 MG/DL (ref 70–99)
GLUCOSE SERPL-MCNC: 143 MG/DL (ref 70–99)
PERFORMED ON: ABNORMAL
POTASSIUM SERPL-SCNC: 4.5 MMOL/L (ref 3.5–5.1)
SODIUM SERPL-SCNC: 134 MMOL/L (ref 136–145)

## 2023-07-31 PROCEDURE — 2580000003 HC RX 258

## 2023-07-31 PROCEDURE — 80048 BASIC METABOLIC PNL TOTAL CA: CPT

## 2023-07-31 PROCEDURE — 2580000003 HC RX 258: Performed by: INTERNAL MEDICINE

## 2023-07-31 PROCEDURE — 97166 OT EVAL MOD COMPLEX 45 MIN: CPT

## 2023-07-31 PROCEDURE — 97162 PT EVAL MOD COMPLEX 30 MIN: CPT

## 2023-07-31 PROCEDURE — 36415 COLL VENOUS BLD VENIPUNCTURE: CPT

## 2023-07-31 PROCEDURE — C9113 INJ PANTOPRAZOLE SODIUM, VIA: HCPCS

## 2023-07-31 PROCEDURE — 6370000000 HC RX 637 (ALT 250 FOR IP)

## 2023-07-31 PROCEDURE — 97535 SELF CARE MNGMENT TRAINING: CPT

## 2023-07-31 PROCEDURE — 6360000002 HC RX W HCPCS

## 2023-07-31 PROCEDURE — 6370000000 HC RX 637 (ALT 250 FOR IP): Performed by: INTERNAL MEDICINE

## 2023-07-31 PROCEDURE — 99232 SBSQ HOSP IP/OBS MODERATE 35: CPT | Performed by: NURSE PRACTITIONER

## 2023-07-31 PROCEDURE — 97530 THERAPEUTIC ACTIVITIES: CPT

## 2023-07-31 PROCEDURE — 99232 SBSQ HOSP IP/OBS MODERATE 35: CPT | Performed by: INTERNAL MEDICINE

## 2023-07-31 PROCEDURE — 1200000000 HC SEMI PRIVATE

## 2023-07-31 RX ORDER — ROSUVASTATIN CALCIUM 10 MG/1
10 TABLET, COATED ORAL NIGHTLY
Qty: 30 TABLET | Refills: 0 | Status: ON HOLD
Start: 2023-07-31 | End: 2023-08-02

## 2023-07-31 RX ORDER — PANTOPRAZOLE SODIUM 40 MG/1
40 TABLET, DELAYED RELEASE ORAL
Status: DISCONTINUED | OUTPATIENT
Start: 2023-08-01 | End: 2023-08-02 | Stop reason: HOSPADM

## 2023-07-31 RX ORDER — NIFEDIPINE 30 MG/1
30 TABLET, FILM COATED, EXTENDED RELEASE ORAL 2 TIMES DAILY
Qty: 30 TABLET | Refills: 0 | Status: ON HOLD
Start: 2023-07-31

## 2023-07-31 RX ORDER — PANTOPRAZOLE SODIUM 40 MG/1
40 TABLET, DELAYED RELEASE ORAL
Qty: 30 TABLET | Refills: 3 | Status: ON HOLD
Start: 2023-08-01 | End: 2023-08-02

## 2023-07-31 RX ADMIN — APIXABAN 5 MG: 5 TABLET, FILM COATED ORAL at 21:35

## 2023-07-31 RX ADMIN — LISINOPRIL 20 MG: 20 TABLET ORAL at 21:34

## 2023-07-31 RX ADMIN — PANTOPRAZOLE SODIUM 40 MG: 40 INJECTION, POWDER, FOR SOLUTION INTRAVENOUS at 09:26

## 2023-07-31 RX ADMIN — ROSUVASTATIN CALCIUM 10 MG: 5 TABLET, FILM COATED ORAL at 21:35

## 2023-07-31 RX ADMIN — NIFEDIPINE 30 MG: 30 TABLET, EXTENDED RELEASE ORAL at 09:26

## 2023-07-31 RX ADMIN — SODIUM CHLORIDE, PRESERVATIVE FREE 10 ML: 5 INJECTION INTRAVENOUS at 09:26

## 2023-07-31 RX ADMIN — LISINOPRIL 20 MG: 20 TABLET ORAL at 09:26

## 2023-07-31 RX ADMIN — SODIUM CHLORIDE, PRESERVATIVE FREE 5 ML: 5 INJECTION INTRAVENOUS at 21:35

## 2023-07-31 RX ADMIN — NIFEDIPINE 30 MG: 30 TABLET, EXTENDED RELEASE ORAL at 21:35

## 2023-07-31 RX ADMIN — APIXABAN 5 MG: 5 TABLET, FILM COATED ORAL at 09:26

## 2023-07-31 RX ADMIN — SODIUM CHLORIDE, PRESERVATIVE FREE 10 ML: 5 INJECTION INTRAVENOUS at 21:00

## 2023-07-31 NOTE — PLAN OF CARE
Problem: Safety - Adult  Goal: Free from fall injury  Outcome: Progressing  Note: Discussed with pt importance to keep bed low and locked with alarm activated, nonskid socks on when out of bed, call light and belongings within reach- will monitor. Problem: Pain  Goal: Verbalizes/displays adequate comfort level or baseline comfort level  Outcome: Progressing  Note: Patient complains of pain at level 3/10. Patient describes pain as ache. Patient requests pain medication. Patient medicated with tylenol . Will continue to monitor.

## 2023-07-31 NOTE — PLAN OF CARE
Problem: Discharge Planning  Goal: Discharge to home or other facility with appropriate resources  7/30/2023 2207 by Jc Luz RN  Outcome: Progressing     Problem: Safety - Adult  Goal: Free from fall injury  7/30/2023 2207 by Jc Luz RN  Outcome: Progressing     Problem: Skin/Tissue Integrity  Goal: Absence of new skin breakdown  Description: 1. Monitor for areas of redness and/or skin breakdown  2. Assess vascular access sites hourly  3. Every 4-6 hours minimum:  Change oxygen saturation probe site  4. Every 4-6 hours:  If on nasal continuous positive airway pressure, respiratory therapy assess nares and determine need for appliance change or resting period.   7/30/2023 2207 by Jc Luz RN  Outcome: Progressing     Problem: Chronic Conditions and Co-morbidities  Goal: Patient's chronic conditions and co-morbidity symptoms are monitored and maintained or improved  7/30/2023 2207 by Jc Luz RN  Outcome: Progressing     Problem: Nutrition Deficit:  Goal: Optimize nutritional status  7/30/2023 2207 by Jc Luz RN  Outcome: Progressing     Problem: Cardiovascular - Adult  Goal: Maintains optimal cardiac output and hemodynamic stability  Outcome: Progressing     Problem: Cardiovascular - Adult  Goal: Absence of cardiac dysrhythmias or at baseline  Outcome: Progressing     Problem: Musculoskeletal - Adult  Goal: Return mobility to safest level of function  Outcome: Progressing

## 2023-07-31 NOTE — PLAN OF CARE
Problem: Discharge Planning  Goal: Discharge to home or other facility with appropriate resources  Outcome: Progressing     Problem: Safety - Adult  Goal: Free from fall injury  7/31/2023 1939 by Gerald Bagley RN  Outcome: Progressing  Flowsheets (Taken 7/31/2023 1937)  Free From Fall Injury: Instruct family/caregiver on patient safety     Problem: Skin/Tissue Integrity  Goal: Absence of new skin breakdown  Description: 1. Monitor for areas of redness and/or skin breakdown  2. Assess vascular access sites hourly  3. Every 4-6 hours minimum:  Change oxygen saturation probe site  4. Every 4-6 hours:  If on nasal continuous positive airway pressure, respiratory therapy assess nares and determine need for appliance change or resting period.   Outcome: Progressing     Problem: Nutrition Deficit:  Goal: Optimize nutritional status  Outcome: Progressing     Problem: Cardiovascular - Adult  Goal: Maintains optimal cardiac output and hemodynamic stability  Outcome: Progressing  Flowsheets (Taken 7/31/2023 1930)  Maintains optimal cardiac output and hemodynamic stability: Monitor blood pressure and heart rate     Problem: Cardiovascular - Adult  Goal: Absence of cardiac dysrhythmias or at baseline  Outcome: Progressing  Flowsheets (Taken 7/31/2023 1930)  Absence of cardiac dysrhythmias or at baseline:   Monitor cardiac rate and rhythm   Assess for signs of decreased cardiac output     Problem: Musculoskeletal - Adult  Goal: Return mobility to safest level of function  Outcome: Progressing

## 2023-08-01 LAB
ANION GAP SERPL CALCULATED.3IONS-SCNC: 7 MMOL/L (ref 3–16)
BUN SERPL-MCNC: 15 MG/DL (ref 7–20)
CALCIUM SERPL-MCNC: 9.2 MG/DL (ref 8.3–10.6)
CHLORIDE SERPL-SCNC: 105 MMOL/L (ref 99–110)
CO2 SERPL-SCNC: 27 MMOL/L (ref 21–32)
CREAT SERPL-MCNC: 0.8 MG/DL (ref 0.6–1.2)
GFR SERPLBLD CREATININE-BSD FMLA CKD-EPI: >60 ML/MIN/{1.73_M2}
GLUCOSE BLD-MCNC: 114 MG/DL (ref 70–99)
GLUCOSE BLD-MCNC: 145 MG/DL (ref 70–99)
GLUCOSE BLD-MCNC: 148 MG/DL (ref 70–99)
GLUCOSE BLD-MCNC: 198 MG/DL (ref 70–99)
GLUCOSE SERPL-MCNC: 130 MG/DL (ref 70–99)
PERFORMED ON: ABNORMAL
POTASSIUM SERPL-SCNC: 4.4 MMOL/L (ref 3.5–5.1)
SODIUM SERPL-SCNC: 139 MMOL/L (ref 136–145)

## 2023-08-01 PROCEDURE — 6370000000 HC RX 637 (ALT 250 FOR IP)

## 2023-08-01 PROCEDURE — 97530 THERAPEUTIC ACTIVITIES: CPT

## 2023-08-01 PROCEDURE — 6370000000 HC RX 637 (ALT 250 FOR IP): Performed by: INTERNAL MEDICINE

## 2023-08-01 PROCEDURE — 2580000003 HC RX 258

## 2023-08-01 PROCEDURE — 2580000003 HC RX 258: Performed by: INTERNAL MEDICINE

## 2023-08-01 PROCEDURE — 80048 BASIC METABOLIC PNL TOTAL CA: CPT

## 2023-08-01 PROCEDURE — 36415 COLL VENOUS BLD VENIPUNCTURE: CPT

## 2023-08-01 PROCEDURE — 99232 SBSQ HOSP IP/OBS MODERATE 35: CPT | Performed by: INTERNAL MEDICINE

## 2023-08-01 PROCEDURE — 1200000000 HC SEMI PRIVATE

## 2023-08-01 PROCEDURE — 97535 SELF CARE MNGMENT TRAINING: CPT

## 2023-08-01 RX ADMIN — ROSUVASTATIN CALCIUM 10 MG: 5 TABLET, FILM COATED ORAL at 20:05

## 2023-08-01 RX ADMIN — SODIUM CHLORIDE, PRESERVATIVE FREE 10 ML: 5 INJECTION INTRAVENOUS at 20:07

## 2023-08-01 RX ADMIN — APIXABAN 5 MG: 5 TABLET, FILM COATED ORAL at 09:18

## 2023-08-01 RX ADMIN — NIFEDIPINE 30 MG: 30 TABLET, EXTENDED RELEASE ORAL at 20:05

## 2023-08-01 RX ADMIN — APIXABAN 5 MG: 5 TABLET, FILM COATED ORAL at 21:17

## 2023-08-01 RX ADMIN — PANTOPRAZOLE SODIUM 40 MG: 40 TABLET, DELAYED RELEASE ORAL at 07:01

## 2023-08-01 RX ADMIN — NIFEDIPINE 30 MG: 30 TABLET, EXTENDED RELEASE ORAL at 09:17

## 2023-08-01 RX ADMIN — LISINOPRIL 20 MG: 20 TABLET ORAL at 20:05

## 2023-08-01 RX ADMIN — ACETAMINOPHEN 650 MG: 325 TABLET ORAL at 12:04

## 2023-08-01 RX ADMIN — LISINOPRIL 20 MG: 20 TABLET ORAL at 09:17

## 2023-08-01 RX ADMIN — SODIUM CHLORIDE, PRESERVATIVE FREE 10 ML: 5 INJECTION INTRAVENOUS at 09:18

## 2023-08-01 RX ADMIN — SODIUM CHLORIDE, PRESERVATIVE FREE 10 ML: 5 INJECTION INTRAVENOUS at 20:06

## 2023-08-02 ENCOUNTER — HOSPITAL ENCOUNTER (INPATIENT)
Age: 78
DRG: 056 | End: 2023-08-02
Attending: PHYSICAL MEDICINE & REHABILITATION | Admitting: PHYSICAL MEDICINE & REHABILITATION
Payer: MEDICARE

## 2023-08-02 VITALS
HEART RATE: 61 BPM | HEIGHT: 63 IN | WEIGHT: 114.64 LBS | DIASTOLIC BLOOD PRESSURE: 66 MMHG | RESPIRATION RATE: 18 BRPM | TEMPERATURE: 97.6 F | OXYGEN SATURATION: 98 % | BODY MASS INDEX: 20.31 KG/M2 | SYSTOLIC BLOOD PRESSURE: 122 MMHG

## 2023-08-02 PROBLEM — I63.9 ACUTE CVA (CEREBROVASCULAR ACCIDENT) (HCC): Status: ACTIVE | Noted: 2023-08-02

## 2023-08-02 LAB
ANION GAP SERPL CALCULATED.3IONS-SCNC: 7 MMOL/L (ref 3–16)
BUN SERPL-MCNC: 18 MG/DL (ref 7–20)
CALCIUM SERPL-MCNC: 9.1 MG/DL (ref 8.3–10.6)
CHLORIDE SERPL-SCNC: 104 MMOL/L (ref 99–110)
CO2 SERPL-SCNC: 27 MMOL/L (ref 21–32)
CREAT SERPL-MCNC: 1 MG/DL (ref 0.6–1.2)
GFR SERPLBLD CREATININE-BSD FMLA CKD-EPI: 58 ML/MIN/{1.73_M2}
GLUCOSE BLD-MCNC: 113 MG/DL (ref 70–99)
GLUCOSE BLD-MCNC: 144 MG/DL (ref 70–99)
GLUCOSE BLD-MCNC: 176 MG/DL (ref 70–99)
GLUCOSE BLD-MCNC: 183 MG/DL (ref 70–99)
GLUCOSE SERPL-MCNC: 119 MG/DL (ref 70–99)
PERFORMED ON: ABNORMAL
POTASSIUM SERPL-SCNC: 4.7 MMOL/L (ref 3.5–5.1)
SODIUM SERPL-SCNC: 138 MMOL/L (ref 136–145)

## 2023-08-02 PROCEDURE — 2580000003 HC RX 258: Performed by: INTERNAL MEDICINE

## 2023-08-02 PROCEDURE — 97530 THERAPEUTIC ACTIVITIES: CPT

## 2023-08-02 PROCEDURE — 97535 SELF CARE MNGMENT TRAINING: CPT

## 2023-08-02 PROCEDURE — 80048 BASIC METABOLIC PNL TOTAL CA: CPT

## 2023-08-02 PROCEDURE — 94799 UNLISTED PULMONARY SVC/PX: CPT

## 2023-08-02 PROCEDURE — 36415 COLL VENOUS BLD VENIPUNCTURE: CPT

## 2023-08-02 PROCEDURE — 1280000000 HC REHAB R&B

## 2023-08-02 PROCEDURE — 2580000003 HC RX 258

## 2023-08-02 PROCEDURE — 97110 THERAPEUTIC EXERCISES: CPT

## 2023-08-02 PROCEDURE — 6370000000 HC RX 637 (ALT 250 FOR IP)

## 2023-08-02 PROCEDURE — 2580000003 HC RX 258: Performed by: PHYSICAL MEDICINE & REHABILITATION

## 2023-08-02 PROCEDURE — 94150 VITAL CAPACITY TEST: CPT

## 2023-08-02 PROCEDURE — 6370000000 HC RX 637 (ALT 250 FOR IP): Performed by: INTERNAL MEDICINE

## 2023-08-02 PROCEDURE — 6370000000 HC RX 637 (ALT 250 FOR IP): Performed by: PHYSICAL MEDICINE & REHABILITATION

## 2023-08-02 RX ORDER — POLYETHYLENE GLYCOL 3350 17 G/17G
17 POWDER, FOR SOLUTION ORAL DAILY PRN
Status: CANCELLED | OUTPATIENT
Start: 2023-08-02

## 2023-08-02 RX ORDER — BISACODYL 5 MG/1
5 TABLET, DELAYED RELEASE ORAL DAILY
Status: CANCELLED | OUTPATIENT
Start: 2023-08-02

## 2023-08-02 RX ORDER — HYDRALAZINE HYDROCHLORIDE 20 MG/ML
10 INJECTION INTRAMUSCULAR; INTRAVENOUS EVERY 6 HOURS PRN
Status: CANCELLED | OUTPATIENT
Start: 2023-08-02

## 2023-08-02 RX ORDER — SODIUM CHLORIDE 0.9 % (FLUSH) 0.9 %
5-40 SYRINGE (ML) INJECTION EVERY 12 HOURS SCHEDULED
Status: DISCONTINUED | OUTPATIENT
Start: 2023-08-02 | End: 2023-08-11

## 2023-08-02 RX ORDER — DEXTROSE MONOHYDRATE 100 MG/ML
INJECTION, SOLUTION INTRAVENOUS CONTINUOUS PRN
Status: DISCONTINUED | OUTPATIENT
Start: 2023-08-02 | End: 2023-08-15 | Stop reason: HOSPADM

## 2023-08-02 RX ORDER — ONDANSETRON 2 MG/ML
4 INJECTION INTRAMUSCULAR; INTRAVENOUS EVERY 6 HOURS PRN
Status: DISCONTINUED | OUTPATIENT
Start: 2023-08-02 | End: 2023-08-15 | Stop reason: HOSPADM

## 2023-08-02 RX ORDER — BISACODYL 5 MG/1
5 TABLET, DELAYED RELEASE ORAL DAILY
Status: DISCONTINUED | OUTPATIENT
Start: 2023-08-02 | End: 2023-08-15 | Stop reason: HOSPADM

## 2023-08-02 RX ORDER — SODIUM CHLORIDE 0.9 % (FLUSH) 0.9 %
5-40 SYRINGE (ML) INJECTION EVERY 12 HOURS SCHEDULED
Status: CANCELLED | OUTPATIENT
Start: 2023-08-02

## 2023-08-02 RX ORDER — ACETAMINOPHEN 325 MG/1
650 TABLET ORAL EVERY 4 HOURS PRN
Status: DISCONTINUED | OUTPATIENT
Start: 2023-08-02 | End: 2023-08-15 | Stop reason: HOSPADM

## 2023-08-02 RX ORDER — INSULIN LISPRO 100 [IU]/ML
0-8 INJECTION, SOLUTION INTRAVENOUS; SUBCUTANEOUS
Status: CANCELLED | OUTPATIENT
Start: 2023-08-02

## 2023-08-02 RX ORDER — INSULIN LISPRO 100 [IU]/ML
0-8 INJECTION, SOLUTION INTRAVENOUS; SUBCUTANEOUS
Status: DISCONTINUED | OUTPATIENT
Start: 2023-08-03 | End: 2023-08-15 | Stop reason: HOSPADM

## 2023-08-02 RX ORDER — INSULIN LISPRO 100 [IU]/ML
0-4 INJECTION, SOLUTION INTRAVENOUS; SUBCUTANEOUS NIGHTLY
Status: DISCONTINUED | OUTPATIENT
Start: 2023-08-02 | End: 2023-08-15 | Stop reason: HOSPADM

## 2023-08-02 RX ORDER — LISINOPRIL 20 MG/1
20 TABLET ORAL 2 TIMES DAILY
Status: CANCELLED | OUTPATIENT
Start: 2023-08-02 | End: 2023-08-03

## 2023-08-02 RX ORDER — INSULIN LISPRO 100 [IU]/ML
0-4 INJECTION, SOLUTION INTRAVENOUS; SUBCUTANEOUS NIGHTLY
Status: CANCELLED | OUTPATIENT
Start: 2023-08-02

## 2023-08-02 RX ORDER — PANTOPRAZOLE SODIUM 40 MG/1
40 TABLET, DELAYED RELEASE ORAL
Status: CANCELLED | OUTPATIENT
Start: 2023-08-03

## 2023-08-02 RX ORDER — SODIUM CHLORIDE 0.9 % (FLUSH) 0.9 %
5-40 SYRINGE (ML) INJECTION PRN
Status: CANCELLED | OUTPATIENT
Start: 2023-08-02

## 2023-08-02 RX ORDER — ROSUVASTATIN CALCIUM 5 MG/1
10 TABLET, COATED ORAL NIGHTLY
Status: COMPLETED | OUTPATIENT
Start: 2023-08-02 | End: 2023-08-02

## 2023-08-02 RX ORDER — NIFEDIPINE 30 MG/1
30 TABLET, FILM COATED, EXTENDED RELEASE ORAL 2 TIMES DAILY
Status: COMPLETED | OUTPATIENT
Start: 2023-08-02 | End: 2023-08-03

## 2023-08-02 RX ORDER — LISINOPRIL 20 MG/1
20 TABLET ORAL 2 TIMES DAILY
Status: COMPLETED | OUTPATIENT
Start: 2023-08-02 | End: 2023-08-03

## 2023-08-02 RX ORDER — ONDANSETRON 2 MG/ML
4 INJECTION INTRAMUSCULAR; INTRAVENOUS EVERY 6 HOURS PRN
Status: CANCELLED | OUTPATIENT
Start: 2023-08-02

## 2023-08-02 RX ORDER — DEXTROSE MONOHYDRATE 100 MG/ML
INJECTION, SOLUTION INTRAVENOUS CONTINUOUS PRN
Status: CANCELLED | OUTPATIENT
Start: 2023-08-02

## 2023-08-02 RX ORDER — ONDANSETRON 4 MG/1
4 TABLET, ORALLY DISINTEGRATING ORAL EVERY 8 HOURS PRN
Status: DISCONTINUED | OUTPATIENT
Start: 2023-08-02 | End: 2023-08-15 | Stop reason: HOSPADM

## 2023-08-02 RX ORDER — HYDRALAZINE HYDROCHLORIDE 20 MG/ML
10 INJECTION INTRAMUSCULAR; INTRAVENOUS EVERY 6 HOURS PRN
Status: DISCONTINUED | OUTPATIENT
Start: 2023-08-02 | End: 2023-08-15 | Stop reason: HOSPADM

## 2023-08-02 RX ORDER — ONDANSETRON 4 MG/1
4 TABLET, ORALLY DISINTEGRATING ORAL EVERY 8 HOURS PRN
Status: CANCELLED | OUTPATIENT
Start: 2023-08-02

## 2023-08-02 RX ORDER — POLYETHYLENE GLYCOL 3350 17 G/17G
17 POWDER, FOR SOLUTION ORAL DAILY PRN
Status: DISCONTINUED | OUTPATIENT
Start: 2023-08-02 | End: 2023-08-15 | Stop reason: HOSPADM

## 2023-08-02 RX ORDER — PANTOPRAZOLE SODIUM 40 MG/1
40 TABLET, DELAYED RELEASE ORAL
Status: DISCONTINUED | OUTPATIENT
Start: 2023-08-03 | End: 2023-08-15 | Stop reason: HOSPADM

## 2023-08-02 RX ORDER — ACETAMINOPHEN 325 MG/1
650 TABLET ORAL EVERY 4 HOURS PRN
Status: CANCELLED | OUTPATIENT
Start: 2023-08-02

## 2023-08-02 RX ORDER — SODIUM CHLORIDE 0.9 % (FLUSH) 0.9 %
5-40 SYRINGE (ML) INJECTION PRN
Status: DISCONTINUED | OUTPATIENT
Start: 2023-08-02 | End: 2023-08-15 | Stop reason: HOSPADM

## 2023-08-02 RX ORDER — NIFEDIPINE 30 MG/1
30 TABLET, FILM COATED, EXTENDED RELEASE ORAL 2 TIMES DAILY
Status: CANCELLED | OUTPATIENT
Start: 2023-08-02 | End: 2023-08-03

## 2023-08-02 RX ORDER — ROSUVASTATIN CALCIUM 10 MG/1
10 TABLET, COATED ORAL NIGHTLY
Status: CANCELLED | OUTPATIENT
Start: 2023-08-02 | End: 2023-08-03

## 2023-08-02 RX ADMIN — SODIUM CHLORIDE, PRESERVATIVE FREE 10 ML: 5 INJECTION INTRAVENOUS at 20:37

## 2023-08-02 RX ADMIN — ROSUVASTATIN CALCIUM 10 MG: 5 TABLET, FILM COATED ORAL at 20:18

## 2023-08-02 RX ADMIN — PANTOPRAZOLE SODIUM 40 MG: 40 TABLET, DELAYED RELEASE ORAL at 06:22

## 2023-08-02 RX ADMIN — APIXABAN 5 MG: 5 TABLET, FILM COATED ORAL at 08:49

## 2023-08-02 RX ADMIN — NIFEDIPINE 30 MG: 30 TABLET, EXTENDED RELEASE ORAL at 20:18

## 2023-08-02 RX ADMIN — NIFEDIPINE 30 MG: 30 TABLET, EXTENDED RELEASE ORAL at 08:49

## 2023-08-02 RX ADMIN — BISACODYL 5 MG: 5 TABLET, COATED ORAL at 20:17

## 2023-08-02 RX ADMIN — LISINOPRIL 20 MG: 20 TABLET ORAL at 20:18

## 2023-08-02 RX ADMIN — APIXABAN 5 MG: 5 TABLET, FILM COATED ORAL at 20:31

## 2023-08-02 RX ADMIN — LISINOPRIL 20 MG: 20 TABLET ORAL at 08:49

## 2023-08-02 RX ADMIN — SODIUM CHLORIDE, PRESERVATIVE FREE 10 ML: 5 INJECTION INTRAVENOUS at 08:49

## 2023-08-02 ASSESSMENT — PAIN SCALES - WONG BAKER: WONGBAKER_NUMERICALRESPONSE: 0

## 2023-08-02 NOTE — PLAN OF CARE
ARU PATIENT TREATMENT PLAN  The [address] [place] [address]    Marcus Iniguez    : 1945  Acct #: [de-identified]  MRN: [id number]  PHYSICIAN:  Pamela Hoang DO  Primary Problem    Patient Active Problem List   Diagnosis    Hypertension    Atrial fibrillation ([address])    Osteoarthritis    Noncompliance of patient with dietary regimen    PAD (peripheral artery disease) ([address])    Pure hypercholesterolemia    Coronary artery disease involving native coronary artery of native heart without angina pectoris    Benign essential HTN    Diabetes mellitus (HCC)    Vitamin D deficiency    Acute osteomyelitis of left foot ([address])    Hammertoe, bilateral    Colonoscopy refused    Chronic deep vein thrombosis (DVT) of left peroneal vein (HCC)    Acute osteomyelitis of right foot ([address])    Osteomyelitis of right foot (HCC)    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, with bone involvement without evidence of necrosis (HCC)    Cellulitis of right lower extremity    PVD (peripheral vascular disease) (HCC)    Chronic anemia    Gastrointestinal hemorrhage    Acute blood loss anemia    Dirty living conditions    Severe malnutrition (HCC)    Cerebrovascular accident (CVA) ([address])    Basilar artery occlusion    Longstanding persistent atrial fibrillation ([address])    CVA (cerebrovascular accident due to intracerebral hemorrhage) ([address])    Malnutrition ([address])    Debility    Bradycardia    Symptomatic bradycardia    Presence of temporary transvenous cardiac pacemaker    CARY (acute kidney injury) ([address])    Electrolyte imbalance    Acute CVA (cerebrovascular accident) ([address])       Rehabilitation Diagnosis:  Stroke, 1.2, Right Body (L Brain)  ADMIT DATE:2023    Patient Goals: Get better and go Home, \"Walk with the walker\"  Admitting Impairments: Moderate dysarthria;  Mild moderate cognitive-linguistic impairment; Decreased functional mobility count as well as intake and collaboratively work with SLP on dietary upgrades. Neuropsychology/Psychology may evaluate and provide necessary support. Medical issues being managed closely and that require 24hour availability of a physician:  [x] Swallowing Precautions  [x] Bowel/Bladder Fx  [] Weight bearing precautions  [] Wound Care    [x] Pain Mgmt   [] Infection Protection  [x] DVT Prophylaxis   [x] Fall Precautions  [x] Fluid/Electrolyte/Nutrition Balance  [x] Voice Protection   [] Respiratory  [] Other:    Medical Prognosis: [] Good  [x] Fair    [] Guarded   Total expected IRF days 21  Anticipated discharge destination:   [] Home Independently   [] Home Modified Independent  [x] Home with supervision    []SNF     [] Other                                           Physician anticipated functional outcomes:Pt will progress to a level of CGA to supervision for all functional mobility to allow for a safe return home. IPOC brief synthesis: The pt is a 66year old lady with a PMHx of Afib, CAD s/p PCI HTN, HLD, and DM who presented to the ED with exhaustion, diaphoresis, and visual changes. This initial ARU patient treatment plan of care, together with the IPOC & the Education plan, form the foundation for the patient's plan of care. Weekly patient care conferences are held to evaluate progress towards the initial treatment plan & goals.     I have reviewed this initial plan of care and agree with its contents:    Title   Name    Date    Time    Physician: QUINCY ShawP.H  PM&R  8/4/2023  11:36 AM      Case Mgmt:Pedro Munroe MSW, 8/3/23 @ [phone]    OT: Nick Moreno OTR/L 8/3/2023 1417    PT: Liyah Frye PT, DPT 8/3/2023 1030    RN: Latanya Bray    ST: DARLENE Fang 8/3/23 @6237    ARU Supervisor: Suellen Mohan PT, DPT 8/4/2023 @ [phone]

## 2023-08-02 NOTE — PROGRESS NOTES
NURSING ASSESSMENT: ARU ADMISSION  The [address] [place] [address] Heather     Rehab Dx/Hx: Acute CVA (cerebrovascular accident) [facility]) [I63.9]   :1945  JWT:[id number]  Date of Admit: 2023  Room #: 3104/3104-01    [facility]ive:   [facility] [address] [facility]. Alert and oriented x4. Oriented to room and call light system. Oriented to rehab routine and therapy schedules. Informed about care conferences and ordering of meals. Drug / Medication Review:   Medications were reviewed by RN at time of admission  [x]  No potential or actual clinically significant medication issues were noted.      []   Yes, a clinically significant medication issue was identified                 []  Adverse Drug Event:                  []  Allergy:                  []  Side Effect:                  []  Ineffective Therapy:                  []  Drug Interaction:                 []  Duplicated Therapy:                 []  Untreated Indication:                  []  Non-adherence:                 []  Other:                  Nursing/Pharmacy contacted the physician:     Date:              Time:                  Actions recommended by physician were completed:   Date :            Time:    4 Eyes Skin Assessment   The patient is being assessed for: Admission     I agree that 2 RN's have performed a thorough Head to Toe Skin Assessment on the patient. ALL assessment sites listed below have been assessed. Scattered bruising to bilat arms, scabs/bruising to bilat shins/thighs, boggy heals bilat, non blanchable redness to coccyx, bruising to left armpit and right flank. Surgical incision to left upper chest (pacemaker placement), Fem site rt groin. Areas assessed by both nurses:   [x]   Head, Face, and Ears   [x]   Shoulders, Back, and Chest, Abdomen  [x]   Arms, Elbows, and Hands   [x]   Coccyx, Sacrum, and Ischium  [x]   Legs, Feet, and Heel     Does the Patient have Skin Breakdown?   Yes

## 2023-08-02 NOTE — PROGRESS NOTES
Patient admitted to unit from [address]. Patient alert & oriented x 4, denies pain/discomfort. Patient oriented to room and unit schedule.

## 2023-08-02 NOTE — PLAN OF CARE
Problem: Skin/Tissue Integrity  Goal: Absence of new skin breakdown  Outcome: Progressing     Problem: Pain  Goal: Verbalizes/displays adequate comfort level or baseline comfort level  Outcome: Progressing  Flowsheets (Taken 8/1/2023 1948)  Verbalizes/displays adequate comfort level or baseline comfort level: Encourage patient to monitor pain and request assistance     Problem: Chronic Conditions and Co-morbidities  Goal: Patient's chronic conditions and co-morbidity symptoms are monitored and maintained or improved  Outcome: Progressing     Problem: Cardiovascular - Adult  Goal: Maintains optimal cardiac output and hemodynamic stability  Outcome: Progressing

## 2023-08-02 NOTE — PROGRESS NOTES
ARU Admission Assessment    Ethnicity  \"Are you of , /a, or Trinidadian origin? \"  Check all that apply:  [x] A. No, not of , /a, or [address] Origin  [] B.  Yes, [place], [place] American, Chicano/a  [] C.  Yes, [address]  [] D.  Yes, [place]  [] E.  Yes, another , , or Trinidadian origin  [] X. Patient unable to respond  [] Y. Patient declines to respond    Race  \"What is your race? \"  Check all that apply:  [x] A. White  [] B. Black or   [] C. American [place] or [place] Native  [] D.  Von  [] E. [place]  [] F. Venezuelan  [] G. [place]  [] Jannet Correa  [] I. [place]  [] J.  Other   [] K.   [] L. O[place]i or [place]  [] M. Swiss  [] N. Other [address] [place] [address]  [] X. Patient unable to respond  [] Y. Patient declines to respond  [] Z. None of the above    Language  A. \"What is your preferred language? \"   English    B. \"Do you need or want an  to communicate with a doctor or health care staff? \"  Check only one:  [x] 0. No  [] 1. Yes  [] 9. Unable to determine    Transportation  \"Has lack of transportation kept you from medical appointments, meetings, work, or from getting things needed for daily living? \"Check all that apply:  [] A.  Yes, it has kept me from medical appointments or from getting my medications  [] B.  Yes, it has kept me from non-medical meetings, appointments, work, or from getting things that I need  [x] C.  No  [] X. Patient unable to respond  [] Y. Patient declines to respond    Hearing  Ability to hear (with hearing aid or hearing appliances if normally used)  []  0. Adequate - no difficulty in normal conversation, social interaction, listening to TV  [x]  1. Minimal difficulty - difficulty in some environments (e.g. when person speaks softly or setting is noisy)  []  2. Moderate difficulty - speaker has to increase volume and speak distinctly   []  3.   Highly impaired - absence of score 0-27, or enter 99 if unable to complete (if symptom frequency (column 2) is blank for 3 or more items). Social Isolation  \"How often do you feel lonely or isolated from those around you? \"  [x] 0. Never  [] 1. Rarely  [] 2. Sometimes  [] 3. Often  [] 4. Always  [] 7. Patient declines to respond  [] 8. Patient unable to respond    Pain Effect on Sleep  \"Over the past 5 days, how much of the time has pain made it hard for you to sleep at night? \"  []  0. Does not apply - I have not had any pain or hurting in the past 5 days  [x]  1. Rarely or not at all  []  2. Occasionally  []  3. Frequently  []  4. Almost constantly  []  8. Unable to answer    **If the patient answers \"0. Does not apply\" to this question, skip the next two \"Pain Effect. Dennise Patricia \" questions**    Pain Interference with Therapy Activities  \"Over the past 5 days, how often have you limited your participation in rehabilitation therapy sessions due to pain? \"  [x]  0. Does not apply - I have not received rehabilitation therapy in the past 5 days  []  1. Rarely or not at all  []  2. Occasionally  []  3. Frequently  []  4. Almost constantly  []  8. Unable to answer    Pain Interference with Day-to-Day Activities: \"Over the past 5 days, how often have you limited your day-to-day activities (excluding rehabilitation therapy session)? \"  [x]  1. Rarely or not at all  []  2. Occasionally  []  3. Frequently  []  4. Almost constantly  []  8. Unable to answer    Nutritional Approaches  Check all of the following nutritional approaches that apply on admission:  []  A. Parenteral/IV feeding (including IV fluids if needed for hydration, but not as part of dialysis/chemo)  []  B. Feeding tube (e.g., nasogastric or abdominal (PEG))  []  C. Mechanically altered diet - requires change in texture of food or liquids (e.g., pureed food, thickened liquids)  []  D. Therapeutic diet (e.g., low salt, diabetic, low cholesterol)  [x]  Z.   None of and updated as necessary, and are accurate for the admission assessment period.     Assessing/Reviewing RN: Tabatha Winston RN    Assessing/Reviewing RN: Ernesto Walton RN

## 2023-08-02 NOTE — CARE COORDINATION
2:14 PM  Plan is for DC to ARU at [facility]. SW spoke to ARU liaison, pre-cert wasn't submitted until today. They will admitted pt once pre-cert is approved. GO following.   Electronically signed by CARLOS Nash, LSW on 8/1/2023 at 2:14 PM  [phone]
Case Management Assessment  Initial Evaluation    Date/Time of Evaluation: 7/28/2023 10:27 AM  Assessment Completed by: Marco Antonio Jiang RN    If patient is discharged prior to next notation, then this note serves as note for discharge by case management. Patient Name: Tomasz Mayfield                   YOB: 1945  Diagnosis: Bradycardia [R00.1]                   Date / Time: 7/27/2023  4:55 PM    Patient Admission Status: Inpatient   Readmission Risk (Low < 19, Mod (19-27), High > 27): Readmission Risk Score: 18.5    Current PCP: MARVIN Reyes CNP  PCP verified by CM? Yes    Chart Reviewed: Yes      History Provided by: Patient  Patient Orientation: Alert and Oriented    Patient Cognition: Alert    Hospitalization in the last 30 days (Readmission):  Yes    Readmission Assessment  Number of Days since last admission?: 1-7 days  Previous Disposition: Acute Rehab ([address] [place] [address])  Who is being Interviewed: Caregiver (chart review)  What was the patient's/caregiver's perception as to why they think they needed to return back to the hospital?: Other (Comment) (bradycardic.  External pacemaker placed)  Did you visit your Primary Care Physician after you left the hospital, before you returned this time?: No  Why weren't you able to visit your PCP?: Other (Comment) (was in acute rehab)  Did you see a specialist, such as Cardiac, Pulmonary, Orthopedic Physician, etc. after you left the hospital?: Yes (PMR)  Who advised the patient to return to the hospital?: Physician  Does the patient report anything that got in the way of taking their medications?: No  In our efforts to provide the best possible care to you and others like you, can you think of anything that we could have done to help you after you left the hospital the first time, so that you might not have needed to return so soon?: Other (Comment) (post acute care needs TBD)      Advance Directives:      Code Status: Full Code   Patient's Primary Decision
DISCHARGE PLANNING:    Plan for discharge to ARU. Precert started 1/91. CM team will continue to follow.     Melanie Dhaliwal RN Case Manager
Not Detected 06/10/2022 11:40 AM    COVID19 NOT DETECTED 05/13/2022 04:39 PM       The Plan for Transition of Care is related to the following treatment goals of Bradycardia [R00.1]    The Patient and/or patient representative Kyle Villafana and her family were provided with a choice of provider and agrees with the discharge plan Yes    Freedom of choice list was provided with basic dialogue that supports the patient's individualized plan of care/goals and shares the quality data associated with the providers.  Yes    Care Transitions patient: No    CARLOS Cheung  [facility]  Case Management Department  Ph: [phone]  Fax: [phone]

## 2023-08-03 LAB
GLUCOSE BLD-MCNC: 121 MG/DL (ref 70–99)
GLUCOSE BLD-MCNC: 127 MG/DL (ref 70–99)
GLUCOSE BLD-MCNC: 131 MG/DL (ref 70–99)
GLUCOSE BLD-MCNC: 142 MG/DL (ref 70–99)
PERFORMED ON: ABNORMAL

## 2023-08-03 PROCEDURE — 92523 SPEECH SOUND LANG COMPREHEN: CPT

## 2023-08-03 PROCEDURE — 97530 THERAPEUTIC ACTIVITIES: CPT

## 2023-08-03 PROCEDURE — 1280000000 HC REHAB R&B

## 2023-08-03 PROCEDURE — 97166 OT EVAL MOD COMPLEX 45 MIN: CPT

## 2023-08-03 PROCEDURE — 97116 GAIT TRAINING THERAPY: CPT

## 2023-08-03 PROCEDURE — 97162 PT EVAL MOD COMPLEX 30 MIN: CPT

## 2023-08-03 PROCEDURE — 97535 SELF CARE MNGMENT TRAINING: CPT

## 2023-08-03 PROCEDURE — 6370000000 HC RX 637 (ALT 250 FOR IP): Performed by: PHYSICAL MEDICINE & REHABILITATION

## 2023-08-03 PROCEDURE — 2580000003 HC RX 258: Performed by: PHYSICAL MEDICINE & REHABILITATION

## 2023-08-03 PROCEDURE — 99232 SBSQ HOSP IP/OBS MODERATE 35: CPT | Performed by: INTERNAL MEDICINE

## 2023-08-03 RX ORDER — ROSUVASTATIN CALCIUM 5 MG/1
10 TABLET, COATED ORAL NIGHTLY
Status: COMPLETED | OUTPATIENT
Start: 2023-08-03 | End: 2023-08-03

## 2023-08-03 RX ORDER — LISINOPRIL 20 MG/1
20 TABLET ORAL DAILY
Status: ACTIVE | OUTPATIENT
Start: 2023-08-03 | End: 2023-08-05

## 2023-08-03 RX ADMIN — SODIUM CHLORIDE, PRESERVATIVE FREE 10 ML: 5 INJECTION INTRAVENOUS at 09:47

## 2023-08-03 RX ADMIN — BISACODYL 5 MG: 5 TABLET, COATED ORAL at 08:00

## 2023-08-03 RX ADMIN — ROSUVASTATIN CALCIUM 10 MG: 5 TABLET, COATED ORAL at 21:51

## 2023-08-03 RX ADMIN — APIXABAN 5 MG: 5 TABLET, FILM COATED ORAL at 21:51

## 2023-08-03 RX ADMIN — PANTOPRAZOLE SODIUM 40 MG: 40 TABLET, DELAYED RELEASE ORAL at 05:06

## 2023-08-03 RX ADMIN — APIXABAN 5 MG: 5 TABLET, FILM COATED ORAL at 08:01

## 2023-08-03 RX ADMIN — NIFEDIPINE 30 MG: 30 TABLET, EXTENDED RELEASE ORAL at 08:01

## 2023-08-03 RX ADMIN — LISINOPRIL 20 MG: 20 TABLET ORAL at 08:01

## 2023-08-03 RX ADMIN — SODIUM CHLORIDE, PRESERVATIVE FREE 10 ML: 5 INJECTION INTRAVENOUS at 21:51

## 2023-08-03 ASSESSMENT — PAIN SCALES - WONG BAKER: WONGBAKER_NUMERICALRESPONSE: 0

## 2023-08-03 ASSESSMENT — PAIN SCALES - GENERAL
PAINLEVEL_OUTOF10: 0
PAINLEVEL_OUTOF10: 0

## 2023-08-03 NOTE — PROGRESS NOTES
Physical Therapy  Facility/Department: [facility] ACUTE REHAB UNIT  Rehabilitation Physical Therapy Initial Assessment    NAME: Micheline Montgomery  : 1945 (13 y.o.)  MRN: [id number]  CODE STATUS: Full Code    Date of Service: 8/3/23      Past Medical History:   Diagnosis Date    Atrial fibrillation ([address])     off coumadin after bleed, declined restart    Blood transfusion     CAD (coronary artery disease)     Diabetes mellitus type II     Diabetic neuropathy ([address])     Gastric ulcer     H. pylori infection     Hyperlipidemia     Hypertension     Numbness and tingling of left leg     Osteoarthritis     knees    Poor historian     Primary hypertension     PVD (peripheral vascular disease) ([address])     PTA distal sfa/pop/peroneal, Dr. Yasmine Carrasquillo 2015    Unspecified cerebral artery occlusion with cerebral infarction     TIA affected left eye    upper gi bleed 2009     Past Surgical History:   Procedure Laterality Date    ANGIOPLASTY  12/30/15    Dr. Yasmine Carrasquillo, LLE, PTA of distal sfa/pop/peroneal    CARDIAC CATHETERIZATION  12    done for surgical clearance, cath was negative    COLONOSCOPY  2022    COLONOSCOPY N/A 2022    COLONOSCOPY CONTROL HEMORRHAGE performed by Jessie Fink MD at [place] [phone]    FOOT SURGERY Left 2018    DEBRIDEMENT OF ULCERS LEFT FOOT AND LEG WITH GRAFT    OTHER SURGICAL HISTORY Left 2017    Left Femoral Peroneal Bypass    PACEMAKER INSERTION/ REMOVAL  2023    SHOULDER ARTHROPLASTY  10/5/12    RIGHT SHOULDER TOTAL ARTHROPLASTY, BICEPS TENODESIS DEPUY, GLOBAL ADVANTAGE AP    TOE AMPUTATION Right 2022    AMPUTATION OF RIGHT FIRST AND FOURTH TOES performed by Edilson Spivey DPM at [address] [facility] [address] Right 2022    TRANSMETATARSAL AMPUTATION RIGHT FOOT performed by Edilson Spivey DPM at [id number] [place] [address] 18 2022    EGD IV SEDATION performed to come fully erect in stance. pt is a high fall risk and safety concerns present for returning home demonstrating poor balance, hemiparesis on L side, poor coordination, and generalized deconditioning. pt will benefit from continued skilled PT to maximize independence and promote safe dc home. Treatment Diagnosis: impaired functional mobiilty 2/2 decreased L sided strength and balance  Therapy Prognosis: Good  Decision Making: Medium Complexity  Discharge Recommendations: Continue to assess pending progress;24 hour supervision or assist;Home with Home health PT  PT D/C Equipment  Other: continue to assess  PT Equipment Recommendations  Other: continue to assess    CLINICAL IMPRESSION   pt is a 65 yo female from home with family and IND at baseline admitted initially post CVA, now readmitted s/p pacemaker placement and well below baseline. pt requiring min-mod A for bed mobility, varying assist of 2 for transfers and mod + min A for very short distance ambulation at RW. pt demonstrating poor tolerance for activity and lacking motivation to participate requiring encouragement throughout. pt demos L lateral lean in sitting and inability to come fully erect in stance. pt is a high fall risk and safety concerns present for returning home demonstrating poor balance, hemiparesis on L side, poor coordination, and generalized deconditioning. pt will benefit from continued skilled PT to maximize independence and promote safe dc home.     GOALS  Patient Goals   Patient Goals : \"Walk with the walker\"  Short Term Goals  Time Frame for Short Term Goals: 1 week  Short Term Goal 1: pt will perf supine<>sit with CGA  Short Term Goal 2: pt will perf STS to RW with min A  Short Term Goal 3: pt will amb 50ft with RW and min A  Short Term Goal 4: pt will negotiate 6 stairs with rail and min A  Long Term Goals  Time Frame for Long Term Goals : 3 weeks  Long Term Goal 1: pt will perf supine<>sit IND  Long Term Goal 2: pt will perf STS to

## 2023-08-03 NOTE — PROGRESS NOTES
SLP ALL NOTES  Facility/Department: [facility] ACUTE REHAB UNIT  Initial Speech/Language/Cognitive Assessment    NAME: Jud Yadav  : 1945   MRN: [id number]  ADMISSION DATE: 2023  ADMITTING DIAGNOSIS: has Hypertension; Atrial fibrillation ([address]); Osteoarthritis; Noncompliance of patient with dietary regimen; PAD (peripheral artery disease) ([address]); Pure hypercholesterolemia; Coronary artery disease involving native coronary artery of native heart without angina pectoris; Benign essential HTN; Diabetes mellitus ([address]); Vitamin D deficiency; Acute osteomyelitis of left foot ([address]); Hammertoe, bilateral; Colonoscopy refused; Chronic deep vein thrombosis (DVT) of left peroneal vein ([address]); Acute osteomyelitis of right foot ([address]); Osteomyelitis of right foot ([address]); 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, with bone involvement without evidence of necrosis ([address]); Cellulitis of right lower extremity; PVD (peripheral vascular disease) ([address]); Chronic anemia; Gastrointestinal hemorrhage; Acute blood loss anemia; Dirty living conditions; Severe malnutrition ([address]); Cerebrovascular accident (CVA) involving posterior circulation ([address]); Basilar artery occlusion; Longstanding persistent atrial fibrillation ([address]); CVA (cerebrovascular accident due to intracerebral hemorrhage) ([address]); Malnutrition ([address]); Debility; Bradycardia; Symptomatic bradycardia; Presence of temporary transvenous cardiac pacemaker; CARY (acute kidney injury) ([address]); Electrolyte imbalance; and Acute CVA (cerebrovascular accident) [facility]) on their problem list.  DATE ONSET: 23    Date of Eval: 8/3/2023   Evaluating Therapist: DARLENE Godinez    RECENT RESULTS  CT OF HEAD/MRI: 23  IMPRESSION:  Expected evolution of the left cerebellar hemisphere infarct. No evidence of  hemorrhagic transformation or any new areas of mass effect.     Primary Complaint: change to speech    Pain:  Buttocks- RN notified    Vision/

## 2023-08-03 NOTE — PLAN OF CARE
Problem: Discharge Planning  Goal: Discharge to home or other facility with appropriate resources  8/3/2023 1942 by Maricel Hogue RN  Outcome: Progressing     Problem: Safety - Adult  Goal: Free from fall injury  8/3/2023 1942 by Maricel Hogue RN  Outcome: Progressing     Problem: Skin/Tissue Integrity  Goal: Absence of new skin breakdown  Description: 1. Monitor for areas of redness and/or skin breakdown  2. Assess vascular access sites hourly  3. Every 4-6 hours minimum:  Change oxygen saturation probe site  4. Every 4-6 hours:  If on nasal continuous positive airway pressure, respiratory therapy assess nares and determine need for appliance change or resting period. 8/3/2023 1942 by Maricel Hogue RN  Outcome: Progressing     Problem: ABCDS Injury Assessment  Goal: Absence of physical injury  8/3/2023 1942 by Maricel Hogue RN  Outcome: Progressing     Problem: Pain  Goal: Verbalizes/displays adequate comfort level or baseline comfort level  8/3/2023 1942 by Maricel Hogue RN  Outcome: Progressing     Problem: Skin/Tissue Integrity  Goal: Absence of new skin breakdown  Description: 1. Monitor for areas of redness and/or skin breakdown  2. Assess vascular access sites hourly  3. Every 4-6 hours minimum:  Change oxygen saturation probe site  4. Every 4-6 hours:  If on nasal continuous positive airway pressure, respiratory therapy assess nares and determine need for appliance change or resting period. 8/3/2023 1942 by Maricel Hogue RN  Outcome: Progressing  8/3/2023 1436 by Ernesto Werner RN  Outcome: Not Progressing  Note: Areas of redness persist to sacrum and bilateral heels. Sacrum does not mark, heels are boggy but do mark. Patient uses heel protectors while in bed and mepilex sacral border to buttocks. Patient turned and repositioned every 2 hours.

## 2023-08-03 NOTE — PROGRESS NOTES
Occupational Therapy  Facility/Department: [facility] ACUTE REHAB UNIT  Occupational Therapy Initial Assessment / Treatment    Name: Willi Hoffman  : 1945  MRN: [id number]  Date of Service: 8/3/2023    Discharge Recommendations:  24 hour supervision or assist, Home with Home health OT  OT Equipment Recommendations  Other: cont to assess, already has GB by toilet       Patient Diagnosis(es): There were no encounter diagnoses. Past Medical History:  has a past medical history of Atrial fibrillation ([address]), Blood transfusion, CAD (coronary artery disease), Diabetes mellitus type II, Diabetic neuropathy ([address]), Gastric ulcer, H. pylori infection, Hyperlipidemia, Hypertension, Numbness and tingling of left leg, Osteoarthritis, Poor historian, Primary hypertension, PVD (peripheral vascular disease) ([address]), Unspecified cerebral artery occlusion with cerebral infarction, and upper gi bleed. Past Surgical History:  has a past surgical history that includes Coronary angioplasty with stent (9015,8290); Cardiac catheterization (12); Total shoulder arthroplasty (10/5/12); angioplasty (12/30/15); other surgical history (Left, 2017); Foot surgery (Left, 2018); Toe amputation (Right, 2022); Upper gastrointestinal endoscopy (N/A, 2022); Colonoscopy (2022); Colonoscopy (N/A, 2022); Toe amputation (Right, 2022); and Pacemaker Insertion/ Removal (2023). Treatment Diagnosis: impaired ADLs and transfers      Assessment   Performance deficits / Impairments: Decreased functional mobility ; Decreased ADL status; Decreased ROM; Decreased strength;Decreased endurance;Decreased balance;Decreased coordination;Decreased cognition;Decreased fine motor control;Decreased posture  Assessment: Pt is a 66 y.o. F s/p acute CVA and pacemaker placement. Pt is typically independent with all ADLs and lives with family who assists w/ IADLs.  Pt presents below functional baseline and requires Mod-Max A for bathing, Activity Tolerance  Activity Tolerance: Patient limited by fatigue;Patient tolerated evaluation without incident;Patient limited by pain; Patient limited by endurance  Activity Tolerance Comments: pt c/o being tired and cold throughout session, frequent rest breaks throughout  Bed mobility  Supine to Sit: Moderate assistance (HOB flat)  Transfers  Stand Step Transfers: 2 Person assistance;Dependent/Total (Stand step w/ RW from EOB > w/c with Mod A x1 + Min A x1)  Sit to stand: 2 Person assistance (Mod A x1 + Min A x1 from EOB > RW, w/c > RW)  Stand to sit: Maximum assistance (VC for hand placement, fully backing up to chair, and eccentric control)    Vision - Basic Assessment  Patient Visual Report: No visual complaint reported. Vision Comments: Pt denied visual changes since the CVA  Vision  Vision: Impaired  Vision Exceptions: Wears glasses at all times  Hearing  Hearing: Within functional limits    Cognition  Overall Cognitive Status: Exceptions  Arousal/Alertness: Appropriate responses to stimuli  Following Commands: Follows one step commands with increased time; Follows one step commands with repetition  Attention Span: Attends with cues to redirect  Memory: Decreased short term memory  Safety Judgement: Decreased awareness of need for assistance;Decreased awareness of need for safety  Problem Solving: Decreased awareness of errors;Assistance required to generate solutions  Insights: Decreased awareness of deficits  Initiation: Requires cues for some  Sequencing: Requires cues for some  Cognition Comment: Pt w/ decreased motivation to participate in therapy d/t fatigue  Orientation  Overall Orientation Status: Within Functional Limits  Orientation Level: Oriented X4          Sensation  Overall Sensation Status: WFL           Education Given To: Patient  Education Provided: Role of Therapy;Plan of Care;Transfer Training;ADL Adaptive Strategies; Energy Conservation  Education Method: Verbal  Barriers to

## 2023-08-03 NOTE — PLAN OF CARE
Problem: Discharge Planning  Goal: Discharge to home or other facility with appropriate resources  Outcome: Progressing  Flowsheets  Taken 8/2/2023 1859 by Shawn Peguero RN  Discharge to home or other facility with appropriate resources: Identify barriers to discharge with patient and caregiver  Taken 8/2/2023 1830 by Shawn Peguero RN  Discharge to home or other facility with appropriate resources: Identify barriers to discharge with patient and caregiver     Problem: Safety - Adult  Goal: Free from fall injury  Outcome: Progressing  Flowsheets (Taken 8/2/2023 1852 by Shawn Peguero RN)  Free From Fall Injury: Instruct family/caregiver on patient safety     Problem: Skin/Tissue Integrity  Goal: Absence of new skin breakdown  Description: 1. Monitor for areas of redness and/or skin breakdown  2. Assess vascular access sites hourly  3. Every 4-6 hours minimum:  Change oxygen saturation probe site  4. Every 4-6 hours:  If on nasal continuous positive airway pressure, respiratory therapy assess nares and determine need for appliance change or resting period.   Outcome: Progressing     Problem: ABCDS Injury Assessment  Goal: Absence of physical injury  Outcome: Progressing  Flowsheets (Taken 8/2/2023 1852 by Shawn Peguero RN)  Absence of Physical Injury: Implement safety measures based on patient assessment

## 2023-08-03 NOTE — PLAN OF CARE
Problem: Skin/Tissue Integrity  Goal: Absence of new skin breakdown  Description: 1. Monitor for areas of redness and/or skin breakdown  2. Assess vascular access sites hourly  3. Every 4-6 hours minimum:  Change oxygen saturation probe site  4. Every 4-6 hours:  If on nasal continuous positive airway pressure, respiratory therapy assess nares and determine need for appliance change or resting period. Outcome: Not Progressing  Note: Areas of redness persist to sacrum and bilateral heels. Sacrum does not mark, heels are boggy but do mark. Patient uses heel protectors while in bed and mepilex sacral border to buttocks. Patient turned and repositioned every 2 hours. Problem: Discharge Planning  Goal: Discharge to home or other facility with appropriate resources  Outcome: Progressing  Flowsheets (Taken 8/3/2023 0812)  Discharge to home or other facility with appropriate resources: Identify discharge learning needs (meds, wound care, etc)     Problem: Safety - Adult  Goal: Free from fall injury  Outcome: Progressing     Problem: ABCDS Injury Assessment  Goal: Absence of physical injury  Outcome: Progressing     Problem: Pain  Goal: Verbalizes/displays adequate comfort level or baseline comfort level  Outcome: Progressing  Flowsheets (Taken 8/3/2023 1246)  Verbalizes/displays adequate comfort level or baseline comfort level: Encourage patient to monitor pain and request assistance  Note: Patient denies pain unless left shoulder is moved. Pain is easily controlled with repositioning. Problem: Skin/Tissue Integrity  Goal: Absence of new skin breakdown  Description: 1. Monitor for areas of redness and/or skin breakdown  2. Assess vascular access sites hourly  3. Every 4-6 hours minimum:  Change oxygen saturation probe site  4. Every 4-6 hours:  If on nasal continuous positive airway pressure, respiratory therapy assess nares and determine need for appliance change or resting period.   Outcome: Not

## 2023-08-03 NOTE — PROGRESS NOTES
Nephrology Progress Note                                                                                                                                                                                                                                                                                                                                                               Office : [phone]     Fax :[phone]              Patient's Name: Donato Cortez    8/3/2023      Assessment/Plan     1. CVA     2. HTN    3. Afib     4. Acid- base/ Electrolyte imbalance     5. DM 2    6. Symptomatic bradycardia  - Cardiology consulted   - S/p pacemaker     6.  CARY    Plan:  - Cr stable   - Perfusion better   - BP controlled on current regimen  - Ur studies - reviewed   - Monitor BP   - Monitor lytes       Reason for Consult:  HTN   Requesting Physician:  MARVIN Soriano - CNP      Interval hx:    Sitting up in a chair at bedside  BP's controlled  Drinking supplemental drinks but not hungry for food      Past Medical History:   Diagnosis Date    Atrial fibrillation ([address])     off coumadin after bleed, declined restart    Blood transfusion     CAD (coronary artery disease)     Diabetes mellitus type II     Diabetic neuropathy ([address]) 2011    Gastric ulcer     H. pylori infection 2009    Hyperlipidemia     Hypertension     Numbness and tingling of left leg     Osteoarthritis     knees    Poor historian     Primary hypertension     PVD (peripheral vascular disease) ([address])     PTA distal sfa/pop/peroneal, Dr. Mono Dial 12/2015    Unspecified cerebral artery occlusion with cerebral infarction     TIA affected left eye    upper gi bleed 12/2009       Past Surgical History:   Procedure Laterality Date    ANGIOPLASTY  12/30/15    Dr. Mono Dial, LLE, PTA of distal sfa/pop/peroneal    CARDIAC CATHETERIZATION  9/21/12    done for surgical clearance, cath was negative    COLONOSCOPY  06/09/2022    COLONOSCOPY N/A 6/9/2022    COLONOSCOPY CONTROL

## 2023-08-03 NOTE — PROGRESS NOTES
Shift assessment complete. VSS. A/Ox4. She did not eat any breakfast or lunch, but did drink 60 ml's of nutritional supplement. At dinner she ate 25% of her meal. Fall precautions in place. Denies pain or discomforts. Call light in reach. Will continue to monitor.

## 2023-08-03 NOTE — H&P
Department of Physical Medicine & Rehabilitation  History & Physical      Patient Identification:  Donato Cortez  : 1945  Admit date: 2023   Attending provider: Leroy Parr DO        Primary care provider: MARVIN Soriano CNP     Chief Complaint: CVA    History of Present Illness/Hospital Course: The pt is a 66year old lady with a PMHx of Afib, CAD s/p PCI HTN, HLD, and DM who presented to the ED with exhaustion, diaphoresis, and visual changes. Pt's symptoms began at 4:15 PM on 2023. She had an acute onset of confusion that progressed to L hemiparesis. Her CT head was negative for acute intracranial abnormalities. A CTA of her age-indeterminate basilar tip occlusion with left posterior cerebral and superior cerebellar artery occlusions. The  Stroke team was consulted, NIHSS was 9, the pt was administered TNK 7:37 PM at OSH ED, and was transferred to [facility] ICU for neurological services. On arrival to the ICU, pt responsive and able to fully participate in writer's assessment and exam. MRI brain displayed an area of diffusion restriction seen involving the superior aspect of the left cerebellar hemisphere consistent with acute ischemia. A MRA displayed Mild narrowing in basilar tip and at origin of the posterior cerebral arteries   Bilaterally with her right greater than left.     Prior Level of Function:  Independent for mobility, ADLs, and IADLs    Current Level of Function:  Mod assist     Pertinent Social History:  Support: Has son at home who is able to provide assistance  Home set-up: Does not have stairs she anticipates difficulty navigating    Past Medical History:   Diagnosis Date    Atrial fibrillation ([address])     off coumadin after bleed, declined restart    Blood transfusion     CAD (coronary artery disease)     Diabetes mellitus type II     Diabetic neuropathy ([address])     Gastric ulcer     H. pylori infection 2009    Hyperlipidemia     Hypertension     Numbness and examination on this patient within 24 hours of admission to this inpatient rehabilitation facility and have determined the patient to be able to tolerate the above course of treatment at an intensive level for a reasonable period of time. I will be completing a detailed individualized  Plan of Care for this patient by day four of the patients stay based upon the Preadmission Screen, this Post-Admission Evaluation, and the therapy evaluations. Barriers: Decreased functional mobility, medical comorbidities  Services Required: PT, OT, SLP  Goals: mod I  Prognosis: Good  Anticipated Dispo: home  ELOS: TBD    Rehabilitation Diagnosis:   Stroke, 1.2, Right Body (L Brain)      Screen is incorrect IGC- should be 1.2 for left brain involvement, no 1.1. Assessment and Plan:  1. CVA s/p TNK   -PT/OT/SLP  2. HTN   - lisinopril- ON HOLD daily but monitor   3. Atrial fibrillation  -monitor   -eliquis   4. DM2  -SSI  - hypoglycemia   5. Bradycardia- pacemaker     Impairments: Decreased functional mobility, Decreased ADLs    Bladder - high risk retention - Monitor PVRs, SC prn >300cc    Bowel - high risk constipation - colace BID, PRN miralax and MoM. follow bowel movements. Enema or suppository if needed.      Safety - fall precautions    PPx  DVT: eliquis   GI: pantoprazole    FULL CODE    Sumanth Tirado D.O. M.P.H  PM&R  8/3/2023  11:06 AM

## 2023-08-03 NOTE — CARE COORDINATION
Case Management Assessment  Initial Evaluation    Date/Time of Evaluation: 8/3/2023 2:51 PM  Assessment Completed by: CARLOS Brian    If patient is discharged prior to next notation, then this note serves as note for discharge by case management. Patient Name: Lynette Torres                   YOB: 1945  Diagnosis: Acute CVA (cerebrovascular accident) [facility]) [I63.9]                   Date / Time: 8/2/2023  6:35 PM    Patient Admission Status: REHAB IP   Readmission Risk (Low < 19, Mod (19-27), High > 27): Readmission Risk Score: 18.3    Current PCP: MARVIN Irvin CNP  PCP verified by CM? Chart Reviewed: Yes      History Provided by:    Patient Orientation:      Patient Cognition:      Hospitalization in the last 30 days (Readmission):  No    If yes, Readmission Assessment in CM Navigator will be completed. Advance Directives:      Code Status: Full Code   Patient's Primary Decision Maker is: Legal Next of Kin    Primary Decision MakerToma Holliday Child - [phone]    Secondary Decision Maker: Allan Cotton - Daughter-in-Law - [phone]    Discharge Planning:    Patient lives with: Family Members Type of Home: House  Primary Care Giver:    Patient Support Systems include: Family Members   Current Financial resources:    Current community resources:    Current services prior to admission: None            Current DME: Walker            Type of Home Care services:  OT, PT, Nursing Services    ADLS  Prior functional level:    Current functional level:      PT AM-PAC:   /24  OT AM-PAC:   /24    Family can provide assistance at DC: Would you like Case Management to discuss the discharge plan with any other family members/significant others, and if so, who?     Plans to Return to Present Housing:    Other Identified Issues/Barriers to RETURNING to current housing: Medical Clearance   Potential Assistance needed at discharge: Home Care            Potential DME:    Patient expects to discharge to: [address] for transportation at discharge:      Financial    Payor: Yasir Fus / Plan: [place] / Product Type: *No Product type* /     Does insurance require precert for SNF: Yes    Potential assistance Purchasing Medications: No  Meds-to-Beds request:        [facility], [address]  [address] [place] [address]  Phone: [phone] Fax: [phone]      Notes:    Factors facilitating achievement of predicted outcomes: Family support, Caregiver support, Friend support, and Motivated    Barriers to discharge: Medical Clearance     Additional Case Management Notes: Ms. John Tripp lives at home with her son in a mobile home in [place], [place]. There are 4-5 steps to enter with a railing. She is independent with self care and functional mobility at baseline. She ambulates with a rolling walker. Her family provides transportation. Son assists with iADL's as needed. No active community services. SW following for anticipated needs at discharge. The Plan for Transition of Care is related to the following treatment goals of Acute CVA (cerebrovascular accident) ([address]) [N79.0]    IF APPLICABLE: The Patient and/or patient representative Woo Catalan and her family were provided with a choice of provider and agrees with the discharge plan. Freedom of choice list with basic dialogue that supports the patient's individualized plan of care/goals and shares the quality data associated with the providers was provided to:     Patient Representative Name:       The Patient and/or Patient Representative Agree with the Discharge Plan?       CARLOS Abad  Case Management Department  Ph: [phone] Fax: [phone]

## 2023-08-04 LAB
ANION GAP SERPL CALCULATED.3IONS-SCNC: 7 MMOL/L (ref 3–16)
BASOPHILS # BLD: 0.1 K/UL (ref 0–0.2)
BASOPHILS NFR BLD: 1.4 %
BUN SERPL-MCNC: 28 MG/DL (ref 7–20)
CALCIUM SERPL-MCNC: 9.3 MG/DL (ref 8.3–10.6)
CHLORIDE SERPL-SCNC: 104 MMOL/L (ref 99–110)
CO2 SERPL-SCNC: 26 MMOL/L (ref 21–32)
CREAT SERPL-MCNC: 1 MG/DL (ref 0.6–1.2)
DEPRECATED RDW RBC AUTO: 16.7 % (ref 12.4–15.4)
EOSINOPHIL # BLD: 0.3 K/UL (ref 0–0.6)
EOSINOPHIL NFR BLD: 4.6 %
GFR SERPLBLD CREATININE-BSD FMLA CKD-EPI: 58 ML/MIN/{1.73_M2}
GLUCOSE BLD-MCNC: 117 MG/DL (ref 70–99)
GLUCOSE BLD-MCNC: 154 MG/DL (ref 70–99)
GLUCOSE BLD-MCNC: 175 MG/DL (ref 70–99)
GLUCOSE BLD-MCNC: 189 MG/DL (ref 70–99)
GLUCOSE SERPL-MCNC: 112 MG/DL (ref 70–99)
HCT VFR BLD AUTO: 26 % (ref 36–48)
HGB BLD-MCNC: 8.7 G/DL (ref 12–16)
LYMPHOCYTES # BLD: 1.9 K/UL (ref 1–5.1)
LYMPHOCYTES NFR BLD: 28.2 %
MCH RBC QN AUTO: 26.8 PG (ref 26–34)
MCHC RBC AUTO-ENTMCNC: 33.5 G/DL (ref 31–36)
MCV RBC AUTO: 79.9 FL (ref 80–100)
MONOCYTES # BLD: 0.5 K/UL (ref 0–1.3)
MONOCYTES NFR BLD: 6.7 %
NEUTROPHILS # BLD: 4 K/UL (ref 1.7–7.7)
NEUTROPHILS NFR BLD: 59.1 %
PERFORMED ON: ABNORMAL
PLATELET # BLD AUTO: 215 K/UL (ref 135–450)
PMV BLD AUTO: 7.8 FL (ref 5–10.5)
POTASSIUM SERPL-SCNC: 4.8 MMOL/L (ref 3.5–5.1)
RBC # BLD AUTO: 3.25 M/UL (ref 4–5.2)
SODIUM SERPL-SCNC: 137 MMOL/L (ref 136–145)
WBC # BLD AUTO: 6.7 K/UL (ref 4–11)

## 2023-08-04 PROCEDURE — 97130 THER IVNTJ EA ADDL 15 MIN: CPT

## 2023-08-04 PROCEDURE — 80048 BASIC METABOLIC PNL TOTAL CA: CPT

## 2023-08-04 PROCEDURE — 85025 COMPLETE CBC W/AUTO DIFF WBC: CPT

## 2023-08-04 PROCEDURE — 97110 THERAPEUTIC EXERCISES: CPT

## 2023-08-04 PROCEDURE — 36415 COLL VENOUS BLD VENIPUNCTURE: CPT

## 2023-08-04 PROCEDURE — 2580000003 HC RX 258: Performed by: PHYSICAL MEDICINE & REHABILITATION

## 2023-08-04 PROCEDURE — 99232 SBSQ HOSP IP/OBS MODERATE 35: CPT | Performed by: INTERNAL MEDICINE

## 2023-08-04 PROCEDURE — 97535 SELF CARE MNGMENT TRAINING: CPT

## 2023-08-04 PROCEDURE — 1280000000 HC REHAB R&B

## 2023-08-04 PROCEDURE — 97129 THER IVNTJ 1ST 15 MIN: CPT

## 2023-08-04 PROCEDURE — 97530 THERAPEUTIC ACTIVITIES: CPT

## 2023-08-04 PROCEDURE — 92507 TX SP LANG VOICE COMM INDIV: CPT

## 2023-08-04 PROCEDURE — 6370000000 HC RX 637 (ALT 250 FOR IP): Performed by: PHYSICAL MEDICINE & REHABILITATION

## 2023-08-04 PROCEDURE — 6360000002 HC RX W HCPCS: Performed by: PHYSICAL MEDICINE & REHABILITATION

## 2023-08-04 RX ORDER — LACTULOSE 10 G/15ML
20 SOLUTION ORAL 3 TIMES DAILY
Status: DISCONTINUED | OUTPATIENT
Start: 2023-08-04 | End: 2023-08-15 | Stop reason: HOSPADM

## 2023-08-04 RX ORDER — DOCUSATE SODIUM 100 MG/1
100 CAPSULE, LIQUID FILLED ORAL DAILY
Status: DISCONTINUED | OUTPATIENT
Start: 2023-08-04 | End: 2023-08-15 | Stop reason: HOSPADM

## 2023-08-04 RX ADMIN — APIXABAN 5 MG: 5 TABLET, FILM COATED ORAL at 09:02

## 2023-08-04 RX ADMIN — SODIUM CHLORIDE, PRESERVATIVE FREE 10 ML: 5 INJECTION INTRAVENOUS at 09:03

## 2023-08-04 RX ADMIN — PANTOPRAZOLE SODIUM 40 MG: 40 TABLET, DELAYED RELEASE ORAL at 05:41

## 2023-08-04 RX ADMIN — ONDANSETRON 4 MG: 2 INJECTION INTRAMUSCULAR; INTRAVENOUS at 19:48

## 2023-08-04 RX ADMIN — BISACODYL 5 MG: 5 TABLET, COATED ORAL at 09:02

## 2023-08-04 RX ADMIN — ACETAMINOPHEN 650 MG: 325 TABLET ORAL at 03:18

## 2023-08-04 RX ADMIN — SODIUM CHLORIDE, PRESERVATIVE FREE 10 ML: 5 INJECTION INTRAVENOUS at 21:19

## 2023-08-04 RX ADMIN — LACTULOSE 20 G: 20 SOLUTION ORAL at 13:57

## 2023-08-04 RX ADMIN — DOCUSATE SODIUM 100 MG: 100 CAPSULE, LIQUID FILLED ORAL at 13:57

## 2023-08-04 RX ADMIN — APIXABAN 5 MG: 5 TABLET, FILM COATED ORAL at 21:13

## 2023-08-04 RX ADMIN — LACTULOSE 20 G: 20 SOLUTION ORAL at 21:13

## 2023-08-04 ASSESSMENT — PAIN DESCRIPTION - ORIENTATION: ORIENTATION: LEFT

## 2023-08-04 ASSESSMENT — PAIN DESCRIPTION - LOCATION: LOCATION: FOOT

## 2023-08-04 ASSESSMENT — PAIN DESCRIPTION - ONSET: ONSET: GRADUAL

## 2023-08-04 ASSESSMENT — PAIN SCALES - WONG BAKER
WONGBAKER_NUMERICALRESPONSE: 0

## 2023-08-04 ASSESSMENT — PAIN DESCRIPTION - FREQUENCY: FREQUENCY: INTERMITTENT

## 2023-08-04 ASSESSMENT — PAIN DESCRIPTION - PAIN TYPE: TYPE: ACUTE PAIN

## 2023-08-04 ASSESSMENT — PAIN - FUNCTIONAL ASSESSMENT: PAIN_FUNCTIONAL_ASSESSMENT: ACTIVITIES ARE NOT PREVENTED

## 2023-08-04 ASSESSMENT — PAIN SCALES - GENERAL
PAINLEVEL_OUTOF10: 0
PAINLEVEL_OUTOF10: 3

## 2023-08-04 ASSESSMENT — PAIN DESCRIPTION - DESCRIPTORS: DESCRIPTORS: BURNING

## 2023-08-04 NOTE — PLAN OF CARE
Problem: Discharge Planning  Goal: Discharge to home or other facility with appropriate resources  Outcome: Progressing  Flowsheets (Taken 8/4/2023 0908)  Discharge to home or other facility with appropriate resources: Identify discharge learning needs (meds, wound care, etc)     Problem: Safety - Adult  Goal: Free from fall injury  Outcome: Progressing  Note: Patient remain free from falls, up with assist x 2. Problem: Skin/Tissue Integrity  Goal: Absence of new skin breakdown  Description: 1. Monitor for areas of redness and/or skin breakdown  2. Assess vascular access sites hourly  3. Every 4-6 hours minimum:  Change oxygen saturation probe site  4. Every 4-6 hours:  If on nasal continuous positive airway pressure, respiratory therapy assess nares and determine need for appliance change or resting period. Outcome: Progressing  Note: Red areas to buttock and bilateral heels have not worsened. Continue to use heel protectors while in bed and frequent turning from side to side. Problem: ABCDS Injury Assessment  Goal: Absence of physical injury  Outcome: Progressing     Problem: Pain  Goal: Verbalizes/displays adequate comfort level or baseline comfort level  Outcome: Progressing  Flowsheets (Taken 8/4/2023 4275)  Verbalizes/displays adequate comfort level or baseline comfort level: Assess pain using appropriate pain scale  Note: Patient is able to use the 0-10 pain scale.

## 2023-08-04 NOTE — PROGRESS NOTES
difficulty with grading weight shifts. Functional Mobility  Factors: HOB slightly elevated, no bedrail  Roll Left  Assistance Level: Contact guard assist  Skilled Clinical Factors: Difficulty grading movement, pt rolls closely to edge of bed (rolls L when instructed to scoot L)  Roll Right  Assistance Level: Contact guard assist  Skilled Clinical Factors: Difficulty grading movement, pt rolls closely to edge of bed  Supine to Sit  Assistance Level: Minimal assistance  Skilled Clinical Factors: VCs for energy efficient technique and sitting EOB as pt props into long sitting, Jonnathan for RLE management. Unsteady with trunk ascension. Scooting  Assistance Level: Contact guard assist  Skilled Clinical Factors: Pt scoots EOB and on mat, pt has significant difficulty grading weight shifts   Balance  Sitting Balance:  (Static CGA: Tendency to lean L and slouch forward, poor midline awareness. Tends to stay in posterior tilt. VCs to orient to midline and one eyes to use visual FB to correct. Dynamic Jonnathan: x1 LOB posteriorly, pt demos difficulty grading lateral weightshifts with scooting and LE clothing management)  Standing Balance: Static: ModA (see standing balance activity)  Transfers  Surface: From mat; To mat;From bed; Wheelchair;From chair with arms;Standard toilet  Additional Factors: Set-up; Verbal cues; Hand placement cues; Increased time to complete  Device: Walker (rolling)  Sit to Stand  Assistance Level: Dependent; Requires x 2 assistance;Minimal assistance; Moderate assistance  Skilled Clinical Factors: ModA + Jonnathan as pt demos difficulty initiating anterior weight shift and trunk extension proceeding anterior weight shift. Heavy VCs on scooting to edge of seat and opening eyes to prepare LE positioning prior to transfer. STS from chair with arms, pt tends to use LEs against surface for stabilization. Stand to Sit  Assistance Level: Dependent; Requires x 2 assistance; Moderate assistance;Minimal assistance;  Skilled Clinical Factors: ModA + Jonnathan as pt demos difficulty with eccentric control. VCs on reaching back with B/l hands  Sit Pivot  Assistance Level: Minimal assistance; Requires x 2 assistance;Dependent; Moderate assistance  Skilled Clinical Factors: ModA + Jonnathan to facilitate initial anterior weight shift prior to transfer, pt able to actively perform lateral transfer to surface. Needs Max VCs to sequence scoot to edge, LE placement, and pivoting. Pt demos difficulty opening eyes (Bed to w/c, w/c <> standard toilet, w/c <> mat, w/c to bed)      Environmental Mobility  Ambulation  Skilled Clinical Factors: Unsafe today as pt demos difficulty with standing balance tolerance and increased fatigue today  W/c mobility:  Surface: Level  Device: Standard w/c  Additional Factors: Set-up, VCs, hand placement cues, increased time to complete  Assistance Required to Manage Parts: Brakes (pt able to lock/unlock brakes after PT demo'd  Assistance level for propulsion: CGA; Jonnathan (Pt intermittently closes eyes and stops propelling, requiring VCs to not run into objects, opening eyes, and assistance with initiating propulsion. Tends to slap LEs on ground during propulsion)  Propulsion Distance: 110'  Skilled Factors: PT instruction on propelling LEs in stepping pattern and using R LE to assist by end as pt became fatigued. L UE not performed 2/2 pain with mvt (RN notified). PT Exercises  Exercise Treatment:  Static and Dynamic Sitting Balance Exercises on mat: Orienting to L midline by using visual feedback in mirror for cuing. Static holds in leaning to L and R (difficulty maintaining balance leaning L>R), Reaching forward/scaption outside of HUMAIRA with R UE to L/R with PT manually facilitating anterior pelvic tilt to promote independence with controlled weight shifts, reaches keeping L shoulder below 90 degrees   -CGA; Jonnathan x1 for 20 minutes as pt demo'd difficulty grading weight shifts, although improved with manual facilitation Co-treatment Concurrent Group Co-treatment   Time In 0730         Time Out 0850         Minutes 80           Timed Code Treatment Minutes: [address] [place], [place], 08/04/23 at 2:23 PM

## 2023-08-04 NOTE — CONSULTS
Comprehensive Nutrition Assessment    RECOMMENDATIONS:  PO Diet: Regular ; continue   ONS: Ensure +HP TID ; continue   Nutrition Education: Education not indicated   Nutrition Support:  Patient w/HIGH RISK of Refeeding. Start daily MV + 100 mg Thiamin for 7 days (start 8/4 - end 8/11). EN Day 1: Start EN formula Diabetic  Glucerna 1.5 @ 20 ml/hr x 24 hours + water flush of 30 ml every 4 hours. EN Day 2: If no signs of refeeding, advance by 10 ml every 8 hours until goal rate of 40 ml/hr. Water flush of 30 ml every 4 hours. Daily Labs: Phos, K+ and Mg to monitor for signs of refeeding. NUTRITION ASSESSMENT:   Nutritional summary & status: Consult r/t new admit to ARU. Pt well known to this RD per previous hospital admission. Per treatment team meeting on 8/3; MD w/ verbal consult for calorie count to be started in order to determine need for NS. Meal ticket for dinner on 8/3 reviewed by RD today (8/4). Pt w/ limited intakes, noted to be 1-25% ave on tray w/ 75% consumption of ONS. Pt previously consuming at least 75% of ONS TID; however, as of late, nursing reports pt has only been consuming 50% or less of Glucerna's. Spoke w/ pt in room this AM. Per chart review, noted pt used to be on minced & moist diet & is edentulous; asked pt if maybe she would benefit from downgrade of diet texture- Pt denied trouble chewing or swallowing & stated that she's just not hungry and \"can't do it\" when RD inquired about poor intakes at meals recently. Pt was c/o buttocks being sore & needing to be moved to bed. Called for nurse & spoke w/ RN in room who informed RD that pt has not been consuming much at all of supplements or food for several days now. RN did mention that pt has been c/o \"tight rope around abdomen\" w/ abd pain & constipation. Informed MD who is going to increase bowel regimen. D/t this info, pt's age & BMI status; suspect pt would benefit from EN from a clinical standpoint.  Discussed this w/ MD this Supplement  Current Tube Feeding (TF) Orders:  Feeding Route: Nasogastric  Formula: Diabetic  Schedule: Continuous  Additives/Modulars: None  Water Flushes: 30 ml q4  Goal TF & Flush Orders Provides: Glucerna 1.5 @ 40 ml/hr to provide 960 ml TV, 1440 kcal, 79 g pro & 729 ml FW  PO Intake: 1-25%, 26-50%   PO Supplement Intake:51-75%, 1-25%  Additional Sources of Calories/IVF:N/A     COMPARATIVE STANDARDS  Energy (kcal):  1953-7770 (25-30 kcal/kg CBW)     Protein (g):  65-80 (1.2-1.5 g/kg CBW)       Fluid (ml/day):  or per MD    ANTHROPOMETRICS  Current Height: 5' 3\" (160 cm)  Current Weight - Scale: 116 lb 13.5 oz (53 kg)    Admission weight: 116 lb 13.5 oz (53 kg)    The patient will be monitored per nutrition standards of care. Consult dietitian if additional nutrition interventions are needed prior to RD reassessment.      Santosh Leon, [address] [address]:  [phone]  Office:  [phone]

## 2023-08-04 NOTE — PROGRESS NOTES
Occupational Therapy  Facility/Department: [facility] ACUTE REHAB UNIT  Rehabilitation Occupational Therapy Daily Treatment Note    Date: 23  Patient Name: Gaby Carroll       Room: 9513/5765-48  MRN: [id number]  Account: [de-identified]   : 1945  (74 y.o.) Gender: female               Past Medical History:  has a past medical history of Atrial fibrillation ([address]), Blood transfusion, CAD (coronary artery disease), Diabetes mellitus type II, Diabetic neuropathy ([address]), Gastric ulcer, H. pylori infection, Hyperlipidemia, Hypertension, Numbness and tingling of left leg, Osteoarthritis, Poor historian, Primary hypertension, PVD (peripheral vascular disease) ([address]), Unspecified cerebral artery occlusion with cerebral infarction, and upper gi bleed. Past Surgical History:   has a past surgical history that includes Coronary angioplasty with stent (4213,3568); Cardiac catheterization (12); Total shoulder arthroplasty (10/5/12); angioplasty (12/30/15); other surgical history (Left, 2017); Foot surgery (Left, 2018); Toe amputation (Right, 2022); Upper gastrointestinal endoscopy (N/A, 2022); Colonoscopy (2022); Colonoscopy (N/A, 2022); Toe amputation (Right, 2022); and Pacemaker Insertion/ Removal (2023). Restrictions  Restrictions/Precautions: Fall Risk, Up as Tolerated  Other position/activity restrictions: up with assist; pacemaker precautions; do not elevate affected arm above shoulder for 30 days    Subjective  Subjective: Pt met supine in bed and agreeable to OT/PT cotx session with encouragement. Pt requiring increased time for all tasks with self limiting behaviors requiring significant emotional support throughout session. Pt reporting pain in R and L knee and L shoulder with movement. RN aware and pt repositioned to comfort at end of session.   Restrictions/Precautions: Fall Risk;Up as Tolerated             Objective     Cognition  Overall Cognitive Status: Exceptions  Arousal/Alertness: Appropriate responses to stimuli  Following Commands: Follows one step commands with increased time; Follows one step commands with repetition  Attention Span: Attends with cues to redirect  Memory: Decreased short term memory  Safety Judgement: Decreased awareness of need for assistance;Decreased awareness of need for safety  Problem Solving: Decreased awareness of errors;Assistance required to generate solutions  Insights: Decreased awareness of deficits  Initiation: Requires cues for some  Sequencing: Requires cues for some  Cognition Comment: Pt requiring encouragment for participation throughout session with frequent cues to keep eyes open. Orientation  Overall Orientation Status: Within Functional Limits  Orientation Level: Oriented X4         ADL  Feeding  Assistance Level: Set-up  Skilled Clinical Factors: Pt set up at end of session with breakfast tray and left in care of RN. Pt stating everything makes me feel sick. I dont want any of it. Grooming/Oral Hygiene  Assistance Level: Moderate assistance  Skilled Clinical Factors: Pt seated in WC at sink to wash face and hands requiring increased time and Mod A with assist for soap and water managment and bringing L UE above sink with stabilization of LUE throughout tasks. Lower Extremity Dressing  Assistance Level: Requires x 2 assistance;Dependent  Skilled Clinical Factors: Pt donning pants seated on toilet with heavy cues and assist  for lifting and threading pants and brief with second person for assist iwth stabilizing stance to pull over hips  Putting On/Taking Off Footwear  Assistance Level: Moderate assistance  Skilled Clinical Factors: Pt requiring +++increased time for donning shoes initially attempting to use figure 4 but unable to sustain LE on knee and put shoe on foot. Pt then attempting to use long handle shoe horn with increased time and education on tech and use of AE.  Pt frequently closing eyes, requiring rest education & training;Self-Care / ADL; Patient/Caregiver education & training    Goals  Patient Goals   Patient goals : to go home  Short Term Goals  Time Frame for Short Term Goals: 1 Week  Short Term Goal 1: Pt will complete UE bathing and UE dressing w/ Min A - ongoing  Short Term Goal 2: Pt will complete LE bathing and LE dressing w/ Mod A  - ongoing  Short Term Goal 3: Pt will complete toileting, including toilet transfer, w/ Mod A - ongoing  Short Term Goal 4: Pt will completed standing level grooming x2 mins w/ CGA - ongoing  Short Term Goal 5: Fx transfers with Jonntahan - ongoing  Long Term Goals  Time Frame for Long Term Goals : 3 weeks  Long Term Goal 1: Pt will complete UE bathing and UE dressing w/ set up - ongoing  Long Term Goal 2: Pt will complete LE bathing and LE dressing w/ spvn  - ongoing  Long Term Goal 3: Pt will complete toileting, including toilet transfer, w/ spvn - ongoing  Long Term Goal 4: Pt will completed standing level grooming x4 mins w/ spvn - ongoing  Long Term Goal 5: Fx transfers with [facility]    AM-PAC Score               Therapy Time   Individual Concurrent Group Co-treatment   Time In       0730   Time Out       0850   Minutes       80   Timed Code Treatment Minutes: [address], EARL/L

## 2023-08-04 NOTE — PROGRESS NOTES
NUTRITION NOTE: Calorie Count      Diet Orders / Intake / Nutrition Support  Current diet/supplement order: ADULT DIET; Regular  ADULT ORAL NUTRITION SUPPLEMENT; Breakfast, Lunch, Dinner; Standard High Calorie/High Protein Oral Supplement  ADULT ORAL NUTRITION SUPPLEMENT; Breakfast, Lunch, Dinner; Diabetic Oral Supplement       COMPARATIVE STANDARDS  Energy (kcal):  5140-6625 (25-30 kcal/kg CBW); Weight Used for Energy Requirements:  Current     Protein (g):  65-80 (1.2-1.5 g/kg CBW); Weight Used for Protein Requirements:  Current        Fluid (ml/day):  or per MD; Method Used for Fluid Requirements:  1 ml/kcal      Date Consumed PO Intake Kcal %   Kcal met PO Intake grams protein %  Protein met   ONS intake   8/3 175 13 15.5 24 75% - 165 kcal, 7.5 g pro                                   **Results will be posted as available.      Vik Saravia [address]:  [phone]  Office:  [phone]

## 2023-08-04 NOTE — PROGRESS NOTES
ACUTE REHAB UNIT  SPEECH/LANGUAGE PATHOLOGY      [x] Daily  [] Weekly Care Conference Note  [] Discharge    Patient:Xuan Payne      :1945  ZJI:[id number]  Rehab Dx/Hx: Acute CVA (cerebrovascular accident) ([address]) [I63.9]    Precautions: [] Aspiration  [x] Fall risk  [] Sternal  [] Seizure [] Hip  [] Weight Bearing [] Other     ST Dx: [] Aphasia  [x] Dysarthria  [] Apraxia   [] Oropharyngeal dysphagia [x] Cognitive Impairment  [] Other:   Date of Admit: 2023  Room #: 8368/5236-77  Date: 2023          Current Diet Order:ADULT DIET; Regular  ADULT ORAL NUTRITION SUPPLEMENT; Breakfast, Lunch, Dinner; Diabetic Oral Supplement  ADULT ORAL NUTRITION SUPPLEMENT; Breakfast, Lunch, Dinner; Standard High Calorie/High Protein Oral Supplement      Lives With: Family (son and daughter in law)  Homemaking Responsibilities: No     Occupation: Retired  Type of Occupation: Worked in a [address]: Goes to [employer] every night, reading the [employer] Exceptions: Wears glasses at all times  Hearing  Hearing: Within functional limits  Barriers toward progress: Decreased motivation and Limited participation        Date: 2023     Tx session 1   Total Timed Code Min 23   Total Treatment Minutes 31   Individual Treatment Minutes 31   Group Treatment Minutes 0   Co-Treat Minutes 0   Brief Exception: N/A   Pain None indicated   Pain Intervention: [] RN notified  [] Repositioned  [] Intervention offered and patient declined  [x] N/A  [] Other:   Subjective:     Pt laying in bed upon entry with RN at side. Participatory with MAX encouragement throughout. Pt stating \"I'm too sick to do it. \" SLP provided encouragement and education that pt is getting therapy to get well, but did not seem to improve effort. Objective / Goals:    Pt will require <3 redirections throughout tx session.      No overt sustained attention breaks, however, pt required several cues for effort and participation

## 2023-08-05 LAB
GLUCOSE BLD-MCNC: 130 MG/DL (ref 70–99)
GLUCOSE BLD-MCNC: 133 MG/DL (ref 70–99)
GLUCOSE BLD-MCNC: 136 MG/DL (ref 70–99)
GLUCOSE BLD-MCNC: 139 MG/DL (ref 70–99)
PERFORMED ON: ABNORMAL

## 2023-08-05 PROCEDURE — 6370000000 HC RX 637 (ALT 250 FOR IP): Performed by: PHYSICAL MEDICINE & REHABILITATION

## 2023-08-05 PROCEDURE — 97116 GAIT TRAINING THERAPY: CPT | Performed by: PHYSICAL THERAPIST

## 2023-08-05 PROCEDURE — 2580000003 HC RX 258: Performed by: PHYSICAL MEDICINE & REHABILITATION

## 2023-08-05 PROCEDURE — 97130 THER IVNTJ EA ADDL 15 MIN: CPT

## 2023-08-05 PROCEDURE — 97530 THERAPEUTIC ACTIVITIES: CPT | Performed by: PHYSICAL THERAPIST

## 2023-08-05 PROCEDURE — 97530 THERAPEUTIC ACTIVITIES: CPT

## 2023-08-05 PROCEDURE — 97129 THER IVNTJ 1ST 15 MIN: CPT

## 2023-08-05 PROCEDURE — 99232 SBSQ HOSP IP/OBS MODERATE 35: CPT | Performed by: INTERNAL MEDICINE

## 2023-08-05 PROCEDURE — 97110 THERAPEUTIC EXERCISES: CPT

## 2023-08-05 PROCEDURE — 1280000000 HC REHAB R&B

## 2023-08-05 RX ADMIN — LACTULOSE 20 G: 20 SOLUTION ORAL at 16:17

## 2023-08-05 RX ADMIN — BISACODYL 5 MG: 5 TABLET, COATED ORAL at 09:00

## 2023-08-05 RX ADMIN — APIXABAN 5 MG: 5 TABLET, FILM COATED ORAL at 19:56

## 2023-08-05 RX ADMIN — PANTOPRAZOLE SODIUM 40 MG: 40 TABLET, DELAYED RELEASE ORAL at 06:18

## 2023-08-05 RX ADMIN — APIXABAN 5 MG: 5 TABLET, FILM COATED ORAL at 08:59

## 2023-08-05 RX ADMIN — SODIUM CHLORIDE, PRESERVATIVE FREE 10 ML: 5 INJECTION INTRAVENOUS at 19:56

## 2023-08-05 RX ADMIN — LACTULOSE 20 G: 20 SOLUTION ORAL at 19:56

## 2023-08-05 RX ADMIN — MINERAL OIL 330 ML: 1000 SOLUTION ORAL at 16:17

## 2023-08-05 RX ADMIN — SODIUM CHLORIDE, PRESERVATIVE FREE 10 ML: 5 INJECTION INTRAVENOUS at 13:27

## 2023-08-05 RX ADMIN — ONDANSETRON 4 MG: 4 TABLET, ORALLY DISINTEGRATING ORAL at 13:34

## 2023-08-05 RX ADMIN — ACETAMINOPHEN 650 MG: 325 TABLET ORAL at 08:30

## 2023-08-05 RX ADMIN — DOCUSATE SODIUM 100 MG: 100 CAPSULE, LIQUID FILLED ORAL at 09:00

## 2023-08-05 RX ADMIN — LACTULOSE 20 G: 20 SOLUTION ORAL at 09:00

## 2023-08-05 ASSESSMENT — PAIN DESCRIPTION - LOCATION: LOCATION: KNEE

## 2023-08-05 ASSESSMENT — PAIN SCALES - GENERAL
PAINLEVEL_OUTOF10: 0
PAINLEVEL_OUTOF10: 0
PAINLEVEL_OUTOF10: 3

## 2023-08-05 ASSESSMENT — PAIN DESCRIPTION - ONSET: ONSET: ON-GOING

## 2023-08-05 ASSESSMENT — PAIN DESCRIPTION - ORIENTATION: ORIENTATION: LEFT

## 2023-08-05 ASSESSMENT — PAIN DESCRIPTION - FREQUENCY: FREQUENCY: CONTINUOUS

## 2023-08-05 ASSESSMENT — PAIN DESCRIPTION - DESCRIPTORS: DESCRIPTORS: ACHING

## 2023-08-05 ASSESSMENT — PAIN SCALES - WONG BAKER: WONGBAKER_NUMERICALRESPONSE: 0

## 2023-08-05 ASSESSMENT — PAIN DESCRIPTION - PAIN TYPE: TYPE: ACUTE PAIN

## 2023-08-05 ASSESSMENT — PAIN - FUNCTIONAL ASSESSMENT: PAIN_FUNCTIONAL_ASSESSMENT: PREVENTS OR INTERFERES SOME ACTIVE ACTIVITIES AND ADLS

## 2023-08-05 NOTE — PROGRESS NOTES
Patient alert & oriented x 4, denies pain/discomfort during assessment. Patient tearful and anxious, c/o being cold and not wanting to eat. Participating in therapy today. Patient shift assessments completed, VSS. Call light and personal items within reach, Safety measures in place.

## 2023-08-05 NOTE — PROGRESS NOTES
ACUTE REHAB UNIT  SPEECH/LANGUAGE PATHOLOGY      [x] Daily  [] Weekly Care Conference Note  [] Discharge    Patient:Xuan Jensen      :1945  [place]  Rehab Dx/Hx: Acute CVA (cerebrovascular accident) ([address]) [I63.9]    Precautions: [] Aspiration  [x] Fall risk  [] Sternal  [] Seizure [] Hip  [] Weight Bearing [] Other  ST Dx: [] Aphasia  [x] Dysarthria  [] Apraxia   [] Oropharyngeal dysphagia [x] Cognitive Impairment  [] Other:   Date of Admit: 2023  Room #: 3594/3913-25  Date: 2023          Current Diet Order:ADULT DIET; Regular  ADULT ORAL NUTRITION SUPPLEMENT; Breakfast, Lunch, Dinner; Standard High Calorie/High Protein Oral Supplement  ADULT ORAL NUTRITION SUPPLEMENT; Breakfast, Lunch, Dinner; Diabetic Oral Supplement      Lives With: Family (son and daughter in law)  Homemaking Responsibilities: No     Occupation: Retired  Type of Occupation: Worked in a [address]: Goes to [employer] every night, reading the [employer] Exceptions: Wears glasses at all times  Hearing  Hearing: Within functional limits  Barriers toward progress: Decreased motivation and Limited participation; Emotional lability        Date: 2023     Tx session 1   Total Timed Code Min 31   Total Treatment Minutes 31   Individual Treatment Minutes 31   Group Treatment Minutes 0   Co-Treat Minutes 0   Brief Exception: N/A   Pain Endorsed significant pain on bottom 2/2 sitting up in w/c all morning   Pain Intervention: [] RN notified  [x] Repositioned  [] Intervention offered and patient declined  [] N/A  [x] Other: transferred pt to bed, pt endorsed improvement   Subjective:     Pt with blanket over head upon entry, very tearful and c/o pain and desire to return to bed. Pt only agreeable to attempt tx if SLP assisted with return to bed. Pt with frequent c/o room temperature, all clothing being \"too tight,\" and \"want to go home. \" Very emotional with frequent redirection and encouragement

## 2023-08-05 NOTE — PROGRESS NOTES
Occupational Therapy  Facility/Department: [facility] ACUTE REHAB UNIT  Rehabilitation Occupational Therapy Daily Treatment Note    Date: 23  Patient Name: Petros Leone       Room: 7288/8931-11  MRN: [id number]  Account: [de-identified]   : 1945  (74 y.o.) Gender: female                    Past Medical History:  has a past medical history of Atrial fibrillation ([address]), Blood transfusion, CAD (coronary artery disease), Diabetes mellitus type II, Diabetic neuropathy ([address]), Gastric ulcer, H. pylori infection, Hyperlipidemia, Hypertension, Numbness and tingling of left leg, Osteoarthritis, Poor historian, Primary hypertension, PVD (peripheral vascular disease) ([address]), Unspecified cerebral artery occlusion with cerebral infarction, and upper gi bleed. Past Surgical History:   has a past surgical history that includes Coronary angioplasty with stent (1709,3128); Cardiac catheterization (12); Total shoulder arthroplasty (10/5/12); angioplasty (12/30/15); other surgical history (Left, 2017); Foot surgery (Left, 2018); Toe amputation (Right, 2022); Upper gastrointestinal endoscopy (N/A, 2022); Colonoscopy (2022); Colonoscopy (N/A, 2022); Toe amputation (Right, 2022); and Pacemaker Insertion/ Removal (2023). Restrictions  Restrictions/Precautions: Fall Risk, Up as Tolerated  Other position/activity restrictions: up with assist; pacemaker precautions; do not elevate affected arm above shoulder for 30 days    Subjective  Subjective: Pt semi-supine in bed upon arrival, agreeable to therapy with encouragement. Co-tx indicated to maximize safety and therapeutic potential. Pt required increased motivation and emotional support throughout session, frequently stated \"I can't do this, I don't want to do this\" and was limited by L shoulder and L knee pain (RN present to administer Tylenol).  Pt's participation in therapy improved w/ listening to preferred gospel

## 2023-08-05 NOTE — PROGRESS NOTES
Patient is alert and oriented x4. VSS. Denies pain. Neuro checks at baseline. Ambulating x2 wheelchair, tolerating fairly well. Can be incontinent at times. Patient encouraged to eat and drink, did drink some water but refused snack. Did have one episode of emesis overnight, nausea relieved with IV Zofran. No bowel movement despite lactulose, bowel sounds active and abdomen soft. Fall precautions in place.

## 2023-08-05 NOTE — PLAN OF CARE
Problem: Safety - Adult  Goal: Free from fall injury  8/4/2023 2211 by Silva Lai RN  Outcome: Progressing   Patient has remained free of falls. 2/4 bed rails up, bed locked and in lowest position, call light within reach. Patient instructed on use of call light and uses appropriately. Bed alarm on. Non-skid footwear and fall band on. Problem: Pain  Goal: Verbalizes/displays adequate comfort level or baseline comfort level  8/4/2023 2211 by Silva Lai, RN  Outcome: Progressing   Patient denies pain at this time.

## 2023-08-05 NOTE — PROGRESS NOTES
Shift assessment complete. VSS. A/Ox4. Patient is eating less than 25% of meals. She will drink 100% of nutritional supplement with encouragement. Fall precautions in place. Denies pain or discomforts. Call light in reach. Will continue to monitor.

## 2023-08-05 NOTE — PROGRESS NOTES
Physical Therapy  Facility/Department: [facility] ACUTE REHAB UNIT  Rehabilitation Physical Therapy Treatment Note    NAME: Melanie Keith  : 1945 (66 y.o.)  MRN: [id number]  CODE STATUS: Full Code    Date of Service: 23       Restrictions:  Restrictions/Precautions: Fall Risk, Up as Tolerated  Position Activity Restriction  Other position/activity restrictions: up with assist; pacemaker precautions; do not elevate affected arm above shoulder for 30 days     SUBJECTIVE  Subjective  Subjective: Pt supine in bed when PT arrived. Pt agreeable to therapy  Pain: c/o L shld pain and B knee pain throughout session. Pain varied according to the activity. Pain consistently elicited with WB        Post Treatment Pain Screening         OBJECTIVE  PT and OT worked collaboratively to utilize the skills of 2 disciplines while maximizing functional mobility to obtain optimal potential.    Cognition  Overall Cognitive Status: Exceptions  Arousal/Alertness: Appropriate responses to stimuli;Delayed responses to stimuli  Following Commands: Follows one step commands with increased time; Follows one step commands with repetition  Attention Span: Attends with cues to redirect  Memory: Decreased short term memory  Safety Judgement: Decreased awareness of need for assistance;Decreased awareness of need for safety  Problem Solving: Decreased awareness of errors;Assistance required to generate solutions  Insights: Decreased awareness of deficits  Initiation: Requires cues for some  Sequencing: Requires cues for some  Cognition Comment: Pt requiring encouragment for participation throughout session with frequent cues to keep eyes open  Orientation  Overall Orientation Status: Within Functional Limits  Orientation Level: Oriented X4    Functional Mobility  Bed Mobility  Additional Factors: Verbal cues; Increased time to complete; Without handrails; Head of bed flat (HOB min elevated)  Roll Right  Assistance Level: Contact guard Term Goal 2: pt will perf STS to RW with min A  Short Term Goal 3: pt will amb 50ft with RW and min A  Short Term Goal 4: pt will negotiate 6 stairs with rail and min A  Long Term Goals  Time Frame for Long Term Goals : 3 weeks  Long Term Goal 1: pt will perf supine<>sit IND  Long Term Goal 2: pt will perf STS to RW with SPV  Long Term Goal 3: pt will amb 150ft with RW and SPV  Long Term Goal 4: pt will negotiate 12 stairs with rail and SBA    PLAN OF CARE/SAFETY  Physcial Therapy Plan  Days Per Week: 5 Days  Hours Per Day:  (75 min)  Therapy Duration: 3 Weeks  Current Treatment Recommendations: Strengthening;Balance training;Functional mobility training;Transfer training;Gait training;Neuromuscular re-education; Safety education & training;Positioning; Therapeutic activities; Endurance training;Stair training;Patient/Caregiver education & training; Wheelchair mobility training  Safety Devices  Type of Devices: Chair alarm in place; Left in chair;Call light within reach;Nurse notified    EDUCATION  Education  Education Given To: Patient  Education Provided: Role of Therapy; Safety;Transfer Training;Plan of Care;Precautions; Mobility Training  Education Provided Comments: PT  instructed pt of importance of breathing through nose and closing mouth for an effective breathing technique. Therapy also instructed pt w/opening eyes to receive visual feedback.   Education Method: Verbal  Barriers to Learning: Cognition  Education Outcome: Continued education needed        Therapy Time   Individual Concurrent Group Co-treatment   Time In       0730   Time Out       0845   Minutes       76             [place], [place], 08/05/23 at 4:12 PM

## 2023-08-05 NOTE — PLAN OF CARE
Problem: Safety - Adult  Goal: Free from fall injury  Outcome: Progressing     Problem: Skin/Tissue Integrity  Goal: Absence of new skin breakdown  Description: 1. Monitor for areas of redness and/or skin breakdown  2. Assess vascular access sites hourly  3. Every 4-6 hours minimum:  Change oxygen saturation probe site  4. Every 4-6 hours:  If on nasal continuous positive airway pressure, respiratory therapy assess nares and determine need for appliance change or resting period.   Outcome: Progressing     Problem: ABCDS Injury Assessment  Goal: Absence of physical injury  Outcome: Progressing     Problem: Pain  Goal: Verbalizes/displays adequate comfort level or baseline comfort level  Outcome: Progressing  Flowsheets (Taken 8/5/2023 8185)  Verbalizes/displays adequate comfort level or baseline comfort level: Assess pain using appropriate pain scale

## 2023-08-05 NOTE — PROGRESS NOTES
Nephrology Progress Note                                                                                                                                                                                                                                                                                                                                                               Office : [phone]     Fax :[phone]              Patient's Name: Reece Goss    8/5/2023      Assessment/Plan     1. CVA     2. HTN    3. Afib     4. Acid- base/ Electrolyte imbalance     5. DM 2    6. Symptomatic bradycardia  - Cardiology consulted   - S/p pacemaker     6.  CARY    Plan:  - Cr stable   - Perfusion better   - BP controlled on current regimen  - Ur studies - reviewed   - Monitor BP   - Monitor lytes       Reason for Consult:  HTN   Requesting Physician:  MARVIN Hamilton - CNP      Interval hx:    BP in decent range   Feels fair       Past Medical History:   Diagnosis Date    Atrial fibrillation ([address])     off coumadin after bleed, declined restart    Blood transfusion     CAD (coronary artery disease)     Diabetes mellitus type II     Diabetic neuropathy ([address]) 2011    Gastric ulcer     H. pylori infection 2009    Hyperlipidemia     Hypertension     Numbness and tingling of left leg     Osteoarthritis     knees    Poor historian     Primary hypertension     PVD (peripheral vascular disease) ([address])     PTA distal sfa/pop/peroneal, Dr. Katharine Leonard 12/2015    Unspecified cerebral artery occlusion with cerebral infarction     TIA affected left eye    upper gi bleed 12/2009       Past Surgical History:   Procedure Laterality Date    ANGIOPLASTY  12/30/15    Dr. Katharine Leonard, LLE, PTA of distal sfa/pop/peroneal    CARDIAC CATHETERIZATION  9/21/12    done for surgical clearance, cath was negative    COLONOSCOPY  06/09/2022    COLONOSCOPY N/A 6/9/2022    COLONOSCOPY CONTROL HEMORRHAGE performed by Irais Gilmore MD at [address] [facility] ENDOSCOPY    CORONARY ANGIOPLASTY WITH STENT PLACEMENT  [id number],8745    FOOT SURGERY Left 01/11/2018    DEBRIDEMENT OF ULCERS LEFT FOOT AND LEG WITH GRAFT    OTHER SURGICAL HISTORY Left 06/05/2017    Left Femoral Peroneal Bypass    PACEMAKER INSERTION/ REMOVAL  7/27/2023    SHOULDER ARTHROPLASTY  10/5/12    RIGHT SHOULDER TOTAL ARTHROPLASTY, BICEPS TENODESIS DEPUY, GLOBAL ADVANTAGE AP    TOE AMPUTATION Right 5/23/2022    AMPUTATION OF RIGHT FIRST AND FOURTH TOES performed by Vargas Zelaya DPM at [address] [facility] [address] Right 9/9/2022    TRANSMETATARSAL AMPUTATION RIGHT FOOT performed by Vargas Zelaya DPM at [address] [facility] N/A 6/8/2022    EGD IV SEDATION performed by Irais Gilmore MD at [address] [facility] ENDOSCOPY       Family History   Problem Relation Age of Onset    Heart Disease Mother     Stroke Mother     Heart Disease Father     Diabetes Sister     Diabetes Brother     Diabetes Maternal Grandmother         reports that she has never smoked. She has never used smokeless tobacco. She reports that she does not drink alcohol and does not use drugs.     Allergies:  Pcn [penicillins]    Current Medications:    lactulose (CHRONULAC) 10 GM/15ML solution 20 g, TID  docusate sodium (COLACE) capsule 100 mg, Daily  apixaban (ELIQUIS) tablet 5 mg, BID  hydrALAZINE (APRESOLINE) injection 10 mg, Q6H PRN  insulin lispro (HUMALOG) injection vial 0-4 Units, Nightly  insulin lispro (HUMALOG) injection vial 0-8 Units, TID WC  ondansetron (ZOFRAN-ODT) disintegrating tablet 4 mg, Q8H PRN   Or  ondansetron (ZOFRAN) injection 4 mg, Q6H PRN  pantoprazole (PROTONIX) tablet 40 mg, QAM AC  sodium chloride flush 0.9 % injection 5-40 mL, 2 times per day  sodium chloride flush 0.9 % injection 5-40 mL, PRN  glucose chewable tablet 16 g, PRN  dextrose bolus 10% 125 mL, PRN   Or  dextrose bolus 10% 250 mL, PRN  glucagon injection 1 mg, PRN  dextrose 10 % infusion, Continuous PRN  acetaminophen

## 2023-08-06 VITALS
TEMPERATURE: 97.8 F | HEIGHT: 63 IN | BODY MASS INDEX: 20.7 KG/M2 | HEART RATE: 74 BPM | WEIGHT: 116.84 LBS | OXYGEN SATURATION: 100 % | DIASTOLIC BLOOD PRESSURE: 78 MMHG | RESPIRATION RATE: 16 BRPM | SYSTOLIC BLOOD PRESSURE: 148 MMHG

## 2023-08-06 LAB
GLUCOSE BLD-MCNC: 126 MG/DL (ref 70–99)
GLUCOSE BLD-MCNC: 133 MG/DL (ref 70–99)
GLUCOSE BLD-MCNC: 139 MG/DL (ref 70–99)
GLUCOSE BLD-MCNC: 155 MG/DL (ref 70–99)
PERFORMED ON: ABNORMAL

## 2023-08-06 PROCEDURE — 99232 SBSQ HOSP IP/OBS MODERATE 35: CPT | Performed by: INTERNAL MEDICINE

## 2023-08-06 PROCEDURE — 6370000000 HC RX 637 (ALT 250 FOR IP): Performed by: PHYSICAL MEDICINE & REHABILITATION

## 2023-08-06 PROCEDURE — 6360000002 HC RX W HCPCS: Performed by: PHYSICAL MEDICINE & REHABILITATION

## 2023-08-06 PROCEDURE — 2580000003 HC RX 258: Performed by: PHYSICAL MEDICINE & REHABILITATION

## 2023-08-06 PROCEDURE — 1280000000 HC REHAB R&B

## 2023-08-06 RX ADMIN — ACETAMINOPHEN 650 MG: 325 TABLET ORAL at 05:51

## 2023-08-06 RX ADMIN — SODIUM CHLORIDE, PRESERVATIVE FREE 10 ML: 5 INJECTION INTRAVENOUS at 20:25

## 2023-08-06 RX ADMIN — LACTULOSE 20 G: 20 SOLUTION ORAL at 20:25

## 2023-08-06 RX ADMIN — DOCUSATE SODIUM 100 MG: 100 CAPSULE, LIQUID FILLED ORAL at 09:13

## 2023-08-06 RX ADMIN — ONDANSETRON 4 MG: 2 INJECTION INTRAMUSCULAR; INTRAVENOUS at 17:29

## 2023-08-06 RX ADMIN — APIXABAN 5 MG: 5 TABLET, FILM COATED ORAL at 09:13

## 2023-08-06 RX ADMIN — APIXABAN 5 MG: 5 TABLET, FILM COATED ORAL at 20:25

## 2023-08-06 RX ADMIN — SODIUM CHLORIDE, PRESERVATIVE FREE 10 ML: 5 INJECTION INTRAVENOUS at 10:49

## 2023-08-06 RX ADMIN — LACTULOSE 20 G: 20 SOLUTION ORAL at 15:30

## 2023-08-06 RX ADMIN — BISACODYL 5 MG: 5 TABLET, COATED ORAL at 09:14

## 2023-08-06 RX ADMIN — PANTOPRAZOLE SODIUM 40 MG: 40 TABLET, DELAYED RELEASE ORAL at 05:51

## 2023-08-06 RX ADMIN — LACTULOSE 20 G: 20 SOLUTION ORAL at 09:14

## 2023-08-06 ASSESSMENT — PAIN DESCRIPTION - LOCATION: LOCATION: SHOULDER

## 2023-08-06 ASSESSMENT — PAIN DESCRIPTION - DESCRIPTORS: DESCRIPTORS: ACHING

## 2023-08-06 ASSESSMENT — PAIN SCALES - GENERAL
PAINLEVEL_OUTOF10: 3
PAINLEVEL_OUTOF10: 0

## 2023-08-06 ASSESSMENT — PAIN DESCRIPTION - ORIENTATION: ORIENTATION: LEFT

## 2023-08-06 NOTE — PLAN OF CARE
Problem: Discharge Planning  Goal: Discharge to home or other facility with appropriate resources  8/6/2023 0038 by Deysi Ruvalcaba RN  Outcome: Progressing    Problem: Safety - Adult  Goal: Free from fall injury  8/6/2023 0038 by Deysi Ruvalcaba RN  Outcome: Progressing  Pt remains free from falls during this stay on the ARU. 1:1 and education provided on the importance of using call light to ask for assistance prior to transfers and ambulation. Pt voices understanding. Will continue to monitor and re-educate as needed. Problem: Skin/Tissue Integrity  Goal: Absence of new skin breakdown  Description: 1. Monitor for areas of redness and/or skin breakdown  2. Assess vascular access sites hourly  3. Every 4-6 hours minimum:  Change oxygen saturation probe site  4. Every 4-6 hours:  If on nasal continuous positive airway pressure, respiratory therapy assess nares and determine need for appliance change or resting period. 8/6/2023 0038 by Deysi Ruvalcaba RN  Outcome: Progressing     Problem: ABCDS Injury Assessment  Goal: Absence of physical injury  8/6/2023 0038 by Deysi Ruvalcaba RN  Outcome: Progressing  Pt remains free from accidental injury during this stay on the ARU. Will continue to monitor pt and assess per schedule and prn.

## 2023-08-06 NOTE — PLAN OF CARE
Problem: Discharge Planning  Goal: Discharge to home or other facility with appropriate resources  8/6/2023 0949 by Elizabethann Mcardle, RN  Outcome: Progressing     Problem: Safety - Adult  Goal: Free from fall injury  8/6/2023 0949 by Elizabethann Mcardle, RN  Outcome: Progressing     Problem: Skin/Tissue Integrity  Goal: Absence of new skin breakdown  Description: 1. Monitor for areas of redness and/or skin breakdown  2. Assess vascular access sites hourly  3. Every 4-6 hours minimum:  Change oxygen saturation probe site  4. Every 4-6 hours:  If on nasal continuous positive airway pressure, respiratory therapy assess nares and determine need for appliance change or resting period. 8/6/2023 0949 by Elizabethann Mcardle, RN  Outcome: Progressing     Problem: ABCDS Injury Assessment  Goal: Absence of physical injury  8/6/2023 0949 by Elizabethann Mcardle, RN  Outcome: Progressing     Problem: Pain  Goal: Verbalizes/displays adequate comfort level or baseline comfort level  8/6/2023 0949 by Elizabethann Mcardle, RN  Outcome: Progressing  Flowsheets (Taken 8/6/2023 0900)  Verbalizes/displays adequate comfort level or baseline comfort level: Encourage patient to monitor pain and request assistance     Problem: Nutrition Deficit:  Goal: Optimize nutritional status  Outcome: Not Progressing  Note: Refusing meals, patient is drinking supplemental drinks x 2 in place of meals.

## 2023-08-06 NOTE — PROGRESS NOTES
Patient alert & oriented x 4, tearful and uncooperative with getting out of bed. Patient squat pivot x 1-2 x transfers. Patient refusing meals, drinking 1 ensure per meal. Patient denies pain, assessment completed this shift, VSS. Call light and personal items within reach, safety measures in place.

## 2023-08-06 NOTE — PROGRESS NOTES
Shift assessment complete, VSS, afebrile, emotional at times, pt., denies pain but grimaces with movement, declines intervention to manage pain. Pt. Sleeping well thus far this shift. Call light and over bed table within reach, education on the use of the call light and when to call for assistance, hourly rounding and frequent visual checks in place.

## 2023-08-07 PROBLEM — Z95.0 CARDIAC PACEMAKER IN SITU: Status: ACTIVE | Noted: 2023-08-07

## 2023-08-07 LAB
ANION GAP SERPL CALCULATED.3IONS-SCNC: 8 MMOL/L (ref 3–16)
BASOPHILS # BLD: 0.1 K/UL (ref 0–0.2)
BASOPHILS NFR BLD: 2.2 %
BUN SERPL-MCNC: 22 MG/DL (ref 7–20)
CALCIUM SERPL-MCNC: 9.7 MG/DL (ref 8.3–10.6)
CHLORIDE SERPL-SCNC: 105 MMOL/L (ref 99–110)
CO2 SERPL-SCNC: 26 MMOL/L (ref 21–32)
CREAT SERPL-MCNC: 0.9 MG/DL (ref 0.6–1.2)
DEPRECATED RDW RBC AUTO: 16.3 % (ref 12.4–15.4)
EOSINOPHIL # BLD: 0.3 K/UL (ref 0–0.6)
EOSINOPHIL NFR BLD: 4.8 %
GFR SERPLBLD CREATININE-BSD FMLA CKD-EPI: >60 ML/MIN/{1.73_M2}
GLUCOSE BLD-MCNC: 137 MG/DL (ref 70–99)
GLUCOSE BLD-MCNC: 143 MG/DL (ref 70–99)
GLUCOSE BLD-MCNC: 147 MG/DL (ref 70–99)
GLUCOSE BLD-MCNC: 147 MG/DL (ref 70–99)
GLUCOSE SERPL-MCNC: 106 MG/DL (ref 70–99)
HCT VFR BLD AUTO: 27.6 % (ref 36–48)
HGB BLD-MCNC: 9.3 G/DL (ref 12–16)
LYMPHOCYTES # BLD: 1.9 K/UL (ref 1–5.1)
LYMPHOCYTES NFR BLD: 31.5 %
MCH RBC QN AUTO: 26.7 PG (ref 26–34)
MCHC RBC AUTO-ENTMCNC: 33.5 G/DL (ref 31–36)
MCV RBC AUTO: 79.6 FL (ref 80–100)
MONOCYTES # BLD: 0.4 K/UL (ref 0–1.3)
MONOCYTES NFR BLD: 6.8 %
NEUTROPHILS # BLD: 3.3 K/UL (ref 1.7–7.7)
NEUTROPHILS NFR BLD: 54.7 %
PERFORMED ON: ABNORMAL
PLATELET # BLD AUTO: 210 K/UL (ref 135–450)
PMV BLD AUTO: 8 FL (ref 5–10.5)
POTASSIUM SERPL-SCNC: 5 MMOL/L (ref 3.5–5.1)
RBC # BLD AUTO: 3.47 M/UL (ref 4–5.2)
SODIUM SERPL-SCNC: 139 MMOL/L (ref 136–145)
WBC # BLD AUTO: 6 K/UL (ref 4–11)

## 2023-08-07 PROCEDURE — 97116 GAIT TRAINING THERAPY: CPT

## 2023-08-07 PROCEDURE — 85025 COMPLETE CBC W/AUTO DIFF WBC: CPT

## 2023-08-07 PROCEDURE — 97110 THERAPEUTIC EXERCISES: CPT

## 2023-08-07 PROCEDURE — 97542 WHEELCHAIR MNGMENT TRAINING: CPT

## 2023-08-07 PROCEDURE — 97130 THER IVNTJ EA ADDL 15 MIN: CPT

## 2023-08-07 PROCEDURE — 97530 THERAPEUTIC ACTIVITIES: CPT

## 2023-08-07 PROCEDURE — 97535 SELF CARE MNGMENT TRAINING: CPT

## 2023-08-07 PROCEDURE — 6370000000 HC RX 637 (ALT 250 FOR IP): Performed by: PHYSICAL MEDICINE & REHABILITATION

## 2023-08-07 PROCEDURE — 36415 COLL VENOUS BLD VENIPUNCTURE: CPT

## 2023-08-07 PROCEDURE — 2580000003 HC RX 258: Performed by: PHYSICAL MEDICINE & REHABILITATION

## 2023-08-07 PROCEDURE — 99232 SBSQ HOSP IP/OBS MODERATE 35: CPT | Performed by: INTERNAL MEDICINE

## 2023-08-07 PROCEDURE — 6360000002 HC RX W HCPCS: Performed by: PHYSICAL MEDICINE & REHABILITATION

## 2023-08-07 PROCEDURE — 97129 THER IVNTJ 1ST 15 MIN: CPT

## 2023-08-07 PROCEDURE — 80048 BASIC METABOLIC PNL TOTAL CA: CPT

## 2023-08-07 PROCEDURE — 1280000000 HC REHAB R&B

## 2023-08-07 RX ORDER — PROCHLORPERAZINE EDISYLATE 5 MG/ML
5 INJECTION INTRAMUSCULAR; INTRAVENOUS EVERY 8 HOURS
Status: DISCONTINUED | OUTPATIENT
Start: 2023-08-07 | End: 2023-08-10

## 2023-08-07 RX ADMIN — DOCUSATE SODIUM 100 MG: 100 CAPSULE, LIQUID FILLED ORAL at 09:11

## 2023-08-07 RX ADMIN — LACTULOSE 20 G: 20 SOLUTION ORAL at 09:11

## 2023-08-07 RX ADMIN — APIXABAN 5 MG: 5 TABLET, FILM COATED ORAL at 09:11

## 2023-08-07 RX ADMIN — APIXABAN 5 MG: 5 TABLET, FILM COATED ORAL at 23:05

## 2023-08-07 RX ADMIN — LACTULOSE 20 G: 20 SOLUTION ORAL at 14:12

## 2023-08-07 RX ADMIN — PANTOPRAZOLE SODIUM 40 MG: 40 TABLET, DELAYED RELEASE ORAL at 06:01

## 2023-08-07 RX ADMIN — PROCHLORPERAZINE EDISYLATE 5 MG: 5 INJECTION, SOLUTION INTRAMUSCULAR; INTRAVENOUS at 23:05

## 2023-08-07 RX ADMIN — BISACODYL 5 MG: 5 TABLET, COATED ORAL at 09:11

## 2023-08-07 RX ADMIN — PROCHLORPERAZINE EDISYLATE 5 MG: 5 INJECTION, SOLUTION INTRAMUSCULAR; INTRAVENOUS at 14:12

## 2023-08-07 RX ADMIN — SODIUM CHLORIDE, PRESERVATIVE FREE 20 ML: 5 INJECTION INTRAVENOUS at 23:11

## 2023-08-07 RX ADMIN — SODIUM CHLORIDE, PRESERVATIVE FREE 10 ML: 5 INJECTION INTRAVENOUS at 14:14

## 2023-08-07 RX ADMIN — LACTULOSE 20 G: 20 SOLUTION ORAL at 23:05

## 2023-08-07 RX ADMIN — SODIUM CHLORIDE, PRESERVATIVE FREE 10 ML: 5 INJECTION INTRAVENOUS at 09:53

## 2023-08-07 RX ADMIN — ACETAMINOPHEN 650 MG: 325 TABLET ORAL at 06:01

## 2023-08-07 ASSESSMENT — PAIN DESCRIPTION - DESCRIPTORS: DESCRIPTORS: ACHING

## 2023-08-07 ASSESSMENT — PAIN DESCRIPTION - PAIN TYPE: TYPE: ACUTE PAIN

## 2023-08-07 ASSESSMENT — PAIN DESCRIPTION - LOCATION: LOCATION: ARM

## 2023-08-07 ASSESSMENT — PAIN DESCRIPTION - ORIENTATION: ORIENTATION: LEFT

## 2023-08-07 ASSESSMENT — PAIN DESCRIPTION - ONSET: ONSET: ON-GOING

## 2023-08-07 ASSESSMENT — PAIN SCALES - GENERAL
PAINLEVEL_OUTOF10: 3
PAINLEVEL_OUTOF10: 0
PAINLEVEL_OUTOF10: 0

## 2023-08-07 ASSESSMENT — PAIN DESCRIPTION - FREQUENCY: FREQUENCY: CONTINUOUS

## 2023-08-07 ASSESSMENT — PAIN - FUNCTIONAL ASSESSMENT: PAIN_FUNCTIONAL_ASSESSMENT: PREVENTS OR INTERFERES SOME ACTIVE ACTIVITIES AND ADLS

## 2023-08-07 ASSESSMENT — PAIN SCALES - WONG BAKER: WONGBAKER_NUMERICALRESPONSE: 2

## 2023-08-07 NOTE — PROGRESS NOTES
ACUTE REHAB UNIT  SPEECH/LANGUAGE PATHOLOGY      [x] Daily  [] Weekly Care Conference Note  [] Discharge    Patient:Xuan Shultz      :1945  IBA:[id number]  Rehab Dx/Hx: Acute CVA (cerebrovascular accident) ([address]) [I63.9]    Precautions: [] Aspiration  [x] Fall risk  [] Sternal  [] Seizure [] Hip  [] Weight Bearing [x] Other: No ROM of LUE   ST Dx: [] Aphasia  [x] Dysarthria  [] Apraxia   [] Oropharyngeal dysphagia [x] Cognitive Impairment  [] Other:   Date of Admit: 2023  Room #: 6909/0348-95  Date: 2023          Current Diet Order:ADULT DIET; Regular  ADULT ORAL NUTRITION SUPPLEMENT; Breakfast, Lunch, Dinner; Standard High Calorie/High Protein Oral Supplement  ADULT ORAL NUTRITION SUPPLEMENT; Breakfast, Lunch, Dinner; Diabetic Oral Supplement      Lives With: Family (son and daughter in law)  Homemaking Responsibilities: No     Occupation: Retired  Type of Occupation: Worked in a [address]: Goes to [employer] every night, reading the [employer] Exceptions: Wears glasses at all times  Hearing  Hearing: Within functional limits  Barriers toward progress: Decreased motivation and Limited participation; Emotional lability        Date: 2023      Tx session 1 Tx session 2   Total Timed Code Min 20 13   Total Treatment Minutes 20 13   Individual Treatment Minutes 20 13   Group Treatment Minutes 0 0   Co-Treat Minutes 0 0   Brief Exception: N/A N/A   Pain Persistent pain in bottom d/t sitting in chair per pt C/o stomach pain from brief being too tight. Pulled down lower and there is no skin irritation or tightness noted and suspect if pt were given loser brief that they would be unable to stay up.     Pain Intervention: [x] RN notified  [] Repositioned  [] Intervention offered and patient declined  [] N/A  [] Other:  [] RN notified  [] Repositioned  [] Intervention offered and patient declined  [] N/A  [x] Other: Call light activiated and notified of pt nausea

## 2023-08-07 NOTE — PROGRESS NOTES
Occupational Therapy  Facility/Department: [facility] ACUTE REHAB UNIT  Rehabilitation Occupational Therapy Daily Treatment Note    Date: 23  Patient Name: Simran Calloway       Room: 6784/2301-66  MRN: [id number]  Account: [de-identified]   : 1945  (74 y.o.) Gender: female                    Past Medical History:  has a past medical history of Atrial fibrillation ([address]), Blood transfusion, CAD (coronary artery disease), Diabetes mellitus type II, Diabetic neuropathy ([address]), Gastric ulcer, H. pylori infection, Hyperlipidemia, Hypertension, Numbness and tingling of left leg, Osteoarthritis, Poor historian, Primary hypertension, PVD (peripheral vascular disease) ([address]), Unspecified cerebral artery occlusion with cerebral infarction, and upper gi bleed. Past Surgical History:   has a past surgical history that includes Coronary angioplasty with stent (8295,4066); Cardiac catheterization (12); Total shoulder arthroplasty (10/5/12); angioplasty (12/30/15); other surgical history (Left, 2017); Foot surgery (Left, 2018); Toe amputation (Right, 2022); Upper gastrointestinal endoscopy (N/A, 2022); Colonoscopy (2022); Colonoscopy (N/A, 2022); Toe amputation (Right, 2022); and Pacemaker Insertion/ Removal (2023). Restrictions  Restrictions/Precautions: Fall Risk, Up as Tolerated  Other position/activity restrictions: up with assist; pacemaker precautions; do not elevate affected arm above shoulder for 30 days    Subjective  Subjective: Pt asleep in bed upon arrival, required max encouragement to participate in therapy. \"I don't want to get out of bed. I don't want to do this, please, I don't like therapy. \" Pt educated on purpose of and benefit of therapy and eventually agreeable. Co-tx indicated to maximize safety and therapeutic potential. Pt reporting unrated pain in nayely knees and L shoulder, RN aware.   Restrictions/Precautions: Fall Risk;Up as Tolerated             Objective

## 2023-08-07 NOTE — PROGRESS NOTES
Shift assessment complete, VSS, afebrile, denies pain thus far. Pt. Encouraged and assisted to sit on bedside commode to empty bladder. Pt. Complaint of feeling very cold frequently, temperature and blankets adjusted per pt. Specification. Pt. With decreased appetite, encouraged to drink nutritional supplements. Call light and over bed table within reach, hourly rounding and frequent visual checks in place.

## 2023-08-07 NOTE — PLAN OF CARE
Problem: Discharge Planning  Goal: Discharge to home or other facility with appropriate resources  8/7/2023 1344 by Carole Roland RN  Outcome: Progressing  Flowsheets (Taken 8/7/2023 4838)  Discharge to home or other facility with appropriate resources: Identify barriers to discharge with patient and caregiver     Problem: Safety - Adult  Goal: Free from fall injury  8/7/2023 1344 by Carole Roland RN  Outcome: Progressing     Problem: Skin/Tissue Integrity  Goal: Absence of new skin breakdown  Description: 1. Monitor for areas of redness and/or skin breakdown  2. Assess vascular access sites hourly  3. Every 4-6 hours minimum:  Change oxygen saturation probe site  4. Every 4-6 hours:  If on nasal continuous positive airway pressure, respiratory therapy assess nares and determine need for appliance change or resting period. 8/7/2023 1344 by Carole Roland RN  Outcome: Progressing  Note: Bilateral heels are intact with no boggy areas or tenderness. The red area to the sacrum appears smaller in size, will continue turning from side to side. Problem: Pain  Goal: Verbalizes/displays adequate comfort level or baseline comfort level  8/7/2023 1344 by Carole Roland RN  Outcome: Progressing  Flowsheets (Taken 8/7/2023 1396)  Verbalizes/displays adequate comfort level or baseline comfort level: Assess pain using appropriate pain scale     Problem: Nutrition Deficit:  Goal: Optimize nutritional status  8/7/2023 1344 by Carole Roland RN  Outcome: Progressing  Note: Patient has been drinking 100% of nutritional supplements today and requested ice-cream at lunch after finishing applesauce.

## 2023-08-07 NOTE — PROGRESS NOTES
Nephrology Progress Note                                                                                                                                                                                                                                                                                                                                                               Office : [phone]     Fax :[phone]              Patient's Name: Gaby Carroll    8/7/2023      Assessment/Plan     1. CVA     2. HTN    3. Afib     4. Acid- base/ Electrolyte imbalance     5. DM 2    6. Symptomatic bradycardia  - Cardiology consulted   - S/p pacemaker     6.  CARY - Resolved    Plan:  - Cr stable   - Perfusion better   - Close monitoring of BP   - Ur studies - reviewed   - Monitor lytes       Reason for Consult:  HTN   Requesting Physician:  MARVIN Rae - CNP      Interval hx:    Resting in bed  Feeling OK  Denies HA/N/V    BP overall controlled  Cr and lytes stable    Past Medical History:   Diagnosis Date    Atrial fibrillation ([address])     off coumadin after bleed, declined restart    Blood transfusion     CAD (coronary artery disease)     Diabetes mellitus type II     Diabetic neuropathy ([address]) 2011    Gastric ulcer     H. pylori infection 2009    Hyperlipidemia     Hypertension     Numbness and tingling of left leg     Osteoarthritis     knees    Poor historian     Primary hypertension     PVD (peripheral vascular disease) ([address])     PTA distal sfa/pop/peroneal, Dr. Byron Saenz 12/2015    Unspecified cerebral artery occlusion with cerebral infarction     TIA affected left eye    upper gi bleed 12/2009       Past Surgical History:   Procedure Laterality Date    ANGIOPLASTY  12/30/15    Dr. Byron Saenz, LLE, PTA of distal sfa/pop/peroneal    CARDIAC CATHETERIZATION  9/21/12    done for surgical clearance, cath was negative    COLONOSCOPY  06/09/2022    COLONOSCOPY N/A 6/9/2022    COLONOSCOPY CONTROL HEMORRHAGE performed by Agata Winn JESSICA in the last 72 hours. IMAGING:  No orders to display         Medical Decision Making:   The following items were considered in medical decision making:  Discussion of patient care with other providers  Reviewed clinical lab tests  Reviewed radiology tests  Reviewed other diagnostic tests/interventions  Independent review of radiologic images - reviewed       Marco Grigsby PA-C  Feel free to contact me   [facility]  Office : [phone]  Fax :[phone]

## 2023-08-07 NOTE — PLAN OF CARE
Problem: Discharge Planning  Goal: Discharge to home or other facility with appropriate resources  Outcome: Progressing  Flowsheets (Taken 8/6/2023 2024)  Discharge to home or other facility with appropriate resources: Identify discharge learning needs (meds, wound care, etc)     Problem: Safety - Adult  Goal: Free from fall injury  Outcome: Progressing   Pt remains free from falls during this stay on the ARU. 1:1 and education provided on the importance of using call light to ask for assistance prior to transfers and ambulation. Pt voices understanding. Will continue to monitor and re-educate as needed. Problem: Skin/Tissue Integrity  Goal: Absence of new skin breakdown  Description: 1. Monitor for areas of redness and/or skin breakdown  2. Assess vascular access sites hourly  3. Every 4-6 hours minimum:  Change oxygen saturation probe site  4. Every 4-6 hours:  If on nasal continuous positive airway pressure, respiratory therapy assess nares and determine need for appliance change or resting period. Outcome: Progressing   Mepilex applied to bony prominences on sacrum, knee and elbow to prevent skin breakdown. Problem: Pain  Goal: Verbalizes/displays adequate comfort level or baseline comfort level  Outcome: Progressing     Problem: Nutrition Deficit:  Goal: Optimize nutritional status  Outcome: Progressing   Pt. Encourages to eat meals ordered on tray and oral supplements.

## 2023-08-07 NOTE — PROGRESS NOTES
Physical Therapy  Facility/Department: [facility] ACUTE REHAB UNIT  Rehabilitation Physical Therapy Treatment Note    NAME: Gaby Carroll  : 1945 (66 y.o.)  MRN: [id number]  CODE STATUS: Full Code    Date of Service: 23       Restrictions:  Restrictions/Precautions: Fall Risk, Up as Tolerated  Position Activity Restriction  Other position/activity restrictions: up with assist; pacemaker precautions; do not elevate affected arm above shoulder for 30 days     SUBJECTIVE  Subjective  Subjective: pt supine in  bed asleep requiring encouragement to participate \"I dont want to do this, dont make me\"  Pain: c/o B knee pain throughout session-unable to rate         OBJECTIVE  Cognition  Overall Cognitive Status: Exceptions  Arousal/Alertness: Appropriate responses to stimuli;Delayed responses to stimuli  Following Commands: Follows one step commands with increased time; Follows one step commands with repetition  Attention Span: Attends with cues to redirect  Memory: Decreased short term memory;Decreased recall of recent events  Safety Judgement: Decreased awareness of need for assistance;Decreased awareness of need for safety  Problem Solving: Decreased awareness of errors;Assistance required to generate solutions  Insights: Decreased awareness of deficits  Initiation: Requires cues for some  Sequencing: Requires cues for some  Cognition Comment: Pt required max encouragment for participation throughout session and emotional at times today \"I just want to go home, I don't like therapy. \"  Orientation  Overall Orientation Status: Within Functional Limits  Orientation Level: Oriented X4    Functional Mobility  Bed Mobility  Additional Factors: Verbal cues; Increased time to complete; Without handrails; Head of bed flat  Supine to Sit  Assistance Level: Contact guard assist  Scooting  Assistance Level: Contact guard assist  Balance  Sitting Balance: Contact guard assistance  Standing Balance:  Moderate assistance (min-mod A

## 2023-08-07 NOTE — PROGRESS NOTES
Shift assessment complete. VSS. A/Ox4. Patient was able to drink 100% of her nutritional supplement with each meal.  She did not eat much food, less than 25% each time. After eating in the afternoon she became nauseous, IV antiemetic medication did not help with her nausea. Fall precautions in place. Denies pain or discomforts. Call light in reach. Will continue to monitor.

## 2023-08-08 PROBLEM — K59.01 SLOW TRANSIT CONSTIPATION: Status: ACTIVE | Noted: 2023-08-08

## 2023-08-08 PROBLEM — Z51.5 ENCOUNTER FOR PALLIATIVE CARE: Status: ACTIVE | Noted: 2023-08-08

## 2023-08-08 PROBLEM — Z71.89 ADVANCE CARE PLANNING: Status: ACTIVE | Noted: 2023-08-08

## 2023-08-08 PROBLEM — R63.0 ANOREXIA: Status: ACTIVE | Noted: 2023-08-08

## 2023-08-08 LAB
GLUCOSE BLD-MCNC: 124 MG/DL (ref 70–99)
GLUCOSE BLD-MCNC: 129 MG/DL (ref 70–99)
GLUCOSE BLD-MCNC: 145 MG/DL (ref 70–99)
GLUCOSE BLD-MCNC: 200 MG/DL (ref 70–99)
PERFORMED ON: ABNORMAL

## 2023-08-08 PROCEDURE — 6370000000 HC RX 637 (ALT 250 FOR IP): Performed by: NURSE PRACTITIONER

## 2023-08-08 PROCEDURE — 97116 GAIT TRAINING THERAPY: CPT

## 2023-08-08 PROCEDURE — 99221 1ST HOSP IP/OBS SF/LOW 40: CPT | Performed by: NURSE PRACTITIONER

## 2023-08-08 PROCEDURE — 2580000003 HC RX 258: Performed by: PHYSICAL MEDICINE & REHABILITATION

## 2023-08-08 PROCEDURE — 92507 TX SP LANG VOICE COMM INDIV: CPT

## 2023-08-08 PROCEDURE — 6370000000 HC RX 637 (ALT 250 FOR IP): Performed by: PHYSICAL MEDICINE & REHABILITATION

## 2023-08-08 PROCEDURE — 97530 THERAPEUTIC ACTIVITIES: CPT

## 2023-08-08 PROCEDURE — 1280000000 HC REHAB R&B

## 2023-08-08 PROCEDURE — 6360000002 HC RX W HCPCS: Performed by: PHYSICAL MEDICINE & REHABILITATION

## 2023-08-08 PROCEDURE — 97542 WHEELCHAIR MNGMENT TRAINING: CPT

## 2023-08-08 PROCEDURE — 97535 SELF CARE MNGMENT TRAINING: CPT

## 2023-08-08 PROCEDURE — 99232 SBSQ HOSP IP/OBS MODERATE 35: CPT | Performed by: INTERNAL MEDICINE

## 2023-08-08 PROCEDURE — 97129 THER IVNTJ 1ST 15 MIN: CPT

## 2023-08-08 RX ORDER — BISACODYL 10 MG
10 SUPPOSITORY, RECTAL RECTAL DAILY PRN
Status: DISCONTINUED | OUTPATIENT
Start: 2023-08-08 | End: 2023-08-15 | Stop reason: HOSPADM

## 2023-08-08 RX ADMIN — PROCHLORPERAZINE EDISYLATE 5 MG: 5 INJECTION, SOLUTION INTRAMUSCULAR; INTRAVENOUS at 06:09

## 2023-08-08 RX ADMIN — LACTULOSE 20 G: 20 SOLUTION ORAL at 08:08

## 2023-08-08 RX ADMIN — PANTOPRAZOLE SODIUM 40 MG: 40 TABLET, DELAYED RELEASE ORAL at 06:09

## 2023-08-08 RX ADMIN — SODIUM CHLORIDE, PRESERVATIVE FREE 10 ML: 5 INJECTION INTRAVENOUS at 08:08

## 2023-08-08 RX ADMIN — INSULIN LISPRO 2 UNITS: 100 INJECTION, SOLUTION INTRAVENOUS; SUBCUTANEOUS at 12:33

## 2023-08-08 RX ADMIN — LACTULOSE 20 G: 20 SOLUTION ORAL at 14:26

## 2023-08-08 RX ADMIN — PROCHLORPERAZINE EDISYLATE 5 MG: 5 INJECTION, SOLUTION INTRAMUSCULAR; INTRAVENOUS at 14:26

## 2023-08-08 RX ADMIN — APIXABAN 5 MG: 5 TABLET, FILM COATED ORAL at 08:08

## 2023-08-08 RX ADMIN — DOCUSATE SODIUM 100 MG: 100 CAPSULE, LIQUID FILLED ORAL at 08:08

## 2023-08-08 RX ADMIN — MINERAL OIL 330 ML: 1000 LIQUID ORAL at 18:31

## 2023-08-08 RX ADMIN — BISACODYL 5 MG: 5 TABLET, COATED ORAL at 08:08

## 2023-08-08 RX ADMIN — APIXABAN 5 MG: 5 TABLET, FILM COATED ORAL at 20:20

## 2023-08-08 ASSESSMENT — PAIN SCALES - GENERAL
PAINLEVEL_OUTOF10: 0
PAINLEVEL_OUTOF10: 0

## 2023-08-08 NOTE — PROGRESS NOTES
Physical Therapy  Facility/Department: [facility] ACUTE REHAB UNIT  Rehabilitation Physical Therapy Treatment Note    NAME: Radha Carrasquillo  : 1945 (66 y.o.)  MRN: [id number]  CODE STATUS: Limited    Date of Service: 23       Restrictions:  Restrictions/Precautions: Fall Risk, Up as Tolerated  Position Activity Restriction  Other position/activity restrictions: up with assist; pacemaker precautions; do not elevate affected arm above shoulder for 30 days     SUBJECTIVE  Subjective  Subjective: pt supine in  bed asleep requiring encouragement to participate \"when can I go home\"  Pain: c/o B knee pain throughout session-unable to rate       OBJECTIVE  Cognition  Overall Cognitive Status: Exceptions  Arousal/Alertness: Appropriate responses to stimuli;Delayed responses to stimuli  Following Commands: Follows one step commands with increased time; Follows one step commands with repetition  Attention Span: Attends with cues to redirect  Memory: Decreased short term memory;Decreased recall of recent events  Safety Judgement: Decreased awareness of need for assistance;Decreased awareness of need for safety  Problem Solving: Decreased awareness of errors;Assistance required to generate solutions  Insights: Decreased awareness of deficits  Initiation: Requires cues for some  Sequencing: Requires cues for some  Cognition Comment: Pt required max encouragment for participation throughout session and VC to keep her eyes open  Orientation  Overall Orientation Status: Within Functional Limits  Orientation Level: Oriented X4    Functional Mobility  Bed Mobility  Additional Factors: Verbal cues; Increased time to complete; Without handrails; Head of bed flat  Bridging  Assistance Level: Contact guard assist  Supine to Sit  Assistance Level: Contact guard assist  Scooting  Assistance Level: Contact guard assist  Balance  Sitting Balance: Contact guard assistance (on EOB and inc time on toilet.  tends to lean to L with Clinical Factors: Pt has severe B  genu valgus  Wheelchair  Surface: Level surface  Device: Standard wheelchair  Additional Factors: Verbal cues; Hand placement cues; Increased time to complete  Assistance Required to Manage Parts: Brakes  Assistance Level for Propulsion: Minimal assistance; Moderate assistance  Propulsion Method: Bilateral lower extremities; Right upper extremity  Propulsion Quality: Slow velocity; Decreased fluidity;Veers right  Propulsion Distance: 100'  Skilled Clinical Factors: PT instruction on LE stepping pattern. max VCs for continued effort and assist needed to gain momentum             PT Exercises  Static Standing Balance Exercises: pt stood statically at RW while naming objects in front of her as distraction for 2 min 45 sec + 3 min with fluctuating assist between min-mod A and max encouragement to maintain stance through entire Catholic song of request      ASSESSMENT/PROGRESS TOWARDS GOALS       Assessment  Assessment: Pt cont to present with decreased overall strength, balance, and endurance disabling her ability for walking and ind w/c management. pt severely limited by motivation to participate, tearful throughout session and continually stating she does not want to participate in therapy. pt continues to require assist of 1-2 for transfers and ambulation and demonstrates poor tolerance for activity requiring frequent and prolonged rest breaks. Pt would benefit from continued skilled therapy to improve functional mobility, standing tolerance activity, to allow for a safer d/c to home. Activity Tolerance: Patient limited by fatigue;Patient limited by pain; Patient limited by endurance  Discharge Recommendations: Home with Home health PT;24 hour supervision or assist;Continue to assess pending progress;Subacute/Skilled Nursing Facility  PT Equipment Recommendations  Other: ongoing  assessment    Goals  Patient Goals   Patient Goals : \"Walk with the walker\"  Short Term Goals  Time Frame

## 2023-08-08 NOTE — PROGRESS NOTES
Patient alert & oriented, denies pain/discomfort thus far this shift. Patient continues with therapy tolerating fairly well. Shift assessments completed, VSS. Patient continues to refuse meals and drinking only Glucerna supplement with bites of her desserts. Palliative Care Consult ordered. Call light and personal items within reach, safety measures in place.

## 2023-08-08 NOTE — PROGRESS NOTES
posture and decreased step length/height. Pt also self-propelled w/c to therapy gym using BLEs and RUE w/ significantly increased time with Min-Mod A and max verbal encouragement  Supine to Sit  Assistance Level: Contact guard assist  Skilled Clinical Factors: HOB slightly elevated without use of bedrail  Scooting  Assistance Level: Contact guard assist  Skilled Clinical Factors: to scoot to edge of bed and forward in w/c  Transfers  Surface: From bed; Wheelchair; To chair with arms  Additional Factors: Verbal cues; Hand placement cues; Increased time to complete;Set-up  Sit to Stand  Assistance Level: Dependent; Requires x 2 assistance  Skilled Clinical Factors: Fluctuated Min-Mod A x1 to Min-Mod A x2, VC for hand placement  Stand to Sit  Assistance Level: Moderate assistance;Maximum assistance  Skilled Clinical Factors: VC to reach back w/ RUE and for eccentric control  Sit Pivot  Assistance Level: Moderate assistance  Skilled Clinical Factors: w/c > recliner, VC for sequencing and hand placement   OT Exercises  Static Standing Balance Exercises: Pt stood at RW 2 mins 45s + 3 mins with Min-Mod A x1 due to posterior lean, required VC for erect posture d/t forward trunk flexion and hip flexion. Pt required max verbal encouragement and therapist played favorite songs during each stand for motivation. Pt cued to identify items in room to encourage keeping her eyes open and visual scanning. Assessment  Assessment  Assessment: Pt demo'd improved participation in UE dressing and LE dressing this date and required less physical assistance, however continues to require max verbal encouragement and motivation. Pt continues to fluctuate 1-2 person assistance for functional transfers with cues for safety and hand placement. Pt limited by constipation and req + time toileting and c/o discomfort throughout session. Pt would require 24 hr hands on physical assist if d/c home, may require SNF d/c if family is unable to provide.  Pt ongoing  Short Term Goal 4: Pt will completed standing level grooming x2 mins w/ CGA - ongoing  Long Term Goals  Time Frame for Long Term Goals : 3 weeks  Long Term Goal 1: Pt will complete UE bathing and UE dressing w/ set up - ongoing  Long Term Goal 2: Pt will complete LE bathing and LE dressing w/ spvn  - ongoing  Long Term Goal 3: Pt will complete toileting, including toilet transfer, w/ spvn - ongoing  Long Term Goal 4: Pt will completed standing level grooming x4 mins w/ spvn - ongoing                 Therapy Time   Individual Concurrent Group Co-treatment   Time In       1000   Time Out       1115   Minutes       75   Timed Code Treatment Minutes: Steven, OT

## 2023-08-08 NOTE — PROGRESS NOTES
Pt in bed, eyes closed but easily aroused. Alert & oriented x 3. Disoriented to time. BP slightly elevated, other VSS. Assessment completed. No complaints at this time. Nighttime medications given, pt tolerated well. Pt repositoned in bed. Reminded pt to call for assistance with any needs. Call light within reach. Safety measures in place.

## 2023-08-08 NOTE — CONSULTS
[facility]  Palliative Medicine Consultation Note      Date Of Admission:8/2/2023  Date of consult: 08/08/23  Seen by [facility] in the past:  No    Recommendations:        Met with patient, had a voluntarily discussion regarding advanced care planning. Patient reports she has 2 living sons, her youngest Danica Setting) is HCPOA. She reports Danica Church has all her medical paperwork. Pt reports she would like to AVOID chest compressions, defibrillation, resuscitative meds, intubation at any point; thus code status changed to Limited x4. Pt reports she may be going to to [facility] (will look into validity of this) and she plans to get stronger to the point of being able to walk around and especially walk/play with her several cats. Given her desire to gain strength and improve, does not align with hospice. Of note, her  passed away Aug 2022, and has been living with her eldest son Barrett Zimmerman. She reports she does not really feel hungry anymore, but feels she will get stronger. Given pts apparent malnutrition, a feeding tube was discussed over the phone with Tom Borrego, and he mentions he wanted to talk to pt regarding this. However, patient stated \"I don't want a feeding tube. \"    1. Goals of Care/Advanced Care planning/Code status: Limited x4 (DNR/DNI). Son Danica Setting is HCPOA (awaiting paperwork from [facility]). Plan for [facility] for rehab and strength improvement. Jasbir Church would like to discuss with pt regarding possibility of feeding tube. 2. Pain: Denies  3. SOB: Denies  4.  Disposition: ARU, plan for [facility], pt agreeable to this    Reason for Consult:         [x]  Goals of Care  [x]  Code Status Discussion/Advanced Care Planning   []  Psychosocial/Family Support  []  Symptom Management  []  Other (Specify)    Requesting Physician: Dr. Santos Owens DO    CHIEF COMPLAINT: \"My butt hurts\"     History Obtained From:  patient, electronic medical record    History of Present Illness: in the last 72 hours. U/A:No results for input(s): NITRITE, COLORU, PHUR, LABCAST, WBCUA, RBCUA, MUCUS, TRICHOMONAS, YEAST, BACTERIA, CLARITYU, SPECGRAV, LEUKOCYTESUR, UROBILINOGEN, BILIRUBINUR, BLOODU, GLUCOSEU, AMORPHOUS in the last 72 hours. Invalid input(s): Ekta Black    No orders to display         Conclusion/Time spent:         Recommendations see above    Time spent with patient and/or family: 20  Time reviewing records: 10 min   Time communicating with staff: 5 min     A total of  minutes spent with the patient and family on unit greater than 50% in counseling regarding palliative care and in goals of care for the patient. Thank you to Dr. Remi Arellano DO for this consultation. We will continue to follow Ms. Romero's care as needed.

## 2023-08-08 NOTE — CONSULTS
The [address] [place] [address]  Palliative Medicine Consultation Note      Date Of Admission:8/2/2023  Date of consult: 08/08/23  Seen by [facility] in the past:  No    Recommendations:        Met with patient, had a voluntarily discussion regarding advanced care planning. Patient reports she has 2 living sons, her youngest Edith West) is HCPOA. She reports Edith West has all her medical paperwork. Pt reports she would like to AVOID chest compressions, defibrillation, resuscitative meds, intubation at any point; thus code status changed to Limited x4. Pt reports she may be going to to [facility] and she plans to get stronger to the point of being able to walk around and especially walk/play with her several cats. Given her desire to gain strength and improve, does not align with hospice. Of note, her  passed away Aug 2022, and has been living with her eldest son Bre Talavera. She reports she does not really feel hungry anymore, but feels she will get stronger. Given pts apparent malnutrition, a feeding tube was discussed and the pt says she would not want a feeding tube. D/w Edith West over the phone, and he feels his mother would not want a feeding tube as well. He mentions that his father had a feeding tube placed towards the end of his life. Edith West is in agreement with pt being DNR/DNI. Rick DNR on pt's chart. D/w GO Okeefe and RN. 1. Goals of Care/Advanced Care planning/Code status: changed to Limited x4 (DNR/DNI). Jasbir West is HCPOA (awaiting paperwork from Edith West). Plan for [facility] for rehab and strength improvement. Jasbir West would like to discuss with pt regarding possibility of feeding tube. 2. Pain: Denies  3. Malnutrition: pt reports poor appetite and RN confirms she is eating poorly. Current BMI is 20. Discussed PEG placement in depth with pt's son Edith West and he and his mother seem opposed to it. 4. Constipation: pt c/o hard stool and constipation.  She had an enema over the weekend which

## 2023-08-08 NOTE — PLAN OF CARE
Problem: Discharge Planning  Goal: Discharge to home or other facility with appropriate resources  8/8/2023 0338 by Ashok Rahman RN  Outcome: Progressing  Flowsheets (Taken 8/8/2023 0745)  Discharge to home or other facility with appropriate resources: Identify barriers to discharge with patient and caregiver     Problem: Safety - Adult  Goal: Free from fall injury  8/8/2023 0922 by Ashok Rahman RN  Outcome: Progressing     Problem: Skin/Tissue Integrity  Goal: Absence of new skin breakdown  Description: 1. Monitor for areas of redness and/or skin breakdown  2. Assess vascular access sites hourly  3. Every 4-6 hours minimum:  Change oxygen saturation probe site  4. Every 4-6 hours:  If on nasal continuous positive airway pressure, respiratory therapy assess nares and determine need for appliance change or resting period.   Outcome: Progressing     Problem: ABCDS Injury Assessment  Goal: Absence of physical injury  8/8/2023 0583 by Ashok Rahman RN  Outcome: Progressing     Problem: Pain  Goal: Verbalizes/displays adequate comfort level or baseline comfort level  8/8/2023 0922 by Ashok Rahman RN  Outcome: Progressing  Flowsheets (Taken 8/8/2023 0745)  Verbalizes/displays adequate comfort level or baseline comfort level: Encourage patient to monitor pain and request assistance     Problem: Nutrition Deficit:  Goal: Optimize nutritional status  8/8/2023 0922 by Ashok Rahman RN  Outcome: Progressing     Problem: Safety - Adult  Goal: Free from fall injury  8/8/2023 0922 by Ashok Rahman RN  Outcome: Progressing  8/8/2023 0221 by Bhavin Calhoun RN  Outcome: Not Progressing  Flowsheets (Taken 8/8/2023 0221)  Free From Fall Injury: Instruct family/caregiver on patient safety     Problem: ABCDS Injury Assessment  Goal: Absence of physical injury  8/8/2023 4198 by Ashok Rahman RN  Outcome: Progressing  8/8/2023 0221 by Bhavin Calhoun RN  Outcome: Not Progressing     Problem: Nutrition

## 2023-08-08 NOTE — PLAN OF CARE
Problem: Discharge Planning  Goal: Discharge to home or other facility with appropriate resources  8/8/2023 0221 by Meghan Nava RN  Flowsheets (Taken 8/8/2023 0221)  Discharge to home or other facility with appropriate resources:   Identify barriers to discharge with patient and caregiver   Identify discharge learning needs (meds, wound care, etc)     Problem: Safety - Adult  Goal: Free from fall injury  8/8/2023 0221 by Meghan Nava RN  Outcome: Not Progressing  Flowsheets (Taken 8/8/2023 0221)  Free From Fall Injury: Instruct family/caregiver on patient safety  8/7/2023 1344 by Avelino Jean Baptiste RN  Outcome: Progressing     Problem: Safety - Adult  Goal: Free from fall injury  8/8/2023 0221 by Meghan Nava RN  Outcome: Not Progressing  Flowsheets (Taken 8/8/2023 0221)  Free From Fall Injury: Instruct family/caregiver on patient safety     Problem: ABCDS Injury Assessment  Goal: Absence of physical injury  8/8/2023 0221 by Meghan Nava RN  Outcome: Not Progressing    Problem: Nutrition Deficit:  Goal: Optimize nutritional status  8/8/2023 0221 by Meghan Nava RN  Outcome: Not Progressing  Flowsheets (Taken 8/8/2023 0221)  Nutrient intake appropriate for improving, restoring, or maintaining nutritional needs:   Assess nutritional status and recommend course of action   Monitor oral intake, labs, and treatment plans   Recommend appropriate diets, oral nutritional supplements, and vitamin/mineral supplements

## 2023-08-08 NOTE — PROGRESS NOTES
Comprehensive Nutrition Assessment    RECOMMENDATIONS:  PO Diet: Regular   ONS: Glucerna TID   Nutrition Education: Education not indicated     NUTRITION ASSESSMENT:   Nutritional summary & status: Follow up. Pt was started on a calorie count 8/3-8/5. PO intake was consistently insufficient in meeting needs w/ ave intake <600 kcals per day w/ <25% of meals consumed. Pt does have ONS in place TID w/ pretty consistent intakes of 51-76%. Noted nursing has been strongly encouraging supplemental intakes at meal times; 100% consumed at bfast this am. Pt would highly benefit from NS; however, noted per treatment team this AM, hesitant to d/c w/ PEG in case pt goes home alone and not to SNF. Discussed in treatment team this AM, pt reportedly presenting w/ poor body image as pt stated that she had gotten heavy when she saw herself in the mirror & CBW of 112 lbs. Should NS be in pt's [facility], please consult RD for recs. Pt would benefit from appetite stimulant or NG on admit per RD's recs. Provided NS recs on previous note. Will reiterate TF recs above. Glucose trending up w/ insulin regimen in place; for now continue Glucerna's. If BG trends down, pt would benefit from Ensure +HP d/t higher kcal/pro content. Noted per treatment team, palliative care to meet w/ pt to discuss [facility] since pt is refusing solid foods & some therapy. RD following.    Admission/PMH: Bradycardia s/p temporary pacemaker, HTN, CAD, DM // CVA    MALNUTRITION ASSESSMENT  Context of Malnutrition: Chronic Illness   Malnutrition Status: Severe malnutrition  Findings of the 6 clinical characteristics of malnutrition (Minimum of 2 out of 6 clinical characteristics is required to make the diagnosis of moderate or severe Protein Calorie Malnutrition based on AND/ASPEN Guidelines):  Energy Intake:  75% or less estimated energy requirements for 1 month or longer  Weight Loss:  Greater than 5% over 1 month     Body Fat Loss:  Severe body fat loss Orbital, Triceps, Buccal region   Muscle Mass Loss:  Severe muscle mass loss Temples (temporalis), Clavicles (pectoralis & deltoids), Hand (interosseous)  Fluid Accumulation:  No significant fluid accumulation     Strength:  Not Performed    NUTRITION DIAGNOSIS   Severe malnutrition related to inadequate protein-energy intake as evidenced by Criteria as identified in malnutrition assessment    Nutrition Monitoring and Evaluation:   Food/Nutrient Intake Outcomes:  Food and Nutrient Intake, Supplement Intake  Physical Signs/Symptoms Outcomes:  Biochemical Data, Nutrition Focused Physical Findings, Weight, Hemodynamic Status     OBJECTIVE DATA: Significant to nutrition assessment  Nutrition Related Findings: +BM 8/6. No edema. Glu 124, BUN 22  Wounds: None  Nutrition Goals: Meet at least 75% of estimated needs, by next RD assessment     CURRENT NUTRITION THERAPIES  ADULT DIET; Regular  ADULT ORAL NUTRITION SUPPLEMENT; Breakfast, Lunch, Dinner; Standard High Calorie/High Protein Oral Supplement  ADULT ORAL NUTRITION SUPPLEMENT; Breakfast, Lunch, Dinner; Diabetic Oral Supplement  PO Intake: 0%, 1-25%   PO Supplement Intake:%  Additional Sources of Calories/IVF:N/A     COMPARATIVE STANDARDS  Energy (kcal):  1365-9337 (25-30 kcal/kg CBW)     Protein (g):  65-80 (1.2-1.5 g/kg CBW)       Fluid (ml/day):  or per MD    ANTHROPOMETRICS  Current Height: 5' 3\" (160 cm)  Current Weight - Scale: 116 lb 13.5 oz (53 kg)    Admission weight: 116 lb 13.5 oz (53 kg)    The patient will be monitored per nutrition standards of care. Consult dietitian if additional nutrition interventions are needed prior to RD reassessment.      Lawrence Valente, [address]:  [phone]  Office:  [phone]

## 2023-08-08 NOTE — CODE DOCUMENTATION
Discussed code status with patient during a voluntary discussion regarding advanced care planning. Pt demonstrates capacity to make decisions, demonstrates ability to rationalize thought process.     Pt would like to AVOID chest compressions, defibrillation, resuscitative meds, intubation (including intubation PRIOR to arrest.    Plan  - Code status changed to Limited x4 (DNR/DNI)  - will discuss with son and POA (per chart) regarding possibility of sending over HCPOA paperwork if he possess this    Kalyn Bosch MD  PGY-2

## 2023-08-08 NOTE — PROGRESS NOTES
Nephrology Progress Note                                                                                                                                                                                                                                                                                                                                                               Office : [phone]     Fax :[phone]              Patient's Name: Caden Holiday    8/8/2023      Assessment/Plan     1. CVA     2. HTN    3. Afib     4. Acid- base/ Electrolyte imbalance     5. DM 2    6. Symptomatic bradycardia  - Cardiology consulted   - S/p pacemaker     6.  CARY - Resolved    Plan:  - Cr stable   - Perfusion better   - Close monitoring of BP - controlled  - Ur studies - reviewed   - Monitor lytes       Reason for Consult:  HTN   Requesting Physician:  MARVIN Nino - CNP      Interval hx:    Working with therapy this AM  Drank Ensure for breakfast  Still not much of an appetite    BP's controlled  No updated labs today     Past Medical History:   Diagnosis Date    Atrial fibrillation ([address])     off coumadin after bleed, declined restart    Blood transfusion     CAD (coronary artery disease)     Diabetes mellitus type II     Diabetic neuropathy ([address]) 2011    Gastric ulcer     H. pylori infection 2009    Hyperlipidemia     Hypertension     Numbness and tingling of left leg     Osteoarthritis     knees    Poor historian     Primary hypertension     PVD (peripheral vascular disease) ([address])     PTA distal sfa/pop/peroneal, Dr. Ti Jackson 12/2015    Unspecified cerebral artery occlusion with cerebral infarction     TIA affected left eye    upper gi bleed 12/2009       Past Surgical History:   Procedure Laterality Date    ANGIOPLASTY  12/30/15    Dr. Ti Jackson, JALEESA, PTA of distal sfa/pop/peroneal    CARDIAC CATHETERIZATION  9/21/12    done for surgical clearance, cath was negative    COLONOSCOPY  06/09/2022    COLONOSCOPY N/A 6/9/2022 results for input(s): CLUR, LABCREA, PROTEINUR, NAUR in the last 72 hours. IMAGING:  No orders to display         Medical Decision Making: The following items were considered in medical decision making:  Discussion of patient care with other providers  Reviewed clinical lab tests  Reviewed radiology tests  Reviewed other diagnostic tests/interventions  Independent review of radiologic images - reviewed       Shant Enamorado PA-C  Feel free to contact me   [facility]  Office : [phone]  Fax :[phone]       I have seen the patient independently from the [address] [place] [address] . I discussed the care with the PA/NP . I personally reviewed the HPI, PH, FH, SH, ROS and medications. I repeated pertinent portions of the examination and reviewed the relevant imaging and laboratory data.  I agree with the findings, assessment and plan as documented, with the following addendum:        Sebastien Bethea MD

## 2023-08-08 NOTE — PROGRESS NOTES
ACUTE REHAB UNIT  SPEECH/LANGUAGE PATHOLOGY      [x] Daily  [x] Weekly Care Conference Note  [] Discharge    Patient:Xuan Rodriguez      :1945  MWW:[id number]  Rehab Dx/Hx: Acute CVA (cerebrovascular accident) ([address]) [I63.9]    Precautions: [] Aspiration  [x] Fall risk  [] Sternal  [] Seizure [] Hip  [] Weight Bearing [x] Other: No ROM of E   ST Dx: [] Aphasia  [x] Dysarthria  [] Apraxia   [] Oropharyngeal dysphagia [x] Cognitive Impairment  [] Other:   Date of Admit: 2023  Room #: 2439/7135-55  Date: 2023          Current Diet Order:ADULT DIET; Regular  ADULT ORAL NUTRITION SUPPLEMENT; Breakfast, Lunch, Dinner; Standard High Calorie/High Protein Oral Supplement  ADULT ORAL NUTRITION SUPPLEMENT; Breakfast, Lunch, Dinner; Diabetic Oral Supplement      Lives With: Family (son and daughter in law)  Homemaking Responsibilities: No     Occupation: Retired  Type of Occupation: Worked in a [address]: Goes to [employer] every night, reading the [employer] Exceptions: Wears glasses at all times  Hearing  Hearing: Within functional limits  Barriers toward progress: Decreased motivation and Limited participation; Emotional lability        Date: 2023       Tx session 1 Tx session 2 Weekly Summary   Total Timed Code Min 10 0    Total Treatment Minutes 20 12    Individual Treatment Minutes 20 12    Group Treatment Minutes 0 0    Co-Treat Minutes 0 0    Brief Exception: N/A N/A    Pain None indicated None indicated    Pain Intervention: [] RN notified  [] Repositioned  [] Intervention offered and patient declined  [x] N/A  [] Other:  [] RN notified  [] Repositioned  [] Intervention offered and patient declined  [x] N/A  [] Other:    Subjective:     Pt resting in bed upon entry and agreeable to tx session. Improved participation this date with preferred activities. Resting in bed upon entry and agreeable to tx session with encouragement.      Objective / Goals:      Pt will

## 2023-08-09 LAB
ANION GAP SERPL CALCULATED.3IONS-SCNC: 9 MMOL/L (ref 3–16)
BASOPHILS # BLD: 0.1 K/UL (ref 0–0.2)
BASOPHILS NFR BLD: 1.8 %
BUN SERPL-MCNC: 23 MG/DL (ref 7–20)
CALCIUM SERPL-MCNC: 9.8 MG/DL (ref 8.3–10.6)
CHLORIDE SERPL-SCNC: 106 MMOL/L (ref 99–110)
CO2 SERPL-SCNC: 27 MMOL/L (ref 21–32)
CREAT SERPL-MCNC: 0.9 MG/DL (ref 0.6–1.2)
DEPRECATED RDW RBC AUTO: 16.3 % (ref 12.4–15.4)
EOSINOPHIL # BLD: 0.2 K/UL (ref 0–0.6)
EOSINOPHIL NFR BLD: 3.6 %
GFR SERPLBLD CREATININE-BSD FMLA CKD-EPI: >60 ML/MIN/{1.73_M2}
GLUCOSE BLD-MCNC: 115 MG/DL (ref 70–99)
GLUCOSE BLD-MCNC: 123 MG/DL (ref 70–99)
GLUCOSE BLD-MCNC: 139 MG/DL (ref 70–99)
GLUCOSE BLD-MCNC: 152 MG/DL (ref 70–99)
GLUCOSE SERPL-MCNC: 117 MG/DL (ref 70–99)
HCT VFR BLD AUTO: 28.5 % (ref 36–48)
HGB BLD-MCNC: 9.5 G/DL (ref 12–16)
LYMPHOCYTES # BLD: 1.7 K/UL (ref 1–5.1)
LYMPHOCYTES NFR BLD: 25.6 %
MCH RBC QN AUTO: 27 PG (ref 26–34)
MCHC RBC AUTO-ENTMCNC: 33.3 G/DL (ref 31–36)
MCV RBC AUTO: 81 FL (ref 80–100)
MONOCYTES # BLD: 0.4 K/UL (ref 0–1.3)
MONOCYTES NFR BLD: 5.8 %
NEUTROPHILS # BLD: 4.1 K/UL (ref 1.7–7.7)
NEUTROPHILS NFR BLD: 63.2 %
PERFORMED ON: ABNORMAL
PLATELET # BLD AUTO: 195 K/UL (ref 135–450)
PMV BLD AUTO: 7.9 FL (ref 5–10.5)
POTASSIUM SERPL-SCNC: 4.6 MMOL/L (ref 3.5–5.1)
RBC # BLD AUTO: 3.52 M/UL (ref 4–5.2)
SODIUM SERPL-SCNC: 142 MMOL/L (ref 136–145)
WBC # BLD AUTO: 6.5 K/UL (ref 4–11)

## 2023-08-09 PROCEDURE — 36415 COLL VENOUS BLD VENIPUNCTURE: CPT

## 2023-08-09 PROCEDURE — 97129 THER IVNTJ 1ST 15 MIN: CPT

## 2023-08-09 PROCEDURE — 80048 BASIC METABOLIC PNL TOTAL CA: CPT

## 2023-08-09 PROCEDURE — 97530 THERAPEUTIC ACTIVITIES: CPT

## 2023-08-09 PROCEDURE — 2580000003 HC RX 258: Performed by: PHYSICAL MEDICINE & REHABILITATION

## 2023-08-09 PROCEDURE — 1280000000 HC REHAB R&B

## 2023-08-09 PROCEDURE — 97110 THERAPEUTIC EXERCISES: CPT

## 2023-08-09 PROCEDURE — 85025 COMPLETE CBC W/AUTO DIFF WBC: CPT

## 2023-08-09 PROCEDURE — 97116 GAIT TRAINING THERAPY: CPT

## 2023-08-09 PROCEDURE — 99232 SBSQ HOSP IP/OBS MODERATE 35: CPT | Performed by: INTERNAL MEDICINE

## 2023-08-09 PROCEDURE — 92507 TX SP LANG VOICE COMM INDIV: CPT

## 2023-08-09 PROCEDURE — 6370000000 HC RX 637 (ALT 250 FOR IP): Performed by: PHYSICAL MEDICINE & REHABILITATION

## 2023-08-09 PROCEDURE — 97535 SELF CARE MNGMENT TRAINING: CPT

## 2023-08-09 PROCEDURE — 6360000002 HC RX W HCPCS: Performed by: PHYSICAL MEDICINE & REHABILITATION

## 2023-08-09 RX ADMIN — SODIUM CHLORIDE, PRESERVATIVE FREE 10 ML: 5 INJECTION INTRAVENOUS at 10:01

## 2023-08-09 RX ADMIN — APIXABAN 5 MG: 5 TABLET, FILM COATED ORAL at 20:52

## 2023-08-09 RX ADMIN — SODIUM CHLORIDE, PRESERVATIVE FREE 20 ML: 5 INJECTION INTRAVENOUS at 00:21

## 2023-08-09 RX ADMIN — APIXABAN 5 MG: 5 TABLET, FILM COATED ORAL at 10:00

## 2023-08-09 RX ADMIN — PROCHLORPERAZINE EDISYLATE 5 MG: 5 INJECTION, SOLUTION INTRAMUSCULAR; INTRAVENOUS at 00:21

## 2023-08-09 RX ADMIN — PANTOPRAZOLE SODIUM 40 MG: 40 TABLET, DELAYED RELEASE ORAL at 06:55

## 2023-08-09 RX ADMIN — DOCUSATE SODIUM 100 MG: 100 CAPSULE, LIQUID FILLED ORAL at 10:00

## 2023-08-09 RX ADMIN — BISACODYL 5 MG: 5 TABLET, COATED ORAL at 10:00

## 2023-08-09 ASSESSMENT — PAIN SCALES - GENERAL
PAINLEVEL_OUTOF10: 0

## 2023-08-09 NOTE — BH NOTE
Behavioral Health Note    Met with patient to discuss barriers to nutrition. Patient was drowsy during this discussion and frequently asked to get in bed. She states that she is having difficulties eating because she does not like the food here. Patient explains that her nutrition will improve once she is staying with her son again. Educated patient about how nutrition is still important in the meantime before she gets back to her son's house and how proper nutrition will aid in recovery. Patient reports that she really enjoys her son's cooking. Called son and daughter in law who reports that they are going to bring in food for patient this afternoon. They also report that she likes eating peanut butter, honey, and crackers. Notably, patient had an empty Ensure on her tray when writer was in the room.

## 2023-08-09 NOTE — PROGRESS NOTES
Nephrology Progress Note                                                                                                                                                                                                                                                                                                                                                               Office : [phone]     Fax :[phone]              Patient's Name: Simran Calloway    8/9/2023      Assessment/Plan     1. CVA     2. HTN    3. Afib     4. Acid- base/ Electrolyte imbalance     5. DM 2    6. Symptomatic bradycardia  - Cardiology consulted   - S/p pacemaker     6.  CARY - Resolved    Plan:  - Cr stable   - Perfusion better   - Close monitoring of BP - controlled  - Ur studies - reviewed   - Monitor lytes       Reason for Consult:  HTN   Requesting Physician:  MARVIN Enriquez - CNP      Interval hx:    Ate some breakfast  Wants to get back into bed     Cr and lytes stable  BP's controlled     Past Medical History:   Diagnosis Date    Atrial fibrillation ([address])     off coumadin after bleed, declined restart    Blood transfusion     CAD (coronary artery disease)     Diabetes mellitus type II     Diabetic neuropathy ([address]) 2011    Gastric ulcer     H. pylori infection 2009    Hyperlipidemia     Hypertension     Numbness and tingling of left leg     Osteoarthritis     knees    Poor historian     Primary hypertension     PVD (peripheral vascular disease) ([address])     PTA distal sfa/pop/peroneal, Dr. Jen Del Toro 12/2015    Unspecified cerebral artery occlusion with cerebral infarction     TIA affected left eye    upper gi bleed 12/2009       Past Surgical History:   Procedure Laterality Date    ANGIOPLASTY  12/30/15    Dr. Jen Del Toro, LLE, PTA of distal sfa/pop/peroneal    CARDIAC CATHETERIZATION  9/21/12    done for surgical clearance, cath was negative    COLONOSCOPY  06/09/2022    COLONOSCOPY N/A 6/9/2022    COLONOSCOPY CONTROL HEMORRHAGE performed by Luis Alfredo Christensen MD at [place] [phone]    FOOT SURGERY Left 01/11/2018    DEBRIDEMENT OF ULCERS LEFT FOOT AND LEG WITH GRAFT    OTHER SURGICAL HISTORY Left 06/05/2017    Left Femoral Peroneal Bypass    PACEMAKER INSERTION/ REMOVAL  7/27/2023    SHOULDER ARTHROPLASTY  10/5/12    RIGHT SHOULDER TOTAL ARTHROPLASTY, BICEPS TENODESIS DEPUY, GLOBAL ADVANTAGE AP    TOE AMPUTATION Right 5/23/2022    AMPUTATION OF RIGHT FIRST AND FOURTH TOES performed by Jonathan Mederos DPM at [address] [place] [address] [address] Right 9/9/2022    TRANSMETATARSAL AMPUTATION RIGHT FOOT performed by Jonathan Mederos DPM at [facility] N/A 6/8/2022    EGD IV SEDATION performed by Luis Alfredo Christensen MD at [facility]       Family History   Problem Relation Age of Onset    Heart Disease Mother     Stroke Mother     Heart Disease Father     Diabetes Sister     Diabetes Brother     Diabetes Maternal Grandmother         reports that she has never smoked. She has never used smokeless tobacco. She reports that she does not drink alcohol and does not use drugs.     Allergies:  Pcn [penicillins]    Current Medications:    bisacodyl (DULCOLAX) suppository 10 mg, Daily PRN  prochlorperazine (COMPAZINE) injection 5 mg, q8h  lactulose (CHRONULAC) 10 GM/15ML solution 20 g, TID  docusate sodium (COLACE) capsule 100 mg, Daily  apixaban (ELIQUIS) tablet 5 mg, BID  hydrALAZINE (APRESOLINE) injection 10 mg, Q6H PRN  insulin lispro (HUMALOG) injection vial 0-4 Units, Nightly  insulin lispro (HUMALOG) injection vial 0-8 Units, TID WC  ondansetron (ZOFRAN-ODT) disintegrating tablet 4 mg, Q8H PRN   Or  ondansetron (ZOFRAN) injection 4 mg, Q6H PRN  pantoprazole (PROTONIX) tablet 40 mg, QAM AC  sodium chloride flush 0.9 % injection 5-40 mL, 2 times per day  sodium chloride flush 0.9 % injection 5-40 mL, PRN  glucose chewable tablet 16 g, PRN  dextrose

## 2023-08-09 NOTE — PROGRESS NOTES
Patient alert & oriented x 4, denies pain/discomfort thus far. Participating in therapy, ambulated in hallway today, tolerated fairly well. Shift assessments completed with VSS. Safety measures in place.

## 2023-08-09 NOTE — PROGRESS NOTES
Pt in bed, eyes closed. Alert & oriented x 3. Disoriented to time. BP elevated, other VSS. Assessment completed. No complaints at this time. Pt incontinent of loose stool. Attends changed and pt repositioned in the bed. Nighttime medications given, pt tolerated well. Reminded pt to call for assistance with any needs. Call light within reach. Safety measures in place.

## 2023-08-09 NOTE — PROGRESS NOTES
ACUTE REHAB UNIT  SPEECH/LANGUAGE PATHOLOGY      [x] Daily  [] Weekly Care Conference Note  [] Discharge    Patient:Xuan Horton      :1945  NMB:[id number]  Rehab Dx/Hx: Acute CVA (cerebrovascular accident) ([address]) [I63.9]    Precautions: [] Aspiration  [x] Fall risk  [] Sternal  [] Seizure [] Hip  [] Weight Bearing [x] Other: No ROM of E   ST Dx: [] Aphasia  [x] Dysarthria  [] Apraxia   [] Oropharyngeal dysphagia [x] Cognitive Impairment  [] Other:   Date of Admit: 2023  Room #: 0433/0051-51  Date: 2023          Current Diet Order:ADULT DIET; Regular  ADULT ORAL NUTRITION SUPPLEMENT; Breakfast, Lunch, Dinner; Standard High Calorie/High Protein Oral Supplement  ADULT ORAL NUTRITION SUPPLEMENT; Breakfast, Lunch, Dinner; Diabetic Oral Supplement      Lives With: Family (son and daughter in law)  Homemaking Responsibilities: No     Occupation: Retired  Type of Occupation: Worked in a [address]: Goes to [employer] every night, reading the [employer] Exceptions: Wears glasses at all times  Hearing  Hearing: Within functional limits  Barriers toward progress: Decreased motivation and Limited participation; Emotional lability        Date: 2023     Tx session 1   Total Timed Code Min 12   Total Treatment Minutes 30   Individual Treatment Minutes 30   Group Treatment Minutes 0   Co-Treat Minutes 0   Brief Exception: N/A   Pain Denied    Pain Intervention: [] RN notified  [] Repositioned  [] Intervention offered and patient declined  [x] N/A  [] Other:    Subjective:     Pt resting in bed upon entry with music playing and agreeable to tx session. PCA at side to assist with repositioning pt to upright position in bed. Objective / Goals:    Pt will require <3 redirections throughout tx session. No redirections required this session. Pt will demonstrate insight to deficits by identifying various areas impacted by dx. Pt agreeable to SNF upon d/c.  Discussed with 8/15/23      Electronically signed by  Ismael Aparicio, [facility]  Speech-Language Pathologist

## 2023-08-09 NOTE — PROGRESS NOTES
Physical Therapy  Facility/Department: [facility] ACUTE REHAB UNIT  Rehabilitation Physical Therapy Treatment Note    NAME: Bridgette Mancia  : 1945 (66 y.o.)  MRN: [id number]  CODE STATUS: Limited    Date of Service: 23       Restrictions:  Restrictions/Precautions: Fall Risk, Up as Tolerated  Position Activity Restriction  Other position/activity restrictions: up with assist; pacemaker precautions; do not elevate affected arm above shoulder for 30 days     SUBJECTIVE  Subjective  Subjective: pt supine in  bed asleep and agreeable to PT, less encouragement required today  Pain: c/o B knee pain throughout session-unable to rate        OBJECTIVE  Cognition  Overall Cognitive Status: Exceptions  Arousal/Alertness: Appropriate responses to stimuli;Delayed responses to stimuli  Following Commands: Follows one step commands with increased time; Follows one step commands with repetition  Attention Span: Attends with cues to redirect  Memory: Decreased short term memory;Decreased recall of recent events  Safety Judgement: Decreased awareness of need for assistance;Decreased awareness of need for safety  Problem Solving: Decreased awareness of errors;Assistance required to generate solutions  Insights: Decreased awareness of deficits  Initiation: Requires cues for some  Sequencing: Requires cues for some  Cognition Comment: Pt required increased encouragment for participation throughout session and VC to keep her eyes open  Orientation  Overall Orientation Status: Within Functional Limits  Orientation Level: Oriented X4    Functional Mobility  Bed Mobility  Additional Factors: Verbal cues; Increased time to complete; Without handrails; Head of bed flat  Supine to Sit  Assistance Level: Minimal assistance  Skilled Clinical Factors: to L side, inc assist for trunk  Scooting  Assistance Level: Contact guard assist  Balance  Sitting Balance: Contact guard assistance  Standing Balance: Minimal assistance (min-mod A at RW with posterior lean)  Standing Balance  Activity: dep pants management  Comments: Pt demo's substantial L LE genu valgus. L knee tends to stabilize on R knee, while feet are shoulder width apart. Transfers  Surface: From bed; Wheelchair (scifit seat)  Additional Factors: Verbal cues; Hand placement cues; Increased time to complete  Device: Walker  Sit to Stand  Assistance Level: Moderate assistance;Minimal assistance (mod A from EOB, min-mod A from wc, mod A from scifit)  Skilled Clinical Factors: pt demos difficulty initiating anterior weight shift and trunk extension proceeding anterior weight shift. Heavy VCs on scooting to edge of seat and opening eyes to prepare LE positioning prior to transfer. STS from wc, pt tends to use LEs against surface for stabilization. Stand to Sit  Assistance Level: Moderate assistance (pt sitting prematurely and assist for controlled descent)  Bed To/From Chair  Technique: Stand step  Assistance Level: Moderate assistance  Skilled Clinical Factors: at RW- EOB>wc mod A, wc>scifit mod A, scifit >wc mod A + min A due to fatigue      Environmental Mobility  Ambulation  Surface: Level surface  Device: Rolling walker  Distance: 25' x2  Activity: Within Unit  Activity Comments: more agreeable to amb on this date with less encouragement required  Additional Factors: Verbal cues; Increased time to complete  Assistance Level: Moderate assistance;Minimal assistance (mod A on 1st trial, min A on second + wc follow)  Gait Deviations: Slow zohra;Decreased step length bilateral;Wide base of support; Unsteady gait  Skilled Clinical Factors: Pt has severe B  genu valgus  Wheelchair  Surface: Level surface  Device: Standard wheelchair  Additional Factors: Verbal cues; Hand placement cues; Increased time to complete  Assistance Required to Manage Parts: Brakes  Assistance Level for Propulsion: Minimal assistance; Moderate assistance  Propulsion Method: Bilateral lower extremities; Right upper

## 2023-08-09 NOTE — PLAN OF CARE
Problem: Discharge Planning  Goal: Discharge to home or other facility with appropriate resources  8/9/2023 1159 by Robert Vyas RN  Outcome: Progressing  Flowsheets (Taken 8/9/2023 0945)  Discharge to home or other facility with appropriate resources: Identify barriers to discharge with patient and caregiver     Problem: Safety - Adult  Goal: Free from fall injury  8/9/2023 1159 by Robert Vays RN  Outcome: Progressing     Problem: Skin/Tissue Integrity  Goal: Absence of new skin breakdown  Description: 1. Monitor for areas of redness and/or skin breakdown  2. Assess vascular access sites hourly  3. Every 4-6 hours minimum:  Change oxygen saturation probe site  4. Every 4-6 hours:  If on nasal continuous positive airway pressure, respiratory therapy assess nares and determine need for appliance change or resting period.   8/9/2023 1159 by Robert Vyas RN  Outcome: Progressing     Problem: ABCDS Injury Assessment  Goal: Absence of physical injury  8/9/2023 1159 by Robert Vyas RN  Outcome: Progressing     Problem: Pain  Goal: Verbalizes/displays adequate comfort level or baseline comfort level  8/9/2023 1159 by Robert Vyas RN  Outcome: Progressing  Flowsheets (Taken 8/9/2023 0945)  Verbalizes/displays adequate comfort level or baseline comfort level: Encourage patient to monitor pain and request assistance     Problem: Nutrition Deficit:  Goal: Optimize nutritional status  8/9/2023 1159 by Robert Vyas RN  Outcome: Progressing     Problem: Discharge Planning  Goal: Discharge to home or other facility with appropriate resources  8/9/2023 1159 by Robert Vyas RN  Outcome: Progressing  Flowsheets (Taken 8/9/2023 0945)  Discharge to home or other facility with appropriate resources: Identify barriers to discharge with patient and caregiver  8/9/2023 0411 by Tacos Carroll RN  Outcome: Not Progressing  Flowsheets (Taken 8/9/2023 0411)  Discharge to home or other facility with appropriate

## 2023-08-09 NOTE — CARE COORDINATION
SW met with patient at bedside to provide updates and give anticipated discharge date of 8/15/23. Patient was agreeable with anticipated discharge date and has no more questions at this time. Patient and SW discussed the recommendation for a short stay at SNF post discharge to increase independence. Patient and son agreeable to SNF and patient noted that she only wants to go to [facility] as it is close to her home and she had been there In the past and had a great experience. SW in contact with [facility] admissions coordinator Cameron Form ([phone]) who noted that patient was accepted at the SNF but would need precert approved before admissions. Cameron Form to start precert on 9/34 with the anticipated discharge date of 8/15. SW following.       Electronically signed by CARLOS Garza on 8/9/2023 at 10:02 AM

## 2023-08-09 NOTE — PROGRESS NOTES
Occupational Therapy  Facility/Department: [facility] ACUTE REHAB UNIT  Rehabilitation Occupational Therapy Daily Treatment Note    Date: 23  Patient Name: Irene Williamson       Room: 6979/2539-51  MRN: [id number]  Account: [de-identified]   : 1945  (74 y.o.) Gender: female                    Past Medical History:  has a past medical history of Atrial fibrillation ([address]), Blood transfusion, CAD (coronary artery disease), Diabetes mellitus type II, Diabetic neuropathy ([address]), Gastric ulcer, H. pylori infection, Hyperlipidemia, Hypertension, Numbness and tingling of left leg, Osteoarthritis, Poor historian, Primary hypertension, PVD (peripheral vascular disease) ([address]), Unspecified cerebral artery occlusion with cerebral infarction, and upper gi bleed. Past Surgical History:   has a past surgical history that includes Coronary angioplasty with stent (2736,1478); Cardiac catheterization (12); Total shoulder arthroplasty (10/5/12); angioplasty (12/30/15); other surgical history (Left, 2017); Foot surgery (Left, 2018); Toe amputation (Right, 2022); Upper gastrointestinal endoscopy (N/A, 2022); Colonoscopy (2022); Colonoscopy (N/A, 2022); Toe amputation (Right, 2022); and Pacemaker Insertion/ Removal (2023). Restrictions  Restrictions/Precautions: Fall Risk, Up as Tolerated  Other position/activity restrictions: up with assist; pacemaker precautions; do not elevate affected arm above shoulder for 30 days    Subjective  Subjective: Pt asleep in recliner upon arrival, agreeable to therapy with encouragement. Pt reporting unrated pain in L shoulder and nayely knees, RN aware. Co-tx indicated to maximize safety and therapeutic potential. \"I'm going to [facility]. I liked it there, last time I was there for like a year. \"  Restrictions/Precautions: Fall Risk;Up as Tolerated             Objective     Cognition  Overall Cognitive Status: Exceptions  Arousal/Alertness:

## 2023-08-09 NOTE — PROGRESS NOTES
Palliative Care Chart Review  and Check in Note:     NAME:  Luisa Palma  Admit Date: 8/2/2023  Hospital Day:  Hospital Day: 8   Current Code status: Limited    Palliative care is continuing to following Ms. Romero for symptom management,  and goals of care discussion as needed. Patient's chart reviewed today 8/9/23. Pt doing ok today, had large BM after enema yesterday. Reprots she worked out with pt/ot at [employer]. Eating a bit, but did not eat much of her chicken soup as she doesn't like it. Called son Lucio Rodriguez, reminded him to bring HCPOA paperwork when he comes to visit. He reported he is coming today to visit, but will not be able to provide paperwork until tomorrow. The following are the currently established goals/code status, and Symptom management. Goals of care: sarah Rodriguez is HCPOA, awaiting on Chana paperwork from Luciokatharina Rodriguez. Code status: Limited x4 (DNR/DNI)    Discharge plan: plan for [facility] ([place] [place]) when precert attained.       Arnoldo Moses MD, PGY-2  08/09/23  1:53 PM

## 2023-08-09 NOTE — PLAN OF CARE
Problem: Discharge Planning  Goal: Discharge to home or other facility with appropriate resources  Outcome: Not Progressing  Flowsheets (Taken 8/9/2023 0411)  Discharge to home or other facility with appropriate resources:   Identify barriers to discharge with patient and caregiver   Identify discharge learning needs (meds, wound care, etc)     Problem: Safety - Adult  Goal: Free from fall injury  Outcome: Not Progressing  Flowsheets (Taken 8/9/2023 0411)  Free From Fall Injury: Instruct family/caregiver on patient safety     Problem: Skin/Tissue Integrity  Goal: Absence of new skin breakdown  Description: 1.   Monitor for areas of redness and/or skin breakdown  Outcome: Progressing     Problem: ABCDS Injury Assessment  Goal: Absence of physical injury  Outcome: Not Progressing  Flowsheets (Taken 8/9/2023 0411)  Absence of Physical Injury: Implement safety measures based on patient assessment     Problem: Pain  Goal: Verbalizes/displays adequate comfort level or baseline comfort level  Outcome: Progressing  Flowsheets (Taken 8/9/2023 0411)  Verbalizes/displays adequate comfort level or baseline comfort level:   Encourage patient to monitor pain and request assistance   Assess pain using appropriate pain scale   Administer analgesics based on type and severity of pain and evaluate response     Problem: Nutrition Deficit:  Goal: Optimize nutritional status  Outcome: Not Progressing  Flowsheets (Taken 8/9/2023 0411)  Nutrient intake appropriate for improving, restoring, or maintaining nutritional needs:   Assess nutritional status and recommend course of action   Monitor oral intake, labs, and treatment plans   Recommend appropriate diets, oral nutritional supplements, and vitamin/mineral supplements

## 2023-08-10 LAB
GLUCOSE BLD-MCNC: 131 MG/DL (ref 70–99)
GLUCOSE BLD-MCNC: 132 MG/DL (ref 70–99)
GLUCOSE BLD-MCNC: 156 MG/DL (ref 70–99)
GLUCOSE BLD-MCNC: 198 MG/DL (ref 70–99)
PERFORMED ON: ABNORMAL

## 2023-08-10 PROCEDURE — 92507 TX SP LANG VOICE COMM INDIV: CPT

## 2023-08-10 PROCEDURE — 97530 THERAPEUTIC ACTIVITIES: CPT

## 2023-08-10 PROCEDURE — 1280000000 HC REHAB R&B

## 2023-08-10 PROCEDURE — 6370000000 HC RX 637 (ALT 250 FOR IP): Performed by: PHYSICAL MEDICINE & REHABILITATION

## 2023-08-10 PROCEDURE — 97129 THER IVNTJ 1ST 15 MIN: CPT

## 2023-08-10 PROCEDURE — 97542 WHEELCHAIR MNGMENT TRAINING: CPT

## 2023-08-10 PROCEDURE — 97116 GAIT TRAINING THERAPY: CPT

## 2023-08-10 PROCEDURE — 97535 SELF CARE MNGMENT TRAINING: CPT

## 2023-08-10 PROCEDURE — 99232 SBSQ HOSP IP/OBS MODERATE 35: CPT | Performed by: INTERNAL MEDICINE

## 2023-08-10 RX ADMIN — BISACODYL 5 MG: 5 TABLET, COATED ORAL at 09:07

## 2023-08-10 RX ADMIN — DOCUSATE SODIUM 100 MG: 100 CAPSULE, LIQUID FILLED ORAL at 09:07

## 2023-08-10 RX ADMIN — ACETAMINOPHEN 650 MG: 325 TABLET ORAL at 23:28

## 2023-08-10 RX ADMIN — LACTULOSE 20 G: 20 SOLUTION ORAL at 15:33

## 2023-08-10 RX ADMIN — APIXABAN 5 MG: 5 TABLET, FILM COATED ORAL at 21:03

## 2023-08-10 RX ADMIN — LACTULOSE 20 G: 20 SOLUTION ORAL at 21:03

## 2023-08-10 RX ADMIN — LACTULOSE 20 G: 20 SOLUTION ORAL at 09:07

## 2023-08-10 RX ADMIN — PANTOPRAZOLE SODIUM 40 MG: 40 TABLET, DELAYED RELEASE ORAL at 05:20

## 2023-08-10 RX ADMIN — APIXABAN 5 MG: 5 TABLET, FILM COATED ORAL at 09:07

## 2023-08-10 ASSESSMENT — PAIN DESCRIPTION - PAIN TYPE: TYPE: CHRONIC PAIN

## 2023-08-10 ASSESSMENT — PAIN SCALES - GENERAL
PAINLEVEL_OUTOF10: 5
PAINLEVEL_OUTOF10: 0
PAINLEVEL_OUTOF10: 4
PAINLEVEL_OUTOF10: 0
PAINLEVEL_OUTOF10: 0

## 2023-08-10 ASSESSMENT — PAIN SCALES - WONG BAKER
WONGBAKER_NUMERICALRESPONSE: 0
WONGBAKER_NUMERICALRESPONSE: 0

## 2023-08-10 ASSESSMENT — PAIN - FUNCTIONAL ASSESSMENT: PAIN_FUNCTIONAL_ASSESSMENT: PREVENTS OR INTERFERES SOME ACTIVE ACTIVITIES AND ADLS

## 2023-08-10 ASSESSMENT — PAIN DESCRIPTION - DESCRIPTORS
DESCRIPTORS: DISCOMFORT;GNAWING
DESCRIPTORS: DISCOMFORT

## 2023-08-10 NOTE — PROGRESS NOTES
Nephrology Progress Note                                                                                                                                                                                                                                                                                                                                                               Office : [phone]     Fax :[phone]              Patient's Name: Melanie Keith    8/10/2023      Assessment/Plan     1. CVA     2. HTN    3. Afib     4. Acid- base/ Electrolyte imbalance     5. DM 2    6. Symptomatic bradycardia  - Cardiology consulted   - S/p pacemaker     6.  CARY - Resolved    Plan:  - Cr stable   - Perfusion better   - Close monitoring of BP - controlled  - Ur studies - reviewed   - Monitor lytes       Reason for Consult:  HTN   Requesting Physician:  Ba Fowler, APRN - CNP      Interval hx:    Sitting up in a chair  Appears to have had some breakfast this AM  No complaints at this time    BP's controlled   Anticipating eventual d/c to SNF    Past Medical History:   Diagnosis Date    Atrial fibrillation ([address])     off coumadin after bleed, declined restart    Blood transfusion     CAD (coronary artery disease)     Diabetes mellitus type II     Diabetic neuropathy ([address]) 2011    Gastric ulcer     H. pylori infection 2009    Hyperlipidemia     Hypertension     Numbness and tingling of left leg     Osteoarthritis     knees    Poor historian     Primary hypertension     PVD (peripheral vascular disease) ([address])     PTA distal sfa/pop/peroneal, Dr. Tanisha Richter 12/2015    Unspecified cerebral artery occlusion with cerebral infarction     TIA affected left eye    upper gi bleed 12/2009       Past Surgical History:   Procedure Laterality Date    ANGIOPLASTY  12/30/15    Dr. Tanisha Richter, [facility], PTA of distal sfa/pop/peroneal    CARDIAC CATHETERIZATION  9/21/12    done for surgical clearance, cath was negative    COLONOSCOPY  06/09/2022 COLONOSCOPY N/A 6/9/2022    COLONOSCOPY CONTROL HEMORRHAGE performed by Aric Bennett MD at [address] [facility]  [id number]    FOOT SURGERY Left 01/11/2018    DEBRIDEMENT OF ULCERS LEFT FOOT AND LEG WITH GRAFT    OTHER SURGICAL HISTORY Left 06/05/2017    Left Femoral Peroneal Bypass    PACEMAKER INSERTION/ REMOVAL  7/27/2023    SHOULDER ARTHROPLASTY  10/5/12    RIGHT SHOULDER TOTAL ARTHROPLASTY, BICEPS TENODESIS DEPUY, GLOBAL ADVANTAGE AP    TOE AMPUTATION Right 5/23/2022    AMPUTATION OF RIGHT FIRST AND FOURTH TOES performed by Fareed De Leon DPM at [address] [place] [address] [address] Right 9/9/2022    TRANSMETATARSAL AMPUTATION RIGHT FOOT performed by Fareed De Leon DPM at [facility] N/A 6/8/2022    EGD IV SEDATION performed by Aric Bennett MD at [facility]       Family History   Problem Relation Age of Onset    Heart Disease Mother     Stroke Mother     Heart Disease Father     Diabetes Sister     Diabetes Brother     Diabetes Maternal Grandmother         reports that she has never smoked. She has never used smokeless tobacco. She reports that she does not drink alcohol and does not use drugs.     Allergies:  Pcn [penicillins]    Current Medications:    bisacodyl (DULCOLAX) suppository 10 mg, Daily PRN  prochlorperazine (COMPAZINE) injection 5 mg, q8h  lactulose (CHRONULAC) 10 GM/15ML solution 20 g, TID  docusate sodium (COLACE) capsule 100 mg, Daily  apixaban (ELIQUIS) tablet 5 mg, BID  hydrALAZINE (APRESOLINE) injection 10 mg, Q6H PRN  insulin lispro (HUMALOG) injection vial 0-4 Units, Nightly  insulin lispro (HUMALOG) injection vial 0-8 Units, TID WC  ondansetron (ZOFRAN-ODT) disintegrating tablet 4 mg, Q8H PRN   Or  ondansetron (ZOFRAN) injection 4 mg, Q6H PRN  pantoprazole (PROTONIX) tablet 40 mg, QAM AC  sodium chloride flush 0.9 % injection 5-40 mL, 2 times per day  sodium chloride flush 0.9 %

## 2023-08-10 NOTE — PROGRESS NOTES
ACUTE REHAB UNIT  SPEECH/LANGUAGE PATHOLOGY      [x] Daily  [] Weekly Care Conference Note  [] Discharge    Patient:Xuan Peterson      :1945  OKU:[id number]  Rehab Dx/Hx: Acute CVA (cerebrovascular accident) ([address]) [I63.9]    Precautions: [] Aspiration  [x] Fall risk  [] Sternal  [] Seizure [] Hip  [] Weight Bearing [x] Other: No ROM of E   ST Dx: [] Aphasia  [x] Dysarthria  [] Apraxia   [] Oropharyngeal dysphagia [x] Cognitive Impairment  [] Other:   Date of Admit: 2023  Room #: 3778/6139-05  Date: 8/10/2023          Current Diet Order:ADULT DIET; Regular  ADULT ORAL NUTRITION SUPPLEMENT; Breakfast, Lunch, Dinner; Standard High Calorie/High Protein Oral Supplement  ADULT ORAL NUTRITION SUPPLEMENT; Breakfast, Lunch, Dinner; Diabetic Oral Supplement      Lives With: Family (son and daughter in law)  Homemaking Responsibilities: No     Occupation: Retired  Type of Occupation: Worked in a [address]: Goes to [employer] every night, reading the [employer] Exceptions: Wears glasses at all times  Hearing  Hearing: Within functional limits  Barriers toward progress: Decreased motivation and Limited participation; Emotional lability        Date: 8/10/2023     Tx session 1   Total Timed Code Min 15   Total Treatment Minutes 30   Individual Treatment Minutes 30   Group Treatment Minutes 0   Co-Treat Minutes 0   Brief Exception: N/A   Pain Denied    Pain Intervention: [] RN notified  [] Repositioned  [] Intervention offered and patient declined  [x] N/A  [] Other:    Subjective:     Pt upright in wheelchair upon entry and agreeable to tx session. Pt required min cues towards end of session to stay awake. Objective / Goals:    Pt will require <3 redirections throughout tx session. X3 cues required towards end of session d/t lethargy. No cues required for attention. Pt will demonstrate insight to deficits by identifying various areas impacted by dx.     Goal not directly

## 2023-08-10 NOTE — PROGRESS NOTES
Physical Therapy  Facility/Department: [facility] ACUTE REHAB UNIT  Rehabilitation Physical Therapy Treatment Note    NAME: Irene Williamson  : 1945 (66 y.o.)  MRN: [id number]  CODE STATUS: Limited    Date of Service: 8/10/23       Restrictions:  Restrictions/Precautions: Fall Risk, Up as Tolerated  Position Activity Restriction  Other position/activity restrictions: up with assist; pacemaker precautions; do not elevate affected arm above shoulder for 30 days     SUBJECTIVE  Subjective  Subjective: pt supine in  bed asleep and agreeable to PT, less encouragement required today  Pain: c/o B knee pain throughout session-unable to rate            OBJECTIVE  Cognition  Overall Cognitive Status: Exceptions  Arousal/Alertness: Appropriate responses to stimuli;Delayed responses to stimuli  Following Commands: Follows one step commands with increased time; Follows one step commands with repetition  Attention Span: Attends with cues to redirect  Memory: Decreased short term memory;Decreased recall of recent events  Safety Judgement: Decreased awareness of need for assistance;Decreased awareness of need for safety  Problem Solving: Decreased awareness of errors;Assistance required to generate solutions  Insights: Decreased awareness of deficits  Initiation: Requires cues for some  Sequencing: Requires cues for some  Cognition Comment: Pt required encouragment for participation throughout session and VC to keep her eyes open  Orientation  Overall Orientation Status: Within Functional Limits  Orientation Level: Oriented X4    Functional Mobility  Supine to Sit  Assistance Level: Contact guard assist  Skilled Clinical Factors: to R side, inc effort  Scooting  Assistance Level: Contact guard assist  Balance  Sitting Balance: Contact guard assistance (to SBA on shower chair during prolonged ADL with OT)  Standing Balance:  Moderate assistance (min to mod A at RW with posterior lean during pericare and pants management)  Standing ASSESSMENT/PROGRESS TOWARDS GOALS       Assessment  Assessment: Pt cont to present with decreased overall strength, balance, and endurance disabling her ability for walking and ind w/c management. pt able to take full shower with OT/PT cotx and tolerated well. pt continues to fatigue quickly and limited by pain requiring frequent rest breaks and cues. pt perfoming most transfers on this date with assist of 1 however did require assist of 2 to shower chair due 2/2 pt sitting prematurely and quick onset of fatigue. pt only able to tolerate short distance amb to/from bathroom on this date. pt remains below baseline and may dc to SNF prior to return home to further therapy gains. Pt would benefit from continued skilled therapy to improve functional mobility, standing tolerance activity, to allow for a safer d/c to home. Activity Tolerance: Patient limited by fatigue;Patient limited by pain; Patient limited by endurance; Patient tolerated evaluation without incident  Discharge Recommendations: Continue to assess pending progress;Subacute/Skilled Nursing Facility  PT Equipment Recommendations  Other: ongoing  assessment    Goals  Patient Goals   Patient Goals : \"Walk with the walker\"  Short Term Goals  Time Frame for Short Term Goals: 1 week (all goals ongoing)  Short Term Goal 1: pt will perf supine<>sit with CGA  Short Term Goal 2: pt will perf STS to RW with min A  Short Term Goal 3: pt will amb 50ft with RW and min A  Short Term Goal 4: pt will negotiate 6 stairs with rail and min A  Long Term Goals  Time Frame for Long Term Goals : 3 weeks  Long Term Goal 1: pt will perf supine<>sit IND  Long Term Goal 2: pt will perf STS to RW with SPV  Long Term Goal 3: pt will amb 150ft with RW and SPV  Long Term Goal 4: pt will negotiate 12 stairs with rail and SBA    PLAN OF CARE/SAFETY  Physcial Therapy Plan  Days Per Week: 5 Days  Hours Per Day:  (75 min)  Therapy Duration: 3 Weeks  Current Treatment Recommendations:

## 2023-08-10 NOTE — PROGRESS NOTES
Occupational Therapy  Facility/Department: [facility] ACUTE REHAB UNIT  Rehabilitation Occupational Therapy Daily Treatment Note    Date: 8/10/23  Patient Name: Delia Anand       Room: [facility]/1843-  MRN: [id number]  Account: [de-identified]   : 1945  (74 y.o.) Gender: female                    Past Medical History:  has a past medical history of Atrial fibrillation ([address]), Blood transfusion, CAD (coronary artery disease), Diabetes mellitus type II, Diabetic neuropathy ([address]), Gastric ulcer, H. pylori infection, Hyperlipidemia, Hypertension, Numbness and tingling of left leg, Osteoarthritis, Poor historian, Primary hypertension, PVD (peripheral vascular disease) ([address]), Unspecified cerebral artery occlusion with cerebral infarction, and upper gi bleed. Past Surgical History:   has a past surgical history that includes Coronary angioplasty with stent (8583,2644); Cardiac catheterization (12); Total shoulder arthroplasty (10/5/12); angioplasty (12/30/15); other surgical history (Left, 2017); Foot surgery (Left, 2018); Toe amputation (Right, 2022); Upper gastrointestinal endoscopy (N/A, 2022); Colonoscopy (2022); Colonoscopy (N/A, 2022); Toe amputation (Right, 2022); and Pacemaker Insertion/ Removal (2023). Restrictions  Restrictions/Precautions: Fall Risk, Up as Tolerated  Other position/activity restrictions: up with assist; pacemaker precautions; do not elevate affected arm above shoulder for 30 days    Subjective  Subjective: Pt asleep in bed upon arrival, agreeable to ADL with encouragement. Pt reporting unrated pain in L shoulder and nayely knees, RN aware. Co-tx indicated to maximize safety and therapeutic potential.  Restrictions/Precautions: Fall Risk;Up as Tolerated             Objective     Cognition  Overall Cognitive Status: Exceptions  Arousal/Alertness: Appropriate responses to stimuli;Delayed responses to stimuli  Following Commands:  Follows one step commands

## 2023-08-10 NOTE — PLAN OF CARE
Problem: Discharge Planning  Goal: Discharge to home or other facility with appropriate resources  Outcome: Progressing  Flowsheets (Taken 8/10/2023 0917)  Discharge to home or other facility with appropriate resources: Identify barriers to discharge with patient and caregiver     Problem: Safety - Adult  Goal: Free from fall injury  Outcome: Progressing. No injuries     Problem: Skin/Tissue Integrity  Goal: Absence of new skin breakdown  Description: 1. Monitor for areas of redness and/or skin breakdown  2. Assess vascular access sites hourly  3. Every 4-6 hours minimum:  Change oxygen saturation probe site  4. Every 4-6 hours:  If on nasal continuous positive airway pressure, respiratory therapy assess nares and determine need for appliance change or resting period. Outcome: Progressing. Thin frail, thin. No new areas. Problem: ABCDS Injury Assessment  Goal: Absence of physical injury  Outcome: Progressing. No injuries  Flowsheets (Taken 8/10/2023 0917)  Absence of Physical Injury: Implement safety measures based on patient assessment     Problem: Pain  Goal: Verbalizes/displays adequate comfort level or baseline comfort level  Outcome: Progressing. Denies  Flowsheets (Taken 8/10/2023 0915)  Verbalizes/displays adequate comfort level or baseline comfort level:   Encourage patient to monitor pain and request assistance   Assess pain using appropriate pain scale   Administer analgesics based on type and severity of pain and evaluate response   Implement non-pharmacological measures as appropriate and evaluate response   Consider cultural and social influences on pain and pain management   Notify Licensed Independent Practitioner if interventions unsuccessful or patient reports new pain     Problem: Nutrition Deficit:  Goal: Optimize nutritional status  Outcome: Progressing. Patient fluid intake good this shift, drinking supplements.

## 2023-08-10 NOTE — PLAN OF CARE
Problem: Safety - Adult  Goal: Free from fall injury  8/9/2023 2150 by Laura Kimbrough RN  Outcome: Progressing     Problem: Skin/Tissue Integrity  Goal: Absence of new skin breakdown  Description: 1. Monitor for areas of redness and/or skin breakdown  2. Assess vascular access sites hourly  3. Every 4-6 hours minimum:  Change oxygen saturation probe site  4. Every 4-6 hours:  If on nasal continuous positive airway pressure, respiratory therapy assess nares and determine need for appliance change or resting period.   8/9/2023 2150 by Laura Kimbrough RN  Outcome: Progressing     Problem: ABCDS Injury Assessment  Goal: Absence of physical injury  8/9/2023 2150 by Laura Kimbrough RN  Outcome: Progressing     Problem: Pain  Goal: Verbalizes/displays adequate comfort level or baseline comfort level  8/9/2023 2150 by Laura Kimbrough RN  Outcome: Progressing     Problem: Nutrition Deficit:  Goal: Optimize nutritional status  8/9/2023 2150 by Laura Kimbrough RN  Outcome: Progressing

## 2023-08-11 LAB
ANION GAP SERPL CALCULATED.3IONS-SCNC: 11 MMOL/L (ref 3–16)
BASOPHILS # BLD: 0.1 K/UL (ref 0–0.2)
BASOPHILS NFR BLD: 1.1 %
BUN SERPL-MCNC: 27 MG/DL (ref 7–20)
CALCIUM SERPL-MCNC: 9.4 MG/DL (ref 8.3–10.6)
CHLORIDE SERPL-SCNC: 101 MMOL/L (ref 99–110)
CO2 SERPL-SCNC: 24 MMOL/L (ref 21–32)
CREAT SERPL-MCNC: 0.9 MG/DL (ref 0.6–1.2)
DEPRECATED RDW RBC AUTO: 16.3 % (ref 12.4–15.4)
EOSINOPHIL # BLD: 0.3 K/UL (ref 0–0.6)
EOSINOPHIL NFR BLD: 4.1 %
GFR SERPLBLD CREATININE-BSD FMLA CKD-EPI: >60 ML/MIN/{1.73_M2}
GLUCOSE BLD-MCNC: 112 MG/DL (ref 70–99)
GLUCOSE BLD-MCNC: 129 MG/DL (ref 70–99)
GLUCOSE BLD-MCNC: 139 MG/DL (ref 70–99)
GLUCOSE BLD-MCNC: 167 MG/DL (ref 70–99)
GLUCOSE SERPL-MCNC: 117 MG/DL (ref 70–99)
HCT VFR BLD AUTO: 26.7 % (ref 36–48)
HGB BLD-MCNC: 9.1 G/DL (ref 12–16)
LYMPHOCYTES # BLD: 1.9 K/UL (ref 1–5.1)
LYMPHOCYTES NFR BLD: 30.1 %
MCH RBC QN AUTO: 27 PG (ref 26–34)
MCHC RBC AUTO-ENTMCNC: 34 G/DL (ref 31–36)
MCV RBC AUTO: 79.4 FL (ref 80–100)
MONOCYTES # BLD: 0.4 K/UL (ref 0–1.3)
MONOCYTES NFR BLD: 6.4 %
NEUTROPHILS # BLD: 3.7 K/UL (ref 1.7–7.7)
NEUTROPHILS NFR BLD: 58.3 %
PERFORMED ON: ABNORMAL
PLATELET # BLD AUTO: 185 K/UL (ref 135–450)
PMV BLD AUTO: 8.2 FL (ref 5–10.5)
POTASSIUM SERPL-SCNC: 4.5 MMOL/L (ref 3.5–5.1)
RBC # BLD AUTO: 3.36 M/UL (ref 4–5.2)
SODIUM SERPL-SCNC: 136 MMOL/L (ref 136–145)
WBC # BLD AUTO: 6.3 K/UL (ref 4–11)

## 2023-08-11 PROCEDURE — 80048 BASIC METABOLIC PNL TOTAL CA: CPT

## 2023-08-11 PROCEDURE — 97535 SELF CARE MNGMENT TRAINING: CPT

## 2023-08-11 PROCEDURE — 92507 TX SP LANG VOICE COMM INDIV: CPT

## 2023-08-11 PROCEDURE — 6370000000 HC RX 637 (ALT 250 FOR IP): Performed by: PHYSICAL MEDICINE & REHABILITATION

## 2023-08-11 PROCEDURE — 1280000000 HC REHAB R&B

## 2023-08-11 PROCEDURE — 97530 THERAPEUTIC ACTIVITIES: CPT

## 2023-08-11 PROCEDURE — 36415 COLL VENOUS BLD VENIPUNCTURE: CPT

## 2023-08-11 PROCEDURE — 97116 GAIT TRAINING THERAPY: CPT

## 2023-08-11 PROCEDURE — 97542 WHEELCHAIR MNGMENT TRAINING: CPT

## 2023-08-11 PROCEDURE — 97129 THER IVNTJ 1ST 15 MIN: CPT

## 2023-08-11 PROCEDURE — 85025 COMPLETE CBC W/AUTO DIFF WBC: CPT

## 2023-08-11 PROCEDURE — 99232 SBSQ HOSP IP/OBS MODERATE 35: CPT | Performed by: INTERNAL MEDICINE

## 2023-08-11 RX ADMIN — DOCUSATE SODIUM 100 MG: 100 CAPSULE, LIQUID FILLED ORAL at 08:25

## 2023-08-11 RX ADMIN — APIXABAN 5 MG: 5 TABLET, FILM COATED ORAL at 08:25

## 2023-08-11 RX ADMIN — APIXABAN 5 MG: 5 TABLET, FILM COATED ORAL at 20:38

## 2023-08-11 RX ADMIN — PANTOPRAZOLE SODIUM 40 MG: 40 TABLET, DELAYED RELEASE ORAL at 05:50

## 2023-08-11 RX ADMIN — LACTULOSE 20 G: 20 SOLUTION ORAL at 15:23

## 2023-08-11 RX ADMIN — LACTULOSE 20 G: 20 SOLUTION ORAL at 20:38

## 2023-08-11 RX ADMIN — ACETAMINOPHEN 650 MG: 325 TABLET ORAL at 08:24

## 2023-08-11 RX ADMIN — LACTULOSE 20 G: 20 SOLUTION ORAL at 08:25

## 2023-08-11 RX ADMIN — BISACODYL 5 MG: 5 TABLET, COATED ORAL at 08:25

## 2023-08-11 ASSESSMENT — PAIN DESCRIPTION - LOCATION: LOCATION: SHOULDER

## 2023-08-11 ASSESSMENT — PAIN SCALES - GENERAL: PAINLEVEL_OUTOF10: 7

## 2023-08-11 ASSESSMENT — PAIN - FUNCTIONAL ASSESSMENT: PAIN_FUNCTIONAL_ASSESSMENT: PREVENTS OR INTERFERES SOME ACTIVE ACTIVITIES AND ADLS

## 2023-08-11 ASSESSMENT — PAIN DESCRIPTION - DESCRIPTORS: DESCRIPTORS: ACHING

## 2023-08-11 ASSESSMENT — PAIN DESCRIPTION - ORIENTATION: ORIENTATION: LEFT

## 2023-08-11 NOTE — PROGRESS NOTES
ACUTE REHAB UNIT  SPEECH/LANGUAGE PATHOLOGY      [x] Daily  [] Weekly Care Conference Note  [] Discharge    Patient:Xuan Mesa      :1945  WPR:[id number]  Rehab Dx/Hx: Acute CVA (cerebrovascular accident) ([address]) [I63.9]    Precautions: [] Aspiration  [x] Fall risk  [] Sternal  [] Seizure [] Hip  [] Weight Bearing [x] Other: No ROM of E   ST Dx: [] Aphasia  [x] Dysarthria  [] Apraxia   [] Oropharyngeal dysphagia [x] Cognitive Impairment  [] Other:   Date of Admit: 2023  Room #: 9522/9586-14  Date: 2023          Current Diet Order:ADULT DIET; Regular  ADULT ORAL NUTRITION SUPPLEMENT; Breakfast, Lunch, Dinner; Standard High Calorie/High Protein Oral Supplement  ADULT ORAL NUTRITION SUPPLEMENT; Breakfast, Lunch, Dinner; Diabetic Oral Supplement      Lives With: Family (son and daughter in law)  Homemaking Responsibilities: No     Occupation: Retired  Type of Occupation: Worked in a [address]: Goes to [employer] every night, reading the [employer] Exceptions: Wears glasses at all times  Hearing  Hearing: Within functional limits  Barriers toward progress: Decreased motivation and Limited participation; Emotional lability        Date: 2023     Tx session 1   Total Timed Code Min 30   Total Treatment Minutes 30   Individual Treatment Minutes 30   Group Treatment Minutes 0   Co-Treat Minutes 0   Brief Exception: N/A   Pain None indicated    Pain Intervention: [] RN notified  [] Repositioned  [] Intervention offered and patient declined  [x] N/A  [] Other:    Subjective:     Pt supine in bed upon arrival. Pt participates throughout but continues to keep eyes closed. Objective / Goals:    Pt will require <3 redirections throughout tx session. X2 instances of need for redirection due to reduced selective attention. Pt will demonstrate insight to deficits by identifying various areas impacted by dx.     Pt able to demo inability to walk and difficulty with transfers requiring 2 people. Incorrectly identified left arm as a new barrier. Pt will complete executive function tasks with 80% accuracy given min cues. ID early vs late: 100% accuracy  Calculating time difference: 63% accuracy. Pt will implement speech strategies with min cues to increase intelligibility to 75% of conversations exchanges. Incentive spirometer x10 - very poor ability  Sustained exhalation against resistance - max cues, mostly 3 second intervals. Maximum phonation time - 5.91 seconds    X2 communication barriers. Pt will tolerate ongoing cognitive-linguistic assessment. Goal not targeted this session. Other areas targeted:    Education:   Education re: rationale for skills    Safety Devices: [x] Call light within reach  [] Chair alarm activated and connected to nurse call light system  [x] Bed alarm activated   [] Other:    Assessment: Ongoing cognitive-linguistic impairments with improved participation this morning. Plan: Continue as per plan of care.       Interventions used this date:  [x] Speech/Language Treatment  [] Instruction in HEP  [] Dysphagia Treatment [x] Cognitive Treatment   [] Other:    Discharge recommendations:  [] Home independently  [] Home with assistance [x]  24 hour supervision  [] ECF [] Other  Continued Tx Upon Discharge: ? [] Yes    [] No    [x] TBD based on progress while on ARU     [] Vital Stim indicated     [] Other:   Estimated discharge date: 8/15/23    Electronically signed by  Parris Dominguez M.A., [address] [facility]  Speech-Language Pathologist

## 2023-08-11 NOTE — PROGRESS NOTES
Pt is alert and oriented x4, pt c/o of the left arm being sore to move. Pt stated that's where the Iv was and it caused her pain,pt had to urinate and had small bm, when trying to get up, it was difficult for her d/t the pain, she stated she wanted to use bedpan, that was unsuccessful d/t pt bony prominences, pt stated it hurts to bad to use the bedpan, when trying to get pt to the bedside commode, with walker, pt noted her left knee popped, her knees began to buckle, I tried having pt pivot back to the bed from the bedside commode, however that was successful, to keep from lower pt to the floor, this nurse grabbed her legs bilaterally and scooped her up into fetal position and placed her back in the bed, pt tolerated good, turned her to her side, cream zinc applied to sacrum, call light within reach and safety was being maintained.

## 2023-08-11 NOTE — PROGRESS NOTES
Occupational Therapy  Facility/Department: [facility] ACUTE REHAB UNIT  Rehabilitation Occupational Therapy Daily Treatment Note    Date: 23  Patient Name: Lizbeth Ocampo       Room: 1547/1365-77  MRN: [id number]  Account: [de-identified]   : 1945  (74 y.o.) Gender: female                    Past Medical History:  has a past medical history of Atrial fibrillation ([address]), Blood transfusion, CAD (coronary artery disease), Diabetes mellitus type II, Diabetic neuropathy ([address]), Gastric ulcer, H. pylori infection, Hyperlipidemia, Hypertension, Numbness and tingling of left leg, Osteoarthritis, Poor historian, Primary hypertension, PVD (peripheral vascular disease) ([address]), Unspecified cerebral artery occlusion with cerebral infarction, and upper gi bleed. Past Surgical History:   has a past surgical history that includes Coronary angioplasty with stent (1772,2850); Cardiac catheterization (12); Total shoulder arthroplasty (10/5/12); angioplasty (12/30/15); other surgical history (Left, 2017); Foot surgery (Left, 2018); Toe amputation (Right, 2022); Upper gastrointestinal endoscopy (N/A, 2022); Colonoscopy (2022); Colonoscopy (N/A, 2022); Toe amputation (Right, 2022); and Pacemaker Insertion/ Removal (2023). Restrictions  Restrictions/Precautions: Fall Risk, Up as Tolerated  Other position/activity restrictions: up with assist; pacemaker precautions; do not elevate affected arm above shoulder for 30 days    Subjective  Subjective: Pt sitting in bed w/ blankets pulled over her head upon arrival, agreeable to therapy with encouragement. Pt reported unrated pain in L shoulder and nayely knees, RN aware, biofreeze applied to knees during session.  Co-tx indicated to maximize safety and therapeutic potential.  Restrictions/Precautions: Fall Risk;Up as Tolerated             Objective     Cognition  Overall Cognitive Status: Exceptions  Arousal/Alertness: Appropriate responses to stimuli;Delayed responses to stimuli  Following Commands: Follows one step commands with increased time; Follows one step commands with repetition  Attention Span: Attends with cues to redirect  Memory: Decreased short term memory;Decreased recall of recent events  Safety Judgement: Decreased awareness of need for assistance;Decreased awareness of need for safety  Problem Solving: Decreased awareness of errors;Assistance required to generate solutions  Insights: Decreased awareness of deficits  Initiation: Requires cues for some  Sequencing: Requires cues for some  Cognition Comment: Pt required encouragment for participation throughout session and VC to keep her eyes open  Orientation  Overall Orientation Status: Within Functional Limits         ADL  Feeding  Assistance Level: Set-up  Skilled Clinical Factors: To open ensure and water bottle; Encouraged to drink  Grooming/Oral Hygiene  Assistance Level: Set-up  Skilled Clinical Factors: Hand hygiene w/ wipe  Upper Extremity Dressing  Assistance Level: Increased time to complete;Set-up; Verbal cues; Minimal assistance  Skilled Clinical Factors: Pt doffed hospital gown and donned sweater seated EOB w/ + time to thread BUEs, assist to pull overhead and down in back, and + time to pull down in front; CGA-Min A for unsupported sitting balance  Lower Extremity Dressing  Assistance Level: Dependent; Requires x 2 assistance  Skilled Clinical Factors: OT assisted w/ doffing soiled brief and donning clean brief and pants seated on toilet to conserve time; x2 assist for pants mgmt over hips in stance w/ BUE support on RW  Putting On/Taking Off Footwear  Assistance Level: Increased time to complete;Verbal cues; Set-up; Maximum assistance  Skilled Clinical Factors: To don shoes seated in w/c  Toileting  Assistance Level: Requires x 2 assistance;Dependent; Increased time to complete;Set-up; Verbal cues  Skilled Clinical Factors: x2 person assist for pants mgmt over hips w/ BUE support

## 2023-08-11 NOTE — PROGRESS NOTES
Nutrition Note    NUTRITION ASSESSMENT:  Nutritional summary & status: Consult r/t poor PO intake & NS in place. RD following pt on admit. Noted SLP working w/ pt on this date & trialed Ensure +HP; pt very receptive to supplement & requesting chocolate flavor going forward. Pt receptive to Ensure +HP TID w/ meals. Will update order & adjust TF to meet 75% of EEN instead of 100% d/t pt willing to try increasing oral intake. Pt on board w/ POC & notified of new nutrition goals. Will continue to monitor pt per [facility] & adjust ons/tf accordingly. RD following. The patient will still be monitored per nutrition standards of care. Consult dietitian if nutrition interventions essential to patient care is needed.      Herrera Clarke, [address]:  [phone]  Office:  [phone]

## 2023-08-11 NOTE — PROGRESS NOTES
Physical Therapy  Facility/Department: [address] [place] [address] ACUTE REHAB UNIT  Rehabilitation Physical Therapy Treatment Note    NAME: Gio Pruett  : 1945 (66 y.o.)  MRN: [id number]  CODE STATUS: Limited    Date of Service: 23       Restrictions:  Restrictions/Precautions: Fall Risk, Up as Tolerated  Position Activity Restriction  Other position/activity restrictions: up with assist; pacemaker precautions; do not elevate affected arm above shoulder for 30 days     SUBJECTIVE  Subjective  Subjective: pt supine in  bed asleep and agreeable to PT, \"I dont want to do it today\"  Pain: c/o B knee pain throughout session-unable to rate. biofreeze applied to B knees at end of session            OBJECTIVE  Cognition  Overall Cognitive Status: Exceptions  Arousal/Alertness: Appropriate responses to stimuli;Delayed responses to stimuli  Following Commands: Follows one step commands with increased time; Follows one step commands with repetition  Attention Span: Attends with cues to redirect  Memory: Decreased short term memory;Decreased recall of recent events  Safety Judgement: Decreased awareness of need for assistance;Decreased awareness of need for safety  Problem Solving: Decreased awareness of errors;Assistance required to generate solutions  Insights: Decreased awareness of deficits  Initiation: Requires cues for some  Sequencing: Requires cues for some  Cognition Comment: Pt required encouragment for participation throughout session and VC to keep her eyes open  Orientation  Overall Orientation Status: Within Functional Limits    Functional Mobility  Supine to Sit  Assistance Level: Stand by assist  Skilled Clinical Factors: inc effort  Scooting  Assistance Level: Stand by assist  Skilled Clinical Factors: to EOB in sitting  Balance  Sitting Balance: Contact guard assistance (on EOB and on toilet (lean to L with fatigue and dressing))  Standing Balance:  Moderate assistance (at [facility] for pericare and pants management in bathroom, flexed posture and knee valgus)  Transfers  Surface: From bed; Wheelchair;Standard toilet  Additional Factors: Verbal cues; Hand placement cues; Increased time to complete  Device: Walker  Sit to Stand  Assistance Level: Moderate assistance;Maximum assistance (max A x3 trials at toilet, mod A from 3 trials at )  Skilled Clinical Factors: pt demos difficulty initiating anterior weight shift and trunk extension proceeding anterior weight shift. Heavy VCs on scooting to edge of seat and opening eyes to prepare LE positioning prior to transfer. STS from , pt tends to use LEs against surface for stabilization. Stand to Sit  Assistance Level: Moderate assistance;Maximum assistance  Skilled Clinical Factors: pt demos difficulty with eccentric control. VCs on reaching back with B hands      Environmental Mobility  Ambulation  Surface: Level surface  Device: Rolling walker  Distance: 15' + 5'  Activity: Within Unit  Activity Comments: tearful with amb 2/2 inc knee pain and unable to tolerate further  Additional Factors: Verbal cues; Increased time to complete  Assistance Level: Moderate assistance;Maximum assistance (mod- max A worse with turns and with fatigeu)  Gait Deviations: Slow zohra;Decreased step length bilateral;Wide base of support; Unsteady gait  Skilled Clinical Factors: Pt has severe B  genu valgus  Stairs  Stair Height: 4''  Device: Bilateral handrails  Number of Stairs: 1+1  Additional Factors: Non-reciprocal going down;Non-reciprocal going up  Assistance Level: Requires x 2 assistance (mod A + min A for 1st step, mod A + CGA for 2nd)  Skilled Clinical Factors - Comments: pt fearful and tearful throughout \"I dont want to do it, I'll just crawl up Chan's steps like I did before\". pt educated on safe negotiation of steps and need to follow LUE precautions  Wheelchair  Surface: Level surface  Device: Standard wheelchair  Additional Factors: Verbal cues; Hand placement cues; Increased time to

## 2023-08-11 NOTE — PLAN OF CARE
Problem: Skin/Tissue Integrity  Goal: Absence of new skin breakdown  Description: 1. Monitor for areas of redness and/or skin breakdown  2. Assess vascular access sites hourly  3. Every 4-6 hours minimum:  Change oxygen saturation probe site  4. Every 4-6 hours:  If on nasal continuous positive airway pressure, respiratory therapy assess nares and determine need for appliance change or resting period.   8/10/2023 2055 by Anthony Elizabeth RN  Outcome: Not Progressing  8/10/2023 1638 by Oswaldo Shipman RN  Outcome: Progressing

## 2023-08-11 NOTE — PROGRESS NOTES
Comprehensive Nutrition Assessment    RECOMMENDATIONS:  PO Diet: Regular ; continue   ONS: Glucerna TID ; continue   Nutrition Education: Education not indicated     NUTRITION ASSESSMENT:   Nutritional summary & status: Follow up. Pt continues on a regular diet w/ intakes improving slightly since previous RD assessment on 8/8. PO intake 51-76% & % noted most recently w/ 1-25% continuing to be average intake of most meals. Pt is receiving Glucerna's TID w/ meals >76% acceptance most supplements. Pt not meeting EEN at this time; however, noted per MD pt is refusing NG & attempting to increase intake; Will eat food from outside the hospital. Continue to encourage optimal intakes/nutrition status during LOS & refer to previous notes should NS be desired. Noted not in pt's [address] at this time. Wt remains stable on admit. Labs reviewed; Glu trending down since previous assessment. Pt planning for d/c 8/15. Will continue to monitor pt per [facility] thru admit. RD following.    Admission/PMH: Bradycardia s/p temporary pacemaker, HTN, CAD, DM // CVA    MALNUTRITION ASSESSMENT  Context of Malnutrition: Chronic Illness   Malnutrition Status: Severe malnutrition  Findings of the 6 clinical characteristics of malnutrition (Minimum of 2 out of 6 clinical characteristics is required to make the diagnosis of moderate or severe Protein Calorie Malnutrition based on AND/ASPEN Guidelines):  Energy Intake:  75% or less estimated energy requirements for 1 month or longer  Weight Loss:  Greater than 5% over 1 month     Body Fat Loss:  Severe body fat loss Orbital, Triceps, Buccal region   Muscle Mass Loss:  Severe muscle mass loss Temples (temporalis), Clavicles (pectoralis & deltoids), Hand (interosseous)  Fluid Accumulation:  No significant fluid accumulation     Strength:  Not Performed    NUTRITION DIAGNOSIS   Severe malnutrition related to inadequate protein-energy intake as evidenced by Criteria as identified in malnutrition

## 2023-08-12 LAB
GLUCOSE BLD-MCNC: 124 MG/DL (ref 70–99)
GLUCOSE BLD-MCNC: 129 MG/DL (ref 70–99)
GLUCOSE BLD-MCNC: 155 MG/DL (ref 70–99)
GLUCOSE BLD-MCNC: 159 MG/DL (ref 70–99)
PERFORMED ON: ABNORMAL

## 2023-08-12 PROCEDURE — 1280000000 HC REHAB R&B

## 2023-08-12 PROCEDURE — 94761 N-INVAS EAR/PLS OXIMETRY MLT: CPT

## 2023-08-12 PROCEDURE — 99232 SBSQ HOSP IP/OBS MODERATE 35: CPT | Performed by: INTERNAL MEDICINE

## 2023-08-12 PROCEDURE — 6370000000 HC RX 637 (ALT 250 FOR IP): Performed by: PHYSICAL MEDICINE & REHABILITATION

## 2023-08-12 RX ADMIN — LACTULOSE 20 G: 20 SOLUTION ORAL at 10:09

## 2023-08-12 RX ADMIN — DOCUSATE SODIUM 100 MG: 100 CAPSULE, LIQUID FILLED ORAL at 10:09

## 2023-08-12 RX ADMIN — APIXABAN 5 MG: 5 TABLET, FILM COATED ORAL at 21:36

## 2023-08-12 RX ADMIN — APIXABAN 5 MG: 5 TABLET, FILM COATED ORAL at 10:09

## 2023-08-12 RX ADMIN — PANTOPRAZOLE SODIUM 40 MG: 40 TABLET, DELAYED RELEASE ORAL at 05:52

## 2023-08-12 RX ADMIN — BISACODYL 5 MG: 5 TABLET, COATED ORAL at 10:09

## 2023-08-12 RX ADMIN — LACTULOSE 20 G: 20 SOLUTION ORAL at 16:21

## 2023-08-12 NOTE — PROGRESS NOTES
Pt. A&O X3, calm, cooperative. Calls out appropriately for assistance. Responses, delayed and unable to understand at times. Shift assessment complete. Denies having pain/discomfort. Assist X1-2 with transfers, using gait belt and w/c. Safety precautions in place to prevent falls/injuries. Compliant with scheduled medications. See flowsheet for additional documentation.      Vitals:    08/12/23 1419   BP:    Pulse: 60   Resp: 16   Temp:    SpO2: 96%

## 2023-08-12 NOTE — PLAN OF CARE
Problem: Discharge Planning  Goal: Discharge to home or other facility with appropriate resources  Outcome: Progressing   Will identify barriers to discharge with patient and caregiver; Identify discharge learning needs (medications, wound care, etc.)       Problem: Safety - Adult  Goal: Free from fall injury  Outcome: Progressing   Pt remains free from falls during this stay on the ARU. 1:1 and education provided on the importance of using call light to ask for assistance prior to transfers and ambulation. Pt voices understanding. Will continue to monitor and re-educate as needed. Problem: Skin/Tissue Integrity  Goal: Absence of new skin breakdown  Description: 1. Monitor for areas of redness and/or skin breakdown  2. Assess vascular access sites hourly  3. Every 4-6 hours minimum:  Change oxygen saturation probe site  4. Every 4-6 hours:  If on nasal continuous positive airway pressure, respiratory therapy assess nares and determine need for appliance change or resting period. Outcome: Progressing   Pt shows no new signs of skin breakdown this shift. Pt repositioned frequently in bed Q2 hours; specialty bed in use; extremities elevated on cushions; will continue to monitor. Problem: Pain  Goal: Verbalizes/displays adequate comfort level or baseline comfort level  Outcome: Progressing  Flowsheets (Taken 8/11/2023 1950)  Verbalizes/displays adequate comfort level or baseline comfort level:   Encourage patient to monitor pain and request assistance   Assess pain using appropriate pain scale   Administer analgesics based on type and severity of pain and evaluate response   Implement non-pharmacological measures as appropriate and evaluate response   Pt demonstrates correct use of 0-10 pain scale; pt verbalizes understanding to notify RN staff of any changes in pain condition, new onset pain, and need for pain medication; pt c/o 0/10 this shift.

## 2023-08-12 NOTE — PROGRESS NOTES
Nephrology Progress Note                                                                                                                                                                                                                                                                                                                                                               Office : [phone]     Fax :[phone]              Patient's Name: Willi Hoffman    8/11/2023      Assessment/Plan     1. CVA     2. HTN    3. Afib     4. Acid- base/ Electrolyte imbalance     5. DM 2    6. Symptomatic bradycardia  - Cardiology consulted   - S/p pacemaker     6.  CARY - Resolved    Plan:  - Cr stable   - Perfusion better   - Close monitoring of BP - controlled  - Ur studies - reviewed   - Monitor lytes       Reason for Consult:  HTN   Requesting Physician:  Migdalia Francois, MARVIN - CNP      Interval hx:    Sitting up in a chair  No complaints at this time    BP's controlled       Past Medical History:   Diagnosis Date    Atrial fibrillation ([address])     off coumadin after bleed, declined restart    Blood transfusion     CAD (coronary artery disease)     Diabetes mellitus type II     Diabetic neuropathy ([address]) 2011    Gastric ulcer     H. pylori infection 2009    Hyperlipidemia     Hypertension     Numbness and tingling of left leg     Osteoarthritis     knees    Poor historian     Primary hypertension     PVD (peripheral vascular disease) ([address])     PTA distal sfa/pop/peroneal, Dr. Cinthya Funes 12/2015    Unspecified cerebral artery occlusion with cerebral infarction     TIA affected left eye    upper gi bleed 12/2009       Past Surgical History:   Procedure Laterality Date    ANGIOPLASTY  12/30/15    Dr. Cinthya Funes, LLE, PTA of distal sfa/pop/peroneal    CARDIAC CATHETERIZATION  9/21/12    done for surgical clearance, cath was negative    COLONOSCOPY  06/09/2022    COLONOSCOPY N/A 6/9/2022    COLONOSCOPY CONTROL HEMORRHAGE performed by Will Murguia JESSICA Moon in the last 72 hours. IMAGING:  No orders to display         Medical Decision Making:   The following items were considered in medical decision making:  Discussion of patient care with other providers  Reviewed clinical lab tests  Reviewed radiology tests  Reviewed other diagnostic tests/interventions  Independent review of radiologic images - reviewed       Ida Espinosa MD  Feel free to contact me   [email or web address]  Office : [phone]  Fax :[phone]

## 2023-08-12 NOTE — PLAN OF CARE
Problem: Discharge Planning  Goal: Discharge to home or other facility with appropriate resources  Outcome: Progressing  Flowsheets (Taken 8/12/2023 1010)  Discharge to home or other facility with appropriate resources: Identify barriers to discharge with patient and caregiver     Problem: Safety - Adult  Goal: Free from fall injury  Outcome: Progressing     Problem: ABCDS Injury Assessment  Goal: Absence of physical injury  Outcome: Progressing  Flowsheets (Taken 8/12/2023 0855)  Absence of Physical Injury: Implement safety measures based on patient assessment     Problem: Pain  Goal: Verbalizes/displays adequate comfort level or baseline comfort level  Outcome: Progressing     Problem: Skin/Tissue Integrity - Adult  Goal: Skin integrity remains intact  Outcome: Not Progressing. New area to right hand. Interventions in place. Flowsheets  Taken 8/12/2023 1010  Skin Integrity Remains Intact: Monitor for areas of redness and/or skin breakdown  Taken 8/12/2023 0855  Skin Integrity Remains Intact: Monitor for areas of redness and/or skin breakdown    Problem: Nutrition Deficit:  Goal: Optimize nutritional status  Outcome: Not Progressing. Appetite poor. Fluids encouraged and drinking very little.

## 2023-08-12 NOTE — PROGRESS NOTES
Patient assisted to bathroom using gait belt and walker. Patient's right hand got stuck under her buttock when she was attempting to wipe. Abrasion noted on right hand. Wound cleansed and bandaid applied.

## 2023-08-12 NOTE — PROGRESS NOTES
Nephrology Progress Note                                                                                                                                                                                                                                                                                                                                                               Office : [phone]     Fax :[phone]              Patient's Name: Yvonne Reagan    8/12/2023      Assessment/Plan     1. CVA     2. HTN    3. Afib     4. Acid- base/ Electrolyte imbalance     5. DM 2    6. Symptomatic bradycardia  - Cardiology consulted   - S/p pacemaker     6.  CARY - Resolved    Plan:  - Cr stable   - Perfusion better   - Close monitoring of BP - controlled  - Ur studies - reviewed   - Monitor lytes       Reason for Consult:  HTN   Requesting Physician:  Claude Delay, APRN - CNP      Interval hx:    Sitting up in a chair  No complaints at this time    BP's controlled       Past Medical History:   Diagnosis Date    Atrial fibrillation ([address])     off coumadin after bleed, declined restart    Blood transfusion     CAD (coronary artery disease)     Diabetes mellitus type II     Diabetic neuropathy ([address]) 2011    Gastric ulcer     H. pylori infection 2009    Hyperlipidemia     Hypertension     Numbness and tingling of left leg     Osteoarthritis     knees    Poor historian     Primary hypertension     PVD (peripheral vascular disease) ([address])     PTA distal sfa/pop/peroneal, Dr. Tootie Valdes 12/2015    Unspecified cerebral artery occlusion with cerebral infarction     TIA affected left eye    upper gi bleed 12/2009       Past Surgical History:   Procedure Laterality Date    ANGIOPLASTY  12/30/15    Dr. Tootie Valdes, [facility], PTA of distal sfa/pop/peroneal    CARDIAC CATHETERIZATION  9/21/12    done for surgical clearance, cath was negative    COLONOSCOPY  06/09/2022    COLONOSCOPY N/A 6/9/2022    COLONOSCOPY CONTROL HEMORRHAGE performed by Kelsy Rouse

## 2023-08-13 VITALS
TEMPERATURE: 97.5 F | BODY MASS INDEX: 20.7 KG/M2 | SYSTOLIC BLOOD PRESSURE: 126 MMHG | OXYGEN SATURATION: 98 % | DIASTOLIC BLOOD PRESSURE: 79 MMHG | HEIGHT: 63 IN | WEIGHT: 116.84 LBS | RESPIRATION RATE: 16 BRPM | HEART RATE: 65 BPM

## 2023-08-13 LAB
GLUCOSE BLD-MCNC: 109 MG/DL (ref 70–99)
GLUCOSE BLD-MCNC: 114 MG/DL (ref 70–99)
GLUCOSE BLD-MCNC: 135 MG/DL (ref 70–99)
GLUCOSE BLD-MCNC: 140 MG/DL (ref 70–99)
PERFORMED ON: ABNORMAL

## 2023-08-13 PROCEDURE — 99232 SBSQ HOSP IP/OBS MODERATE 35: CPT | Performed by: INTERNAL MEDICINE

## 2023-08-13 PROCEDURE — 1280000000 HC REHAB R&B

## 2023-08-13 PROCEDURE — 6370000000 HC RX 637 (ALT 250 FOR IP): Performed by: PHYSICAL MEDICINE & REHABILITATION

## 2023-08-13 RX ADMIN — ACETAMINOPHEN 650 MG: 325 TABLET ORAL at 08:22

## 2023-08-13 RX ADMIN — DOCUSATE SODIUM 100 MG: 100 CAPSULE, LIQUID FILLED ORAL at 08:22

## 2023-08-13 RX ADMIN — PANTOPRAZOLE SODIUM 40 MG: 40 TABLET, DELAYED RELEASE ORAL at 06:45

## 2023-08-13 RX ADMIN — APIXABAN 5 MG: 5 TABLET, FILM COATED ORAL at 08:22

## 2023-08-13 RX ADMIN — LACTULOSE 20 G: 20 SOLUTION ORAL at 14:01

## 2023-08-13 RX ADMIN — LACTULOSE 20 G: 20 SOLUTION ORAL at 21:59

## 2023-08-13 RX ADMIN — APIXABAN 5 MG: 5 TABLET, FILM COATED ORAL at 21:59

## 2023-08-13 RX ADMIN — ACETAMINOPHEN 650 MG: 325 TABLET ORAL at 23:30

## 2023-08-13 RX ADMIN — ONDANSETRON 4 MG: 4 TABLET, ORALLY DISINTEGRATING ORAL at 08:22

## 2023-08-13 RX ADMIN — BISACODYL 5 MG: 5 TABLET, COATED ORAL at 08:22

## 2023-08-13 RX ADMIN — LACTULOSE 20 G: 20 SOLUTION ORAL at 08:22

## 2023-08-13 ASSESSMENT — PAIN DESCRIPTION - ORIENTATION
ORIENTATION: LEFT
ORIENTATION: RIGHT;LEFT

## 2023-08-13 ASSESSMENT — PAIN DESCRIPTION - DESCRIPTORS
DESCRIPTORS: ACHING;BURNING
DESCRIPTORS: ACHING

## 2023-08-13 ASSESSMENT — PAIN DESCRIPTION - PAIN TYPE: TYPE: ACUTE PAIN

## 2023-08-13 ASSESSMENT — PAIN SCALES - GENERAL
PAINLEVEL_OUTOF10: 4
PAINLEVEL_OUTOF10: 3

## 2023-08-13 ASSESSMENT — PAIN DESCRIPTION - FREQUENCY: FREQUENCY: INTERMITTENT

## 2023-08-13 ASSESSMENT — PAIN DESCRIPTION - LOCATION
LOCATION: KNEE
LOCATION: KNEE

## 2023-08-13 ASSESSMENT — PAIN DESCRIPTION - ONSET: ONSET: AWAKENED FROM SLEEP

## 2023-08-13 NOTE — PLAN OF CARE
TOPHER Moreno  Outcome: Not Progressing     Problem: Neurosensory - Adult  Goal: Achieves stable or improved neurological status  Outcome: Progressing  Flowsheets  Taken 8/12/2023 1945 by Jaswinder Cr RN  Achieves stable or improved neurological status: Assess for and report changes in neurological status  Taken 8/12/2023 1010 by Alexis Slater RN  Achieves stable or improved neurological status: Assess for and report changes in neurological status  Goal: Achieves maximal functionality and self care  Outcome: Progressing  Flowsheets (Taken 8/12/2023 1945)  Achieves maximal functionality and self care: Monitor swallowing and airway patency with patient fatigue and changes in neurological status     Problem: Skin/Tissue Integrity - Adult  Goal: Skin integrity remains intact  8/12/2023 2157 by Jaswinder Cr RN  Outcome: Progressing  Flowsheets (Taken 8/12/2023 1945)  Skin Integrity Remains Intact: Monitor for areas of redness and/or skin breakdown  8/12/2023 1639 by Alexis Slater RN  Outcome: Progressing  Flowsheets  Taken 8/12/2023 1010  Skin Integrity Remains Intact: Monitor for areas of redness and/or skin breakdown  Taken 8/12/2023 0855  Skin Integrity Remains Intact: Monitor for areas of redness and/or skin breakdown  Goal: Oral mucous membranes remain intact  Outcome: Progressing     Problem: Musculoskeletal - Adult  Goal: Return mobility to safest level of function  Outcome: Progressing  Flowsheets  Taken 8/12/2023 1945 by Jaswinder Cr RN  Return Mobility to Safest Level of Function: Assess patient stability and activity tolerance for standing, transferring and ambulating with or without assistive devices  Taken 8/12/2023 1010 by Alexis Slater RN  Return Mobility to Safest Level of Function: Assess patient stability and activity tolerance for standing, transferring and ambulating with or without assistive devices  Goal: Maintain proper alignment of affected body part  Outcome:

## 2023-08-13 NOTE — PROGRESS NOTES
Patient alert and oriented x3. Noted to be calm, cooperative. Responses were delayed and noted to have weakness on L arm. Assist X1-2 with transfers, using gait belt and wheelchair when walking to the bathroom. On reg diet, noted to have poor appetite. Due meds given. Accuchecks done 4x a day. Needs attended. Safety precaution observed at all times. Handed off to incoming nurse.

## 2023-08-13 NOTE — PLAN OF CARE
Problem: Discharge Planning  Goal: Discharge to home or other facility with appropriate resources  Outcome: Progressing     Problem: Safety - Adult  Goal: Free from fall injury  Outcome: Progressing     Problem: Skin/Tissue Integrity  Goal: Absence of new skin breakdown  Description: 1. Monitor for areas of redness and/or skin breakdown  2. Assess vascular access sites hourly  3. Every 4-6 hours minimum:  Change oxygen saturation probe site  4. Every 4-6 hours:  If on nasal continuous positive airway pressure, respiratory therapy assess nares and determine need for appliance change or resting period.   Outcome: Progressing     Problem: ABCDS Injury Assessment  Goal: Absence of physical injury  Outcome: Progressing     Problem: Pain  Goal: Verbalizes/displays adequate comfort level or baseline comfort level  Outcome: Progressing     Problem: Nutrition Deficit:  Goal: Optimize nutritional status  Outcome: Progressing     Problem: Neurosensory - Adult  Goal: Achieves stable or improved neurological status  Outcome: Progressing  Goal: Achieves maximal functionality and self care  Outcome: Progressing     Problem: Skin/Tissue Integrity - Adult  Goal: Skin integrity remains intact  Outcome: Progressing  Flowsheets (Taken 8/13/2023 9630)  Skin Integrity Remains Intact: Monitor for areas of redness and/or skin breakdown  Goal: Oral mucous membranes remain intact  Outcome: Progressing     Problem: Musculoskeletal - Adult  Goal: Return mobility to safest level of function  Outcome: Progressing  Goal: Maintain proper alignment of affected body part  Outcome: Progressing  Goal: Return ADL status to a safe level of function  Outcome: Progressing     Problem: Gastrointestinal - Adult  Goal: Maintains or returns to baseline bowel function  Outcome: Progressing

## 2023-08-14 LAB
ANION GAP SERPL CALCULATED.3IONS-SCNC: 7 MMOL/L (ref 3–16)
BASOPHILS # BLD: 0.1 K/UL (ref 0–0.2)
BASOPHILS NFR BLD: 1.9 %
BUN SERPL-MCNC: 30 MG/DL (ref 7–20)
CALCIUM SERPL-MCNC: 9.8 MG/DL (ref 8.3–10.6)
CHLORIDE SERPL-SCNC: 102 MMOL/L (ref 99–110)
CO2 SERPL-SCNC: 28 MMOL/L (ref 21–32)
CREAT SERPL-MCNC: 1.1 MG/DL (ref 0.6–1.2)
DEPRECATED RDW RBC AUTO: 16.8 % (ref 12.4–15.4)
EOSINOPHIL # BLD: 0.3 K/UL (ref 0–0.6)
EOSINOPHIL NFR BLD: 5.9 %
GFR SERPLBLD CREATININE-BSD FMLA CKD-EPI: 51 ML/MIN/{1.73_M2}
GLUCOSE BLD-MCNC: 117 MG/DL (ref 70–99)
GLUCOSE BLD-MCNC: 122 MG/DL (ref 70–99)
GLUCOSE BLD-MCNC: 143 MG/DL (ref 70–99)
GLUCOSE BLD-MCNC: 198 MG/DL (ref 70–99)
GLUCOSE SERPL-MCNC: 121 MG/DL (ref 70–99)
HCT VFR BLD AUTO: 28.5 % (ref 36–48)
HGB BLD-MCNC: 9.8 G/DL (ref 12–16)
LYMPHOCYTES # BLD: 1.8 K/UL (ref 1–5.1)
LYMPHOCYTES NFR BLD: 33.3 %
MCH RBC QN AUTO: 27.7 PG (ref 26–34)
MCHC RBC AUTO-ENTMCNC: 34.4 G/DL (ref 31–36)
MCV RBC AUTO: 80.4 FL (ref 80–100)
MONOCYTES # BLD: 0.4 K/UL (ref 0–1.3)
MONOCYTES NFR BLD: 6.9 %
NEUTROPHILS # BLD: 2.8 K/UL (ref 1.7–7.7)
NEUTROPHILS NFR BLD: 52 %
PERFORMED ON: ABNORMAL
PLATELET # BLD AUTO: 173 K/UL (ref 135–450)
PMV BLD AUTO: 8.3 FL (ref 5–10.5)
POTASSIUM SERPL-SCNC: 4.7 MMOL/L (ref 3.5–5.1)
RBC # BLD AUTO: 3.54 M/UL (ref 4–5.2)
SODIUM SERPL-SCNC: 137 MMOL/L (ref 136–145)
WBC # BLD AUTO: 5.3 K/UL (ref 4–11)

## 2023-08-14 PROCEDURE — 97530 THERAPEUTIC ACTIVITIES: CPT

## 2023-08-14 PROCEDURE — 80048 BASIC METABOLIC PNL TOTAL CA: CPT

## 2023-08-14 PROCEDURE — 92507 TX SP LANG VOICE COMM INDIV: CPT

## 2023-08-14 PROCEDURE — 6370000000 HC RX 637 (ALT 250 FOR IP): Performed by: PHYSICAL MEDICINE & REHABILITATION

## 2023-08-14 PROCEDURE — 97129 THER IVNTJ 1ST 15 MIN: CPT

## 2023-08-14 PROCEDURE — 97116 GAIT TRAINING THERAPY: CPT

## 2023-08-14 PROCEDURE — 36415 COLL VENOUS BLD VENIPUNCTURE: CPT

## 2023-08-14 PROCEDURE — 1280000000 HC REHAB R&B

## 2023-08-14 PROCEDURE — 97542 WHEELCHAIR MNGMENT TRAINING: CPT

## 2023-08-14 PROCEDURE — 85025 COMPLETE CBC W/AUTO DIFF WBC: CPT

## 2023-08-14 PROCEDURE — 99232 SBSQ HOSP IP/OBS MODERATE 35: CPT | Performed by: INTERNAL MEDICINE

## 2023-08-14 PROCEDURE — 97535 SELF CARE MNGMENT TRAINING: CPT

## 2023-08-14 RX ORDER — BISACODYL 5 MG/1
5 TABLET, DELAYED RELEASE ORAL DAILY
Qty: 60 TABLET | Refills: 1
Start: 2023-08-15

## 2023-08-14 RX ORDER — PANTOPRAZOLE SODIUM 40 MG/1
40 TABLET, DELAYED RELEASE ORAL
Qty: 30 TABLET | Refills: 3
Start: 2023-08-15

## 2023-08-14 RX ORDER — POLYETHYLENE GLYCOL 3350 17 G/17G
17 POWDER, FOR SOLUTION ORAL DAILY PRN
Qty: 30 PACKET | Refills: 0
Start: 2023-08-14 | End: 2023-09-13

## 2023-08-14 RX ORDER — PSEUDOEPHEDRINE HCL 30 MG
100 TABLET ORAL DAILY
Qty: 60 CAPSULE | Refills: 0
Start: 2023-08-15

## 2023-08-14 RX ADMIN — BISACODYL 5 MG: 5 TABLET, COATED ORAL at 09:22

## 2023-08-14 RX ADMIN — ACETAMINOPHEN 650 MG: 325 TABLET ORAL at 17:05

## 2023-08-14 RX ADMIN — ACETAMINOPHEN 650 MG: 325 TABLET ORAL at 22:30

## 2023-08-14 RX ADMIN — PANTOPRAZOLE SODIUM 40 MG: 40 TABLET, DELAYED RELEASE ORAL at 05:53

## 2023-08-14 RX ADMIN — APIXABAN 5 MG: 5 TABLET, FILM COATED ORAL at 09:22

## 2023-08-14 RX ADMIN — LACTULOSE 20 G: 20 SOLUTION ORAL at 22:30

## 2023-08-14 RX ADMIN — DOCUSATE SODIUM 100 MG: 100 CAPSULE, LIQUID FILLED ORAL at 09:22

## 2023-08-14 RX ADMIN — ONDANSETRON 4 MG: 4 TABLET, ORALLY DISINTEGRATING ORAL at 10:20

## 2023-08-14 RX ADMIN — APIXABAN 5 MG: 5 TABLET, FILM COATED ORAL at 22:30

## 2023-08-14 ASSESSMENT — PAIN SCALES - WONG BAKER
WONGBAKER_NUMERICALRESPONSE: 0

## 2023-08-14 ASSESSMENT — PAIN SCALES - GENERAL
PAINLEVEL_OUTOF10: 0
PAINLEVEL_OUTOF10: 4
PAINLEVEL_OUTOF10: 4

## 2023-08-14 ASSESSMENT — PAIN DESCRIPTION - ORIENTATION: ORIENTATION: LOWER

## 2023-08-14 ASSESSMENT — PAIN DESCRIPTION - ONSET: ONSET: PROGRESSIVE

## 2023-08-14 ASSESSMENT — PAIN DESCRIPTION - LOCATION
LOCATION: BACK
LOCATION: COCCYX

## 2023-08-14 ASSESSMENT — PAIN DESCRIPTION - PAIN TYPE: TYPE: ACUTE PAIN

## 2023-08-14 ASSESSMENT — PAIN DESCRIPTION - DESCRIPTORS: DESCRIPTORS: ACHING

## 2023-08-14 ASSESSMENT — PAIN - FUNCTIONAL ASSESSMENT: PAIN_FUNCTIONAL_ASSESSMENT: ACTIVITIES ARE NOT PREVENTED

## 2023-08-14 ASSESSMENT — PAIN DESCRIPTION - FREQUENCY: FREQUENCY: INTERMITTENT

## 2023-08-14 NOTE — PROGRESS NOTES
cues  Assistance Level: Maximum assistance  Skilled Clinical Factors: Max A x1 for stand > sit on toilet with VC for hand placement and eccentric control; Max A x1 to stand from toilet > RW  Tub/Shower Transfers  Type: Shower  Transfer From: Rolling walker  Transfer To: Tub transfer bench  Additional Factors: Cues for hand placement;Verbal cues  Assistance Level: Requires x 2 assistance;Dependent;Verbal cues  Skilled Clinical Factors: x2 to safely lower to TTB d/t difficulty stepping BLEs back to bench and attempting to sit too soon, Min A for sit > stand from TTB > RW          Functional Mobility  Device: Rolling walker  Activity:  (to bathroom)  Assistance Level: Moderate assistance;Dependent; Requires x 2 assistance  Skilled Clinical Factors: Pt ambulated from EOB to toilet w/ RW and MOd A x1, required Mod-Max A x1 + Min A x1 to ambulate from toilet to TTB d/t difficulty turning around and backing up and attempting to sit prematurely. Pt w/ significantly forward flexed posture, req VC for RW mgmt and sequencing, ++ time and encouragement to complete, pt crying and stating \"i can't do it, i can't do it\" while taking steps. Pt also self-propelled w/c 150 ft in hallway using BLEs and RUE w/ Min-Mod A and max verbal encouragement  Roll Left  Assistance Level: Stand by assist  Skilled Clinical Factors: HOB flat w/o rails, verbal encouragement  Roll Right  Assistance Level: Stand by assist  Skilled Clinical Factors: HOB flat w/o rails, verbal encouragement  Sit to Supine  Assistance Level: Stand by assist  Skilled Clinical Factors: HOB flat w/o rails, verbal encouragement  Supine to Sit  Assistance Level: Stand by assist  Skilled Clinical Factors: HOB flat w/o rails, verbal encouragement  Transfers  Surface: From bed; Wheelchair  Additional Factors: Verbal cues; Hand placement cues; Increased time to complete;Set-up  Sit to Stand  Assistance Level: Minimal assistance  Skilled Clinical Factors: EOB > RW, RTS > RW, TTB > RW,

## 2023-08-14 NOTE — PROGRESS NOTES
Patient complained of left knee pain. She describes the pain as aching and burning. Heat packs place on knee, Medicated with Tylenol and repositioned in bed with pillow support. Call light in reach.

## 2023-08-14 NOTE — PATIENT CARE CONFERENCE
Inpatient Rehabilitation  Weekly Team Conference Note  The [address] [place] [address]  [phone]  Patient Name: Micheline Montgomery        MRN: [id number]    : 1945  (66 y.o.)  Gender: female   Referring Practitioner: DO Viktor  Diagnosis: acute CVA  The team conference for this patient was held on 2023 at 10:30am by:  Reji Fair.  DO Viktor    Current/Goal QM SCORES  QM Current/Goal Score   Eating CARE Score: 5 / Discharge Goal: Independent   Oral Hygiene CARE Score: 4 / Discharge Goal: Independent   Shower/Bathing CARE Score: 2 / Discharge Goal: Independent   UB Dressing CARE Score: 2 / Discharge Goal: Independent   LB Dressing CARE Score: 1 / Discharge Goal: Independent   Putting on/off Footwear CARE Score: 2 / Discharge Goal: Independent   Toileting Hygiene CARE Score: 1 / Discharge Goal: Independent   Bladder Continence Bladder Continence: Always continent    Bowel Continence Bowel Continence: Occassionally incontinent    Toilet Transfers CARE Score: 1 / Discharge Goal: Independent   Shower/Bathe Self  CARE Score: 2 / Discharge Goal: Independent   Rolling Left and Right CARE Score: 2 / Discharge Goal: Independent   Sit to Lying CARE Score: 2 / Discharge Goal: Independent   Lying to Sitting on Bedside CARE Score: 2 / Discharge Goal: Independent   Sit to Stand CARE Score: 1 / Discharge Goal: Supervision or touching assistance   Chair/Bed to Chair Transfer CARE Score: 1 / Discharge Goal: Supervision or touching assistance   Car Transfers CARE Score: 1 / Discharge Goal: Supervision or touching assistance   Walk 10 Feet CARE Score: 1 / Discharge Goal: Supervision or touching assistance   Walk 50 Feet with Two Turns CARE Score: 88 / Discharge Goal: Supervision or touching assistance   Walk 150 Feet CARE Score: 88 / Discharge Goal: Supervision or touching assistance   Walk 10 Feet on Uneven Surfaces CARE Score: 88 / Discharge Goal: Supervision or touching
Family/Caregiver Education  [] Home visit  []Therapeutic Pass [] Consults:_______   [] Family/Caregiver Training  [] Stroke Risk Factor Education     [] Other;_______     TEAM SUMMARY: Will continue with current poc & goals until anticipated d/c date of 8/15/2023. MD:   Stroke Risk Factors:   [] N/A for this patient [x] HTN  [x]  Diabetes  [x] Hyperlipidemia  []Obesity BMI >25  [x] Atrial Fibrillation [] Smoker (current)  [] Smoker (quit in last 12 months)  [] Sleep Apnea [] Other    Risk for Readmission: High (20 or greater)- 21%  Critical Items: If High Risk, consider the following recommendations: Follow up with PCP within one week of discharge.     Justification for Continued Stay:   Criteria for continued IRF stay:  Based on my medical assessment of the patient and review of information from the interdisciplinary team, as part of this weekly team conference, the patient continues to meet the following criteria for IRF level of care:  [x] The patient requires 24-hour rehabilitation nursing care   [x] The patient requires an intensive rehabilitation therapy program  [x] The patient requires active and ongoing intervention of multiple therapy disciplines  [x] The patient requires continued physician supervision by a rehabilitation physician  [x] The patient requires an intensive and coordinated interdisciplinary team approach to the delivery of rehabilitative care    Medical Necessity-continued close physician medical management is required for:   [] Cardiac/Circulatory dysfunction  [] Respiratory/Pulmonary dysfunction  [] Integumentary complications  [] Peripheral Vascular dysfunction  [] Musculoskeletal dysfunction  [x] Neurological dysfunction d/t:  [x] CVA  [] SCI  [] TBI  [] Other: __________  [] Renal dysfunction  [] Hematologic dysfunction    [] Endocrine disorders  [] GI disorders     [] Genito-Urinary dysfunction    Assessment/Plan:  [x] The patient is making good progression towards their LTG's, is

## 2023-08-14 NOTE — PLAN OF CARE
Problem: Discharge Planning  Goal: Discharge to home or other facility with appropriate resources  8/14/2023 1140 by Aly Mcgovern RN  Outcome: Progressing  Flowsheets (Taken 8/14/2023 0915)  Discharge to home or other facility with appropriate resources: Identify barriers to discharge with patient and caregiver     Problem: Safety - Adult  Goal: Free from fall injury  8/14/2023 1140 by Aly Mcgovern RN  Outcome: Progressing     Problem: Skin/Tissue Integrity  Goal: Absence of new skin breakdown  Description: 1. Monitor for areas of redness and/or skin breakdown  2. Assess vascular access sites hourly  3. Every 4-6 hours minimum:  Change oxygen saturation probe site  4. Every 4-6 hours:  If on nasal continuous positive airway pressure, respiratory therapy assess nares and determine need for appliance change or resting period.   8/14/2023 1140 by Aly Mcgovern RN  Outcome: Progressing     Problem: ABCDS Injury Assessment  Goal: Absence of physical injury  Outcome: Progressing     Problem: Pain  Goal: Verbalizes/displays adequate comfort level or baseline comfort level  8/14/2023 1140 by Aly Mcgovern RN  Outcome: Progressing  Flowsheets (Taken 8/14/2023 0915)  Verbalizes/displays adequate comfort level or baseline comfort level: Encourage patient to monitor pain and request assistance     Problem: Nutrition Deficit:  Goal: Optimize nutritional status  Outcome: Progressing     Problem: Neurosensory - Adult  Goal: Achieves stable or improved neurological status  Outcome: Progressing  Flowsheets (Taken 8/14/2023 0915)  Achieves stable or improved neurological status: Assess for and report changes in neurological status     Problem: Neurosensory - Adult  Goal: Achieves maximal functionality and self care  Outcome: Progressing  Flowsheets (Taken 8/14/2023 0915)  Achieves maximal functionality and self care: Monitor swallowing and airway patency with patient fatigue and changes in neurological status

## 2023-08-14 NOTE — PROGRESS NOTES
Nephrology Progress Note                                                                                                                                                                                                                                                                                                                                                               Office : [phone]     Fax :[phone]              Patient's Name: Purnima Gong    8/14/2023      Assessment/Plan     1. CVA     2. HTN    3. Afib     4. Acid- base/ Electrolyte imbalance     5. DM 2    6. Symptomatic bradycardia  - Cardiology consulted   - S/p pacemaker     6.  CARY - Resolved    Plan:  - Cr overall stable   - Perfusion better   - Close monitoring of BP - controlled  - Ur studies - reviewed   - Monitor lytes       Reason for Consult:  HTN   Requesting Physician:  MARVIN Hinds - CNP      Interval hx:    Says she is nauseous from eating too much for breakfast  Wants to get back into bed    BP's controlled       Past Medical History:   Diagnosis Date    Atrial fibrillation ([address])     off coumadin after bleed, declined restart    Blood transfusion     CAD (coronary artery disease)     Diabetes mellitus type II     Diabetic neuropathy ([address]) 2011    Gastric ulcer     H. pylori infection 2009    Hyperlipidemia     Hypertension     Numbness and tingling of left leg     Osteoarthritis     knees    Poor historian     Primary hypertension     PVD (peripheral vascular disease) ([address])     PTA distal sfa/pop/peroneal, Dr. Antonietta Schwab 12/2015    Unspecified cerebral artery occlusion with cerebral infarction     TIA affected left eye    upper gi bleed 12/2009       Past Surgical History:   Procedure Laterality Date    ANGIOPLASTY  12/30/15    Dr. Antonietta Schwab, [facility], PTA of distal sfa/pop/peroneal    CARDIAC CATHETERIZATION  9/21/12    done for surgical clearance, cath was negative    COLONOSCOPY  06/09/2022    COLONOSCOPY N/A 6/9/2022    COLONOSCOPY

## 2023-08-14 NOTE — PROGRESS NOTES
Physical Therapy  Facility/Department: [facility] UNIT  Rehabilitation Physical Therapy Treatment & Discharge Summary     NAME: Choco Grace  : 1945 (66 y.o.)  MRN: [id number]  CODE STATUS: Limited    Date of Service: 23       Restrictions:  Restrictions/Precautions: Fall Risk, Up as Tolerated  Position Activity Restriction  Other position/activity restrictions: up with assist; pacemaker precautions; do not elevate affected arm above shoulder for 30 days     SUBJECTIVE  Subjective  Subjective: pt supine in bed, agreeable to PT with max encouragement and continually saying \"I cant\" with every task she was asked to perform  Pain: c/o B knee pain throughout session-unable to rate. biofreeze applied to B knees in middle of session         OBJECTIVE  Cognition  Overall Cognitive Status: Exceptions  Arousal/Alertness: Appropriate responses to stimuli;Delayed responses to stimuli  Following Commands: Follows one step commands with increased time; Follows one step commands with repetition  Attention Span: Attends with cues to redirect  Memory: Decreased short term memory;Decreased recall of recent events  Safety Judgement: Decreased awareness of need for assistance;Decreased awareness of need for safety  Problem Solving: Decreased awareness of errors;Assistance required to generate solutions  Insights: Decreased awareness of deficits  Initiation: Requires cues for some  Sequencing: Requires cues for some  Cognition Comment: Pt required max encouragment for participation throughout session and VC to keep her eyes open. Pt emotionally labile throughout session, tearful and repeatedly stating \"I can't do this, I can't do this, I don't want to do this. \"  Orientation  Overall Orientation Status: Within Functional Limits    Functional Mobility  Bed Mobility  Additional Factors: Head of bed flat; Without handrails; Increased time to complete;Verbal cues  Roll Left  Assistance Level: Stand by assist  Roll

## 2023-08-14 NOTE — PROGRESS NOTES
ACUTE REHAB UNIT  SPEECH/LANGUAGE PATHOLOGY      [x] Daily  [] Weekly Care Conference Note  [x] Discharge    Patient:Xuan De La Cruz      :1945  JEL:[id number]  Rehab Dx/Hx: Acute CVA (cerebrovascular accident) ([address]) [I63.9]    Precautions: [] Aspiration  [x] Fall risk  [] Sternal  [] Seizure [] Hip  [] Weight Bearing [x] Other: No ROM of E   ST Dx: [] Aphasia  [x] Dysarthria  [] Apraxia   [] Oropharyngeal dysphagia [x] Cognitive Impairment  [] Other:   Date of Admit: 2023  Room #: 7698/8529-13  Date: 2023          Current Diet Order:ADULT DIET; Regular  ADULT ORAL NUTRITION SUPPLEMENT; Breakfast, Lunch, Dinner; Standard High Calorie/High Protein Oral Supplement  ADULT ORAL NUTRITION SUPPLEMENT; Breakfast, Lunch, Dinner; Diabetic Oral Supplement      Lives With: Family (son and daughter in law)  Homemaking Responsibilities: No     Occupation: Retired  Type of Occupation: Worked in a [address]: Goes to [employer] every night, reading the [employer] Exceptions: Wears glasses at all times  Hearing  Hearing: Within functional limits  Barriers toward progress: Decreased motivation and Limited participation; Emotional lability        Date: 2023      Tx session 1 Discharge Summary   Total Timed Code Min 20    Total Treatment Minutes 30    Individual Treatment Minutes 30    Group Treatment Minutes 0    Co-Treat Minutes 0    Brief Exception: N/A    Pain Denied; endorsed nausea    Pain Intervention: [x] RN notified  [] Repositioned  [] Intervention offered and patient declined  [] N/A  [] Other:     Subjective:     Pt upright in wheelchair upon entry and agreeable to tx session. During tx session, pt with c/o of nausea and RN notified. RN at side and offered pt medication for nausea. Objective / Goals:     Pt will require <3 redirections throughout tx session.      Pt required >5 redirections t/o session d/t perseverations on amount of food eaten this AM and this

## 2023-08-14 NOTE — PROGRESS NOTES
Patient alert & oriented x4, denies pain/discomfort thus far this shift. Patient participating in therapy today without difficulty. Shift assessments completed, VSS. Patient c/o nausea because she felt she \"ate to much for breakfast\". Patient given zofran per request & ordered. Call light and personal items within reach, safety measures in place.

## 2023-08-14 NOTE — PLAN OF CARE
Problem: Discharge Planning  Goal: Discharge to home or other facility with appropriate resources  8/14/2023 1140 by Gabby Roblero RN  Outcome: Progressing  Flowsheets (Taken 8/14/2023 0915)  Discharge to home or other facility with appropriate resources: Identify barriers to discharge with patient and caregiver     Problem: Safety - Adult  Goal: Free from fall injury  8/14/2023 1140 by Gabby Roblero RN  Outcome: Progressing     Problem: Skin/Tissue Integrity  Goal: Absence of new skin breakdown  Description: 1. Monitor for areas of redness and/or skin breakdown  2. Assess vascular access sites hourly  3. Every 4-6 hours minimum:  Change oxygen saturation probe site  4. Every 4-6 hours:  If on nasal continuous positive airway pressure, respiratory therapy assess nares and determine need for appliance change or resting period.   8/14/2023 1140 by Gabby Roblero RN  Outcome: Progressing     Problem: Pain  Goal: Verbalizes/displays adequate comfort level or baseline comfort level  8/14/2023 1140 by Gabby Roblero RN  Outcome: Progressing  Flowsheets (Taken 8/14/2023 0915)  Verbalizes/displays adequate comfort level or baseline comfort level: Encourage patient to monitor pain and request assistance     Problem: Nutrition Deficit:  Goal: Optimize nutritional status  Outcome: Progressing

## 2023-08-14 NOTE — DISCHARGE INSTR - COC
Continuity of Care Form    Patient Name: Bridgette Mancia   :  [id number]  MRN:  [id number]    Admit date:  2023  Discharge date:  8/15/2023    Code Status Order: Limited   Advance Directives:     Admitting Physician:  Gina Hines DO  PCP: MARVIN Brewster CNP    Discharging Nurse: [address] Unit/Room#: 8918/5860-88  Discharging Unit Phone Number: [phone]    Emergency Contact:   Extended Emergency Contact Information  Primary Emergency Contact: [facility] Phone: [phone]  Mobile Phone: [phone]  Relation: Daughter-in-Law  Secondary Emergency Contact: [address] Phone: [phone]  Mobile Phone: [phone]  Relation: Child    Past Surgical History:  Past Surgical History:   Procedure Laterality Date    ANGIOPLASTY  12/30/15    Dr. Lexi Giordano, LLE, PTA of distal sfa/pop/peroneal    CARDIAC CATHETERIZATION  12    done for surgical clearance, cath was negative    COLONOSCOPY  2022    COLONOSCOPY N/A 2022    COLONOSCOPY CONTROL HEMORRHAGE performed by Kenneth Doherty MD at [address] [facility]  8430,1502    FOOT SURGERY Left 2018    DEBRIDEMENT OF ULCERS LEFT FOOT AND LEG WITH GRAFT    OTHER SURGICAL HISTORY Left 2017    Left Femoral Peroneal Bypass    PACEMAKER INSERTION/ REMOVAL  2023    SHOULDER ARTHROPLASTY  10/5/12    RIGHT SHOULDER TOTAL ARTHROPLASTY, BICEPS TENODESIS DEPUY, GLOBAL ADVANTAGE AP    TOE AMPUTATION Right 2022    AMPUTATION OF RIGHT FIRST AND FOURTH TOES performed by Spencer Rausch DPM at [address] [facility] [address] Right 2022    TRANSMETATARSAL AMPUTATION RIGHT FOOT performed by Spencer Rausch DPM at [address] [facility] N/A 2022    EGD IV SEDATION performed by Kenneth Doherty MD at [address] [place] [address]       Immunization History:   Immunization History   Administered Date(s) Administered    Pneumococcal,

## 2023-08-15 VITALS
BODY MASS INDEX: 19.14 KG/M2 | WEIGHT: 108.03 LBS | SYSTOLIC BLOOD PRESSURE: 150 MMHG | TEMPERATURE: 97.3 F | OXYGEN SATURATION: 97 % | HEIGHT: 63 IN | RESPIRATION RATE: 16 BRPM | HEART RATE: 62 BPM | DIASTOLIC BLOOD PRESSURE: 81 MMHG

## 2023-08-15 LAB
GLUCOSE BLD-MCNC: 125 MG/DL (ref 70–99)
GLUCOSE BLD-MCNC: 126 MG/DL (ref 70–99)
GLUCOSE BLD-MCNC: 140 MG/DL (ref 70–99)
PERFORMED ON: ABNORMAL

## 2023-08-15 PROCEDURE — 99232 SBSQ HOSP IP/OBS MODERATE 35: CPT | Performed by: INTERNAL MEDICINE

## 2023-08-15 PROCEDURE — 51798 US URINE CAPACITY MEASURE: CPT

## 2023-08-15 PROCEDURE — 6370000000 HC RX 637 (ALT 250 FOR IP): Performed by: PHYSICAL MEDICINE & REHABILITATION

## 2023-08-15 RX ADMIN — LACTULOSE 20 G: 20 SOLUTION ORAL at 08:30

## 2023-08-15 RX ADMIN — PANTOPRAZOLE SODIUM 40 MG: 40 TABLET, DELAYED RELEASE ORAL at 05:14

## 2023-08-15 RX ADMIN — DOCUSATE SODIUM 100 MG: 100 CAPSULE, LIQUID FILLED ORAL at 08:30

## 2023-08-15 RX ADMIN — APIXABAN 5 MG: 5 TABLET, FILM COATED ORAL at 08:30

## 2023-08-15 RX ADMIN — BISACODYL 5 MG: 5 TABLET, COATED ORAL at 08:30

## 2023-08-15 ASSESSMENT — PAIN SCALES - WONG BAKER
WONGBAKER_NUMERICALRESPONSE: 0

## 2023-08-15 NOTE — PROGRESS NOTES
Belongings packed and sent with patient. Written discharge instructions and living will and DNR paperwork was given to the Ambulance transporter for delivery. Out via stretcher.

## 2023-08-15 NOTE — PROGRESS NOTES
Nephrology Progress Note                                                                                                                                                                                                                                                                                                                                                               Office : [phone]     Fax :[phone]              Patient's Name: Caden Holiday    8/15/2023      Assessment/Plan     1. CVA     2. HTN    3. Afib     4. Acid- base/ Electrolyte imbalance     5. DM 2    6. Symptomatic bradycardia  - Cardiology consulted   - S/p pacemaker     6.  CARY - Resolved    Plan:  - Cr overall stable   - Perfusion better   - Close monitoring of BP - controlled  - Ur studies - reviewed   - Monitor lytes       Reason for Consult:  HTN   Requesting Physician:  MARVIN Nino - CNP      Interval hx:    No complaints  Drinking her protein drinks  Anticipating discharge today to SNF    BP's acceptable      Past Medical History:   Diagnosis Date    Atrial fibrillation ([address])     off coumadin after bleed, declined restart    Blood transfusion     CAD (coronary artery disease)     Diabetes mellitus type II     Diabetic neuropathy ([address]) 2011    Gastric ulcer     H. pylori infection 2009    Hyperlipidemia     Hypertension     Numbness and tingling of left leg     Osteoarthritis     knees    Poor historian     Primary hypertension     PVD (peripheral vascular disease) ([address])     PTA distal sfa/pop/peroneal, Dr. Ti Jackson 12/2015    Unspecified cerebral artery occlusion with cerebral infarction     TIA affected left eye    upper gi bleed 12/2009       Past Surgical History:   Procedure Laterality Date    ANGIOPLASTY  12/30/15    Dr. Ti Jackson, E, PTA of distal sfa/pop/peroneal    CARDIAC CATHETERIZATION  9/21/12    done for surgical clearance, cath was negative    COLONOSCOPY  06/09/2022    COLONOSCOPY N/A 6/9/2022    COLONOSCOPY CONTROL

## 2023-08-15 NOTE — PROGRESS NOTES
Discharge report called to the Latoya Quiroga at [facility]. The nurse verbalizes understanding of d/c instructions including medication orders for ECF and possible side effects associated with them. The nurse verbalized understanding to call the doctor listed if they should have any complications or worsening of symptoms and she verbalized understanding about follow-up appointments. At the time of discharge patient had no IV access or tubes.  Expected to be picked up by ambulance service @ 4:30pm.

## 2023-08-15 NOTE — DISCHARGE SUMMARY
Physical Medicine & Rehabilitation  Discharge Summary     Patient Identification:  Petros Leone  : 1945  Admit date: 2023  Discharge date: 08/15/23  Attending provider: Violetta Navas DO        Primary care provider: MARVIN Persaud CNP     Discharge Diagnoses:   Patient Active Problem List   Diagnosis    Hypertension    Atrial fibrillation ([address])    Osteoarthritis    Noncompliance of patient with dietary regimen    PAD (peripheral artery disease) ([address])    Pure hypercholesterolemia    Coronary artery disease involving native coronary artery of native heart without angina pectoris    Benign essential HTN    Diabetes mellitus ([address])    Vitamin D deficiency    Acute osteomyelitis of left foot ([address])    Hammertoe, bilateral    Colonoscopy refused    Chronic deep vein thrombosis (DVT) of left peroneal vein (HCC)    Acute osteomyelitis of right foot ([address])    Osteomyelitis of right foot ([address])    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, with bone involvement without evidence of necrosis (HCC)    Cellulitis of right lower extremity    PVD (peripheral vascular disease) (HCC)    Chronic anemia    Gastrointestinal hemorrhage    Acute blood loss anemia    Dirty living conditions    Severe malnutrition ([address])    Cerebrovascular accident (CVA) ([address])    Basilar artery occlusion    Longstanding persistent atrial fibrillation (HCC)    CVA (cerebrovascular accident due to intracerebral hemorrhage) ([address])    Malnutrition ([address])    Debility    Bradycardia    Symptomatic bradycardia    Presence of temporary transvenous cardiac pacemaker    CARY (acute kidney injury) ([address])    Electrolyte imbalance    Acute CVA (cerebrovascular accident) ([address])    Cardiac pacemaker in situ    Anorexia    Slow transit constipation    Encounter for palliative care    Advance care planning       Discharge Functional Status:      Physical therapy:  Supine to Sit: Supervision or touching assistance  Sit to Supine:

## 2023-08-15 NOTE — PROGRESS NOTES
Pt is alert and oriented, she c/o but pain, turned pt to left and right multiple times, tolerated good. Tolerated all medications good, vss stable, pt is being d/c to snf today, no s/s of hypo or hyperglycemia noted. Will contin ue to monitor safety is being maintained.

## 2023-08-15 NOTE — CARE COORDINATION
/24      Discharging to Facility/ Agency   Name: [facility]  EIDSR:[phone]  Fax:[phone]    LOC at discharge: Skilled  RAFA Completed: Yes    Notification completed in HENS/PAS?:  Yes : CM has completed HENS online through secure website for SNF admission at 8/15/23. Document ID #: [id number]    IMM Completed:   Yes, Case management has presented and reviewed IMM letter #2 to the patient and/or family/ POA. Patient and/or family/POA verbalized understanding of their medicare rights and appeal process if needed. Patient and/or family/POA has signed and placed today's date (8/15/23) and time ([phone]) on letter #2 on the the appropriate lines. Patient and/or family/POA, provided copy of signed letter and they are aware that this original copy of IMM letter #2 is available prior to discharge from the paper chart on the unit. Electronic documentation has been entered into epic for IMM letter #2 and original paper copy has been added to the paper chart at the nurses station. IMM Letter given to Patient/Family/Significant other/Guardian/POA/by[de-identified] Julian GONZALEZ  IMM Letter date given[de-identified] 08/15/23  IMM Letter time given[de-identified] [phone]    Transportation:  Transportation PLAN for discharge: EMS transportation   Mode of Transport: Ambulance stretcher - BLS  Reason for medical transport: Bed confined: Meets the following criteria 1) unable to get out of bed without assistance or ambulate, 2) unable to safely sit up in a wheelchair, 3) unable to maintain erect seating position in a chair for time needed for transport  Name of Transport Company: [employer]: [phone]  Time of Transport: 1630    Transport form completed: Yes  Referrals made at [facility] for outpatient continued care:  Not Applicable    Additional CM Notes: Patient to discharge to [facility] SNF today via [facility] @ 1630. SW met with patient at bedside to discuss discharge plan.  Patient has no more questions at

## 2023-08-15 NOTE — PLAN OF CARE
Problem: Skin/Tissue Integrity  Goal: Absence of new skin breakdown  Description: 1. Monitor for areas of redness and/or skin breakdown  2. Assess vascular access sites hourly  3. Every 4-6 hours minimum:  Change oxygen saturation probe site  4. Every 4-6 hours:  If on nasal continuous positive airway pressure, respiratory therapy assess nares and determine need for appliance change or resting period.   Outcome: Not Progressing     Problem: Skin/Tissue Integrity - Adult  Goal: Skin integrity remains intact  Outcome: Not Progressing  Goal: Oral mucous membranes remain intact  Outcome: Not Progressing     Problem: Musculoskeletal - Adult  Goal: Return mobility to safest level of function  Outcome: Not Progressing     Problem: Gastrointestinal - Adult  Goal: Maintains or returns to baseline bowel function  Outcome: Not Progressing

## 2023-09-05 ENCOUNTER — OFFICE VISIT (OUTPATIENT)
Dept: FAMILY MEDICINE CLINIC | Age: 78
End: 2023-09-05
Payer: MEDICARE

## 2023-09-05 VITALS
DIASTOLIC BLOOD PRESSURE: 76 MMHG | HEART RATE: 81 BPM | TEMPERATURE: 97.9 F | SYSTOLIC BLOOD PRESSURE: 131 MMHG | OXYGEN SATURATION: 99 %

## 2023-09-05 DIAGNOSIS — I63.9 CEREBROVASCULAR ACCIDENT (CVA), UNSPECIFIED MECHANISM (HCC): ICD-10-CM

## 2023-09-05 DIAGNOSIS — R00.1 BRADYCARDIA: ICD-10-CM

## 2023-09-05 DIAGNOSIS — E55.9 VITAMIN D DEFICIENCY: ICD-10-CM

## 2023-09-05 DIAGNOSIS — I48.11 LONGSTANDING PERSISTENT ATRIAL FIBRILLATION (HCC): ICD-10-CM

## 2023-09-05 DIAGNOSIS — Z09 HOSPITAL DISCHARGE FOLLOW-UP: Primary | ICD-10-CM

## 2023-09-05 DIAGNOSIS — E11.59 TYPE 2 DIABETES MELLITUS WITH OTHER CIRCULATORY COMPLICATION, WITHOUT LONG-TERM CURRENT USE OF INSULIN (HCC): ICD-10-CM

## 2023-09-05 DIAGNOSIS — Z95.0 CARDIAC PACEMAKER IN SITU: ICD-10-CM

## 2023-09-05 LAB
ALBUMIN SERPL-MCNC: 4.4 G/DL (ref 3.4–5)
ALBUMIN/GLOB SERPL: 1.8 {RATIO} (ref 1.1–2.2)
ALP SERPL-CCNC: 54 U/L (ref 40–129)
ALT SERPL-CCNC: 9 U/L (ref 10–40)
ANION GAP SERPL CALCULATED.3IONS-SCNC: 12 MMOL/L (ref 3–16)
AST SERPL-CCNC: 19 U/L (ref 15–37)
BASOPHILS # BLD: 0.1 K/UL (ref 0–0.2)
BASOPHILS NFR BLD: 1.5 %
BILIRUB SERPL-MCNC: 0.5 MG/DL (ref 0–1)
BUN SERPL-MCNC: 18 MG/DL (ref 7–20)
CALCIUM SERPL-MCNC: 9.6 MG/DL (ref 8.3–10.6)
CHLORIDE SERPL-SCNC: 101 MMOL/L (ref 99–110)
CO2 SERPL-SCNC: 25 MMOL/L (ref 21–32)
CREAT SERPL-MCNC: 0.9 MG/DL (ref 0.6–1.2)
DEPRECATED RDW RBC AUTO: 17 % (ref 12.4–15.4)
EOSINOPHIL # BLD: 0.1 K/UL (ref 0–0.6)
EOSINOPHIL NFR BLD: 2.4 %
GFR SERPLBLD CREATININE-BSD FMLA CKD-EPI: >60 ML/MIN/{1.73_M2}
GLUCOSE SERPL-MCNC: 159 MG/DL (ref 70–99)
HCT VFR BLD AUTO: 33.6 % (ref 36–48)
HGB BLD-MCNC: 11.1 G/DL (ref 12–16)
LYMPHOCYTES # BLD: 1.1 K/UL (ref 1–5.1)
LYMPHOCYTES NFR BLD: 24.6 %
MCH RBC QN AUTO: 26.9 PG (ref 26–34)
MCHC RBC AUTO-ENTMCNC: 33.2 G/DL (ref 31–36)
MCV RBC AUTO: 81.1 FL (ref 80–100)
MONOCYTES # BLD: 0.2 K/UL (ref 0–1.3)
MONOCYTES NFR BLD: 4.1 %
NEUTROPHILS # BLD: 3.1 K/UL (ref 1.7–7.7)
NEUTROPHILS NFR BLD: 67.4 %
PLATELET # BLD AUTO: 176 K/UL (ref 135–450)
PMV BLD AUTO: 8.3 FL (ref 5–10.5)
POTASSIUM SERPL-SCNC: 4.1 MMOL/L (ref 3.5–5.1)
PROT SERPL-MCNC: 6.8 G/DL (ref 6.4–8.2)
RBC # BLD AUTO: 4.14 M/UL (ref 4–5.2)
SODIUM SERPL-SCNC: 138 MMOL/L (ref 136–145)
WBC # BLD AUTO: 4.6 K/UL (ref 4–11)

## 2023-09-05 PROCEDURE — G8400 PT W/DXA NO RESULTS DOC: HCPCS | Performed by: NURSE PRACTITIONER

## 2023-09-05 PROCEDURE — 1111F DSCHRG MED/CURRENT MED MERGE: CPT | Performed by: NURSE PRACTITIONER

## 2023-09-05 PROCEDURE — 3075F SYST BP GE 130 - 139MM HG: CPT | Performed by: NURSE PRACTITIONER

## 2023-09-05 PROCEDURE — 3078F DIAST BP <80 MM HG: CPT | Performed by: NURSE PRACTITIONER

## 2023-09-05 PROCEDURE — 1090F PRES/ABSN URINE INCON ASSESS: CPT | Performed by: NURSE PRACTITIONER

## 2023-09-05 PROCEDURE — 36415 COLL VENOUS BLD VENIPUNCTURE: CPT | Performed by: NURSE PRACTITIONER

## 2023-09-05 PROCEDURE — G8427 DOCREV CUR MEDS BY ELIG CLIN: HCPCS | Performed by: NURSE PRACTITIONER

## 2023-09-05 PROCEDURE — 1123F ACP DISCUSS/DSCN MKR DOCD: CPT | Performed by: NURSE PRACTITIONER

## 2023-09-05 PROCEDURE — 1036F TOBACCO NON-USER: CPT | Performed by: NURSE PRACTITIONER

## 2023-09-05 PROCEDURE — 99214 OFFICE O/P EST MOD 30 MIN: CPT | Performed by: NURSE PRACTITIONER

## 2023-09-05 PROCEDURE — G8420 CALC BMI NORM PARAMETERS: HCPCS | Performed by: NURSE PRACTITIONER

## 2023-09-05 PROCEDURE — 3044F HG A1C LEVEL LT 7.0%: CPT | Performed by: NURSE PRACTITIONER

## 2023-09-05 RX ORDER — ASCORBIC ACID 250 MG
250 TABLET ORAL DAILY
Qty: 30 TABLET | Refills: 3 | Status: SHIPPED | OUTPATIENT
Start: 2023-09-05

## 2023-09-05 RX ORDER — OMEPRAZOLE 20 MG/1
20 CAPSULE, DELAYED RELEASE ORAL DAILY
COMMUNITY

## 2023-09-05 RX ORDER — SIMVASTATIN 20 MG
20 TABLET ORAL NIGHTLY
Qty: 90 TABLET | Refills: 0 | Status: SHIPPED | OUTPATIENT
Start: 2023-09-05

## 2023-09-05 RX ORDER — MELATONIN
Qty: 30 TABLET | Refills: 5 | Status: SHIPPED | OUTPATIENT
Start: 2023-09-05

## 2023-09-05 RX ORDER — NIFEDIPINE 30 MG/1
30 TABLET, EXTENDED RELEASE ORAL DAILY
Qty: 90 TABLET | Refills: 1 | Status: SHIPPED | OUTPATIENT
Start: 2023-09-05

## 2023-09-05 SDOH — ECONOMIC STABILITY: FOOD INSECURITY: WITHIN THE PAST 12 MONTHS, THE FOOD YOU BOUGHT JUST DIDN'T LAST AND YOU DIDN'T HAVE MONEY TO GET MORE.: NEVER TRUE

## 2023-09-05 SDOH — ECONOMIC STABILITY: FOOD INSECURITY: WITHIN THE PAST 12 MONTHS, YOU WORRIED THAT YOUR FOOD WOULD RUN OUT BEFORE YOU GOT MONEY TO BUY MORE.: NEVER TRUE

## 2023-09-05 SDOH — ECONOMIC STABILITY: INCOME INSECURITY: HOW HARD IS IT FOR YOU TO PAY FOR THE VERY BASICS LIKE FOOD, HOUSING, MEDICAL CARE, AND HEATING?: NOT HARD AT ALL

## 2023-09-05 SDOH — ECONOMIC STABILITY: HOUSING INSECURITY
IN THE LAST 12 MONTHS, WAS THERE A TIME WHEN YOU DID NOT HAVE A STEADY PLACE TO SLEEP OR SLEPT IN A SHELTER (INCLUDING NOW)?: NO

## 2023-09-05 NOTE — PROGRESS NOTES
Post-Discharge Transitional Care  Follow Up      Reece Goss   YOB: 1945    Date of Office Visit:  9/5/2023  Date of Hospital Admission: 8/2/23  Date of Hospital Discharge: 8/15/23  Risk of hospital readmission (high >=14%. Medium >=10%) :Readmission Risk Score: 21.3      Care management risk score Rising risk (score 2-5) and Complex Care (Scores >=6): No Risk Score On File     Non face to face  following discharge, date last encounter closed (first attempt may have been earlier): *No documented post hospital discharge outreach found in the last 14 days    Call initiated 2 business days of discharge: *No response recorded in the last 14 days    ASSESSMENT/PLAN:   Hospital discharge follow-up  -     OK DISCHARGE MEDS RECONCILED W/ CURRENT OUTPATIENT MED LIST  Vitamin D deficiency  -     vitamin D3 (CHOLECALCIFEROL) 25 MCG (1000 UT) TABS tablet; TAKE 1 TABLET BY MOUTH EVERY DAY, Disp-30 tablet, R-5Normal  Cerebrovascular accident (CVA), unspecified mechanism ([address])  -     simvastatin (ZOCOR) 20 MG tablet; Take 1 tablet by mouth nightly, Disp-90 tablet, R-0Normal  Bradycardia  Cardiac pacemaker in situ  -     [facility] Fracisco Bower MD, Cardiac Electrophysiology, [facility]  Longstanding persistent atrial fibrillation (HCC)  -     NIFEdipine (PROCARDIA XL) 30 MG extended release tablet; Take 1 tablet by mouth daily, Disp-90 tablet, R-1Normal  -     apixaban (ELIQUIS) 5 MG TABS tablet; Take 1 tablet by mouth 2 times daily, Disp-60 tablet, R-1Normal  Type 2 diabetes mellitus with other circulatory complication, without long-term current use of insulin (HCC)  -     Hemoglobin A1C  -     CBC with Auto Differential  -     Comprehensive Metabolic Panel  Patient presents today for hospital follow-up. Patient was admitted from 7/16 to 7/21 for basilar artery obstruction, abnormal EKG finding, UTI, essential hypertension, chronic A-fib, mixed hyperlipidemia, type 2 diabetes mellitus.   Patient was

## 2023-09-06 ENCOUNTER — TELEPHONE (OUTPATIENT)
Dept: FAMILY MEDICINE CLINIC | Age: 78
End: 2023-09-06

## 2023-09-06 DIAGNOSIS — E43 SEVERE PROTEIN-CALORIE MALNUTRITION (HCC): ICD-10-CM

## 2023-09-06 DIAGNOSIS — I63.9 ACUTE CEREBROVASCULAR ACCIDENT (CVA) (HCC): Primary | ICD-10-CM

## 2023-09-06 DIAGNOSIS — R63.0 ANOREXIC: ICD-10-CM

## 2023-09-06 LAB
EST. AVERAGE GLUCOSE BLD GHB EST-MCNC: 102.5 MG/DL
HBA1C MFR BLD: 5.2 %

## 2023-09-06 NOTE — TELEPHONE ENCOUNTER
Kian was on the phone with the family and they are requesting an order for a bedside commode to be faxed to [facility] in [address] [facility]. I faxed the order. Family and [employer] aware.

## 2023-09-12 ENCOUNTER — NURSE ONLY (OUTPATIENT)
Dept: CARDIOLOGY CLINIC | Age: 78
End: 2023-09-12

## 2023-09-12 ENCOUNTER — OFFICE VISIT (OUTPATIENT)
Dept: CARDIOLOGY CLINIC | Age: 78
End: 2023-09-12
Payer: MEDICARE

## 2023-09-12 VITALS — DIASTOLIC BLOOD PRESSURE: 60 MMHG | SYSTOLIC BLOOD PRESSURE: 118 MMHG | HEART RATE: 83 BPM

## 2023-09-12 DIAGNOSIS — I48.20 CHRONIC ATRIAL FIBRILLATION (HCC): Primary | ICD-10-CM

## 2023-09-12 DIAGNOSIS — I10 ESSENTIAL HYPERTENSION: ICD-10-CM

## 2023-09-12 DIAGNOSIS — Z95.0 PACEMAKER: ICD-10-CM

## 2023-09-12 DIAGNOSIS — R00.0 TACHYCARDIA: ICD-10-CM

## 2023-09-12 DIAGNOSIS — R00.1 SYMPTOMATIC BRADYCARDIA: ICD-10-CM

## 2023-09-12 DIAGNOSIS — I49.5 SSS (SICK SINUS SYNDROME) (HCC): ICD-10-CM

## 2023-09-12 DIAGNOSIS — Z95.0 CARDIAC PACEMAKER IN SITU: Primary | ICD-10-CM

## 2023-09-12 DIAGNOSIS — I25.10 CORONARY ARTERY DISEASE INVOLVING NATIVE CORONARY ARTERY OF NATIVE HEART WITHOUT ANGINA PECTORIS: ICD-10-CM

## 2023-09-12 DIAGNOSIS — Z79.01 ON CONTINUOUS ORAL ANTICOAGULATION: ICD-10-CM

## 2023-09-12 PROCEDURE — 3074F SYST BP LT 130 MM HG: CPT | Performed by: NURSE PRACTITIONER

## 2023-09-12 PROCEDURE — G8420 CALC BMI NORM PARAMETERS: HCPCS | Performed by: NURSE PRACTITIONER

## 2023-09-12 PROCEDURE — 99214 OFFICE O/P EST MOD 30 MIN: CPT | Performed by: NURSE PRACTITIONER

## 2023-09-12 PROCEDURE — 3078F DIAST BP <80 MM HG: CPT | Performed by: NURSE PRACTITIONER

## 2023-09-12 PROCEDURE — 1111F DSCHRG MED/CURRENT MED MERGE: CPT | Performed by: NURSE PRACTITIONER

## 2023-09-12 PROCEDURE — 1090F PRES/ABSN URINE INCON ASSESS: CPT | Performed by: NURSE PRACTITIONER

## 2023-09-12 PROCEDURE — G8427 DOCREV CUR MEDS BY ELIG CLIN: HCPCS | Performed by: NURSE PRACTITIONER

## 2023-09-12 PROCEDURE — 1036F TOBACCO NON-USER: CPT | Performed by: NURSE PRACTITIONER

## 2023-09-12 PROCEDURE — G8400 PT W/DXA NO RESULTS DOC: HCPCS | Performed by: NURSE PRACTITIONER

## 2023-09-12 PROCEDURE — 1123F ACP DISCUSS/DSCN MKR DOCD: CPT | Performed by: NURSE PRACTITIONER

## 2023-09-12 RX ORDER — METOPROLOL SUCCINATE 25 MG/1
12.5 TABLET, EXTENDED RELEASE ORAL DAILY
Qty: 30 TABLET | Refills: 5 | Status: SHIPPED | OUTPATIENT
Start: 2023-09-12

## 2023-09-12 NOTE — PROGRESS NOTES
[facility]   Electrophysiology  Office Visit  Date: 9/12/2023    Chief Complaint   Patient presents with    Bradycardia    Atrial Fibrillation    Coronary Artery Disease    Hypertension     Cardiac HX: Lynette Torres is a 66 y.o. woman with a h/o HTN, HLD, DM, PVD, CAD - MVD, s/p PCI to LAD/Cx (2006), restenosis w/ PCI to RCA (2007), chronic AF (2012), refused [address], s/p GIB (2022), LLE DVT (5/2022),  who had p/w CVA, s/p TNK (7/16/2023), noted to be bradycardic w/ HR's in 30-40 bpm range w/ s/s, s/p temp PPM (7/27/23), s/p single ch MDT PPM (7/28/23, Dr. Ranjana Van), now on Eliquis. Interval History/HPI: Patient is here for follow-up for chronic AF, symptomatic bradycardia and PPM management. Patient with a longstanding history of chronic atrial fibrillation. This is dating back to 2012. She refused [address] in the beginning. She did have a GI bleed in 2022. Patient was originally seen by EP when she presented with a CVA in July 2023. She was noted to be bradycardic with heart rates in the 30 to 40 bpm range with symptoms. She was implanted with a temporary pacemaker and then underwent a single-chamber MDT PPM on 7/28/2023. Today she presents in AF with a heart rate of 86 bpm.  She is asymptomatic in atrial fibrillation. Review of her device today shows that she has had 56 monitored VT episodes that appear to be AF/RVR. She ventricularly paces 61.6% of the time. Her heart rates are not always controlled. She does not feel any heart racing or palpitations. She is in a wheelchair today and states that she cannot stand. Her son states that she fell trying to go up the steps a few days ago. She was not dizzy or lightheaded. He states her 1 leg is weaker since her stroke. She remains on Eliquis 5 mg twice a day with no issues with bleeding or dark tarry stools. Her blood pressure is well controlled in the office today.   She denies chest pain, shortness of breath, PND, orthopnea or lower extremity

## 2023-09-13 PROCEDURE — 93294 REM INTERROG EVL PM/LDLS PM: CPT | Performed by: INTERNAL MEDICINE

## 2023-09-13 PROCEDURE — 93296 REM INTERROG EVL PM/IDS: CPT | Performed by: INTERNAL MEDICINE

## 2023-09-20 ENCOUNTER — APPOINTMENT (OUTPATIENT)
Dept: GENERAL RADIOLOGY | Age: 78
End: 2023-09-20
Payer: MEDICARE

## 2023-09-20 ENCOUNTER — HOSPITAL ENCOUNTER (INPATIENT)
Age: 78
LOS: 1 days | Discharge: NURSING, REHAB OR LONG-TERM CARE | End: 2023-09-21
Attending: EMERGENCY MEDICINE | Admitting: INTERNAL MEDICINE
Payer: MEDICARE

## 2023-09-20 DIAGNOSIS — R53.1 GENERALIZED WEAKNESS: Primary | ICD-10-CM

## 2023-09-20 DIAGNOSIS — Z95.0 ARTIFICIAL CARDIAC PACEMAKER: ICD-10-CM

## 2023-09-20 DIAGNOSIS — Z79.01 ANTICOAGULATED BY ANTICOAGULATION TREATMENT: ICD-10-CM

## 2023-09-20 DIAGNOSIS — I48.20 CHRONIC ATRIAL FIBRILLATION (HCC): ICD-10-CM

## 2023-09-20 DIAGNOSIS — Z86.73 HISTORY OF RECENT STROKE: ICD-10-CM

## 2023-09-20 DIAGNOSIS — E11.9 DIABETES MELLITUS TYPE 2 IN NONOBESE (HCC): ICD-10-CM

## 2023-09-20 PROBLEM — N39.0 UTI (URINARY TRACT INFECTION): Status: ACTIVE | Noted: 2023-09-20

## 2023-09-20 PROBLEM — Z78.9 UNABLE TO CARE FOR SELF: Status: ACTIVE | Noted: 2023-09-20

## 2023-09-20 PROBLEM — I49.5 SSS (SICK SINUS SYNDROME) (HCC): Status: ACTIVE | Noted: 2023-09-20

## 2023-09-20 LAB
ALBUMIN SERPL-MCNC: 4.4 G/DL (ref 3.4–5)
ALBUMIN/GLOB SERPL: 1.5 {RATIO} (ref 1.1–2.2)
ALP SERPL-CCNC: 61 U/L (ref 40–129)
ALT SERPL-CCNC: 13 U/L (ref 10–40)
ANION GAP SERPL CALCULATED.3IONS-SCNC: 10 MMOL/L (ref 3–16)
AST SERPL-CCNC: 17 U/L (ref 15–37)
BACTERIA URNS QL MICRO: ABNORMAL /HPF
BASOPHILS # BLD: 0.1 K/UL (ref 0–0.2)
BASOPHILS NFR BLD: 1.2 %
BILIRUB SERPL-MCNC: 0.5 MG/DL (ref 0–1)
BILIRUB UR QL STRIP.AUTO: NEGATIVE
BUN SERPL-MCNC: 24 MG/DL (ref 7–20)
CALCIUM SERPL-MCNC: 10.2 MG/DL (ref 8.3–10.6)
CHLORIDE SERPL-SCNC: 103 MMOL/L (ref 99–110)
CLARITY UR: CLEAR
CO2 SERPL-SCNC: 28 MMOL/L (ref 21–32)
COLOR UR: YELLOW
CREAT SERPL-MCNC: 0.7 MG/DL (ref 0.6–1.2)
DEPRECATED RDW RBC AUTO: 16.9 % (ref 12.4–15.4)
DIGOXIN SERPL-MCNC: <0.3 NG/ML (ref 0.8–2)
EKG ATRIAL RATE: 61 BPM
EKG DIAGNOSIS: NORMAL
EKG Q-T INTERVAL: 450 MS
EKG QRS DURATION: 112 MS
EKG QTC CALCULATION (BAZETT): 460 MS
EKG R AXIS: 14 DEGREES
EKG T AXIS: 56 DEGREES
EKG VENTRICULAR RATE: 63 BPM
EOSINOPHIL # BLD: 0.2 K/UL (ref 0–0.6)
EOSINOPHIL NFR BLD: 2.8 %
EPI CELLS #/AREA URNS HPF: ABNORMAL /HPF (ref 0–5)
GFR SERPLBLD CREATININE-BSD FMLA CKD-EPI: >60 ML/MIN/{1.73_M2}
GLUCOSE BLD-MCNC: 112 MG/DL (ref 70–99)
GLUCOSE BLD-MCNC: 128 MG/DL (ref 70–99)
GLUCOSE BLD-MCNC: 147 MG/DL (ref 70–99)
GLUCOSE SERPL-MCNC: 130 MG/DL (ref 70–99)
GLUCOSE UR STRIP.AUTO-MCNC: NEGATIVE MG/DL
HCT VFR BLD AUTO: 35.9 % (ref 36–48)
HGB BLD-MCNC: 11.8 G/DL (ref 12–16)
HGB UR QL STRIP.AUTO: ABNORMAL
INR PPP: 1.13 (ref 0.84–1.16)
KETONES UR STRIP.AUTO-MCNC: NEGATIVE MG/DL
LEUKOCYTE ESTERASE UR QL STRIP.AUTO: ABNORMAL
LYMPHOCYTES # BLD: 1.5 K/UL (ref 1–5.1)
LYMPHOCYTES NFR BLD: 26.4 %
MCH RBC QN AUTO: 26.5 PG (ref 26–34)
MCHC RBC AUTO-ENTMCNC: 32.9 G/DL (ref 31–36)
MCV RBC AUTO: 80.3 FL (ref 80–100)
MONOCYTES # BLD: 0.3 K/UL (ref 0–1.3)
MONOCYTES NFR BLD: 4.7 %
NEUTROPHILS # BLD: 3.6 K/UL (ref 1.7–7.7)
NEUTROPHILS NFR BLD: 64.9 %
NITRITE UR QL STRIP.AUTO: NEGATIVE
PERFORMED ON: ABNORMAL
PH UR STRIP.AUTO: 6 [PH] (ref 5–8)
PLATELET # BLD AUTO: 212 K/UL (ref 135–450)
PMV BLD AUTO: 7.3 FL (ref 5–10.5)
POTASSIUM SERPL-SCNC: 4.7 MMOL/L (ref 3.5–5.1)
PROT SERPL-MCNC: 7.4 G/DL (ref 6.4–8.2)
PROT UR STRIP.AUTO-MCNC: NEGATIVE MG/DL
PROTHROMBIN TIME: 14.5 SEC (ref 11.5–14.8)
RBC # BLD AUTO: 4.47 M/UL (ref 4–5.2)
RBC #/AREA URNS HPF: ABNORMAL /HPF (ref 0–4)
SODIUM SERPL-SCNC: 141 MMOL/L (ref 136–145)
SP GR UR STRIP.AUTO: 1.01 (ref 1–1.03)
UA COMPLETE W REFLEX CULTURE PNL UR: YES
UA DIPSTICK W REFLEX MICRO PNL UR: YES
URN SPEC COLLECT METH UR: ABNORMAL
UROBILINOGEN UR STRIP-ACNC: 0.2 E.U./DL
WBC # BLD AUTO: 5.5 K/UL (ref 4–11)
WBC #/AREA URNS HPF: ABNORMAL /HPF (ref 0–5)

## 2023-09-20 PROCEDURE — 81001 URINALYSIS AUTO W/SCOPE: CPT

## 2023-09-20 PROCEDURE — 99222 1ST HOSP IP/OBS MODERATE 55: CPT

## 2023-09-20 PROCEDURE — 71045 X-RAY EXAM CHEST 1 VIEW: CPT

## 2023-09-20 PROCEDURE — 90715 TDAP VACCINE 7 YRS/> IM: CPT | Performed by: EMERGENCY MEDICINE

## 2023-09-20 PROCEDURE — 93010 ELECTROCARDIOGRAM REPORT: CPT | Performed by: INTERNAL MEDICINE

## 2023-09-20 PROCEDURE — 36415 COLL VENOUS BLD VENIPUNCTURE: CPT

## 2023-09-20 PROCEDURE — 6370000000 HC RX 637 (ALT 250 FOR IP): Performed by: EMERGENCY MEDICINE

## 2023-09-20 PROCEDURE — 90471 IMMUNIZATION ADMIN: CPT | Performed by: EMERGENCY MEDICINE

## 2023-09-20 PROCEDURE — 73110 X-RAY EXAM OF WRIST: CPT

## 2023-09-20 PROCEDURE — 80162 ASSAY OF DIGOXIN TOTAL: CPT

## 2023-09-20 PROCEDURE — 93005 ELECTROCARDIOGRAM TRACING: CPT | Performed by: EMERGENCY MEDICINE

## 2023-09-20 PROCEDURE — 97110 THERAPEUTIC EXERCISES: CPT

## 2023-09-20 PROCEDURE — 99285 EMERGENCY DEPT VISIT HI MDM: CPT

## 2023-09-20 PROCEDURE — 87086 URINE CULTURE/COLONY COUNT: CPT

## 2023-09-20 PROCEDURE — 2580000003 HC RX 258: Performed by: EMERGENCY MEDICINE

## 2023-09-20 PROCEDURE — 85610 PROTHROMBIN TIME: CPT

## 2023-09-20 PROCEDURE — 97162 PT EVAL MOD COMPLEX 30 MIN: CPT

## 2023-09-20 PROCEDURE — 6360000002 HC RX W HCPCS: Performed by: EMERGENCY MEDICINE

## 2023-09-20 PROCEDURE — 97530 THERAPEUTIC ACTIVITIES: CPT

## 2023-09-20 PROCEDURE — 6370000000 HC RX 637 (ALT 250 FOR IP)

## 2023-09-20 PROCEDURE — 85025 COMPLETE CBC W/AUTO DIFF WBC: CPT

## 2023-09-20 PROCEDURE — 2580000003 HC RX 258

## 2023-09-20 PROCEDURE — 1200000000 HC SEMI PRIVATE

## 2023-09-20 PROCEDURE — 83036 HEMOGLOBIN GLYCOSYLATED A1C: CPT

## 2023-09-20 PROCEDURE — 97165 OT EVAL LOW COMPLEX 30 MIN: CPT

## 2023-09-20 PROCEDURE — 80053 COMPREHEN METABOLIC PANEL: CPT

## 2023-09-20 PROCEDURE — 6360000002 HC RX W HCPCS

## 2023-09-20 RX ORDER — INSULIN LISPRO 100 [IU]/ML
0-4 INJECTION, SOLUTION INTRAVENOUS; SUBCUTANEOUS NIGHTLY
Status: DISCONTINUED | OUTPATIENT
Start: 2023-09-20 | End: 2023-09-21 | Stop reason: HOSPADM

## 2023-09-20 RX ORDER — CIPROFLOXACIN 250 MG/1
250 TABLET, FILM COATED ORAL 2 TIMES DAILY
Status: DISCONTINUED | OUTPATIENT
Start: 2023-09-20 | End: 2023-09-21 | Stop reason: HOSPADM

## 2023-09-20 RX ORDER — INSULIN LISPRO 100 [IU]/ML
0-4 INJECTION, SOLUTION INTRAVENOUS; SUBCUTANEOUS
Status: DISCONTINUED | OUTPATIENT
Start: 2023-09-20 | End: 2023-09-21 | Stop reason: HOSPADM

## 2023-09-20 RX ORDER — ONDANSETRON 4 MG/1
4 TABLET, ORALLY DISINTEGRATING ORAL EVERY 8 HOURS PRN
Status: DISCONTINUED | OUTPATIENT
Start: 2023-09-20 | End: 2023-09-21 | Stop reason: HOSPADM

## 2023-09-20 RX ORDER — 0.9 % SODIUM CHLORIDE 0.9 %
1000 INTRAVENOUS SOLUTION INTRAVENOUS ONCE
Status: COMPLETED | OUTPATIENT
Start: 2023-09-20 | End: 2023-09-20

## 2023-09-20 RX ORDER — BACITRACIN ZINC AND POLYMYXIN B SULFATE 500; 1000 [USP'U]/G; [USP'U]/G
OINTMENT TOPICAL ONCE
Status: COMPLETED | OUTPATIENT
Start: 2023-09-20 | End: 2023-09-20

## 2023-09-20 RX ORDER — METOPROLOL SUCCINATE 25 MG/1
12.5 TABLET, EXTENDED RELEASE ORAL DAILY
Status: DISCONTINUED | OUTPATIENT
Start: 2023-09-20 | End: 2023-09-21 | Stop reason: HOSPADM

## 2023-09-20 RX ORDER — NIFEDIPINE 30 MG/1
30 TABLET, EXTENDED RELEASE ORAL DAILY
Status: DISCONTINUED | OUTPATIENT
Start: 2023-09-20 | End: 2023-09-21 | Stop reason: HOSPADM

## 2023-09-20 RX ORDER — POLYETHYLENE GLYCOL 3350 17 G/17G
17 POWDER, FOR SOLUTION ORAL DAILY PRN
Status: DISCONTINUED | OUTPATIENT
Start: 2023-09-20 | End: 2023-09-21 | Stop reason: HOSPADM

## 2023-09-20 RX ORDER — ACETAMINOPHEN 325 MG/1
650 TABLET ORAL EVERY 6 HOURS PRN
Status: DISCONTINUED | OUTPATIENT
Start: 2023-09-20 | End: 2023-09-21 | Stop reason: HOSPADM

## 2023-09-20 RX ORDER — SODIUM CHLORIDE 0.9 % (FLUSH) 0.9 %
5-40 SYRINGE (ML) INJECTION PRN
Status: DISCONTINUED | OUTPATIENT
Start: 2023-09-20 | End: 2023-09-21 | Stop reason: HOSPADM

## 2023-09-20 RX ORDER — SODIUM CHLORIDE 0.9 % (FLUSH) 0.9 %
5-40 SYRINGE (ML) INJECTION EVERY 12 HOURS SCHEDULED
Status: DISCONTINUED | OUTPATIENT
Start: 2023-09-20 | End: 2023-09-21 | Stop reason: HOSPADM

## 2023-09-20 RX ORDER — PANTOPRAZOLE SODIUM 40 MG/1
40 TABLET, DELAYED RELEASE ORAL
Status: DISCONTINUED | OUTPATIENT
Start: 2023-09-21 | End: 2023-09-21 | Stop reason: HOSPADM

## 2023-09-20 RX ORDER — DEXTROSE MONOHYDRATE 100 MG/ML
INJECTION, SOLUTION INTRAVENOUS CONTINUOUS PRN
Status: DISCONTINUED | OUTPATIENT
Start: 2023-09-20 | End: 2023-09-21 | Stop reason: HOSPADM

## 2023-09-20 RX ORDER — ATORVASTATIN CALCIUM 10 MG/1
10 TABLET, FILM COATED ORAL NIGHTLY
Status: DISCONTINUED | OUTPATIENT
Start: 2023-09-20 | End: 2023-09-21 | Stop reason: HOSPADM

## 2023-09-20 RX ORDER — ACETAMINOPHEN 650 MG/1
650 SUPPOSITORY RECTAL EVERY 6 HOURS PRN
Status: DISCONTINUED | OUTPATIENT
Start: 2023-09-20 | End: 2023-09-21 | Stop reason: HOSPADM

## 2023-09-20 RX ORDER — SODIUM CHLORIDE 9 MG/ML
INJECTION, SOLUTION INTRAVENOUS PRN
Status: DISCONTINUED | OUTPATIENT
Start: 2023-09-20 | End: 2023-09-21 | Stop reason: HOSPADM

## 2023-09-20 RX ORDER — ONDANSETRON 2 MG/ML
4 INJECTION INTRAMUSCULAR; INTRAVENOUS EVERY 6 HOURS PRN
Status: DISCONTINUED | OUTPATIENT
Start: 2023-09-20 | End: 2023-09-21 | Stop reason: HOSPADM

## 2023-09-20 RX ADMIN — METOPROLOL SUCCINATE 12.5 MG: 25 TABLET, EXTENDED RELEASE ORAL at 15:28

## 2023-09-20 RX ADMIN — NIFEDIPINE 30 MG: 30 TABLET, FILM COATED, EXTENDED RELEASE ORAL at 15:28

## 2023-09-20 RX ADMIN — BACITRACIN ZINC AND POLYMYXIN B SULFATE 28.3 G: at 11:25

## 2023-09-20 RX ADMIN — APIXABAN 5 MG: 5 TABLET, FILM COATED ORAL at 20:25

## 2023-09-20 RX ADMIN — SODIUM CHLORIDE 1000 ML: 9 INJECTION, SOLUTION INTRAVENOUS at 11:24

## 2023-09-20 RX ADMIN — APIXABAN 5 MG: 5 TABLET, FILM COATED ORAL at 15:28

## 2023-09-20 RX ADMIN — SODIUM CHLORIDE 1000 MG: 900 INJECTION INTRAVENOUS at 15:27

## 2023-09-20 RX ADMIN — ATORVASTATIN CALCIUM 10 MG: 10 TABLET, FILM COATED ORAL at 20:25

## 2023-09-20 RX ADMIN — SODIUM CHLORIDE: 9 INJECTION, SOLUTION INTRAVENOUS at 15:26

## 2023-09-20 RX ADMIN — CIPROFLOXACIN 250 MG: 250 TABLET, FILM COATED ORAL at 20:25

## 2023-09-20 RX ADMIN — TETANUS TOXOID, REDUCED DIPHTHERIA TOXOID AND ACELLULAR PERTUSSIS VACCINE, ADSORBED 0.5 ML: 5; 2.5; 8; 8; 2.5 SUSPENSION INTRAMUSCULAR at 11:25

## 2023-09-20 RX ADMIN — ACETAMINOPHEN 650 MG: 325 TABLET ORAL at 15:31

## 2023-09-20 ASSESSMENT — PAIN DESCRIPTION - LOCATION
LOCATION: KNEE
LOCATION: WRIST

## 2023-09-20 ASSESSMENT — PAIN DESCRIPTION - ORIENTATION
ORIENTATION: LEFT
ORIENTATION: RIGHT

## 2023-09-20 ASSESSMENT — PAIN - FUNCTIONAL ASSESSMENT
PAIN_FUNCTIONAL_ASSESSMENT: ACTIVITIES ARE NOT PREVENTED
PAIN_FUNCTIONAL_ASSESSMENT: NONE - DENIES PAIN
PAIN_FUNCTIONAL_ASSESSMENT: 0-10
PAIN_FUNCTIONAL_ASSESSMENT: NONE - DENIES PAIN
PAIN_FUNCTIONAL_ASSESSMENT: NONE - DENIES PAIN

## 2023-09-20 ASSESSMENT — PAIN DESCRIPTION - DESCRIPTORS
DESCRIPTORS: THROBBING
DESCRIPTORS: ACHING

## 2023-09-20 ASSESSMENT — ENCOUNTER SYMPTOMS
SHORTNESS OF BREATH: 0
COUGH: 0
CHEST TIGHTNESS: 0

## 2023-09-20 ASSESSMENT — PAIN SCALES - GENERAL
PAINLEVEL_OUTOF10: 4
PAINLEVEL_OUTOF10: 3

## 2023-09-20 ASSESSMENT — PAIN DESCRIPTION - FREQUENCY: FREQUENCY: INTERMITTENT

## 2023-09-20 NOTE — PLAN OF CARE
Admit 2W    UTI - rocephin     Failure to thrive/debility     Patient interested in ECF -> unable to be cared for at home by son   PT/OT  Case management       MARVIN Garcia - CNP  09/20/23  12:10 PM
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
## 2023-09-20 NOTE — ED NOTES
[facility] here to transport pt.  Pt remains alert and without c/o's and resting with OU closed, resps even and unlab      Yonis Asif RN  09/20/23 8109
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
## 2023-09-20 NOTE — ED NOTES
Spoke with [facility] APS worker, Per Curiel, via phone with info relayed. Nighat Bryan states she will follow up with [facility] after pt is admitted there.  MD made aware     Himanshu Dial RN  09/20/23 [id number]

## 2023-09-20 NOTE — ED NOTES
[facility] Dispatch phoned for APS contact     Juli Lopez RN  09/20/23 7474
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
## 2023-09-20 NOTE — ACP (ADVANCE CARE PLANNING)
Advance Care Planning     General Advance Care Planning (ACP) Conversation    Date of Conversation: 9/20/2023  Conducted with: Patient with Decision Making Capacity    Healthcare Decision Maker:    Primary Decision Maker: jordan jacome - Child - [phone]    Secondary Decision Maker: Mirela Ren - Daughter-in-Law - [phone]    Secondary Decision Maker: Ale Morris - Child - [phone]  Click here to complete Healthcare Decision Makers including selection of the Healthcare Decision Maker Relationship (ie \"Primary\"). Today we documented Decision Maker(s) consistent with Legal Next of Kin hierarchy. Content/Action Overview:   Has ACP document(s) on file - reflects the patient's care preferences  Reviewed DNR/DNI and patient confirms current DNR status - completed forms on file (place new order if needed)        Length of Voluntary ACP Conversation in minutes:  <16 minutes (Non-Billable)    Nicole Torres

## 2023-09-21 ENCOUNTER — TELEPHONE (OUTPATIENT)
Dept: FAMILY MEDICINE CLINIC | Age: 78
End: 2023-09-21

## 2023-09-21 VITALS
HEIGHT: 63 IN | WEIGHT: 108 LBS | HEART RATE: 60 BPM | RESPIRATION RATE: 16 BRPM | OXYGEN SATURATION: 98 % | SYSTOLIC BLOOD PRESSURE: 152 MMHG | DIASTOLIC BLOOD PRESSURE: 71 MMHG | TEMPERATURE: 98 F | BODY MASS INDEX: 19.14 KG/M2

## 2023-09-21 LAB
BACTERIA UR CULT: NORMAL
EST. AVERAGE GLUCOSE BLD GHB EST-MCNC: 108.3 MG/DL
GLUCOSE BLD-MCNC: 122 MG/DL (ref 70–99)
HBA1C MFR BLD: 5.4 %
PERFORMED ON: ABNORMAL

## 2023-09-21 PROCEDURE — 6370000000 HC RX 637 (ALT 250 FOR IP)

## 2023-09-21 PROCEDURE — 99239 HOSP IP/OBS DSCHRG MGMT >30: CPT | Performed by: INTERNAL MEDICINE

## 2023-09-21 RX ORDER — CIPROFLOXACIN 250 MG/1
250 TABLET, FILM COATED ORAL 2 TIMES DAILY
Qty: 6 TABLET | Refills: 0 | DISCHARGE
Start: 2023-09-21 | End: 2023-09-24

## 2023-09-21 RX ADMIN — PANTOPRAZOLE SODIUM 40 MG: 40 TABLET, DELAYED RELEASE ORAL at 06:04

## 2023-09-21 RX ADMIN — APIXABAN 5 MG: 5 TABLET, FILM COATED ORAL at 09:42

## 2023-09-21 RX ADMIN — METOPROLOL SUCCINATE 12.5 MG: 25 TABLET, EXTENDED RELEASE ORAL at 09:42

## 2023-09-21 RX ADMIN — NIFEDIPINE 30 MG: 30 TABLET, FILM COATED, EXTENDED RELEASE ORAL at 09:43

## 2023-09-21 RX ADMIN — CIPROFLOXACIN 250 MG: 250 TABLET, FILM COATED ORAL at 09:43
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
## 2023-09-21 NOTE — PLAN OF CARE
Problem: Discharge Planning  Goal: Discharge to home or other facility with appropriate resources  9/20/2023 2231 by Calin Vallecillo RN  Outcome: Progressing  9/20/2023 1359 by Aga Morales RN  Outcome: Progressing     Problem: ABCDS Injury Assessment  Goal: Absence of physical injury  Outcome: Progressing     Problem: Safety - Adult  Goal: Free from fall injury  Outcome: Progressing

## 2023-09-21 NOTE — TELEPHONE ENCOUNTER
Care Transitions Initial Follow Up Call    Outreach made within 2 business days of discharge: Yes    Patient: Zaire Mancera Patient : 1945   MRN: [id number]  Reason for Admission: There are no discharge diagnoses documented for the most recent discharge. Discharge Date: 23    Pt discharged to a skilled care facility.
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
## 2023-09-21 NOTE — DISCHARGE INSTR - COC
Continuity of Care Form    Patient Name: Zaire Mancera   :  1945  MRN:  [id number]    Admit date:  2023  Discharge date:  2023    Code Status Order: DNR-CCA   Advance Directives:     Admitting Physician:  Toni Thao DO  PCP: MARVIN Henson - CNP    Discharging Nurse: Padmini Webb, [address] Unit/Room#: 0213/0213-02  Discharging Unit Phone Number: [phone]    Emergency Contact:   Extended Emergency Contact Information  Primary Emergency Contact: [facility] Phone: [phone]  Mobile Phone: [phone]  Relation: Daughter-in-Law   needed? No  Secondary Emergency Contact: [address] [place] [address] Phone: [phone]  Work Phone: [phone]  Mobile Phone: [phone]  Relation: Child   needed?  No    Past Surgical History:  Past Surgical History:   Procedure Laterality Date    ANGIOPLASTY  12/30/15    Dr. Rebecca Fang, JALEESA, PTA of distal sfa/pop/peroneal    CARDIAC CATHETERIZATION  12    done for surgical clearance, cath was negative    COLONOSCOPY  2022    COLONOSCOPY N/A 2022    COLONOSCOPY CONTROL HEMORRHAGE performed by Keren Doe MD at [place] [id number],[facility]    FOOT SURGERY Left 2018    DEBRIDEMENT OF ULCERS LEFT FOOT AND LEG WITH GRAFT    OTHER SURGICAL HISTORY Left 2017    Left Femoral Peroneal Bypass    PACEMAKER INSERTION/ REMOVAL  2023    SHOULDER ARTHROPLASTY  10/5/12    RIGHT SHOULDER TOTAL ARTHROPLASTY, BICEPS TENODESIS DEPUY, GLOBAL ADVANTAGE AP    TOE AMPUTATION Right 2022    AMPUTATION OF RIGHT FIRST AND FOURTH TOES performed by Ruben Oliveira DPM at [address] [facility] [address] Right 2022    TRANSMETATARSAL AMPUTATION RIGHT FOOT performed by Ruben Oliveira DPM at [address] [facility] 18 2022    EGD IV SEDATION performed by Keren Doe MD at [address] [place] [address] the Time of Hospital Discharge:   Respiratory Treatments: NA  Oxygen Therapy:  is not on home oxygen therapy. Ventilator:    - No ventilator support    Rehab Therapies: Physical Therapy and Occupational Therapy  Weight Bearing Status/Restrictions: No weight bearing restrictions  Other Medical Equipment (for information only, NOT a DME order): Claudeen Maroon Steady X2 Assist  Other Treatments: NA    Patient's personal belongings (please select all that are sent with patient):  Mariah Gupta    RN SIGNATURE:  Electronically signed by Gerda Vazquez RN on 9/21/23 at 10:23 AM EDT    CASE MANAGEMENT/SOCIAL WORK SECTION    Inpatient Status Date: 9/20/23    Readmission Risk Assessment Score:  Readmission Risk              Risk of Unplanned Readmission:  25           Discharging to Facility/ Agency   [facility]  [phone]    Deniz Lee RN 9/21/23 10:07  :  Prognosis: Good    Condition at Discharge: Stable    Rehab Potential (if transferring to Rehab): Good    Recommended Labs or Other Treatments After Discharge: none    Physician Certification: I certify the above information and transfer of Davis Ag  is necessary for the continuing treatment of the diagnosis listed and that she requires [address] for less 30 days.      Update Admission H&P: No change in H&P    PHYSICIAN SIGNATURE:  Electronically signed by Erinn Covarrubias MD on 9/21/23 at 8:58 AM EDT

## 2023-09-21 NOTE — PLAN OF CARE
Problem: Discharge Planning  Goal: Discharge to home or other facility with appropriate resources  4/34/5830 6223 by MITCH HERRERA  Outcome: Completed  9/20/2023 2231 by Carrie Mckinney RN  Outcome: Progressing     Problem: ABCDS Injury Assessment  Goal: Absence of physical injury  1/47/3446 4964 by MITCH HERRERA  Outcome: Completed  9/20/2023 2231 by Carrie Mckinney RN  Outcome: Progressing     Problem: Safety - Adult  Goal: Free from fall injury  4/65/3219 9914 by MITCH HERRERA  Outcome: Completed  9/20/2023 2231 by Carrie Mckinney RN  Outcome: Progressing     Problem: Skin/Tissue Integrity  Goal: Absence of new skin breakdown  Description: 1. Monitor for areas of redness and/or skin breakdown  2. Assess vascular access sites hourly  3. Every 4-6 hours minimum:  Change oxygen saturation probe site  4. Every 4-6 hours:  If on nasal continuous positive airway pressure, respiratory therapy assess nares and determine need for appliance change or resting period.   Outcome: Completed

## 2023-09-21 NOTE — PROGRESS NOTES
4 Eyes Skin Assessment     NAME:  Wells Soulier  YOB: 1945  MEDICAL RECORD NUMBER:  [id number]    The patient is being assessed for  Other Discharged to [facility]    I agree that at least one RN has performed a thorough Head to Toe Skin Assessment on the patient. ALL assessment sites listed below have been assessed. Areas assessed by both nurses: Scattered bruises and scabs. Scattered abrasions BLE. Blanchable redness to coccyx, preventative in place. Skin teat to left forearm. Head, Face, Ears, Shoulders, Back, Chest, Arms, Elbows, Hands, Sacrum. Buttock, Coccyx, Ischium, and Legs.  Feet and Heels               Jc Prevention initiated by RN: Yes  Wound Care Orders initiated by RN: No    Pressure Injury (Stage 3,4, Unstageable, DTI, NWPT, and Complex wounds) if present, place Wound referral order by RN under : No    New Ostomies, if present place, Ostomy referral order under : No     Nurse 1 eSignature: Electronically signed by Olu Nance RN on 9/21/23 at 10:41 AM EDT    **SHARE this note so that the co-signing nurse can place an eSignature**    Nurse 2 eSignature: Electronically signed by Melrose Hamman, RN on 9/21/23 at 10:42 AM EDT

## 2023-10-06 DIAGNOSIS — I48.11 LONGSTANDING PERSISTENT ATRIAL FIBRILLATION (HCC): ICD-10-CM

## 2023-10-06 DIAGNOSIS — I63.9 CEREBROVASCULAR ACCIDENT (CVA), UNSPECIFIED MECHANISM (HCC): ICD-10-CM

## 2023-10-06 RX ORDER — METOPROLOL SUCCINATE 25 MG/1
12.5 TABLET, EXTENDED RELEASE ORAL DAILY
Qty: 30 TABLET | Refills: 1 | Status: SHIPPED | OUTPATIENT
Start: 2023-10-06

## 2023-10-06 RX ORDER — SIMVASTATIN 20 MG
20 TABLET ORAL NIGHTLY
Qty: 90 TABLET | Refills: 0 | Status: SHIPPED | OUTPATIENT
Start: 2023-10-06

## 2023-10-06 RX ORDER — NIFEDIPINE 30 MG/1
30 TABLET, EXTENDED RELEASE ORAL DAILY
Qty: 30 TABLET | Refills: 1 | Status: SHIPPED | OUTPATIENT
Start: 2023-10-06

## 2023-10-20 PROBLEM — N39.0 UTI (URINARY TRACT INFECTION): Status: RESOLVED | Noted: 2023-09-20 | Resolved: 2023-10-20

## 2023-10-24 DIAGNOSIS — I63.9 CEREBROVASCULAR ACCIDENT (CVA), UNSPECIFIED MECHANISM (HCC): ICD-10-CM

## 2023-10-24 RX ORDER — SIMVASTATIN 20 MG
20 TABLET ORAL NIGHTLY
Qty: 30 TABLET | Refills: 3 | Status: SHIPPED | OUTPATIENT
Start: 2023-10-24

## 2023-10-24 RX ORDER — METOPROLOL SUCCINATE 25 MG/1
12.5 TABLET, EXTENDED RELEASE ORAL DAILY
Qty: 30 TABLET | Refills: 3 | Status: SHIPPED | OUTPATIENT
Start: 2023-10-24

## 2023-10-24 NOTE — TELEPHONE ENCOUNTER
Family states medication was not returned from nursing home patient needs refilled again   Future appt Visit 12/5    Last appt 9/5

## 2023-11-13 ENCOUNTER — OFFICE VISIT (OUTPATIENT)
Dept: FAMILY MEDICINE CLINIC | Age: 78
End: 2023-11-13
Payer: MEDICARE

## 2023-11-13 VITALS
HEIGHT: 63 IN | BODY MASS INDEX: 14.95 KG/M2 | WEIGHT: 84.38 LBS | SYSTOLIC BLOOD PRESSURE: 139 MMHG | HEART RATE: 66 BPM | OXYGEN SATURATION: 98 % | DIASTOLIC BLOOD PRESSURE: 81 MMHG | TEMPERATURE: 97.4 F

## 2023-11-13 DIAGNOSIS — Z01.818 PRE-OP EXAM: Primary | ICD-10-CM

## 2023-11-13 PROCEDURE — 1036F TOBACCO NON-USER: CPT | Performed by: NURSE PRACTITIONER

## 2023-11-13 PROCEDURE — 3075F SYST BP GE 130 - 139MM HG: CPT | Performed by: NURSE PRACTITIONER

## 2023-11-13 PROCEDURE — 1123F ACP DISCUSS/DSCN MKR DOCD: CPT | Performed by: NURSE PRACTITIONER

## 2023-11-13 PROCEDURE — 36415 COLL VENOUS BLD VENIPUNCTURE: CPT | Performed by: NURSE PRACTITIONER

## 2023-11-13 PROCEDURE — 99213 OFFICE O/P EST LOW 20 MIN: CPT | Performed by: NURSE PRACTITIONER

## 2023-11-13 PROCEDURE — G8400 PT W/DXA NO RESULTS DOC: HCPCS | Performed by: NURSE PRACTITIONER

## 2023-11-13 PROCEDURE — G8427 DOCREV CUR MEDS BY ELIG CLIN: HCPCS | Performed by: NURSE PRACTITIONER

## 2023-11-13 PROCEDURE — 3079F DIAST BP 80-89 MM HG: CPT | Performed by: NURSE PRACTITIONER

## 2023-11-13 PROCEDURE — G8419 CALC BMI OUT NRM PARAM NOF/U: HCPCS | Performed by: NURSE PRACTITIONER

## 2023-11-13 PROCEDURE — G8484 FLU IMMUNIZE NO ADMIN: HCPCS | Performed by: NURSE PRACTITIONER

## 2023-11-13 PROCEDURE — 1090F PRES/ABSN URINE INCON ASSESS: CPT | Performed by: NURSE PRACTITIONER

## 2023-11-13 NOTE — PROGRESS NOTES
anticoagulation therapy therefore I did recommend her contacting cardiology for clearance and recommendations. Current A1c 5.4. Chronic conditions are stable. Patient aware that she must call cardiologist office to receive cardiac clearance prior to procedure. It is in my medical opinion this patient is clinically stable and at moderate risk to proceed with intended surgery/anesthesia as planned pending cardiac approval.  - CBC with Auto Differential  - Comprehensive Metabolic Panel        The note was completed using Dragon voice recognition transcription. Every effort was made to ensure accuracy; however, inadvertent  transcription errors may be present despite my best efforts to edit errors.

## 2023-11-14 ENCOUNTER — TELEPHONE (OUTPATIENT)
Dept: FAMILY MEDICINE CLINIC | Age: 78
End: 2023-11-14

## 2023-11-14 ENCOUNTER — TELEPHONE (OUTPATIENT)
Dept: CARDIOLOGY CLINIC | Age: 78
End: 2023-11-14

## 2023-11-14 LAB
ALBUMIN SERPL-MCNC: 4.5 G/DL (ref 3.4–5)
ALBUMIN/GLOB SERPL: 1.8 {RATIO} (ref 1.1–2.2)
ALP SERPL-CCNC: 58 U/L (ref 40–129)
ALT SERPL-CCNC: 28 U/L (ref 10–40)
ANION GAP SERPL CALCULATED.3IONS-SCNC: 12 MMOL/L (ref 3–16)
AST SERPL-CCNC: 26 U/L (ref 15–37)
BASOPHILS # BLD: 0.1 K/UL (ref 0–0.2)
BASOPHILS NFR BLD: 0.9 %
BILIRUB SERPL-MCNC: 0.4 MG/DL (ref 0–1)
BUN SERPL-MCNC: 28 MG/DL (ref 7–20)
CALCIUM SERPL-MCNC: 10.2 MG/DL (ref 8.3–10.6)
CHLORIDE SERPL-SCNC: 102 MMOL/L (ref 99–110)
CO2 SERPL-SCNC: 27 MMOL/L (ref 21–32)
CREAT SERPL-MCNC: 0.8 MG/DL (ref 0.6–1.2)
DEPRECATED RDW RBC AUTO: 16 % (ref 12.4–15.4)
EOSINOPHIL # BLD: 0.2 K/UL (ref 0–0.6)
EOSINOPHIL NFR BLD: 2.7 %
GFR SERPLBLD CREATININE-BSD FMLA CKD-EPI: >60 ML/MIN/{1.73_M2}
GLUCOSE SERPL-MCNC: 217 MG/DL (ref 70–99)
HCT VFR BLD AUTO: 36.5 % (ref 36–48)
HGB BLD-MCNC: 12.3 G/DL (ref 12–16)
LYMPHOCYTES # BLD: 1.6 K/UL (ref 1–5.1)
LYMPHOCYTES NFR BLD: 21.7 %
MCH RBC QN AUTO: 27.8 PG (ref 26–34)
MCHC RBC AUTO-ENTMCNC: 33.6 G/DL (ref 31–36)
MCV RBC AUTO: 82.8 FL (ref 80–100)
MONOCYTES # BLD: 0.4 K/UL (ref 0–1.3)
MONOCYTES NFR BLD: 4.9 %
NEUTROPHILS # BLD: 5.1 K/UL (ref 1.7–7.7)
NEUTROPHILS NFR BLD: 69.8 %
PLATELET # BLD AUTO: 179 K/UL (ref 135–450)
PMV BLD AUTO: 8.7 FL (ref 5–10.5)
POTASSIUM SERPL-SCNC: 4.4 MMOL/L (ref 3.5–5.1)
PROT SERPL-MCNC: 7 G/DL (ref 6.4–8.2)
RBC # BLD AUTO: 4.41 M/UL (ref 4–5.2)
SODIUM SERPL-SCNC: 141 MMOL/L (ref 136–145)
WBC # BLD AUTO: 7.3 K/UL (ref 4–11)

## 2023-11-14 RX ORDER — CEPHALEXIN 500 MG/1
500 CAPSULE ORAL 4 TIMES DAILY
Qty: 40 CAPSULE | Refills: 0 | Status: SHIPPED | OUTPATIENT
Start: 2023-11-14

## 2023-11-14 NOTE — TELEPHONE ENCOUNTER
Can we fill out a clearance form for UL to sign when he returns to the office? Daughter in law aware that this will be addressed when UL returns to the office next week.

## 2023-11-14 NOTE — TELEPHONE ENCOUNTER
Redness was only in her wrist/mid forearm area. If redness has extended up her arm then I would say it has increased. Due to concerns would recommend antibiotics. Please verify pharmacy.

## 2023-11-14 NOTE — TELEPHONE ENCOUNTER
Pt. Daughter in law called for CC. Please call back when CC is sent so that they are aware it has been faxed. [phone]    Cardiac Risk Assessment  for Procedures and Surgery    What type of procedure are you having:   cataract surgery    When is your procedure scheduled for:  11.28.2023    What physician is performing your procedure: [address] [place]     Phone Number:   [phone]    Fax number to send the letter:  [phone]      Cardiologist  Griffin Grace    Last Appointment:  [phone]    Next Appointment:   [phone]    Are you on any blood thinners:    yes                  If yes what?    Eliquis 5mg    History of Coronary Artery Disease:    Last Stress test:    Last echo:    Device type and : pacemaker

## 2023-11-14 NOTE — TELEPHONE ENCOUNTER
Home care nurse calling about redness on patient's arm from elbow to wrist.  She said it does feel warm to touch and has some edema.   Patient said she showed it to you yesterday she just wanted to make sure that she did and make sure symptoms were the same

## 2023-12-05 ENCOUNTER — OFFICE VISIT (OUTPATIENT)
Dept: FAMILY MEDICINE CLINIC | Age: 78
End: 2023-12-05
Payer: MEDICARE

## 2023-12-05 ENCOUNTER — TELEPHONE (OUTPATIENT)
Dept: FAMILY MEDICINE CLINIC | Age: 78
End: 2023-12-05

## 2023-12-05 VITALS
TEMPERATURE: 96.6 F | SYSTOLIC BLOOD PRESSURE: 137 MMHG | DIASTOLIC BLOOD PRESSURE: 83 MMHG | HEART RATE: 74 BPM | OXYGEN SATURATION: 98 %

## 2023-12-05 DIAGNOSIS — I10 BENIGN ESSENTIAL HTN: ICD-10-CM

## 2023-12-05 DIAGNOSIS — Z28.21 INFLUENZA VACCINATION DECLINED: ICD-10-CM

## 2023-12-05 DIAGNOSIS — E11.59 TYPE 2 DIABETES MELLITUS WITH OTHER CIRCULATORY COMPLICATION, WITHOUT LONG-TERM CURRENT USE OF INSULIN (HCC): ICD-10-CM

## 2023-12-05 DIAGNOSIS — E78.00 PURE HYPERCHOLESTEROLEMIA: ICD-10-CM

## 2023-12-05 DIAGNOSIS — I82.552 CHRONIC DEEP VEIN THROMBOSIS (DVT) OF LEFT PERONEAL VEIN (HCC): ICD-10-CM

## 2023-12-05 DIAGNOSIS — E55.9 VITAMIN D DEFICIENCY: ICD-10-CM

## 2023-12-05 DIAGNOSIS — I73.9 PAD (PERIPHERAL ARTERY DISEASE) (HCC): ICD-10-CM

## 2023-12-05 DIAGNOSIS — E46 MALNUTRITION, UNSPECIFIED TYPE (HCC): Chronic | ICD-10-CM

## 2023-12-05 DIAGNOSIS — D64.9 CHRONIC ANEMIA: ICD-10-CM

## 2023-12-05 DIAGNOSIS — I48.20 CHRONIC ATRIAL FIBRILLATION (HCC): Primary | ICD-10-CM

## 2023-12-05 PROBLEM — I61.9 CVA (CEREBROVASCULAR ACCIDENT DUE TO INTRACEREBRAL HEMORRHAGE) (HCC): Status: RESOLVED | Noted: 2023-07-21 | Resolved: 2023-12-05

## 2023-12-05 PROBLEM — Z71.89 ADVANCE CARE PLANNING: Status: RESOLVED | Noted: 2023-08-08 | Resolved: 2023-12-05

## 2023-12-05 PROBLEM — Z95.0 PRESENCE OF TEMPORARY TRANSVENOUS CARDIAC PACEMAKER: Status: RESOLVED | Noted: 2023-07-28 | Resolved: 2023-12-05

## 2023-12-05 PROBLEM — I48.11 LONGSTANDING PERSISTENT ATRIAL FIBRILLATION (HCC): Status: RESOLVED | Noted: 2023-07-18 | Resolved: 2023-12-05

## 2023-12-05 PROBLEM — M86.171 ACUTE OSTEOMYELITIS OF RIGHT FOOT (HCC): Status: RESOLVED | Noted: 2022-05-13 | Resolved: 2023-12-05

## 2023-12-05 PROBLEM — R00.1 BRADYCARDIA: Status: RESOLVED | Noted: 2023-07-26 | Resolved: 2023-12-05

## 2023-12-05 PROBLEM — Z78.9 UNABLE TO CARE FOR SELF: Status: RESOLVED | Noted: 2023-09-20 | Resolved: 2023-12-05

## 2023-12-05 PROBLEM — R00.1 SYMPTOMATIC BRADYCARDIA: Status: RESOLVED | Noted: 2023-07-27 | Resolved: 2023-12-05

## 2023-12-05 PROBLEM — I49.5 SSS (SICK SINUS SYNDROME) (HCC): Status: RESOLVED | Noted: 2023-09-20 | Resolved: 2023-12-05

## 2023-12-05 PROCEDURE — G8419 CALC BMI OUT NRM PARAM NOF/U: HCPCS | Performed by: NURSE PRACTITIONER

## 2023-12-05 PROCEDURE — 99214 OFFICE O/P EST MOD 30 MIN: CPT | Performed by: NURSE PRACTITIONER

## 2023-12-05 PROCEDURE — G8484 FLU IMMUNIZE NO ADMIN: HCPCS | Performed by: NURSE PRACTITIONER

## 2023-12-05 PROCEDURE — G8427 DOCREV CUR MEDS BY ELIG CLIN: HCPCS | Performed by: NURSE PRACTITIONER

## 2023-12-05 PROCEDURE — 1123F ACP DISCUSS/DSCN MKR DOCD: CPT | Performed by: NURSE PRACTITIONER

## 2023-12-05 PROCEDURE — 3079F DIAST BP 80-89 MM HG: CPT | Performed by: NURSE PRACTITIONER

## 2023-12-05 PROCEDURE — G8400 PT W/DXA NO RESULTS DOC: HCPCS | Performed by: NURSE PRACTITIONER

## 2023-12-05 PROCEDURE — 3075F SYST BP GE 130 - 139MM HG: CPT | Performed by: NURSE PRACTITIONER

## 2023-12-05 PROCEDURE — 1090F PRES/ABSN URINE INCON ASSESS: CPT | Performed by: NURSE PRACTITIONER

## 2023-12-05 PROCEDURE — 1036F TOBACCO NON-USER: CPT | Performed by: NURSE PRACTITIONER

## 2023-12-05 PROCEDURE — 3044F HG A1C LEVEL LT 7.0%: CPT | Performed by: NURSE PRACTITIONER

## 2023-12-05 ASSESSMENT — ENCOUNTER SYMPTOMS
RESPIRATORY NEGATIVE: 1
GASTROINTESTINAL NEGATIVE: 1
EYES NEGATIVE: 1

## 2023-12-05 NOTE — TELEPHONE ENCOUNTER
Would recommend contacting cardiology to clarify if she is to be on the nifedipine still.   Patient's son was notified during the office visit to restart her vitamin D.

## 2023-12-05 NOTE — TELEPHONE ENCOUNTER
Chan,son, called back about the Nifedapine. He doesn't know who took her off of it. But, she has not been taking it. The [facility] wouldn't tell me b/c she is not an active patient, the family will have to sign a release. Cardiology still had her on it on 9/12. Hospital discharged her on it on 9/25. When we saw her on 11/13, our office indicated that it was an active med at that time as well. The only thing [facility] took her off of prior to surgery was the Eliquis. Does she need to take the Nifedapine?    Also, they weren't aware that she was suppose to be taking the Vit D3.

## 2023-12-05 NOTE — PROGRESS NOTES
ASSESSMENT/PLAN:   1. Chronic atrial fibrillation (HCC)  Stable. Managed by cardiology. Patient compliant with metoprolol and Eliquis. Continue with current treatment management follow-up in 6 months, sooner for new or worsening symptoms    2. Benign essential HTN  Stable. Patient compliant with metoprolol. Patient previously on nifedipine however when daughter-in-law called earlier for updated med rec list nifedipine was not listed. I did recommend asking and verifying. Son aware as well. They will call and update medications. 3. Chronic deep vein thrombosis (DVT) of left peroneal vein ([facility])  Stable. Managed with anticoagulation therapy. No new or worsening symptoms. Continue with current treatment and management. 4. PAD (peripheral artery disease) ([facility])  Stable. Patient compliant with anticoagulation therapy. Continue with current treatment management follow-up in 6 months, sooner for new or worsening symptoms. 5. Type 2 diabetes mellitus with other circulatory complication, without long-term current use of insulin ([facility])  Stable. Patient compliant with metformin. Previous A1c 5.4. Continue with current treatment follow-up in 6 months, sooner for new or worsening symptoms. 6. Chronic anemia  Stable. Previous labs reviewed and stable. Continue with current treatment follow-up in 6 to 12 months, sooner for new or worsening symptoms. 7. Pure hypercholesterolemia  Stable. Patient compliant with simvastatin 20 mg daily. Continue current treatment management follow-up in 6 months, sooner for new or worsening symptoms. 8. Vitamin D deficiency  Stable. Patient daughter-in-law had called earlier and reported up-to-date med list however vitamin D was not listed as currently taking. We did discuss the importance of taking vitamin D and patient's son notified to restart. Refills previously sent to pharmacy. Both verbalized and acknowledged with plan of care at this time.     9.

## 2023-12-06 NOTE — TELEPHONE ENCOUNTER
Got in touch with Carrie Acosta from  [address]. She does not see anywhere in their notes where they had her stop the medication.    She is in agreeance that she needs to continue the Nifedapine

## 2023-12-11 ENCOUNTER — TELEPHONE (OUTPATIENT)
Dept: FAMILY MEDICINE CLINIC | Age: 78
End: 2023-12-11

## 2023-12-11 NOTE — TELEPHONE ENCOUNTER
Please inform home health nurse that if skin tear does need attention she will need to go to ER for evaluation. Please  patient on the recommendations to use her walker and wheelchair for ambulatory assistance.

## 2023-12-11 NOTE — TELEPHONE ENCOUNTER
[facility]. nurse called to report patient stumbled in to bathroom door yesterday and received skin tear to her left arm yesterday. No orders needed. Just FYI her walking is unsteady.

## 2023-12-11 NOTE — TELEPHONE ENCOUNTER
[facility]. nurse informed. The tear isn't that bad. She put a band aid on it like the multiple other places on her. She was using her walker when she fell. HC said that she is really weak. She was at the [facility] and used all of her PT/OT time she thinks. There is an OT/PT person going into the home. They did get her walking but every time the [address] [place] [address] sees her, she's in bed. She said that she just had to report the skin tear.

## 2024-01-16 ENCOUNTER — TELEMEDICINE (OUTPATIENT)
Dept: FAMILY MEDICINE CLINIC | Age: 79
End: 2024-01-16
Payer: MEDICARE

## 2024-01-16 DIAGNOSIS — Z00.00 ENCOUNTER FOR SUBSEQUENT ANNUAL WELLNESS VISIT IN MEDICARE PATIENT: Primary | ICD-10-CM

## 2024-01-16 PROCEDURE — G0439 PPPS, SUBSEQ VISIT: HCPCS | Performed by: NURSE PRACTITIONER

## 2024-01-16 ASSESSMENT — PATIENT HEALTH QUESTIONNAIRE - PHQ9
1. LITTLE INTEREST OR PLEASURE IN DOING THINGS: 0
SUM OF ALL RESPONSES TO PHQ QUESTIONS 1-9: 0
2. FEELING DOWN, DEPRESSED OR HOPELESS: 0
SUM OF ALL RESPONSES TO PHQ9 QUESTIONS 1 & 2: 0
SUM OF ALL RESPONSES TO PHQ QUESTIONS 1-9: 0

## 2024-01-16 NOTE — PROGRESS NOTES
mouth daily Yes Mayelin Celis APRN - CNP   [facility] [place], [place]. Devices (BATH BENCH WITH BACK) MISC M19.90  D62.  E11.9  R53.8  Patient not taking: Reported on 11/13/2023  Mayelin Celis APRN - CNP       CareTeam (Including outside providers/suppliers regularly involved in providing care):   Patient Care Team:  Mayelin Celis APRN - CNP as PCP - General (Nurse Practitioner)  Mayelin Celis APRN - CNP as PCP - Empaneled Provider     Reviewed and updated this visit:  Allergies  Meds          Xuan Romero, was evaluated through a synchronous (real-time) audio-video encounter. The patient (or guardian if applicable) is aware that this is a billable service, which includes applicable co-pays. This Virtual Visit was conducted with patient's (and/or legal guardian's) consent. Patient identification was verified, and a caregiver was present when appropriate.   The patient was located at Home: [address] [place] [address] [address]  [facility] [address]  Provider was located at Other: home

## 2024-01-16 NOTE — PATIENT INSTRUCTIONS
cholesterol. If you think you may have a problem with alcohol or drug use, talk to your doctor.   Medicines    Take your medicines exactly as prescribed. Call your doctor if you think you are having a problem with your medicine.     If your doctor recommends aspirin, take the amount directed each day. Make sure you take aspirin and not another kind of pain reliever, such as acetaminophen (Tylenol).   When should you call for help?   Call 911 if you have symptoms of a heart attack. These may include:    Chest pain or pressure, or a strange feeling in the chest.     Sweating.     Shortness of breath.     Pain, pressure, or a strange feeling in the back, neck, jaw, or upper belly or in one or both shoulders or arms.     Lightheadedness or sudden weakness.     A fast or irregular heartbeat.   After you call 911, the  may tell you to chew 1 adult-strength or 2 to 4 low-dose aspirin. Wait for an ambulance. Do not try to drive yourself.  Watch closely for changes in your health, and be sure to contact your doctor if you have any problems.  Where can you learn more?  Go to https://www.[employer].net/patientEd and enter F075 to learn more about \"A Healthy Heart: Care Instructions.\"  Current as of: June 25, 2023               Content Version: 13.9  © 6960-8127 [employer].   Care instructions adapted under license by [employer]. If you have questions about a medical condition or this instruction, always ask your healthcare professional. [employer] disclaims any warranty or liability for your use of this information.      Personalized Preventive Plan for Xuan Romero - 1/16/2024  Medicare offers a range of preventive health benefits. Some of the tests and screenings are paid in full while other may be subject to a deductible, co-insurance, and/or copay.    Some of these benefits include a comprehensive review of your medical history including lifestyle, illnesses that may run in your

## 2024-01-18 RX ORDER — MULTIVIT WITH MINERALS/LUTEIN
250 TABLET ORAL DAILY
Qty: 30 TABLET | Refills: 3 | Status: SHIPPED | OUTPATIENT
Start: 2024-01-18

## 2024-01-18 NOTE — TELEPHONE ENCOUNTER
Future appt scheduled 06/10/2024               Last appt 12/05/2023      Last Written 09/05/2023    ascorbic acid (V-R VITAMIN C) 250 MG tablet  #30  3 RF

## 2024-02-05 NOTE — PLAN OF CARE
Hospital Follow up call completed.  I left a voice message for Mom to see if her issues pertaining to Diaz's prescriptions had been resolved and if she had additional questions regarding his discharge plan, medications, or needed follow up visits.   Problem: Discharge Planning  Goal: Discharge to home or other facility with appropriate resources  8/14/2023 0353 by Jessica Kwong RN  Outcome: Progressing  Flowsheets (Taken 8/14/2023 0353)  Discharge to home or other facility with appropriate resources: Identify barriers to discharge with patient and caregiver     Problem: Safety - Adult  Goal: Free from fall injury  8/14/2023 0353 by eJssica Kwong RN  Outcome: Progressing  Note: Fall precautions in place. Bed is in low and locked position. Bed alarm set. Call light and bedside table within reach. Patient has called appropriately for assistance.      Problem: Skin/Tissue Integrity  Goal: Absence of new skin breakdown  8/14/2023 0353 by Jessica Kwong RN  Outcome: Progressing     Problem: Pain  Goal: Verbalizes/displays adequate comfort level or baseline comfort level  8/14/2023 0353 by Jessica Kwong RN  Outcome: Progressing  Flowsheets (Taken 8/14/2023 0353)  Verbalizes/displays adequate comfort level or baseline comfort level:   Encourage patient to monitor pain and request assistance   Assess pain using appropriate pain scale   Administer analgesics based on type and severity of pain and evaluate response   Implement non-pharmacological measures as appropriate and evaluate response

## 2024-03-03 DIAGNOSIS — I48.11 LONGSTANDING PERSISTENT ATRIAL FIBRILLATION (HCC): ICD-10-CM

## 2024-03-04 RX ORDER — NIFEDIPINE 30 MG/1
30 TABLET, EXTENDED RELEASE ORAL DAILY
Qty: 90 TABLET | Refills: 1 | Status: SHIPPED | OUTPATIENT
Start: 2024-03-04

## 2024-03-04 RX ORDER — APIXABAN 5 MG/1
5 TABLET, FILM COATED ORAL 2 TIMES DAILY
Qty: 60 TABLET | Refills: 1 | Status: SHIPPED | OUTPATIENT
Start: 2024-03-04

## 2024-03-18 ENCOUNTER — OFFICE VISIT (OUTPATIENT)
Dept: FAMILY MEDICINE CLINIC | Age: 79
End: 2024-03-18
Payer: MEDICARE

## 2024-03-18 VITALS
HEART RATE: 65 BPM | DIASTOLIC BLOOD PRESSURE: 83 MMHG | SYSTOLIC BLOOD PRESSURE: 157 MMHG | BODY MASS INDEX: 20.02 KG/M2 | TEMPERATURE: 97.3 F | WEIGHT: 113 LBS | OXYGEN SATURATION: 98 % | HEIGHT: 63 IN

## 2024-03-18 DIAGNOSIS — Z01.818 PRE-OP EXAMINATION: Primary | ICD-10-CM

## 2024-03-18 PROCEDURE — 1090F PRES/ABSN URINE INCON ASSESS: CPT | Performed by: NURSE PRACTITIONER

## 2024-03-18 PROCEDURE — G8484 FLU IMMUNIZE NO ADMIN: HCPCS | Performed by: NURSE PRACTITIONER

## 2024-03-18 PROCEDURE — 1036F TOBACCO NON-USER: CPT | Performed by: NURSE PRACTITIONER

## 2024-03-18 PROCEDURE — G8428 CUR MEDS NOT DOCUMENT: HCPCS | Performed by: NURSE PRACTITIONER

## 2024-03-18 PROCEDURE — G8400 PT W/DXA NO RESULTS DOC: HCPCS | Performed by: NURSE PRACTITIONER

## 2024-03-18 PROCEDURE — 3077F SYST BP >= 140 MM HG: CPT | Performed by: NURSE PRACTITIONER

## 2024-03-18 PROCEDURE — 99213 OFFICE O/P EST LOW 20 MIN: CPT | Performed by: NURSE PRACTITIONER

## 2024-03-18 PROCEDURE — G8420 CALC BMI NORM PARAMETERS: HCPCS | Performed by: NURSE PRACTITIONER

## 2024-03-18 PROCEDURE — 1123F ACP DISCUSS/DSCN MKR DOCD: CPT | Performed by: NURSE PRACTITIONER

## 2024-03-18 PROCEDURE — 36415 COLL VENOUS BLD VENIPUNCTURE: CPT | Performed by: NURSE PRACTITIONER

## 2024-03-18 PROCEDURE — 3079F DIAST BP 80-89 MM HG: CPT | Performed by: NURSE PRACTITIONER

## 2024-03-18 RX ORDER — MULTIVIT WITH MINERALS/LUTEIN
250 TABLET ORAL DAILY
Qty: 30 TABLET | Refills: 3 | Status: SHIPPED | OUTPATIENT
Start: 2024-03-18

## 2024-03-18 NOTE — PROGRESS NOTES
midline, phonation normal, thyroid unremarkable  Chest:  No deformity of thorax              Respirations easy and unlabored with equal breath sounds              Lungs clear  Heart:  reg rate  Abdomen:  Soft  with no masses noted                     Hernia - none                    Liver and spleen not palpably enlarged                    Bowel sounds are normally active                    Tenderness - none                    Rebound or rididity - none  Neurologic:  A &O x 3           No rash            No lesion  Psychiatric: mood and affect intact                     speech and thought processes seem appropriate     Assessment and Plan:  1. Pre-op examination  Patient presents today for preop examination.  Patient is scheduled for cataract surgery later this month.  Patient reports that she will have the initial surgery performed on her first eye on 3/26 followed by the second eye on 4/23/2024.  Patient be having procedure performed at [facility].  Patient denies any recent changes in medical history or recent illnesses.  Patient has known history of peripheral vascular disease, A-fib, PAD, CAD, hypertension, chronic DVT, CVA, diabetes mellitus, osteoarthritis, anemia, vitamin D deficiency, and pure hypercholesterolemia.  Chronic conditions are stable.  Patient denies any current use of NSAIDs, vitamin E, fish oil and or aspirin.  Patient does report use of Eliquis.  We did discuss holding this the day of her surgery and restarting that evening pending no complications.  Preop labs ordered and pending.  It is my medical opinion that patient is clinically stable at this time and at moderate risk to proceed with intended surgery/anesthesia as planned.  - CBC with Auto Differential  - Comprehensive Metabolic Panel        The note was completed using Dragon voice recognition transcription. Every effort was made to ensure accuracy; however, inadvertent  transcription errors may be present despite my best

## 2024-03-19 LAB
ALBUMIN SERPL-MCNC: 4.8 G/DL (ref 3.4–5)
ALBUMIN/GLOB SERPL: 1.7 {RATIO} (ref 1.1–2.2)
ALP SERPL-CCNC: 85 U/L (ref 40–129)
ALT SERPL-CCNC: 27 U/L (ref 10–40)
ANION GAP SERPL CALCULATED.3IONS-SCNC: 13 MMOL/L (ref 3–16)
AST SERPL-CCNC: 24 U/L (ref 15–37)
BASOPHILS # BLD: 0.1 K/UL (ref 0–0.2)
BASOPHILS NFR BLD: 1.3 %
BILIRUB SERPL-MCNC: 0.4 MG/DL (ref 0–1)
BUN SERPL-MCNC: 23 MG/DL (ref 7–20)
CALCIUM SERPL-MCNC: 10.2 MG/DL (ref 8.3–10.6)
CHLORIDE SERPL-SCNC: 102 MMOL/L (ref 99–110)
CO2 SERPL-SCNC: 27 MMOL/L (ref 21–32)
CREAT SERPL-MCNC: 0.9 MG/DL (ref 0.6–1.2)
DEPRECATED RDW RBC AUTO: 14.2 % (ref 12.4–15.4)
EOSINOPHIL # BLD: 0.3 K/UL (ref 0–0.6)
EOSINOPHIL NFR BLD: 4.6 %
GFR SERPLBLD CREATININE-BSD FMLA CKD-EPI: >60 ML/MIN/{1.73_M2}
GLUCOSE SERPL-MCNC: 176 MG/DL (ref 70–99)
HCT VFR BLD AUTO: 35 % (ref 36–48)
HGB BLD-MCNC: 12.1 G/DL (ref 12–16)
LYMPHOCYTES # BLD: 1.8 K/UL (ref 1–5.1)
LYMPHOCYTES NFR BLD: 24.7 %
MCH RBC QN AUTO: 28.7 PG (ref 26–34)
MCHC RBC AUTO-ENTMCNC: 34.4 G/DL (ref 31–36)
MCV RBC AUTO: 83.3 FL (ref 80–100)
MONOCYTES # BLD: 0.4 K/UL (ref 0–1.3)
MONOCYTES NFR BLD: 5.2 %
NEUTROPHILS # BLD: 4.6 K/UL (ref 1.7–7.7)
NEUTROPHILS NFR BLD: 64.2 %
PLATELET # BLD AUTO: 217 K/UL (ref 135–450)
PMV BLD AUTO: 8.9 FL (ref 5–10.5)
POTASSIUM SERPL-SCNC: 4 MMOL/L (ref 3.5–5.1)
PROT SERPL-MCNC: 7.7 G/DL (ref 6.4–8.2)
RBC # BLD AUTO: 4.21 M/UL (ref 4–5.2)
SODIUM SERPL-SCNC: 142 MMOL/L (ref 136–145)
WBC # BLD AUTO: 7.1 K/UL (ref 4–11)

## 2024-03-26 ENCOUNTER — NURSE ONLY (OUTPATIENT)
Dept: CARDIOLOGY CLINIC | Age: 79
End: 2024-03-26

## 2024-03-26 ENCOUNTER — OFFICE VISIT (OUTPATIENT)
Dept: CARDIOLOGY CLINIC | Age: 79
End: 2024-03-26
Payer: MEDICARE

## 2024-03-26 VITALS
SYSTOLIC BLOOD PRESSURE: 136 MMHG | WEIGHT: 113 LBS | DIASTOLIC BLOOD PRESSURE: 80 MMHG | BODY MASS INDEX: 20.02 KG/M2 | HEART RATE: 72 BPM

## 2024-03-26 DIAGNOSIS — I48.11 LONGSTANDING PERSISTENT ATRIAL FIBRILLATION (HCC): Primary | ICD-10-CM

## 2024-03-26 DIAGNOSIS — Z79.01 ON CONTINUOUS ORAL ANTICOAGULATION: ICD-10-CM

## 2024-03-26 DIAGNOSIS — R00.1 SINUS BRADYCARDIA: ICD-10-CM

## 2024-03-26 DIAGNOSIS — Z95.0 CARDIAC PACEMAKER IN SITU: Primary | ICD-10-CM

## 2024-03-26 DIAGNOSIS — I49.5 SSS (SICK SINUS SYNDROME) (HCC): ICD-10-CM

## 2024-03-26 DIAGNOSIS — I25.10 CORONARY ARTERY DISEASE INVOLVING NATIVE CORONARY ARTERY OF NATIVE HEART WITHOUT ANGINA PECTORIS: ICD-10-CM

## 2024-03-26 DIAGNOSIS — I48.20 CHRONIC ATRIAL FIBRILLATION (HCC): ICD-10-CM

## 2024-03-26 DIAGNOSIS — Z95.0 PACEMAKER: ICD-10-CM

## 2024-03-26 DIAGNOSIS — I10 BENIGN ESSENTIAL HTN: ICD-10-CM

## 2024-03-26 PROCEDURE — 1123F ACP DISCUSS/DSCN MKR DOCD: CPT

## 2024-03-26 PROCEDURE — G8400 PT W/DXA NO RESULTS DOC: HCPCS

## 2024-03-26 PROCEDURE — 93000 ELECTROCARDIOGRAM COMPLETE: CPT

## 2024-03-26 PROCEDURE — G8420 CALC BMI NORM PARAMETERS: HCPCS

## 2024-03-26 PROCEDURE — 3079F DIAST BP 80-89 MM HG: CPT

## 2024-03-26 PROCEDURE — G8484 FLU IMMUNIZE NO ADMIN: HCPCS

## 2024-03-26 PROCEDURE — G8427 DOCREV CUR MEDS BY ELIG CLIN: HCPCS

## 2024-03-26 PROCEDURE — 1090F PRES/ABSN URINE INCON ASSESS: CPT

## 2024-03-26 PROCEDURE — 99214 OFFICE O/P EST MOD 30 MIN: CPT

## 2024-03-26 PROCEDURE — 3075F SYST BP GE 130 - 139MM HG: CPT

## 2024-03-26 PROCEDURE — 1036F TOBACCO NON-USER: CPT

## 2024-03-26 NOTE — PATIENT INSTRUCTIONS
Call office next Tuesday 4/2/2024 double check cardiac clearance has been signed  Notify [facility] once cardiac clearance has been signed to make sure they got the form

## 2024-03-26 NOTE — PROGRESS NOTES
[facility]   Electrophysiology  Office Visit  Date: 3/26/2024    Chief Complaint   Patient presents with    Hypertension    Coronary Artery Disease    Atrial Fibrillation    Bradycardia    Device Check     Cardiac HX: Xuan Romero is a 78 y.o. woman with a h/o HTN, HLD, DM, PVD, CAD, MVD, s/p PCI to LAD/Cx (2006), restenosis w/ PCI to RCA (2007), chronic AF (2012), refused OAC, s/p GIB (2022), LLE DVT (5/2022), who had p/w CVA, s/p TNK (7/16/2023), noted to be bradycardic w/ HR's in 30-40 bpm range w/ s/s, s/p temp PPM (7/27/2023), s/p single ch MDT PPM (7/28/2023, Dr. Coon), now on Eliquis.    Interval History/HPI: Patient is here for follow-up for chronic A-fib, symptomatic bradycardia and pacemaker management.  Patient with a longstanding history of chronic atrial fibrillation dating back to 2012.  She had refused anticoagulation when she was initially diagnosed with atrial fibrillation.  She did have a GI bleed in 2022.  Patient was originally seen by EP when she presented with a CVA in July 2023.  She was noted to be bradycardic with heart rates 30 to 40 BPM range with symptoms.  She was implanted with a temporary pacemaker and then underwent a single-chamber MDT PPM on 7/28/2023.  Today she presents in . She remains on Eliquis 5 mg twice a day.  She has had no issues with bleeding or dark tarry stools. Review of her device today shows  76.5 % , no arrhythmias, other parameters stable. Her estimated device longevity is 13.5 years.  She has not felt any heart racing, palpitations or irregular heartbeats. Her blood pressure is controlled in the office today. She  has not had any chest pain, shortness of breath, PND, orthopnea or lower extremity edema.     Home medications:   Current Outpatient Medications on File Prior to Visit   Medication Sig Dispense Refill    Ascorbic Acid (VITAMIN C) 250 MG tablet Take 1 tablet by mouth daily 30 tablet 3    ELIQUIS 5 MG TABS tablet TAKE 1 TABLET BY MOUTH

## 2024-04-02 ENCOUNTER — TELEPHONE (OUTPATIENT)
Dept: CARDIOLOGY CLINIC | Age: 79
End: 2024-04-02

## 2024-04-02 NOTE — TELEPHONE ENCOUNTER
CARDIAC CLEARANCE     What type of procedure are you having? Cataract surgery on left eye    Which physician is performing your procedure? [facility]  Dr. Debra Silva    When is your procedure scheduled for? 4/23/24    Where are you having this procedure? [facility]    Are you taking Blood Thinners? Yes    If so what? ELIQUIS 5 MG TABS tablet (Name/dose/frequesncy)    Does the surgeon want you to stop your blood thinner? yes If so for how long?     Phone Number and Contact Name for Physicians office: Dr. Debra Silva [phone]    Fax number to send information: [phone]    Appt scheduled for 4/4/24

## 2024-04-03 DIAGNOSIS — E55.9 VITAMIN D DEFICIENCY: ICD-10-CM

## 2024-04-03 NOTE — PROGRESS NOTES
OR    TOE AMPUTATION Right 9/9/2022    TRANSMETATARSAL AMPUTATION RIGHT FOOT performed by To Heath DPM at [facility] OR    UPPER GASTROINTESTINAL ENDOSCOPY N/A 6/8/2022    EGD IV SED[facility]       Allergies:  Allergies   Allergen Reactions    Pcn [Penicillins] Other (See Comments)     Unsure of reaction       Medication:   Prior to Admission medications    Medication Sig Start Date End Date Taking? Authorizing Provider   Ascorbic Acid (VITAMIN C) 250 MG tablet Take 1 tablet by mouth daily 3/18/24   Mayelin Celis APRN - CNP   ELIQUIS 5 MG TABS tablet TAKE 1 TABLET BY MOUTH TWICE A DAY 3/4/24   Mayelin Celis APRN - CNP   NIFEdipine (PROCARDIA XL) 30 MG extended release tablet TAKE 1 TABLET BY MOUTH DAILY 3/4/24   Mayelin Celis APRN - CNP   metoprolol succinate (TOPROL XL) 25 MG extended release tablet Take 0.5 tablets by mouth daily 12/21/23   Mayelin Celis APRN - CNP   simvastatin (ZOCOR) 20 MG tablet Take 1 tablet by mouth nightly 12/21/23   Mayelin Celis APRN - CNP   metFORMIN (GLUCOPHAGE) 500 MG tablet TAKE 2 TABLETS BY MOUTH DAILY WITH BREAKFAST 12/5/23   Mayelin Celis APRN - CNP   [facility]. Devices (BATH BENCH WITH BACK) MISC M19.90  D62.  E11.9  R53.8 9/14/23   Mayelin Celis APRN - CNP   vitamin D3 (CHOLECALCIFEROL) 25 MCG (1000 UT) TABS tablet TAKE 1 TABLET BY MOUTH EVERY DAY 9/5/23   Mayelin Celis APRN - CNP       Social History:   reports that she has never smoked. She has been exposed to tobacco smoke. She has never used smokeless tobacco. She reports that she does not drink alcohol and does not use drugs.        Family History:  family history includes Diabetes in her brother, maternal grandmother, and sister; Heart Disease in her father and mother; Stroke in her mother.   Reviewed. Denies family history of sudden cardiac death, arrhythmia, premature CAD    Review of System:    General ROS: negative for - chills, fever   Psychological

## 2024-04-04 ENCOUNTER — OFFICE VISIT (OUTPATIENT)
Dept: CARDIOLOGY CLINIC | Age: 79
End: 2024-04-04
Payer: MEDICARE

## 2024-04-04 VITALS
HEART RATE: 67 BPM | SYSTOLIC BLOOD PRESSURE: 132 MMHG | WEIGHT: 117 LBS | DIASTOLIC BLOOD PRESSURE: 60 MMHG | BODY MASS INDEX: 20.73 KG/M2

## 2024-04-04 DIAGNOSIS — I48.20 CHRONIC ATRIAL FIBRILLATION (HCC): ICD-10-CM

## 2024-04-04 DIAGNOSIS — Z95.0 CARDIAC PACEMAKER IN SITU: Primary | ICD-10-CM

## 2024-04-04 PROCEDURE — 3078F DIAST BP <80 MM HG: CPT | Performed by: INTERNAL MEDICINE

## 2024-04-04 PROCEDURE — 1090F PRES/ABSN URINE INCON ASSESS: CPT | Performed by: INTERNAL MEDICINE

## 2024-04-04 PROCEDURE — G8427 DOCREV CUR MEDS BY ELIG CLIN: HCPCS | Performed by: INTERNAL MEDICINE

## 2024-04-04 PROCEDURE — G8420 CALC BMI NORM PARAMETERS: HCPCS | Performed by: INTERNAL MEDICINE

## 2024-04-04 PROCEDURE — 93000 ELECTROCARDIOGRAM COMPLETE: CPT | Performed by: INTERNAL MEDICINE

## 2024-04-04 PROCEDURE — 1036F TOBACCO NON-USER: CPT | Performed by: INTERNAL MEDICINE

## 2024-04-04 PROCEDURE — 99214 OFFICE O/P EST MOD 30 MIN: CPT | Performed by: INTERNAL MEDICINE

## 2024-04-04 PROCEDURE — 1123F ACP DISCUSS/DSCN MKR DOCD: CPT | Performed by: INTERNAL MEDICINE

## 2024-04-04 PROCEDURE — G8400 PT W/DXA NO RESULTS DOC: HCPCS | Performed by: INTERNAL MEDICINE

## 2024-04-04 PROCEDURE — 3075F SYST BP GE 130 - 139MM HG: CPT | Performed by: INTERNAL MEDICINE

## 2024-04-04 RX ORDER — MELATONIN
Qty: 90 TABLET | Refills: 3 | Status: SHIPPED | OUTPATIENT
Start: 2024-04-04

## 2024-04-05 ENCOUNTER — HOSPITAL ENCOUNTER (OUTPATIENT)
Dept: NON INVASIVE DIAGNOSTICS | Age: 79
Discharge: HOME | End: 2024-04-05
Payer: MEDICARE

## 2024-04-05 ENCOUNTER — HOSPITAL ENCOUNTER (OUTPATIENT)
Dept: NUCLEAR MEDICINE | Age: 79
Discharge: HOME | End: 2024-04-05
Payer: MEDICARE

## 2024-04-05 DIAGNOSIS — I25.10 CORONARY ARTERY DISEASE INVOLVING NATIVE CORONARY ARTERY OF NATIVE HEART WITHOUT ANGINA PECTORIS: ICD-10-CM

## 2024-04-05 PROCEDURE — 78452 HT MUSCLE IMAGE SPECT MULT: CPT

## 2024-04-05 PROCEDURE — 3430000000 HC RX DIAGNOSTIC RADIOPHARMACEUTICAL

## 2024-04-05 PROCEDURE — A9502 TC99M TETROFOSMIN: HCPCS

## 2024-04-05 PROCEDURE — 6360000002 HC RX W HCPCS

## 2024-04-05 PROCEDURE — 93017 CV STRESS TEST TRACING ONLY: CPT

## 2024-04-05 RX ORDER — REGADENOSON 0.08 MG/ML
0.4 INJECTION, SOLUTION INTRAVENOUS
Status: COMPLETED | OUTPATIENT
Start: 2024-04-05 | End: 2024-04-05

## 2024-04-05 RX ADMIN — TETROFOSMIN 30 MILLICURIE: 1.38 INJECTION, POWDER, LYOPHILIZED, FOR SOLUTION INTRAVENOUS at 09:30

## 2024-04-05 RX ADMIN — REGADENOSON 0.4 MG: 0.08 INJECTION, SOLUTION INTRAVENOUS at 09:30

## 2024-04-05 RX ADMIN — TETROFOSMIN 11 MILLICURIE: 1.38 INJECTION, POWDER, LYOPHILIZED, FOR SOLUTION INTRAVENOUS at 07:45

## 2024-04-05 NOTE — PROGRESS NOTES
Pt completed stress portion of cardiac stress test, denied symptoms after lexiscan. Pt is discharged to nuclear department for final scan. Nuclear tech will remove PIV. Discharge instructions given to pt. Pt verbalizes understanding to discharge instructions.

## 2024-04-15 NOTE — TELEPHONE ENCOUNTER
Future appt scheduled 06/10/2024                   Last appt 03/18/2024      Last Written 12/05/2023    metFORMIN (GLUCOPHAGE) 500 MG tablet   #60  5 RF

## 2024-05-06 DIAGNOSIS — I48.11 LONGSTANDING PERSISTENT ATRIAL FIBRILLATION (HCC): ICD-10-CM

## 2024-05-06 RX ORDER — APIXABAN 5 MG/1
5 TABLET, FILM COATED ORAL 2 TIMES DAILY
Qty: 60 TABLET | Refills: 1 | Status: SHIPPED | OUTPATIENT
Start: 2024-05-06

## 2024-05-14 ENCOUNTER — TELEPHONE (OUTPATIENT)
Dept: CARDIOLOGY CLINIC | Age: 79
End: 2024-05-14

## 2024-05-14 NOTE — TELEPHONE ENCOUNTER
Called pt's primary number [phone], spoke with pt's contact Evy informed her of message and she preferred that I speak with her  (pt's son & contact) Demario at [phone]. Informed Rich of message. He was not sure why pt got scheduled so early and that pt is doing great. I informed Demario it looks like a scheduling error. Informed Demario that CMV will be going to [facility] he preferred to stay at [facility]. I scheduled pt with agk & device ck for 10/1/24 at 115pm, and placed pt's appt information in outgoing mail.

## 2024-05-14 NOTE — TELEPHONE ENCOUNTER
Patient is scheduled with CMV 5/16/2024. Upon review of chart, patient was seen by Dr. Coon on 4/4/2024. Per review of his note, he recommended follow up with Dr. Sanchez in 6 months.     Will you please reach out to patient and see if she was needing to see Dr. Sanchez so early? Or if she can be seen in the 6 month time frame as requested.     Jaymie OV Plan 4/4/2024  Plan:  Her device is functioning appropriately.  She is on Eliquis for stroke prophylaxis  EF was normal 6 months ago  Clinically, no symptoms  May proceed with planned cataract surgery, low risk.  Follow up with Dr Sanchez at [facility] in 6 months

## 2024-05-17 DIAGNOSIS — I63.9 CEREBROVASCULAR ACCIDENT (CVA), UNSPECIFIED MECHANISM (HCC): ICD-10-CM

## 2024-05-20 RX ORDER — SIMVASTATIN 20 MG
20 TABLET ORAL NIGHTLY
Qty: 30 TABLET | Refills: 1 | Status: SHIPPED | OUTPATIENT
Start: 2024-05-20

## 2024-06-10 ENCOUNTER — OFFICE VISIT (OUTPATIENT)
Dept: FAMILY MEDICINE CLINIC | Age: 79
End: 2024-06-10
Payer: MEDICARE

## 2024-06-10 VITALS
DIASTOLIC BLOOD PRESSURE: 75 MMHG | TEMPERATURE: 97.8 F | HEART RATE: 64 BPM | BODY MASS INDEX: 19.84 KG/M2 | OXYGEN SATURATION: 98 % | SYSTOLIC BLOOD PRESSURE: 132 MMHG | HEIGHT: 63 IN | WEIGHT: 112 LBS

## 2024-06-10 DIAGNOSIS — I73.9 PVD (PERIPHERAL VASCULAR DISEASE) (HCC): ICD-10-CM

## 2024-06-10 DIAGNOSIS — Z53.20 COLONOSCOPY REFUSED: ICD-10-CM

## 2024-06-10 DIAGNOSIS — E11.59 TYPE 2 DIABETES MELLITUS WITH OTHER CIRCULATORY COMPLICATION, WITHOUT LONG-TERM CURRENT USE OF INSULIN (HCC): ICD-10-CM

## 2024-06-10 DIAGNOSIS — I73.9 PAD (PERIPHERAL ARTERY DISEASE) (HCC): Primary | ICD-10-CM

## 2024-06-10 DIAGNOSIS — I25.10 CORONARY ARTERY DISEASE INVOLVING NATIVE CORONARY ARTERY OF NATIVE HEART WITHOUT ANGINA PECTORIS: ICD-10-CM

## 2024-06-10 DIAGNOSIS — E78.00 PURE HYPERCHOLESTEROLEMIA: ICD-10-CM

## 2024-06-10 DIAGNOSIS — M19.90 OSTEOARTHRITIS, UNSPECIFIED OSTEOARTHRITIS TYPE, UNSPECIFIED SITE: ICD-10-CM

## 2024-06-10 DIAGNOSIS — E55.9 VITAMIN D DEFICIENCY: ICD-10-CM

## 2024-06-10 DIAGNOSIS — I10 BENIGN ESSENTIAL HTN: ICD-10-CM

## 2024-06-10 PROBLEM — N17.9 AKI (ACUTE KIDNEY INJURY) (HCC): Status: RESOLVED | Noted: 2023-07-28 | Resolved: 2024-06-10

## 2024-06-10 PROBLEM — Z51.5 ENCOUNTER FOR PALLIATIVE CARE: Status: RESOLVED | Noted: 2023-08-08 | Resolved: 2024-06-10

## 2024-06-10 PROBLEM — E87.8 ELECTROLYTE IMBALANCE: Status: RESOLVED | Noted: 2023-07-28 | Resolved: 2024-06-10

## 2024-06-10 LAB — HBA1C MFR BLD: 6 %

## 2024-06-10 PROCEDURE — 3078F DIAST BP <80 MM HG: CPT | Performed by: NURSE PRACTITIONER

## 2024-06-10 PROCEDURE — 1036F TOBACCO NON-USER: CPT | Performed by: NURSE PRACTITIONER

## 2024-06-10 PROCEDURE — 1090F PRES/ABSN URINE INCON ASSESS: CPT | Performed by: NURSE PRACTITIONER

## 2024-06-10 PROCEDURE — 83036 HEMOGLOBIN GLYCOSYLATED A1C: CPT | Performed by: NURSE PRACTITIONER

## 2024-06-10 PROCEDURE — 3075F SYST BP GE 130 - 139MM HG: CPT | Performed by: NURSE PRACTITIONER

## 2024-06-10 PROCEDURE — 99214 OFFICE O/P EST MOD 30 MIN: CPT | Performed by: NURSE PRACTITIONER

## 2024-06-10 PROCEDURE — G8420 CALC BMI NORM PARAMETERS: HCPCS | Performed by: NURSE PRACTITIONER

## 2024-06-10 PROCEDURE — 1123F ACP DISCUSS/DSCN MKR DOCD: CPT | Performed by: NURSE PRACTITIONER

## 2024-06-10 PROCEDURE — G8427 DOCREV CUR MEDS BY ELIG CLIN: HCPCS | Performed by: NURSE PRACTITIONER

## 2024-06-10 PROCEDURE — G8400 PT W/DXA NO RESULTS DOC: HCPCS | Performed by: NURSE PRACTITIONER

## 2024-06-10 ASSESSMENT — PATIENT HEALTH QUESTIONNAIRE - PHQ9
1. LITTLE INTEREST OR PLEASURE IN DOING THINGS: NOT AT ALL
SUM OF ALL RESPONSES TO PHQ9 QUESTIONS 1 & 2: 0
SUM OF ALL RESPONSES TO PHQ QUESTIONS 1-9: 0
SUM OF ALL RESPONSES TO PHQ QUESTIONS 1-9: 0
2. FEELING DOWN, DEPRESSED OR HOPELESS: NOT AT ALL
SUM OF ALL RESPONSES TO PHQ QUESTIONS 1-9: 0
SUM OF ALL RESPONSES TO PHQ QUESTIONS 1-9: 0

## 2024-06-10 ASSESSMENT — ENCOUNTER SYMPTOMS
GASTROINTESTINAL NEGATIVE: 1
RESPIRATORY NEGATIVE: 1
EYES NEGATIVE: 1

## 2024-06-10 NOTE — PROGRESS NOTES
use: No    Sexual activity: Yes     Partners: Male   Other Topics Concern    Not on file   Social History Narrative    Not on file     Social Determinants of Health     Financial Resource Strain: Low Risk  (9/5/2023)    Overall Financial Resource Strain (CARDIA)     Difficulty of Paying Living Expenses: Not hard at all   Food Insecurity: Not on file (9/5/2023)   Transportation Needs: Unknown (9/5/2023)    PRAPARE - Transportation     Lack of Transportation (Medical): Not on file     Lack of Transportation (Non-Medical): No   Physical Activity: Inactive (1/16/2024)    Exercise Vital Sign     Days of Exercise per Week: 0 days     Minutes of Exercise per Session: 0 min   Stress: Not on file   Social Connections: Not on file   Intimate Partner Violence: Not on file   Housing Stability: Unknown (9/5/2023)    Housing Stability Vital Sign     Unable to Pay for Housing in the Last Year: Not on file     Number of Places Lived in the Last Year: Not on file     Unstable Housing in the Last Year: No     Current Outpatient Medications   Medication Sig Dispense Refill    simvastatin (ZOCOR) 20 MG tablet TAKE ONE TABLET BY MOUTH ONCE NIGHTLY 30 tablet 1    ELIQUIS 5 MG TABS tablet TAKE 1 TABLET BY MOUTH TWICE A DAY 60 tablet 1    metFORMIN (GLUCOPHAGE) 500 MG tablet TAKE 2 TABLETS BY MOUTH DAILY WITH BREAKFAST 180 tablet 1    vitamin D (VITAMIN D3) 25 MCG (1000 UT) TABS tablet TAKE 1 TABLET BY MOUTH DAILY 90 tablet 3    Ascorbic Acid (VITAMIN C) 250 MG tablet Take 1 tablet by mouth daily 30 tablet 3    NIFEdipine (PROCARDIA XL) 30 MG extended release tablet TAKE 1 TABLET BY MOUTH DAILY 90 tablet 1    metoprolol succinate (TOPROL XL) 25 MG extended release tablet Take 0.5 tablets by mouth daily 90 tablet 0    Misc. Devices (BATH BENCH WITH BACK) MISC M19.90  D62.  E11.9  R53.8 1 each 0     No current facility-administered medications for this visit.     Allergies   Allergen Reactions    Pcn [Penicillins] Other (See Comments)

## 2024-06-28 ENCOUNTER — HOSPITAL ENCOUNTER (EMERGENCY)
Age: 79
Discharge: HOME | End: 2024-06-29
Attending: STUDENT IN AN ORGANIZED HEALTH CARE EDUCATION/TRAINING PROGRAM
Payer: MEDICARE

## 2024-06-28 ENCOUNTER — APPOINTMENT (OUTPATIENT)
Dept: GENERAL RADIOLOGY | Age: 79
End: 2024-06-28
Payer: MEDICARE

## 2024-06-28 DIAGNOSIS — S81.802A WOUND OF LEFT LOWER EXTREMITY, INITIAL ENCOUNTER: Primary | ICD-10-CM

## 2024-06-28 PROCEDURE — 99283 EMERGENCY DEPT VISIT LOW MDM: CPT

## 2024-06-28 RX ORDER — BACITRACIN ZINC AND POLYMYXIN B SULFATE 500; 1000 [USP'U]/G; [USP'U]/G
OINTMENT TOPICAL
Status: COMPLETED
Start: 2024-06-28 | End: 2024-06-29

## 2024-06-28 RX ORDER — BACITRACIN ZINC AND POLYMYXIN B SULFATE 500; 1000 [USP'U]/G; [USP'U]/G
OINTMENT TOPICAL ONCE
Status: COMPLETED | OUTPATIENT
Start: 2024-06-29 | End: 2024-06-29

## 2024-06-28 ASSESSMENT — PAIN - FUNCTIONAL ASSESSMENT: PAIN_FUNCTIONAL_ASSESSMENT: 0-10

## 2024-06-28 ASSESSMENT — PAIN DESCRIPTION - ORIENTATION: ORIENTATION: LEFT;LOWER

## 2024-06-28 ASSESSMENT — PAIN DESCRIPTION - LOCATION: LOCATION: LEG

## 2024-06-28 ASSESSMENT — PAIN SCALES - GENERAL: PAINLEVEL_OUTOF10: 2

## 2024-06-28 ASSESSMENT — PAIN DESCRIPTION - PAIN TYPE: TYPE: ACUTE PAIN

## 2024-06-29 VITALS
HEIGHT: 66 IN | OXYGEN SATURATION: 97 % | WEIGHT: 112 LBS | DIASTOLIC BLOOD PRESSURE: 74 MMHG | TEMPERATURE: 98.1 F | HEART RATE: 68 BPM | BODY MASS INDEX: 18 KG/M2 | SYSTOLIC BLOOD PRESSURE: 134 MMHG | RESPIRATION RATE: 16 BRPM

## 2024-06-29 PROCEDURE — 6370000000 HC RX 637 (ALT 250 FOR IP)

## 2024-06-29 RX ADMIN — BACITRACIN ZINC AND POLYMYXIN B SULFATE: at 00:10

## 2024-06-29 RX ADMIN — BACITRACIN ZINC AND POLYMYXIN B SULFATE: 500; 1000 OINTMENT TOPICAL at 00:10

## 2024-06-29 NOTE — ED NOTES
Wound care to left lower leg. Wound wrapped in guaze non stick dressing and kirlex. Medicated with polysporin ointment. Wound care instructions provided to pt and family. Verbalizes understanding.

## 2024-06-29 NOTE — ED PROVIDER NOTES
[facility] ED      CHIEF COMPLAINT  Leg Pain (Pt with injury to Left lower leg x 1 month. Wound is open with drainage. Came in tonight d/t increased pain. )       HISTORY OF PRESENT ILLNESS  Xuan Romero is a 79 y.o. female  who presents to the ED complaining of an open wound on the left leg.  Patient states that she injured this leg around a month ago and that there was a blood blister on the left shin.  This opened up earlier tonight and was associated with some bleeding.  Due to this open wound patient and family were concerned for the possibility of infection.  Does not report any significantly worsening pain during my exam.    No other complaints, modifying factors or associated symptoms.     I have reviewed the following from the nursing documentation.    Past Medical History:   Diagnosis Date    Acute blood loss anemia     Acute osteomyelitis of left foot ([facility])     Acute osteomyelitis of right foot ([facility]) 05/13/2022    Advance care planning 08/08/2023    CARY (acute kidney injury) ([facility]) 07/28/2023    Atrial fibrillation ([facility])     off coumadin after bleed, declined restart    Blood transfusion     Bradycardia 07/26/2023    CAD (coronary artery disease)     Cellulitis of right lower extremity     CVA (cerebral vascular accident) ([facility])     CVA (cerebrovascular accident due to intracerebral hemorrhage) ([facility]) 07/21/2023    Diabetes mellitus type II     Diabetic neuropathy ([facility]) 2011    Electrolyte imbalance 07/28/2023    Encounter for palliative care 08/08/2023    Gastric ulcer     Gastrointestinal hemorrhage     H. pylori infection 2009    Hyperlipidemia     Hypertension     Hypertension     Longstanding persistent atrial fibrillation (HCC) 07/18/2023    Numbness and tingling of left leg     Osteoarthritis     knees    Osteomyelitis of right foot ([facility])     Poor historian     Presence of temporary transvenous cardiac pacemaker 07/28/2023    Primary hypertension     PVD (peripheral vascular disease)  to be included for SEP-1 Core Measure due to:  Infection is not suspected    During the patient's ED course, the patient was given:  Medications   bacitracin-polymyxin b (POLYSPORIN) ointment ( Topical Given 6/29/24 0010)        IMark DO,  am the primary clinician of record.    CLINICAL IMPRESSION  1. Wound of left lower extremity, initial encounter        Blood pressure 134/74, pulse 68, temperature 98.1 °F (36.7 °C), resp. rate 16, height 1.676 m (5' 6\"), weight 50.8 kg (112 lb), SpO2 97 %, not currently breastfeeding.    DISPOSITION  Xuan Romero was discharged to home in stable condition.      Patient was given scripts for the following medications. I counseled patient how to take these medications.   Discharge Medication List as of 6/29/2024 12:11 AM          Follow-up with:  Mayelin Celis, MARVIN - CNP  [address] [facility] [address] [phone]    Call in 3 days  As needed      DISCLAIMER: This chart was created using Dragon dictation software.  Efforts were made by me to ensure accuracy, however some errors may be present due to limitations of this technology and occasionally words are not transcribed correctly.        Mark Garrett DO  06/29/24 0735

## 2024-06-29 NOTE — DISCHARGE INSTRUCTIONS
Return to nearest ED if you develop severe pain, redness around the wound, increasing swelling, fevers, other concerning symptoms.  Keep the wound clean and dry and keep a bandage over it most of the time.  It is okay to leave it open to air for short periods of time.

## 2024-07-06 DIAGNOSIS — I48.11 LONGSTANDING PERSISTENT ATRIAL FIBRILLATION (HCC): ICD-10-CM

## 2024-07-08 RX ORDER — APIXABAN 5 MG/1
5 TABLET, FILM COATED ORAL 2 TIMES DAILY
Qty: 60 TABLET | Refills: 1 | Status: SHIPPED | OUTPATIENT
Start: 2024-07-08

## 2024-07-11 DIAGNOSIS — I63.9 CEREBROVASCULAR ACCIDENT (CVA), UNSPECIFIED MECHANISM (HCC): ICD-10-CM

## 2024-07-12 RX ORDER — SIMVASTATIN 20 MG
20 TABLET ORAL NIGHTLY
Qty: 30 TABLET | Refills: 5 | Status: SHIPPED | OUTPATIENT
Start: 2024-07-12

## 2024-08-31 DIAGNOSIS — I48.11 LONGSTANDING PERSISTENT ATRIAL FIBRILLATION (HCC): ICD-10-CM

## 2024-09-03 RX ORDER — NIFEDIPINE 30 MG/1
30 TABLET, EXTENDED RELEASE ORAL DAILY
Qty: 90 TABLET | Refills: 1 | Status: SHIPPED | OUTPATIENT
Start: 2024-09-03

## 2024-09-06 DIAGNOSIS — I48.11 LONGSTANDING PERSISTENT ATRIAL FIBRILLATION (HCC): ICD-10-CM

## 2024-09-16 RX ORDER — MULTIVIT WITH MINERALS/LUTEIN
250 TABLET ORAL DAILY
Qty: 30 TABLET | Refills: 3 | Status: SHIPPED | OUTPATIENT
Start: 2024-09-16

## 2024-10-01 ENCOUNTER — OFFICE VISIT (OUTPATIENT)
Dept: CARDIOLOGY CLINIC | Age: 79
End: 2024-10-01

## 2024-10-01 ENCOUNTER — NURSE ONLY (OUTPATIENT)
Dept: CARDIOLOGY CLINIC | Age: 79
End: 2024-10-01

## 2024-10-01 ENCOUNTER — TELEPHONE (OUTPATIENT)
Dept: CARDIOLOGY CLINIC | Age: 79
End: 2024-10-01

## 2024-10-01 VITALS
HEART RATE: 61 BPM | BODY MASS INDEX: 18.19 KG/M2 | SYSTOLIC BLOOD PRESSURE: 138 MMHG | DIASTOLIC BLOOD PRESSURE: 58 MMHG | HEIGHT: 66 IN | WEIGHT: 113.2 LBS | OXYGEN SATURATION: 99 %

## 2024-10-01 DIAGNOSIS — I48.20 CHRONIC ATRIAL FIBRILLATION (HCC): Primary | ICD-10-CM

## 2024-10-01 DIAGNOSIS — I48.11 LONGSTANDING PERSISTENT ATRIAL FIBRILLATION (HCC): ICD-10-CM

## 2024-10-01 DIAGNOSIS — Z95.0 CARDIAC PACEMAKER IN SITU: Primary | ICD-10-CM

## 2024-10-01 DIAGNOSIS — I49.5 SSS (SICK SINUS SYNDROME) (HCC): ICD-10-CM

## 2024-10-01 DIAGNOSIS — I63.9 CEREBROVASCULAR ACCIDENT (CVA), UNSPECIFIED MECHANISM (HCC): ICD-10-CM

## 2024-10-01 RX ORDER — METOPROLOL SUCCINATE 25 MG/1
12.5 TABLET, EXTENDED RELEASE ORAL DAILY
Qty: 90 TABLET | Refills: 0 | Status: SHIPPED | OUTPATIENT
Start: 2024-10-01

## 2024-10-01 NOTE — TELEPHONE ENCOUNTER
Pt returned call. I spoke with the pt and her daughter in law Evy. Pt is now scheduled 11/19/2024 with BRIAN RIDDLE.

## 2024-10-01 NOTE — TELEPHONE ENCOUNTER
Per AGK- this patient needs to be established with cardiology. No rush. Interventional or general.

## 2024-10-01 NOTE — TELEPHONE ENCOUNTER
10/1- called pt @[phone] I spoke with pt's daughter in law, Evy. Evy stating she is currently at an appointment and will give us a call back. Pt needs new pt appt with interventional or general cardio per AGK per RNSC.

## 2024-10-01 NOTE — PATIENT INSTRUCTIONS
RECOMMENDATIONS:  Remote device checks every 3 months.   Explained AF in detail.   No changes today. Continue current medications.   Follow up in 6 months with EP NP.

## 2024-10-01 NOTE — PROGRESS NOTES
if >140/90:   NA  3. Documentation to PCP/referring for new patient:  Sent to PCP at close of office visit  4. CAD patient on anti-platelet: NA  5. CAD patient on STATIN therapy:  Yes  6. Patient with CHF and aFib on anticoagulation:  Yes     All questions and concerns were addressed to the patient/family. Alternatives to my treatment were discussed.    Dr. EZRA Begum MD  Electrophysiology  [facility].  [address] [place] [address]. [address].  [place] [address]  Phone: [phone]  Fax: [phone]     NOTE: This report was transcribed using voice recognition software. Every effort was made to ensure accuracy, however, inadvertent computerized transcription errors may be present.     ITenisha RN, am scribing for and in the presence of Dr. Nicola Begum. 10/01/24 1:40 PM   Tenisha Guerra RN    The scribe's documentation has been prepared under my direction and personally reviewed by me in its entirety. I confirm that the note above accurately reflects all work, physical examination, the discussion of treatments and procedures, and medical decision making performed by me.      YANIQUE ACEVES Jr, MD personally performed the services described in this documentation as scribed by nurse in my presence, and is both accurate and complete.    Electronically signed by YANIQUE Begum Jr, MD on 10/1/2024 at 2:00 PM

## 2024-10-07 DIAGNOSIS — Z82.49 FAMILY HISTORY OF DVT: ICD-10-CM

## 2024-10-07 DIAGNOSIS — Z86.718 HISTORY OF DVT OF LOWER EXTREMITY: ICD-10-CM

## 2024-10-07 DIAGNOSIS — M79.89 LEFT LEG SWELLING: Primary | ICD-10-CM

## 2024-10-10 ENCOUNTER — OFFICE VISIT (OUTPATIENT)
Dept: FAMILY MEDICINE CLINIC | Age: 79
End: 2024-10-10
Payer: MEDICARE

## 2024-10-10 VITALS
BODY MASS INDEX: 18.7 KG/M2 | TEMPERATURE: 98.2 F | DIASTOLIC BLOOD PRESSURE: 74 MMHG | WEIGHT: 115.8 LBS | SYSTOLIC BLOOD PRESSURE: 138 MMHG | HEART RATE: 74 BPM | OXYGEN SATURATION: 98 %

## 2024-10-10 DIAGNOSIS — T14.8XXA OPEN WOUND OF SKIN: ICD-10-CM

## 2024-10-10 DIAGNOSIS — M79.89 LEFT LEG SWELLING: Primary | ICD-10-CM

## 2024-10-10 PROCEDURE — G8400 PT W/DXA NO RESULTS DOC: HCPCS | Performed by: NURSE PRACTITIONER

## 2024-10-10 PROCEDURE — 3075F SYST BP GE 130 - 139MM HG: CPT | Performed by: NURSE PRACTITIONER

## 2024-10-10 PROCEDURE — 1036F TOBACCO NON-USER: CPT | Performed by: NURSE PRACTITIONER

## 2024-10-10 PROCEDURE — G8484 FLU IMMUNIZE NO ADMIN: HCPCS | Performed by: NURSE PRACTITIONER

## 2024-10-10 PROCEDURE — 99213 OFFICE O/P EST LOW 20 MIN: CPT | Performed by: NURSE PRACTITIONER

## 2024-10-10 PROCEDURE — 3078F DIAST BP <80 MM HG: CPT | Performed by: NURSE PRACTITIONER

## 2024-10-10 PROCEDURE — G8420 CALC BMI NORM PARAMETERS: HCPCS | Performed by: NURSE PRACTITIONER

## 2024-10-10 PROCEDURE — 1123F ACP DISCUSS/DSCN MKR DOCD: CPT | Performed by: NURSE PRACTITIONER

## 2024-10-10 PROCEDURE — G8427 DOCREV CUR MEDS BY ELIG CLIN: HCPCS | Performed by: NURSE PRACTITIONER

## 2024-10-10 PROCEDURE — 1090F PRES/ABSN URINE INCON ASSESS: CPT | Performed by: NURSE PRACTITIONER

## 2024-10-10 SDOH — ECONOMIC STABILITY: FOOD INSECURITY: WITHIN THE PAST 12 MONTHS, THE FOOD YOU BOUGHT JUST DIDN'T LAST AND YOU DIDN'T HAVE MONEY TO GET MORE.: NEVER TRUE

## 2024-10-10 SDOH — ECONOMIC STABILITY: FOOD INSECURITY: WITHIN THE PAST 12 MONTHS, YOU WORRIED THAT YOUR FOOD WOULD RUN OUT BEFORE YOU GOT MONEY TO BUY MORE.: NEVER TRUE

## 2024-10-10 SDOH — ECONOMIC STABILITY: INCOME INSECURITY: HOW HARD IS IT FOR YOU TO PAY FOR THE VERY BASICS LIKE FOOD, HOUSING, MEDICAL CARE, AND HEATING?: NOT HARD AT ALL

## 2024-10-10 ASSESSMENT — ENCOUNTER SYMPTOMS
RESPIRATORY NEGATIVE: 1
GASTROINTESTINAL NEGATIVE: 1

## 2024-10-10 NOTE — PATIENT INSTRUCTIONS
Please read the healthy family handout that you were given and share it with your family.       Please compare this printed medication list with your medications at home to be sure they are the same.  If you have any medications that are different please contact us immediately at [phone].     Also review your allergies that we have listed, these may also include medications that you have not been able to tolerate, make sure everything listed is correct. If you have any allergies that are different please contact us immediately at [phone].     You may receive a survey in the mail or by email asking about your experience during your visit today. Please complete and return to us so we know how we are serving you.

## 2024-10-10 NOTE — PROGRESS NOTES
Never Used   Substance and Sexual Activity    Alcohol use: No    Drug use: No    Sexual activity: Yes     Partners: Male   Other Topics Concern    Not on file   Social History Narrative    Not on file     Social Determinants of Health     Financial Resource Strain: Low Risk  (10/10/2024)    Overall Financial Resource Strain (CARDIA)     Difficulty of Paying Living Expenses: Not hard at all   Food Insecurity: No Food Insecurity (10/10/2024)    Hunger Vital Sign     Worried About Running Out of Food in the Last Year: Never true     Ran Out of Food in the Last Year: Never true   Transportation Needs: Unknown (10/10/2024)    PRAPARE - Transportation     Lack of Transportation (Medical): Not on file     Lack of Transportation (Non-Medical): No   Physical Activity: Inactive (1/16/2024)    Exercise Vital Sign     Days of Exercise per Week: 0 days     Minutes of Exercise per Session: 0 min   Stress: Not on file   Social Connections: Not on file   Intimate Partner Violence: Not on file   Housing Stability: Unknown (10/10/2024)    Housing Stability Vital Sign     Unable to Pay for Housing in the Last Year: Not on file     Number of Times Moved in the Last Year: Not on file     Homeless in the Last Year: No     Current Outpatient Medications   Medication Sig Dispense Refill    apixaban (ELIQUIS) 5 MG TABS tablet Take 1 tablet by mouth 2 times daily 60 tablet 3    metoprolol succinate (TOPROL XL) 25 MG extended release tablet Take 0.5 tablets by mouth daily 90 tablet 0    Ascorbic Acid (VITAMIN C) 250 MG tablet TAKE 1 TABLET BY MOUTH DAILY 30 tablet 3    NIFEdipine (PROCARDIA XL) 30 MG extended release tablet TAKE 1 TABLET BY MOUTH DAILY 90 tablet 1    simvastatin (ZOCOR) 20 MG tablet TAKE ONE TABLET BY MOUTH ONCE NIGHTLY 30 tablet 5    metFORMIN (GLUCOPHAGE) 500 MG tablet TAKE 2 TABLETS BY MOUTH DAILY WITH BREAKFAST 180 tablet 1    vitamin D (VITAMIN D3) 25 MCG (1000 UT) TABS tablet TAKE 1 TABLET BY MOUTH DAILY 90 tablet 3

## 2024-10-15 ENCOUNTER — OFFICE VISIT (OUTPATIENT)
Dept: FAMILY MEDICINE CLINIC | Age: 79
End: 2024-10-15
Payer: MEDICARE

## 2024-10-15 VITALS
WEIGHT: 115.8 LBS | HEART RATE: 60 BPM | BODY MASS INDEX: 18.7 KG/M2 | OXYGEN SATURATION: 97 % | SYSTOLIC BLOOD PRESSURE: 156 MMHG | DIASTOLIC BLOOD PRESSURE: 78 MMHG
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
## 2024-10-15 PROCEDURE — 1036F TOBACCO NON-USER: CPT

## 2024-10-15 PROCEDURE — G8400 PT W/DXA NO RESULTS DOC: HCPCS

## 2024-10-15 PROCEDURE — G8427 DOCREV CUR MEDS BY ELIG CLIN: HCPCS

## 2024-10-15 PROCEDURE — 99213 OFFICE O/P EST LOW 20 MIN: CPT

## 2024-10-15 PROCEDURE — 1090F PRES/ABSN URINE INCON ASSESS: CPT

## 2024-10-15 PROCEDURE — 3077F SYST BP >= 140 MM HG: CPT

## 2024-10-15 PROCEDURE — 1123F ACP DISCUSS/DSCN MKR DOCD: CPT

## 2024-10-15 PROCEDURE — G8484 FLU IMMUNIZE NO ADMIN: HCPCS

## 2024-10-15 PROCEDURE — 3078F DIAST BP <80 MM HG: CPT

## 2024-10-15 PROCEDURE — G8420 CALC BMI NORM PARAMETERS: HCPCS

## 2024-10-15 ASSESSMENT — ENCOUNTER SYMPTOMS
ABDOMINAL PAIN: 0
COUGH: 0
SORE THROAT: 0
EYE DISCHARGE: 0
COLOR CHANGE: 1
BLOOD IN STOOL: 0
ALLERGIC/IMMUNOLOGIC NEGATIVE: 1
NAUSEA: 0
RHINORRHEA: 0
SHORTNESS OF BREATH: 0
VOMITING: 0
SINUS PAIN: 0

## 2024-10-15 NOTE — PROGRESS NOTES
Psychiatric:         Mood and Affect: Mood normal.         Speech: Speech normal.         Behavior: Behavior is cooperative.         Brenda Belle, APRN - CNP    This dictation was generated by voice recognition computer software.  Although all attempts are made to edit the dictation for accuracy, there may be errors in the transcription that are not intended.

## 2024-10-15 NOTE — PROGRESS NOTES
Inpatient Physical Therapy Evaluation & Treatment    Unit: 2 [facility]  Date:  9/20/2023  Patient Name:    Derrick Mohr  Admitting diagnosis:  UTI (urinary tract infection) [N39.0]  Artificial cardiac pacemaker [Z95.0]  Generalized weakness [R53.1]  Chronic atrial fibrillation ([address]) [I48.20]  Diabetes mellitus type 2 in nonobese ([address]) [E11.9]  Anticoagulated by anticoagulation treatment [Z79.01]  History of recent stroke [Z86.73]  Admit Date:  9/20/2023  Precautions/Restrictions/WB Status/ Lines/ Wounds/ Oxygen: Fall risk, Bed/chair alarm, and Lines (external catheter), pacemaker  Pt seen for cotreatment this date due to patient safety and need for the assistance of 2 skilled therapists;     Treatment Time:  14:05-14:38  Treatment Number:  1   Timed Code Treatment Minutes: 23 minutes  Total Treatment Minutes:  33  minutes    Patient Stated Goals for Therapy: \" not stated \"          Discharge Recommendations: SNF  DME needs for discharge: Defer to facility       Therapy recommendation for EMS Transport: can transport by wheelchair    Therapy recommendations for staff:   Assist of 1 for transfers with use of YADY STEDY to/from [facility]  to/from chair    History of Present Illness:   Per ED provider Dr. Parag Ward note 9/20: \"Patient and the patient's EMS unit that brought her in as well as adult protective was contacted also her visiting nurse provided history  Limitations: None  History of present illness patient apparently has had some recent major medical problems in the last week and 1/2 to 1-month. Patient apparently on July 6, 2016 2023 was actually taken care of by me patient presented with an acute posterior stroke patient was given TNK. And sent to [facility] patient was subsequently discharged but then returned on July 21 and was found to be bradycardic with sick sinus syndrome.   History of underlying atrial fibs this did require pacemaker insertion by their EP team  Patient is a type II diabetic history of cardiac stents PAST MEDICAL HISTORY:  Adenotonsillar hypertrophy     Dysphasia secondary to tonsil size    Frequent headaches resolved    Inattention     MARIBEL (obstructive sleep apnea)     Seizure-like activity resolved

## 2024-11-11 DIAGNOSIS — I63.9 CEREBROVASCULAR ACCIDENT (CVA), UNSPECIFIED MECHANISM (HCC): ICD-10-CM

## 2024-11-11 RX ORDER — SIMVASTATIN 20 MG
20 TABLET ORAL NIGHTLY
Qty: 90 TABLET | Refills: 1 | Status: SHIPPED | OUTPATIENT
Start: 2024-11-11

## 2024-11-14 ENCOUNTER — TELEPHONE (OUTPATIENT)
Dept: CARDIOLOGY | Age: 79
End: 2024-11-14

## 2024-11-14 DIAGNOSIS — R94.31 ABNORMAL ELECTROCARDIOGRAPHY: ICD-10-CM

## 2024-11-14 DIAGNOSIS — I48.20 CHRONIC ATRIAL FIBRILLATION (HCC): Primary | ICD-10-CM

## 2024-11-14 NOTE — TELEPHONE ENCOUNTER
I spoke with the patient and she states that she feels well overall. She did feel dizzy at some point within the last few weeks but is unsure if it correlates. She is not dizzy currently and does not wish to go to the ER. Stress test and echo ordered. Patient provided with central scheduling number. She will report to ER if dizziness recurs.

## 2024-11-14 NOTE — TELEPHONE ENCOUNTER
Please check on patient to see if syncope or presyncope on 11/01/2024 at around 0800.  Has history of CHB.  If symptoms then come to ER.  If asymptomatic then echocardiogram and stress test as outpatient.  Thank you!

## 2024-11-19 ENCOUNTER — TELEPHONE (OUTPATIENT)
Dept: CARDIOLOGY CLINIC | Age: 79
End: 2024-11-19

## 2024-11-19 ENCOUNTER — OFFICE VISIT (OUTPATIENT)
Dept: CARDIOLOGY CLINIC | Age: 79
End: 2024-11-19
Payer: MEDICARE

## 2024-11-19 VITALS
HEART RATE: 66 BPM | BODY MASS INDEX: 18.7 KG/M2 | OXYGEN SATURATION: 97 % | SYSTOLIC BLOOD PRESSURE: 144 MMHG | DIASTOLIC BLOOD PRESSURE: 60 MMHG | HEIGHT: 66 IN

## 2024-11-19 DIAGNOSIS — I50.21 ACUTE HFREF (HEART FAILURE WITH REDUCED EJECTION FRACTION) (HCC): Primary | ICD-10-CM

## 2024-11-19 DIAGNOSIS — Z95.0 PACEMAKER: ICD-10-CM

## 2024-11-19 DIAGNOSIS — R94.39 ABNORMAL STRESS TEST: Primary | ICD-10-CM

## 2024-11-19 DIAGNOSIS — I25.10 CORONARY ARTERY DISEASE INVOLVING NATIVE CORONARY ARTERY OF NATIVE HEART WITHOUT ANGINA PECTORIS: ICD-10-CM

## 2024-11-19 DIAGNOSIS — R09.89 OTHER SPECIFIED SYMPTOMS AND SIGNS INVOLVING THE CIRCULATORY AND RESPIRATORY SYSTEMS: ICD-10-CM

## 2024-11-19 DIAGNOSIS — I63.9 CEREBROVASCULAR ACCIDENT (CVA), UNSPECIFIED MECHANISM (HCC): ICD-10-CM

## 2024-11-19 DIAGNOSIS — I48.11 LONGSTANDING PERSISTENT ATRIAL FIBRILLATION (HCC): ICD-10-CM

## 2024-11-19 DIAGNOSIS — Z79.899 MEDICATION MANAGEMENT: ICD-10-CM

## 2024-11-19 PROCEDURE — 1036F TOBACCO NON-USER: CPT | Performed by: INTERNAL MEDICINE

## 2024-11-19 PROCEDURE — G8400 PT W/DXA NO RESULTS DOC: HCPCS | Performed by: INTERNAL MEDICINE

## 2024-11-19 PROCEDURE — 99204 OFFICE O/P NEW MOD 45 MIN: CPT | Performed by: INTERNAL MEDICINE

## 2024-11-19 PROCEDURE — 1123F ACP DISCUSS/DSCN MKR DOCD: CPT | Performed by: INTERNAL MEDICINE

## 2024-11-19 PROCEDURE — 1090F PRES/ABSN URINE INCON ASSESS: CPT | Performed by: INTERNAL MEDICINE

## 2024-11-19 PROCEDURE — G8420 CALC BMI NORM PARAMETERS: HCPCS | Performed by: INTERNAL MEDICINE

## 2024-11-19 PROCEDURE — G8484 FLU IMMUNIZE NO ADMIN: HCPCS | Performed by: INTERNAL MEDICINE

## 2024-11-19 PROCEDURE — G8427 DOCREV CUR MEDS BY ELIG CLIN: HCPCS | Performed by: INTERNAL MEDICINE

## 2024-11-19 PROCEDURE — 1159F MED LIST DOCD IN RCRD: CPT | Performed by: INTERNAL MEDICINE

## 2024-11-19 PROCEDURE — 3077F SYST BP >= 140 MM HG: CPT | Performed by: INTERNAL MEDICINE

## 2024-11-19 PROCEDURE — 3078F DIAST BP <80 MM HG: CPT | Performed by: INTERNAL MEDICINE

## 2024-11-19 RX ORDER — LOSARTAN POTASSIUM 50 MG/1
50 TABLET ORAL 2 TIMES DAILY
Qty: 60 TABLET | Refills: 3 | Status: SHIPPED | OUTPATIENT
Start: 2024-11-19

## 2024-11-19 NOTE — PATIENT INSTRUCTIONS
Plan:  Stop Nifidipine.   Start Losartan 50 mg in the morning and 50 mg in the evening.   Call our office to verify medications once you get home.  Go to echo as scheduled. The stress test has been cancelled.    Order carotid ultrasound to assess your carotid arteries. Call [phone] to schedule.   Order fasting lab work: Lipids, TSH, A1C, CMP and CBC   Recommend left heart catheterization to further evaluate your heart for blockages.  Sravanthi our  will call you to get on the schedule.   NPO (nothing to eat or drink) after midnight the night before your procedure. A sip of water with your medications is okay.  Hold Eliquis and Metformin 48 hours prior to procedure. Hold vitamins/supplements the morning of procedure.   Pack an overnight bag and have a  with you.   Avoid lotions, oils and creams 1 day prior to your procedure.    Follow up with me in 2 months.

## 2024-11-19 NOTE — TELEPHONE ENCOUNTER
Cardiac Cath orders: [facility]  Ordering Provider: KAITLYNN  Performing Provider: Next available  Length of time for procedure:  IV Sedation: Yes  Reps needed:  Specific equip needed: NA  Medications to hold:Hold Eliquis and Metformin 48 hours prior to procedure. Hold vitamins/supplements the morning of procedure   Pt aware of meds to hold?: Yes at OV 10/19  Urgent? Next available  (All echos should be completed prior to the scheduled procedure date, preferably Osteen or other outreach)

## 2024-11-19 NOTE — PROGRESS NOTES
CARDIOLOGY CONSULTATION        Patient Name: Xuan Romero  Primary Care physician: Mayelin Celis APRN - CNP    Reason for Referral/Chief Complaint: Xuan Romero is a 79 y.o. patient who is referred to cardiology clinic today for evaluation and treatment of ischemic cardiomyopathy.     History of Present Illness:   Xuan Romero is a 79 y.o. with a past medical history of anemia, osteomyelitis L and R foot, R foot partial amputation, CARY, A.fib, bradycardia, CAD, CVA, DM, GI Bleed, PVD, Pacemaker ([employer] 7/28/23), HTN, HLD, and GI hemorrhage.     Patient followed with Dr. Begum on 10/1/24, no medication changes were made, patient to have device checks every 3 months. CHB noted on 11/1/24, patient was asymptomatic when staff called patient, but reported dizziness. Stress and echo ordered and scheduled.     Today, 11/19/24, she states she is doing well. She presents in a wheelchair due to having a stroke 2 years ago with residual left sided weakness. She presents with her son and daughter in law. Patient denies current edema, chest pain, shortness of breath, palpitations, dizziness or syncope.  Discussed testing results from April and the need to undergo LHC for new onset HFrEF.     Past Medical History:   has a past medical history of Acute blood loss anemia, Acute osteomyelitis of left foot, Acute osteomyelitis of right foot, Advance care planning, CARY (acute kidney injury) ([facility]), Atrial fibrillation ([facility]), Blood transfusion, Bradycardia, CAD (coronary artery disease), Cellulitis of right lower extremity, CVA (cerebral vascular accident) (HCC), CVA (cerebrovascular accident due to intracerebral hemorrhage) (HCC), Diabetes mellitus type II, Diabetic neuropathy (HCC), Electrolyte imbalance, Encounter for palliative care, Gastric ulcer, Gastrointestinal hemorrhage, H. pylori infection, Hyperlipidemia, Hypertension, Hypertension, Longstanding persistent atrial fibrillation (HCC), Numbness and tingling of

## 2024-11-20 ENCOUNTER — TELEPHONE (OUTPATIENT)
Dept: CARDIOLOGY CLINIC | Age: 79
End: 2024-11-20

## 2024-11-20 NOTE — TELEPHONE ENCOUNTER
Krystyna CALDERÓN review upon return     Called and spoke with pt son Tati. Called to verify if pt was taking  Nifdipine. Son states pt stopped that medication yesterday after OV. Asked if pt was taking metoprolol 2 tablets daily or 0.5 tabs daily pts son tati states they are taking Metoprolol 0.5 tablet daily    Med list updated.

## 2024-11-20 NOTE — TELEPHONE ENCOUNTER
Pts family called in with pts updated medication list. Pt has been taking:    Metoprolol Succinate 25mg 2 tabs daily  Losatan 50mg  Simvastatin 20mg  Metformin 500mg  Eliquis 5mg  Vitamin D  Vitamin C  Nifedipine 30mg 1x a day    Per last ov note:   Stop Nifidipine.   Start Losartan 50 mg in the morning and 50 mg in the evening.    Please review medications.

## 2024-11-21 PROBLEM — R94.39 ABNORMAL STRESS TEST: Status: ACTIVE | Noted: 2024-11-19

## 2024-11-21 NOTE — TELEPHONE ENCOUNTER
Procedure:  [facility]  Doctor:  Dr. Hannah (Ekwenibe)  Date:  12/11/24  Time:  9am  Arrival:  7:30am  Reps:  n/a  Anesthesia:  n/a      Spoke with patient and son. Please have patient arrive to the main entrance of [facility] ([address] [place], [place] [id number]) and check in with the registration desk.  They will be directed to the Cath Lab.  Remind patient to be NPO after midnight (8 hours prior). Do not apply lotions/creams on skin the day of procedure.    Instructions mailed.    VERONIKA, KAITLYNN nixon only.

## 2024-11-22 NOTE — TELEPHONE ENCOUNTER
11/22- called pt @[phone] pt is now scheduled 11/29/2024 for a lab visit for BP/HR check per KAITLYNN.

## 2024-12-02 ENCOUNTER — LAB (OUTPATIENT)
Dept: CARDIOLOGY CLINIC | Age: 79
End: 2024-12-02

## 2024-12-02 VITALS — OXYGEN SATURATION: 99 % | SYSTOLIC BLOOD PRESSURE: 148 MMHG | HEART RATE: 66 BPM | DIASTOLIC BLOOD PRESSURE: 60 MMHG

## 2024-12-03 ENCOUNTER — TELEPHONE (OUTPATIENT)
Dept: FAMILY MEDICINE CLINIC | Age: 79
End: 2024-12-03

## 2024-12-03 NOTE — TELEPHONE ENCOUNTER
[facility] requesting records. H&P, any recent labs, last 2-3 office notes.  Asking if Mayelin Celis CNP will follow patient or if their provider will need to follow. Fax #[phone]

## 2024-12-09 ENCOUNTER — OFFICE VISIT (OUTPATIENT)
Dept: FAMILY MEDICINE CLINIC | Age: 79
End: 2024-12-09
Payer: MEDICARE

## 2024-12-09 ENCOUNTER — HOSPITAL ENCOUNTER (OUTPATIENT)
Age: 79
Discharge: HOME | End: 2024-12-09
Payer: MEDICARE

## 2024-12-09 VITALS
TEMPERATURE: 97.8 F | OXYGEN SATURATION: 98 % | SYSTOLIC BLOOD PRESSURE: 138 MMHG | HEART RATE: 64 BPM | DIASTOLIC BLOOD PRESSURE: 73 MMHG | BODY MASS INDEX: 18.57 KG/M2 | WEIGHT: 115 LBS

## 2024-12-09 DIAGNOSIS — I73.9 PAD (PERIPHERAL ARTERY DISEASE) (HCC): ICD-10-CM

## 2024-12-09 DIAGNOSIS — I82.552 CHRONIC DEEP VEIN THROMBOSIS (DVT) OF LEFT PERONEAL VEIN (HCC): ICD-10-CM

## 2024-12-09 DIAGNOSIS — E78.00 PURE HYPERCHOLESTEROLEMIA: ICD-10-CM

## 2024-12-09 DIAGNOSIS — E55.9 VITAMIN D DEFICIENCY: ICD-10-CM

## 2024-12-09 DIAGNOSIS — I25.10 CORONARY ARTERY DISEASE INVOLVING NATIVE CORONARY ARTERY OF NATIVE HEART WITHOUT ANGINA PECTORIS: ICD-10-CM

## 2024-12-09 DIAGNOSIS — D64.9 CHRONIC ANEMIA: ICD-10-CM

## 2024-12-09 DIAGNOSIS — E11.59 TYPE 2 DIABETES MELLITUS WITH OTHER CIRCULATORY COMPLICATION, WITHOUT LONG-TERM CURRENT USE OF INSULIN (HCC): ICD-10-CM

## 2024-12-09 DIAGNOSIS — I73.9 PVD (PERIPHERAL VASCULAR DISEASE) (HCC): ICD-10-CM

## 2024-12-09 DIAGNOSIS — M19.90 OSTEOARTHRITIS, UNSPECIFIED OSTEOARTHRITIS TYPE, UNSPECIFIED SITE: ICD-10-CM

## 2024-12-09 DIAGNOSIS — I48.20 CHRONIC ATRIAL FIBRILLATION (HCC): Primary | ICD-10-CM

## 2024-12-09 DIAGNOSIS — I63.9 CEREBROVASCULAR ACCIDENT (CVA), UNSPECIFIED MECHANISM (HCC): ICD-10-CM

## 2024-12-09 DIAGNOSIS — I10 BENIGN ESSENTIAL HTN: ICD-10-CM

## 2024-12-09 PROBLEM — R09.89 OTHER SPECIFIED SYMPTOMS AND SIGNS INVOLVING THE CIRCULATORY AND RESPIRATORY SYSTEMS: Status: RESOLVED | Noted: 2024-11-19 | Resolved: 2024-12-09

## 2024-12-09 LAB
ALBUMIN SERPL-MCNC: 4 G/DL (ref 3.4–5)
ALBUMIN/GLOB SERPL: 1.3 {RATIO} (ref 1.1–2.2)
ALP SERPL-CCNC: 65 U/L (ref 40–129)
ALT SERPL-CCNC: 10 U/L (ref 10–40)
ANION GAP SERPL CALCULATED.3IONS-SCNC: 5 MMOL/L (ref 3–16)
AST SERPL-CCNC: 15 U/L (ref 15–37)
BASOPHILS # BLD: 0.1 K/UL (ref 0–0.2)
BASOPHILS NFR BLD: 1.1 %
BILIRUB SERPL-MCNC: 0.4 MG/DL (ref 0–1)
BUN SERPL-MCNC: 30 MG/DL (ref 7–20)
CALCIUM SERPL-MCNC: 9.8 MG/DL (ref 8.3–10.6)
CHLORIDE SERPL-SCNC: 104 MMOL/L (ref 99–110)
CO2 SERPL-SCNC: 28 MMOL/L (ref 21–32)
CREAT SERPL-MCNC: 0.8 MG/DL (ref 0.6–1.2)
DEPRECATED RDW RBC AUTO: 15.8 % (ref 12.4–15.4)
EOSINOPHIL # BLD: 0.3 K/UL (ref 0–0.6)
EOSINOPHIL NFR BLD: 4.1 %
GFR SERPLBLD CREATININE-BSD FMLA CKD-EPI: 75 ML/MIN/{1.73_M2}
GLUCOSE SERPL-MCNC: 145 MG/DL (ref 70–99)
HCT VFR BLD AUTO: 30.7 % (ref 36–48)
HGB BLD-MCNC: 9.8 G/DL (ref 12–16)
LYMPHOCYTES # BLD: 1.9 K/UL (ref 1–5.1)
LYMPHOCYTES NFR BLD: 29 %
MCH RBC QN AUTO: 26.6 PG (ref 26–34)
MCHC RBC AUTO-ENTMCNC: 31.7 G/DL (ref 31–36)
MCV RBC AUTO: 83.7 FL (ref 80–100)
MONOCYTES # BLD: 0.5 K/UL (ref 0–1.3)
MONOCYTES NFR BLD: 7.3 %
NEUTROPHILS # BLD: 3.9 K/UL (ref 1.7–7.7)
NEUTROPHILS NFR BLD: 58.5 %
PLATELET # BLD AUTO: 180 K/UL (ref 135–450)
PMV BLD AUTO: 7.6 FL (ref 5–10.5)
POTASSIUM SERPL-SCNC: 4.7 MMOL/L (ref 3.5–5.1)
PROT SERPL-MCNC: 7.2 G/DL (ref 6.4–8.2)
RBC # BLD AUTO: 3.67 M/UL (ref 4–5.2)
SODIUM SERPL-SCNC: 137 MMOL/L (ref 136–145)
WBC # BLD AUTO: 6.7 K/UL (ref 4–11)

## 2024-12-09 PROCEDURE — 36415 COLL VENOUS BLD VENIPUNCTURE: CPT

## 2024-12-09 PROCEDURE — 3044F HG A1C LEVEL LT 7.0%: CPT | Performed by: NURSE PRACTITIONER

## 2024-12-09 PROCEDURE — 1090F PRES/ABSN URINE INCON ASSESS: CPT | Performed by: NURSE PRACTITIONER

## 2024-12-09 PROCEDURE — 99214 OFFICE O/P EST MOD 30 MIN: CPT | Performed by: NURSE PRACTITIONER

## 2024-12-09 PROCEDURE — 80053 COMPREHEN METABOLIC PANEL: CPT

## 2024-12-09 PROCEDURE — G8420 CALC BMI NORM PARAMETERS: HCPCS | Performed by: NURSE PRACTITIONER

## 2024-12-09 PROCEDURE — 84443 ASSAY THYROID STIM HORMONE: CPT

## 2024-12-09 PROCEDURE — 82306 VITAMIN D 25 HYDROXY: CPT

## 2024-12-09 PROCEDURE — 80061 LIPID PANEL: CPT

## 2024-12-09 PROCEDURE — 1159F MED LIST DOCD IN RCRD: CPT | Performed by: NURSE PRACTITIONER

## 2024-12-09 PROCEDURE — 1036F TOBACCO NON-USER: CPT | Performed by: NURSE PRACTITIONER

## 2024-12-09 PROCEDURE — G8400 PT W/DXA NO RESULTS DOC: HCPCS | Performed by: NURSE PRACTITIONER

## 2024-12-09 PROCEDURE — 3075F SYST BP GE 130 - 139MM HG: CPT | Performed by: NURSE PRACTITIONER

## 2024-12-09 PROCEDURE — G2211 COMPLEX E/M VISIT ADD ON: HCPCS | Performed by: NURSE PRACTITIONER

## 2024-12-09 PROCEDURE — 3078F DIAST BP <80 MM HG: CPT | Performed by: NURSE PRACTITIONER

## 2024-12-09 PROCEDURE — G8484 FLU IMMUNIZE NO ADMIN: HCPCS | Performed by: NURSE PRACTITIONER

## 2024-12-09 PROCEDURE — G8427 DOCREV CUR MEDS BY ELIG CLIN: HCPCS | Performed by: NURSE PRACTITIONER

## 2024-12-09 PROCEDURE — 85025 COMPLETE CBC W/AUTO DIFF WBC: CPT

## 2024-12-09 PROCEDURE — 1123F ACP DISCUSS/DSCN MKR DOCD: CPT | Performed by: NURSE PRACTITIONER

## 2024-12-09 PROCEDURE — 83036 HEMOGLOBIN GLYCOSYLATED A1C: CPT

## 2024-12-09 ASSESSMENT — ENCOUNTER SYMPTOMS
EYES NEGATIVE: 1
GASTROINTESTINAL NEGATIVE: 1
RESPIRATORY NEGATIVE: 1

## 2024-12-09 NOTE — PATIENT INSTRUCTIONS
Please read the healthy family handout that you were given and share it with your family.       Please compare this printed medication list with your medications at home to be sure they are the same.  If you have any medications that are different please contact us immediately at [phone].     Also review your allergies that we have listed, these may also include medications that you have not been able to tolerate, make sure everything listed is correct. If you have any allergies that are different please contact us immediately at [phone].     You may receive a survey in the mail or by email asking about your experience during your visit today. Please complete and return to us so we know how we are serving you.

## 2024-12-09 NOTE — PROGRESS NOTES
Musculoskeletal:         General: Deformity present. Normal range of motion.      Cervical back: Normal range of motion and neck supple.      Right foot: Deformity present.      Comments: Limited ROM of bilateral knees, left shoulder      Right Lower Extremity: (metatarsal amputation)  Skin:     General: Skin is warm and dry.      Capillary Refill: Capillary refill takes 2 to 3 seconds.   Neurological:      Mental Status: She is alert and oriented to person, place, and time.   Psychiatric:         Mood and Affect: Mood normal.         Behavior: Behavior normal.        ASSESSMENT/PLAN:   1. Chronic atrial fibrillation (HCC)/Coronary artery disease involving native coronary artery of native heart without angina pectoris  Stable.  Managed by cardiology.  Patient is scheduled for left heart cath on 12/11.  Patient denies any dizziness currently.  Patient has reported dizziness in the past.  Patient currently on losartan 50 mg twice a day, metoprolol succinate 25 mg at bedtime, simvastatin 20 mg nightly, Eliquis 5 mg twice a day.  Continue with current treatment and management follow-up in 6 months, sooner for new or worsening symptoms.    2. Benign essential HTN  Stable.  Patient counseled importance of monitoring blood pressure at home.  Patient currently on losartan 50 mg twice a day and metoprolol 25 mg nightly.  Continue current treatment and management follow-up in 6 months, sooner for new or worsening symptoms.    3. Cerebrovascular accident (CVA), unspecified mechanism (HCC)  Stable.  No new or worsening symptoms.  Patient continues to have chronic left-sided weakness.  We did discuss the importance of strengthening exercises to promote muscle strength and avoid further wasting.  Patient verbalized and acknowledges of plan of care at this time.    4. Chronic deep vein thrombosis (DVT) of left peroneal vein (HCC)  Stable.  Patient compliant with chronic anticoagulation therapy.  Continue current treatment and

## 2024-12-10 LAB
25(OH)D3 SERPL-MCNC: 27.3 NG/ML
CHOLEST SERPL-MCNC: 128 MG/DL (ref 0–199)
EST. AVERAGE GLUCOSE BLD GHB EST-MCNC: 116.9 MG/DL
HBA1C MFR BLD: 5.7 %
HDLC SERPL-MCNC: 51 MG/DL (ref 40–60)
LDL CHOLESTEROL: 65 MG/DL
TRIGL SERPL-MCNC: 59 MG/DL (ref 0–150)
TSH SERPL DL<=0.005 MIU/L-ACNC: 1.41 UIU/ML (ref 0.27–4.2)
VLDLC SERPL CALC-MCNC: 12 MG/DL

## 2024-12-11 ENCOUNTER — HOSPITAL ENCOUNTER (OUTPATIENT)
Age: 79
Discharge: HOME | End: 2024-12-11
Attending: INTERNAL MEDICINE | Admitting: INTERNAL MEDICINE
Payer: MEDICARE

## 2024-12-11 VITALS
RESPIRATION RATE: 19 BRPM | WEIGHT: 126.1 LBS | OXYGEN SATURATION: 98 % | DIASTOLIC BLOOD PRESSURE: 47 MMHG | HEART RATE: 60 BPM | BODY MASS INDEX: 20.36 KG/M2 | SYSTOLIC BLOOD PRESSURE: 130 MMHG

## 2024-12-11 DIAGNOSIS — R94.39 ABNORMAL STRESS TEST: ICD-10-CM

## 2024-12-11 LAB
ANION GAP SERPL CALCULATED.3IONS-SCNC: 12 MMOL/L (ref 3–16)
BUN SERPL-MCNC: 27 MG/DL (ref 7–20)
CALCIUM SERPL-MCNC: 9.9 MG/DL (ref 8.3–10.6)
CHLORIDE SERPL-SCNC: 104 MMOL/L (ref 99–110)
CHOLEST SERPL-MCNC: 143 MG/DL (ref 0–199)
CO2 SERPL-SCNC: 25 MMOL/L (ref 21–32)
CREAT SERPL-MCNC: 0.9 MG/DL (ref 0.6–1.2)
DEPRECATED RDW RBC AUTO: 15.7 % (ref 12.4–15.4)
EKG ATRIAL RATE: 76 BPM
EKG DIAGNOSIS: NORMAL
EKG Q-T INTERVAL: 506 MS
EKG QRS DURATION: 192 MS
EKG QTC CALCULATION (BAZETT): 542 MS
EKG R AXIS: 51 DEGREES
EKG T AXIS: 108 DEGREES
EKG VENTRICULAR RATE: 69 BPM
GFR SERPLBLD CREATININE-BSD FMLA CKD-EPI: 65 ML/MIN/{1.73_M2}
GLUCOSE SERPL-MCNC: 136 MG/DL (ref 70–99)
HCT VFR BLD AUTO: 32 % (ref 36–48)
HDLC SERPL-MCNC: 54 MG/DL (ref 40–60)
HGB BLD-MCNC: 10.3 G/DL (ref 12–16)
INR PPP: 1.13 (ref 0.85–1.15)
LDLC SERPL CALC-MCNC: 80 MG/DL
MCH RBC QN AUTO: 26.6 PG (ref 26–34)
MCHC RBC AUTO-ENTMCNC: 32.2 G/DL (ref 31–36)
MCV RBC AUTO: 82.5 FL (ref 80–100)
PLATELET # BLD AUTO: 192 K/UL (ref 135–450)
PMV BLD AUTO: 7.4 FL (ref 5–10.5)
POTASSIUM SERPL-SCNC: 4.6 MMOL/L (ref 3.5–5.1)
PROTHROMBIN TIME: 14.7 SEC (ref 11.9–14.9)
RBC # BLD AUTO: 3.88 M/UL (ref 4–5.2)
SODIUM SERPL-SCNC: 141 MMOL/L (ref 136–145)
TRIGL SERPL-MCNC: 43 MG/DL (ref 0–150)
VLDLC SERPL CALC-MCNC: 9 MG/DL
WBC # BLD AUTO: 6.9 K/UL (ref 4–11)

## 2024-12-11 PROCEDURE — 2500000003 HC RX 250 WO HCPCS: Performed by: INTERNAL MEDICINE

## 2024-12-11 PROCEDURE — 6360000004 HC RX CONTRAST MEDICATION: Performed by: INTERNAL MEDICINE

## 2024-12-11 PROCEDURE — C1894 INTRO/SHEATH, NON-LASER: HCPCS | Performed by: INTERNAL MEDICINE

## 2024-12-11 PROCEDURE — 99152 MOD SED SAME PHYS/QHP 5/>YRS: CPT | Performed by: INTERNAL MEDICINE

## 2024-12-11 PROCEDURE — 93458 L HRT ARTERY/VENTRICLE ANGIO: CPT | Performed by: INTERNAL MEDICINE

## 2024-12-11 PROCEDURE — 6360000002 HC RX W HCPCS: Performed by: INTERNAL MEDICINE

## 2024-12-11 PROCEDURE — 80061 LIPID PANEL: CPT

## 2024-12-11 PROCEDURE — 80048 BASIC METABOLIC PNL TOTAL CA: CPT

## 2024-12-11 PROCEDURE — 93005 ELECTROCARDIOGRAM TRACING: CPT | Performed by: INTERNAL MEDICINE

## 2024-12-11 PROCEDURE — 85610 PROTHROMBIN TIME: CPT

## 2024-12-11 PROCEDURE — 6370000000 HC RX 637 (ALT 250 FOR IP): Performed by: INTERNAL MEDICINE

## 2024-12-11 PROCEDURE — 2709999900 HC NON-CHARGEABLE SUPPLY: Performed by: INTERNAL MEDICINE

## 2024-12-11 PROCEDURE — 93010 ELECTROCARDIOGRAM REPORT: CPT | Performed by: INTERNAL MEDICINE

## 2024-12-11 PROCEDURE — 7100000011 HC PHASE II RECOVERY - ADDTL 15 MIN: Performed by: INTERNAL MEDICINE

## 2024-12-11 PROCEDURE — C1769 GUIDE WIRE: HCPCS | Performed by: INTERNAL MEDICINE

## 2024-12-11 PROCEDURE — 2580000003 HC RX 258: Performed by: INTERNAL MEDICINE

## 2024-12-11 PROCEDURE — 7100000010 HC PHASE II RECOVERY - FIRST 15 MIN: Performed by: INTERNAL MEDICINE

## 2024-12-11 PROCEDURE — 85027 COMPLETE CBC AUTOMATED: CPT

## 2024-12-11 RX ORDER — CARVEDILOL 6.25 MG/1
6.25 TABLET ORAL 2 TIMES DAILY
Qty: 60 TABLET | Refills: 3 | Status: SHIPPED | OUTPATIENT
Start: 2024-12-11

## 2024-12-11 RX ORDER — ASPIRIN 325 MG
325 TABLET ORAL ONCE
Status: COMPLETED | OUTPATIENT
Start: 2024-12-11 | End: 2024-12-11

## 2024-12-11 RX ORDER — SODIUM CHLORIDE 0.9 % (FLUSH) 0.9 %
5-40 SYRINGE (ML) INJECTION PRN
Status: DISCONTINUED | OUTPATIENT
Start: 2024-12-11 | End: 2024-12-11 | Stop reason: HOSPADM

## 2024-12-11 RX ORDER — SODIUM CHLORIDE 9 MG/ML
INJECTION, SOLUTION INTRAVENOUS CONTINUOUS PRN
Status: COMPLETED | OUTPATIENT
Start: 2024-12-11 | End: 2024-12-11

## 2024-12-11 RX ORDER — SODIUM CHLORIDE 0.9 % (FLUSH) 0.9 %
5-40 SYRINGE (ML) INJECTION PRN
OUTPATIENT
Start: 2024-12-11

## 2024-12-11 RX ORDER — LORAZEPAM 0.5 MG/1
0.5 TABLET ORAL
Status: DISCONTINUED | OUTPATIENT
Start: 2024-12-11 | End: 2024-12-11 | Stop reason: HOSPADM

## 2024-12-11 RX ORDER — ONDANSETRON 2 MG/ML
4 INJECTION INTRAMUSCULAR; INTRAVENOUS EVERY 6 HOURS PRN
Status: DISCONTINUED | OUTPATIENT
Start: 2024-12-11 | End: 2024-12-11 | Stop reason: HOSPADM

## 2024-12-11 RX ORDER — SODIUM CHLORIDE 9 MG/ML
INJECTION, SOLUTION INTRAVENOUS PRN
Status: DISCONTINUED | OUTPATIENT
Start: 2024-12-11 | End: 2024-12-11 | Stop reason: HOSPADM

## 2024-12-11 RX ORDER — SODIUM CHLORIDE 0.9 % (FLUSH) 0.9 %
5-40 SYRINGE (ML) INJECTION EVERY 12 HOURS SCHEDULED
Status: DISCONTINUED | OUTPATIENT
Start: 2024-12-11 | End: 2024-12-11 | Stop reason: HOSPADM

## 2024-12-11 RX ORDER — MIDAZOLAM HYDROCHLORIDE 1 MG/ML
INJECTION, SOLUTION INTRAMUSCULAR; INTRAVENOUS PRN
Status: DISCONTINUED | OUTPATIENT
Start: 2024-12-11 | End: 2024-12-11 | Stop reason: HOSPADM

## 2024-12-11 RX ORDER — SODIUM CHLORIDE 0.9 % (FLUSH) 0.9 %
5-40 SYRINGE (ML) INJECTION EVERY 12 HOURS SCHEDULED
OUTPATIENT
Start: 2024-12-11

## 2024-12-11 RX ORDER — HEPARIN SODIUM 1000 [USP'U]/ML
INJECTION, SOLUTION INTRAVENOUS; SUBCUTANEOUS PRN
Status: DISCONTINUED | OUTPATIENT
Start: 2024-12-11 | End: 2024-12-11 | Stop reason: HOSPADM

## 2024-12-11 RX ORDER — SODIUM CHLORIDE 9 MG/ML
INJECTION, SOLUTION INTRAVENOUS PRN
OUTPATIENT
Start: 2024-12-11

## 2024-12-11 RX ORDER — IOPAMIDOL 755 MG/ML
INJECTION, SOLUTION INTRAVASCULAR PRN
Status: DISCONTINUED | OUTPATIENT
Start: 2024-12-11 | End: 2024-12-11 | Stop reason: HOSPADM

## 2024-12-11 RX ORDER — LIDOCAINE HYDROCHLORIDE 20 MG/ML
INJECTION, SOLUTION EPIDURAL; INFILTRATION; INTRACAUDAL; PERINEURAL PRN
Status: DISCONTINUED | OUTPATIENT
Start: 2024-12-11 | End: 2024-12-11 | Stop reason: HOSPADM

## 2024-12-11 RX ORDER — FENTANYL CITRATE 50 UG/ML
INJECTION, SOLUTION INTRAMUSCULAR; INTRAVENOUS PRN
Status: DISCONTINUED | OUTPATIENT
Start: 2024-12-11 | End: 2024-12-11 | Stop reason: HOSPADM

## 2024-12-11 RX ORDER — ACETAMINOPHEN 325 MG/1
650 TABLET ORAL EVERY 4 HOURS PRN
OUTPATIENT
Start: 2024-12-11

## 2024-12-11 RX ADMIN — ASPIRIN 325 MG: 325 TABLET ORAL at 08:39

## 2024-12-11 RX ADMIN — SODIUM CHLORIDE: 9 INJECTION, SOLUTION INTRAVENOUS at 08:39

## 2024-12-11 NOTE — PROGRESS NOTES
PRELIMINARY PROCEDURE REPORT        INDICATION FOR PROCEDURE  New cardiomyopathy/LV dysfunction, abnormal stress test      PROCEDURE FINDINGS  Successful left heart cath via right radial approach, access closed with TR band with good hemostasis  Multivessel heavily calcified obstructive CAD involving ostial large diagonal branch as well as large right PLV suggesting ischemic cause of cardiomyopathy  Mildly reduced LV function, LVEF 45% with anterolateral hypokinesis  Mildly elevated left-sided filling pressures, LVEDP 15 mmHg      PLAN/RECOMMENDATIONS  Given complex nature of CAD, distal location of lesions and heavy calcifications as well as patient's advanced age and frail status, aggressive medical and antianginal therapy is recommended at this time as risks of intervention likely outweigh benefits  I will change metoprolol to carvedilol for better antianginal profile  Lengthy discussion held with the patient and patient's son, they seem to be in full agreement with this plan and conservative management  Resume follow-up with outpatient cardiology  Resume Eliis tomorrow morning if no access site complication      No complications.    See full report for details.      Darian Hannah MD, FACC, [facility]  Interventional Cardiology  [facility]  [phone] (c)

## 2024-12-11 NOTE — DISCHARGE INSTRUCTIONS
Cath Labs at  [facility]   Discharge Instructions        12/11/2024  Xuan Romero   Date of Birth 1945       Activity:  No driving for 24 hours.  In 24 hours you may remove dressing and shower, wash site gently with soap and water and leave open to air  Avoid submerging your arm in sitting water for 5 days.  Do not use your right hand for 24 hours, then  No lifting more than 5 pounds for 5 days.   No lotions, powders, or ointments near site for 5 days.   No work/school for 5 days unless instructed otherwise by your cardiologist.    Diet:   Resume previous diet, if a cardiac diet is specified you will receive a handout with  general guidelines.   Drink extra non-alcoholic/decaffienated fluids for first 24 hours after your procedure.    Arm Management:  If bleeding occurs from the site or a hematoma (lump) begins to increase in size, apply pressure directly over the site, call 911 to return to the hospital.    Special Instructions:  Report any coolness or numbness in the arm  Report any chills, fever, itching, red bumps or rash   Report any of the following to the MD: drainage from the site, redness and/or swelling at the site, increased tenderness at the site   If you are currently taking Metformin or Metformin combination medications for Diabetes, hold your dose for 48 hours after your procedure.  Consult your Cardiologist before taking any NSAIDS, vitamin supplements, estrogen, or estrogen plus progestin.  Do not stop taking Plavix, Brilinta or Effient, without first consulting your cardiologist.    Sedation Discharge Instructions:  For the next 24 hours do not drive a car, operate machinery, power tools or kitchen appliances.    Do not drink alcohol; including beer or wine.    Do not make any important decisions or sign any important papers.  For the next 24 hours you can expect drowsiness, light-headed or dizziness, nausea/ vomiting, inability to concentrate, fatigue and desire to sleep.  We strongly

## 2024-12-11 NOTE — H&P
Sedation Pre-Procedure Note    Patient Name: Xuan Romero   YOB: 1945  Room/Bed: [facility] Room/PL  Medical Record Number: [id number]  Date: 12/11/2024   Time: 9:35 AM       Indication: Abnormal stress test, cardiomyopathy    Consent: I have discussed with the patient and/or the patient representative the indication, alternatives, and the possible risks and/or complications of the planned procedure and the anesthesia methods.  Risks including MI, cardiac failure, cardiac arrest and other fatal and nonfatal dysrhythmias, excessive bleeding, stroke and others discussed at length with the patient the patient and/or patient representative appear to understand and agree to proceed.    Vital Signs:   Vitals:    12/11/24 0832   BP: (!) 158/88   SpO2: 100%       Past Medical History:   has a past medical history of Acute blood loss anemia, Acute CVA (cerebrovascular accident) ([facility]), Acute osteomyelitis of left foot, Acute osteomyelitis of right foot, Advance care planning, CARY (acute kidney injury) ([facility]), Atrial fibrillation ([facility]), Blood transfusion, Bradycardia, CAD (coronary artery disease), Cellulitis of right lower extremity, CVA (cerebral vascular accident) ([facility]), CVA (cerebrovascular accident due to intracerebral hemorrhage) ([facility]), Diabetes mellitus type II, Diabetic neuropathy ([facility]), Electrolyte imbalance, Encounter for palliative care, Gastric ulcer, Gastrointestinal hemorrhage, H. pylori infection, Hyperlipidemia, Hypertension, Hypertension, Longstanding persistent atrial fibrillation ([facility]), Numbness and tingling of left leg, Osteoarthritis, Osteomyelitis of right foot, Other specified symptoms and signs involving the circulatory and respiratory systems, Poor historian, Presence of temporary transvenous cardiac pacemaker, Primary hypertension, PVD (peripheral vascular disease) ([facility]), Sick sinus syndrome ([facility]), SSS (sick sinus syndrome) ([facility]), Symptomatic bradycardia, Unable to care for self,

## 2024-12-12 ENCOUNTER — TELEPHONE (OUTPATIENT)
Dept: FAMILY MEDICINE CLINIC | Age: 79
End: 2024-12-12

## 2024-12-12 NOTE — TELEPHONE ENCOUNTER
Chan, son, called b/c he wanted to make sure he got Xuan the correct iron pills.   He got her Centrum for Women with iron but he can't read the bottle to know how much to give her and how much iron is in a serving.  He will find out and them call me back.   Chan called back and the Centrum has 18mg Iron per serving.    Doesn't say how often to give it to her.  He's assuming just one per day.   Please advise.

## 2024-12-16 ENCOUNTER — HOSPITAL ENCOUNTER (OUTPATIENT)
Age: 79
Discharge: HOME | End: 2024-12-18
Attending: INTERNAL MEDICINE
Payer: MEDICARE

## 2024-12-16 DIAGNOSIS — I25.10 CORONARY ARTERY DISEASE INVOLVING NATIVE CORONARY ARTERY OF NATIVE HEART WITHOUT ANGINA PECTORIS: ICD-10-CM

## 2024-12-16 DIAGNOSIS — I48.11 LONGSTANDING PERSISTENT ATRIAL FIBRILLATION (HCC): ICD-10-CM

## 2024-12-16 DIAGNOSIS — I63.9 CEREBROVASCULAR ACCIDENT (CVA), UNSPECIFIED MECHANISM (HCC): ICD-10-CM

## 2024-12-16 DIAGNOSIS — R09.89 OTHER SPECIFIED SYMPTOMS AND SIGNS INVOLVING THE CIRCULATORY AND RESPIRATORY SYSTEMS: ICD-10-CM

## 2024-12-16 LAB
VAS LEFT ARM BP: 164 MMHG
VAS LEFT CCA DIST EDV: 16.2 CM/S
VAS LEFT CCA DIST PSV: 73.9 CM/S
VAS LEFT CCA MID EDV: 13 CM/S
VAS LEFT CCA MID PSV: 78.3 CM/S
VAS LEFT CCA PROX EDV: 14.3 CM/S
VAS LEFT CCA PROX PSV: 78.9 CM/S
VAS LEFT ECA PSV: 65.9 CM/S
VAS LEFT ICA DIST EDV: 15.3 CM/S
VAS LEFT ICA DIST PSV: 71.7 CM/S
VAS LEFT ICA MID EDV: 22 CM/S
VAS LEFT ICA MID PSV: 88.9 CM/S
VAS LEFT ICA PROX EDV: 17.4 CM/S
VAS LEFT ICA PROX PSV: 95.1 CM/S
VAS LEFT ICA/CCA PSV: 1.21
VAS LEFT SUBCLAVIAN PROX PSV: 47.5 CM/S
VAS LEFT VERTEBRAL EDV: 18.1 CM/S
VAS LEFT VERTEBRAL PSV: 76.3 CM/S
VAS RIGHT ARM BP: 178 MMHG
VAS RIGHT CCA DIST EDV: 13.6 CM/S
VAS RIGHT CCA DIST PSV: 63.2 CM/S
VAS RIGHT CCA MID EDV: 16.2 CM/S
VAS RIGHT CCA MID PSV: 65.9 CM/S
VAS RIGHT CCA PROX EDV: 12.7 CM/S
VAS RIGHT CCA PROX PSV: 60.6 CM/S
VAS RIGHT ECA PSV: 190 CM/S
VAS RIGHT ICA DIST EDV: 17.7 CM/S
VAS RIGHT ICA DIST PSV: 73.8 CM/S
VAS RIGHT ICA MID EDV: 15.4 CM/S
VAS RIGHT ICA MID PSV: 121 CM/S
VAS RIGHT ICA PROX EDV: 38.4 CM/S
VAS RIGHT ICA PROX PSV: 187 CM/S
VAS RIGHT ICA/CCA PSV: 2.84
VAS RIGHT SUBCLAVIAN PROX PSV: 32.8 CM/S
VAS RIGHT VERTEBRAL PSV: 0 CM/S

## 2024-12-16 PROCEDURE — 93880 EXTRACRANIAL BILAT STUDY: CPT

## 2024-12-16 PROCEDURE — 93880 EXTRACRANIAL BILAT STUDY: CPT | Performed by: SURGERY

## 2024-12-17 ENCOUNTER — TELEPHONE (OUTPATIENT)
Dept: CARDIOLOGY CLINIC | Age: 79
End: 2024-12-17

## 2024-12-17 DIAGNOSIS — I65.21 OCCLUSION AND STENOSIS OF RIGHT CAROTID ARTERY: Primary | ICD-10-CM

## 2024-12-17 NOTE — TELEPHONE ENCOUNTER
PT daughter in law Evy returned call, medical staff unavailable at time of call to relay results.

## 2024-12-17 NOTE — RESULT ENCOUNTER NOTE
Yesica De La Cruz3 hours ago (12:58 PM)    AP  PT daughter in law Ratliff returned call, medical staff unavailable at time of call to relay results.

## 2024-12-21 ENCOUNTER — HOSPITAL ENCOUNTER (EMERGENCY)
Age: 79
Discharge: HOME | End: 2024-12-21
Payer: MEDICARE

## 2024-12-21 VITALS
BODY MASS INDEX: 21.51 KG/M2 | OXYGEN SATURATION: 99 % | HEART RATE: 65 BPM | RESPIRATION RATE: 16 BRPM | SYSTOLIC BLOOD PRESSURE: 171 MMHG | HEIGHT: 64 IN | DIASTOLIC BLOOD PRESSURE: 89 MMHG | TEMPERATURE: 97.8 F | WEIGHT: 126 LBS

## 2024-12-21 DIAGNOSIS — K56.41 FECAL IMPACTION IN RECTUM (HCC): Primary | ICD-10-CM

## 2024-12-21 DIAGNOSIS — K59.00 CONSTIPATION, UNSPECIFIED CONSTIPATION TYPE: ICD-10-CM

## 2024-12-21 PROCEDURE — 99283 EMERGENCY DEPT VISIT LOW MDM: CPT

## 2024-12-21 RX ORDER — POLYETHYLENE GLYCOL 3350 17 G/17G
17 POWDER, FOR SOLUTION ORAL DAILY
Qty: 510 G | Refills: 0 | Status: SHIPPED | OUTPATIENT
Start: 2024-12-21 | End: 2025-01-20

## 2024-12-21 NOTE — ED NOTES
Discharge instructions reviewed with patient and family and they verbalize understanding. Patient wheeled out of the ED by RN.

## 2024-12-21 NOTE — ED PROVIDER NOTES
[facility] ED  EMERGENCY DEPARTMENT ENCOUNTER        Pt Name: Xuan Romero  MRN: [id number]  Birthdate 1945  Date of evaluation: 12/21/2024  Provider: Lauren Palm PA-C  PCP: Mayelin Celis APRN - CNP  Note Started: 6:08 PM EST 12/21/24      BREONNA. I have evaluated this patient.        CHIEF COMPLAINT       Chief Complaint   Patient presents with    Constipation       HISTORY OF PRESENT ILLNESS: 1 or more Elements     History From: patient and family            Chief Complaint: constipation     Xuan Romero is a 79 y.o. female who presents with complaints of constipation.  She states that her \"poop is stuck \".  She has pain in her rectum.  She denies nausea vomiting abdominal pain fever.  She has a history of chronic constipation.  She is with family at bedside who have been trying stool softeners over the past couple days but patient does not like to take this.    Nursing Notes were all reviewed and agreed with or any disagreements were addressed in the HPI.    REVIEW OF SYSTEMS :      Review of Systems    Positives and Pertinent negatives as per HPI.     SURGICAL HISTORY     Past Surgical History:   Procedure Laterality Date    ANGIOPLASTY  12/30/15    JALEESA Schumacher, PTA of distal sfa/pop/peroneal    CARDIAC CATHETERIZATION  9/21/12    done for surgical clearance, cath was negative    COLONOSCOPY  06/09/2022    COLONOSCOPY N/A 6/9/2022    COLONOSCOPY CONTROL HEMORRHAGE performed by Nader Layne MD at [facility]    CORONARY ANGIOPLASTY WITH STENT PLACEMENT  2006,2007    FOOT SURGERY Left 01/11/2018    DEBRIDEMENT OF ULCERS LEFT FOOT AND LEG WITH GRAFT    OTHER SURGICAL HISTORY Left 06/05/2017    Left Femoral Peroneal Bypass    PACEMAKER INSERTION/ REMOVAL  7/27/2023    SHOULDER ARTHROPLASTY  10/5/12    RIGHT SHOULDER TOTAL ARTHROPLASTY, BICEPS TENODESIS DEPUY, GLOBAL ADVANTAGE AP    TOE AMPUTATION Right 5/23/2022    AMPUTATION OF RIGHT FIRST AND FOURTH TOES  Motor Response: Obeys commands  Sulma Coma Scale Score: 15              CIWA Assessment  BP: (!) 171/89  Pulse: 65             PHYSICAL EXAM  1 or more Elements     ED Triage Vitals [12/21/24 1315]   BP Systolic BP Percentile Diastolic BP Percentile Temp Temp Source Pulse Respirations SpO2   (!) 171/89 -- -- 97.8 °F (36.6 °C) Oral 65 16 99 %      Height Weight - Scale         1.626 m (5' 4\") 57.2 kg (126 lb)             Physical Exam  Constitutional:       General: She is not in acute distress.     Appearance: Normal appearance. She is not ill-appearing.      Comments: Elderly female pleasant appears uncomfortable family at bedside   HENT:      Head: Normocephalic and atraumatic.   Eyes:      General: No scleral icterus.     Extraocular Movements: Extraocular movements intact.      Conjunctiva/sclera: Conjunctivae normal.   Cardiovascular:      Rate and Rhythm: Normal rate and regular rhythm.      Heart sounds: Normal heart sounds.   Pulmonary:      Effort: Pulmonary effort is normal. No respiratory distress.      Breath sounds: Normal breath sounds.   Abdominal:      General: Abdomen is flat. There is no distension.      Palpations: Abdomen is soft.      Tenderness: There is no abdominal tenderness. There is no right CVA tenderness, left CVA tenderness or guarding.   Genitourinary:     Comments: Rectal exam with a severe fecal impaction no bleeding  Musculoskeletal:      Cervical back: Normal range of motion and neck supple.   Skin:     General: Skin is warm and dry.      Findings: No rash.   Neurological:      General: No focal deficit present.      Mental Status: She is alert and oriented to person, place, and time. Mental status is at baseline.   Psychiatric:         Mood and Affect: Mood normal.         Behavior: Behavior normal.         Thought Content: Thought content normal.         Judgment: Judgment normal.           DIAGNOSTIC RESULTS   LABS:    Labs Reviewed - No data to display    When ordered only

## 2024-12-21 NOTE — ED TRIAGE NOTES
Patient presents to the ED from home with c/o constipation x 2 weeks. Denies fever abdominal pain. Reports some rectal pain.

## 2024-12-21 NOTE — DISCHARGE INSTRUCTIONS
Please continue increased hydration and take stool softener daily.  Please call GI and follow-up regarding her constipation and for colonoscopy

## 2024-12-24 DIAGNOSIS — R93.89 ABNORMAL CAROTID ULTRASOUND: Primary | ICD-10-CM

## 2025-01-09 ENCOUNTER — APPOINTMENT (OUTPATIENT)
Dept: NUCLEAR MEDICINE | Age: 80
End: 2025-01-09
Attending: INTERNAL MEDICINE
Payer: MEDICARE

## 2025-01-09 ENCOUNTER — APPOINTMENT (OUTPATIENT)
Age: 80
End: 2025-01-09
Attending: INTERNAL MEDICINE
Payer: MEDICARE

## 2025-01-09 ENCOUNTER — HOSPITAL ENCOUNTER (OUTPATIENT)
Age: 80
Discharge: HOME | End: 2025-01-11
Attending: INTERNAL MEDICINE
Payer: MEDICARE

## 2025-01-09 VITALS
HEIGHT: 64 IN | WEIGHT: 126 LBS | BODY MASS INDEX: 21.51 KG/M2 | SYSTOLIC BLOOD PRESSURE: 171 MMHG | DIASTOLIC BLOOD PRESSURE: 89 MMHG

## 2025-01-09 DIAGNOSIS — I48.20 CHRONIC ATRIAL FIBRILLATION (HCC): ICD-10-CM

## 2025-01-09 DIAGNOSIS — I65.23 BILATERAL CAROTID ARTERY STENOSIS: Primary | ICD-10-CM

## 2025-01-09 LAB
ECHO AO ROOT DIAM: 3.1 CM
ECHO AO ROOT INDEX: 1.93 CM/M2
ECHO AV CUSP MM: 1.5 CM
ECHO AV PEAK GRADIENT: 5 MMHG
ECHO AV PEAK VELOCITY: 1.1 M/S
ECHO BSA: 1.61 M2
ECHO EST RA PRESSURE: 8 MMHG
ECHO LA AREA 2C: 20.2 CM2
ECHO LA AREA 4C: 27.9 CM2
ECHO LA MAJOR AXIS: 6.1 CM
ECHO LA MINOR AXIS: 5.2 CM
ECHO LA VOL BP: 78 ML (ref 22–52)
ECHO LA VOL MOD A2C: 55 ML (ref 22–52)
ECHO LA VOL MOD A4C: 97 ML (ref 22–52)
ECHO LA VOL/BSA BIPLANE: 48 ML/M2 (ref 16–34)
ECHO LA VOLUME INDEX MOD A2C: 34 ML/M2 (ref 16–34)
ECHO LA VOLUME INDEX MOD A4C: 60 ML/M2 (ref 16–34)
ECHO LV E' LATERAL VELOCITY: 10.7 CM/S
ECHO LV E' SEPTAL VELOCITY: 9.46 CM/S
ECHO LV EF PHYSICIAN: 38 %
ECHO LV INTERNAL DIMENSION DIASTOLE INDEX: 3.11 CM/M2
ECHO LV INTERNAL DIMENSION DIASTOLIC: 5 CM (ref 3.9–5.3)
ECHO LV ISOVOLUMETRIC RELAXATION TIME (IVRT): 100 MS
ECHO LV IVSD: 1.2 CM (ref 0.6–0.9)
ECHO LV MASS 2D: 220.3 G (ref 67–162)
ECHO LV MASS INDEX 2D: 136.8 G/M2 (ref 43–95)
ECHO LV POSTERIOR WALL DIASTOLIC: 1.1 CM (ref 0.6–0.9)
ECHO LV RELATIVE WALL THICKNESS RATIO: 0.44
ECHO MV A VELOCITY: 0.55 M/S
ECHO MV E VELOCITY: 0.85 M/S
ECHO MV E/A RATIO: 1.55
ECHO MV E/E' LATERAL: 7.94
ECHO MV E/E' RATIO (AVERAGED): 8.46
ECHO MV E/E' SEPTAL: 8.99
ECHO PV MAX VELOCITY: 0.7 M/S
ECHO PV PEAK GRADIENT: 2 MMHG
ECHO RA AREA 4C: 20.2 CM2
ECHO RA END SYSTOLIC VOLUME APICAL 4 CHAMBER INDEX BSA: 39 ML/M2
ECHO RA VOLUME: 62 ML
ECHO RIGHT VENTRICULAR SYSTOLIC PRESSURE (RVSP): 35 MMHG
ECHO RV BASAL DIMENSION: 4.2 CM
ECHO RV FREE WALL PEAK S': 12.8 CM/S
ECHO RV LONGITUDINAL DIMENSION: 5.9 CM
ECHO RV MID DIMENSION: 3.1 CM
ECHO RV TAPSE: 2.5 CM (ref 1.7–?)
ECHO TV PEAK GRADIENT: 2 MMHG
ECHO TV REGURGITANT MAX VELOCITY: 2.58 M/S
ECHO TV REGURGITANT PEAK GRADIENT: 27 MMHG

## 2025-01-09 PROCEDURE — 93306 TTE W/DOPPLER COMPLETE: CPT

## 2025-01-14 ENCOUNTER — TELEPHONE (OUTPATIENT)
Dept: VASCULAR SURGERY | Age: 80
End: 2025-01-14

## 2025-01-16 ENCOUNTER — HOSPITAL ENCOUNTER (OUTPATIENT)
Dept: CT IMAGING | Age: 80
Discharge: HOME | End: 2025-01-16
Payer: MEDICARE

## 2025-01-16 DIAGNOSIS — I65.23 BILATERAL CAROTID ARTERY STENOSIS: ICD-10-CM

## 2025-01-16 PROCEDURE — 6360000004 HC RX CONTRAST MEDICATION: Performed by: SURGERY

## 2025-01-16 PROCEDURE — 70498 CT ANGIOGRAPHY NECK: CPT

## 2025-01-16 RX ORDER — MULTIVIT WITH MINERALS/LUTEIN
250 TABLET ORAL DAILY
Qty: 30 TABLET | Refills: 3 | Status: SHIPPED | OUTPATIENT
Start: 2025-01-16

## 2025-01-16 RX ORDER — IOPAMIDOL 755 MG/ML
75 INJECTION, SOLUTION INTRAVASCULAR
Status: COMPLETED | OUTPATIENT
Start: 2025-01-16 | End: 2025-01-16

## 2025-01-16 RX ADMIN — IOPAMIDOL 75 ML: 755 INJECTION, SOLUTION INTRAVENOUS at 11:46

## 2025-01-16 NOTE — PROGRESS NOTES
regurgitation.   IVC size is dilated (>2.1 cm) and collapses < 50% with respiration   consistent with elevated RA pressure (15 mmHg).   Estimated pulmonary artery systolic pressure is moderately elevated at 72   mmHg assuming a right atrial pressure of 15 mmHg.    Stress Test 4/5/24   Summary   Large size, moderate intensity, partially-reversible inferior wall perfusion   defect. Moderate size, moderate-intensity, partially-reversible anteroapical   perfusion defect. Inferior hypokinesis with post-stress LVEF low at 44%. No   visual or calculated TID. Findings are suggestive of LAD and/or RCA   territory mixed ischemia/scar.   Abnormal study. Overall findings represent a high risk scan    Cardiac catheterization 12/23/24  Successful left heart cath via right radial approach, access closed with TR band with good hemostasis  Multivessel heavily calcified obstructive CAD involving ostial large diagonal branch as well as large right PLV suggesting ischemic cause of cardiomyopathy  Mildly reduced LV function, LVEF 45% with anterolateral hypokinesis  Mildly elevated left-sided filling pressures, LVEDP 15 mmHg    Additional studies:   Carotid ultrasound 12/16/24    50-69% stenosis in the right internal carotid artery.  This may be underestimated due calcific shadowing.    <50% stenosis in the left internal carotid artery.    Occluded right vertebral artery.    Normal antegrade flow involving the left vertebral artery.    Impression and Plan:      Severe multivessel CAD, med mgmt only  Acute HFrEF  AFIB  PAD -- R carotid artery stenosis  H/o CVA with residual left sided weakness  CHB s/p PPM  H/o GI Bleed  DM  HTN  HLP    Plan:  Increase Coreg (carvedilol) to 12.5 mg in the morning and evening.   You can take 2 tablets in the morning and evening of the dose you have at home until you  your new script.   Stop metoprolol succinate (Toprol XL).   Monitor your blood pressure at home, if the top number is persistently

## 2025-01-17 ENCOUNTER — OFFICE VISIT (OUTPATIENT)
Dept: VASCULAR SURGERY | Age: 80
End: 2025-01-17
Payer: MEDICARE

## 2025-01-17 VITALS
BODY MASS INDEX: 21.51 KG/M2 | DIASTOLIC BLOOD PRESSURE: 60 MMHG | SYSTOLIC BLOOD PRESSURE: 130 MMHG | HEIGHT: 64 IN | WEIGHT: 126 LBS

## 2025-01-17 DIAGNOSIS — I65.21 STENOSIS OF RIGHT CAROTID ARTERY: Primary | ICD-10-CM

## 2025-01-17 PROCEDURE — 3075F SYST BP GE 130 - 139MM HG: CPT | Performed by: SURGERY

## 2025-01-17 PROCEDURE — G8420 CALC BMI NORM PARAMETERS: HCPCS | Performed by: SURGERY

## 2025-01-17 PROCEDURE — 1159F MED LIST DOCD IN RCRD: CPT | Performed by: SURGERY

## 2025-01-17 PROCEDURE — G8400 PT W/DXA NO RESULTS DOC: HCPCS | Performed by: SURGERY

## 2025-01-17 PROCEDURE — 1160F RVW MEDS BY RX/DR IN RCRD: CPT | Performed by: SURGERY

## 2025-01-17 PROCEDURE — 99214 OFFICE O/P EST MOD 30 MIN: CPT | Performed by: SURGERY

## 2025-01-17 PROCEDURE — 1090F PRES/ABSN URINE INCON ASSESS: CPT | Performed by: SURGERY

## 2025-01-17 PROCEDURE — 3078F DIAST BP <80 MM HG: CPT | Performed by: SURGERY

## 2025-01-17 PROCEDURE — 1123F ACP DISCUSS/DSCN MKR DOCD: CPT | Performed by: SURGERY

## 2025-01-17 PROCEDURE — 1036F TOBACCO NON-USER: CPT | Performed by: SURGERY

## 2025-01-17 PROCEDURE — G8427 DOCREV CUR MEDS BY ELIG CLIN: HCPCS | Performed by: SURGERY

## 2025-01-17 RX ORDER — CLOPIDOGREL BISULFATE 75 MG/1
75 TABLET ORAL DAILY
Qty: 30 TABLET | Refills: 0 | Status: SHIPPED | OUTPATIENT
Start: 2025-01-17

## 2025-01-17 NOTE — PROGRESS NOTES
Revascularization (TCAR)  Procedure, risks, benefits, and alternatives discussed and understood.        Edson Gillis MD, FACS

## 2025-01-20 ENCOUNTER — OFFICE VISIT (OUTPATIENT)
Age: 80
End: 2025-01-20
Payer: MEDICARE

## 2025-01-20 ENCOUNTER — PREP FOR PROCEDURE (OUTPATIENT)
Dept: VASCULAR SURGERY | Age: 80
End: 2025-01-20

## 2025-01-20 VITALS
DIASTOLIC BLOOD PRESSURE: 80 MMHG | HEART RATE: 83 BPM | HEIGHT: 64 IN | SYSTOLIC BLOOD PRESSURE: 160 MMHG | OXYGEN SATURATION: 99 % | BODY MASS INDEX: 21.63 KG/M2

## 2025-01-20 DIAGNOSIS — I65.23 BILATERAL CAROTID ARTERY STENOSIS: ICD-10-CM

## 2025-01-20 DIAGNOSIS — I65.21 STENOSIS OF RIGHT CAROTID ARTERY: ICD-10-CM

## 2025-01-20 DIAGNOSIS — Z01.818 PRE-OP TESTING: Primary | ICD-10-CM

## 2025-01-20 DIAGNOSIS — I48.20 CHRONIC ATRIAL FIBRILLATION (HCC): ICD-10-CM

## 2025-01-20 DIAGNOSIS — I50.21 ACUTE HFREF (HEART FAILURE WITH REDUCED EJECTION FRACTION) (HCC): ICD-10-CM

## 2025-01-20 DIAGNOSIS — I49.5 SSS (SICK SINUS SYNDROME) (HCC): ICD-10-CM

## 2025-01-20 DIAGNOSIS — I25.10 CORONARY ARTERY DISEASE INVOLVING NATIVE CORONARY ARTERY OF NATIVE HEART WITHOUT ANGINA PECTORIS: Primary | ICD-10-CM

## 2025-01-20 DIAGNOSIS — Z95.0 PACEMAKER: ICD-10-CM

## 2025-01-20 DIAGNOSIS — I10 BENIGN ESSENTIAL HTN: ICD-10-CM

## 2025-01-20 PROCEDURE — G8400 PT W/DXA NO RESULTS DOC: HCPCS | Performed by: INTERNAL MEDICINE

## 2025-01-20 PROCEDURE — 99214 OFFICE O/P EST MOD 30 MIN: CPT | Performed by: INTERNAL MEDICINE

## 2025-01-20 PROCEDURE — 3079F DIAST BP 80-89 MM HG: CPT | Performed by: INTERNAL MEDICINE

## 2025-01-20 PROCEDURE — G2211 COMPLEX E/M VISIT ADD ON: HCPCS | Performed by: INTERNAL MEDICINE

## 2025-01-20 PROCEDURE — 1159F MED LIST DOCD IN RCRD: CPT | Performed by: INTERNAL MEDICINE

## 2025-01-20 PROCEDURE — 3077F SYST BP >= 140 MM HG: CPT | Performed by: INTERNAL MEDICINE

## 2025-01-20 PROCEDURE — 1036F TOBACCO NON-USER: CPT | Performed by: INTERNAL MEDICINE

## 2025-01-20 PROCEDURE — 1090F PRES/ABSN URINE INCON ASSESS: CPT | Performed by: INTERNAL MEDICINE

## 2025-01-20 PROCEDURE — G8420 CALC BMI NORM PARAMETERS: HCPCS | Performed by: INTERNAL MEDICINE

## 2025-01-20 PROCEDURE — G8427 DOCREV CUR MEDS BY ELIG CLIN: HCPCS | Performed by: INTERNAL MEDICINE

## 2025-01-20 PROCEDURE — 1123F ACP DISCUSS/DSCN MKR DOCD: CPT | Performed by: INTERNAL MEDICINE

## 2025-01-20 RX ORDER — LOSARTAN POTASSIUM 50 MG/1
50 TABLET ORAL 2 TIMES DAILY
Qty: 180 TABLET | Refills: 3 | Status: SHIPPED | OUTPATIENT
Start: 2025-01-20

## 2025-01-20 RX ORDER — CARVEDILOL 12.5 MG/1
12.5 TABLET ORAL 2 TIMES DAILY
Qty: 120 TABLET | Refills: 3 | Status: SHIPPED | OUTPATIENT
Start: 2025-01-20

## 2025-01-20 RX ORDER — SODIUM CHLORIDE 0.9 % (FLUSH) 0.9 %
5-40 SYRINGE (ML) INJECTION EVERY 12 HOURS SCHEDULED
Status: CANCELLED | OUTPATIENT
Start: 2025-01-20

## 2025-01-20 RX ORDER — METOPROLOL SUCCINATE 25 MG/1
25 TABLET, EXTENDED RELEASE ORAL DAILY
COMMUNITY
End: 2025-01-20

## 2025-01-20 RX ORDER — SODIUM CHLORIDE 9 MG/ML
INJECTION, SOLUTION INTRAVENOUS PRN
Status: CANCELLED | OUTPATIENT
Start: 2025-01-20

## 2025-01-20 RX ORDER — ASPIRIN 81 MG/1
81 TABLET, CHEWABLE ORAL DAILY
COMMUNITY

## 2025-01-20 RX ORDER — SODIUM CHLORIDE 0.9 % (FLUSH) 0.9 %
5-40 SYRINGE (ML) INJECTION PRN
Status: CANCELLED | OUTPATIENT
Start: 2025-01-20

## 2025-01-20 NOTE — PROGRESS NOTES
Xuan BRANDT Heather    Age 79 y.o.    female    1945    MRN [id number]    1/29/2025  Arrival Time_____________  OR Time____________136 Min     Procedure(s):  RIGHT TRANSCAROTID ARTERY REVASCULARIZAITON STENT PLACEMENT                      General   Surgeon(s):  Edson Gillis, MD      DAY ADMIT ___  SDS/OP ___  OUTPT IN BED ___        Phone [phone] (home) [phone] (work)                 PCP _____________________ Phone_________________ Epic ( ) Epic CE ( ) Appt ________    NOTES: _________________________________________ Consult/Cardio _______________    ____________________________________________________________________________    ____________________________________________________________________________  PAT APPT DATE:________ TIME: ________  FAXED QAD: _______  (__) H&P w/ Hospitalist    (__) PAT orders in EPIC    (__) Meet with PAT nurse  __________________________________________________________________________  Preop Nurse phone screen complete: _____________  (__) CBC     (__) W/ DIFF ___________  (__) CT CHEST  __________   (__) Hgb A1C    ___________  (__) CHEST X RAY   __________  (__) LIPID PROFILE  ___________  (__) EKG   __________  (__) PT-INR / APTT  ___________  (__) PFT's   __________  (__) BMP   ___________  (__) CAROTIDS  __________  (__) CMP   ___________  (__) VEIN MAPPING  __________  (__) U/A   ___________  ( X ) HISTORY & PHYSICAL __________  (__) URINE C & S  ___________  (__) CARDIAC CLEARANCE __________  (__) U/A W/ FLEX  ___________  (__) PULM. CLEARANCE __________  (__) SERUM PREGNANCY ___________  (__) Preop Orders in EPIC __________  (__) TYPE & SCREEN __________repeat ( ) (__)  __________________ __________  (__) Albumin   ___________  (__)  __________________ __________  (__) TRANSFERRIN  ___________  (__)  __________________ __________  (__) LIVER PROFILE  ___________  (__) URINE PREG DOS __________  (__) MRSA NASAL SWAB ___________  (__) BLOOD SUGAR DOS __________  (__)

## 2025-01-21 RX ORDER — MULTIVIT-MIN/FERROUS GLUCONATE 9 MG/15 ML
15 LIQUID (ML) ORAL DAILY
COMMUNITY

## 2025-01-21 NOTE — PROGRESS NOTES
Surgery Date and Time:  1/29/25 @ 10:15 am    Arrival Time:  08:15 am        The instructions given when and if a patient needs to stop oral intake prior to surgery varies. Follow the instructions you were      given by your Surgeon or RN during the Pre-op call.      __X__Do not eat or drink anything after Midnight the night before the surgery. NO gum, mints, candy or ice chips the day of surgery.        Only take the following medications with a small sip of water the morning of surgery:  carvedilol, plavix, aspirin       Hold the following medications (per anesthesia or surgeon request): losartan      Last Dose:  1/27/25 am by 0700 am        Aspirin, Ibuprofen, Advil, Naproxen, Vitamin E and other Anti-inflammatory products and supplements should be stopped       for 5 -7days before surgery or as directed by your physician. Continue aspirin, take day of surgery with small sip of water.          Check with your Surgeon/PCP/Cardiologist regarding stopping Plavix, Coumadin, Eliquis, Lovenox, Effient, Pradaxa, Xarelto, Fragmin or        other blood thinners and follow their instructions.  Medication:  eliquis         Last dose:  1/27/25 am        Plavix-continue, take day of surgery.                 NO DIABETES MEDICATIONS DAY OF SURGERY.                 - If you take a long acting-insulin, take your normal dose the night before surgery & half the dose if taken in the morning.     - Do not smoke or vape, and do not drink any alcoholic beverages 24 hours prior to surgery, this includes NA Beer. Refrain from using     any recreational drugs, including non-prescribed prescription drugs.     -You may brush your teeth and gargle the morning of surgery.  DO NOT SWALLOW WATER.    -You MUST plan for a responsible adult to stay on site while you are here and take you home after your surgery. You will not be allowed                to leave alone or drive yourself home. It is requested someone stay with you the first 24

## 2025-01-24 NOTE — PROGRESS NOTES
REVISED         Surgery Date and Time: 1/29/25 @ 10:15 am Arrival Time: 08:15 am     The instructions given when and if a patient needs to stop oral intake prior to surgery varies. Follow the instructions you were  given by your Surgeon or RN during the Pre-op call.     __X__Do not eat or drink anything after Midnight the night before the surgery. NO gum, mints, candy or ice chips the day of surgery.  Only take the following medications with a small sip of water the morning of surgery: carvedilol, plavix, aspirin     Hold the following medications (per anesthesia or surgeon request): losartan   Last Dose: 1/26/25 PM      Aspirin, Ibuprofen, Advil, Naproxen, Vitamin E and other Anti-inflammatory products and supplements should be stopped   for 5 -7days before surgery or as directed by your physician. Continue aspirin, take day of surgery with small sip of water.      Check with your Surgeon/PCP/Cardiologist regarding stopping Plavix, Coumadin, Eliquis, Lovenox, Effient, Pradaxa, Xarelto, Fragmin or   other blood thinners and follow their instructions. Medication: eliquis Last dose: 1/27/25 am  Plavix-continue, take day of surgery.     NO DIABETES MEDICATIONS DAY OF SURGERY.      - If you take a long acting-insulin, take your normal dose the night before surgery & half the dose if taken in the morning.     - Do not smoke or vape, and do not drink any alcoholic beverages 24 hours prior to surgery, this includes NA Beer. Refrain from using   any recreational drugs, including non-prescribed prescription drugs.   -You may brush your teeth and gargle the morning of surgery. DO NOT SWALLOW WATER.  -You MUST plan for a responsible adult to stay on site while you are here and take you home after your surgery. You will not be allowed   to leave alone or drive yourself home. It is requested someone stay with you the first 24 hrs. Your surgery will be cancelled if you do not   have a ride home with a responsible adult.  -A

## 2025-01-28 ENCOUNTER — ANESTHESIA EVENT (OUTPATIENT)
Dept: OPERATING ROOM | Age: 80
DRG: 035 | End: 2025-01-28
Payer: MEDICARE

## 2025-01-29 ENCOUNTER — HOSPITAL ENCOUNTER (INPATIENT)
Age: 80
LOS: 1 days | Discharge: HOME | DRG: 035 | End: 2025-01-30
Attending: SURGERY | Admitting: SURGERY
Payer: MEDICARE

## 2025-01-29 ENCOUNTER — ANESTHESIA (OUTPATIENT)
Dept: OPERATING ROOM | Age: 80
DRG: 035 | End: 2025-01-29
Payer: MEDICARE

## 2025-01-29 PROBLEM — I65.21 CAROTID STENOSIS, RIGHT: Status: ACTIVE | Noted: 2025-01-29

## 2025-01-29 LAB
ANION GAP SERPL CALCULATED.3IONS-SCNC: 11 MMOL/L (ref 3–16)
BASOPHILS # BLD: 0.1 K/UL (ref 0–0.2)
BASOPHILS NFR BLD: 1.2 %
BUN SERPL-MCNC: 30 MG/DL (ref 7–20)
CALCIUM SERPL-MCNC: 9.8 MG/DL (ref 8.3–10.6)
CHLORIDE SERPL-SCNC: 107 MMOL/L (ref 99–110)
CLOSURE TME BLD-IMP: ABNORMAL
CLOSURE TME COLL+ADP BLD: 150 SEC (ref 56–110)
CLOSURE TME COLL+EPINEP BLD: 168 SEC (ref 86–194)
CO2 SERPL-SCNC: 22 MMOL/L (ref 21–32)
CREAT SERPL-MCNC: 1 MG/DL (ref 0.6–1.2)
DEPRECATED RDW RBC AUTO: 15.8 % (ref 12.4–15.4)
EKG ATRIAL RATE: 60 BPM
EKG DIAGNOSIS: NORMAL
EKG Q-T INTERVAL: 528 MS
EKG QRS DURATION: 166 MS
EKG QTC CALCULATION (BAZETT): 528 MS
EKG R AXIS: 100 DEGREES
EKG T AXIS: -80 DEGREES
EKG VENTRICULAR RATE: 60 BPM
EOSINOPHIL # BLD: 0.4 K/UL (ref 0–0.6)
EOSINOPHIL NFR BLD: 6.2 %
GFR SERPLBLD CREATININE-BSD FMLA CKD-EPI: 57 ML/MIN/{1.73_M2}
GLUCOSE BLD-MCNC: 108 MG/DL (ref 70–99)
GLUCOSE BLD-MCNC: 128 MG/DL (ref 70–99)
GLUCOSE BLD-MCNC: 165 MG/DL (ref 70–99)
GLUCOSE SERPL-MCNC: 106 MG/DL (ref 70–99)
HCT VFR BLD AUTO: 27.3 % (ref 36–48)
HGB BLD-MCNC: 9.1 G/DL (ref 12–16)
LYMPHOCYTES # BLD: 2 K/UL (ref 1–5.1)
LYMPHOCYTES NFR BLD: 28.3 %
MCH RBC QN AUTO: 27.5 PG (ref 26–34)
MCHC RBC AUTO-ENTMCNC: 33.5 G/DL (ref 31–36)
MCV RBC AUTO: 82 FL (ref 80–100)
MONOCYTES # BLD: 0.5 K/UL (ref 0–1.3)
MONOCYTES NFR BLD: 6.9 %
NEUTROPHILS # BLD: 4 K/UL (ref 1.7–7.7)
NEUTROPHILS NFR BLD: 57.4 %
PERFORMED ON: ABNORMAL
PLATELET # BLD AUTO: 153 K/UL (ref 135–450)
PMV BLD AUTO: 8.1 FL (ref 5–10.5)
POC ACT LR: >400 SEC
POTASSIUM SERPL-SCNC: 4.5 MMOL/L (ref 3.5–5.1)
RBC # BLD AUTO: 3.33 M/UL (ref 4–5.2)
SODIUM SERPL-SCNC: 140 MMOL/L (ref 136–145)
WBC # BLD AUTO: 7 K/UL (ref 4–11)

## 2025-01-29 PROCEDURE — 85347 COAGULATION TIME ACTIVATED: CPT

## 2025-01-29 PROCEDURE — C1884 EMBOLIZATION PROTECT SYST: HCPCS | Performed by: SURGERY

## 2025-01-29 PROCEDURE — C1725 CATH, TRANSLUMIN NON-LASER: HCPCS | Performed by: SURGERY

## 2025-01-29 PROCEDURE — 2709999900 HC NON-CHARGEABLE SUPPLY: Performed by: SURGERY

## 2025-01-29 PROCEDURE — 3600000017 HC SURGERY HYBRID ADDL 15MIN: Performed by: SURGERY

## 2025-01-29 PROCEDURE — 6360000002 HC RX W HCPCS: Performed by: SURGERY

## 2025-01-29 PROCEDURE — 2700000000 HC OXYGEN THERAPY PER DAY

## 2025-01-29 PROCEDURE — C1769 GUIDE WIRE: HCPCS | Performed by: SURGERY

## 2025-01-29 PROCEDURE — C1876 STENT, NON-COA/NON-COV W/DEL: HCPCS | Performed by: SURGERY

## 2025-01-29 PROCEDURE — 6370000000 HC RX 637 (ALT 250 FOR IP): Performed by: SURGERY

## 2025-01-29 PROCEDURE — 7100000001 HC PACU RECOVERY - ADDTL 15 MIN: Performed by: SURGERY

## 2025-01-29 PROCEDURE — 2500000003 HC RX 250 WO HCPCS: Performed by: SURGERY

## 2025-01-29 PROCEDURE — 85576 BLOOD PLATELET AGGREGATION: CPT

## 2025-01-29 PROCEDURE — 7100000000 HC PACU RECOVERY - FIRST 15 MIN: Performed by: SURGERY

## 2025-01-29 PROCEDURE — 6360000002 HC RX W HCPCS: Performed by: ANESTHESIOLOGY

## 2025-01-29 PROCEDURE — 3700000001 HC ADD 15 MINUTES (ANESTHESIA): Performed by: SURGERY

## 2025-01-29 PROCEDURE — 85025 COMPLETE CBC W/AUTO DIFF WBC: CPT

## 2025-01-29 PROCEDURE — 2580000003 HC RX 258

## 2025-01-29 PROCEDURE — 80048 BASIC METABOLIC PNL TOTAL CA: CPT

## 2025-01-29 PROCEDURE — 3600000007 HC SURGERY HYBRID BASE: Performed by: SURGERY

## 2025-01-29 PROCEDURE — 6360000002 HC RX W HCPCS

## 2025-01-29 PROCEDURE — 94761 N-INVAS EAR/PLS OXIMETRY MLT: CPT

## 2025-01-29 PROCEDURE — 2500000003 HC RX 250 WO HCPCS

## 2025-01-29 PROCEDURE — 6360000004 HC RX CONTRAST MEDICATION: Performed by: SURGERY

## 2025-01-29 PROCEDURE — 93005 ELECTROCARDIOGRAM TRACING: CPT | Performed by: ANESTHESIOLOGY

## 2025-01-29 PROCEDURE — C1894 INTRO/SHEATH, NON-LASER: HCPCS | Performed by: SURGERY

## 2025-01-29 PROCEDURE — 2060000000 HC ICU INTERMEDIATE R&B

## 2025-01-29 PROCEDURE — 3700000000 HC ANESTHESIA ATTENDED CARE: Performed by: SURGERY

## 2025-01-29 DEVICE — 8-6MM X 40MM
Type: IMPLANTABLE DEVICE | Site: NECK | Status: FUNCTIONAL
Brand: ENROUTE TRANSCAROTID STENT

## 2025-01-29 RX ORDER — DEXAMETHASONE SODIUM PHOSPHATE 4 MG/ML
INJECTION, SOLUTION INTRA-ARTICULAR; INTRALESIONAL; INTRAMUSCULAR; INTRAVENOUS; SOFT TISSUE
Status: DISCONTINUED | OUTPATIENT
Start: 2025-01-29 | End: 2025-01-29 | Stop reason: SDUPTHER

## 2025-01-29 RX ORDER — GLUCAGON 1 MG/ML
1 KIT INJECTION PRN
Status: DISCONTINUED | OUTPATIENT
Start: 2025-01-29 | End: 2025-01-30 | Stop reason: HOSPADM

## 2025-01-29 RX ORDER — SODIUM CHLORIDE 9 MG/ML
INJECTION, SOLUTION INTRAVENOUS PRN
Status: DISCONTINUED | OUTPATIENT
Start: 2025-01-29 | End: 2025-01-29 | Stop reason: HOSPADM

## 2025-01-29 RX ORDER — SODIUM CHLORIDE 0.9 % (FLUSH) 0.9 %
5-40 SYRINGE (ML) INJECTION EVERY 12 HOURS SCHEDULED
Status: DISCONTINUED | OUTPATIENT
Start: 2025-01-29 | End: 2025-01-29 | Stop reason: HOSPADM

## 2025-01-29 RX ORDER — ASPIRIN 81 MG/1
81 TABLET, CHEWABLE ORAL DAILY
Status: DISCONTINUED | OUTPATIENT
Start: 2025-01-29 | End: 2025-01-30 | Stop reason: HOSPADM

## 2025-01-29 RX ORDER — SODIUM CHLORIDE, SODIUM LACTATE, POTASSIUM CHLORIDE, CALCIUM CHLORIDE 600; 310; 30; 20 MG/100ML; MG/100ML; MG/100ML; MG/100ML
INJECTION, SOLUTION INTRAVENOUS CONTINUOUS
Status: DISCONTINUED | OUTPATIENT
Start: 2025-01-29 | End: 2025-01-29 | Stop reason: HOSPADM

## 2025-01-29 RX ORDER — ASCORBIC ACID 500 MG
250 TABLET ORAL DAILY
Status: DISCONTINUED | OUTPATIENT
Start: 2025-01-29 | End: 2025-01-30 | Stop reason: HOSPADM

## 2025-01-29 RX ORDER — SODIUM CHLORIDE 9 MG/ML
INJECTION, SOLUTION INTRAVENOUS
Status: DISCONTINUED | OUTPATIENT
Start: 2025-01-29 | End: 2025-01-29 | Stop reason: SDUPTHER

## 2025-01-29 RX ORDER — DEXTROSE MONOHYDRATE 100 MG/ML
INJECTION, SOLUTION INTRAVENOUS CONTINUOUS PRN
Status: DISCONTINUED | OUTPATIENT
Start: 2025-01-29 | End: 2025-01-30 | Stop reason: HOSPADM

## 2025-01-29 RX ORDER — PHENYLEPHRINE HCL IN 0.9% NACL 1 MG/10 ML
SYRINGE (ML) INTRAVENOUS
Status: DISCONTINUED | OUTPATIENT
Start: 2025-01-29 | End: 2025-01-29 | Stop reason: SDUPTHER

## 2025-01-29 RX ORDER — MEPERIDINE HYDROCHLORIDE 50 MG/ML
12.5 INJECTION INTRAMUSCULAR; INTRAVENOUS; SUBCUTANEOUS EVERY 5 MIN PRN
Status: DISCONTINUED | OUTPATIENT
Start: 2025-01-29 | End: 2025-01-29 | Stop reason: HOSPADM

## 2025-01-29 RX ORDER — ONDANSETRON 2 MG/ML
4 INJECTION INTRAMUSCULAR; INTRAVENOUS EVERY 6 HOURS PRN
Status: DISCONTINUED | OUTPATIENT
Start: 2025-01-29 | End: 2025-01-30 | Stop reason: HOSPADM

## 2025-01-29 RX ORDER — PROTAMINE SULFATE 10 MG/ML
INJECTION, SOLUTION INTRAVENOUS
Status: DISCONTINUED | OUTPATIENT
Start: 2025-01-29 | End: 2025-01-29 | Stop reason: SDUPTHER

## 2025-01-29 RX ORDER — OXYCODONE HYDROCHLORIDE 5 MG/1
5 TABLET ORAL PRN
Status: DISCONTINUED | OUTPATIENT
Start: 2025-01-29 | End: 2025-01-29 | Stop reason: HOSPADM

## 2025-01-29 RX ORDER — SODIUM CHLORIDE 0.9 % (FLUSH) 0.9 %
5-40 SYRINGE (ML) INJECTION PRN
Status: DISCONTINUED | OUTPATIENT
Start: 2025-01-29 | End: 2025-01-30 | Stop reason: HOSPADM

## 2025-01-29 RX ORDER — HEPARIN SODIUM 1000 [USP'U]/ML
INJECTION, SOLUTION INTRAVENOUS; SUBCUTANEOUS
Status: DISCONTINUED | OUTPATIENT
Start: 2025-01-29 | End: 2025-01-29 | Stop reason: SDUPTHER

## 2025-01-29 RX ORDER — LOSARTAN POTASSIUM 25 MG/1
50 TABLET ORAL 2 TIMES DAILY
Status: DISCONTINUED | OUTPATIENT
Start: 2025-01-29 | End: 2025-01-30 | Stop reason: HOSPADM

## 2025-01-29 RX ORDER — CLOPIDOGREL BISULFATE 75 MG/1
75 TABLET ORAL DAILY
Status: DISCONTINUED | OUTPATIENT
Start: 2025-01-29 | End: 2025-01-30 | Stop reason: HOSPADM

## 2025-01-29 RX ORDER — NALOXONE HYDROCHLORIDE 0.4 MG/ML
INJECTION, SOLUTION INTRAMUSCULAR; INTRAVENOUS; SUBCUTANEOUS PRN
Status: DISCONTINUED | OUTPATIENT
Start: 2025-01-29 | End: 2025-01-29 | Stop reason: HOSPADM

## 2025-01-29 RX ORDER — FENTANYL CITRATE 50 UG/ML
INJECTION, SOLUTION INTRAMUSCULAR; INTRAVENOUS
Status: DISCONTINUED | OUTPATIENT
Start: 2025-01-29 | End: 2025-01-29 | Stop reason: SDUPTHER

## 2025-01-29 RX ORDER — SODIUM CHLORIDE 0.9 % (FLUSH) 0.9 %
5-40 SYRINGE (ML) INJECTION PRN
Status: DISCONTINUED | OUTPATIENT
Start: 2025-01-29 | End: 2025-01-29 | Stop reason: HOSPADM

## 2025-01-29 RX ORDER — MORPHINE SULFATE 2 MG/ML
2 INJECTION, SOLUTION INTRAMUSCULAR; INTRAVENOUS
Status: DISCONTINUED | OUTPATIENT
Start: 2025-01-29 | End: 2025-01-30

## 2025-01-29 RX ORDER — IOPAMIDOL 612 MG/ML
INJECTION, SOLUTION INTRAVASCULAR PRN
Status: DISCONTINUED | OUTPATIENT
Start: 2025-01-29 | End: 2025-01-29 | Stop reason: ALTCHOICE

## 2025-01-29 RX ORDER — MIDAZOLAM HYDROCHLORIDE 1 MG/ML
2 INJECTION, SOLUTION INTRAMUSCULAR; INTRAVENOUS
Status: DISCONTINUED | OUTPATIENT
Start: 2025-01-29 | End: 2025-01-29 | Stop reason: HOSPADM

## 2025-01-29 RX ORDER — OXYCODONE HYDROCHLORIDE 5 MG/1
10 TABLET ORAL EVERY 4 HOURS PRN
Status: DISCONTINUED | OUTPATIENT
Start: 2025-01-29 | End: 2025-01-30

## 2025-01-29 RX ORDER — OXYCODONE HYDROCHLORIDE 5 MG/1
10 TABLET ORAL PRN
Status: DISCONTINUED | OUTPATIENT
Start: 2025-01-29 | End: 2025-01-29 | Stop reason: HOSPADM

## 2025-01-29 RX ORDER — PROPOFOL 10 MG/ML
INJECTION, EMULSION INTRAVENOUS
Status: DISCONTINUED | OUTPATIENT
Start: 2025-01-29 | End: 2025-01-29 | Stop reason: SDUPTHER

## 2025-01-29 RX ORDER — HYDRALAZINE HYDROCHLORIDE 20 MG/ML
10 INJECTION INTRAMUSCULAR; INTRAVENOUS
Status: DISCONTINUED | OUTPATIENT
Start: 2025-01-29 | End: 2025-01-30 | Stop reason: HOSPADM

## 2025-01-29 RX ORDER — SODIUM CHLORIDE 0.9 % (FLUSH) 0.9 %
5-40 SYRINGE (ML) INJECTION EVERY 12 HOURS SCHEDULED
Status: DISCONTINUED | OUTPATIENT
Start: 2025-01-29 | End: 2025-01-30 | Stop reason: HOSPADM

## 2025-01-29 RX ORDER — OXYCODONE HYDROCHLORIDE 5 MG/1
5 TABLET ORAL EVERY 4 HOURS PRN
Status: DISCONTINUED | OUTPATIENT
Start: 2025-01-29 | End: 2025-01-30 | Stop reason: HOSPADM

## 2025-01-29 RX ORDER — ATORVASTATIN CALCIUM 10 MG/1
20 TABLET, FILM COATED ORAL DAILY
Status: DISCONTINUED | OUTPATIENT
Start: 2025-01-29 | End: 2025-01-30 | Stop reason: HOSPADM

## 2025-01-29 RX ORDER — LIDOCAINE HYDROCHLORIDE 20 MG/ML
INJECTION, SOLUTION INFILTRATION; PERINEURAL
Status: DISCONTINUED | OUTPATIENT
Start: 2025-01-29 | End: 2025-01-29 | Stop reason: SDUPTHER

## 2025-01-29 RX ORDER — MORPHINE SULFATE 4 MG/ML
4 INJECTION, SOLUTION INTRAMUSCULAR; INTRAVENOUS
Status: DISCONTINUED | OUTPATIENT
Start: 2025-01-29 | End: 2025-01-30

## 2025-01-29 RX ORDER — ONDANSETRON 2 MG/ML
4 INJECTION INTRAMUSCULAR; INTRAVENOUS ONCE
Status: COMPLETED | OUTPATIENT
Start: 2025-01-29 | End: 2025-01-29

## 2025-01-29 RX ORDER — DIPHENHYDRAMINE HYDROCHLORIDE 50 MG/ML
6.25 INJECTION INTRAMUSCULAR; INTRAVENOUS
Status: DISCONTINUED | OUTPATIENT
Start: 2025-01-29 | End: 2025-01-29 | Stop reason: HOSPADM

## 2025-01-29 RX ORDER — LIDOCAINE HYDROCHLORIDE 10 MG/ML
1 INJECTION, SOLUTION EPIDURAL; INFILTRATION; INTRACAUDAL; PERINEURAL
Status: DISCONTINUED | OUTPATIENT
Start: 2025-01-29 | End: 2025-01-29 | Stop reason: HOSPADM

## 2025-01-29 RX ORDER — SODIUM CHLORIDE 9 MG/ML
INJECTION, SOLUTION INTRAVENOUS PRN
Status: DISCONTINUED | OUTPATIENT
Start: 2025-01-29 | End: 2025-01-30 | Stop reason: HOSPADM

## 2025-01-29 RX ORDER — NITROGLYCERIN 20 MG/100ML
5 INJECTION INTRAVENOUS CONTINUOUS PRN
Status: DISCONTINUED | OUTPATIENT
Start: 2025-01-29 | End: 2025-01-30 | Stop reason: HOSPADM

## 2025-01-29 RX ORDER — ROCURONIUM BROMIDE 10 MG/ML
INJECTION, SOLUTION INTRAVENOUS
Status: DISCONTINUED | OUTPATIENT
Start: 2025-01-29 | End: 2025-01-29 | Stop reason: SDUPTHER

## 2025-01-29 RX ORDER — INSULIN LISPRO 100 [IU]/ML
0-8 INJECTION, SOLUTION INTRAVENOUS; SUBCUTANEOUS
Status: DISCONTINUED | OUTPATIENT
Start: 2025-01-29 | End: 2025-01-30 | Stop reason: HOSPADM

## 2025-01-29 RX ORDER — CARVEDILOL 6.25 MG/1
12.5 TABLET ORAL 2 TIMES DAILY
Status: DISCONTINUED | OUTPATIENT
Start: 2025-01-29 | End: 2025-01-30 | Stop reason: HOSPADM

## 2025-01-29 RX ORDER — PROMETHAZINE HYDROCHLORIDE 25 MG/1
12.5 TABLET ORAL EVERY 6 HOURS PRN
Status: DISCONTINUED | OUTPATIENT
Start: 2025-01-29 | End: 2025-01-30 | Stop reason: HOSPADM

## 2025-01-29 RX ADMIN — ROCURONIUM BROMIDE 10 MG: 50 INJECTION, SOLUTION INTRAVENOUS at 11:31

## 2025-01-29 RX ADMIN — FENTANYL CITRATE 25 MCG: 50 INJECTION, SOLUTION INTRAMUSCULAR; INTRAVENOUS at 12:18

## 2025-01-29 RX ADMIN — SODIUM CHLORIDE: 9 INJECTION, SOLUTION INTRAVENOUS at 10:35

## 2025-01-29 RX ADMIN — HYDRALAZINE HYDROCHLORIDE 10 MG: 20 INJECTION INTRAMUSCULAR; INTRAVENOUS at 20:19

## 2025-01-29 RX ADMIN — FENTANYL CITRATE 50 MCG: 50 INJECTION, SOLUTION INTRAMUSCULAR; INTRAVENOUS at 10:53

## 2025-01-29 RX ADMIN — OXYCODONE HYDROCHLORIDE AND ACETAMINOPHEN 250 MG: 500 TABLET ORAL at 20:18

## 2025-01-29 RX ADMIN — PHENYLEPHRINE HYDROCHLORIDE 20 MCG/MIN: 10 INJECTION INTRAVENOUS at 11:30

## 2025-01-29 RX ADMIN — ONDANSETRON 4 MG: 2 INJECTION INTRAMUSCULAR; INTRAVENOUS at 10:59

## 2025-01-29 RX ADMIN — ASPIRIN 81 MG 81 MG: 81 TABLET ORAL at 13:10

## 2025-01-29 RX ADMIN — SODIUM CHLORIDE, PRESERVATIVE FREE 10 ML: 5 INJECTION INTRAVENOUS at 20:47

## 2025-01-29 RX ADMIN — LIDOCAINE HYDROCHLORIDE 30 MG: 20 INJECTION, SOLUTION INFILTRATION; PERINEURAL at 10:49

## 2025-01-29 RX ADMIN — HEPARIN SODIUM 6000 UNITS: 1000 INJECTION INTRAVENOUS; SUBCUTANEOUS at 11:19

## 2025-01-29 RX ADMIN — Medication 50 MCG: at 11:44

## 2025-01-29 RX ADMIN — HYDROMORPHONE HYDROCHLORIDE 0.5 MG: 1 INJECTION, SOLUTION INTRAMUSCULAR; INTRAVENOUS; SUBCUTANEOUS at 13:22

## 2025-01-29 RX ADMIN — Medication 50 MCG: at 11:16

## 2025-01-29 RX ADMIN — FENTANYL CITRATE 25 MCG: 50 INJECTION, SOLUTION INTRAMUSCULAR; INTRAVENOUS at 10:00

## 2025-01-29 RX ADMIN — DEXAMETHASONE SODIUM PHOSPHATE 4 MG: 4 INJECTION, SOLUTION INTRAMUSCULAR; INTRAVENOUS at 10:59

## 2025-01-29 RX ADMIN — LIDOCAINE HYDROCHLORIDE 30 MG: 20 INJECTION, SOLUTION INFILTRATION; PERINEURAL at 12:09

## 2025-01-29 RX ADMIN — LOSARTAN POTASSIUM 50 MG: 25 TABLET, FILM COATED ORAL at 20:22

## 2025-01-29 RX ADMIN — PROPOFOL 50 MG: 10 INJECTION, EMULSION INTRAVENOUS at 10:49

## 2025-01-29 RX ADMIN — ATORVASTATIN CALCIUM 20 MG: 10 TABLET, FILM COATED ORAL at 20:18

## 2025-01-29 RX ADMIN — CARVEDILOL 12.5 MG: 6.25 TABLET, FILM COATED ORAL at 20:18

## 2025-01-29 RX ADMIN — ROCURONIUM BROMIDE 50 MG: 50 INJECTION, SOLUTION INTRAVENOUS at 10:50

## 2025-01-29 RX ADMIN — SODIUM CHLORIDE: 9 INJECTION, SOLUTION INTRAVENOUS at 10:36

## 2025-01-29 RX ADMIN — Medication 50 MCG: at 10:50

## 2025-01-29 RX ADMIN — CEFAZOLIN 2000 MG: 2 INJECTION, POWDER, FOR SOLUTION INTRAVENOUS at 10:50

## 2025-01-29 RX ADMIN — CLOPIDOGREL BISULFATE 75 MG: 75 TABLET ORAL at 13:09

## 2025-01-29 RX ADMIN — SUGAMMADEX 200 MG: 100 INJECTION, SOLUTION INTRAVENOUS at 12:09

## 2025-01-29 RX ADMIN — PROTAMINE SULFATE 50 MG: 10 INJECTION, SOLUTION INTRAVENOUS at 11:50

## 2025-01-29 RX ADMIN — HYDRALAZINE HYDROCHLORIDE 10 MG: 20 INJECTION INTRAMUSCULAR; INTRAVENOUS at 23:47

## 2025-01-29 ASSESSMENT — PAIN SCALES - GENERAL
PAINLEVEL_OUTOF10: 0
PAINLEVEL_OUTOF10: 0
PAINLEVEL_OUTOF10: 5
PAINLEVEL_OUTOF10: 0

## 2025-01-29 ASSESSMENT — PAIN SCALES - WONG BAKER: WONGBAKER_NUMERICALRESPONSE: NO HURT

## 2025-01-29 NOTE — ANESTHESIA PRE PROCEDURE
12/09/2024 01:12 PM    ALKPHOS 65 12/09/2024 01:12 PM    AST 15 12/09/2024 01:12 PM    ALT 10 12/09/2024 01:12 PM       POC Tests: No results for input(s): \"POCGLU\", \"POCNA\", \"POCK\", \"POCCL\", \"POCBUN\", \"POCHEMO\", \"POCHCT\" in the last 72 hours.    Coags:   Lab Results   Component Value Date/Time    PROTIME 14.7 12/11/2024 08:00 AM    PROTIME 19.8 01/02/2010 03:15 PM    INR 1.13 12/11/2024 08:00 AM    APTT 31.8 06/08/2022 06:05 AM       HCG (If Applicable): No results found for: \"PREGTESTUR\", \"PREGSERUM\", \"HCG\", \"HCGQUANT\"     ABGs: No results found for: \"PHART\", \"PO2ART\", \"DYT3NBJ\", \"VHS8JXM\", \"BEART\", \"T2USBRVV\"     Type & Screen (If Applicable):  Lab Results   Component Value Date    ABORH O POS 06/07/2022    LABANTI NEG 06/07/2022       Drug/Infectious Status (If Applicable):  No results found for: \"HIV\", \"HEPCAB\"    COVID-19 Screening (If Applicable):   Lab Results   Component Value Date/Time    COVID19 Not Detected 06/10/2022 11:40 AM    COVID19 NOT DETECTED 05/13/2022 04:39 PM           Anesthesia Evaluation  Patient summary reviewed and Nursing notes reviewed   no history of anesthetic complications:   Airway: Mallampati: II     Neck ROM: full     Dental:          Pulmonary:                              Cardiovascular:    (+) hypertension:, pacemaker:, CAD:                  Neuro/Psych:   (+) CVA:, neuromuscular disease:            GI/Hepatic/Renal:   (+) PUD          Endo/Other:    (+) Diabetes.                 Abdominal:             Vascular:   + PVD, aortic or cerebral.       Other Findings:       Anesthesia Plan      general     ASA 3       Induction: intravenous.  arterial line    Anesthetic plan and risks discussed with patient.      Plan discussed with CRNA.                MARK BUSTOS MD   1/29/2025

## 2025-01-29 NOTE — ANESTHESIA POSTPROCEDURE EVALUATION
Department of Anesthesiology  Postprocedure Note    Patient: Xuan Romero  MRN: [id number]  YOB: 1945  Date of evaluation: 1/29/2025    Procedure Summary       Date: 01/29/25 Room / Location: [place] [address] (HYBRID) / [facility]    Anesthesia Start: 1035 Anesthesia Stop: 1228    Procedure: RIGHT TRANSCAROTID ARTERY REVASCULARIZAITON STENT PLACEMENT (Right: Neck) Diagnosis:       Bilateral carotid artery stenosis      (Bilateral carotid artery stenosis [I65.23])    Surgeons: Edson Gillis MD Responsible Provider: Alexis Vargas MD    Anesthesia Type: general ASA Status: 3            Anesthesia Type: No value filed.    Moe Phase I: Moe Score: 8    Moe Phase II:      Anesthesia Post Evaluation    Comments: Anes Post-op Note    Name:    Xuan Romero  MRN:      [id number]    Patient Vitals in the past 12 hrs:  01/29/25 1230, Pulse:60  01/29/25 1225, BP:(!) 142/65, Temp:97.2 °F (36.2 °C), Temp src:Temporal, Pulse:60, Resp:(!) 9, SpO2:100 %  01/29/25 0843, BP:(!) 155/71, Temp:97.2 °F (36.2 °C), Temp src:Oral, Pulse:60, Resp:16, SpO2:99 %     LABS:    CBC  Lab Results       Component                Value               Date/Time                  WBC                      7.0                 01/29/2025 09:20 AM        HGB                      9.1 (L)             01/29/2025 09:20 AM        HCT                      27.3 (L)            01/29/2025 09:20 AM        PLT                      153                 01/29/2025 09:20 AM   RENAL  Lab Results       Component                Value               Date/Time                  NA                       140                 01/29/2025 09:20 AM        K                        4.5                 01/29/2025 09:20 AM        K                        4.6                 12/11/2024 08:00 AM        CL                       107                 01/29/2025 09:20 AM        CO2                      22                  01/29/2025 09:20 AM        BUN

## 2025-01-29 NOTE — ADDENDUM NOTE
Addendum  created 01/29/25 1619 by Mary Sutton APRN - CRNA    Attestation recorded in Intraprocedure, Flowsheet accepted, Intraprocedure Attestations filed

## 2025-01-29 NOTE — H&P
I have reviewed the history and physical (See note dated 1/17/2025) and examined the patient and find no relevant changes.   I have reviewed with the patient and/or family the risks, benefits, and alternatives to the procedure.

## 2025-01-29 NOTE — PROGRESS NOTES
Patient admitted to [facility] 8 in preparation for surgery, VSS. Consent confirmed. IV inserted into LFA, LR infusing. Belongings clothing and wheelchair placed in recovery. NPO since 2230. Family at bedside, phone number in system for text updates, call light within reach.

## 2025-01-29 NOTE — PROGRESS NOTES
Pt C/O left arm pain and \"pacer feeling funny.\"  VSS alert resp unlabored Dr Vargas called and 12 lead EKG requested. Also notified that pt had C/O right hand feeling cold earlier. Warmer on. Temp of hand close to Lt hand temp. Bruising noted at Rt radial Tiff site.

## 2025-01-29 NOTE — PROGRESS NOTES
A line removed from R wrist, pressure held for 10min, no bleeding, small gauze dressing applied with tape at site

## 2025-01-29 NOTE — BRIEF OP NOTE
Brief Postoperative Note      Patient: Xuan Romero  YOB: 1945  MRN: [id number]    Date of Procedure: 1/29/2025    Pre-Op Diagnosis Codes:      * Bilateral carotid artery stenosis [I65.23]    Post-Op Diagnosis: Same       Procedure(s):  RIGHT TRANSCAROTID ARTERY REVASCULARIZAITON STENT PLACEMENT    Surgeon(s):  Edson Gillis MD    Assistant:  Surgical Assistant: Wan Curry    Anesthesia: General    Estimated Blood Loss (mL): Minimal    Complications: None    Specimens:   * No specimens in log *    Implants:  Implant Name Type Inv. Item Serial No.  Lot No. LRB No. Used Action   STENT CAR L 40 MM LINCOLN 8-6 MM DEL SHTH L 57 CM LINCOLN 5 FR ART - [id number] Carotid stents STENT CAR L 40 MM LINCOLN 8-6 MM DEL SHTH L 57 CM LINCOLN 5 FR ART  [employer] [facility] [id number] Right 1 Implanted         Drains: * No LDAs found *    Findings:  Infection Present At Time Of Surgery (PATOS) (choose all levels that have infection present):  No infection present      Electronically signed by Edson Gillis MD on 1/29/2025 at 12:04 PM

## 2025-01-30 VITALS
DIASTOLIC BLOOD PRESSURE: 52 MMHG | TEMPERATURE: 97.9 F | WEIGHT: 140.21 LBS | OXYGEN SATURATION: 98 % | RESPIRATION RATE: 16 BRPM | HEART RATE: 60 BPM | BODY MASS INDEX: 23.94 KG/M2 | HEIGHT: 64 IN | SYSTOLIC BLOOD PRESSURE: 124 MMHG

## 2025-01-30 PROCEDURE — 037K3DZ DILATION OF RIGHT INTERNAL CAROTID ARTERY WITH INTRALUMINAL DEVICE, PERCUTANEOUS APPROACH: ICD-10-PCS | Performed by: SURGERY

## 2025-01-30 PROCEDURE — 99024 POSTOP FOLLOW-UP VISIT: CPT | Performed by: CLINICAL NURSE SPECIALIST

## 2025-01-30 PROCEDURE — B3161ZZ FLUOROSCOPY OF RIGHT INTERNAL CAROTID ARTERY USING LOW OSMOLAR CONTRAST: ICD-10-PCS | Performed by: SURGERY

## 2025-01-30 PROCEDURE — 2500000003 HC RX 250 WO HCPCS: Performed by: SURGERY

## 2025-01-30 PROCEDURE — 6360000002 HC RX W HCPCS: Performed by: NURSE PRACTITIONER

## 2025-01-30 PROCEDURE — 6370000000 HC RX 637 (ALT 250 FOR IP): Performed by: SURGERY

## 2025-01-30 RX ORDER — LORAZEPAM 2 MG/ML
0.5 INJECTION INTRAMUSCULAR ONCE
Status: COMPLETED | OUTPATIENT
Start: 2025-01-30 | End: 2025-01-30

## 2025-01-30 RX ADMIN — LORAZEPAM 0.5 MG: 2 INJECTION, SOLUTION INTRAMUSCULAR; INTRAVENOUS at 02:38

## 2025-01-30 RX ADMIN — APIXABAN 5 MG: 5 TABLET, FILM COATED ORAL at 09:20

## 2025-01-30 RX ADMIN — ASPIRIN 81 MG 81 MG: 81 TABLET ORAL at 09:21

## 2025-01-30 RX ADMIN — CARVEDILOL 12.5 MG: 6.25 TABLET, FILM COATED ORAL at 09:20

## 2025-01-30 RX ADMIN — SODIUM CHLORIDE, PRESERVATIVE FREE 10 ML: 5 INJECTION INTRAVENOUS at 09:21

## 2025-01-30 RX ADMIN — CLOPIDOGREL BISULFATE 75 MG: 75 TABLET ORAL at 09:20

## 2025-01-30 RX ADMIN — OXYCODONE HYDROCHLORIDE AND ACETAMINOPHEN 250 MG: 500 TABLET ORAL at 09:20

## 2025-01-30 RX ADMIN — ATORVASTATIN CALCIUM 20 MG: 10 TABLET, FILM COATED ORAL at 09:21

## 2025-01-30 RX ADMIN — LOSARTAN POTASSIUM 50 MG: 25 TABLET, FILM COATED ORAL at 09:21

## 2025-01-30 NOTE — DISCHARGE INSTR - COC
Continuity of Care Form    Patient Name: Xuan Romero   :  1945  MRN:  [id number]    Admit date:  2025  Discharge date:  ***    Code Status Order: Full Code   Advance Directives:   Advance Care Flowsheet Documentation        Date/Time Healthcare Directive Type of Healthcare Directive Copy in Chart Healthcare Agent Appointed Healthcare Agent's Name Healthcare Agent's Phone Number    25 0913 No, patient does not have an advance directive for healthcare treatment  --  --  --  --  --                     Admitting Physician:  Edson Gillis MD  PCP: Mayelin Celis APRN - CNP    Discharging Nurse: ***  Discharging Hospital Unit/Room#: 0421/0421-01  Discharging Unit Phone Number: ***    Emergency Contact:   Extended Emergency Contact Information  Primary Emergency Contact: Evy Romero  Home Phone: [phone]  Mobile Phone: [phone]  Relation: Daughter-in-Law   needed? No  Secondary Emergency Contact: HeatherChan  Home Phone: [phone]  Work Phone: [phone]  Mobile Phone: [phone]  Relation: Child   needed? No    Past Surgical History:  Past Surgical History:   Procedure Laterality Date    ANGIOPLASTY  12/30/15    JALEESA Schumacher, PTA of distal sfa/pop/peroneal    CARDIAC CATHETERIZATION  12    done for surgical clearance, cath was negative    CARDIAC PROCEDURE N/A 2024    Left heart cath / coronary angiography performed by Darian Hannah MD at [facility] CARDIAC CATH LAB    CAROTID STENT PLACEMENT Right 2025    RIGHT TRANSCAROTID ARTERY REVASCULARIZAITON STENT PLACEMENT performed by Edson Gillis MD at [facility] OR    COLONOSCOPY  2022    COLONOSCOPY N/A 2022    COLONOSCOPY CONTROL HEMORRHAGE performed by Nader Layne MD at [facility] ENDOSCOPY    CORONARY ANGIOPLASTY WITH STENT PLACEMENT  ,    FOOT SURGERY Left 2018    DEBRIDEMENT OF ULCERS LEFT FOOT AND LEG WITH GRAFT    OTHER SURGICAL HISTORY Left 2017    Left Femoral

## 2025-01-30 NOTE — OP NOTE
[address] [address]                            OPERATIVE REPORT      PATIENT NAME: RICARDO PINZON                : 1945  MED REC NO: [id number]                      ROOM: 0421  ACCOUNT NO: [id number]                       ADMIT DATE: 2025  PROVIDER: Edson Gillis MD      DATE OF PROCEDURE:  2025    SURGEON:  Edson Gillis MD    PREOPERATIVE DIAGNOSIS:  High-grade right carotid stenosis.    POSTOPERATIVE DIAGNOSIS:  High-grade right carotid stenosis.    PROCEDURE:  Transcarotid revascularization (stenting) using flow reversal for embolic protection.    ANESTHESIA:  General endotracheal.    INDICATIONS:  The patient is a 79-year-old female with a high-grade right carotid stenosis.  She is brought to the operating room at this time to undergo carotid stenting procedure via transcarotid route and flow reversal.    DESCRIPTION OF PROCEDURE:  The patient was brought to the operating room, placed in supine position.  General endotracheal anesthesia was induced.  After adequate anesthesia, the right neck and right femoral regions were prepped and draped in sterile fashion.  Ultrasound was used to identify the right common femoral vein which was patent and compressible and free of thrombus.  Under direct visualization, this was accessed with a 5-Vietnamese micropuncture sheath.  A digital copy of the ultrasound image was saved and placed in the patient's record.  A Bentson wire was advanced through the micropuncture sheath into the vena cava and the [address] venous return sheath was placed over the wire, it was secured to the skin using 2-0 silk sutures.  The patient was then given 6000 units of intravenous heparin.  A small transverse incision was made between the 2 heads of the sternocleidomastoid muscle on the right.  The 2 heads were split and the common carotid artery was identified, it was mobilized for several

## 2025-01-30 NOTE — PROGRESS NOTES
Patient remains lethargic but arouses easily. Able to follow commands in all extremities, L sided weakness in both upper and lower. Ataxia noted mainly on left, but slightly on R side. Able to swallow pills whole. Still complaining of some nausea and lack of appetite. Spoke with Dr. Hayley Farias about BP meds and came to conclusion that the Hydralazine may be increasing her lethargy. Hydralazine thus was discontinued. Amlodipine started this am. Along with morning lisinopril. Will continue to monitor pressures and talk with team to adjust meds as needed. right upper arm

## 2025-01-30 NOTE — DISCHARGE SUMMARY
[facility]     DISCHARGE SUMMARY    Patient ID: Xuan Romero  MRN:  [id number]  YOB: 1945      Admission Date:  1/29/2025  8:15 AM  Discharge Date:  1/30/2025 12:48 PM     Principle Diagnosis:  Bilateral carotid artery stenosis    Secondary Diagnosis:  Principal Problem:    Bilateral carotid artery stenosis  Active Problems:    Carotid stenosis, right  Resolved Problems:    * No resolved hospital problems. *      Procedure:  Right Transcarotid revascularization (stenting) using flow reversal for embolic protection.       History:  The patient is a 79 y.o.   female who has had a prior CVA. A recent carotid duplex and CTA head/neck demonstrated high-grade right carotid stenosis. She was agreeable to proceed with elective right carotid stenting.    Hospital Course:  The patient underwent elective right TCAR on January 29, 2025. Her operative course was uncomplicated. She was extubated in the OR prior to transferring to PACU. Her recovery was stable and transferred to PCU for ongoing care. Her postoperative course followed the usual clinical pathway. She was deemed medically ready for discharge home on POD #1.    Treatments: IV hydration    LABS:  Recent Labs     01/29/25  0920   WBC 7.0   HGB 9.1*   HCT 27.3*                                                                     Recent Labs     01/29/25  0920      K 4.5      CO2 22   BUN 30*   CREATININE 1.0   GLUCOSE 106*     Condition at discharge: Stable     Disposition:  home    Physical Exam on discharge day:   BP (!) 124/52   Pulse 60   Temp 97.9 °F (36.6 °C) (Oral)   Resp 16   Ht 1.626 m (5' 4.02\")   Wt 63.6 kg (140 lb 3.4 oz)   SpO2 98%   BMI 24.06 kg/m²     Right neck incision soft, clean dry and intact with Dermabond  Right femoral vein access site soft, clean dry and intact with Tegaderm  Awake, alert and oriented. Very talkative  Tongue midline, face symmetrical  Motor and

## 2025-01-30 NOTE — CARE COORDINATION
Case Management Assessment  Initial Evaluation    Date/Time of Evaluation: 1/30/2025 10:47 AM  Assessment Completed by: Estephania Briseno RN    If patient is discharged prior to next notation, then this note serves as note for discharge by case management.    Patient Name: Xuan Romero                   YOB: 1945  Diagnosis: Bilateral carotid artery stenosis [I65.23]  Carotid stenosis, right [I65.21]                   Date / Time: 1/29/2025  8:15 AM    Patient Admission Status: Inpatient   Readmission Risk (Low < 19, Mod (19-27), High > 27): Readmission Risk Score: 14.4    Current PCP: Mayelin Celis APRN - CNP  PCP verified by CM? (P) Yes    Chart Reviewed: Yes      History Provided by:    Patient Orientation: (P) Other (see comment) (1/30 patient has dementia/ confused)    Patient Cognition:      Hospitalization in the last 30 days (Readmission):  No    If yes, Readmission Assessment in  Navigator will be completed.    Advance Directives:      Code Status: Full Code   Patient's Primary Decision Maker is:      Primary Decision Maker: jordan romero - Child - [phone]    Secondary Decision Maker: Evy Romero - Daughter-in-Law - [phone]    Secondary Decision Maker: Chan Romero Child - [phone]    Discharge Planning:    Patient lives with: (P) Children Type of Home: (P) House  Primary Care Giver:    Patient Support Systems include: (P) Children   Current Financial resources: (P) Medicare  Current community resources: (P) None  Current services prior to admission: (P) Durable Medical Equipment            Current DME: (P) Wheelchair            Type of Home Care services:  (P) None    ADLS  Prior functional level: (P) Assistance with the following:, Mobility, Shopping, Housework, Cooking, Feeding, Toileting, Bathing, Dressing  Current functional level: (P) Assistance with the following:, Bathing, Dressing, Toileting, Feeding, Shopping, Cooking, Mobility, Housework    PT AM-PAC:   /24  OT AM-PAC:

## 2025-01-30 NOTE — PROGRESS NOTES
Scanned and discussed all morning medications with patient. She then expressed she would like to wait until her son is here at 11a before taking her pills to make sure they are all correct. Page sent to [facility] PURCELL notified that nurse is unable to ambulate the patient due to her being wheelchair bound. She states she can't stand and that her son just lifts her into her wheelchair. Waiting for response.     1030: Pt was agreeable to taking her scanned medications.

## 2025-01-30 NOTE — PLAN OF CARE
Problem: Discharge Planning  Goal: Discharge to home or other facility with appropriate resources  Outcome: Progressing     Problem: Pain  Goal: Verbalizes/displays adequate comfort level or baseline comfort level  Outcome: Progressing     Problem: Safety - Adult  Goal: Free from fall injury  Outcome: Progressing     Problem: ABCDS Injury Assessment  Goal: Absence of physical injury  Outcome: Progressing     Problem: Skin/Tissue Integrity  Goal: Skin integrity remains intact  Description: 1.  Monitor for areas of redness and/or skin breakdown  2.  Assess vascular access sites hourly  3.  Every 4-6 hours minimum:  Change oxygen saturation probe site  4.  Every 4-6 hours:  If on nasal continuous positive airway pressure, respiratory therapy assess nares and determine need for appliance change or resting period  Outcome: Progressing     Problem: Chronic Conditions and Co-morbidities  Goal: Patient's chronic conditions and co-morbidity symptoms are monitored and maintained or improved  Outcome: Progressing

## 2025-01-30 NOTE — PROGRESS NOTES
Patient discharge completed all documentation reviewed with son bedside. Discharge information included information on diagnosis including signs and symptoms, complications and when to seek medical attention. Patient and family verbalized understanding of all discharge information. Patient escorted out by staff with all documented belongings. Home with family in her own wheelchair.

## 2025-01-30 NOTE — PROGRESS NOTES
Vascular Surgery Progress Note      POD#  1  S/P Right TCAR    Chief Complaint: Postop follow up      SUBJECTIVE:  Refused lab draws as she has been poked too much    OBJECTIVE    Physical  CURRENT VITALS:  BP (!) 163/84   Pulse 71   Temp 98.4 °F (36.9 °C) (Axillary)   Resp 18   Ht 1.626 m (5' 4.02\")   Wt 63.6 kg (140 lb 3.4 oz)   SpO2 97%   BMI 24.06 kg/m²   24 HR INTAKE/OUTPUT:    Intake/Output Summary (Last 24 hours) at 1/30/2025 0805  Last data filed at 1/29/2025 2147  Gross per 24 hour   Intake 1640 ml   Output --   Net 1640 ml     Right neck incision soft, clean dry and intact with Dermabond  Right femoral vein access site soft, clean dry and intact with Tegaderm  Awake, alert and oriented. Very talkative  Tongue midline, face symmetrical  Motor and sensory functions intact      Data  CBC:   Recent Labs     01/29/25  0920   WBC 7.0   HGB 9.1*   HCT 27.3*   MCV 82.0        BMP:   Recent Labs     01/29/25  0920      K 4.5      CO2 22   GLUCOSE 106*   BUN 30*   CREATININE 1.0   CALCIUM 9.8       Current Inpatient Medications  Current Facility-Administered Medications: apixaban (ELIQUIS) tablet 5 mg, 5 mg, Oral, BID  ascorbic acid (VITAMIN C) tablet 250 mg, 250 mg, Oral, Daily  aspirin chewable tablet 81 mg, 81 mg, Oral, Daily  carvedilol (COREG) tablet 12.5 mg, 12.5 mg, Oral, BID  clopidogrel (PLAVIX) tablet 75 mg, 75 mg, Oral, Daily  losartan (COZAAR) tablet 50 mg, 50 mg, Oral, BID  atorvastatin (LIPITOR) tablet 20 mg, 20 mg, Oral, Daily  sodium chloride flush 0.9 % injection 5-40 mL, 5-40 mL, IntraVENous, 2 times per day  sodium chloride flush 0.9 % injection 5-40 mL, 5-40 mL, IntraVENous, PRN  0.9 % sodium chloride infusion, , IntraVENous, PRN  hydrALAZINE (APRESOLINE) injection 10 mg, 10 mg, IntraVENous, Q1H PRN  nitroGLYCERIN 200 mcg/mL in dextrose 5%, 5 mcg/min, IntraVENous, Continuous PRN  phenylephrine (LINDA-SYNEPHRINE) 50 mg in sodium chloride 0.9 % 250 mL infusion,

## 2025-01-31 ENCOUNTER — TELEPHONE (OUTPATIENT)
Dept: FAMILY MEDICINE CLINIC | Age: 80
End: 2025-01-31

## 2025-02-03 ENCOUNTER — OFFICE VISIT (OUTPATIENT)
Dept: FAMILY MEDICINE CLINIC | Age: 80
End: 2025-02-03

## 2025-02-03 VITALS
BODY MASS INDEX: 24.02 KG/M2 | SYSTOLIC BLOOD PRESSURE: 134 MMHG | HEART RATE: 64 BPM | OXYGEN SATURATION: 98 % | WEIGHT: 140 LBS | DIASTOLIC BLOOD PRESSURE: 68 MMHG

## 2025-02-03 DIAGNOSIS — Z09 HOSPITAL DISCHARGE FOLLOW-UP: Primary | ICD-10-CM

## 2025-02-03 DIAGNOSIS — I65.23 BILATERAL CAROTID ARTERY STENOSIS: ICD-10-CM

## 2025-02-03 DIAGNOSIS — Z95.9 S/P ARTERIAL STENT: ICD-10-CM

## 2025-02-03 NOTE — PROGRESS NOTES
Acid (VITAMIN C) 250 MG tablet TAKE 1 TABLET BY MOUTH DAILY 30 tablet 3    metFORMIN (GLUCOPHAGE) 500 MG tablet TAKE 2 TABLETS BY MOUTH DAILY WITH BREAKFAST (Patient taking differently: Take 2 tablets by mouth daily (with breakfast) TAKE 2 TABLETS BY MOUTH DAILY WITH BREAKFAST) 180 tablet 1    simvastatin (ZOCOR) 20 MG tablet Take 1 tablet by mouth nightly 90 tablet 1    apixaban (ELIQUIS) 5 MG TABS tablet Take 1 tablet by mouth 2 times daily 60 tablet 3    vitamin D (VITAMIN D3) 25 MCG (1000 UT) TABS tablet TAKE 1 TABLET BY MOUTH DAILY (Patient taking differently: Take 1 tablet by mouth daily TAKE 1 TABLET BY MOUTH DAILY) 90 tablet 3    Misc. Devices (BATH BENCH WITH BACK) MISC M19.90  D62.  E11.9  R53.8 1 each 0        Medications patient taking as of now reconciled against medications ordered at time of hospital discharge: Yes    A comprehensive review of systems was negative except for what was noted in the HPI.    Objective:    /68 (Site: Right Upper Arm, Position: Sitting) Comment: manual  Pulse 64   Wt 63.5 kg (140 lb)   SpO2 98%   BMI 24.02 kg/m²   General Appearance: alert and oriented to person, place and time  Skin: warm and dry, no rash or erythema and surgical incision to right anterior neck.  Head: normocephalic and atraumatic  Eyes: pupils equal, round, and reactive to light, extraocular eye movements intact, conjunctivae normal  ENT: tympanic membrane, external ear and ear canal normal bilaterally, oropharynx clear and moist with normal mucous membranes  Neck: neck supple and non tender without mass and surgical incision present to right anterior neck, surgical glue in place.  Bruising present no surrounding erythema or exudate noted.  Pulmonary/Chest: clear to auscultation bilaterally- no wheezes, rales or rhonchi, normal air movement, no respiratory distress and no chest wall tenderness  Cardiovascular: normal rate, regular rhythm, and intact distal pulses  Abdomen: soft,

## 2025-02-10 ENCOUNTER — HOSPITAL ENCOUNTER (INPATIENT)
Age: 80
LOS: 4 days | Discharge: HOME | DRG: 378 | End: 2025-02-14
Attending: EMERGENCY MEDICINE | Admitting: HOSPITALIST
Payer: MEDICARE

## 2025-02-10 ENCOUNTER — APPOINTMENT (OUTPATIENT)
Dept: CT IMAGING | Age: 80
DRG: 378 | End: 2025-02-10
Payer: MEDICARE

## 2025-02-10 DIAGNOSIS — K92.2 GASTROINTESTINAL HEMORRHAGE, UNSPECIFIED GASTROINTESTINAL HEMORRHAGE TYPE: Primary | ICD-10-CM

## 2025-02-10 DIAGNOSIS — I48.11 LONGSTANDING PERSISTENT ATRIAL FIBRILLATION (HCC): ICD-10-CM

## 2025-02-10 DIAGNOSIS — Z79.01 ON CONTINUOUS ORAL ANTICOAGULATION: ICD-10-CM

## 2025-02-10 PROBLEM — D62 ACUTE BLOOD LOSS ANEMIA (ABLA): Status: ACTIVE | Noted: 2022-06-07

## 2025-02-10 LAB
ABO + RH BLD: NORMAL
ALBUMIN SERPL-MCNC: 3.7 G/DL (ref 3.4–5)
ALBUMIN/GLOB SERPL: 1.6 {RATIO} (ref 1.1–2.2)
ALP SERPL-CCNC: 51 U/L (ref 40–129)
ALT SERPL-CCNC: 10 U/L (ref 10–40)
ANION GAP SERPL CALCULATED.3IONS-SCNC: 12 MMOL/L (ref 3–16)
ANTI-XA UNFRAC HEPARIN: >1.1 IU/ML (ref 0.3–0.7)
APTT BLD: 34.6 SEC (ref 22.1–36.4)
AST SERPL-CCNC: 14 U/L (ref 15–37)
BASOPHILS # BLD: 0.1 K/UL (ref 0–0.2)
BASOPHILS NFR BLD: 1.1 %
BILIRUB SERPL-MCNC: <0.2 MG/DL (ref 0–1)
BLD GP AB SCN SERPL QL: NORMAL
BLOOD BANK DISPENSE STATUS: NORMAL
BLOOD BANK DISPENSE STATUS: NORMAL
BLOOD BANK PRODUCT CODE: NORMAL
BLOOD BANK PRODUCT CODE: NORMAL
BPU ID: NORMAL
BPU ID: NORMAL
BUN SERPL-MCNC: 45 MG/DL (ref 7–20)
CALCIUM SERPL-MCNC: 8.8 MG/DL (ref 8.3–10.6)
CHLORIDE SERPL-SCNC: 110 MMOL/L (ref 99–110)
CO2 SERPL-SCNC: 20 MMOL/L (ref 21–32)
CREAT SERPL-MCNC: 1 MG/DL (ref 0.6–1.2)
DEPRECATED RDW RBC AUTO: 17.1 % (ref 12.4–15.4)
DESCRIPTION BLOOD BANK: NORMAL
DESCRIPTION BLOOD BANK: NORMAL
EOSINOPHIL # BLD: 0.4 K/UL (ref 0–0.6)
EOSINOPHIL NFR BLD: 5.1 %
FLUAV RNA RESP QL NAA+PROBE: NOT DETECTED
FLUBV RNA RESP QL NAA+PROBE: NOT DETECTED
GFR SERPLBLD CREATININE-BSD FMLA CKD-EPI: 57 ML/MIN/{1.73_M2}
GLUCOSE BLD-MCNC: 125 MG/DL
GLUCOSE BLD-MCNC: 125 MG/DL (ref 70–99)
GLUCOSE SERPL-MCNC: 158 MG/DL (ref 70–99)
HCT VFR BLD AUTO: 18.5 % (ref 36–48)
HCT VFR BLD AUTO: 19 % (ref 36–48)
HEMOCCULT STL QL: ABNORMAL
HGB BLD-MCNC: 6.1 G/DL (ref 12–16)
HGB BLD-MCNC: 6.3 G/DL (ref 12–16)
LDH SERPL L TO P-CCNC: 74 U/L (ref 100–190)
LYMPHOCYTES # BLD: 2.5 K/UL (ref 1–5.1)
LYMPHOCYTES NFR BLD: 30.5 %
MCH RBC QN AUTO: 27.5 PG (ref 26–34)
MCHC RBC AUTO-ENTMCNC: 33.1 G/DL (ref 31–36)
MCV RBC AUTO: 83 FL (ref 80–100)
MONOCYTES # BLD: 0.5 K/UL (ref 0–1.3)
MONOCYTES NFR BLD: 5.7 %
NEUTROPHILS # BLD: 4.7 K/UL (ref 1.7–7.7)
NEUTROPHILS NFR BLD: 57.6 %
PERFORMED ON: ABNORMAL
PLATELET # BLD AUTO: 229 K/UL (ref 135–450)
PMV BLD AUTO: 8 FL (ref 5–10.5)
POTASSIUM SERPL-SCNC: 4.8 MMOL/L (ref 3.5–5.1)
PROT SERPL-MCNC: 6 G/DL (ref 6.4–8.2)
RBC # BLD AUTO: 2.23 M/UL (ref 4–5.2)
SARS-COV-2 RNA RESP QL NAA+PROBE: NOT DETECTED
SODIUM SERPL-SCNC: 142 MMOL/L (ref 136–145)
WBC # BLD AUTO: 8.1 K/UL (ref 4–11)

## 2025-02-10 PROCEDURE — 80053 COMPREHEN METABOLIC PANEL: CPT

## 2025-02-10 PROCEDURE — 36415 COLL VENOUS BLD VENIPUNCTURE: CPT

## 2025-02-10 PROCEDURE — 86850 RBC ANTIBODY SCREEN: CPT

## 2025-02-10 PROCEDURE — 6360000002 HC RX W HCPCS: Performed by: EMERGENCY MEDICINE

## 2025-02-10 PROCEDURE — 99285 EMERGENCY DEPT VISIT HI MDM: CPT

## 2025-02-10 PROCEDURE — 82607 VITAMIN B-12: CPT

## 2025-02-10 PROCEDURE — 1200000000 HC SEMI PRIVATE

## 2025-02-10 PROCEDURE — 82728 ASSAY OF FERRITIN: CPT

## 2025-02-10 PROCEDURE — 85520 HEPARIN ASSAY: CPT

## 2025-02-10 PROCEDURE — 74174 CTA ABD&PLVS W/CONTRAST: CPT

## 2025-02-10 PROCEDURE — 85025 COMPLETE CBC W/AUTO DIFF WBC: CPT

## 2025-02-10 PROCEDURE — 30233N1 TRANSFUSION OF NONAUTOLOGOUS RED BLOOD CELLS INTO PERIPHERAL VEIN, PERCUTANEOUS APPROACH: ICD-10-PCS | Performed by: INTERNAL MEDICINE

## 2025-02-10 PROCEDURE — 36430 TRANSFUSION BLD/BLD COMPNT: CPT

## 2025-02-10 PROCEDURE — 6360000004 HC RX CONTRAST MEDICATION: Performed by: EMERGENCY MEDICINE

## 2025-02-10 PROCEDURE — P9016 RBC LEUKOCYTES REDUCED: HCPCS

## 2025-02-10 PROCEDURE — 87636 SARSCOV2 & INF A&B AMP PRB: CPT

## 2025-02-10 PROCEDURE — 86901 BLOOD TYPING SEROLOGIC RH(D): CPT

## 2025-02-10 PROCEDURE — 83010 ASSAY OF HAPTOGLOBIN QUANT: CPT

## 2025-02-10 PROCEDURE — 83615 LACTATE (LD) (LDH) ENZYME: CPT

## 2025-02-10 PROCEDURE — 85730 THROMBOPLASTIN TIME PARTIAL: CPT

## 2025-02-10 PROCEDURE — 85014 HEMATOCRIT: CPT

## 2025-02-10 PROCEDURE — 2580000003 HC RX 258: Performed by: EMERGENCY MEDICINE

## 2025-02-10 PROCEDURE — 85018 HEMOGLOBIN: CPT

## 2025-02-10 PROCEDURE — 86923 COMPATIBILITY TEST ELECTRIC: CPT

## 2025-02-10 PROCEDURE — 86900 BLOOD TYPING SEROLOGIC ABO: CPT

## 2025-02-10 PROCEDURE — 82746 ASSAY OF FOLIC ACID SERUM: CPT

## 2025-02-10 PROCEDURE — 83540 ASSAY OF IRON: CPT

## 2025-02-10 PROCEDURE — 6370000000 HC RX 637 (ALT 250 FOR IP): Performed by: HOSPITALIST

## 2025-02-10 PROCEDURE — 96365 THER/PROPH/DIAG IV INF INIT: CPT

## 2025-02-10 PROCEDURE — 96366 THER/PROPH/DIAG IV INF ADDON: CPT

## 2025-02-10 PROCEDURE — 82270 OCCULT BLOOD FECES: CPT

## 2025-02-10 PROCEDURE — 83550 IRON BINDING TEST: CPT

## 2025-02-10 RX ORDER — SODIUM CHLORIDE 9 MG/ML
INJECTION, SOLUTION INTRAVENOUS PRN
Status: DISCONTINUED | OUTPATIENT
Start: 2025-02-10 | End: 2025-02-14 | Stop reason: HOSPADM

## 2025-02-10 RX ORDER — INSULIN LISPRO 100 [IU]/ML
0-4 INJECTION, SOLUTION INTRAVENOUS; SUBCUTANEOUS
Status: DISCONTINUED | OUTPATIENT
Start: 2025-02-10 | End: 2025-02-14 | Stop reason: HOSPADM

## 2025-02-10 RX ORDER — ONDANSETRON 2 MG/ML
4 INJECTION INTRAMUSCULAR; INTRAVENOUS EVERY 6 HOURS PRN
Status: DISCONTINUED | OUTPATIENT
Start: 2025-02-10 | End: 2025-02-10

## 2025-02-10 RX ORDER — PROCHLORPERAZINE EDISYLATE 5 MG/ML
10 INJECTION INTRAMUSCULAR; INTRAVENOUS EVERY 6 HOURS PRN
Status: DISCONTINUED | OUTPATIENT
Start: 2025-02-10 | End: 2025-02-14 | Stop reason: HOSPADM

## 2025-02-10 RX ORDER — SODIUM CHLORIDE 9 MG/ML
50 INJECTION, SOLUTION INTRAVENOUS ONCE
Status: COMPLETED | OUTPATIENT
Start: 2025-02-10 | End: 2025-02-10

## 2025-02-10 RX ORDER — POLYETHYLENE GLYCOL 3350 17 G/17G
17 POWDER, FOR SOLUTION ORAL DAILY
Status: DISCONTINUED | OUTPATIENT
Start: 2025-02-11 | End: 2025-02-14 | Stop reason: HOSPADM

## 2025-02-10 RX ORDER — ACETAMINOPHEN 650 MG/1
650 SUPPOSITORY RECTAL EVERY 6 HOURS PRN
Status: DISCONTINUED | OUTPATIENT
Start: 2025-02-10 | End: 2025-02-14 | Stop reason: HOSPADM

## 2025-02-10 RX ORDER — PANTOPRAZOLE SODIUM 40 MG/10ML
80 INJECTION, POWDER, LYOPHILIZED, FOR SOLUTION INTRAVENOUS ONCE
Status: DISCONTINUED | OUTPATIENT
Start: 2025-02-10 | End: 2025-02-10

## 2025-02-10 RX ORDER — SODIUM CHLORIDE 0.9 % (FLUSH) 0.9 %
5-40 SYRINGE (ML) INJECTION PRN
Status: DISCONTINUED | OUTPATIENT
Start: 2025-02-10 | End: 2025-02-14 | Stop reason: HOSPADM

## 2025-02-10 RX ORDER — MAGNESIUM SULFATE IN WATER 40 MG/ML
2000 INJECTION, SOLUTION INTRAVENOUS PRN
Status: DISCONTINUED | OUTPATIENT
Start: 2025-02-10 | End: 2025-02-14 | Stop reason: HOSPADM

## 2025-02-10 RX ORDER — SODIUM CHLORIDE 9 MG/ML
INJECTION, SOLUTION INTRAVENOUS PRN
Status: DISCONTINUED | OUTPATIENT
Start: 2025-02-10 | End: 2025-02-13

## 2025-02-10 RX ORDER — ACETAMINOPHEN 325 MG/1
650 TABLET ORAL EVERY 6 HOURS PRN
Status: DISCONTINUED | OUTPATIENT
Start: 2025-02-10 | End: 2025-02-14 | Stop reason: HOSPADM

## 2025-02-10 RX ORDER — CARVEDILOL 6.25 MG/1
6.25 TABLET ORAL 2 TIMES DAILY
Status: DISCONTINUED | OUTPATIENT
Start: 2025-02-10 | End: 2025-02-11

## 2025-02-10 RX ORDER — GLUCAGON 1 MG/ML
1 KIT INJECTION PRN
Status: DISCONTINUED | OUTPATIENT
Start: 2025-02-10 | End: 2025-02-14 | Stop reason: HOSPADM

## 2025-02-10 RX ORDER — DEXTROSE MONOHYDRATE 100 MG/ML
INJECTION, SOLUTION INTRAVENOUS CONTINUOUS PRN
Status: DISCONTINUED | OUTPATIENT
Start: 2025-02-10 | End: 2025-02-14 | Stop reason: HOSPADM

## 2025-02-10 RX ORDER — INSULIN LISPRO 100 [IU]/ML
0.05 INJECTION, SOLUTION INTRAVENOUS; SUBCUTANEOUS
Status: DISCONTINUED | OUTPATIENT
Start: 2025-02-11 | End: 2025-02-14 | Stop reason: HOSPADM

## 2025-02-10 RX ORDER — LOSARTAN POTASSIUM 25 MG/1
25 TABLET ORAL DAILY
Status: DISCONTINUED | OUTPATIENT
Start: 2025-02-11 | End: 2025-02-11

## 2025-02-10 RX ORDER — INSULIN GLARGINE 100 [IU]/ML
0.15 INJECTION, SOLUTION SUBCUTANEOUS DAILY
Status: DISCONTINUED | OUTPATIENT
Start: 2025-02-11 | End: 2025-02-11

## 2025-02-10 RX ORDER — POTASSIUM CHLORIDE 7.45 MG/ML
10 INJECTION INTRAVENOUS PRN
Status: DISCONTINUED | OUTPATIENT
Start: 2025-02-10 | End: 2025-02-14 | Stop reason: HOSPADM

## 2025-02-10 RX ORDER — SODIUM CHLORIDE 0.9 % (FLUSH) 0.9 %
5-40 SYRINGE (ML) INJECTION EVERY 12 HOURS SCHEDULED
Status: DISCONTINUED | OUTPATIENT
Start: 2025-02-10 | End: 2025-02-14 | Stop reason: HOSPADM

## 2025-02-10 RX ORDER — POTASSIUM CHLORIDE 29.8 MG/ML
20 INJECTION INTRAVENOUS PRN
Status: DISCONTINUED | OUTPATIENT
Start: 2025-02-10 | End: 2025-02-14 | Stop reason: HOSPADM

## 2025-02-10 RX ORDER — ONDANSETRON 4 MG/1
4 TABLET, ORALLY DISINTEGRATING ORAL EVERY 8 HOURS PRN
Status: DISCONTINUED | OUTPATIENT
Start: 2025-02-10 | End: 2025-02-14 | Stop reason: HOSPADM

## 2025-02-10 RX ORDER — IOPAMIDOL 755 MG/ML
100 INJECTION, SOLUTION INTRAVASCULAR
Status: COMPLETED | OUTPATIENT
Start: 2025-02-10 | End: 2025-02-10

## 2025-02-10 RX ADMIN — PANTOPRAZOLE SODIUM 80 MG: 40 INJECTION, POWDER, LYOPHILIZED, FOR SOLUTION INTRAVENOUS at 18:26

## 2025-02-10 RX ADMIN — CARVEDILOL 6.25 MG: 6.25 TABLET, FILM COATED ORAL at 23:17

## 2025-02-10 RX ADMIN — SODIUM CHLORIDE 50 ML: 900 INJECTION INTRAVENOUS at 18:25

## 2025-02-10 RX ADMIN — PROTHROMBIN COMPLEX CONCENTRATE (HUMAN) 2000 UNITS: 25.5; 16.5; 24; 22; 22; 26 POWDER, FOR SOLUTION INTRAVENOUS at 18:09

## 2025-02-10 RX ADMIN — IOPAMIDOL 100 ML: 755 INJECTION, SOLUTION INTRAVENOUS at 17:43

## 2025-02-10 RX ADMIN — PANTOPRAZOLE SODIUM 8 MG/HR: 40 INJECTION, POWDER, LYOPHILIZED, FOR SOLUTION INTRAVENOUS at 18:33

## 2025-02-10 ASSESSMENT — PAIN - FUNCTIONAL ASSESSMENT: PAIN_FUNCTIONAL_ASSESSMENT: NONE - DENIES PAIN

## 2025-02-11 ENCOUNTER — ANESTHESIA (OUTPATIENT)
Dept: ENDOSCOPY | Age: 80
End: 2025-02-11
Payer: MEDICARE

## 2025-02-11 ENCOUNTER — ANESTHESIA EVENT (OUTPATIENT)
Dept: ENDOSCOPY | Age: 80
End: 2025-02-11
Payer: MEDICARE

## 2025-02-11 DIAGNOSIS — I65.23 BILATERAL CAROTID ARTERY STENOSIS: Primary | ICD-10-CM

## 2025-02-11 PROBLEM — Z98.890 HISTORY OF RIGHT-SIDED CAROTID ENDARTERECTOMY: Status: ACTIVE | Noted: 2025-02-11

## 2025-02-11 LAB
ANION GAP SERPL CALCULATED.3IONS-SCNC: 12 MMOL/L (ref 3–16)
BASOPHILS # BLD: 0.1 K/UL (ref 0–0.2)
BASOPHILS NFR BLD: 0.9 %
BLOOD BANK DISPENSE STATUS: NORMAL
BLOOD BANK PRODUCT CODE: NORMAL
BPU ID: NORMAL
BUN SERPL-MCNC: 36 MG/DL (ref 7–20)
CALCIUM SERPL-MCNC: 8.5 MG/DL (ref 8.3–10.6)
CHLORIDE SERPL-SCNC: 113 MMOL/L (ref 99–110)
CO2 SERPL-SCNC: 15 MMOL/L (ref 21–32)
CREAT SERPL-MCNC: 0.9 MG/DL (ref 0.6–1.2)
DEPRECATED RDW RBC AUTO: 16.7 % (ref 12.4–15.4)
DESCRIPTION BLOOD BANK: NORMAL
EOSINOPHIL # BLD: 0.4 K/UL (ref 0–0.6)
EOSINOPHIL NFR BLD: 5 %
FERRITIN SERPL IA-MCNC: 21.2 NG/ML (ref 15–150)
FOLATE SERPL-MCNC: 27.2 NG/ML (ref 4.78–24.2)
GFR SERPLBLD CREATININE-BSD FMLA CKD-EPI: 65 ML/MIN/{1.73_M2}
GLUCOSE BLD-MCNC: 135 MG/DL (ref 70–99)
GLUCOSE BLD-MCNC: 135 MG/DL (ref 70–99)
GLUCOSE BLD-MCNC: 165 MG/DL (ref 70–99)
GLUCOSE SERPL-MCNC: 115 MG/DL (ref 70–99)
HAPTOGLOB SERPL-MCNC: 59.3 MG/DL (ref 30–200)
HCT VFR BLD AUTO: 26.6 % (ref 36–48)
HCT VFR BLD AUTO: 27.4 % (ref 36–48)
HCT VFR BLD AUTO: 28.3 % (ref 36–48)
HGB BLD-MCNC: 8.8 G/DL (ref 12–16)
HGB BLD-MCNC: 9.3 G/DL (ref 12–16)
HGB BLD-MCNC: 9.5 G/DL (ref 12–16)
INR PPP: 1.46 (ref 0.85–1.15)
IRON SATN MFR SERPL: 15 % (ref 15–50)
IRON SERPL-MCNC: 22 UG/DL (ref 37–145)
LYMPHOCYTES # BLD: 2 K/UL (ref 1–5.1)
LYMPHOCYTES NFR BLD: 25.5 %
MCH RBC QN AUTO: 28.5 PG (ref 26–34)
MCHC RBC AUTO-ENTMCNC: 33.7 G/DL (ref 31–36)
MCV RBC AUTO: 84.5 FL (ref 80–100)
MONOCYTES # BLD: 0.4 K/UL (ref 0–1.3)
MONOCYTES NFR BLD: 4.9 %
NEUTROPHILS # BLD: 5 K/UL (ref 1.7–7.7)
NEUTROPHILS NFR BLD: 63.7 %
PERFORMED ON: ABNORMAL
PLATELET # BLD AUTO: 160 K/UL (ref 135–450)
PMV BLD AUTO: 7.6 FL (ref 5–10.5)
POTASSIUM SERPL-SCNC: 4.3 MMOL/L (ref 3.5–5.1)
PROTHROMBIN TIME: 17.9 SEC (ref 11.9–14.9)
RBC # BLD AUTO: 3.35 M/UL (ref 4–5.2)
SODIUM SERPL-SCNC: 140 MMOL/L (ref 136–145)
TIBC SERPL-MCNC: 142 UG/DL (ref 260–445)
VIT B12 SERPL-MCNC: 479 PG/ML (ref 211–911)
WBC # BLD AUTO: 7.9 K/UL (ref 4–11)

## 2025-02-11 PROCEDURE — 99233 SBSQ HOSP IP/OBS HIGH 50: CPT | Performed by: INTERNAL MEDICINE

## 2025-02-11 PROCEDURE — 0DJ08ZZ INSPECTION OF UPPER INTESTINAL TRACT, VIA NATURAL OR ARTIFICIAL OPENING ENDOSCOPIC: ICD-10-PCS | Performed by: INTERNAL MEDICINE

## 2025-02-11 PROCEDURE — 3609017100 HC EGD: Performed by: INTERNAL MEDICINE

## 2025-02-11 PROCEDURE — 85610 PROTHROMBIN TIME: CPT

## 2025-02-11 PROCEDURE — 2580000003 HC RX 258: Performed by: HOSPITALIST

## 2025-02-11 PROCEDURE — 3700000000 HC ANESTHESIA ATTENDED CARE: Performed by: INTERNAL MEDICINE

## 2025-02-11 PROCEDURE — 85025 COMPLETE CBC W/AUTO DIFF WBC: CPT

## 2025-02-11 PROCEDURE — 80048 BASIC METABOLIC PNL TOTAL CA: CPT

## 2025-02-11 PROCEDURE — 85018 HEMOGLOBIN: CPT

## 2025-02-11 PROCEDURE — 6370000000 HC RX 637 (ALT 250 FOR IP)

## 2025-02-11 PROCEDURE — 6360000002 HC RX W HCPCS: Performed by: NURSE ANESTHETIST, CERTIFIED REGISTERED

## 2025-02-11 PROCEDURE — 2580000003 HC RX 258: Performed by: NURSE ANESTHETIST, CERTIFIED REGISTERED

## 2025-02-11 PROCEDURE — 85014 HEMATOCRIT: CPT

## 2025-02-11 PROCEDURE — 1200000000 HC SEMI PRIVATE

## 2025-02-11 PROCEDURE — 2709999900 HC NON-CHARGEABLE SUPPLY: Performed by: INTERNAL MEDICINE

## 2025-02-11 PROCEDURE — 7100000010 HC PHASE II RECOVERY - FIRST 15 MIN: Performed by: INTERNAL MEDICINE

## 2025-02-11 PROCEDURE — 6370000000 HC RX 637 (ALT 250 FOR IP): Performed by: INTERNAL MEDICINE

## 2025-02-11 PROCEDURE — 6360000002 HC RX W HCPCS: Performed by: HOSPITALIST

## 2025-02-11 PROCEDURE — 2500000003 HC RX 250 WO HCPCS: Performed by: HOSPITALIST

## 2025-02-11 PROCEDURE — 7100000011 HC PHASE II RECOVERY - ADDTL 15 MIN: Performed by: INTERNAL MEDICINE

## 2025-02-11 PROCEDURE — 36415 COLL VENOUS BLD VENIPUNCTURE: CPT

## 2025-02-11 RX ORDER — CARVEDILOL 6.25 MG/1
12.5 TABLET ORAL 2 TIMES DAILY
Status: DISCONTINUED | OUTPATIENT
Start: 2025-02-11 | End: 2025-02-14 | Stop reason: HOSPADM

## 2025-02-11 RX ORDER — SODIUM CHLORIDE 0.9 % (FLUSH) 0.9 %
5-40 SYRINGE (ML) INJECTION PRN
Status: DISCONTINUED | OUTPATIENT
Start: 2025-02-11 | End: 2025-02-11 | Stop reason: HOSPADM

## 2025-02-11 RX ORDER — ONDANSETRON 2 MG/ML
4 INJECTION INTRAMUSCULAR; INTRAVENOUS
Status: DISCONTINUED | OUTPATIENT
Start: 2025-02-11 | End: 2025-02-11 | Stop reason: HOSPADM

## 2025-02-11 RX ORDER — SODIUM CHLORIDE 0.9 % (FLUSH) 0.9 %
5-40 SYRINGE (ML) INJECTION EVERY 12 HOURS SCHEDULED
Status: DISCONTINUED | OUTPATIENT
Start: 2025-02-11 | End: 2025-02-11 | Stop reason: HOSPADM

## 2025-02-11 RX ORDER — SODIUM CHLORIDE, SODIUM LACTATE, POTASSIUM CHLORIDE, CALCIUM CHLORIDE 600; 310; 30; 20 MG/100ML; MG/100ML; MG/100ML; MG/100ML
INJECTION, SOLUTION INTRAVENOUS
Status: DISCONTINUED | OUTPATIENT
Start: 2025-02-11 | End: 2025-02-11 | Stop reason: SDUPTHER

## 2025-02-11 RX ORDER — SODIUM CHLORIDE 9 MG/ML
INJECTION, SOLUTION INTRAVENOUS PRN
Status: DISCONTINUED | OUTPATIENT
Start: 2025-02-11 | End: 2025-02-11 | Stop reason: HOSPADM

## 2025-02-11 RX ORDER — POLYETHYLENE GLYCOL 3350 17 G/17G
119 POWDER, FOR SOLUTION ORAL ONCE
Status: COMPLETED | OUTPATIENT
Start: 2025-02-12 | End: 2025-02-12

## 2025-02-11 RX ORDER — BISACODYL 5 MG/1
20 TABLET, DELAYED RELEASE ORAL ONCE
Status: COMPLETED | OUTPATIENT
Start: 2025-02-11 | End: 2025-02-11

## 2025-02-11 RX ORDER — OXYCODONE HYDROCHLORIDE 5 MG/1
5 TABLET ORAL PRN
Status: DISCONTINUED | OUTPATIENT
Start: 2025-02-11 | End: 2025-02-11 | Stop reason: HOSPADM

## 2025-02-11 RX ORDER — LIDOCAINE HYDROCHLORIDE 20 MG/ML
INJECTION, SOLUTION EPIDURAL; INFILTRATION; INTRACAUDAL; PERINEURAL
Status: DISCONTINUED | OUTPATIENT
Start: 2025-02-11 | End: 2025-02-11 | Stop reason: SDUPTHER

## 2025-02-11 RX ORDER — OXYCODONE HYDROCHLORIDE 5 MG/1
10 TABLET ORAL PRN
Status: DISCONTINUED | OUTPATIENT
Start: 2025-02-11 | End: 2025-02-11 | Stop reason: HOSPADM

## 2025-02-11 RX ORDER — LOSARTAN POTASSIUM 50 MG/1
50 TABLET ORAL DAILY
Status: DISCONTINUED | OUTPATIENT
Start: 2025-02-11 | End: 2025-02-14 | Stop reason: HOSPADM

## 2025-02-11 RX ORDER — PROPOFOL 10 MG/ML
INJECTION, EMULSION INTRAVENOUS
Status: DISCONTINUED | OUTPATIENT
Start: 2025-02-11 | End: 2025-02-11 | Stop reason: SDUPTHER

## 2025-02-11 RX ORDER — POLYETHYLENE GLYCOL 3350 17 G/17G
238 POWDER, FOR SOLUTION ORAL ONCE
Status: COMPLETED | OUTPATIENT
Start: 2025-02-11 | End: 2025-02-11

## 2025-02-11 RX ORDER — NALOXONE HYDROCHLORIDE 0.4 MG/ML
INJECTION, SOLUTION INTRAMUSCULAR; INTRAVENOUS; SUBCUTANEOUS PRN
Status: DISCONTINUED | OUTPATIENT
Start: 2025-02-11 | End: 2025-02-11 | Stop reason: HOSPADM

## 2025-02-11 RX ORDER — MEPERIDINE HYDROCHLORIDE 25 MG/ML
12.5 INJECTION INTRAMUSCULAR; INTRAVENOUS; SUBCUTANEOUS EVERY 5 MIN PRN
Status: DISCONTINUED | OUTPATIENT
Start: 2025-02-11 | End: 2025-02-11 | Stop reason: HOSPADM

## 2025-02-11 RX ADMIN — LOSARTAN POTASSIUM 50 MG: 50 TABLET, FILM COATED ORAL at 09:26

## 2025-02-11 RX ADMIN — SODIUM CHLORIDE, PRESERVATIVE FREE 10 ML: 5 INJECTION INTRAVENOUS at 09:31

## 2025-02-11 RX ADMIN — CARVEDILOL 12.5 MG: 6.25 TABLET, FILM COATED ORAL at 20:15

## 2025-02-11 RX ADMIN — POLYETHYLENE GLYCOL 3350 238 G: 17 POWDER, FOR SOLUTION ORAL at 20:15

## 2025-02-11 RX ADMIN — PROPOFOL 25 MG: 10 INJECTION, EMULSION INTRAVENOUS at 13:27

## 2025-02-11 RX ADMIN — SODIUM CHLORIDE, PRESERVATIVE FREE 10 ML: 5 INJECTION INTRAVENOUS at 20:52

## 2025-02-11 RX ADMIN — CARVEDILOL 12.5 MG: 6.25 TABLET, FILM COATED ORAL at 09:26

## 2025-02-11 RX ADMIN — LIDOCAINE HYDROCHLORIDE 60 MG: 20 INJECTION, SOLUTION EPIDURAL; INFILTRATION; INTRACAUDAL; PERINEURAL at 13:23

## 2025-02-11 RX ADMIN — BISACODYL 20 MG: 5 TABLET, COATED ORAL at 17:28

## 2025-02-11 RX ADMIN — PANTOPRAZOLE SODIUM 40 MG: 40 INJECTION, POWDER, LYOPHILIZED, FOR SOLUTION INTRAVENOUS at 09:27

## 2025-02-11 RX ADMIN — PROPOFOL 50 MG: 10 INJECTION, EMULSION INTRAVENOUS at 13:23

## 2025-02-11 RX ADMIN — PANTOPRAZOLE SODIUM 40 MG: 40 INJECTION, POWDER, LYOPHILIZED, FOR SOLUTION INTRAVENOUS at 20:15

## 2025-02-11 RX ADMIN — SODIUM CHLORIDE, POTASSIUM CHLORIDE, SODIUM LACTATE AND CALCIUM CHLORIDE: 600; 310; 30; 20 INJECTION, SOLUTION INTRAVENOUS at 13:20

## 2025-02-11 ASSESSMENT — ENCOUNTER SYMPTOMS: SHORTNESS OF BREATH: 0

## 2025-02-11 ASSESSMENT — LIFESTYLE VARIABLES: SMOKING_STATUS: 0

## 2025-02-11 NOTE — FLOWSHEET NOTE
02/11/25 0844   Vital Signs   Temp 97.4 °F (36.3 °C)   Temp Source Oral   Pulse 64   Heart Rate Source Monitor   Respirations 16   BP (!) 157/69   MAP (Calculated) 98   BP Location Left upper arm   BP Method Automatic   Patient Position Semi fowlers   Oxygen Therapy   SpO2 98 %   O2 Device None (Room air)         Shift assessment completed see flow sheet. Patient in bed alert and oriented x3 disoriented to time. Patient on RA, showing no signs of distress. Patient originally refused morning labs, was able to educate her on the reason we needed them and she agreed. Morning medications given per order. Patient has no other needs at this time. Standard safety measures in place.

## 2025-02-11 NOTE — PROGRESS NOTES
Progress Note    Admit Date:  2/10/2025    Subjective:  Ms. Romero seen up in bed, reports she had black stools for few days    Recently had right CEA , on ASA, plavix and eliquis    Does not smoke  No abd pain   Denies any sob or chest pain today     Objective:   BP (!) 175/84   Pulse 68   Temp 97.5 °F (36.4 °C) (Oral)   Resp 16   Ht 1.6 m (5' 3\")   Wt 63.5 kg (140 lb)   SpO2 99%   BMI 24.80 kg/m²        Intake/Output Summary (Last 24 hours) at 2/11/2025 0748  Last data filed at 2/11/2025 0500  Gross per 24 hour   Intake 1206.55 ml   Output 300 ml   Net 906.55 ml       Physical Exam:         General: elderly female up in bed   Awake, alert and oriented. Appears to be not in any distress  Mucous Membranes:  Pink , anicteric  Neck: No JVD, no carotid bruit, no thyromegaly  Chest:  Clear to auscultation bilaterally, no added sounds  Cardiovascular:  RRR S1S2 heard, no murmurs or gallops  Abdomen:  Soft, undistended, non tender, no organomegaly, BS present  Extremities:  scattered ecchymoses  chronic OA changes to both knees  Right partial foot amputation   No edema or cyanosis. Distal pulses well felt  Neurological : grossly normal with gen chronic weakness          Medications:  carvedilol, 6.25 mg, BID  losartan, 25 mg, Daily  sodium chloride flush, 5-40 mL, 2 times per day  polyethylene glycol, 17 g, Daily  insulin glargine, 0.15 Units/kg, Daily  insulin lispro, 0.05 Units/kg, TID WC  insulin lispro, 0-4 Units, 4x Daily AC & HS  pantoprazole (PROTONIX) 40 mg in sodium chloride (PF) 0.9 % 10 mL injection, 40 mg, BID      PRN Medications:  sodium chloride, , PRN  glucose, 4 tablet, PRN  dextrose bolus, 125 mL, PRN   Or  dextrose bolus, 250 mL, PRN  glucagon (rDNA), 1 mg, PRN  dextrose, , Continuous PRN  sodium chloride flush, 5-40 mL, PRN  sodium chloride, , PRN  potassium chloride, 20 mEq, PRN   Or  potassium chloride, 10 mEq, PRN  magnesium sulfate, 2,000 mg, PRN  ondansetron, 4 mg, Q8H PRN  acetaminophen, 650

## 2025-02-11 NOTE — ED PROVIDER NOTES
[facility] EMERGENCY DEPARTMENT    EMERGENCY DEPARTMENT ENCOUNTER        Patient Name: Xuan Romero  MRN: [id number]  Birthdate 1945  Date of evaluation: 2/10/2025  PCP: Mayelin Celis APRN - CNP  Note Started: 9:31 PM EST 2/10/25    CHIEF COMPLAINT       Rectal Bleeding (Pt to ED with complaint of rectal bleeding that started on Saturday. Denies any abdominal pain. )      HISTORY OF PRESENT ILLNESS: 1 or more Elements       Xuan Romero is a 79 y.o. female who presents to the emergency department for evaluation of rectal bleeding.  Patient reports having ongoing bleeding from rectum since Saturday. Denies have anything like this in the past.  She reports taking Eliquis because she had a stroke in the past.  Denies having any chest pain or abdominal pain.  Patient and family do not think she has had a colonoscopy in the past.  Denies having any other symptoms.  No recent illnesses.  No fevers chills, cough, runny nose, or shortness of breath.    No other complaints, modifying factors or associated symptoms.     History obtained by the patient unless stated otherwise as above on HPI.   No limitations unless specified as above on HPI.     Past medical history:   Past Medical History:   Diagnosis Date    Acute blood loss anemia     Acute osteomyelitis of foot     Bilateral carotid artery stenosis     s/p Stent    Cellulitis of right lower extremity     Chronic deep vein thrombosis (DVT) of left peroneal vein ([facility]) 05/28/2021    Chronic systolic heart failure ([facility])     EF of 35 - 40%    Coronary artery disease involving native coronary artery of native heart without angina pectoris     CVA (cerebrovascular accident due to intracerebral hemorrhage) ([facility]) 07/21/2023    Diabetic neuropathy ([facility]) 2011    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, with bone involvement without evidence of necrosis ([facility])     Essential hypertension     H. pylori infection 2009    Longstanding

## 2025-02-11 NOTE — PROGRESS NOTES
Prior to receiving transfusion of blood products, patient educated on the following:    Blood product transfusion process  Benefits of receiving blood product transfusion  Risks associated with receiving the transfusion  Signs and symptoms of complications associated with blood product transfusion  Vague, uneasy feeling  Onset of pain (especially at the IV site, back, or chest)  Chills  Flushing  Fever  Nausea  Dizziness  Rash  Itching  Dark or red urine  Instructed patient to notify RN immediately if any sign or symptom occurs
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
## 2025-02-11 NOTE — H&P
History and Physical / Pre-Sedation Assessment    Patient:  Xuan BRANDT Heather   :   1945     Intended Procedure:  EGD    HPI: 79 year old female with history of DM, HTN, HLD, PVD s/p PTA, CVA, CHF, CAD, carotid stenosis s/p stent, A fib, SSS s/p pacemaker admitted with painless hematochezia and acute anemia concerning for brisk upper GI bleed    Past Medical History:   Diagnosis Date    Acute blood loss anemia     Acute osteomyelitis of foot     Bilateral carotid artery stenosis     s/p Stent    Cellulitis of right lower extremity     Chronic deep vein thrombosis (DVT) of left peroneal vein ([facility]) 2021    Chronic systolic heart failure ([facility])     EF of 35 - 40%    Coronary artery disease involving native coronary artery of native heart without angina pectoris     CVA (cerebrovascular accident due to intracerebral hemorrhage) ([facility]) 2023    Diabetic neuropathy ([facility])     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, with bone involvement without evidence of necrosis ([facility])     Essential hypertension     H. pylori infection     Longstanding persistent atrial fibrillation ([facility]) 2023    Mixed hyperlipidemia     Osteoarthritis     knees    Peptic ulcer with hemorrhage     Presence of temporary transvenous cardiac pacemaker 2023    PVD (peripheral vascular disease) with claudication ([facility])     PTA distal sfa/pop/peroneal, Dr. Gillis 2015    SSS (sick sinus syndrome) ([facility]) 2023    Type 2 diabetes mellitus with complication, without long-term current use of insulin ([facility])     Vitamin D deficiency 2017     Past Surgical History:   Procedure Laterality Date    ANGIOPLASTY  12/30/15    Dr. Gillis, LLE, PTA of distal sfa/pop/peroneal    CARDIAC CATHETERIZATION  12    done for surgical clearance, cath was negative    CARDIAC PROCEDURE N/A 2024    Left heart cath / coronary angiography performed by Darian Hannah MD at [facility] CARDIAC CATH LAB

## 2025-02-11 NOTE — PROGRESS NOTES
.Patient admitted to room 316 from ED. Patient oriented to room, call light, bed rails, phone, lights and bathroom. Patient instructed about the schedule of the day including: vital sign frequency, lab draws, possible tests, frequency of MD and staff rounds, daily weights, I &O's and prescribed diet.  Telemetry box in place, patient aware of placement and reason. Bed locked, in lowest position, side rails up 2/4, call light within reach.        Recliner Assessment  Patient is not able to demonstrated the ability to move from a reclining position to an upright position within the recliner. however patient is alert, oriented and able to provide informed consent       4 Eyes Skin Assessment     NAME:  Xuan Romero  YOB: 1945  MEDICAL RECORD NUMBER:  [id number]    The patient is being assessed for  Admission    I agree that at least one RN has performed a thorough Head to Toe Skin Assessment on the patient. ALL assessment sites listed below have been assessed.      Areas assessed by both nurses:    Head, Face, Ears, Shoulders, Back, Chest, Arms, Elbows, Hands, Sacrum. Buttock, Coccyx, Ischium, Legs. Feet and Heels, and Under Medical Devices         Does the Patient have a Wound? No noted wound(s)       Jc Prevention initiated by RN: Yes  Wound Care Orders initiated by RN: No    Pressure Injury (Stage 3,4, Unstageable, DTI, NWPT, and Complex wounds) if present, place Wound referral order by RN under : No    New Ostomies, if present place, Ostomy referral order under : No     Nurse 1 eSignature: Electronically signed by Florencia Swanson RN on 2/11/25 at 5:05 AM EST    **SHARE this note so that the co-signing nurse can place an eSignature**    Nurse 2 eSignature: {Esignature:[id number]} admission

## 2025-02-11 NOTE — CARE COORDINATION
Case Management Assessment  Initial Evaluation    Date/Time of Evaluation: 2/11/2025 9:15 AM  Assessment Completed by: Pooja Hartman    If patient is discharged prior to next notation, then this note serves as note for discharge by case management.    Patient Name: Xuan Romero                   YOB: 1945  Diagnosis: On continuous oral anticoagulation [Z79.01]  Gastrointestinal hemorrhage, unspecified gastrointestinal hemorrhage type [K92.2]  Acute on chronic anemia [D64.9]                   Date / Time: 2/10/2025  3:19 PM    Patient Admission Status: Inpatient   Readmission Risk (Low < 19, Mod (19-27), High > 27): Readmission Risk Score: 14.5    Current PCP: Mayelin Celis APRN - CNP  PCP verified by CM? Yes (tello)    Chart Reviewed: Yes      History Provided by: Patient  Patient Orientation: Alert and Oriented    Patient Cognition: Alert    Hospitalization in the last 30 days (Readmission):  No    If yes, Readmission Assessment in CM Navigator will be completed.    Advance Directives:      Code Status: Full Code   Patient's Primary Decision Maker is: Patient Declined (Legal Next of Kin Remains as Decision Maker)    Primary Decision Maker: jordan romero - [phone]    Secondary Decision Maker: Evy Romero - Daughter-in-Law - [phone]    Secondary Decision Maker: Chan Romero - [phone]    Discharge Planning:    Patient lives with: Children Type of Home: House  Primary Care Giver: Family  Patient Support Systems include: Children   Current Financial resources: Medicare  Current community resources: None  Current services prior to admission: Durable Medical Equipment            Current DME: Wheelchair            Type of Home Care services:  None    ADLS  Prior functional level: Assistance with the following:, Bathing, Dressing, Toileting, Cooking, Housework, Shopping, Mobility  Current functional level: Mobility, Shopping, Housework, Cooking, Toileting, Bathing, Assistance

## 2025-02-11 NOTE — ASSESSMENT & PLAN NOTE
- Known hx w/o evidence of active angina or acute ischemia on EKG or HS troponin  - Medical management reviewed and adjusted with DAPT held given GI bleeding  - Maintain Hgb>8

## 2025-02-11 NOTE — FLOWSHEET NOTE
02/11/25 1353   Vital Signs   Temp 97.9 °F (36.6 °C)   Temp Source Axillary   Pulse 73   Respirations 14   BP (!) 145/59   MAP (Calculated) 88   Pain Assessment   Pain Assessment None - Denies Pain   Oxygen Therapy   SpO2 97 %   O2 Device None (Room air)   Rhythm Interpretation   Cardiac Rhythm Sinus rhythm     Vss. Patient in bed resting

## 2025-02-11 NOTE — PROGRESS NOTES
Report called to PCU RNEdwige. Procedure findings reported. Patient currently in SDS recovery. CMU updated on patient's current location.

## 2025-02-11 NOTE — H&P
(!) 146/67 (!) 156/59 (!) 142/68   Pulse: 80 73 73 70   Resp: 12 17 19 18   Temp:  98.2 °F (36.8 °C)     TempSrc:  Oral     SpO2: 100% 100% 100% 98%   Weight:       Height:           Medications Prior to Admission     Prior to Admission medications    Medication Sig Start Date End Date Taking? Authorizing Provider   aspirin 81 MG chewable tablet Take 1 tablet by mouth daily   Yes ProviderZina MD   carvedilol (COREG) 12.5 MG tablet Take 1 tablet by mouth 2 times daily 1/20/25  Yes Neo Soliman MD   losartan (COZAAR) 50 MG tablet Take 1 tablet by mouth in the morning and at bedtime  Patient taking differently: Take 1 tablet by mouth daily 1/20/25  Yes Neo Soliman MD   clopidogrel (PLAVIX) 75 MG tablet Take 1 tablet by mouth daily 1/17/25  Yes Edson Gillis MD   Ascorbic Acid (VITAMIN C) 250 MG tablet TAKE 1 TABLET BY MOUTH DAILY 1/16/25  Yes Mayelin Celis APRN - CNP   metFORMIN (GLUCOPHAGE) 500 MG tablet TAKE 2 TABLETS BY MOUTH DAILY WITH BREAKFAST  Patient taking differently: Take 2 tablets by mouth daily (with breakfast) 12/17/24  Yes Mayelin Celis APRN - CNP   simvastatin (ZOCOR) 20 MG tablet Take 1 tablet by mouth nightly 11/11/24  Yes Mayelin Celis APRN - CNP   apixaban (ELIQUIS) 5 MG TABS tablet Take 1 tablet by mouth 2 times daily 10/1/24  Yes YANIQUE Begum Jr., MD   vitamin D (VITAMIN D3) 25 MCG (1000 UT) TABS tablet TAKE 1 TABLET BY MOUTH DAILY  Patient taking differently: Take 1 tablet by mouth daily 4/4/24  Yes Mayelin Celis APRN - CNP       Physical Exam:    Neuro: CN intact, MORRIS, strength intact,   Cardio:  RRR   Pulm: CTA    Gastro: Soft, NT, ND BS+, No guarding  Extremity: No edema     Past Medical History:   PMHx   Past Medical History:   Diagnosis Date    Acute blood loss anemia     Acute osteomyelitis of foot     Bilateral carotid artery stenosis     s/p Stent    Cellulitis of right lower extremity     Chronic deep vein thrombosis (DVT) of left peroneal vein ([facility])  52 03/05/2012 02:51 PM    TRIG 43 12/11/2024 08:00 AM     Hemoglobin A1C:   Lab Results   Component Value Date/Time    LABA1C 5.7 12/09/2024 01:12 PM     TSH:   Lab Results   Component Value Date/Time    TSH 1.16 07/19/2023 04:36 AM     Troponin: No results found for: \"TROPONINT\"  Lactic Acid: No results for input(s): \"LACTA\" in the last 72 hours.  BNP: No results for input(s): \"PROBNP\" in the last 72 hours.  UA:  Lab Results   Component Value Date/Time    NITRU Negative 09/20/2023 10:45 AM    COLORU Yellow 09/20/2023 10:45 AM    PHUR 6.0 09/20/2023 10:45 AM    WBCUA 10-20 09/20/2023 10:45 AM    RBCUA 3-4 09/20/2023 10:45 AM    BACTERIA Rare 09/20/2023 10:45 AM    CLARITYU Clear 09/20/2023 10:45 AM    SPECGRAV 1.025 05/02/2019 03:13 PM    LEUKOCYTESUR LARGE 09/20/2023 10:45 AM    UROBILINOGEN 0.2 09/20/2023 10:45 AM    BILIRUBINUR Negative 09/20/2023 10:45 AM    BILIRUBINUR neg 05/02/2019 03:13 PM    BLOODU TRACE-INTACT 09/20/2023 10:45 AM    GLUCOSEU Negative 09/20/2023 10:45 AM    KETUA Negative 09/20/2023 10:45 AM    AMORPHOUS Rare 06/07/2022 01:55 PM     Urine Cultures:   Lab Results   Component Value Date/Time    LABURIN  09/20/2023 10:55 AM     <50,000 CFU/ml mixed skin/urogenital robert. No further workup <50,000  CFU/ml mixed skin/urogenital robert. No further workup      LABURIN 100 10/01/2019 02:22 PM     Blood Cultures:   Lab Results   Component Value Date/Time    BC No Growth after 4 days of incubation. 09/04/2022 06:30 PM     Lab Results   Component Value Date/Time    BLOODCULT2 No Growth after 4 days of incubation. 09/04/2022 06:30 PM     Organism:   Lab Results   Component Value Date/Time    ORG Escherichia coli 07/16/2023 06:47 PM       Imaging/Diagnostics Last 24 Hours   CTA ABDOMEN PELVIS W WO CONTRAST    Result Date: 2/10/2025  EXAMINATION: CTA OF THE ABDOMEN AND PELVIS WITH AND WITHOUT CONTRAST 2/10/2025 5:38 pm: TECHNIQUE: CTA of the abdomen and pelvis was performed without and with the

## 2025-02-11 NOTE — ANESTHESIA PRE PROCEDURE
Department of Anesthesiology  Preprocedure Note       Name:  Xuan Romero   Age:  79 y.o.  :  1945                                          MRN:  [id number]         Date:  2025      Surgeon: Surgeon(s):  Nader Layne MD    Procedure: Procedure(s):  EGD W/ANES.    Medications prior to admission:   Prior to Admission medications    Medication Sig Start Date End Date Taking? Authorizing Provider   aspirin 81 MG chewable tablet Take 1 tablet by mouth daily   Yes Zina Shay MD   carvedilol (COREG) 12.5 MG tablet Take 1 tablet by mouth 2 times daily 25  Yes Neo Soliman MD   losartan (COZAAR) 50 MG tablet Take 1 tablet by mouth in the morning and at bedtime  Patient taking differently: Take 1 tablet by mouth daily 25  Yes Neo Soliman MD   clopidogrel (PLAVIX) 75 MG tablet Take 1 tablet by mouth daily 25  Yes Edson Gillis MD   Ascorbic Acid (VITAMIN C) 250 MG tablet TAKE 1 TABLET BY MOUTH DAILY 25  Yes Mayelin Celis APRN - CNP   metFORMIN (GLUCOPHAGE) 500 MG tablet TAKE 2 TABLETS BY MOUTH DAILY WITH BREAKFAST  Patient taking differently: Take 2 tablets by mouth daily (with breakfast) 24  Yes Mayelin Celis APRN - CNP   simvastatin (ZOCOR) 20 MG tablet Take 1 tablet by mouth nightly 24  Yes Mayelin Celis APRN - CNP   apixaban (ELIQUIS) 5 MG TABS tablet Take 1 tablet by mouth 2 times daily 10/1/24  Yes YANIQUE Begum Jr., MD   vitamin D (VITAMIN D3) 25 MCG (1000 UT) TABS tablet TAKE 1 TABLET BY MOUTH DAILY  Patient taking differently: Take 1 tablet by mouth daily 24  Yes Mayelin Celis APRN - CNP       Current medications:    Current Facility-Administered Medications   Medication Dose Route Frequency Provider Last Rate Last Admin    carvedilol (COREG) tablet 12.5 mg  12.5 mg Oral BID Jaquan Ko MD   12.5 mg at 25 0926    losartan (COZAAR) tablet 50 mg  50 mg Oral Daily Jaquan Ko MD   50 mg at 25

## 2025-02-11 NOTE — ASSESSMENT & PLAN NOTE
- Longterm control/A1C unclear w/o acute inpt hyperglycemia noted  - Wt based lantus & prandial Novolog initiated for inpt management  - Resume low CHO diet

## 2025-02-11 NOTE — ASSESSMENT & PLAN NOTE
- Known hx of ICM with estimated LVF 35-40% per TTE (2023) but no evidence of acute HF present on exam  - Established medical regimen adjusted given GI blood loss and diuretic therapy held at this time  - Monitor Wt and volume status s/p transfusion

## 2025-02-11 NOTE — PROGRESS NOTES
3rd unit of blood transfusing. There were no adverse reactions noted at this time. Transferred care to PCU, RN. Face to face bedside report given, no need voiced at this time. Pt in bed comfortable awake in bed. All of p.t. belongings are transferred with patient and at beside. No signs of distress noted.  Call light within reach. P.t. Denies needs at this time.

## 2025-02-11 NOTE — PROGRESS NOTES
Patient admitted from endo to pacu.  Bedside report received.  Patient immediately hooked up to vitals machine.Lindsay Schulz RN

## 2025-02-11 NOTE — FLOWSHEET NOTE
02/11/25 1420   Vital Signs   Temp 98 °F (36.7 °C)   Temp Source Axillary   Pulse 67   Heart Rate Source Monitor   BP (!) 159/74   MAP (Calculated) 102   BP Location Left upper arm   BP Method Automatic   Patient Position Semi fowlers   Oxygen Therapy   SpO2 95 %   O2 Device None (Room air)     Patient back from endo

## 2025-02-11 NOTE — FLOWSHEET NOTE
02/11/25 0430   Vital Signs   Temp 97.5 °F (36.4 °C)   Temp Source Oral   Pulse 65   Respirations 16   BP (!) 175/82   MAP (Calculated) 113   BP Location Left upper arm   BP Method Automatic   Patient Position Semi fowlers   Pain Assessment   Pain Assessment None - Denies Pain   Oxygen Therapy   SpO2 99 %   O2 Device None (Room air)     Pt arrived to unit , has blood running. Call light within reach . Orientation to unit given. Put in for SS consult . No rectal bleeding noted. Pt has faded bruising to L rib area, pt had a CT no fx noted

## 2025-02-11 NOTE — PROGRESS NOTES
Patient transferred via bed in stable condition with all belongings to room 316.Lindsay Schulz RN

## 2025-02-11 NOTE — CONSENT
Informed Consent for Blood Component Transfusion Note    I have discussed with the patient the rationale for blood component transfusion; its benefits in treating or preventing fatigue, organ damage, or death; and its risk which includes mild transfusion reactions, rare risk of blood borne infection, or more serious but rare reactions. I have discussed the alternatives to transfusion, including the risk and consequences of not receiving transfusion. The patient had an opportunity to ask questions and had agreed to proceed with transfusion of blood components.    Electronically signed by Cindy Crockett MD on 2/10/25 at 9:31 PM EST

## 2025-02-11 NOTE — PLAN OF CARE
Problem: Metabolic/Fluid and Electrolytes - Adult  Goal: Electrolytes maintained within normal limits  Outcome: Progressing

## 2025-02-11 NOTE — PROGRESS NOTES
PM Assessment completed. Pt does not express any pain or discomfort at this time. Respirations are even & easy. No complaints voiced. Pt denies needs at this time. SR up x 2, and bed in low position. Call light is within reach.    Bedside Mobility Assessment Tool (BMAT):     Assessment Level 1- Sit and Shake    1. From a semi-reclined position, ask patient to sit up and rotate to a seated position at the side of the bed. Can use the bedrail.    2. Ask patient to reach out and grab your hand and shake making sure patient reaches across his/her midline.   Pass- Patient is able to come to a seated position, maintain core strength. Maintains seated balance while reaching across midline. Move on to Assessment Level 2.     Assessment Level 2- Stretch and Point   1. With patient in seated position at the side of the bed, have patient place both feet on the floor (or stool) with knees no higher than hips.    2. Ask patient to stretch one leg and straighten the knee, then bend the ankle/flex and point the toes. If appropriate, repeat with the other leg.   Pass- Patient is able to demonstrate appropriate quad strength on intended weight bearing limb(s). Move onto Assessment Level 3.     Assessment Level 3- Stand   1. Ask patient to elevate off the bed or chair (seated to standing) using an assistive device (cane, bedrail).    2. Patient should be able to raise buttocks off be and hold for a count of five. May repeat once.   Fail- Patient unable to demonstrate standing stability. Patient is MOBILITY LEVEL 3.     Assessment Level 4- Walk   1. Ask patient to march in place at bedside.    2. Then ask patient to advance step and return each foot. Some medical conditions may render a patient from stepping backwards, use your best clinical judgement.   Fail- Patient not able to complete tasks OR requires use of assistive device. Patient is MOBILITY LEVEL 3.       Mobility Level- 2

## 2025-02-11 NOTE — ASSESSMENT & PLAN NOTE
- Baseline normocytic/normochromic dz with acute lower GI bleed reported   - Chronic APT and NOAC use in context of atherosclerotic dz and Afib noted with diverticular bleed suspected  - AC reversal provided in ED and transfusion ordered for goal HgB>8 given underlying CAD  - Follow up Iron/B12 and hemolytic studies ordered  - Additional GI eval pending

## 2025-02-11 NOTE — ANESTHESIA POSTPROCEDURE EVALUATION
Department of Anesthesiology  Postprocedure Note    Patient: Xuan Romero  MRN: [id number]  YOB: 1945  Date of evaluation: 2/11/2025    Procedure Summary       Date: 02/11/25 Room / Location: [address]    Anesthesia Start: 1320 Anesthesia Stop: 1334    Procedure: EGD W/ANES. Diagnosis:       Anemia, unspecified type      (Anemia, unspecified type [D64.9])    Surgeons: Nader Layne MD Responsible Provider: Lj Pereyra MD    Anesthesia Type: TIVA ASA Status: 3            Anesthesia Type: No value filed.    Moe Phase I: Moe Score: 10    Moe Phase II: Moe Score: 9    Anesthesia Post Evaluation    Patient location during evaluation: bedside  Patient participation: complete - patient participated  Level of consciousness: confused and awake  Airway patency: patent  Nausea & Vomiting: no nausea  Cardiovascular status: hemodynamically stable  Respiratory status: acceptable  Hydration status: euvolemic  Pain management: adequate      Vitals:    02/11/25 1210 02/11/25 1330 02/11/25 1335 02/11/25 1353   BP: (!) 173/78 119/60 122/62 (!) 145/59   Pulse: 74 72 74 73   Resp: 16 14 14 14   Temp: 97.3 °F (36.3 °C) 98 °F (36.7 °C) 97.9 °F (36.6 °C) 97.9 °F (36.6 °C)   TempSrc: Temporal Axillary Axillary Axillary   SpO2: 98% 96% 96% 97%   Weight:       Height:          No notable events documented.

## 2025-02-11 NOTE — ASSESSMENT & PLAN NOTE
- Known hx with underlying SSS and PPM placement reviewed  - CHADSV2>4 but level of Afib burden unclear with PPM interrogation requested  - NOAC therapy held and reversal administered by ED given acute lower GI bleeding   - Continue BB & correct lytes PRN   Child-Family Life Procedural Support    Data: Pardeep Miranda was referred by LPN to this Child-Family  for assessment of coping, preparation and support during today's laser treatment.  Today's is the patient's first laser treatment.  Patient is not familiar with this procedure.  Difficult aspects of procedure include pain, general fear/anxiety of procedure and discomfort.  Patient was accompanied by mother and father in procedure room for procedure.  Patient was provided developmentally appropriate preparation/teaching by Child-Family  and Physician via verbal descriptions and role-playing.    Intervention: This Child-Family  provided redirection, visual distraction, positive touch/massage, parental/caregiver guidance, presence/support and sensory items in procedure room.    Assessment: At the start of the procedure patient appeared calm.  Patient was able to hold still, able to utilize coping strategy, able to cooperate with demands of procedure and crying.  Challenges patient had with procedure included pain during the procedure.  Overall, patient appeared calm/relaxed while being held by the father for removal of L-mx cream and cleaning of the forehead. This writer provided small distraction items to transition onto the bed and reduce anxieties prior to the laser treatment. The patient was swaddled, mother and father present at bedside, pacifier and light up for the laser. The patient appeared to cope by having intermittent crying for the laser of the forehead and right side of the nose. Once completed the patient was able to return to base coping and smile/ and engage in play with this writer.    Plan: This Child-Family  will continue to follow/support patient during hospitalization/future clinic visits.

## 2025-02-11 NOTE — PROGRESS NOTES
Occupational/Physical Therapy  Orders received, chart reviewed. Patient off floor for EGD and unavailable at this time. Will reattempt as patient status and therapy schedules allow.    Crystal Paredes, OTR/L [id number]  Dima Haynes PT, DPT [id number]

## 2025-02-12 ENCOUNTER — ANESTHESIA (OUTPATIENT)
Dept: ENDOSCOPY | Age: 80
End: 2025-02-12
Payer: MEDICARE

## 2025-02-12 ENCOUNTER — ANESTHESIA EVENT (OUTPATIENT)
Dept: ENDOSCOPY | Age: 80
End: 2025-02-12
Payer: MEDICARE

## 2025-02-12 LAB
ANION GAP SERPL CALCULATED.3IONS-SCNC: 8 MMOL/L (ref 3–16)
BUN SERPL-MCNC: 24 MG/DL (ref 7–20)
CALCIUM SERPL-MCNC: 8.3 MG/DL (ref 8.3–10.6)
CHLORIDE SERPL-SCNC: 108 MMOL/L (ref 99–110)
CO2 SERPL-SCNC: 17 MMOL/L (ref 21–32)
CREAT SERPL-MCNC: 0.9 MG/DL (ref 0.6–1.2)
GFR SERPLBLD CREATININE-BSD FMLA CKD-EPI: 65 ML/MIN/{1.73_M2}
GLUCOSE BLD-MCNC: 106 MG/DL (ref 70–99)
GLUCOSE BLD-MCNC: 125 MG/DL (ref 70–99)
GLUCOSE BLD-MCNC: 130 MG/DL (ref 70–99)
GLUCOSE BLD-MCNC: 261 MG/DL (ref 70–99)
GLUCOSE BLD-MCNC: 277 MG/DL (ref 70–99)
GLUCOSE SERPL-MCNC: 95 MG/DL (ref 70–99)
HCT VFR BLD AUTO: 25 % (ref 36–48)
HCT VFR BLD AUTO: 25.4 % (ref 36–48)
HCT VFR BLD AUTO: 26.9 % (ref 36–48)
HGB BLD-MCNC: 8.2 G/DL (ref 12–16)
HGB BLD-MCNC: 8.5 G/DL (ref 12–16)
HGB BLD-MCNC: 8.8 G/DL (ref 12–16)
PERFORMED ON: ABNORMAL
POTASSIUM SERPL-SCNC: 3.8 MMOL/L (ref 3.5–5.1)
SODIUM SERPL-SCNC: 133 MMOL/L (ref 136–145)

## 2025-02-12 PROCEDURE — 2580000003 HC RX 258

## 2025-02-12 PROCEDURE — 97530 THERAPEUTIC ACTIVITIES: CPT

## 2025-02-12 PROCEDURE — 97162 PT EVAL MOD COMPLEX 30 MIN: CPT

## 2025-02-12 PROCEDURE — 2709999900 HC NON-CHARGEABLE SUPPLY: Performed by: INTERNAL MEDICINE

## 2025-02-12 PROCEDURE — 2580000003 HC RX 258: Performed by: HOSPITALIST

## 2025-02-12 PROCEDURE — 3609009900 HC COLONOSCOPY W/CONTROL BLEEDING ANY METHOD: Performed by: INTERNAL MEDICINE

## 2025-02-12 PROCEDURE — 2720000010 HC SURG SUPPLY STERILE: Performed by: INTERNAL MEDICINE

## 2025-02-12 PROCEDURE — 6360000002 HC RX W HCPCS: Performed by: HOSPITALIST

## 2025-02-12 PROCEDURE — 85018 HEMOGLOBIN: CPT

## 2025-02-12 PROCEDURE — 3700000000 HC ANESTHESIA ATTENDED CARE: Performed by: INTERNAL MEDICINE

## 2025-02-12 PROCEDURE — 1200000000 HC SEMI PRIVATE

## 2025-02-12 PROCEDURE — 80048 BASIC METABOLIC PNL TOTAL CA: CPT

## 2025-02-12 PROCEDURE — 6370000000 HC RX 637 (ALT 250 FOR IP): Performed by: INTERNAL MEDICINE

## 2025-02-12 PROCEDURE — 6370000000 HC RX 637 (ALT 250 FOR IP)

## 2025-02-12 PROCEDURE — 7100000011 HC PHASE II RECOVERY - ADDTL 15 MIN: Performed by: INTERNAL MEDICINE

## 2025-02-12 PROCEDURE — 99232 SBSQ HOSP IP/OBS MODERATE 35: CPT | Performed by: INTERNAL MEDICINE

## 2025-02-12 PROCEDURE — 7100000010 HC PHASE II RECOVERY - FIRST 15 MIN: Performed by: INTERNAL MEDICINE

## 2025-02-12 PROCEDURE — 0W3P8ZZ CONTROL BLEEDING IN GASTROINTESTINAL TRACT, VIA NATURAL OR ARTIFICIAL OPENING ENDOSCOPIC: ICD-10-PCS | Performed by: INTERNAL MEDICINE

## 2025-02-12 PROCEDURE — 3700000001 HC ADD 15 MINUTES (ANESTHESIA): Performed by: INTERNAL MEDICINE

## 2025-02-12 PROCEDURE — 6370000000 HC RX 637 (ALT 250 FOR IP): Performed by: HOSPITALIST

## 2025-02-12 PROCEDURE — 2500000003 HC RX 250 WO HCPCS: Performed by: HOSPITALIST

## 2025-02-12 PROCEDURE — 85014 HEMATOCRIT: CPT

## 2025-02-12 PROCEDURE — 97166 OT EVAL MOD COMPLEX 45 MIN: CPT

## 2025-02-12 PROCEDURE — 36415 COLL VENOUS BLD VENIPUNCTURE: CPT

## 2025-02-12 PROCEDURE — 6360000002 HC RX W HCPCS

## 2025-02-12 PROCEDURE — 97535 SELF CARE MNGMENT TRAINING: CPT

## 2025-02-12 RX ORDER — LABETALOL HYDROCHLORIDE 5 MG/ML
5 INJECTION, SOLUTION INTRAVENOUS EVERY 10 MIN PRN
Status: DISCONTINUED | OUTPATIENT
Start: 2025-02-12 | End: 2025-02-12 | Stop reason: HOSPADM

## 2025-02-12 RX ORDER — NALOXONE HYDROCHLORIDE 0.4 MG/ML
INJECTION, SOLUTION INTRAMUSCULAR; INTRAVENOUS; SUBCUTANEOUS PRN
Status: DISCONTINUED | OUTPATIENT
Start: 2025-02-12 | End: 2025-02-12 | Stop reason: HOSPADM

## 2025-02-12 RX ORDER — MEPERIDINE HYDROCHLORIDE 25 MG/ML
12.5 INJECTION INTRAMUSCULAR; INTRAVENOUS; SUBCUTANEOUS EVERY 5 MIN PRN
Status: DISCONTINUED | OUTPATIENT
Start: 2025-02-12 | End: 2025-02-12 | Stop reason: HOSPADM

## 2025-02-12 RX ORDER — OXYCODONE HYDROCHLORIDE 5 MG/1
5 TABLET ORAL PRN
Status: DISCONTINUED | OUTPATIENT
Start: 2025-02-12 | End: 2025-02-12 | Stop reason: HOSPADM

## 2025-02-12 RX ORDER — LIDOCAINE HYDROCHLORIDE 20 MG/ML
INJECTION, SOLUTION INFILTRATION; PERINEURAL
Status: DISCONTINUED | OUTPATIENT
Start: 2025-02-12 | End: 2025-02-12 | Stop reason: SDUPTHER

## 2025-02-12 RX ORDER — SODIUM CHLORIDE 0.9 % (FLUSH) 0.9 %
5-40 SYRINGE (ML) INJECTION PRN
Status: DISCONTINUED | OUTPATIENT
Start: 2025-02-12 | End: 2025-02-12 | Stop reason: HOSPADM

## 2025-02-12 RX ORDER — SODIUM CHLORIDE 9 MG/ML
INJECTION, SOLUTION INTRAVENOUS PRN
Status: DISCONTINUED | OUTPATIENT
Start: 2025-02-12 | End: 2025-02-12 | Stop reason: HOSPADM

## 2025-02-12 RX ORDER — SODIUM CHLORIDE, SODIUM LACTATE, POTASSIUM CHLORIDE, CALCIUM CHLORIDE 600; 310; 30; 20 MG/100ML; MG/100ML; MG/100ML; MG/100ML
INJECTION, SOLUTION INTRAVENOUS
Status: DISCONTINUED | OUTPATIENT
Start: 2025-02-12 | End: 2025-02-12 | Stop reason: SDUPTHER

## 2025-02-12 RX ORDER — DIPHENHYDRAMINE HYDROCHLORIDE 50 MG/ML
12.5 INJECTION INTRAMUSCULAR; INTRAVENOUS
Status: DISCONTINUED | OUTPATIENT
Start: 2025-02-12 | End: 2025-02-12 | Stop reason: HOSPADM

## 2025-02-12 RX ORDER — SODIUM CHLORIDE 0.9 % (FLUSH) 0.9 %
5-40 SYRINGE (ML) INJECTION EVERY 12 HOURS SCHEDULED
Status: DISCONTINUED | OUTPATIENT
Start: 2025-02-12 | End: 2025-02-12 | Stop reason: HOSPADM

## 2025-02-12 RX ORDER — ATORVASTATIN CALCIUM 40 MG/1
40 TABLET, FILM COATED ORAL DAILY
Status: DISCONTINUED | OUTPATIENT
Start: 2025-02-12 | End: 2025-02-14 | Stop reason: HOSPADM

## 2025-02-12 RX ORDER — ONDANSETRON 2 MG/ML
4 INJECTION INTRAMUSCULAR; INTRAVENOUS
Status: DISCONTINUED | OUTPATIENT
Start: 2025-02-12 | End: 2025-02-12 | Stop reason: HOSPADM

## 2025-02-12 RX ORDER — OXYCODONE HYDROCHLORIDE 5 MG/1
10 TABLET ORAL PRN
Status: DISCONTINUED | OUTPATIENT
Start: 2025-02-12 | End: 2025-02-12 | Stop reason: HOSPADM

## 2025-02-12 RX ORDER — PROPOFOL 10 MG/ML
INJECTION, EMULSION INTRAVENOUS
Status: DISCONTINUED | OUTPATIENT
Start: 2025-02-12 | End: 2025-02-12 | Stop reason: SDUPTHER

## 2025-02-12 RX ORDER — AMLODIPINE BESYLATE 5 MG/1
5 TABLET ORAL NIGHTLY
Status: DISCONTINUED | OUTPATIENT
Start: 2025-02-12 | End: 2025-02-14 | Stop reason: HOSPADM

## 2025-02-12 RX ADMIN — SODIUM CHLORIDE, PRESERVATIVE FREE 10 ML: 5 INJECTION INTRAVENOUS at 21:36

## 2025-02-12 RX ADMIN — PROPOFOL 180 MG: 10 INJECTION, EMULSION INTRAVENOUS at 11:02

## 2025-02-12 RX ADMIN — LIDOCAINE HYDROCHLORIDE 50 MG: 20 INJECTION, SOLUTION INFILTRATION; PERINEURAL at 11:02

## 2025-02-12 RX ADMIN — POLYETHYLENE GLYCOL 3350 119 G: 17 POWDER, FOR SOLUTION ORAL at 02:39

## 2025-02-12 RX ADMIN — CARVEDILOL 12.5 MG: 6.25 TABLET, FILM COATED ORAL at 20:57

## 2025-02-12 RX ADMIN — LOSARTAN POTASSIUM 50 MG: 50 TABLET, FILM COATED ORAL at 08:56

## 2025-02-12 RX ADMIN — ATORVASTATIN CALCIUM 40 MG: 40 TABLET, FILM COATED ORAL at 20:57

## 2025-02-12 RX ADMIN — PANTOPRAZOLE SODIUM 40 MG: 40 INJECTION, POWDER, LYOPHILIZED, FOR SOLUTION INTRAVENOUS at 08:43

## 2025-02-12 RX ADMIN — INSULIN LISPRO 2 UNITS: 100 INJECTION, SOLUTION INTRAVENOUS; SUBCUTANEOUS at 20:56

## 2025-02-12 RX ADMIN — AMLODIPINE BESYLATE 5 MG: 5 TABLET ORAL at 20:57

## 2025-02-12 RX ADMIN — SODIUM CHLORIDE, PRESERVATIVE FREE 10 ML: 5 INJECTION INTRAVENOUS at 08:43

## 2025-02-12 RX ADMIN — SODIUM CHLORIDE, POTASSIUM CHLORIDE, SODIUM LACTATE AND CALCIUM CHLORIDE: 600; 310; 30; 20 INJECTION, SOLUTION INTRAVENOUS at 10:56

## 2025-02-12 RX ADMIN — PANTOPRAZOLE SODIUM 40 MG: 40 INJECTION, POWDER, LYOPHILIZED, FOR SOLUTION INTRAVENOUS at 20:57

## 2025-02-12 RX ADMIN — CARVEDILOL 12.5 MG: 6.25 TABLET, FILM COATED ORAL at 08:56

## 2025-02-12 ASSESSMENT — PAIN SCALES - GENERAL
PAINLEVEL_OUTOF10: 0

## 2025-02-12 ASSESSMENT — PAIN SCALES - WONG BAKER: WONGBAKER_NUMERICALRESPONSE: NO HURT

## 2025-02-12 ASSESSMENT — PAIN - FUNCTIONAL ASSESSMENT: PAIN_FUNCTIONAL_ASSESSMENT: NONE - DENIES PAIN

## 2025-02-12 NOTE — PLAN OF CARE
Problem: Safety - Adult  Goal: Free from fall injury  Outcome: Progressing     Problem: Chronic Conditions and Co-morbidities  Goal: Patient's chronic conditions and co-morbidity symptoms are monitored and maintained or improved  Outcome: Progressing     Problem: Discharge Planning  Goal: Discharge to home or other facility with appropriate resources  Outcome: Progressing     Problem: Gastrointestinal - Adult  Goal: Maintains or returns to baseline bowel function  Outcome: Progressing     Problem: Metabolic/Fluid and Electrolytes - Adult  Goal: Electrolytes maintained within normal limits  2/11/2025 2136 by Charlene Scott, RN  Outcome: Progressing  2/11/2025 1244 by Edwige Negrete, RN  Outcome: Progressing

## 2025-02-12 NOTE — H&P
History and Physical / Pre-Sedation Assessment    Patient:  Xuan Romero   :   1945     Intended Procedure:  Colonoscopy    HPI: 79 year old female with history of DM, HTN, HLD, PVD s/p PTA, CVA, CHF, CAD, carotid stenosis s/p stent, A fib, SSS s/p pacemaker admitted with painless hematochezia and acute anemia. EGD negative yesterday       Past Medical History:   Diagnosis Date    Acute blood loss anemia     Acute osteomyelitis of foot     Bilateral carotid artery stenosis     s/p Stent    Cellulitis of right lower extremity     Chronic deep vein thrombosis (DVT) of left peroneal vein ([facility]) 2021    Chronic systolic heart failure ([facility])     EF of 35 - 40%    Coronary artery disease involving native coronary artery of native heart without angina pectoris     CVA (cerebrovascular accident due to intracerebral hemorrhage) ([facility]) 2023    Diabetic neuropathy ([facility])     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, with bone involvement without evidence of necrosis ([facility])     Essential hypertension     H. pylori infection     Longstanding persistent atrial fibrillation ([facility]) 2023    Mixed hyperlipidemia     Osteoarthritis     knees    Peptic ulcer with hemorrhage     Presence of temporary transvenous cardiac pacemaker 2023    PVD (peripheral vascular disease) with claudication ([facility])     PTA distal sfa/pop/peroneal, Dr. Gillis 2015    SSS (sick sinus syndrome) ([facility]) 2023    Type 2 diabetes mellitus with complication, without long-term current use of insulin ([facility])     Vitamin D deficiency 2017     Past Surgical History:   Procedure Laterality Date    ANGIOPLASTY  12/30/15    Dr. Gillis, LLE, PTA of distal sfa/pop/peroneal    CARDIAC CATHETERIZATION  12    done for surgical clearance, cath was negative    CARDIAC PROCEDURE N/A 2024    Left heart cath / coronary angiography performed by Darian Hannah MD at [facility] CARDIAC CATH [facility]     TABLETS BY MOUTH DAILY WITH BREAKFAST  Patient taking differently: Take 2 tablets by mouth daily (with breakfast) 12/17/24  Yes Mayelin Celis APRN - CNP   simvastatin (ZOCOR) 20 MG tablet Take 1 tablet by mouth nightly 11/11/24  Yes Mayelin Celis APRN - CNP   apixaban (ELIQUIS) 5 MG TABS tablet Take 1 tablet by mouth 2 times daily 10/1/24  Yes YANIQUE Begum Jr., MD   vitamin D (VITAMIN D3) 25 MCG (1000 UT) TABS tablet TAKE 1 TABLET BY MOUTH DAILY  Patient taking differently: Take 1 tablet by mouth daily 4/4/24  Yes Mayelin Celis APRN - CNP        Allergies:   Allergies   Allergen Reactions    Pcn [Penicillins] Other (See Comments)     Unsure of reaction       Nurses notes reviewed and agreed.    Physical Exam:  Vital Signs: BP (!) 145/64   Pulse 65   Temp (!) 96.7 °F (35.9 °C) (Infrared)   Resp 15   Ht 1.6 m (5' 3\")   Wt 63.5 kg (140 lb)   SpO2 100%   BMI 24.80 kg/m²    Airway: Mallampati: II (soft palate, uvula, fauces visible)  Pulmonary:Normal  Cardiac:Normal  Abdomen:Normal    Pre-Procedure Assessment / Plan:  ASA: Class 3 - A patient with severe systemic disease that limits activity but is not incapacitating  Level of Sedation Plan:Moderate sedation  Post Procedure plan: Return to same level of care    I assessed the patient and find that the patient is in satisfactory condition to proceed with the planned procedure and sedation plan.    I have explained the risk, benefits, and alternatives to the procedure; the patient understands and agrees to proceed.       Nader Layne MD  2/12/2025

## 2025-02-12 NOTE — PROGRESS NOTES
Progress Note    Admit Date:  2/10/2025    EGD with no bleed, ( unable to review report in EPIC )   For colonoscopy today   Hb stable     Subjective:  Ms. Romero seen up in bed, reports she had black/red stools with prep overnight      Recently had right CEA , on ASA, plavix and eliquis    Does not smoke  No abd pain   Denies any sob or chest pain today     Objective:   BP (!) 159/70   Pulse 60   Temp 98 °F (36.7 °C) (Oral)   Resp 16   Ht 1.6 m (5' 3\")   Wt 63.5 kg (140 lb)   SpO2 98%   BMI 24.80 kg/m²        Intake/Output Summary (Last 24 hours) at 2/12/2025 0725  Last data filed at 2/12/2025 0257  Gross per 24 hour   Intake 2180 ml   Output --   Net 2180 ml       Physical Exam:         General: elderly female up in bed   Awake, alert and oriented. Appears to be not in any distress  Mucous Membranes:  Pink , anicteric  Neck: No JVD, no carotid bruit, no thyromegaly  Chest:  Clear to auscultation bilaterally, no added sounds  Cardiovascular:  RRR S1S2 heard, no murmurs or gallops  Abdomen:  Soft, undistended, non tender, no organomegaly, BS present  Extremities:  scattered ecchymoses  chronic OA changes to both knees  Right partial foot amputation   No edema or cyanosis. Distal pulses well felt  Neurological : grossly normal with gen chronic weakness          Medications:  carvedilol, 12.5 mg, BID  losartan, 50 mg, Daily  sodium chloride flush, 5-40 mL, 2 times per day  polyethylene glycol, 17 g, Daily  insulin lispro, 0.05 Units/kg, TID WC  insulin lispro, 0-4 Units, 4x Daily AC & HS  pantoprazole (PROTONIX) 40 mg in sodium chloride (PF) 0.9 % 10 mL injection, 40 mg, BID      PRN Medications:  sodium chloride, , PRN  glucose, 4 tablet, PRN  dextrose bolus, 125 mL, PRN   Or  dextrose bolus, 250 mL, PRN  glucagon (rDNA), 1 mg, PRN  dextrose, , Continuous PRN  sodium chloride flush, 5-40 mL, PRN  sodium chloride, , PRN  potassium chloride, 20 mEq, PRN   Or  potassium chloride, 10 mEq, PRN  magnesium sulfate,  https://www.fda.gov/media/536617/download  EUA: https://www.fda.gov/media/980771/download  IFU: https://www.fda.gov/media/623180/download    Methodology:  RT-PCR          Influenza A NOT DETECTED     Influenza B NOT DETECTED          Cardiac catheterization 12/23/24  Successful left heart cath via right radial approach, access closed with TR band with good hemostasis  Multivessel heavily calcified obstructive CAD involving ostial large diagonal branch as well as large right PLV suggesting ischemic cause of cardiomyopathy  Mildly reduced LV function, LVEF 45% with anterolateral hypokinesis  Mildly elevated left-sided filling pressures, LVEDP 15 mmHg    Echo 1/9/25    Left Ventricle: Moderately reduced left ventricular systolic function with a visually estimated EF of 35 - 40%. Left ventricle is mildly dilated. Mildly increased wall thickness. Findings consistent with mild concentric hypertrophy. Moderate global hypokinesis present. Normal left ventricular filling pressure.    Mitral Valve: Mild annular calcification. Mildly thickened, at the anterior and posterior leaflets. Mild paravalvular regurgitation.    Tricuspid Valve: Mild to moderate regurgitation. Normal RVSP. The estimated RVSP is 35 mmHg.    Left Atrium: Left atrium is moderately dilated.    Right Atrium: Right atrium is moderately dilated.    Aortic Valve: Individual leaflets not well visualized but appear mildly thickened and calcified.    Image quality is technically difficult. Procedure performed with the patient in a sitting position in a wheelchair.     RADIOLOGY  CTA ABDOMEN PELVIS W WO CONTRAST   Final Result   1. No evidence of active GI bleed.  Possible rectal wall thickening.   2. Atrophy of the right kidney with high-grade stenosis seen at the origin of   the right renal artery.   3. Moderate severe atherosclerotic disease seen involving the mesenteric   vessels and left renal artery.  No evidence of jeopardized bowel.

## 2025-02-12 NOTE — ANESTHESIA POSTPROCEDURE EVALUATION
Department of Anesthesiology  Postprocedure Note    Patient: Xuan Romero  MRN: [id number]  YOB: 1945  Date of evaluation: 2/12/2025    Procedure Summary       Date: 02/12/25 Room / Location: [address]    Anesthesia Start: 1102 Anesthesia Stop: 1140    Procedure: COLONOSCOPY CONTROL HEMORRHAGE Diagnosis:       Gastrointestinal hemorrhage, unspecified gastrointestinal hemorrhage type      (Gastrointestinal hemorrhage, unspecified gastrointestinal hemorrhage type [K92.2])    Surgeons: Nader Layne MD Responsible Provider: Eugenie Martin MD    Anesthesia Type: TIVA ASA Status: 3            Anesthesia Type: No value filed.    Moe Phase I: Moe Score: 10    Moe Phase II: Moe Score: 10    Anesthesia Post Evaluation    Comments: Postoperative Anesthesia Note    Name:    Xuan Romero  MRN:      [id number]    Patient Vitals in the past 12 hrs:  02/12/25 1225, BP:(!) 133/57, Temp:97.7 °F (36.5 °C), Temp src:Oral, Pulse:65, Resp:16, SpO2:100 %  02/12/25 1200, BP:(!) 157/58, Temp:97.7 °F (36.5 °C), Temp src:Oral, Pulse:60, Resp:16, SpO2:99 %  02/12/25 1146, BP:139/60, Temp:97.7 °F (36.5 °C), Temp src:Oral, Pulse:60, Resp:16, SpO2:100 %  02/12/25 0956, BP:(!) 145/64, Temp:(!) 96.7 °F (35.9 °C), Temp src:Infrared, Pulse:65, Resp:15, SpO2:100 %  02/12/25 0822, BP:(!) 176/77, Temp:97.5 °F (36.4 °C), Temp src:Oral, Pulse:68, Resp:16, SpO2:99 %  02/12/25 0215, BP:(!) 159/70, Temp:98 °F (36.7 °C), Temp src:Oral, Pulse:60, Resp:16, SpO2:98 %     LABS:    CBC  Lab Results       Component                Value               Date/Time                  WBC                      7.9                 02/11/2025 07:59 AM        HGB                      8.8 (L)             02/12/2025 08:09 AM        HCT                      26.9 (L)            02/12/2025 08:09 AM        PLT                      160                 02/11/2025 07:59 AM   RENAL  Lab Results

## 2025-02-12 NOTE — CARE COORDINATION
Call placed to pt's son, CHRISTINA Bar per paperwork. He states he would like pt to go to either [facility] or [facility]    Referral called to:    [facility] accepted    CC: awaiting return call    Pt returned to floor from procedure. Pt is intermittently confused but agreeable to DC to SNF at this time.       1338: [facility] able to accept pt. Precert started .

## 2025-02-12 NOTE — PROGRESS NOTES
Inpatient Physical Therapy Evaluation & Treatment    Unit: PCU  Date:  2/12/2025  Patient Name:    Xuan Romero  Admitting diagnosis:  On continuous oral anticoagulation [Z79.01]  Gastrointestinal hemorrhage, unspecified gastrointestinal hemorrhage type [K92.2]  Acute on chronic anemia [D64.9]  Admit Date:  2/10/2025  Precautions/Restrictions/WB Status/ Lines/ Wounds/ Oxygen: Fall risk, Bed/chair alarm, Lines (IV), and Telemetry      Pt seen for cotreatment this date due to patient safety, acute illness/injury, and limited functional status information    Treatment Time:  226-550  Treatment Number:  1   Timed Code Treatment Minutes: 23 minutes  Total Treatment Minutes:  33  minutes    Patient Stated Goals for Therapy: none stated          Discharge Recommendations: SNF  DME needs for discharge: Defer to facility       Therapy recommendation for EMS Transport: requires transport by cot due to pt needs A x 2 for safe transfers and pt with poor sitting balance/tolerance    Therapy recommendations for staff:   Assist of 2 for stand-pivot transfers with gait belt to/from [facility]    History of Present Illness:   79 year old female with history of DM, HTN, HLD, PVD s/p PTA, CVA, CHF, CAD, carotid stenosis s/p stent, A fib, SSS s/p pacemaker admitted with painless hematochezia and acute anemia concerning for brisk upper GI bleed      Hx CVA with residual left-sided weakness   EGD 2/11  Colonoscopy pending 2/12     Preadmission Environment:   Pt. Lives                                              with family (son)  Home environment:                            mobile home/trailer  Steps to enter first floor:                     Ramp  Bathroom:                                           tub/shower unit  Pt sleeps in a:                                     Flat bed  Equipment owned:                              RW and manual WC     Preadmission Status:  Pt. Able to drive:                                 No  Pt. Fully independent

## 2025-02-12 NOTE — PROGRESS NOTES
PM assessment completed. Scheduled medications given per MAR. VSS room air, A/O to self, situation, and place. Patient does not appear in distress and denies any needs at this time. Call light in reach, will monitor, bed alarm on.

## 2025-02-12 NOTE — PROGRESS NOTES
Hand off report given to  TOPHER Chang.   Patient is stable showing no signs of distress and has no current needs at this time.   Call light is in reach and bed is in lowest position.    Care is transferred at this time.

## 2025-02-12 NOTE — ANESTHESIA PRE PROCEDURE
Department of Anesthesiology  Preprocedure Note       Name:  Xuan Romero   Age:  79 y.o.  :  1945                                          MRN:  [id number]         Date:  2025      Surgeon: Surgeon(s):  Nader Layne MD    Procedure: Procedure(s):  COLON W/ANES.    Medications prior to admission:   Prior to Admission medications    Medication Sig Start Date End Date Taking? Authorizing Provider   aspirin 81 MG chewable tablet Take 1 tablet by mouth daily   Yes Zina Shay MD   carvedilol (COREG) 12.5 MG tablet Take 1 tablet by mouth 2 times daily 25  Yes Neo Soliman MD   losartan (COZAAR) 50 MG tablet Take 1 tablet by mouth in the morning and at bedtime  Patient taking differently: Take 1 tablet by mouth daily 25  Yes Neo Soliman MD   clopidogrel (PLAVIX) 75 MG tablet Take 1 tablet by mouth daily 25  Yes Edson Gillis MD   Ascorbic Acid (VITAMIN C) 250 MG tablet TAKE 1 TABLET BY MOUTH DAILY 25  Yes Mayelin Celis APRN - CNP   metFORMIN (GLUCOPHAGE) 500 MG tablet TAKE 2 TABLETS BY MOUTH DAILY WITH BREAKFAST  Patient taking differently: Take 2 tablets by mouth daily (with breakfast) 24  Yes Mayelin Celis APRN - CNP   simvastatin (ZOCOR) 20 MG tablet Take 1 tablet by mouth nightly 24  Yes Mayelin Celis APRN - CNP   apixaban (ELIQUIS) 5 MG TABS tablet Take 1 tablet by mouth 2 times daily 10/1/24  Yes YANIQUE Begum Jr., MD   vitamin D (VITAMIN D3) 25 MCG (1000 UT) TABS tablet TAKE 1 TABLET BY MOUTH DAILY  Patient taking differently: Take 1 tablet by mouth daily 24  Yes Mayelin Celis APRN - CNP       Current medications:    Current Facility-Administered Medications   Medication Dose Route Frequency Provider Last Rate Last Admin    amLODIPine (NORVASC) tablet 5 mg  5 mg Oral Nightly Jaquan Ko MD        atorvastatin (LIPITOR) tablet 40 mg  40 mg Oral Daily Jaquan Ko MD        carvedilol (COREG) tablet 12.5

## 2025-02-12 NOTE — PROGRESS NOTES
Handoff report given to TOPHER Castillo. Care transferred.       This am BM blood watery with a few small clots.

## 2025-02-12 NOTE — PROGRESS NOTES
Inpatient Occupational Therapy Evaluation & Treatment    Unit: PCU  Date:  2/12/2025  Patient Name:    Xuan Romero  Admitting diagnosis:  On continuous oral anticoagulation [Z79.01]  Gastrointestinal hemorrhage, unspecified gastrointestinal hemorrhage type [K92.2]  Acute on chronic anemia [D64.9]  Admit Date:  2/10/2025  Precautions/Restrictions/WB Status/ Lines/ Wounds/ Oxygen: Fall risk, Bed/chair alarm, Lines (IV), Confusion, and Telemetry    Pt seen for cotreatment this date due to acute illness/injury    Treatment Time:  8:33-9:06  Treatment Number:  1  Timed Code Treatment Minutes: 23 minutes  Total Treatment Minutes:  33  minutes    Patient Goals for Therapy: did not state          Discharge Recommendations: SNF  DME needs for discharge: Defer to facility       Therapy recommendations for staff:   Assist of 2 for stand-pivot transfers with gait belt to/from chair    History of Present Illness: 79 year old female with history of DM, HTN, HLD, PVD s/p PTA, CVA, CHF, CAD, carotid stenosis s/p stent, A fib, SSS s/p pacemaker admitted with painless hematochezia and acute anemia concerning for brisk upper GI bleed     Hx CVA with residual left-sided weakness     Preadmission Environment:   Pt. Lives                                              with family (son)  Home environment:                            mobile home/trailer  Steps to enter first floor:                     Ramp  Bathroom:                                           tub/shower unit  Pt sleeps in a:                                     Couch  Equipment owned:                              RW and manual WC     Preadmission Status:  Pt. Able to drive:                                 No  Pt. Fully independent with ADLs:       No  Pt. Required assistance for:               Bathing, Dressing, Cleaning, Cooking, Laundry , and grocery shopping  Pt. required assist: min A for functional transfers and utilized manual wheelchair for mobility in home and Manual

## 2025-02-13 LAB
ANION GAP SERPL CALCULATED.3IONS-SCNC: 10 MMOL/L (ref 3–16)
BUN SERPL-MCNC: 16 MG/DL (ref 7–20)
CALCIUM SERPL-MCNC: 8.3 MG/DL (ref 8.3–10.6)
CHLORIDE SERPL-SCNC: 111 MMOL/L (ref 99–110)
CO2 SERPL-SCNC: 17 MMOL/L (ref 21–32)
CREAT SERPL-MCNC: 1 MG/DL (ref 0.6–1.2)
GFR SERPLBLD CREATININE-BSD FMLA CKD-EPI: 57 ML/MIN/{1.73_M2}
GLUCOSE BLD-MCNC: 127 MG/DL (ref 70–99)
GLUCOSE BLD-MCNC: 130 MG/DL (ref 70–99)
GLUCOSE BLD-MCNC: 158 MG/DL (ref 70–99)
GLUCOSE BLD-MCNC: 257 MG/DL (ref 70–99)
GLUCOSE SERPL-MCNC: 114 MG/DL (ref 70–99)
HCT VFR BLD AUTO: 25.8 % (ref 36–48)
HGB BLD-MCNC: 8.4 G/DL (ref 12–16)
PERFORMED ON: ABNORMAL
POTASSIUM SERPL-SCNC: 4.1 MMOL/L (ref 3.5–5.1)
SODIUM SERPL-SCNC: 138 MMOL/L (ref 136–145)

## 2025-02-13 PROCEDURE — 6370000000 HC RX 637 (ALT 250 FOR IP): Performed by: INTERNAL MEDICINE

## 2025-02-13 PROCEDURE — 36415 COLL VENOUS BLD VENIPUNCTURE: CPT

## 2025-02-13 PROCEDURE — 6370000000 HC RX 637 (ALT 250 FOR IP): Performed by: HOSPITALIST

## 2025-02-13 PROCEDURE — 2500000003 HC RX 250 WO HCPCS: Performed by: HOSPITALIST

## 2025-02-13 PROCEDURE — 85014 HEMATOCRIT: CPT

## 2025-02-13 PROCEDURE — 80048 BASIC METABOLIC PNL TOTAL CA: CPT

## 2025-02-13 PROCEDURE — 99232 SBSQ HOSP IP/OBS MODERATE 35: CPT | Performed by: INTERNAL MEDICINE

## 2025-02-13 PROCEDURE — 6360000002 HC RX W HCPCS: Performed by: HOSPITALIST

## 2025-02-13 PROCEDURE — 2580000003 HC RX 258: Performed by: HOSPITALIST

## 2025-02-13 PROCEDURE — 85018 HEMOGLOBIN: CPT

## 2025-02-13 PROCEDURE — 1200000000 HC SEMI PRIVATE

## 2025-02-13 RX ORDER — ASPIRIN 81 MG/1
81 TABLET, CHEWABLE ORAL DAILY
Status: DISCONTINUED | OUTPATIENT
Start: 2025-02-13 | End: 2025-02-13

## 2025-02-13 RX ORDER — CLOPIDOGREL BISULFATE 75 MG/1
75 TABLET ORAL DAILY
Status: DISCONTINUED | OUTPATIENT
Start: 2025-02-13 | End: 2025-02-14 | Stop reason: HOSPADM

## 2025-02-13 RX ORDER — LOSARTAN POTASSIUM 50 MG/1
50 TABLET ORAL DAILY
Qty: 30 TABLET | Refills: 0
Start: 2025-02-13

## 2025-02-13 RX ADMIN — CARVEDILOL 12.5 MG: 6.25 TABLET, FILM COATED ORAL at 20:06

## 2025-02-13 RX ADMIN — POLYETHYLENE GLYCOL (3350) 17 G: 17 POWDER, FOR SOLUTION ORAL at 10:07

## 2025-02-13 RX ADMIN — INSULIN LISPRO 3 UNITS: 100 INJECTION, SOLUTION INTRAVENOUS; SUBCUTANEOUS at 12:29

## 2025-02-13 RX ADMIN — SODIUM CHLORIDE, PRESERVATIVE FREE 10 ML: 5 INJECTION INTRAVENOUS at 10:07

## 2025-02-13 RX ADMIN — PANTOPRAZOLE SODIUM 40 MG: 40 INJECTION, POWDER, LYOPHILIZED, FOR SOLUTION INTRAVENOUS at 20:06

## 2025-02-13 RX ADMIN — PANTOPRAZOLE SODIUM 40 MG: 40 INJECTION, POWDER, LYOPHILIZED, FOR SOLUTION INTRAVENOUS at 10:07

## 2025-02-13 RX ADMIN — AMLODIPINE BESYLATE 5 MG: 5 TABLET ORAL at 20:06

## 2025-02-13 RX ADMIN — CLOPIDOGREL BISULFATE 75 MG: 75 TABLET ORAL at 10:07

## 2025-02-13 RX ADMIN — LOSARTAN POTASSIUM 50 MG: 50 TABLET, FILM COATED ORAL at 10:07

## 2025-02-13 RX ADMIN — CARVEDILOL 12.5 MG: 6.25 TABLET, FILM COATED ORAL at 10:07

## 2025-02-13 RX ADMIN — INSULIN LISPRO 1 UNITS: 100 INJECTION, SOLUTION INTRAVENOUS; SUBCUTANEOUS at 12:29

## 2025-02-13 RX ADMIN — SODIUM CHLORIDE, PRESERVATIVE FREE 10 ML: 5 INJECTION INTRAVENOUS at 20:39

## 2025-02-13 RX ADMIN — ATORVASTATIN CALCIUM 40 MG: 40 TABLET, FILM COATED ORAL at 20:06

## 2025-02-13 RX ADMIN — INSULIN LISPRO 3 UNITS: 100 INJECTION, SOLUTION INTRAVENOUS; SUBCUTANEOUS at 17:51

## 2025-02-13 ASSESSMENT — PAIN SCALES - GENERAL: PAINLEVEL_OUTOF10: 0

## 2025-02-13 ASSESSMENT — PAIN SCALES - WONG BAKER: WONGBAKER_NUMERICALRESPONSE: NO HURT

## 2025-02-13 NOTE — FLOWSHEET NOTE
02/13/25 1000   Vitals   Temp 97.6 °F (36.4 °C)   Temp Source Oral   Pulse 65   Heart Rate Source Monitor   BP (!) 129/43   MAP (Calculated) 72   BP Location Left upper arm   BP Upper/Lower Upper   BP Method Automatic   Patient Position Semi fowlers   Oxygen Therapy   SpO2 99 %   O2 Device None (Room air)     Shift assessment completed-see flow sheet. Patient in bed awake,alert and oriented to self.  Morning medications given per order.   Patient denies any further needs,call light within reach.

## 2025-02-13 NOTE — DISCHARGE INSTRUCTIONS
Heart Failure Resources:  Heart Failure Interactive Workbook:  Go to https://[employer]italApliiq.[employer]/publication/?u=365599 for a Free Heart Failure Interactive Workbook provided by The American Heart Association. This interactive workbook will provide information on Healthier Living with Heart Failure. Please copy and paste link into search bar. Use your mouse to scroll through the pages.    HF [place] poonam:   Heart Failure Free smart phone poonam available for iPhone and Android download. Use your phone to track sodium intake, fluid intake, symptoms, and weight.     Low Sodium Diet / Recipes:  Go to www.[employer] website for “renal” diet which is Low Sodium! [employer] is a dialysis company, but this website offers free seasonal cookbooks. Each quarter, they will release 25-30 new recipes with a breakdown of calories, sodium, and glucose. You can also go to [employer]/recipes website for free recipes.     Discharge Instruction Video:  Scan the QR code below with your camera and click the canva.com link to open the video and watch educational information on Heart Failure and Medications from one of our nurses.   [email or web address].[employer]/design/DAFZnsH_JRk/[email or web address]/edit    Home Exercise Program:   Identification of Green/Yellow/Red zones:  You should be able to identify when you feel good (green zone), if you have 1-2 symptoms of HF (yellow zone), or if you are in need of medical attention (red zone).  In your CHF education folder you were provided a “stop light tool” to outline this information.     We want to you to rate your exertion levels:    Our therapy team has discussed means of identification with you such as the \"Luci scale.\"  The Luci rating scale ranges from 6 to 20, where 6 means \"no exertion at all\" and 20 means \"maximal exertion.\" The goal is to use this to gauge how much effort it is taking for you to do your normal daily tasks.   You should be able to recognize when too much exertion is

## 2025-02-13 NOTE — PROGRESS NOTES
PM assessment completed. Scheduled medications given per MAR. VSS room air, A/O x4. Patient does not appear in distress and denies any needs at this time. Call light in reach, will monitor, bed alarm on

## 2025-02-13 NOTE — DISCHARGE INSTR - COC
Assessment:  Last Vital Signs: BP (!) 122/58   Pulse 65   Temp 97.8 °F (36.6 °C) (Oral)   Resp 16   Ht 1.6 m (5' 3\")   Wt 57.3 kg (126 lb 6.4 oz)   SpO2 98%   BMI 22.39 kg/m²     Last documented pain score (0-10 scale): Pain Level: 0  Last Weight:   Wt Readings from Last 1 Encounters:   02/13/25 57.3 kg (126 lb 6.4 oz)     Mental Status:  oriented, alert, coherent, logical, thought processes intact, and able to concentrate and follow conversation    IV Access:  - None    Nursing Mobility/ADLs:  Walking   Dependent  Transfer  Dependent  Bathing  Assisted  Dressing  Assisted  Toileting  Assisted  Feeding  Independent  Med Admin  Assisted  Med Delivery   whole    Wound Care Documentation and Therapy:  Puncture 01/29/25 Femoral (Active)   Closure Steri-Strips 02/11/25 0016   Culture Taken No 02/11/25 0016   Drainage Amount None 02/11/25 0016   Odor None 02/11/25 0016   Dressing/Treatment Tegaderm/transparent film dressing 01/30/25 0918   Dressing Status Clean, dry & intact 02/11/25 0016   Number of days: 14       Incision 01/29/25 Neck Right;Lateral (Active)   Dressing Status Clean;Dry;Intact 02/12/25 2159   Dressing/Treatment Surgical glue 01/29/25 1158   Margins Approximated 02/11/25 0016   Incision Assessment Dry 02/11/25 0016   Drainage Amount None (dry) 02/11/25 0016   Odor None 02/11/25 0016   Number of days: 14        Elimination:  Continence:   Bowel: Yes  Bladder: Yes  Urinary Catheter: None   Colostomy/Ileostomy/Ileal Conduit: No       Date of Last BM: 2/12/2025    Intake/Output Summary (Last 24 hours) at 2/13/2025 0848  Last data filed at 2/13/2025 0303  Gross per 24 hour   Intake 780 ml   Output 200 ml   Net 580 ml     I/O last 3 completed shifts:  In: 2960 [P.O.:2660; I.V.:300]  Out: 200 [Urine:200]    Safety Concerns:     At Risk for Falls    Impairments/Disabilities:      W/c bound    Nutrition Therapy:  Current Nutrition Therapy:   - Oral Diet:  Low Sodium (2gm)    Routes of Feeding:

## 2025-02-13 NOTE — PLAN OF CARE
Problem: Safety - Adult  Goal: Free from fall injury  Outcome: Progressing     Problem: Chronic Conditions and Co-morbidities  Goal: Patient's chronic conditions and co-morbidity symptoms are monitored and maintained or improved  Outcome: Progressing     Problem: Discharge Planning  Goal: Discharge to home or other facility with appropriate resources  Outcome: Progressing     Problem: Gastrointestinal - Adult  Goal: Maintains or returns to baseline bowel function  Outcome: Progressing     Problem: Metabolic/Fluid and Electrolytes - Adult  Goal: Electrolytes maintained within normal limits  2/13/2025 0047 by Charlene Scott, RN  Outcome: Progressing  2/12/2025 1216 by Edwige Negrete, RN  Outcome: Progressing     Problem: Pain  Goal: Verbalizes/displays adequate comfort level or baseline comfort level  Outcome: Progressing

## 2025-02-13 NOTE — PROGRESS NOTES
PROGRESS NOTE  S:79 yrs Patient  admitted on 2/10/2025 with On continuous oral anticoagulation [Z79.01]  Gastrointestinal hemorrhage, unspecified gastrointestinal hemorrhage type [K92.2]  Acute on chronic anemia [D64.9] .  Today, she has not had further signs of bleeding per nursing. She denies pain. She denies having BM since procedure.     Exam:   Vitals:    02/13/25 0300   BP: (!) 122/58   Pulse: 65   Resp: 16   Temp: 97.8 °F (36.6 °C)   SpO2: 98%   Generalized: alert, no acute distress  HEENT: sclera clear, anicteric  Neck: supple, trachea midline   Heart: A.fib  Abdomen: soft, NT, ND  Extremities: no edema     Medications: Reviewed    Labs:  CBC:   Recent Labs     02/10/25  1702 02/10/25  2201 02/11/25  0759 02/11/25  1425 02/12/25  0809 02/12/25  1944 02/13/25  0804   WBC 8.1  --  7.9  --   --   --   --    HGB 6.1*   < > 9.5*   < > 8.8* 8.2* 8.4*   HCT 18.5*   < > 28.3*   < > 26.9* 25.0* 25.8*   MCV 83.0  --  84.5  --   --   --   --      --  160  --   --   --   --     < > = values in this interval not displayed.     BMP:   Recent Labs     02/11/25  0759 02/12/25  0739 02/13/25  0804    133* 138   K 4.3 3.8 4.1   * 108 111*   CO2 15* 17* 17*   BUN 36* 24* 16   CREATININE 0.9 0.9 1.0     Attending Supervising Physician's Attestation Statement  The patient is a 79 y.o. female. I have performed a history and physical examination of the patient. I discussed the case with SHEYLA Rubio    I reviewed the patient's Past Medical History, Past Surgical History, Medications, and Allergies.     Physical Exam:  Vitals:    02/13/25 0300 02/13/25 1000 02/13/25 1515 02/13/25 1750   BP: (!) 122/58 (!) 129/43 118/72 118/60   Pulse: 65 65 67 66   Resp: 16  16 16   Temp: 97.8 °F (36.6 °C) 97.6 °F (36.4 °C) 97.8 °F (36.6 °C) 98 °F (36.7 °C)   TempSrc: Oral Oral Oral Oral   SpO2: 98% 99% 100% 93%   Weight: 57.3 kg (126 lb 6.4 oz)      Height:           Physical

## 2025-02-13 NOTE — PROGRESS NOTES
Heart Failure Resource Line [phone] with any questions or concerns.  [x]  Encourage follow-up appointment compliance. Pt to discharge to NH when ready.    [x]  Emphasize daily weights: Instruct patient to call the MD if weight gain of 3 lbs in 1 day or 5 lbs in a week.   [x]  Review sodium restriction diet. Encourage patient to not add table salt and avoid foods high in sodium.   [x]  Educate further on fluid restriction of 48 oz - 64 oz with labeled pitcher during inpatient admission.  [x]  Continue to educate on signs & symptoms of Heart Failure.         Electronically signed by Terri Tang, MSN, RN  on 2/13/2025 at 1:31 PM

## 2025-02-13 NOTE — PROGRESS NOTES
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 Romero seen up in bed, reports she is doing ok  No further bleed   Hb stable       Recently had right CEA , on ASA, plavix and eliquis    Does not smoke  No abd pain   Denies any sob or chest pain today     Objective:   BP (!) 122/58   Pulse 65   Temp 97.8 °F (36.6 °C) (Oral)   Resp 16   Ht 1.6 m (5' 3\")   Wt 57.3 kg (126 lb 6.4 oz)   SpO2 98%   BMI 22.39 kg/m²        Intake/Output Summary (Last 24 hours) at 2/13/2025 0730  Last data filed at 2/13/2025 0303  Gross per 24 hour   Intake 960 ml   Output 200 ml   Net 760 ml       Physical Exam:         General: elderly female up in bed   Awake, alert and oriented. Appears to be not in any distress  Mucous Membranes:  Pink , anicteric  Neck: No JVD, no carotid bruit, no thyromegaly  Chest:  Clear to auscultation bilaterally, no added sounds  Cardiovascular:  RRR S1S2 heard, no murmurs or gallops  Abdomen:  Soft, undistended, non tender, no organomegaly, BS present  Extremities:  scattered ecchymoses  chronic OA changes to both knees  Right partial foot amputation   No edema or cyanosis. Distal pulses well felt  Neurological : grossly normal with gen chronic weakness          Medications:  amLODIPine, 5 mg, Nightly  atorvastatin, 40 mg, Daily  carvedilol, 12.5 mg, BID  losartan, 50 mg, Daily  sodium chloride flush, 5-40 mL, 2 times per day  polyethylene glycol, 17 g, Daily  insulin lispro, 0.05 Units/kg, TID WC  insulin lispro, 0-4 Units, 4x Daily AC & HS  pantoprazole (PROTONIX) 40 mg in sodium chloride (PF) 0.9 % 10 mL injection, 40 mg, BID      PRN Medications:  sodium chloride, , PRN  glucose, 4 tablet, PRN  dextrose bolus, 125 mL, PRN   Or  dextrose bolus, 250 mL, PRN  glucagon (rDNA), 1 mg, PRN  dextrose, , Continuous PRN  sodium chloride flush, 5-40 mL, PRN  sodium chloride,

## 2025-02-13 NOTE — PLAN OF CARE
Problem: Safety - Adult  Goal: Free from fall injury  2/13/2025 1033 by Clary Diaz RN  Outcome: Adequate for Discharge  2/13/2025 0047 by Charlene Scott RN  Outcome: Progressing     Problem: Chronic Conditions and Co-morbidities  Goal: Patient's chronic conditions and co-morbidity symptoms are monitored and maintained or improved  2/13/2025 1033 by Clary Diaz RN  Outcome: Adequate for Discharge  2/13/2025 0047 by Charlene Scott RN  Outcome: Progressing     Problem: Discharge Planning  Goal: Discharge to home or other facility with appropriate resources  2/13/2025 1033 by Clary Diaz RN  Outcome: Adequate for Discharge  2/13/2025 0047 by Charlene Scott RN  Outcome: Progressing     Problem: Gastrointestinal - Adult  Goal: Maintains or returns to baseline bowel function  2/13/2025 1033 by Clary Diaz RN  Outcome: Adequate for Discharge  2/13/2025 0047 by Charlene Scott RN  Outcome: Progressing     Problem: Metabolic/Fluid and Electrolytes - Adult  Goal: Electrolytes maintained within normal limits  2/13/2025 1033 by Clary Diaz RN  Outcome: Adequate for Discharge  2/13/2025 0047 by Charlene Scott RN  Outcome: Progressing     Problem: Pain  Goal: Verbalizes/displays adequate comfort level or baseline comfort level  2/13/2025 1033 by Clary Diaz RN  Outcome: Adequate for Discharge  2/13/2025 0047 by Charlene Scott RN  Outcome: Progressing

## 2025-02-14 VITALS
TEMPERATURE: 97.9 F | SYSTOLIC BLOOD PRESSURE: 115 MMHG | HEIGHT: 63 IN | WEIGHT: 134 LBS | DIASTOLIC BLOOD PRESSURE: 67 MMHG | BODY MASS INDEX: 23.74 KG/M2 | HEART RATE: 63 BPM | RESPIRATION RATE: 16 BRPM | OXYGEN SATURATION: 98 %

## 2025-02-14 LAB
BASOPHILS # BLD: 0.1 K/UL (ref 0–0.2)
BASOPHILS NFR BLD: 0.7 %
DEPRECATED RDW RBC AUTO: 17.5 % (ref 12.4–15.4)
EOSINOPHIL # BLD: 0.3 K/UL (ref 0–0.6)
EOSINOPHIL NFR BLD: 4.2 %
GLUCOSE BLD-MCNC: 140 MG/DL (ref 70–99)
GLUCOSE BLD-MCNC: 201 MG/DL (ref 70–99)
GLUCOSE BLD-MCNC: 91 MG/DL (ref 70–99)
HCT VFR BLD AUTO: 24.8 % (ref 36–48)
HGB BLD-MCNC: 8 G/DL (ref 12–16)
LYMPHOCYTES # BLD: 1.7 K/UL (ref 1–5.1)
LYMPHOCYTES NFR BLD: 21.5 %
MCH RBC QN AUTO: 28 PG (ref 26–34)
MCHC RBC AUTO-ENTMCNC: 32.4 G/DL (ref 31–36)
MCV RBC AUTO: 86.6 FL (ref 80–100)
MONOCYTES # BLD: 0.5 K/UL (ref 0–1.3)
MONOCYTES NFR BLD: 6.3 %
NEUTROPHILS # BLD: 5.3 K/UL (ref 1.7–7.7)
NEUTROPHILS NFR BLD: 67.3 %
PERFORMED ON: ABNORMAL
PERFORMED ON: ABNORMAL
PERFORMED ON: NORMAL
PLATELET # BLD AUTO: 160 K/UL (ref 135–450)
PMV BLD AUTO: 7.6 FL (ref 5–10.5)
RBC # BLD AUTO: 2.87 M/UL (ref 4–5.2)
WBC # BLD AUTO: 7.9 K/UL (ref 4–11)

## 2025-02-14 PROCEDURE — 6370000000 HC RX 637 (ALT 250 FOR IP): Performed by: INTERNAL MEDICINE

## 2025-02-14 PROCEDURE — 2500000003 HC RX 250 WO HCPCS: Performed by: HOSPITALIST

## 2025-02-14 PROCEDURE — 6370000000 HC RX 637 (ALT 250 FOR IP): Performed by: HOSPITALIST

## 2025-02-14 PROCEDURE — 6360000002 HC RX W HCPCS: Performed by: HOSPITALIST

## 2025-02-14 PROCEDURE — 99239 HOSP IP/OBS DSCHRG MGMT >30: CPT | Performed by: INTERNAL MEDICINE

## 2025-02-14 PROCEDURE — 6360000002 HC RX W HCPCS: Performed by: INTERNAL MEDICINE

## 2025-02-14 PROCEDURE — 2580000003 HC RX 258: Performed by: INTERNAL MEDICINE

## 2025-02-14 PROCEDURE — 85025 COMPLETE CBC W/AUTO DIFF WBC: CPT

## 2025-02-14 PROCEDURE — 36415 COLL VENOUS BLD VENIPUNCTURE: CPT

## 2025-02-14 PROCEDURE — 2580000003 HC RX 258: Performed by: HOSPITALIST

## 2025-02-14 RX ORDER — FERROUS SULFATE 325(65) MG
325 TABLET ORAL
Qty: 90 TABLET | Refills: 0
Start: 2025-02-14

## 2025-02-14 RX ADMIN — APIXABAN 2.5 MG: 5 TABLET, FILM COATED ORAL at 08:46

## 2025-02-14 RX ADMIN — INSULIN LISPRO 1 UNITS: 100 INJECTION, SOLUTION INTRAVENOUS; SUBCUTANEOUS at 12:28

## 2025-02-14 RX ADMIN — CLOPIDOGREL BISULFATE 75 MG: 75 TABLET ORAL at 08:45

## 2025-02-14 RX ADMIN — SODIUM CHLORIDE, PRESERVATIVE FREE 10 ML: 5 INJECTION INTRAVENOUS at 08:47

## 2025-02-14 RX ADMIN — CARVEDILOL 12.5 MG: 6.25 TABLET, FILM COATED ORAL at 08:46

## 2025-02-14 RX ADMIN — PANTOPRAZOLE SODIUM 40 MG: 40 INJECTION, POWDER, LYOPHILIZED, FOR SOLUTION INTRAVENOUS at 08:47

## 2025-02-14 RX ADMIN — SODIUM CHLORIDE 100 MG: 9 INJECTION, SOLUTION INTRAVENOUS at 10:56

## 2025-02-14 RX ADMIN — POLYETHYLENE GLYCOL (3350) 17 G: 17 POWDER, FOR SOLUTION ORAL at 08:47

## 2025-02-14 RX ADMIN — INSULIN LISPRO 3 UNITS: 100 INJECTION, SOLUTION INTRAVENOUS; SUBCUTANEOUS at 12:27

## 2025-02-14 RX ADMIN — LOSARTAN POTASSIUM 50 MG: 50 TABLET, FILM COATED ORAL at 08:46

## 2025-02-14 ASSESSMENT — PAIN SCALES - GENERAL
PAINLEVEL_OUTOF10: 0
PAINLEVEL_OUTOF10: 0

## 2025-02-14 ASSESSMENT — PAIN SCALES - WONG BAKER: WONGBAKER_NUMERICALRESPONSE: NO HURT

## 2025-02-14 NOTE — DISCHARGE SUMMARY
Name:  Xuan Romero  Room:  /0316-01  MRN:    [id number]    IM Discharge Summary    Discharging Physician:  Jaquan larson MD    Admit: 2/10/2025    Discharge:      PCP      Mayelin Celis APRN - CNP    Diagnoses and hospital course  this Admission           Hematochezia.  Acute blood loss anemia.  -hemoccult positive.   -CTA abd/pelvis showed no active bleed.  -hgb on admission 6.1, baseline hgb ~9.8-11.  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  -3 units PRBCs given with improved hb to 9.5 >8.5  - GI consulted   - EGD neg , colonoscopy with bleeding AVM in ascending colon s.p APC  - hb stable   - restarted plavix and low dose ELiquis 2.5 mg bid  stop ASA  - given venofer 100 mg IV x 1 , start on iron supplements        Severe multivessel CAD.  -[facility] 12/23/2024 with multivessel CAD , due to frail status, opted for medical management.   -was on  ASA and Plavix, currently on hold due to GIB. See above   -continue statin and beta blocker.     Persistent atrial fibrillation.  SSS s/p pacemaker (7/2023).  -home Eliquis on hold for GI bleed as above   -continue home Coreg.      Chronic systolic CHF.  -does not appear to be fluid overloaded.   -echo 1/9/2025 EF 35-40% , global hypokinesis.   - resumed coreg,losartan   - monitor and use lasix as needed  -I/Os, daily weights.      Bilateral carotid artery stenosis.  -s/p TCAR 1/29/2025.  -continue outpatient follow up with vascular.  Resumed plavix     Hx CVA with residual left-sided weakness.  -uses wheelchair. Minimal ambulation at home   -ASA and Plavix on hold. See above  -continue statin.     Hyperlipidemia.   -continue statin.      Type II diabetes mellitus.  -SSI and weight-based Lantus.  -POC Glucose, carb control diet.      Social issues - reportedly not getting enough care at home  Social work consulted  Pt ot consulted and plans for SNF noted       ventricular filling pressure.    Mitral Valve: Mild annular calcification. Mildly thickened, at the anterior and posterior leaflets. Mild paravalvular regurgitation.    Tricuspid Valve: Mild to moderate regurgitation. Normal RVSP. The estimated RVSP is 35 mmHg.    Left Atrium: Left atrium is moderately dilated.    Right Atrium: Right atrium is moderately dilated.    Aortic Valve: Individual leaflets not well visualized but appear mildly thickened and calcified.    Image quality is technically difficult. Procedure performed with the patient in a sitting position in a wheelchair.      RADIOLOGY  CTA ABDOMEN PELVIS W WO CONTRAST   Final Result   1. No evidence of active GI bleed.  Possible rectal wall thickening.   2. Atrophy of the right kidney with high-grade stenosis seen at the origin of   the right renal artery.   3. Moderate severe atherosclerotic disease seen involving the mesenteric   vessels and left renal artery.  No evidence of jeopardized bowel.                 Consults:    1. GI                  Recent Labs     02/12/25  1944 02/13/25  0804 02/14/25  0819   WBC  --   --  7.9   HGB 8.2* 8.4* 8.0*   HCT 25.0* 25.8* 24.8*   MCV  --   --  86.6   PLT  --   --  160       Recent Labs     02/12/25  0739 02/13/25  0804   * 138   K 3.8 4.1    111*   CO2 17* 17*   BUN 24* 16   CREATININE 0.9 1.0       CBC:  Lab Results   Component Value Date/Time    WBC 7.9 02/14/2025 08:19 AM    HGB 8.0 02/14/2025 08:19 AM    HCT 24.8 02/14/2025 08:19 AM    MCV 86.6 02/14/2025 08:19 AM     02/14/2025 08:19 AM    NEUTOPHILPCT 67.3 02/14/2025 08:19 AM    LYMPHOPCT 21.5 02/14/2025 08:19 AM    MONOPCT 6.3 02/14/2025 08:19 AM    EOSPCT 4.2 02/14/2025 08:19 AM    BASOPCT 0.7 02/14/2025 08:19 AM    NEUTROABS 5.3 02/14/2025 08:19 AM    LYMPHSABS 1.7 02/14/2025 08:19 AM    MONOSABS 0.5 02/14/2025 08:19 AM    EOSABS 0.3 02/14/2025 08:19 AM    BASOSABS 0.1 02/14/2025 08:19 AM     BNP:   Lab Results   Component Value

## 2025-02-14 NOTE — CARE COORDINATION
Spoke to Maricarmen with [facility] who states they are waiting on precert to be approved. She will call with approval. Pt will be ok to DC to [facility] when precert is approved

## 2025-02-14 NOTE — PROGRESS NOTES
PROGRESS NOTE  S:79 yrs Patient  admitted on 2/10/2025 with On continuous oral anticoagulation [Z79.01]  Gastrointestinal hemorrhage, unspecified gastrointestinal hemorrhage type [K92.2]  Acute on chronic anemia [D64.9] .  Today, she reports feeling well. She has not had further signs of bleeding per nursing. She is tolerating diet.     Exam:   Vitals:    02/14/25 0838   BP: (!) 157/64   Pulse: 68   Resp: 16   Temp: 97.9 °F (36.6 °C)   SpO2: 100%   Generalized: alert, no acute distress  HEENT: sclera clear, anicteric  Neck: supple, trachea midline   Heart: A.fib  Abdomen: soft, NT, ND  Extremities: no edema     Medications: Reviewed    Labs:  CBC:   Recent Labs     02/12/25  1944 02/13/25  0804 02/14/25  0819   WBC  --   --  7.9   HGB 8.2* 8.4* 8.0*   HCT 25.0* 25.8* 24.8*   MCV  --   --  86.6   PLT  --   --  160     BMP:   Recent Labs     02/12/25  0739 02/13/25  0804   * 138   K 3.8 4.1    111*   CO2 17* 17*   BUN 24* 16   CREATININE 0.9 1.0     Attending Supervising Physician's Attestation Statement  The patient is a 79 y.o. female. I have performed a history and physical examination of the patient. I discussed the case with SHEYLA Rubio    I reviewed the patient's Past Medical History, Past Surgical History, Medications, and Allergies.     Physical Exam:  Vitals:    02/13/25 2200 02/14/25 0258 02/14/25 0838 02/14/25 1200   BP: 130/73 130/69 (!) 157/64 (!) 152/58   Pulse: 68 67 68 66   Resp: 16 16 16 18   Temp: 98 °F (36.7 °C) 96.9 °F (36.1 °C) 97.9 °F (36.6 °C) 97.8 °F (36.6 °C)   TempSrc: Oral Oral Oral Oral   SpO2: 100% 100% 100% 100%   Weight:  60.8 kg (134 lb)     Height:           Physical Examination:   General- chronically ill appearing  Mental status - alert, oriented to person, place, and time  Eyes - sclera anicteric  Neck - supple, no significant adenopathy  Heart - normal rate and regular rhythm  Abdomen - soft, NT, ND  Extremities - no pedal

## 2025-02-14 NOTE — PROGRESS NOTES
Patient educated on discharge instructions as well as new medications use, dosage, administration and possible side effects.  Patient verified knowledge. IV removed without difficulty and dry dressing in place. Telemetry monitor removed and returned to [facility]. Pt left facility in stable condition to Skilled nursing facility with all of their personal belongings.

## 2025-02-14 NOTE — FLOWSHEET NOTE
02/14/25 0838   Vital Signs   Temp 97.9 °F (36.6 °C)   Temp Source Oral   Pulse 68   Respirations 16   BP (!) 157/64   MAP (Calculated) 95   BP Location Left upper arm   BP Method Automatic   Patient Position High fowlers   Pain Assessment   Pain Assessment None - Denies Pain   Oxygen Therapy   SpO2 100 %   O2 Device None (Room air)     AM assessment complete. Pt a&o x4. Medications given per orders. Did verify with Dr Ko that it is okay to give eliquis even though his note states if no bleeding within 1 week. He states to give with history she is more prone for stroke. Surgical incision to R neck glued and appears to be free from infection. Appetite is good. Repositioned in bed. Oral care performed. LCTA but they are diminished. No edema. Scattered bruising. Call light and bedside table within reach.

## 2025-02-14 NOTE — PLAN OF CARE
Problem: Safety - Adult  Goal: Free from fall injury  2/13/2025 1033 by Clary Diaz, RN  Outcome: Adequate for Discharge     Problem: Chronic Conditions and Co-morbidities  Goal: Patient's chronic conditions and co-morbidity symptoms are monitored and maintained or improved  2/13/2025 1033 by Clary Diaz RN  Outcome: Adequate for Discharge     Problem: Discharge Planning  Goal: Discharge to home or other facility with appropriate resources  2/13/2025 1033 by Clary Diaz, RN  Outcome: Adequate for Discharge     Problem: Gastrointestinal - Adult  Goal: Maintains or returns to baseline bowel function  2/13/2025 1033 by Clary Diaz RN  Outcome: Adequate for Discharge     Problem: Metabolic/Fluid and Electrolytes - Adult  Goal: Electrolytes maintained within normal limits  2/13/2025 1033 by Clary Diaz, RN  Outcome: Adequate for Discharge     Problem: Pain  Goal: Verbalizes/displays adequate comfort level or baseline comfort level  2/13/2025 1033 by Clary Diaz, RN  Outcome: Adequate for Discharge

## 2025-02-14 NOTE — CARE COORDINATION
DISCHARGE ORDER  Date/Time 2025 2:48 PM  Completed by: Cornelia Alston RN, Case Management    Patient Name: Kourtney Romero    : 1945      Admit order Date and Status:ip 2/10  Noted discharge order. (verify MD's last order for status of admission/Traditional Medicare 3 [place] Inpatient qualifying stay required for SNF)    Confirmed discharge plan with:              Patient:  Yes              When pt confirms DC plan does any support person need to be contacted by CM Yes if yes who_[email or web address]_____                      Discharge to Facility: [facility]   Facility phone number for staff giving report: [phone]   Pre-certification completed: yes   Hospital Exemption Notification (HENS) completed: yes   Discharge orders and Continuity of Care faxed to facility:  FP Complete      Transportation:               Medical Transport explained with choice list offered to pt/family.                Choice:(no preference)  Agency used: [facility]   time:   1800      Pt/family/Nursing/Facility aware of  time:   Yes Names: cornelia gricel, kourtney, jordan, murray  Ambulance form completed:  yes:      Date Last IMM Given:     Comments:dc order noted. Precert approved. Pt going to DC to [facility]. Pt alerted. Son, Jordan, aware. Facility aware. Pt's RN preparing pt for DC. Transportation obtained.    Pt is being d/c'd to [facility] today. Pt's O2 sats are 100% on rA.    Discharge timeout done with RN, CM, PT. All discharge needs and concerns addressed.    Discharging nurse to complete RAFA, reconcile AVS, and place final copy with patient's discharge packet. Discharging RN to ensure that written prescriptions for  Level II medications are sent with patient to the facility as per protocol.

## 2025-02-17 ENCOUNTER — TELEPHONE (OUTPATIENT)
Dept: FAMILY MEDICINE CLINIC | Age: 80
End: 2025-02-17

## 2025-02-17 NOTE — TELEPHONE ENCOUNTER
Care Transitions Initial Follow Up Call    Outreach made within 2 business days of discharge: Yes    Patient: Xuan Romero Patient : 1945   MRN: [id number]  Reason for Admission: anemia  Discharge Date: 25       Spoke with: Evy, Family Member (on HIPAA)    Discharge department/facility: [facility]    Evy states that Xuan is inpatient at [facility] for rehab.  She will be in for about 20 days.       Discharge Information:  Social issues - reportedly not getting enough care at home  Social work consulted  Pt ot consulted and plans for SNF noted        Scheduled appointment with PCP within 7-14 days    Follow Up  Future Appointments   Date Time Provider Department Center   2025 10:00 AM MARIYA [facility] LAB 2 [facility]   3/27/2025  2:00 PM SCHEDULE, HILARIO DEVICE CHECK [facility]   3/27/2025  2:00 PM Mikki Dixon APRN - CNP [facility]   2025  1:30 PM Darian Hannah MD [facility]   2025 10:40 AM Vimal, Mayelin, APRN - CNP [facility]       MERCEDES VALDEZ [facility]

## 2025-02-18 ENCOUNTER — TELEPHONE (OUTPATIENT)
Dept: VASCULAR SURGERY | Age: 80
End: 2025-02-18

## 2025-02-18 NOTE — TELEPHONE ENCOUNTER
Called Lauren or Kerline regarding scheduling patient for carotid duplex scan at [facility] for 2/26/25 at 10:00am and arrive at 9:30am. Omar

## 2025-02-26 ENCOUNTER — HOSPITAL ENCOUNTER (OUTPATIENT)
Dept: VASCULAR LAB | Age: 80
Discharge: HOME | End: 2025-02-28
Attending: SURGERY
Payer: MEDICARE

## 2025-02-26 DIAGNOSIS — I65.23 BILATERAL CAROTID ARTERY STENOSIS: ICD-10-CM

## 2025-02-26 LAB
VAS LEFT ARM BP: 124 MMHG
VAS LEFT CCA DIST EDV: 16.5 CM/S
VAS LEFT CCA DIST PSV: 127 CM/S
VAS LEFT CCA MID EDV: 16.5 CM/S
VAS LEFT CCA MID PSV: 126 CM/S
VAS LEFT CCA PROX EDV: 13.2 CM/S
VAS LEFT CCA PROX PSV: 135 CM/S
VAS LEFT ECA PSV: 120 CM/S
VAS LEFT ICA DIST EDV: 28.5 CM/S
VAS LEFT ICA DIST PSV: 137 CM/S
VAS LEFT ICA MID EDV: 25.2 CM/S
VAS LEFT ICA MID PSV: 151 CM/S
VAS LEFT ICA PROX EDV: 25.2 CM/S
VAS LEFT ICA PROX PSV: 142 CM/S
VAS LEFT ICA/CCA PSV: 1.2
VAS LEFT SUBCLAVIAN PROX PSV: 127 CM/S
VAS LEFT VERTEBRAL EDV: 22 CM/S
VAS LEFT VERTEBRAL PSV: 168 CM/S
VAS RIGHT ARM BP: 121 MMHG
VAS RIGHT CCA DIST EDV: 14.9 CM/S
VAS RIGHT CCA DIST PSV: 92.6 CM/S
VAS RIGHT CCA MID EDV: 14.9 CM/S
VAS RIGHT CCA MID PSV: 91.9 CM/S
VAS RIGHT CCA PROX EDV: 20.5 CM/S
VAS RIGHT CCA PROX PSV: 107 CM/S
VAS RIGHT ECA PSV: 218 CM/S
VAS RIGHT ICA DIST EDV: 28.6 CM/S
VAS RIGHT ICA DIST PSV: 168 CM/S
VAS RIGHT ICA MID EDV: 25.1 CM/S
VAS RIGHT ICA MID PSV: 164 CM/S
VAS RIGHT ICA PROX EDV: 20.8 CM/S
VAS RIGHT ICA PROX PSV: 104 CM/S
VAS RIGHT ICA/CCA PSV: 1.85
VAS RIGHT SUBCLAVIAN PROX PSV: 296 CM/S
VAS RIGHT VERTEBRAL PSV: 15.7 CM/S

## 2025-02-26 PROCEDURE — 93880 EXTRACRANIAL BILAT STUDY: CPT

## 2025-02-27 DIAGNOSIS — I65.23 BILATERAL CAROTID ARTERY STENOSIS: Primary | ICD-10-CM

## 2025-03-10 ENCOUNTER — OFFICE VISIT (OUTPATIENT)
Dept: FAMILY MEDICINE CLINIC | Age: 80
End: 2025-03-10
Payer: MEDICARE

## 2025-03-10 VITALS
DIASTOLIC BLOOD PRESSURE: 78 MMHG | TEMPERATURE: 97.9 F | SYSTOLIC BLOOD PRESSURE: 138 MMHG | OXYGEN SATURATION: 98 % | HEART RATE: 71 BPM | BODY MASS INDEX: 21.79 KG/M2 | WEIGHT: 123 LBS

## 2025-03-10 DIAGNOSIS — K92.2 GASTROINTESTINAL HEMORRHAGE, UNSPECIFIED GASTROINTESTINAL HEMORRHAGE TYPE: ICD-10-CM

## 2025-03-10 DIAGNOSIS — I48.20 CHRONIC ATRIAL FIBRILLATION (HCC): ICD-10-CM

## 2025-03-10 DIAGNOSIS — Z79.01 CHRONIC ANTICOAGULATION: ICD-10-CM

## 2025-03-10 DIAGNOSIS — D64.9 CHRONIC ANEMIA: ICD-10-CM

## 2025-03-10 DIAGNOSIS — E11.59 TYPE 2 DIABETES MELLITUS WITH OTHER CIRCULATORY COMPLICATION, WITHOUT LONG-TERM CURRENT USE OF INSULIN (HCC): ICD-10-CM

## 2025-03-10 DIAGNOSIS — Z09 HOSPITAL DISCHARGE FOLLOW-UP: Primary | ICD-10-CM

## 2025-03-10 PROCEDURE — 1036F TOBACCO NON-USER: CPT | Performed by: NURSE PRACTITIONER

## 2025-03-10 PROCEDURE — G8420 CALC BMI NORM PARAMETERS: HCPCS | Performed by: NURSE PRACTITIONER

## 2025-03-10 PROCEDURE — 1123F ACP DISCUSS/DSCN MKR DOCD: CPT | Performed by: NURSE PRACTITIONER

## 2025-03-10 PROCEDURE — 99214 OFFICE O/P EST MOD 30 MIN: CPT | Performed by: NURSE PRACTITIONER

## 2025-03-10 PROCEDURE — 1090F PRES/ABSN URINE INCON ASSESS: CPT | Performed by: NURSE PRACTITIONER

## 2025-03-10 PROCEDURE — G8400 PT W/DXA NO RESULTS DOC: HCPCS | Performed by: NURSE PRACTITIONER

## 2025-03-10 PROCEDURE — G8427 DOCREV CUR MEDS BY ELIG CLIN: HCPCS | Performed by: NURSE PRACTITIONER

## 2025-03-10 PROCEDURE — 1159F MED LIST DOCD IN RCRD: CPT | Performed by: NURSE PRACTITIONER

## 2025-03-10 PROCEDURE — 1111F DSCHRG MED/CURRENT MED MERGE: CPT | Performed by: NURSE PRACTITIONER

## 2025-03-10 ASSESSMENT — ENCOUNTER SYMPTOMS
RESPIRATORY NEGATIVE: 1
EYES NEGATIVE: 1
GASTROINTESTINAL NEGATIVE: 1

## 2025-03-10 ASSESSMENT — PATIENT HEALTH QUESTIONNAIRE - PHQ9
SUM OF ALL RESPONSES TO PHQ QUESTIONS 1-9: 0
SUM OF ALL RESPONSES TO PHQ QUESTIONS 1-9: 0
2. FEELING DOWN, DEPRESSED OR HOPELESS: NOT AT ALL
SUM OF ALL RESPONSES TO PHQ QUESTIONS 1-9: 0
1. LITTLE INTEREST OR PLEASURE IN DOING THINGS: NOT AT ALL
SUM OF ALL RESPONSES TO PHQ QUESTIONS 1-9: 0

## 2025-03-10 NOTE — PATIENT INSTRUCTIONS
Please read the healthy family handout that you were given and share it with your family.       Please compare this printed medication list with your medications at home to be sure they are the same.  If you have any medications that are different please contact us immediately at [phone].     Also review your allergies that we have listed, these may also include medications that you have not been able to tolerate, make sure everything listed is correct. If you have any allergies that are different please contact us immediately at [phone].     You may receive a survey in the mail or by email asking about your experience during your visit today. Please complete and return to us so we know how we are serving you.

## 2025-03-10 NOTE — PROGRESS NOTES
CHIEF COMPLAINT  Chief Complaint   Patient presents with    nursing home followup        HPI   Xuan Romero is a 79 y.o. female who presents to the office accompanied by son and daughter-in-law for hospital/nursing home follow-up.  Patient was admitted on 2/12 to 2/14 in the hospital for GI bleed, chronic anticoagulation use, and persistent A-fib.  Upon discharge patient was seen [facility])\" 3/6.  Patient reports since being home she is doing well.  No episodes of dizziness lightheadedness, chest pain or shortness of breath.  No abdominal pain or discomfort.  No nausea, vomiting, diarrhea.  No dark or tarry stools.  Patient denies any blood in urine or stool.  Patient's family members assist with medications.  Patient's son concerned because it appears that some medications have been changed.  No other complaints, modifying factors or associated symptoms.     Nursing notes reviewed.   Past Medical History:   Diagnosis Date    Acute blood loss anemia     Acute osteomyelitis of foot ([facility])     Bilateral carotid artery stenosis     s/p Stent    Cellulitis of right lower extremity     Chronic deep vein thrombosis (DVT) of left peroneal vein ([facility]) 05/28/2021    Chronic systolic heart failure ([facility])     EF of 35 - 40%    Coronary artery disease involving native coronary artery of native heart without angina pectoris     CVA (cerebrovascular accident due to intracerebral hemorrhage) ([facility]) 07/21/2023    Diabetic neuropathy ([facility]) 2011    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, with bone involvement without evidence of necrosis ([facility])     Essential hypertension     H. pylori infection 2009    Longstanding persistent atrial fibrillation ([facility]) 07/18/2023    Mixed hyperlipidemia     Osteoarthritis     knees    Peptic ulcer with hemorrhage     Presence of temporary transvenous cardiac pacemaker 07/28/2023    PVD (peripheral vascular disease) with claudication     PTA distal

## 2025-03-13 RX ORDER — CLOPIDOGREL BISULFATE 75 MG/1
75 TABLET ORAL DAILY
Qty: 30 TABLET | Refills: 0 | Status: SHIPPED | OUTPATIENT
Start: 2025-03-13

## 2025-03-19 ENCOUNTER — HOSPITAL ENCOUNTER (EMERGENCY)
Age: 80
Discharge: HOME | End: 2025-03-20
Payer: MEDICARE

## 2025-03-19 ENCOUNTER — APPOINTMENT (OUTPATIENT)
Dept: CT IMAGING | Age: 80
End: 2025-03-19
Payer: MEDICARE

## 2025-03-19 DIAGNOSIS — N30.00 ACUTE CYSTITIS WITHOUT HEMATURIA: ICD-10-CM

## 2025-03-19 DIAGNOSIS — K56.41 FECAL IMPACTION (HCC): ICD-10-CM

## 2025-03-19 DIAGNOSIS — K59.00 CONSTIPATION, UNSPECIFIED CONSTIPATION TYPE: Primary | ICD-10-CM

## 2025-03-19 LAB
ALBUMIN SERPL-MCNC: 3.9 G/DL (ref 3.4–5)
ALBUMIN/GLOB SERPL: 1.3 {RATIO} (ref 1.1–2.2)
ALP SERPL-CCNC: 71 U/L (ref 40–129)
ALT SERPL-CCNC: 11 U/L (ref 10–40)
ANION GAP SERPL CALCULATED.3IONS-SCNC: 12 MMOL/L (ref 3–16)
AST SERPL-CCNC: 16 U/L (ref 15–37)
BASOPHILS # BLD: 0.1 K/UL (ref 0–0.2)
BASOPHILS NFR BLD: 1.2 %
BILIRUB SERPL-MCNC: 0.3 MG/DL (ref 0–1)
BUN SERPL-MCNC: 39 MG/DL (ref 7–20)
CALCIUM SERPL-MCNC: 9.2 MG/DL (ref 8.3–10.6)
CHLORIDE SERPL-SCNC: 103 MMOL/L (ref 99–110)
CO2 SERPL-SCNC: 23 MMOL/L (ref 21–32)
CREAT SERPL-MCNC: 1.1 MG/DL (ref 0.6–1.2)
DEPRECATED RDW RBC AUTO: 16.2 % (ref 12.4–15.4)
EOSINOPHIL # BLD: 0.4 K/UL (ref 0–0.6)
EOSINOPHIL NFR BLD: 5.2 %
GFR SERPLBLD CREATININE-BSD FMLA CKD-EPI: 51 ML/MIN/{1.73_M2}
GLUCOSE SERPL-MCNC: 106 MG/DL (ref 70–99)
HCT VFR BLD AUTO: 29.7 % (ref 36–48)
HGB BLD-MCNC: 9.7 G/DL (ref 12–16)
LACTATE BLDV-SCNC: 1.5 MMOL/L (ref 0.4–2)
LIPASE SERPL-CCNC: 26 U/L (ref 13–60)
LYMPHOCYTES # BLD: 1.5 K/UL (ref 1–5.1)
LYMPHOCYTES NFR BLD: 21.7 %
MCH RBC QN AUTO: 27.1 PG (ref 26–34)
MCHC RBC AUTO-ENTMCNC: 32.8 G/DL (ref 31–36)
MCV RBC AUTO: 82.8 FL (ref 80–100)
MONOCYTES # BLD: 0.3 K/UL (ref 0–1.3)
MONOCYTES NFR BLD: 4.7 %
NEUTROPHILS # BLD: 4.6 K/UL (ref 1.7–7.7)
NEUTROPHILS NFR BLD: 67.2 %
PLATELET # BLD AUTO: 228 K/UL (ref 135–450)
PMV BLD AUTO: 8.2 FL (ref 5–10.5)
POTASSIUM SERPL-SCNC: 4.8 MMOL/L (ref 3.5–5.1)
PROT SERPL-MCNC: 6.8 G/DL (ref 6.4–8.2)
RBC # BLD AUTO: 3.59 M/UL (ref 4–5.2)
SODIUM SERPL-SCNC: 138 MMOL/L (ref 136–145)
WBC # BLD AUTO: 6.9 K/UL (ref 4–11)

## 2025-03-19 PROCEDURE — 81001 URINALYSIS AUTO W/SCOPE: CPT

## 2025-03-19 PROCEDURE — 87086 URINE CULTURE/COLONY COUNT: CPT

## 2025-03-19 PROCEDURE — 2580000003 HC RX 258: Performed by: PHYSICIAN ASSISTANT

## 2025-03-19 PROCEDURE — 83690 ASSAY OF LIPASE: CPT

## 2025-03-19 PROCEDURE — 80053 COMPREHEN METABOLIC PANEL: CPT

## 2025-03-19 PROCEDURE — 83605 ASSAY OF LACTIC ACID: CPT

## 2025-03-19 PROCEDURE — 99285 EMERGENCY DEPT VISIT HI MDM: CPT

## 2025-03-19 PROCEDURE — 85025 COMPLETE CBC W/AUTO DIFF WBC: CPT

## 2025-03-19 PROCEDURE — 74176 CT ABD & PELVIS W/O CONTRAST: CPT

## 2025-03-19 PROCEDURE — 6360000004 HC RX CONTRAST MEDICATION: Performed by: PHYSICIAN ASSISTANT

## 2025-03-19 RX ORDER — DOCUSATE SODIUM 100 MG/1
100 CAPSULE, LIQUID FILLED ORAL 2 TIMES DAILY PRN
COMMUNITY

## 2025-03-19 RX ORDER — POLYETHYLENE GLYCOL 3350 17 G/17G
17 POWDER, FOR SOLUTION ORAL DAILY
COMMUNITY

## 2025-03-19 RX ORDER — IOPAMIDOL 755 MG/ML
75 INJECTION, SOLUTION INTRAVASCULAR
Status: COMPLETED | OUTPATIENT
Start: 2025-03-19 | End: 2025-03-19

## 2025-03-19 RX ORDER — 0.9 % SODIUM CHLORIDE 0.9 %
1000 INTRAVENOUS SOLUTION INTRAVENOUS ONCE
Status: COMPLETED | OUTPATIENT
Start: 2025-03-19 | End: 2025-03-19

## 2025-03-19 RX ADMIN — SODIUM CHLORIDE 1000 ML: 0.9 INJECTION, SOLUTION INTRAVENOUS at 21:16

## 2025-03-19 RX ADMIN — IOPAMIDOL 75 ML: 755 INJECTION, SOLUTION INTRAVENOUS at 21:33

## 2025-03-19 ASSESSMENT — PAIN DESCRIPTION - LOCATION: LOCATION: ABDOMEN;RECTUM

## 2025-03-19 ASSESSMENT — PAIN SCALES - GENERAL: PAINLEVEL_OUTOF10: 10

## 2025-03-19 ASSESSMENT — PAIN DESCRIPTION - DESCRIPTORS: DESCRIPTORS: DISCOMFORT

## 2025-03-19 ASSESSMENT — PAIN - FUNCTIONAL ASSESSMENT: PAIN_FUNCTIONAL_ASSESSMENT: 0-10

## 2025-03-20 VITALS
HEIGHT: 63 IN | SYSTOLIC BLOOD PRESSURE: 147 MMHG | DIASTOLIC BLOOD PRESSURE: 65 MMHG | TEMPERATURE: 97.7 F | HEART RATE: 67 BPM | BODY MASS INDEX: 21.79 KG/M2 | WEIGHT: 123 LBS | RESPIRATION RATE: 16 BRPM | OXYGEN SATURATION: 99 %

## 2025-03-20 LAB
BACTERIA UR CULT: NORMAL
BACTERIA URNS QL MICRO: ABNORMAL /HPF
BILIRUB UR QL STRIP.AUTO: NEGATIVE
CLARITY UR: CLEAR
COLOR UR: YELLOW
EPI CELLS #/AREA URNS HPF: ABNORMAL /HPF (ref 0–5)
GLUCOSE UR STRIP.AUTO-MCNC: NEGATIVE MG/DL
HGB UR QL STRIP.AUTO: ABNORMAL
KETONES UR STRIP.AUTO-MCNC: NEGATIVE MG/DL
LEUKOCYTE ESTERASE UR QL STRIP.AUTO: ABNORMAL
NITRITE UR QL STRIP.AUTO: NEGATIVE
PH UR STRIP.AUTO: 6.5 [PH] (ref 5–8)
PROT UR STRIP.AUTO-MCNC: NEGATIVE MG/DL
RBC #/AREA URNS HPF: ABNORMAL /HPF (ref 0–4)
RENAL EPI CELLS #/AREA UR COMP ASSIST: ABNORMAL /HPF (ref 0–1)
SP GR UR STRIP.AUTO: <=1.005 (ref 1–1.03)
UA COMPLETE W REFLEX CULTURE PNL UR: YES
UA DIPSTICK W REFLEX MICRO PNL UR: YES
URN SPEC COLLECT METH UR: ABNORMAL
UROBILINOGEN UR STRIP-ACNC: 0.2 E.U./DL
WBC #/AREA URNS HPF: ABNORMAL /HPF (ref 0–5)

## 2025-03-20 RX ORDER — CEPHALEXIN 500 MG/1
500 CAPSULE ORAL 4 TIMES DAILY
Qty: 28 CAPSULE | Refills: 0 | Status: SHIPPED | OUTPATIENT
Start: 2025-03-20 | End: 2025-03-27

## 2025-03-20 NOTE — ED NOTES
Pt discharged from SNF a few weeks ago after admission here for GI bleed.  Pt c/o constipation and abdominal pain again.  Can't remember last bowel movement.  Rose parker

## 2025-03-20 NOTE — ED PROVIDER NOTES
[facility] Emergency Department    CHIEF COMPLAINT  Abdominal Pain (Pt with c/o abdominal pain and constipation.  Started dulcolax today.  Was taking miralax and wasn't helping.  )      SHARED SERVICE VISIT  Evaluated by BREONNA.  My supervising physician was available for consultation.    HISTORY OF PRESENT ILLNESS  Xuan Romero is a 79 y.o. female who presents to the ED complaining of diffuse lower abdominal pain with constipation.  She has been experiencing constipation ongoing for the past 5 days.  Was initially taking MiraLAX, however this not helped.  Started stool softener today, however still unable to have a bowel movement.  She is describing pain as a abdominal fullness sensation with some discomfort in the rectum.  Still able to urinate with no discomfort or hematuria. Denies any headache, body ache, fevers or chills.  Denies any coughing or sneezing.  Denies any sore throat or congestion.  Denies any vision changes or dizziness.  Denies any chest pain, shortness of breath, or dyspnea on exertion.  Denies any diarrhea or bloody stools.  Denies any new onset back pain.  Denies any recent travel or sick contacts.    No other complaints, modifying factors or associated symptoms.     Nursing notes reviewed.   Past Medical History:   Diagnosis Date    Acute blood loss anemia     Acute osteomyelitis of foot ([facility])     Bilateral carotid artery stenosis     s/p Stent    Cellulitis of right lower extremity     Chronic deep vein thrombosis (DVT) of left peroneal vein ([facility]) 05/28/2021    Chronic systolic heart failure ([facility])     EF of 35 - 40%    Coronary artery disease involving native coronary artery of native heart without angina pectoris     CVA (cerebrovascular accident due to intracerebral hemorrhage) ([facility]) 07/21/2023    Diabetic neuropathy ([facility]) 2011    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, with bone involvement without evidence of necrosis  Detail Level: Generalized

## 2025-03-20 NOTE — ED NOTES
Soap suds enema completed.  Large amount hard stool returned followed by soft stool.  Rn encouraged pt to drink more water and take stool softener at home.  RN cleansed pt up and placed new depends on her.  Rose parker

## 2025-03-20 NOTE — DISCHARGE INSTRUCTIONS
Continue using MiraLAX as well as increasing daily fiber intake and fluids.  If abdominal pain should return, to emergency department for further evaluation.  Follow-up department care provider.

## 2025-03-24 ENCOUNTER — TELEPHONE (OUTPATIENT)
Dept: FAMILY MEDICINE CLINIC | Age: 80
End: 2025-03-24

## 2025-03-24 NOTE — TELEPHONE ENCOUNTER
Discussed with patient's son, advised dose of losartan should be 50 mg daily according to last office note.  Advised med list shows 2.5 mg twice daily but recommended he contact cardiologist to confirm

## 2025-03-24 NOTE — TELEPHONE ENCOUNTER
Patient's son says that patient was previously taking Eliquis 2.5 mg, new bottle says 5 mg. Should she be taking eliquis 2.5 mg or 5 mg. Asking if losartan is for blood pressure. Son says that patient was taking the losartan 50 mg 2 times a day and the nursing home changed the losartan 50 mg to 1 a day, asking if she should be taking 2 a day instead of 1 a day.

## 2025-03-26 NOTE — PROGRESS NOTES
[facility]   Electrophysiology Outpatient Note              Date:  March 26, 2025  Patient name: Xuan Romero  YOB: 1945    Primary Care physician: Mayelin Celis APRN - CNP    HISTORY OF PRESENT ILLNESS: The patient is a 79 y.o.  female with a history of hypertension, hyperlipidemia, mild multivessel CAD/BHARGAVI, bradycardia/single-chamber PPM, persistent atrial fibrillation, stroke    Patient follows with  in EP clinic.  2023 patient was admitted to [facility] with acute stroke.  Patient was found to be bradycardic and ultimately underwent single-chamber PPM implantation.    Today she is being seen for history of permanent atrial fibrillation, bradycardia and device management. Device checks reveals  75.5%, AP 24.5% ECG shows SR with a HR of 60. Patient patient is accompanied by her son and daughter-in-law.  Patient was recently in the hospital with GI bleed in which they found AVM that APC treatment was applied.  Unfortunately she was taking triple therapy at that time.  Her aspirin has since been discontinued.  Patient has been approved to continue taking Plavix and Eliquis 2.5 mg twice daily.  Reviewed at length with family the importance and taking 2.5 mg twice daily to prevent stroke.  Son verbalizes understanding patient will get CBC in 2 weeks to reassess her hemoglobin    Past Medical History:   has a past medical history of Acute blood loss anemia, Acute osteomyelitis of foot ([facility]), Bilateral carotid artery stenosis, Cellulitis of right lower extremity, Chronic deep vein thrombosis (DVT) of left peroneal vein ([facility]), Chronic systolic heart failure ([facility]), Coronary artery disease involving native coronary artery of native heart without angina pectoris, CVA (cerebrovascular accident due to intracerebral hemorrhage) ([facility]), Diabetic neuropathy ([facility]), 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, with bone involvement without evidence of

## 2025-03-27 ENCOUNTER — CLINICAL SUPPORT (OUTPATIENT)
Dept: CARDIOLOGY CLINIC | Age: 80
End: 2025-03-27

## 2025-03-27 ENCOUNTER — OFFICE VISIT (OUTPATIENT)
Dept: CARDIOLOGY CLINIC | Age: 80
End: 2025-03-27

## 2025-03-27 VITALS
DIASTOLIC BLOOD PRESSURE: 56 MMHG | BODY MASS INDEX: 21.79 KG/M2 | HEART RATE: 60 BPM | HEIGHT: 63 IN | SYSTOLIC BLOOD PRESSURE: 116 MMHG | OXYGEN SATURATION: 98 %

## 2025-03-27 DIAGNOSIS — Z95.0 PACEMAKER: Primary | ICD-10-CM

## 2025-03-27 DIAGNOSIS — D50.8 OTHER IRON DEFICIENCY ANEMIA: ICD-10-CM

## 2025-03-27 DIAGNOSIS — I48.11 LONGSTANDING PERSISTENT ATRIAL FIBRILLATION (HCC): Primary | ICD-10-CM

## 2025-03-27 DIAGNOSIS — I25.10 CORONARY ARTERY DISEASE DUE TO CALCIFIED CORONARY LESION: ICD-10-CM

## 2025-03-27 DIAGNOSIS — E78.2 MIXED HYPERLIPIDEMIA: ICD-10-CM

## 2025-03-27 DIAGNOSIS — I49.5 SICK SINUS SYNDROME (HCC): ICD-10-CM

## 2025-03-27 DIAGNOSIS — I25.84 CORONARY ARTERY DISEASE DUE TO CALCIFIED CORONARY LESION: ICD-10-CM

## 2025-03-27 DIAGNOSIS — I48.0 PAROXYSMAL A-FIB (HCC): ICD-10-CM

## 2025-03-27 NOTE — PATIENT INSTRUCTIONS
Continue with Eliquis 2.5 mg twice daily for stroke risk reduction--reduced for age and weight  Continue Plavix 75 mg daily  Continue losartan 50 mg daily  Continue Cozaar 50 mg daily  Continue with every 3-month remote device checks  Blood work: CBC in 2 weeks    Follow up in 6 months Mikki CARRION

## 2025-03-31 NOTE — PROGRESS NOTES
OV NPKK  REP CHECK. No parameters have been changed during the current session.    See MURJ report under cardiology tab once EP reviews.

## 2025-04-08 ENCOUNTER — CLINICAL SUPPORT (OUTPATIENT)
Dept: CARDIOLOGY CLINIC | Age: 80
End: 2025-04-08

## 2025-04-11 ENCOUNTER — HOSPITAL ENCOUNTER (OUTPATIENT)
Age: 80
Discharge: HOME | End: 2025-04-11
Payer: MEDICARE

## 2025-04-11 ENCOUNTER — RESULTS FOLLOW-UP (OUTPATIENT)
Dept: CARDIOLOGY CLINIC | Age: 80
End: 2025-04-11

## 2025-04-11 DIAGNOSIS — E55.9 VITAMIN D DEFICIENCY: ICD-10-CM

## 2025-04-11 DIAGNOSIS — I48.11 LONGSTANDING PERSISTENT ATRIAL FIBRILLATION (HCC): ICD-10-CM

## 2025-04-11 LAB
BASOPHILS # BLD: 0.1 K/UL (ref 0–0.2)
BASOPHILS NFR BLD: 1.2 %
DEPRECATED RDW RBC AUTO: 15.8 % (ref 12.4–15.4)
EOSINOPHIL # BLD: 0.3 K/UL (ref 0–0.6)
EOSINOPHIL NFR BLD: 3.4 %
HCT VFR BLD AUTO: 28.7 % (ref 36–48)
HGB BLD-MCNC: 9.4 G/DL (ref 12–16)
LYMPHOCYTES # BLD: 1.7 K/UL (ref 1–5.1)
LYMPHOCYTES NFR BLD: 22 %
MCH RBC QN AUTO: 26.7 PG (ref 26–34)
MCHC RBC AUTO-ENTMCNC: 32.9 G/DL (ref 31–36)
MCV RBC AUTO: 81.2 FL (ref 80–100)
MONOCYTES # BLD: 0.4 K/UL (ref 0–1.3)
MONOCYTES NFR BLD: 5.6 %
NEUTROPHILS # BLD: 5.2 K/UL (ref 1.7–7.7)
NEUTROPHILS NFR BLD: 67.8 %
PLATELET # BLD AUTO: 182 K/UL (ref 135–450)
PMV BLD AUTO: 7.6 FL (ref 5–10.5)
RBC # BLD AUTO: 3.53 M/UL (ref 4–5.2)
WBC # BLD AUTO: 7.7 K/UL (ref 4–11)

## 2025-04-11 PROCEDURE — 36415 COLL VENOUS BLD VENIPUNCTURE: CPT

## 2025-04-11 PROCEDURE — 85025 COMPLETE CBC W/AUTO DIFF WBC: CPT

## 2025-04-11 RX ORDER — CHOLECALCIFEROL (VITAMIN D3) 25 MCG
TABLET ORAL
Qty: 90 TABLET | Refills: 3 | Status: SHIPPED | OUTPATIENT
Start: 2025-04-11

## 2025-04-15 RX ORDER — CLOPIDOGREL BISULFATE 75 MG/1
75 TABLET ORAL DAILY
Qty: 30 TABLET | Refills: 0 | Status: SHIPPED | OUTPATIENT
Start: 2025-04-15

## 2025-05-13 RX ORDER — CLOPIDOGREL BISULFATE 75 MG/1
75 TABLET ORAL DAILY
Qty: 30 TABLET | Refills: 0 | Status: SHIPPED | OUTPATIENT
Start: 2025-05-13

## 2025-05-19 NOTE — TELEPHONE ENCOUNTER
Last Office Visit: 03/27/25 Provider: ASTRID  **Is provider OOT? No    Next Office Visit: 9/25/2025 Provider: ASTRID  **If no OV, when does pt need to be seen? N/A   **Has patient already had 30 day supply? No    Lab orders needed? no   Encounter provider correct? Yes If not, change provider  Script changes since last refill? no    LAST LABS:   CBC:  Lab Results   Component Value Date    WBC 7.7 04/11/2025    HGB 9.4 (L) 04/11/2025    HCT 28.7 (L) 04/11/2025    MCV 81.2 04/11/2025     04/11/2025    LYMPHOPCT 22.0 04/11/2025    RBC 3.53 (L) 04/11/2025    MCH 26.7 04/11/2025    MCHC 32.9 04/11/2025    RDW 15.8 (H) 04/11/2025           BMP:  Lab Results   Component Value Date/Time     03/19/2025 08:43 PM    K 4.8 03/19/2025 08:43 PM     03/19/2025 08:43 PM    CO2 23 03/19/2025 08:43 PM    BUN 39 03/19/2025 08:43 PM    CREATININE 1.1 03/19/2025 08:43 PM    GLUCOSE 106 03/19/2025 08:43 PM    CALCIUM 9.2 03/19/2025 08:43 PM    LABGLOM 51 03/19/2025 08:43 PM    LABGLOM >60 03/18/2024 03:09 PM        **Care Everywhere? no

## 2025-05-19 NOTE — TELEPHONE ENCOUNTER
Pt called and stated that she has 3 days of Eliquis 2.5mg left.  Please send refill to     [id number] - [place], [place] - [address] - P [phone] - F [phone]  [address], [place] [address]  Phone: [phone]  Fax: [phone]     LOV with ASTRID 3/27/25

## 2025-06-02 DIAGNOSIS — I63.9 CEREBROVASCULAR ACCIDENT (CVA), UNSPECIFIED MECHANISM (HCC): ICD-10-CM

## 2025-06-03 RX ORDER — SIMVASTATIN 20 MG
20 TABLET ORAL NIGHTLY
Qty: 90 TABLET | Refills: 1 | Status: SHIPPED | OUTPATIENT
Start: 2025-06-03

## 2025-06-03 NOTE — TELEPHONE ENCOUNTER
Future appt scheduled 06/09/2025                          Last appt 03/10/2025    Refill Request     CONFIRM preferrred pharmacy with the patient.    If Mail Order Rx - Pend for 90 day refill.      Last Seen: Last Seen Department: 3/10/2025  Last Seen by PCP: 3/10/2025    Last Written: 11/11/2024    If no future appointment scheduled, route STAFF MESSAGE with patient name to the  Pool for scheduling.      Next Appointment:   Future Appointments   Date Time Provider Department Center   6/9/2025 10:40 AM Prince Frederick, Mayelin, APRN - CNP [facility] DE[facility]   9/25/2025  1:15 PM SCHEDULE, HILARIO DEVICE CHECK [facility]   9/25/2025  1:15 PM Mikki Dixon APRN - CNP [facility]       Message sent to  to schedule appt with patient?  NO      Requested Prescriptions     Pending Prescriptions Disp Refills    simvastatin (ZOCOR) 20 MG tablet [Pharmacy Med Name: SIMVASTATIN 20 MG TABLET] 90 tablet 1     Sig: TAKE ONE TABLET BY MOUTH ONCE NIGHTLY

## 2025-06-09 ENCOUNTER — OFFICE VISIT (OUTPATIENT)
Dept: FAMILY MEDICINE CLINIC | Age: 80
End: 2025-06-09
Payer: MEDICARE

## 2025-06-09 ENCOUNTER — RESULTS FOLLOW-UP (OUTPATIENT)
Dept: FAMILY MEDICINE CLINIC | Age: 80
End: 2025-06-09

## 2025-06-09 VITALS
DIASTOLIC BLOOD PRESSURE: 78 MMHG | HEART RATE: 68 BPM | TEMPERATURE: 97.6 F | BODY MASS INDEX: 20.91 KG/M2 | OXYGEN SATURATION: 96 % | SYSTOLIC BLOOD PRESSURE: 138 MMHG | WEIGHT: 118 LBS

## 2025-06-09 DIAGNOSIS — I10 BENIGN ESSENTIAL HTN: ICD-10-CM

## 2025-06-09 DIAGNOSIS — D64.9 CHRONIC ANEMIA: ICD-10-CM

## 2025-06-09 DIAGNOSIS — Z79.01 CHRONIC ANTICOAGULATION: ICD-10-CM

## 2025-06-09 DIAGNOSIS — I73.9 PVD (PERIPHERAL VASCULAR DISEASE): ICD-10-CM

## 2025-06-09 DIAGNOSIS — Z95.0 CARDIAC PACEMAKER IN SITU: ICD-10-CM

## 2025-06-09 DIAGNOSIS — I25.10 CORONARY ARTERY DISEASE INVOLVING NATIVE CORONARY ARTERY OF NATIVE HEART WITHOUT ANGINA PECTORIS: ICD-10-CM

## 2025-06-09 DIAGNOSIS — E78.00 PURE HYPERCHOLESTEROLEMIA: ICD-10-CM

## 2025-06-09 DIAGNOSIS — E11.8 TYPE 2 DIABETES MELLITUS WITH COMPLICATION, WITHOUT LONG-TERM CURRENT USE OF INSULIN (HCC): ICD-10-CM

## 2025-06-09 DIAGNOSIS — I73.9 PAD (PERIPHERAL ARTERY DISEASE): ICD-10-CM

## 2025-06-09 DIAGNOSIS — E55.9 VITAMIN D DEFICIENCY: ICD-10-CM

## 2025-06-09 DIAGNOSIS — I48.11 LONGSTANDING PERSISTENT ATRIAL FIBRILLATION (HCC): ICD-10-CM

## 2025-06-09 DIAGNOSIS — I50.22 CHRONIC SYSTOLIC HEART FAILURE (HCC): Primary | ICD-10-CM

## 2025-06-09 LAB
CREAT UR-MCNC: 49.4 MG/DL (ref 28–259)
HBA1C MFR BLD: 5.8 %
MICROALBUMIN UR DL<=1MG/L-MCNC: 6.8 MG/DL
MICROALBUMIN/CREAT UR: 137.7 MG/G (ref 0–30)

## 2025-06-09 PROCEDURE — 3044F HG A1C LEVEL LT 7.0%: CPT | Performed by: NURSE PRACTITIONER

## 2025-06-09 PROCEDURE — 1090F PRES/ABSN URINE INCON ASSESS: CPT | Performed by: NURSE PRACTITIONER

## 2025-06-09 PROCEDURE — G8427 DOCREV CUR MEDS BY ELIG CLIN: HCPCS | Performed by: NURSE PRACTITIONER

## 2025-06-09 PROCEDURE — 1160F RVW MEDS BY RX/DR IN RCRD: CPT | Performed by: NURSE PRACTITIONER

## 2025-06-09 PROCEDURE — 1036F TOBACCO NON-USER: CPT | Performed by: NURSE PRACTITIONER

## 2025-06-09 PROCEDURE — G8400 PT W/DXA NO RESULTS DOC: HCPCS | Performed by: NURSE PRACTITIONER

## 2025-06-09 PROCEDURE — 99215 OFFICE O/P EST HI 40 MIN: CPT | Performed by: NURSE PRACTITIONER

## 2025-06-09 PROCEDURE — 1123F ACP DISCUSS/DSCN MKR DOCD: CPT | Performed by: NURSE PRACTITIONER

## 2025-06-09 PROCEDURE — 3078F DIAST BP <80 MM HG: CPT | Performed by: NURSE PRACTITIONER

## 2025-06-09 PROCEDURE — 1159F MED LIST DOCD IN RCRD: CPT | Performed by: NURSE PRACTITIONER

## 2025-06-09 PROCEDURE — G2211 COMPLEX E/M VISIT ADD ON: HCPCS | Performed by: NURSE PRACTITIONER

## 2025-06-09 PROCEDURE — 3075F SYST BP GE 130 - 139MM HG: CPT | Performed by: NURSE PRACTITIONER

## 2025-06-09 PROCEDURE — 83036 HEMOGLOBIN GLYCOSYLATED A1C: CPT | Performed by: NURSE PRACTITIONER

## 2025-06-09 PROCEDURE — G8420 CALC BMI NORM PARAMETERS: HCPCS | Performed by: NURSE PRACTITIONER

## 2025-06-09 RX ORDER — FERROUS SULFATE 325(65) MG
325 TABLET ORAL
Qty: 90 TABLET | Refills: 1 | Status: SHIPPED | OUTPATIENT
Start: 2025-06-09

## 2025-06-09 RX ORDER — LOSARTAN POTASSIUM 50 MG/1
50 TABLET ORAL DAILY
Qty: 90 TABLET | Refills: 1 | Status: SHIPPED | OUTPATIENT
Start: 2025-06-09

## 2025-06-09 ASSESSMENT — ENCOUNTER SYMPTOMS
RESPIRATORY NEGATIVE: 1
EYES NEGATIVE: 1
GASTROINTESTINAL NEGATIVE: 1

## 2025-06-09 NOTE — PROGRESS NOTES
checked blood glucose.  Patient noncompliant with diet.  Patient compliant with metformin 1000 mg daily with breakfast.  Continue current treatment and management follow-up in 6 months, sooner for new or worsening symptoms.  - Albumin/Creatinine Ratio, Urine  - POCT glycosylated hemoglobin (Hb A1C)    5. Chronic anemia  Stable.  Recent labs reviewed.  Patient compliant with iron supplement daily.  Continue current treatment management follow-up in 6 months, sooner for new or worsening symptoms.    6. Vitamin D deficiency  Stable.  Patient compliant with vitamin D supplement daily.  Continue current treatment management follow-up in 6 months, sooner for new or worsening symptoms.    7. Benign essential HTN  Stable.  Patient compliant with carvedilol and losartan daily.  Continue current treatment management follow-up in 6 months, sooner if new or worsening symptoms.    8. PAD (peripheral artery disease)/PVD (peripheral vascular disease)  Stable.  Managed by cardiology and vascular.  Patient compliant with chronic anticoagulation therapy and statin.  Continue current treatment management follow-up in 6 months, sooner for new or worsening symptoms.    9. Pure hypercholesterolemia  Stable.  Patient compliant with simvastatin 20 mg daily.  Continue current treatment management follow-up in 6 months, sooner if new or worsening symptoms.         The note was completed using Dragon voice recognition transcription. Every effort was made to ensure accuracy; however, inadvertent  transcription errors may be present despite my best efforts to edit errors.    Mayelin Celis, MARVIN - CNP

## 2025-06-09 NOTE — PATIENT INSTRUCTIONS
Please read the healthy family handout that you were given and share it with your family.       Please compare this printed medication list with your medications at home to be sure they are the same.  If you have any medications that are different please contact us immediately at [phone].     Also review your allergies that we have listed, these may also include medications that you have not been able to tolerate, make sure everything listed is correct. If you have any allergies that are different please contact us immediately at [phone].     You may receive a survey in the mail or by email asking about your experience during your visit today. Please complete and return to us so we know how we are serving you.

## 2025-06-10 RX ORDER — CLOPIDOGREL BISULFATE 75 MG/1
75 TABLET ORAL DAILY
Qty: 30 TABLET | Refills: 0 | Status: SHIPPED | OUTPATIENT
Start: 2025-06-10

## 2025-06-16 RX ORDER — MULTIVIT WITH MINERALS/LUTEIN
250 TABLET ORAL DAILY
Qty: 30 TABLET | Refills: 3 | Status: SHIPPED | OUTPATIENT
Start: 2025-06-16

## 2025-07-10 RX ORDER — CLOPIDOGREL BISULFATE 75 MG/1
75 TABLET ORAL DAILY
Qty: 30 TABLET | Refills: 0 | Status: SHIPPED | OUTPATIENT
Start: 2025-07-10

## 2025-07-17 ENCOUNTER — HOSPITAL ENCOUNTER (EMERGENCY)
Age: 80
Discharge: HOME | End: 2025-07-17
Payer: MEDICARE

## 2025-07-17 VITALS
BODY MASS INDEX: 18 KG/M2 | OXYGEN SATURATION: 100 % | WEIGHT: 112 LBS | SYSTOLIC BLOOD PRESSURE: 132 MMHG | TEMPERATURE: 97.7 F | HEIGHT: 66 IN | DIASTOLIC BLOOD PRESSURE: 63 MMHG | HEART RATE: 76 BPM | RESPIRATION RATE: 16 BRPM

## 2025-07-17 DIAGNOSIS — T14.8XXA SUPERFICIAL HEMATOMA: Primary | ICD-10-CM

## 2025-07-17 PROCEDURE — 99282 EMERGENCY DEPT VISIT SF MDM: CPT

## 2025-07-17 ASSESSMENT — PAIN SCALES - GENERAL: PAINLEVEL_OUTOF10: 0

## 2025-07-17 ASSESSMENT — PAIN - FUNCTIONAL ASSESSMENT: PAIN_FUNCTIONAL_ASSESSMENT: 0-10

## 2025-07-17 NOTE — ED PROVIDER NOTES
interventions in ED course.     EMERGENCY DEPARTMENT COURSE and DIFFERENTIAL DIAGNOSIS/MDM:   Vitals:    Vitals:    07/17/25 1418   BP: 132/63   Pulse: 76   Resp: 16   Temp: 97.7 °F (36.5 °C)   TempSrc: Oral   SpO2: 100%   Weight: 50.8 kg (112 lb)   Height: 1.676 m (5' 6\")       Is this patient to be included in the SEP-1 Core Measure due to severe sepsis or septic shock?   No   Exclusion criteria - the patient is NOT to be included for SEP-1 Core Measure due to:  Infection is not suspected    Patient was given the following medications:  Medications - No data to display          Chronic Conditions affecting care:    has a past medical history of Acute blood loss anemia, Acute osteomyelitis of foot ([facility]), Bilateral carotid artery stenosis, Cellulitis of right lower extremity, Chronic deep vein thrombosis (DVT) of left peroneal vein ([facility]) (05/28/2021), Chronic systolic heart failure ([facility]), Coronary artery disease involving native coronary artery of native heart without angina pectoris, CVA (cerebrovascular accident due to intracerebral hemorrhage) ([facility]) (07/21/2023), Diabetic neuropathy ([facility]) (2011), 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, with bone involvement without evidence of necrosis ([facility]), Essential hypertension, H. pylori infection (2009), Longstanding persistent atrial fibrillation ([facility]) (07/18/2023), Mixed hyperlipidemia, Osteoarthritis, Peptic ulcer with hemorrhage, Presence of temporary transvenous cardiac pacemaker (07/28/2023), PVD (peripheral vascular disease) with claudication, SSS (sick sinus syndrome) ([facility]) (09/20/2023), Type 2 diabetes mellitus with complication, without long-term current use of insulin ([facility]), and Vitamin D deficiency (05/20/2017).    CONSULTS: (Who and What was discussed)  None      Records Reviewed (External, Source and Summary)   None    CC/HPI Summary, DDx, ED Course, and Reassessment:   Xuan Romero is a 80 y.o. female who presents to the

## 2025-07-17 NOTE — DISCHARGE INSTRUCTIONS
Please apply heat to the area like discussed.  If the wound starts to bleed, apply compression and a bandage to the site.  Follow-up with your primary care doctor in the next 2 to 3 days for repeat evaluation.  Return to the emergency department if your symptoms worsen.

## 2025-07-17 NOTE — ED NOTES
Discharge paperwork given to and reviewed with pt and family. Pt verbalized understanding and all questions answered. Pt encouraged to return if having worsening symptoms or new symptoms discussed in discharge paperwork.  Pt to follow up with PCP if needed  Pt in NAD, RR even and unlabored. Pt off unit via wheelchair with family

## 2025-08-11 RX ORDER — CLOPIDOGREL BISULFATE 75 MG/1
75 TABLET ORAL DAILY
Qty: 30 TABLET | Refills: 0 | Status: SHIPPED | OUTPATIENT
Start: 2025-08-11

## 2025-09-05 RX ORDER — CLOPIDOGREL BISULFATE 75 MG/1
75 TABLET ORAL DAILY
Qty: 30 TABLET | Refills: 0 | Status: SHIPPED | OUTPATIENT
Start: 2025-09-05

## (undated) DEVICE — HANDPIECE SET WITH HIGH FLOW TIP AND SUCTION TUBE: Brand: INTERPULSE

## (undated) DEVICE — YANKAUER,BULB TIP,W/O VENT,RIGID,STERILE: Brand: MEDLINE

## (undated) DEVICE — GLOVE ORANGE PI 7 1/2   MSG9075

## (undated) DEVICE — STANDARD HYPODERMIC NEEDLE,POLYPROPYLENE HUB: Brand: MONOJECT

## (undated) DEVICE — GOWN,AURORA,NONREINF,RAGLAN,XXL,STERILE: Brand: MEDLINE

## (undated) DEVICE — ENDO CARRY-ON PROCEDURE KIT INCLUDES SUCTION TUBING, LUBRICANT, GAUZE, BIOHAZARD STICKER, TRANSPORT PAD AND INTERCEPT BEDSIDE KIT.: Brand: ENDO CARRY-ON PROCEDURE KIT

## (undated) DEVICE — BLADE CARBON-STEEL #15 STRL TWIN-BACK

## (undated) DEVICE — 5MM X 35MM: Brand: ENROUTE ENFLATE TRANSCAROTID RX BALLOON DILATATION CATHETER

## (undated) DEVICE — GUIDEWIRE VASC L260CM DIA0.035IN RAD 3MM J TIP L7CM PTFE

## (undated) DEVICE — DRESSING,GAUZE,XEROFORM,CURAD,5"X9",ST: Brand: CURAD

## (undated) DEVICE — COVER XR CASS W20XL41IN UNIV ADH STRP

## (undated) DEVICE — SOLUTION IV IRRIG POUR BRL 0.9% SODIUM CHL 2F7124

## (undated) DEVICE — SUTURE NONABSORBABLE MONOFILAMENT 3-0 PS-1 18 IN BLK ETHILON 1663H

## (undated) DEVICE — ELECTRODE,RADIOTRANSLUCENT,FOAM,3PK: Brand: MEDLINE

## (undated) DEVICE — CATH LAB PACK: Brand: MEDLINE INDUSTRIES, INC.

## (undated) DEVICE — TRAY PREP DRY W/ PREM GLV 2 APPL 6 SPNG 2 UNDPD 1 OVERWRAP

## (undated) DEVICE — Device: Brand: JELCO

## (undated) DEVICE — TOWEL,OR,DSP,ST,BLUE,STD,6/PK,12PK/CS: Brand: MEDLINE

## (undated) DEVICE — BAG C ARM 22 IN LEN 22 IN W LTX FREE ST SNAP

## (undated) DEVICE — SOLUTION IRRIG 2000ML 0.9% SOD CHL USP UROMATIC PLAS CONT

## (undated) DEVICE — PROVE COVER: Brand: UNBRANDED

## (undated) DEVICE — GUIDEWIRE VASC L150CM DIA0.035IN STR TIP PTFE FIX COR

## (undated) DEVICE — SYRINGE ANGIO 12ML COR CTRL ROT ADPT SLD PLUNG CLR BRL M

## (undated) DEVICE — TUBING, SUCTION, 3/16" X 12', STRAIGHT: Brand: MEDLINE

## (undated) DEVICE — 3M™ TEGADERM™ TRANSPARENT FILM DRESSING FRAME STYLE, 1624W, 2-3/8 IN X 2-3/4 IN (6 CM X 7 CM), 100/CT 4CT/CASE: Brand: 3M™ TEGADERM™

## (undated) DEVICE — GLIDESHEATH SLENDER STAINLESS STEEL KIT: Brand: GLIDESHEATH SLENDER

## (undated) DEVICE — CONMED SCOPE SAVER BITE BLOCK, 20X27 MM: Brand: SCOPE SAVER

## (undated) DEVICE — CATHETER DIAG 5FR L100CM LUMN ID0.047IN JR4 CRV 0 SIDE H

## (undated) DEVICE — SYRINGE MED 20ML STD CLR PLAS LUERLOCK TIP N CTRL DISP

## (undated) DEVICE — SOLUTION IV HEPARIN SODIUM SODIUM CHL 0.9% 500 ML INJ VIAFLX

## (undated) DEVICE — CONTAINER,SPECIMEN,OR STERILE,4OZ: Brand: MEDLINE

## (undated) DEVICE — ANGLED-TIP ARTERIAL SHEATH CONFIGURATION
Type: IMPLANTABLE DEVICE | Site: NECK | Status: NON-FUNCTIONAL
Brand: ENROUTE TRANSCAROTID NEUROPROTECTION SYSTEM
Removed: 2025-01-29

## (undated) DEVICE — SNAP KOVER: Brand: UNBRANDED

## (undated) DEVICE — LOWER EXTREMITY: Brand: MEDLINE INDUSTRIES, INC.

## (undated) DEVICE — FIAPC® PROBE W/ FILTER 2200 A OD 2.3MM/6.9FR; L 2.2M/7.2FT: Brand: ERBE

## (undated) DEVICE — ENDOSCOPIC KIT 2 12 FT OP4 DE2 GWN SYR

## (undated) DEVICE — CANNULA,OXY,ADULT,SUPERSOFT,W/7'TUB,SC: Brand: MEDLINE INDUSTRIES, INC.

## (undated) DEVICE — GLOVE SURG SZ 8 L11.77IN FNGR THK9.8MIL STRW LTX POLYMER

## (undated) DEVICE — SOLUTION IRRIG 1000ML 0.9% SOD CHL USP POUR PLAS BTL

## (undated) DEVICE — SUTURE ETHLN SZ 3-0 L30IN NONABSORBABLE BLK FSL L30MM 3/8 1671H

## (undated) DEVICE — ELECTRODE PT RET AD L9FT HI MOIST COND ADH HYDRGEL CORDED

## (undated) DEVICE — 3M™ IOBAN™ 2 ANTIMICROBIAL INCISE DRAPE 6650EZ: Brand: IOBAN™ 2

## (undated) DEVICE — Device

## (undated) DEVICE — GUIDEWIRE VASC L260CM DIA0.035IN STR TIP FLPY PROF WHLY

## (undated) DEVICE — ANGLED-TIP ARTERIAL SHEATH CONFIGURATION: Brand: ENROUTE TRANSCAROTID NEUROPROTECTION SYSTEM

## (undated) DEVICE — COVER,TABLE,HEAVY DUTY,50"X90",STRL: Brand: MEDLINE

## (undated) DEVICE — ANG DR W/PANELS: Brand: CONVERTORS

## (undated) DEVICE — GLOVE ORANGE PI 8   MSG9080

## (undated) DEVICE — INTRODUCER SHTH 0.018 IN 21 GAX7 CM TRANSCAROTID ACCS KT

## (undated) DEVICE — TR BAND RADIAL ARTERY COMPRESSION DEVICE: Brand: TR BAND

## (undated) DEVICE — PREMIUM WET SKIN PREP TRAY: Brand: MEDLINE INDUSTRIES, INC.

## (undated) DEVICE — GAUZE,SPONGE,2"X2",8PLY,STERILE,LF,2'S: Brand: MEDLINE

## (undated) DEVICE — CATHETER DIAG 5FR L100CM LUMN ID0.047IN JL3.5 CRV 0 SIDE H

## (undated) DEVICE — GUIDEWIRE VASCULAR L95CM DIA0.014IN ENROUTE

## (undated) DEVICE — DEVICE INFL W/ HEM VLV TORQ

## (undated) DEVICE — SYRINGE LL 10CC

## (undated) DEVICE — SUTURE PERMAHAND SZ 2-0 L18IN NONABSORBABLE BLK L26MM SH C012D

## (undated) DEVICE — SUTURE NONABSORBABLE MONOFILAMENT 5-0 C-1 1X24 IN PROLENE 8725H